# Patient Record
Sex: FEMALE | Race: ASIAN | NOT HISPANIC OR LATINO | URBAN - METROPOLITAN AREA
[De-identification: names, ages, dates, MRNs, and addresses within clinical notes are randomized per-mention and may not be internally consistent; named-entity substitution may affect disease eponyms.]

---

## 2017-02-27 ENCOUNTER — EMERGENCY (EMERGENCY)
Facility: HOSPITAL | Age: 72
LOS: 1 days | Discharge: HOME | End: 2017-02-27
Attending: EMERGENCY MEDICINE | Admitting: EMERGENCY MEDICINE
Payer: MEDICARE

## 2017-02-27 VITALS
OXYGEN SATURATION: 100 % | DIASTOLIC BLOOD PRESSURE: 51 MMHG | SYSTOLIC BLOOD PRESSURE: 123 MMHG | HEART RATE: 70 BPM | RESPIRATION RATE: 18 BRPM

## 2017-02-27 VITALS
DIASTOLIC BLOOD PRESSURE: 71 MMHG | TEMPERATURE: 99 F | OXYGEN SATURATION: 98 % | HEART RATE: 73 BPM | RESPIRATION RATE: 18 BRPM | SYSTOLIC BLOOD PRESSURE: 130 MMHG

## 2017-02-27 LAB
ALBUMIN SERPL ELPH-MCNC: 4.4 G/DL — SIGNIFICANT CHANGE UP (ref 3.3–5)
ALP SERPL-CCNC: 54 U/L — SIGNIFICANT CHANGE UP (ref 40–120)
ALT FLD-CCNC: 26 U/L — SIGNIFICANT CHANGE UP (ref 4–33)
APTT BLD: 27.2 SEC — LOW (ref 27.5–37.4)
AST SERPL-CCNC: 23 U/L — SIGNIFICANT CHANGE UP (ref 4–32)
BASOPHILS # BLD AUTO: 0.03 K/UL — SIGNIFICANT CHANGE UP (ref 0–0.2)
BASOPHILS NFR BLD AUTO: 0.3 % — SIGNIFICANT CHANGE UP (ref 0–2)
BILIRUB SERPL-MCNC: 0.9 MG/DL — SIGNIFICANT CHANGE UP (ref 0.2–1.2)
BUN SERPL-MCNC: 20 MG/DL — SIGNIFICANT CHANGE UP (ref 7–23)
CALCIUM SERPL-MCNC: 9.7 MG/DL — SIGNIFICANT CHANGE UP (ref 8.4–10.5)
CHLORIDE SERPL-SCNC: 101 MMOL/L — SIGNIFICANT CHANGE UP (ref 98–107)
CO2 SERPL-SCNC: 27 MMOL/L — SIGNIFICANT CHANGE UP (ref 22–31)
CREAT SERPL-MCNC: 0.8 MG/DL — SIGNIFICANT CHANGE UP (ref 0.5–1.3)
EOSINOPHIL # BLD AUTO: 0.08 K/UL — SIGNIFICANT CHANGE UP (ref 0–0.5)
EOSINOPHIL NFR BLD AUTO: 0.9 % — SIGNIFICANT CHANGE UP (ref 0–6)
GLUCOSE SERPL-MCNC: 120 MG/DL — HIGH (ref 70–99)
HCT VFR BLD CALC: 39.8 % — SIGNIFICANT CHANGE UP (ref 34.5–45)
HGB BLD-MCNC: 13.8 G/DL — SIGNIFICANT CHANGE UP (ref 11.5–15.5)
IMM GRANULOCYTES NFR BLD AUTO: 0.9 % — SIGNIFICANT CHANGE UP (ref 0–1.5)
INR BLD: 0.96 — SIGNIFICANT CHANGE UP (ref 0.87–1.18)
LYMPHOCYTES # BLD AUTO: 2.19 K/UL — SIGNIFICANT CHANGE UP (ref 1–3.3)
LYMPHOCYTES # BLD AUTO: 25.3 % — SIGNIFICANT CHANGE UP (ref 13–44)
MAGNESIUM SERPL-MCNC: 2 MG/DL — SIGNIFICANT CHANGE UP (ref 1.6–2.6)
MCHC RBC-ENTMCNC: 31.7 PG — SIGNIFICANT CHANGE UP (ref 27–34)
MCHC RBC-ENTMCNC: 34.7 % — SIGNIFICANT CHANGE UP (ref 32–36)
MCV RBC AUTO: 91.5 FL — SIGNIFICANT CHANGE UP (ref 80–100)
MONOCYTES # BLD AUTO: 0.8 K/UL — SIGNIFICANT CHANGE UP (ref 0–0.9)
MONOCYTES NFR BLD AUTO: 9.3 % — SIGNIFICANT CHANGE UP (ref 2–14)
NEUTROPHILS # BLD AUTO: 5.46 K/UL — SIGNIFICANT CHANGE UP (ref 1.8–7.4)
NEUTROPHILS NFR BLD AUTO: 63.3 % — SIGNIFICANT CHANGE UP (ref 43–77)
PLATELET # BLD AUTO: 169 K/UL — SIGNIFICANT CHANGE UP (ref 150–400)
PMV BLD: 9.1 FL — SIGNIFICANT CHANGE UP (ref 7–13)
POTASSIUM SERPL-MCNC: 4.3 MMOL/L — SIGNIFICANT CHANGE UP (ref 3.5–5.3)
POTASSIUM SERPL-SCNC: 4.3 MMOL/L — SIGNIFICANT CHANGE UP (ref 3.5–5.3)
PROT SERPL-MCNC: 7.4 G/DL — SIGNIFICANT CHANGE UP (ref 6–8.3)
PROTHROM AB SERPL-ACNC: 10.9 SEC — SIGNIFICANT CHANGE UP (ref 10–13.1)
RBC # BLD: 4.35 M/UL — SIGNIFICANT CHANGE UP (ref 3.8–5.2)
RBC # FLD: 13.1 % — SIGNIFICANT CHANGE UP (ref 10.3–14.5)
SODIUM SERPL-SCNC: 141 MMOL/L — SIGNIFICANT CHANGE UP (ref 135–145)
TROPONIN T SERPL-MCNC: < 0.06 NG/ML — SIGNIFICANT CHANGE UP (ref 0–0.06)
WBC # BLD: 8.64 K/UL — SIGNIFICANT CHANGE UP (ref 3.8–10.5)
WBC # FLD AUTO: 8.64 K/UL — SIGNIFICANT CHANGE UP (ref 3.8–10.5)

## 2017-02-27 PROCEDURE — 74176 CT ABD & PELVIS W/O CONTRAST: CPT | Mod: 26

## 2017-02-27 PROCEDURE — 70450 CT HEAD/BRAIN W/O DYE: CPT | Mod: 26

## 2017-02-27 PROCEDURE — 99285 EMERGENCY DEPT VISIT HI MDM: CPT | Mod: GC

## 2017-02-27 PROCEDURE — 72125 CT NECK SPINE W/O DYE: CPT | Mod: 26

## 2017-02-27 PROCEDURE — 71250 CT THORAX DX C-: CPT | Mod: 26
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
## 2018-09-14 NOTE — ED ADULT NURSE NOTE - FALL HARM RISK TYPE OF ASSESSMENT
pt presents to ED with complaint of LLQ abdominal pain starting this am. pt states pain began this morning, but has become progressively worse. pt denies n/v/d.
Daily Assessment

## 2019-05-01 ENCOUNTER — OUTPATIENT (OUTPATIENT)
Dept: OUTPATIENT SERVICES | Facility: HOSPITAL | Age: 74
LOS: 1 days | End: 2019-05-01
Payer: MEDICARE

## 2019-05-01 PROCEDURE — G9001: CPT

## 2019-05-08 ENCOUNTER — INPATIENT (INPATIENT)
Facility: HOSPITAL | Age: 74
LOS: 0 days | Discharge: HOME | DRG: 247 | End: 2019-05-09
Attending: INTERNAL MEDICINE | Admitting: INTERNAL MEDICINE
Payer: MEDICARE

## 2019-05-08 ENCOUNTER — TRANSCRIPTION ENCOUNTER (OUTPATIENT)
Age: 74
End: 2019-05-08

## 2019-05-08 VITALS
HEIGHT: 61 IN | HEART RATE: 56 BPM | WEIGHT: 126.1 LBS | RESPIRATION RATE: 20 BRPM | OXYGEN SATURATION: 97 % | DIASTOLIC BLOOD PRESSURE: 59 MMHG | TEMPERATURE: 98 F | SYSTOLIC BLOOD PRESSURE: 119 MMHG

## 2019-05-08 DIAGNOSIS — Z98.891 HISTORY OF UTERINE SCAR FROM PREVIOUS SURGERY: Chronic | ICD-10-CM

## 2019-05-08 DIAGNOSIS — R94.7 ABNORMAL RESULTS OF OTHER ENDOCRINE FUNCTION STUDIES: ICD-10-CM

## 2019-05-08 DIAGNOSIS — Z90.710 ACQUIRED ABSENCE OF BOTH CERVIX AND UTERUS: Chronic | ICD-10-CM

## 2019-05-08 LAB
ALBUMIN SERPL ELPH-MCNC: 4.9 G/DL — SIGNIFICANT CHANGE UP (ref 3.3–5)
ALP SERPL-CCNC: 40 U/L — SIGNIFICANT CHANGE UP (ref 40–120)
ALT FLD-CCNC: 21 U/L — SIGNIFICANT CHANGE UP (ref 10–45)
ANION GAP SERPL CALC-SCNC: 13 MMOL/L — SIGNIFICANT CHANGE UP (ref 5–17)
AST SERPL-CCNC: 25 U/L — SIGNIFICANT CHANGE UP (ref 10–40)
BILIRUB SERPL-MCNC: 0.5 MG/DL — SIGNIFICANT CHANGE UP (ref 0.2–1.2)
BUN SERPL-MCNC: 20 MG/DL — SIGNIFICANT CHANGE UP (ref 7–23)
CALCIUM SERPL-MCNC: 10.7 MG/DL — HIGH (ref 8.4–10.5)
CHLORIDE SERPL-SCNC: 102 MMOL/L — SIGNIFICANT CHANGE UP (ref 96–108)
CO2 SERPL-SCNC: 27 MMOL/L — SIGNIFICANT CHANGE UP (ref 22–31)
CREAT SERPL-MCNC: 0.95 MG/DL — SIGNIFICANT CHANGE UP (ref 0.5–1.3)
GLUCOSE BLDC GLUCOMTR-MCNC: 140 MG/DL — HIGH (ref 70–99)
GLUCOSE BLDC GLUCOMTR-MCNC: 226 MG/DL — HIGH (ref 70–99)
GLUCOSE BLDC GLUCOMTR-MCNC: 92 MG/DL — SIGNIFICANT CHANGE UP (ref 70–99)
GLUCOSE BLDC GLUCOMTR-MCNC: 95 MG/DL — SIGNIFICANT CHANGE UP (ref 70–99)
GLUCOSE SERPL-MCNC: 100 MG/DL — HIGH (ref 70–99)
HBA1C BLD-MCNC: 7.1 % — HIGH (ref 4–5.6)
HCT VFR BLD CALC: 39.8 % — SIGNIFICANT CHANGE UP (ref 34.5–45)
HGB BLD-MCNC: 13.6 G/DL — SIGNIFICANT CHANGE UP (ref 11.5–15.5)
MCHC RBC-ENTMCNC: 30.6 PG — SIGNIFICANT CHANGE UP (ref 27–34)
MCHC RBC-ENTMCNC: 34.1 GM/DL — SIGNIFICANT CHANGE UP (ref 32–36)
MCV RBC AUTO: 90 FL — SIGNIFICANT CHANGE UP (ref 80–100)
PLATELET # BLD AUTO: 247 K/UL — SIGNIFICANT CHANGE UP (ref 150–400)
POTASSIUM SERPL-MCNC: 4.5 MMOL/L — SIGNIFICANT CHANGE UP (ref 3.5–5.3)
POTASSIUM SERPL-SCNC: 4.5 MMOL/L — SIGNIFICANT CHANGE UP (ref 3.5–5.3)
PROT SERPL-MCNC: 7.6 G/DL — SIGNIFICANT CHANGE UP (ref 6–8.3)
RBC # BLD: 4.42 M/UL — SIGNIFICANT CHANGE UP (ref 3.8–5.2)
RBC # FLD: 12.4 % — SIGNIFICANT CHANGE UP (ref 10.3–14.5)
SODIUM SERPL-SCNC: 142 MMOL/L — SIGNIFICANT CHANGE UP (ref 135–145)
WBC # BLD: 5.8 K/UL — SIGNIFICANT CHANGE UP (ref 3.8–10.5)
WBC # FLD AUTO: 5.8 K/UL — SIGNIFICANT CHANGE UP (ref 3.8–10.5)

## 2019-05-08 PROCEDURE — 93458 L HRT ARTERY/VENTRICLE ANGIO: CPT | Mod: 26,XU

## 2019-05-08 PROCEDURE — 93010 ELECTROCARDIOGRAM REPORT: CPT | Mod: 76

## 2019-05-08 PROCEDURE — 92928 PRQ TCAT PLMT NTRAC ST 1 LES: CPT | Mod: LC,GC

## 2019-05-08 PROCEDURE — 99152 MOD SED SAME PHYS/QHP 5/>YRS: CPT | Mod: GC

## 2019-05-08 RX ORDER — ASPIRIN/CALCIUM CARB/MAGNESIUM 324 MG
81 TABLET ORAL DAILY
Qty: 0 | Refills: 0 | Status: DISCONTINUED | OUTPATIENT
Start: 2019-05-08 | End: 2019-05-09

## 2019-05-08 RX ORDER — SODIUM CHLORIDE 9 MG/ML
1000 INJECTION, SOLUTION INTRAVENOUS
Qty: 0 | Refills: 0 | Status: DISCONTINUED | OUTPATIENT
Start: 2019-05-08 | End: 2019-05-09

## 2019-05-08 RX ORDER — CLOPIDOGREL BISULFATE 75 MG/1
75 TABLET, FILM COATED ORAL DAILY
Qty: 0 | Refills: 0 | Status: DISCONTINUED | OUTPATIENT
Start: 2019-05-08 | End: 2019-05-09

## 2019-05-08 RX ORDER — METOPROLOL TARTRATE 50 MG
25 TABLET ORAL DAILY
Qty: 0 | Refills: 0 | Status: DISCONTINUED | OUTPATIENT
Start: 2019-05-08 | End: 2019-05-09

## 2019-05-08 RX ORDER — DEXTROSE 50 % IN WATER 50 %
15 SYRINGE (ML) INTRAVENOUS ONCE
Qty: 0 | Refills: 0 | Status: DISCONTINUED | OUTPATIENT
Start: 2019-05-08 | End: 2019-05-09

## 2019-05-08 RX ORDER — GLUCAGON INJECTION, SOLUTION 0.5 MG/.1ML
1 INJECTION, SOLUTION SUBCUTANEOUS ONCE
Qty: 0 | Refills: 0 | Status: DISCONTINUED | OUTPATIENT
Start: 2019-05-08 | End: 2019-05-09

## 2019-05-08 RX ORDER — DEXTROSE 50 % IN WATER 50 %
25 SYRINGE (ML) INTRAVENOUS ONCE
Qty: 0 | Refills: 0 | Status: DISCONTINUED | OUTPATIENT
Start: 2019-05-08 | End: 2019-05-09

## 2019-05-08 RX ORDER — INSULIN LISPRO 100/ML
VIAL (ML) SUBCUTANEOUS
Qty: 0 | Refills: 0 | Status: DISCONTINUED | OUTPATIENT
Start: 2019-05-08 | End: 2019-05-09

## 2019-05-08 RX ORDER — INSULIN LISPRO 100/ML
VIAL (ML) SUBCUTANEOUS AT BEDTIME
Qty: 0 | Refills: 0 | Status: DISCONTINUED | OUTPATIENT
Start: 2019-05-08 | End: 2019-05-09

## 2019-05-08 RX ORDER — INSULIN GLARGINE 100 [IU]/ML
40 INJECTION, SOLUTION SUBCUTANEOUS AT BEDTIME
Qty: 0 | Refills: 0 | Status: DISCONTINUED | OUTPATIENT
Start: 2019-05-08 | End: 2019-05-09

## 2019-05-08 RX ORDER — ATORVASTATIN CALCIUM 80 MG/1
40 TABLET, FILM COATED ORAL AT BEDTIME
Qty: 0 | Refills: 0 | Status: DISCONTINUED | OUTPATIENT
Start: 2019-05-08 | End: 2019-05-09

## 2019-05-08 RX ORDER — SODIUM CHLORIDE 9 MG/ML
3 INJECTION INTRAMUSCULAR; INTRAVENOUS; SUBCUTANEOUS EVERY 8 HOURS
Qty: 0 | Refills: 0 | Status: DISCONTINUED | OUTPATIENT
Start: 2019-05-08 | End: 2019-05-09

## 2019-05-08 RX ORDER — HYDROCHLOROTHIAZIDE 25 MG
12.5 TABLET ORAL DAILY
Qty: 0 | Refills: 0 | Status: DISCONTINUED | OUTPATIENT
Start: 2019-05-08 | End: 2019-05-09

## 2019-05-08 RX ORDER — DEXTROSE 50 % IN WATER 50 %
12.5 SYRINGE (ML) INTRAVENOUS ONCE
Qty: 0 | Refills: 0 | Status: DISCONTINUED | OUTPATIENT
Start: 2019-05-08 | End: 2019-05-09

## 2019-05-08 RX ORDER — VALSARTAN 80 MG/1
320 TABLET ORAL DAILY
Qty: 0 | Refills: 0 | Status: DISCONTINUED | OUTPATIENT
Start: 2019-05-08 | End: 2019-05-09

## 2019-05-08 RX ORDER — FENOFIBRATE,MICRONIZED 130 MG
145 CAPSULE ORAL DAILY
Qty: 0 | Refills: 0 | Status: DISCONTINUED | OUTPATIENT
Start: 2019-05-08 | End: 2019-05-09

## 2019-05-08 RX ORDER — SODIUM CHLORIDE 9 MG/ML
250 INJECTION INTRAMUSCULAR; INTRAVENOUS; SUBCUTANEOUS ONCE
Qty: 0 | Refills: 0 | Status: COMPLETED | OUTPATIENT
Start: 2019-05-08 | End: 2019-05-08

## 2019-05-08 RX ORDER — AMLODIPINE BESYLATE 2.5 MG/1
5 TABLET ORAL DAILY
Qty: 0 | Refills: 0 | Status: DISCONTINUED | OUTPATIENT
Start: 2019-05-08 | End: 2019-05-09

## 2019-05-08 RX ADMIN — ATORVASTATIN CALCIUM 40 MILLIGRAM(S): 80 TABLET, FILM COATED ORAL at 21:05

## 2019-05-08 RX ADMIN — SODIUM CHLORIDE 3 MILLILITER(S): 9 INJECTION INTRAMUSCULAR; INTRAVENOUS; SUBCUTANEOUS at 16:10

## 2019-05-08 RX ADMIN — SODIUM CHLORIDE 750 MILLILITER(S): 9 INJECTION INTRAMUSCULAR; INTRAVENOUS; SUBCUTANEOUS at 12:52

## 2019-05-08 RX ADMIN — Medication 145 MILLIGRAM(S): at 18:18

## 2019-05-08 RX ADMIN — SODIUM CHLORIDE 3 MILLILITER(S): 9 INJECTION INTRAMUSCULAR; INTRAVENOUS; SUBCUTANEOUS at 21:18

## 2019-05-08 RX ADMIN — INSULIN GLARGINE 40 UNIT(S): 100 INJECTION, SOLUTION SUBCUTANEOUS at 21:08

## 2019-05-08 NOTE — H&P CARDIOLOGY - PMH
Hypertension HTN (hypertension), benign    Hypertension    IDDM (insulin dependent diabetes mellitus)

## 2019-05-08 NOTE — H&P CARDIOLOGY - HISTORY OF PRESENT ILLNESS
72 y/o female with PMH of HTN presents with c/o HANSEN ( while swimming) Denies chest pain or palpitations s/p stress test on 5/2019 which demonstrates reversible anteroapical defect with EF~69% seen & evaluated by cardiologist & now recommends for cardiac cath. 72 y/o female with PMH of HTN HLD, IDDM, presents with c/o HANSEN ( while swimming) & fatigue, Denies chest pain or palpitations s/p stress test on 5/2019 which demonstrates reversible anteroapical defect with EF~69% seen & evaluated by cardiologist Dr Malick Juarez & now recommends for cardiac cath.

## 2019-05-09 ENCOUNTER — TRANSCRIPTION ENCOUNTER (OUTPATIENT)
Age: 74
End: 2019-05-09

## 2019-05-09 VITALS
RESPIRATION RATE: 16 BRPM | TEMPERATURE: 98 F | DIASTOLIC BLOOD PRESSURE: 49 MMHG | HEART RATE: 69 BPM | SYSTOLIC BLOOD PRESSURE: 120 MMHG | OXYGEN SATURATION: 97 %

## 2019-05-09 DIAGNOSIS — Z71.89 OTHER SPECIFIED COUNSELING: ICD-10-CM

## 2019-05-09 LAB
ANION GAP SERPL CALC-SCNC: 11 MMOL/L — SIGNIFICANT CHANGE UP (ref 5–17)
BUN SERPL-MCNC: 22 MG/DL — SIGNIFICANT CHANGE UP (ref 7–23)
CALCIUM SERPL-MCNC: 9.8 MG/DL — SIGNIFICANT CHANGE UP (ref 8.4–10.5)
CHLORIDE SERPL-SCNC: 103 MMOL/L — SIGNIFICANT CHANGE UP (ref 96–108)
CO2 SERPL-SCNC: 26 MMOL/L — SIGNIFICANT CHANGE UP (ref 22–31)
CREAT SERPL-MCNC: 0.92 MG/DL — SIGNIFICANT CHANGE UP (ref 0.5–1.3)
GLUCOSE BLDC GLUCOMTR-MCNC: 106 MG/DL — HIGH (ref 70–99)
GLUCOSE SERPL-MCNC: 101 MG/DL — HIGH (ref 70–99)
HCT VFR BLD CALC: 37.2 % — SIGNIFICANT CHANGE UP (ref 34.5–45)
HGB BLD-MCNC: 13 G/DL — SIGNIFICANT CHANGE UP (ref 11.5–15.5)
MCHC RBC-ENTMCNC: 31.4 PG — SIGNIFICANT CHANGE UP (ref 27–34)
MCHC RBC-ENTMCNC: 34.9 GM/DL — SIGNIFICANT CHANGE UP (ref 32–36)
MCV RBC AUTO: 90.1 FL — SIGNIFICANT CHANGE UP (ref 80–100)
PLATELET # BLD AUTO: 213 K/UL — SIGNIFICANT CHANGE UP (ref 150–400)
POTASSIUM SERPL-MCNC: 3.8 MMOL/L — SIGNIFICANT CHANGE UP (ref 3.5–5.3)
POTASSIUM SERPL-SCNC: 3.8 MMOL/L — SIGNIFICANT CHANGE UP (ref 3.5–5.3)
RBC # BLD: 4.13 M/UL — SIGNIFICANT CHANGE UP (ref 3.8–5.2)
RBC # FLD: 12.4 % — SIGNIFICANT CHANGE UP (ref 10.3–14.5)
SODIUM SERPL-SCNC: 140 MMOL/L — SIGNIFICANT CHANGE UP (ref 135–145)
WBC # BLD: 6.4 K/UL — SIGNIFICANT CHANGE UP (ref 3.8–10.5)
WBC # FLD AUTO: 6.4 K/UL — SIGNIFICANT CHANGE UP (ref 3.8–10.5)

## 2019-05-09 PROCEDURE — C1887: CPT

## 2019-05-09 PROCEDURE — C1894: CPT

## 2019-05-09 PROCEDURE — 99152 MOD SED SAME PHYS/QHP 5/>YRS: CPT

## 2019-05-09 PROCEDURE — 99153 MOD SED SAME PHYS/QHP EA: CPT

## 2019-05-09 PROCEDURE — C1874: CPT

## 2019-05-09 PROCEDURE — 99231 SBSQ HOSP IP/OBS SF/LOW 25: CPT

## 2019-05-09 PROCEDURE — 85027 COMPLETE CBC AUTOMATED: CPT

## 2019-05-09 PROCEDURE — C9600: CPT | Mod: LC

## 2019-05-09 PROCEDURE — 82962 GLUCOSE BLOOD TEST: CPT

## 2019-05-09 PROCEDURE — 80048 BASIC METABOLIC PNL TOTAL CA: CPT

## 2019-05-09 PROCEDURE — 83036 HEMOGLOBIN GLYCOSYLATED A1C: CPT

## 2019-05-09 PROCEDURE — 93005 ELECTROCARDIOGRAM TRACING: CPT

## 2019-05-09 PROCEDURE — 93458 L HRT ARTERY/VENTRICLE ANGIO: CPT | Mod: XU

## 2019-05-09 PROCEDURE — C1769: CPT

## 2019-05-09 PROCEDURE — 80053 COMPREHEN METABOLIC PANEL: CPT

## 2019-05-09 RX ORDER — SITAGLIPTIN AND METFORMIN HYDROCHLORIDE 500; 50 MG/1; MG/1
1 TABLET, FILM COATED ORAL
Qty: 0 | Refills: 0 | DISCHARGE

## 2019-05-09 RX ORDER — CLOPIDOGREL BISULFATE 75 MG/1
1 TABLET, FILM COATED ORAL
Qty: 90 | Refills: 3
Start: 2019-05-09 | End: 2020-05-02

## 2019-05-09 RX ADMIN — VALSARTAN 320 MILLIGRAM(S): 80 TABLET ORAL at 07:45

## 2019-05-09 RX ADMIN — SODIUM CHLORIDE 3 MILLILITER(S): 9 INJECTION INTRAMUSCULAR; INTRAVENOUS; SUBCUTANEOUS at 05:24

## 2019-05-09 RX ADMIN — CLOPIDOGREL BISULFATE 75 MILLIGRAM(S): 75 TABLET, FILM COATED ORAL at 07:44

## 2019-05-09 RX ADMIN — AMLODIPINE BESYLATE 5 MILLIGRAM(S): 2.5 TABLET ORAL at 07:44

## 2019-05-09 RX ADMIN — Medication 81 MILLIGRAM(S): at 07:44

## 2019-05-09 RX ADMIN — Medication 12.5 MILLIGRAM(S): at 07:44

## 2019-05-09 RX ADMIN — Medication 25 MILLIGRAM(S): at 07:44

## 2019-05-09 NOTE — DISCHARGE NOTE PROVIDER - HOSPITAL COURSE
HPI:    74 y/o female with PMH of HTN HLD, IDDM, presents with c/o HANSEN ( while swimming) & fatigue, Denies chest pain or palpitations s/p stress test on 5/2019 which demonstrates reversible anteroapical defect with EF~69% seen & evaluated by cardiologist Dr Malick Juarez & now recommends for cardiac cath. (08 May 2019 11:28).        5/8 cardiac cath one stent to the distal left circ. Right radial site without swelling, bleeding.

## 2019-05-09 NOTE — DISCHARGE NOTE PROVIDER - NSDCCPCAREPLAN_GEN_ALL_CORE_FT
PRINCIPAL DISCHARGE DIAGNOSIS  Diagnosis: CAD (coronary artery disease), native coronary artery  Assessment and Plan of Treatment: Do not stop your aspirin or Plavix unless instructed to do so by your cardiologist, they help keep your stented arteries open.   No heavy lifting or pushing/pulling with procedure arm for 2 weeks. No driving for 2 days. You may shower 24 hours following the procedure but avoid baths/swimming for 1 week. Check your wrist site for bleeding and/or swelling daily following procedure and call your doctor immediately if it occurs or if you experience increased pain at the site. Follow up with your cardiologist in 1-2 weeks. You may call [facility] if you have any questions/concerns regarding your procedure [phone].      SECONDARY DISCHARGE DIAGNOSES  Diagnosis: HTN (hypertension)  Assessment and Plan of Treatment: Continue with your blood pressure medications; eat a heart healthy diet with low salt diet; exercise regularly (consult with your physician or cardiologist first); maintain a heart healthy weight; if you smoke - quit (A resource to help you stop smoking is the [facility] [employer] – phone number [phone].); include healthy ways to manage stress. Continue to follow with your primary care physician or cardiologist.    Diagnosis: HLD (hyperlipidemia)  Assessment and Plan of Treatment: Continue with your cholesterol medications. Eat a heart healthy diet that is low in saturated fats and salt, and includes whole grains, fruits, vegetables and lean protein; exercise regularly (consult with your physician or cardiologist first); maintain a heart healthy weight; if you smoke - quit (A resource to help you stop smoking is the [facility] [employer] [facility] – phone number [phone].). Continue to follow with your primary physician or cardiologist.

## 2019-05-09 NOTE — DISCHARGE NOTE PROVIDER - NSDCPNSUBOBJ_GEN_ALL_CORE
Patient is a 73y old  Female who presents with a chief complaint of                 Allergies        No Known Allergies        Intolerances                Medications:    amLODIPine   Tablet 5 milliGRAM(s) Oral daily    aspirin enteric coated 81 milliGRAM(s) Oral daily    atorvastatin 40 milliGRAM(s) Oral at bedtime    clopidogrel Tablet 75 milliGRAM(s) Oral daily    dextrose 40% Gel 15 Gram(s) Oral once PRN    dextrose 5%. 1000 milliLiter(s) IV Continuous <Continuous>    dextrose 50% Injectable 12.5 Gram(s) IV Push once    dextrose 50% Injectable 25 Gram(s) IV Push once    dextrose 50% Injectable 25 Gram(s) IV Push once    fenofibrate Tablet 145 milliGRAM(s) Oral daily    glucagon  Injectable 1 milliGRAM(s) IntraMuscular once PRN    hydrochlorothiazide 12.5 milliGRAM(s) Oral daily    insulin glargine Injectable (LANTUS) 40 Unit(s) SubCutaneous at bedtime    insulin lispro (HumaLOG) corrective regimen sliding scale   SubCutaneous three times a day before meals    insulin lispro (HumaLOG) corrective regimen sliding scale   SubCutaneous at bedtime    metoprolol succinate ER 25 milliGRAM(s) Oral daily    sodium chloride 0.9% lock flush 3 milliLiter(s) IV Push every 8 hours    valsartan 320 milliGRAM(s) Oral daily            Vitals:    T(C): 37.1 (19 @ 20:05), Max: 37.1 (19 @ 20:05)    HR: 66 (19 @ 20:05) (56 - 72)    BP: 112/39 (19 @ 20:05) (107/51 - 138/60)    BP(mean): 79 (19 @ 11:28) (79 - 79)    RR: 18 (19 @ 20:05) (18 - 20)    SpO2: 97% (19 @ 20:05) (96% - 97%)    Wt(kg): --    Daily Height in cm: 154.94 (08 May 2019 11:28)      Daily Weight in k.2 (08 May 2019 11:28)    I&O's Summary        08 May 2019 07:01  -  09 May 2019 00:46    --------------------------------------------------------    IN: 180 mL / OUT: 0 mL / NET: 180 mL                    Physical Exam:    Appearance: Normal    Eyes: PERRL, EOMI    HENT: Normal oral muscosa, NC/AT    Cardiovascular: S1S2, RRR, No M/R/G, no JVD, No Lower extremity edema    Procedural Access Site: No hematoma, Non-tender to palpation, 2+ pulse, No bruit, No Ecchymosis    Respiratory: Clear to auscultation bilaterally    Gastrointestinal: Soft, Non tender, Normal Bowel Sounds    Musculoskeletal: No clubbing, No joint deformity     Neurologic: Non-focal    Lymphatic: No lymphadenopathy    Psychiatry: AAOx3, Mood & affect appropriate    Skin: No rashes, No ecchymoses, No cyanosis                142  |  102  |  20    ----------------------------<  100<H>    4.5   |  27  |  0.95        Ca    10.7<H>      08 May 2019 11:52        TPro  7.6  /  Alb  4.9  /  TBili  0.5  /  DBili  x   /  AST  25  /  ALT  21  /  AlkPhos  40                          Lipid panel     Hgb A1c Hemoglobin A1C, Whole Blood: 7.1 % ( @ 20:12)                                13.6     5.8   )-----------( 247      ( 08 May 2019 11:52 )               39.8                 ECG: SB 57 bpm        Cath: one distal left circ stent        Imaging:        Interpretation of Telemetry: Patient is a 73y old  Female who presents with a chief complaint of abnormal stress test (09 May 2019 00:19)                    Allergies        No Known Allergies        Intolerances                Medications:    amLODIPine   Tablet 5 milliGRAM(s) Oral daily    aspirin enteric coated 81 milliGRAM(s) Oral daily    atorvastatin 40 milliGRAM(s) Oral at bedtime    clopidogrel Tablet 75 milliGRAM(s) Oral daily    dextrose 40% Gel 15 Gram(s) Oral once PRN    dextrose 5%. 1000 milliLiter(s) IV Continuous <Continuous>    dextrose 50% Injectable 12.5 Gram(s) IV Push once    dextrose 50% Injectable 25 Gram(s) IV Push once    dextrose 50% Injectable 25 Gram(s) IV Push once    fenofibrate Tablet 145 milliGRAM(s) Oral daily    glucagon  Injectable 1 milliGRAM(s) IntraMuscular once PRN    hydrochlorothiazide 12.5 milliGRAM(s) Oral daily    insulin glargine Injectable (LANTUS) 40 Unit(s) SubCutaneous at bedtime    insulin lispro (HumaLOG) corrective regimen sliding scale   SubCutaneous three times a day before meals    insulin lispro (HumaLOG) corrective regimen sliding scale   SubCutaneous at bedtime    metoprolol succinate ER 25 milliGRAM(s) Oral daily    sodium chloride 0.9% lock flush 3 milliLiter(s) IV Push every 8 hours    valsartan 320 milliGRAM(s) Oral daily            Vitals:    T(C): 37.1 (19 @ 20:05), Max: 37.1 (19 @ 20:05)    HR: 66 (19 @ 20:05) (56 - 72)    BP: 112/39 (19 @ 20:05) (107/51 - 138/60)    BP(mean): 79 (19 @ 11:28) (79 - 79)    RR: 18 (19 @ 20:05) (18 - 20)    SpO2: 97% (19 @ 20:05) (96% - 97%)    Wt(kg): --    Daily Height in cm: 154.94 (08 May 2019 11:28)      Daily Weight in k.2 (08 May 2019 11:28)    I&O's Summary        08 May 2019 07:01  -  09 May 2019 02:49    --------------------------------------------------------    IN: 180 mL / OUT: 0 mL / NET: 180 mL                    Physical Exam:    Appearance: Normal    Eyes: PERRL, EOMI    HENT: Normal oral muscosa, NC/AT    Cardiovascular: S1S2, RRR, No M/R/G, no JVD, No Lower extremity edema    Procedural Access Site: No hematoma, Non-tender to palpation, 2+ pulse, No bruit, No Ecchymosis    Respiratory: Clear to auscultation bilaterally    Gastrointestinal: Soft, Non tender, Normal Bowel Sounds    Musculoskeletal: No clubbing, No joint deformity     Neurologic: Non-focal    Lymphatic: No lymphadenopathy    Psychiatry: AAOx3, Mood & affect appropriate    Skin: No rashes, No ecchymoses, No cyanosis                140  |  103  |  22    ----------------------------<  101<H>    3.8   |  26  |  0.92        Ca    9.8      09 May 2019 01:22        TPro  7.6  /  Alb  4.9  /  TBili  0.5  /  DBili  x   /  AST  25  /  ALT  21  /  AlkPhos  40                          Lipid panel     Hgb A1c Hemoglobin A1C, Whole Blood: 7.1 % ( @ 20:12)                                13.0     6.4   )-----------( 213      ( 09 May 2019 01:22 )               37.2                 ECG: SB 57 bpm        Cath: one distal left circ stent        Imaging:        Interpretation of Telemetry:

## 2019-05-09 NOTE — DISCHARGE NOTE PROVIDER - CARE PROVIDER_API CALL
Malick Juarez (MD)  Cardiology  [address] [place] [address]  Phone: [phone]  Fax: [phone]  Follow Up Time:

## 2019-05-09 NOTE — DISCHARGE NOTE PROVIDER - NSDCCPTREATMENT_GEN_ALL_CORE_FT
PRINCIPAL PROCEDURE  Procedure: Placement of coronary artery stent  Findings and Treatment: one distal left circ stent

## 2019-05-09 NOTE — DISCHARGE NOTE NURSING/CASE MANAGEMENT/SOCIAL WORK - NSDCDPATPORTLINK_GEN_ALL_CORE
You can access the [facility] Patient Portal, offered by [facility], by registering with the following website: [email or web address]

## 2020-12-24 PROBLEM — I10 ESSENTIAL (PRIMARY) HYPERTENSION: Chronic | Status: ACTIVE | Noted: 2019-05-08

## 2020-12-24 PROBLEM — E11.9 TYPE 2 DIABETES MELLITUS WITHOUT COMPLICATIONS: Chronic | Status: ACTIVE | Noted: 2019-05-08

## 2021-01-03 ENCOUNTER — APPOINTMENT (OUTPATIENT)
Dept: DISASTER EMERGENCY | Facility: CLINIC | Age: 76
End: 2021-01-03

## 2021-01-04 LAB — SARS-COV-2 N GENE NPH QL NAA+PROBE: NOT DETECTED

## 2021-01-09 DIAGNOSIS — Z01.818 ENCOUNTER FOR OTHER PREPROCEDURAL EXAMINATION: ICD-10-CM

## 2021-01-10 ENCOUNTER — APPOINTMENT (OUTPATIENT)
Dept: DISASTER EMERGENCY | Facility: CLINIC | Age: 76
End: 2021-01-10

## 2021-01-12 ENCOUNTER — FORM ENCOUNTER (OUTPATIENT)
Age: 76
End: 2021-01-12

## 2021-01-12 LAB — SARS-COV-2 N GENE NPH QL NAA+PROBE: NOT DETECTED

## 2021-01-13 ENCOUNTER — OUTPATIENT (OUTPATIENT)
Dept: OUTPATIENT SERVICES | Facility: HOSPITAL | Age: 76
LOS: 1 days | End: 2021-01-13
Payer: MEDICARE

## 2021-01-13 VITALS
DIASTOLIC BLOOD PRESSURE: 63 MMHG | WEIGHT: 125 LBS | RESPIRATION RATE: 18 BRPM | SYSTOLIC BLOOD PRESSURE: 143 MMHG | HEIGHT: 64 IN | TEMPERATURE: 98 F | OXYGEN SATURATION: 96 % | HEART RATE: 59 BPM

## 2021-01-13 VITALS — OXYGEN SATURATION: 97 % | DIASTOLIC BLOOD PRESSURE: 65 MMHG | HEART RATE: 62 BPM | SYSTOLIC BLOOD PRESSURE: 141 MMHG

## 2021-01-13 DIAGNOSIS — Z98.891 HISTORY OF UTERINE SCAR FROM PREVIOUS SURGERY: Chronic | ICD-10-CM

## 2021-01-13 DIAGNOSIS — I25.10 ATHEROSCLEROTIC HEART DISEASE OF NATIVE CORONARY ARTERY WITHOUT ANGINA PECTORIS: ICD-10-CM

## 2021-01-13 DIAGNOSIS — Z90.710 ACQUIRED ABSENCE OF BOTH CERVIX AND UTERUS: Chronic | ICD-10-CM

## 2021-01-13 LAB
ANION GAP SERPL CALC-SCNC: 10 MMOL/L — SIGNIFICANT CHANGE UP (ref 5–17)
BUN SERPL-MCNC: 19 MG/DL — SIGNIFICANT CHANGE UP (ref 7–23)
CALCIUM SERPL-MCNC: 9.7 MG/DL — SIGNIFICANT CHANGE UP (ref 8.4–10.5)
CHLORIDE SERPL-SCNC: 103 MMOL/L — SIGNIFICANT CHANGE UP (ref 96–108)
CO2 SERPL-SCNC: 24 MMOL/L — SIGNIFICANT CHANGE UP (ref 22–31)
CREAT SERPL-MCNC: 0.76 MG/DL — SIGNIFICANT CHANGE UP (ref 0.5–1.3)
GLUCOSE SERPL-MCNC: 101 MG/DL — HIGH (ref 70–99)
HCT VFR BLD CALC: 38.7 % — SIGNIFICANT CHANGE UP (ref 34.5–45)
HGB BLD-MCNC: 12.5 G/DL — SIGNIFICANT CHANGE UP (ref 11.5–15.5)
MCHC RBC-ENTMCNC: 29.3 PG — SIGNIFICANT CHANGE UP (ref 27–34)
MCHC RBC-ENTMCNC: 32.3 GM/DL — SIGNIFICANT CHANGE UP (ref 32–36)
MCV RBC AUTO: 90.6 FL — SIGNIFICANT CHANGE UP (ref 80–100)
NRBC # BLD: 0 /100 WBCS — SIGNIFICANT CHANGE UP (ref 0–0)
PLATELET # BLD AUTO: 247 K/UL — SIGNIFICANT CHANGE UP (ref 150–400)
POTASSIUM SERPL-MCNC: 4.3 MMOL/L — SIGNIFICANT CHANGE UP (ref 3.5–5.3)
POTASSIUM SERPL-SCNC: 4.3 MMOL/L — SIGNIFICANT CHANGE UP (ref 3.5–5.3)
RBC # BLD: 4.27 M/UL — SIGNIFICANT CHANGE UP (ref 3.8–5.2)
RBC # FLD: 13.2 % — SIGNIFICANT CHANGE UP (ref 10.3–14.5)
SODIUM SERPL-SCNC: 137 MMOL/L — SIGNIFICANT CHANGE UP (ref 135–145)
WBC # BLD: 5.6 K/UL — SIGNIFICANT CHANGE UP (ref 3.8–10.5)
WBC # FLD AUTO: 5.6 K/UL — SIGNIFICANT CHANGE UP (ref 3.8–10.5)

## 2021-01-13 PROCEDURE — 80048 BASIC METABOLIC PNL TOTAL CA: CPT

## 2021-01-13 PROCEDURE — 93454 CORONARY ARTERY ANGIO S&I: CPT

## 2021-01-13 PROCEDURE — C1887: CPT

## 2021-01-13 PROCEDURE — 85027 COMPLETE CBC AUTOMATED: CPT

## 2021-01-13 PROCEDURE — 93454 CORONARY ARTERY ANGIO S&I: CPT | Mod: 26

## 2021-01-13 PROCEDURE — C1769: CPT

## 2021-01-13 PROCEDURE — C1894: CPT

## 2021-01-13 NOTE — ASU DISCHARGE PLAN (ADULT/PEDIATRIC) - CALL YOUR DOCTOR IF YOU HAVE ANY OF THE FOLLOWING:
Bleeding that does not stop/Swelling that gets worse/Wound/Surgical Site with redness, or foul smelling discharge or pus/Numbness, tingling, color or temperature change to extremity/Nausea and vomiting that does not stop

## 2021-01-13 NOTE — ASU DISCHARGE PLAN (ADULT/PEDIATRIC) - CARE PROVIDER_API CALL
VIOLA VILLAREAL [facility]  Cardiology  [address] [place] [address]  Phone: [phone]  Fax: [phone]  Follow Up Time: 2 weeks

## 2021-01-13 NOTE — H&P CARDIOLOGY - HISTORY OF PRESENT ILLNESS
Mandarin  Zahra  ID # [id number]    66 yo Mandarin speaking M with PMhx HTN , DM, HLD, presents [facility] after experiencing CP.   Pain is substernal radiating to back and epigastric region . Pain not related to exertion or meals , and lasts a few hours. He has no SOB or palpitations . He followed with his cardiologist Dr Glover, and had a stress test with Left anterior descending artery ischemia . He had an ECHO with EF 70%, normal LV systolic function ,  mild basal septal hypertrophy . Stress revealed apicval , septal anterior hypokinesis  suggestive of ischemia.  No fever , chills , N/V/D abdominal pain . COVID negative on  1/9/21.

## 2021-06-07 NOTE — DISCHARGE NOTE PROVIDER - NSDCQMCOGNITION_NEU_ALL_CORE
Free from intentional harm  6/7/2021 0955 by Leatha Aragon RN  Outcome: Ongoing  Note: Patient remains free from intentional harm during this shift. Will continue to monitor and assess patient. 6/6/2021 2318 by Tomas Yoder RN  Outcome: Met This Shift  Note: Pt is free of intentional harm. No intentions noted. Will monitor. Problem: DAILY CARE  Goal: Daily care needs are met  6/7/2021 0955 by Leatha Aragon RN  Outcome: Ongoing  Note: Patient reports all physical/emotional needs are met at this time, will continue to monitor and assess   6/6/2021 2318 by Tomas Yoder RN  Outcome: Ongoing  Note: Checking on patient Q2H for nutrition needs, hygiene needs, comfort measures, mobility, fall risk interventions, and safe environment. All precautions and interventions in place. Educated patient on use of call light and telephone. Patient verbalizes understanding. Call light/telephone in reach. Problem: PAIN  Goal: Patient's pain/discomfort is manageable  6/7/2021 0955 by Leatha Aragon RN  Outcome: Ongoing  Note: Pain managed with pharmacologic and non-pharmacologic interventions during this shift. Will continue to monitor and assess for needs and change in patient condition. 6/6/2021 2318 by Tomas Yoder RN  Outcome: Ongoing  Note: Pain /discomfort being managed with PRN analgesics per MD orders, rest,ice, splinting. Patient able to express presence and absence of pain and rate pain appropriately using numerical scale. Problem: SKIN INTEGRITY  Goal: Skin integrity is maintained or improved  6/7/2021 0955 by Leatha Aragon RN  Outcome: Ongoing  Note: Patient skin condition and mucus membrane integrity remain unchanged during this shift. Skin breakdown prevention interventions are in place. Will continue to monitor and assess. 6/6/2021 2318 by Tomas Yoder RN  Outcome: Ongoing  Note: Vinod score assessed. Patient able to ambulate and turn self.  Repositioned patient Q2H and assessed skin. Educated patient on importance of repositioning to prevent skin issues. Problem: KNOWLEDGE DEFICIT  Goal: Patient/S.O. demonstrates understanding of disease process, treatment plan, medications, and discharge instructions. 6/7/2021 0955 by Ramsey Peoples RN  Outcome: Ongoing  6/6/2021 2318 by Jackelin Flood RN  Outcome: Ongoing  Note: Education for surgical complications given per this shift. Pt verbalized understanding. Problem: DISCHARGE BARRIERS  Goal: Patient's continuum of care needs are met  6/7/2021 0955 by Ramsey Peoples RN  Outcome: Ongoing  Note: Patient reports needs are met at this time, will continue to monitor and assess   6/6/2021 2318 by Jackelin Flood RN  Outcome: Ongoing  Note: Patient involved in care planning. Patient aware of diagnosis, problems, medications, and other factors that play an important role while in hospital. Will continue to involve patient throughout shift. Problem: Discharge Planning:  Goal: Discharged to appropriate level of care  Description: Discharged to appropriate level of care  6/7/2021 0955 by Ramsey Peoples RN  Outcome: Ongoing  Note: No anticipated plan for d/c at this time, will update patient as more information becomes available   6/6/2021 2318 by Jackelin Flood RN  Outcome: Ongoing  Note: Care continued at this time. Problem: Falls - Risk of:  Goal: Will remain free from falls  Description: Will remain free from falls  6/7/2021 0955 by Ramsey Peoples RN  Outcome: Ongoing  Note: Patient remains free from falls during this shift. Fall precautions remain in place. Patient educated on the need to implement call light use prior to ambulation. Will continue to monitor and assess. 6/6/2021 2318 by Jackelin Flood RN  Outcome: Met This Shift  Note: Patient educated on fall prevention. Call light is within reach, bed locked in lowest position, personal items within reach, and bed alarm is on.   Will round on patient per unit guidelines. Goal: Absence of physical injury  Description: Absence of physical injury  6/7/2021 0955 by Ngozi Saldaña RN  Outcome: Ongoing  Note: Patient remains free from physical harm during this shift. Will continue to monitor and assess patient. 6/6/2021 2318 by Saurabh Sanchez RN  Outcome: Met This Shift  Note: Pt is free of injury. No injury noted. Fall precautions in place. Call light within reach. Will monitor. Problem: Nutrition  Goal: Optimal nutrition therapy  6/7/2021 0955 by Ngozi Saldaña RN  Outcome: Ongoing  Note: Patient NPO at this time for surgery this afternoon   6/6/2021 2318 by Saurabh Sanchez RN  Outcome: Not Met This Shift  Note: Patient educated on proper nutrition. Patients intake monitored and patient assessed to make sure no assistance with eating was required. Patient encouraged to eat a well balanced diet. Problem: Skin Integrity:  Goal: Will show no infection signs and symptoms  Description: Will show no infection signs and symptoms  6/7/2021 0955 by Ngozi Saldaña RN  Outcome: Ongoing  Note: Patient remains free from new signs and symptoms of infection during this shift. Infection prevention measures are in place. Will continue to monitor for alterations in patient condition throughout the shift. 6/6/2021 2318 by Saurabh Sanchez RN  Outcome: Ongoing  Note: Pt assessed for infection, No signs or symptoms of surgical site noted. VVS, WBC being monitored. Reviewed information with pt and family, pt verbalized understanding     Goal: Absence of new skin breakdown  Description: Absence of new skin breakdown  6/7/2021 0955 by Ngozi Saldaña RN  Outcome: Ongoing  Note: Patient skin condition and mucus membrane integrity remain unchanged during this shift. Skin breakdown prevention interventions are in place. Will continue to monitor and assess. 6/6/2021 2318 by Saurabh Sanchez RN  Outcome: Ongoing  Note: Vinod score assessed. Patient able to ambulate and turn self. No difficulties

## 2021-07-19 ENCOUNTER — NON-APPOINTMENT (OUTPATIENT)
Age: 76
End: 2021-07-19

## 2021-07-19 ENCOUNTER — APPOINTMENT (OUTPATIENT)
Dept: OPHTHALMOLOGY | Facility: CLINIC | Age: 76
End: 2021-07-19
Payer: MEDICARE

## 2021-07-19 PROCEDURE — 92004 COMPRE OPH EXAM NEW PT 1/>: CPT

## 2021-07-19 PROCEDURE — 99072 ADDL SUPL MATRL&STAF TM PHE: CPT

## 2021-09-26 NOTE — ASU DISCHARGE PLAN (ADULT/PEDIATRIC) - ASU DC SPECIAL INSTRUCTIONSFT
No heavy lifting or pushing/pulling with procedure arm for 2 weeks. No driving for 2 days. You may shower 24 hours following the procedure but avoid baths/swimming for 1 week. Check your wrist site for bleeding and/or swelling daily following procedure and call your doctor immediately if it occurs or if you experience increased pain at the site. Follow up with your cardiologist in 1-2 weeks. You may call [facility] if you have any questions/concerns regarding your procedure [phone]. Home

## 2022-01-01 ENCOUNTER — APPOINTMENT (OUTPATIENT)
Dept: HEMATOLOGY ONCOLOGY | Facility: CLINIC | Age: 77
End: 2022-01-01

## 2022-01-01 ENCOUNTER — RESULT REVIEW (OUTPATIENT)
Age: 77
End: 2022-01-01

## 2022-01-01 ENCOUNTER — APPOINTMENT (OUTPATIENT)
Dept: INFUSION THERAPY | Facility: HOSPITAL | Age: 77
End: 2022-01-01

## 2022-01-01 ENCOUNTER — NON-APPOINTMENT (OUTPATIENT)
Age: 77
End: 2022-01-01

## 2022-01-01 ENCOUNTER — APPOINTMENT (OUTPATIENT)
Dept: CT IMAGING | Facility: IMAGING CENTER | Age: 77
End: 2022-01-01

## 2022-01-01 ENCOUNTER — APPOINTMENT (OUTPATIENT)
Dept: HEMATOLOGY ONCOLOGY | Facility: CLINIC | Age: 77
End: 2022-01-01
Payer: MEDICARE

## 2022-01-01 ENCOUNTER — APPOINTMENT (OUTPATIENT)
Dept: GERIATRICS | Facility: CLINIC | Age: 77
End: 2022-01-01
Payer: MEDICARE

## 2022-01-01 ENCOUNTER — OUTPATIENT (OUTPATIENT)
Dept: OUTPATIENT SERVICES | Facility: HOSPITAL | Age: 77
LOS: 1 days | Discharge: HOME | End: 2022-01-01

## 2022-01-01 ENCOUNTER — OUTPATIENT (OUTPATIENT)
Dept: OUTPATIENT SERVICES | Facility: HOSPITAL | Age: 77
LOS: 1 days | End: 2022-01-01
Payer: MEDICARE

## 2022-01-01 ENCOUNTER — APPOINTMENT (OUTPATIENT)
Dept: ENDOCRINOLOGY | Facility: CLINIC | Age: 77
End: 2022-01-01

## 2022-01-01 ENCOUNTER — RX RENEWAL (OUTPATIENT)
Age: 77
End: 2022-01-01

## 2022-01-01 ENCOUNTER — APPOINTMENT (OUTPATIENT)
Dept: ENDOCRINOLOGY | Facility: CLINIC | Age: 77
End: 2022-01-01
Payer: MEDICARE

## 2022-01-01 ENCOUNTER — APPOINTMENT (OUTPATIENT)
Dept: INTERVENTIONAL RADIOLOGY/VASCULAR | Facility: CLINIC | Age: 77
End: 2022-01-01

## 2022-01-01 VITALS
RESPIRATION RATE: 16 BRPM | TEMPERATURE: 97.4 F | WEIGHT: 119.05 LBS | HEART RATE: 90 BPM | BODY MASS INDEX: 21.63 KG/M2 | SYSTOLIC BLOOD PRESSURE: 126 MMHG | DIASTOLIC BLOOD PRESSURE: 69 MMHG | OXYGEN SATURATION: 99 % | HEIGHT: 62.01 IN

## 2022-01-01 VITALS
OXYGEN SATURATION: 99 % | TEMPERATURE: 97 F | HEART RATE: 87 BPM | SYSTOLIC BLOOD PRESSURE: 151 MMHG | RESPIRATION RATE: 16 BRPM | HEIGHT: 62.01 IN | DIASTOLIC BLOOD PRESSURE: 73 MMHG | BODY MASS INDEX: 21.39 KG/M2 | WEIGHT: 117.73 LBS

## 2022-01-01 VITALS
HEART RATE: 71 BPM | DIASTOLIC BLOOD PRESSURE: 70 MMHG | OXYGEN SATURATION: 98 % | WEIGHT: 117.95 LBS | BODY MASS INDEX: 21.59 KG/M2 | TEMPERATURE: 97.3 F | RESPIRATION RATE: 16 BRPM | SYSTOLIC BLOOD PRESSURE: 131 MMHG

## 2022-01-01 VITALS
DIASTOLIC BLOOD PRESSURE: 78 MMHG | RESPIRATION RATE: 16 BRPM | BODY MASS INDEX: 22.85 KG/M2 | HEART RATE: 80 BPM | OXYGEN SATURATION: 98 % | WEIGHT: 121.03 LBS | HEIGHT: 60.98 IN | SYSTOLIC BLOOD PRESSURE: 146 MMHG | TEMPERATURE: 97.2 F

## 2022-01-01 VITALS
TEMPERATURE: 97.2 F | SYSTOLIC BLOOD PRESSURE: 143 MMHG | HEIGHT: 61.97 IN | DIASTOLIC BLOOD PRESSURE: 72 MMHG | BODY MASS INDEX: 21.7 KG/M2 | RESPIRATION RATE: 15 BRPM | OXYGEN SATURATION: 98 % | HEART RATE: 88 BPM | WEIGHT: 117.95 LBS

## 2022-01-01 VITALS
BODY MASS INDEX: 22.84 KG/M2 | DIASTOLIC BLOOD PRESSURE: 70 MMHG | HEART RATE: 71 BPM | OXYGEN SATURATION: 97 % | WEIGHT: 121 LBS | SYSTOLIC BLOOD PRESSURE: 110 MMHG | TEMPERATURE: 98.1 F | HEIGHT: 61 IN

## 2022-01-01 VITALS — HEIGHT: 62 IN | WEIGHT: 113 LBS | BODY MASS INDEX: 20.8 KG/M2

## 2022-01-01 VITALS
OXYGEN SATURATION: 98 % | WEIGHT: 117.7 LBS | DIASTOLIC BLOOD PRESSURE: 69 MMHG | TEMPERATURE: 97.1 F | HEART RATE: 94 BPM | RESPIRATION RATE: 16 BRPM | BODY MASS INDEX: 21.66 KG/M2 | SYSTOLIC BLOOD PRESSURE: 111 MMHG | HEIGHT: 61.97 IN

## 2022-01-01 DIAGNOSIS — Z90.710 ACQUIRED ABSENCE OF BOTH CERVIX AND UTERUS: Chronic | ICD-10-CM

## 2022-01-01 DIAGNOSIS — C25.9 MALIGNANT NEOPLASM OF PANCREAS, UNSPECIFIED: ICD-10-CM

## 2022-01-01 DIAGNOSIS — Z95.5 PRESENCE OF CORONARY ANGIOPLASTY IMPLANT AND GRAFT: Chronic | ICD-10-CM

## 2022-01-01 DIAGNOSIS — Z98.891 HISTORY OF UTERINE SCAR FROM PREVIOUS SURGERY: Chronic | ICD-10-CM

## 2022-01-01 DIAGNOSIS — Z51.11 ENCOUNTER FOR ANTINEOPLASTIC CHEMOTHERAPY: ICD-10-CM

## 2022-01-01 DIAGNOSIS — Z71.89 OTHER SPECIFIED COUNSELING: ICD-10-CM

## 2022-01-01 DIAGNOSIS — R11.2 NAUSEA WITH VOMITING, UNSPECIFIED: ICD-10-CM

## 2022-01-01 DIAGNOSIS — R91.1 SOLITARY PULMONARY NODULE: ICD-10-CM

## 2022-01-01 DIAGNOSIS — E86.0 DEHYDRATION: ICD-10-CM

## 2022-01-01 DIAGNOSIS — Z45.2 ENCOUNTER FOR ADJUSTMENT AND MANAGEMENT OF VASCULAR ACCESS DEVICE: ICD-10-CM

## 2022-01-01 DIAGNOSIS — Z51.89 ENCOUNTER FOR OTHER SPECIFIED AFTERCARE: ICD-10-CM

## 2022-01-01 LAB
ALBUMIN SERPL ELPH-MCNC: 3.7 G/DL — SIGNIFICANT CHANGE UP (ref 3.3–5)
ALBUMIN SERPL ELPH-MCNC: 3.8 G/DL — SIGNIFICANT CHANGE UP (ref 3.3–5)
ALBUMIN SERPL ELPH-MCNC: 3.9 G/DL — SIGNIFICANT CHANGE UP (ref 3.3–5)
ALBUMIN SERPL ELPH-MCNC: 3.9 G/DL — SIGNIFICANT CHANGE UP (ref 3.3–5)
ALBUMIN SERPL ELPH-MCNC: 4 G/DL — SIGNIFICANT CHANGE UP (ref 3.3–5)
ALBUMIN SERPL ELPH-MCNC: 4 G/DL — SIGNIFICANT CHANGE UP (ref 3.3–5)
ALBUMIN SERPL ELPH-MCNC: 4.2 G/DL
ALBUMIN SERPL ELPH-MCNC: 4.2 G/DL — SIGNIFICANT CHANGE UP (ref 3.3–5)
ALBUMIN SERPL ELPH-MCNC: 4.2 G/DL — SIGNIFICANT CHANGE UP (ref 3.3–5)
ALP BLD-CCNC: 252 U/L
ALP SERPL-CCNC: 117 U/L — SIGNIFICANT CHANGE UP (ref 40–120)
ALP SERPL-CCNC: 124 U/L — HIGH (ref 40–120)
ALP SERPL-CCNC: 126 U/L — HIGH (ref 40–120)
ALP SERPL-CCNC: 129 U/L — HIGH (ref 40–120)
ALP SERPL-CCNC: 133 U/L — HIGH (ref 40–120)
ALP SERPL-CCNC: 144 U/L — HIGH (ref 40–120)
ALP SERPL-CCNC: 167 U/L — HIGH (ref 40–120)
ALP SERPL-CCNC: 181 U/L — HIGH (ref 40–120)
ALP SERPL-CCNC: 226 U/L — HIGH (ref 40–120)
ALP SERPL-CCNC: 97 U/L — SIGNIFICANT CHANGE UP (ref 40–120)
ALT FLD-CCNC: 10 U/L — SIGNIFICANT CHANGE UP (ref 10–45)
ALT FLD-CCNC: 11 U/L — SIGNIFICANT CHANGE UP (ref 10–45)
ALT FLD-CCNC: 16 U/L — SIGNIFICANT CHANGE UP (ref 10–45)
ALT FLD-CCNC: 16 U/L — SIGNIFICANT CHANGE UP (ref 10–45)
ALT FLD-CCNC: 18 U/L — SIGNIFICANT CHANGE UP (ref 10–45)
ALT FLD-CCNC: 9 U/L — LOW (ref 10–45)
ALT SERPL-CCNC: 11 U/L
ANION GAP SERPL CALC-SCNC: 10 MMOL/L — SIGNIFICANT CHANGE UP (ref 5–17)
ANION GAP SERPL CALC-SCNC: 11 MMOL/L — SIGNIFICANT CHANGE UP (ref 5–17)
ANION GAP SERPL CALC-SCNC: 12 MMOL/L — SIGNIFICANT CHANGE UP (ref 5–17)
ANION GAP SERPL CALC-SCNC: 13 MMOL/L — SIGNIFICANT CHANGE UP (ref 5–17)
ANION GAP SERPL CALC-SCNC: 17 MMOL/L
AST SERPL-CCNC: 15 U/L — SIGNIFICANT CHANGE UP (ref 10–40)
AST SERPL-CCNC: 15 U/L — SIGNIFICANT CHANGE UP (ref 10–40)
AST SERPL-CCNC: 17 U/L — SIGNIFICANT CHANGE UP (ref 10–40)
AST SERPL-CCNC: 18 U/L
AST SERPL-CCNC: 18 U/L — SIGNIFICANT CHANGE UP (ref 10–40)
AST SERPL-CCNC: 21 U/L — SIGNIFICANT CHANGE UP (ref 10–40)
AST SERPL-CCNC: 23 U/L — SIGNIFICANT CHANGE UP (ref 10–40)
AST SERPL-CCNC: 24 U/L — SIGNIFICANT CHANGE UP (ref 10–40)
AST SERPL-CCNC: 28 U/L — SIGNIFICANT CHANGE UP (ref 10–40)
AST SERPL-CCNC: 28 U/L — SIGNIFICANT CHANGE UP (ref 10–40)
AST SERPL-CCNC: 29 U/L — SIGNIFICANT CHANGE UP (ref 10–40)
BASOPHILS # BLD AUTO: 0 K/UL — SIGNIFICANT CHANGE UP (ref 0–0.2)
BASOPHILS # BLD AUTO: 0 K/UL — SIGNIFICANT CHANGE UP (ref 0–0.2)
BASOPHILS # BLD AUTO: 0.04 K/UL — SIGNIFICANT CHANGE UP (ref 0–0.2)
BASOPHILS # BLD AUTO: 0.05 K/UL — SIGNIFICANT CHANGE UP (ref 0–0.2)
BASOPHILS # BLD AUTO: 0.05 K/UL — SIGNIFICANT CHANGE UP (ref 0–0.2)
BASOPHILS # BLD AUTO: 0.06 K/UL — SIGNIFICANT CHANGE UP (ref 0–0.2)
BASOPHILS # BLD AUTO: 0.07 K/UL — SIGNIFICANT CHANGE UP (ref 0–0.2)
BASOPHILS # BLD AUTO: 0.1 K/UL — SIGNIFICANT CHANGE UP (ref 0–0.2)
BASOPHILS # BLD AUTO: 0.11 K/UL — SIGNIFICANT CHANGE UP (ref 0–0.2)
BASOPHILS # BLD AUTO: 0.12 K/UL — SIGNIFICANT CHANGE UP (ref 0–0.2)
BASOPHILS # BLD AUTO: 0.16 K/UL — SIGNIFICANT CHANGE UP (ref 0–0.2)
BASOPHILS # BLD AUTO: 0.17 K/UL — SIGNIFICANT CHANGE UP (ref 0–0.2)
BASOPHILS NFR BLD AUTO: 0 % — SIGNIFICANT CHANGE UP (ref 0–2)
BASOPHILS NFR BLD AUTO: 0 % — SIGNIFICANT CHANGE UP (ref 0–2)
BASOPHILS NFR BLD AUTO: 0.6 % — SIGNIFICANT CHANGE UP (ref 0–2)
BASOPHILS NFR BLD AUTO: 0.6 % — SIGNIFICANT CHANGE UP (ref 0–2)
BASOPHILS NFR BLD AUTO: 0.7 % — SIGNIFICANT CHANGE UP (ref 0–2)
BASOPHILS NFR BLD AUTO: 0.7 % — SIGNIFICANT CHANGE UP (ref 0–2)
BASOPHILS NFR BLD AUTO: 0.8 % — SIGNIFICANT CHANGE UP (ref 0–2)
BASOPHILS NFR BLD AUTO: 1 % — SIGNIFICANT CHANGE UP (ref 0–2)
BASOPHILS NFR BLD AUTO: 1 % — SIGNIFICANT CHANGE UP (ref 0–2)
BASOPHILS NFR BLD AUTO: 1.1 % — SIGNIFICANT CHANGE UP (ref 0–2)
BASOPHILS NFR BLD AUTO: 1.2 % — SIGNIFICANT CHANGE UP (ref 0–2)
BASOPHILS NFR BLD AUTO: 1.4 % — SIGNIFICANT CHANGE UP (ref 0–2)
BILIRUB SERPL-MCNC: 0.2 MG/DL
BILIRUB SERPL-MCNC: 0.2 MG/DL — SIGNIFICANT CHANGE UP (ref 0.2–1.2)
BILIRUB SERPL-MCNC: 0.3 MG/DL — SIGNIFICANT CHANGE UP (ref 0.2–1.2)
BILIRUB SERPL-MCNC: 0.3 MG/DL — SIGNIFICANT CHANGE UP (ref 0.2–1.2)
BUN SERPL-MCNC: 12 MG/DL — SIGNIFICANT CHANGE UP (ref 7–23)
BUN SERPL-MCNC: 13 MG/DL — SIGNIFICANT CHANGE UP (ref 7–23)
BUN SERPL-MCNC: 14 MG/DL — SIGNIFICANT CHANGE UP (ref 7–23)
BUN SERPL-MCNC: 15 MG/DL — SIGNIFICANT CHANGE UP (ref 7–23)
BUN SERPL-MCNC: 16 MG/DL — SIGNIFICANT CHANGE UP (ref 7–23)
BUN SERPL-MCNC: 16 MG/DL — SIGNIFICANT CHANGE UP (ref 7–23)
BUN SERPL-MCNC: 6 MG/DL — LOW (ref 7–23)
BUN SERPL-MCNC: 8 MG/DL
CALCIUM SERPL-MCNC: 8.7 MG/DL — SIGNIFICANT CHANGE UP (ref 8.4–10.5)
CALCIUM SERPL-MCNC: 8.8 MG/DL — SIGNIFICANT CHANGE UP (ref 8.4–10.5)
CALCIUM SERPL-MCNC: 8.9 MG/DL — SIGNIFICANT CHANGE UP (ref 8.4–10.5)
CALCIUM SERPL-MCNC: 8.9 MG/DL — SIGNIFICANT CHANGE UP (ref 8.4–10.5)
CALCIUM SERPL-MCNC: 9.1 MG/DL — SIGNIFICANT CHANGE UP (ref 8.4–10.5)
CALCIUM SERPL-MCNC: 9.2 MG/DL — SIGNIFICANT CHANGE UP (ref 8.4–10.5)
CALCIUM SERPL-MCNC: 9.3 MG/DL — SIGNIFICANT CHANGE UP (ref 8.4–10.5)
CALCIUM SERPL-MCNC: 9.5 MG/DL
CANCER AG19-9 SERPL-ACNC: 1034 U/ML — HIGH
CANCER AG19-9 SERPL-ACNC: 1556 U/ML — HIGH
CANCER AG19-9 SERPL-ACNC: 1948 U/ML — HIGH
CANCER AG19-9 SERPL-ACNC: 474 U/ML — HIGH
CANCER AG19-9 SERPL-ACNC: 606 U/ML — HIGH
CANCER AG19-9 SERPL-ACNC: 640 U/ML — HIGH
CANCER AG19-9 SERPL-ACNC: 652 U/ML
CANCER AG19-9 SERPL-ACNC: 777 U/ML — HIGH
CANCER AG19-9 SERPL-ACNC: 796 U/ML — HIGH
CANCER AG19-9 SERPL-ACNC: 798 U/ML — HIGH
CANCER AG19-9 SERPL-ACNC: 894 U/ML — HIGH
CEA SERPL-MCNC: 3.2 NG/ML — SIGNIFICANT CHANGE UP (ref 0–3.8)
CEA SERPL-MCNC: 3.6 NG/ML — SIGNIFICANT CHANGE UP (ref 0–3.8)
CEA SERPL-MCNC: 3.8 NG/ML — SIGNIFICANT CHANGE UP (ref 0–3.8)
CEA SERPL-MCNC: 4 NG/ML — HIGH (ref 0–3.8)
CEA SERPL-MCNC: 4.2 NG/ML — HIGH (ref 0–3.8)
CEA SERPL-MCNC: 4.3 NG/ML — HIGH (ref 0–3.8)
CEA SERPL-MCNC: 4.3 NG/ML — HIGH (ref 0–3.8)
CEA SERPL-MCNC: 4.4 NG/ML
CEA SERPL-MCNC: 4.8 NG/ML — HIGH (ref 0–3.8)
CEA SERPL-MCNC: 5.6 NG/ML — HIGH (ref 0–3.8)
CEA SERPL-MCNC: 5.6 NG/ML — HIGH (ref 0–3.8)
CHLORIDE SERPL-SCNC: 100 MMOL/L — SIGNIFICANT CHANGE UP (ref 96–108)
CHLORIDE SERPL-SCNC: 101 MMOL/L — SIGNIFICANT CHANGE UP (ref 96–108)
CHLORIDE SERPL-SCNC: 102 MMOL/L — SIGNIFICANT CHANGE UP (ref 96–108)
CHLORIDE SERPL-SCNC: 103 MMOL/L — SIGNIFICANT CHANGE UP (ref 96–108)
CHLORIDE SERPL-SCNC: 99 MMOL/L
CO2 SERPL-SCNC: 20 MMOL/L
CO2 SERPL-SCNC: 22 MMOL/L — SIGNIFICANT CHANGE UP (ref 22–31)
CO2 SERPL-SCNC: 23 MMOL/L — SIGNIFICANT CHANGE UP (ref 22–31)
CO2 SERPL-SCNC: 24 MMOL/L — SIGNIFICANT CHANGE UP (ref 22–31)
CO2 SERPL-SCNC: 24 MMOL/L — SIGNIFICANT CHANGE UP (ref 22–31)
CREAT SERPL-MCNC: 0.53 MG/DL
CREAT SERPL-MCNC: 0.54 MG/DL — SIGNIFICANT CHANGE UP (ref 0.5–1.3)
CREAT SERPL-MCNC: 0.59 MG/DL — SIGNIFICANT CHANGE UP (ref 0.5–1.3)
CREAT SERPL-MCNC: 0.62 MG/DL — SIGNIFICANT CHANGE UP (ref 0.5–1.3)
CREAT SERPL-MCNC: 0.62 MG/DL — SIGNIFICANT CHANGE UP (ref 0.5–1.3)
CREAT SERPL-MCNC: 0.65 MG/DL — SIGNIFICANT CHANGE UP (ref 0.5–1.3)
CREAT SERPL-MCNC: 0.67 MG/DL — SIGNIFICANT CHANGE UP (ref 0.5–1.3)
CREAT SERPL-MCNC: 0.67 MG/DL — SIGNIFICANT CHANGE UP (ref 0.5–1.3)
CREAT SERPL-MCNC: 0.68 MG/DL — SIGNIFICANT CHANGE UP (ref 0.5–1.3)
CREAT SERPL-MCNC: 0.74 MG/DL — SIGNIFICANT CHANGE UP (ref 0.5–1.3)
CREAT SERPL-MCNC: 0.81 MG/DL — SIGNIFICANT CHANGE UP (ref 0.5–1.3)
EGFR: 75 ML/MIN/1.73M2 — SIGNIFICANT CHANGE UP
EGFR: 83 ML/MIN/1.73M2 — SIGNIFICANT CHANGE UP
EGFR: 90 ML/MIN/1.73M2 — SIGNIFICANT CHANGE UP
EGFR: 91 ML/MIN/1.73M2 — SIGNIFICANT CHANGE UP
EGFR: 92 ML/MIN/1.73M2 — SIGNIFICANT CHANGE UP
EGFR: 92 ML/MIN/1.73M2 — SIGNIFICANT CHANGE UP
EGFR: 93 ML/MIN/1.73M2 — SIGNIFICANT CHANGE UP
EGFR: 95 ML/MIN/1.73M2
EGFR: 95 ML/MIN/1.73M2 — SIGNIFICANT CHANGE UP
EOSINOPHIL # BLD AUTO: 0.1 K/UL — SIGNIFICANT CHANGE UP (ref 0–0.5)
EOSINOPHIL # BLD AUTO: 0.11 K/UL — SIGNIFICANT CHANGE UP (ref 0–0.5)
EOSINOPHIL # BLD AUTO: 0.12 K/UL — SIGNIFICANT CHANGE UP (ref 0–0.5)
EOSINOPHIL # BLD AUTO: 0.14 K/UL — SIGNIFICANT CHANGE UP (ref 0–0.5)
EOSINOPHIL # BLD AUTO: 0.15 K/UL — SIGNIFICANT CHANGE UP (ref 0–0.5)
EOSINOPHIL # BLD AUTO: 0.19 K/UL — SIGNIFICANT CHANGE UP (ref 0–0.5)
EOSINOPHIL # BLD AUTO: 0.2 K/UL — SIGNIFICANT CHANGE UP (ref 0–0.5)
EOSINOPHIL # BLD AUTO: 0.32 K/UL — SIGNIFICANT CHANGE UP (ref 0–0.5)
EOSINOPHIL # BLD AUTO: 0.32 K/UL — SIGNIFICANT CHANGE UP (ref 0–0.5)
EOSINOPHIL # BLD AUTO: 0.34 K/UL — SIGNIFICANT CHANGE UP (ref 0–0.5)
EOSINOPHIL # BLD AUTO: 0.34 K/UL — SIGNIFICANT CHANGE UP (ref 0–0.5)
EOSINOPHIL # BLD AUTO: 0.53 K/UL — HIGH (ref 0–0.5)
EOSINOPHIL NFR BLD AUTO: 1 % — SIGNIFICANT CHANGE UP (ref 0–6)
EOSINOPHIL NFR BLD AUTO: 1 % — SIGNIFICANT CHANGE UP (ref 0–6)
EOSINOPHIL NFR BLD AUTO: 1.3 % — SIGNIFICANT CHANGE UP (ref 0–6)
EOSINOPHIL NFR BLD AUTO: 1.3 % — SIGNIFICANT CHANGE UP (ref 0–6)
EOSINOPHIL NFR BLD AUTO: 2 % — SIGNIFICANT CHANGE UP (ref 0–6)
EOSINOPHIL NFR BLD AUTO: 2 % — SIGNIFICANT CHANGE UP (ref 0–6)
EOSINOPHIL NFR BLD AUTO: 2.2 % — SIGNIFICANT CHANGE UP (ref 0–6)
EOSINOPHIL NFR BLD AUTO: 2.3 % — SIGNIFICANT CHANGE UP (ref 0–6)
EOSINOPHIL NFR BLD AUTO: 2.4 % — SIGNIFICANT CHANGE UP (ref 0–6)
EOSINOPHIL NFR BLD AUTO: 2.4 % — SIGNIFICANT CHANGE UP (ref 0–6)
EOSINOPHIL NFR BLD AUTO: 3.2 % — SIGNIFICANT CHANGE UP (ref 0–6)
EOSINOPHIL NFR BLD AUTO: 3.6 % — SIGNIFICANT CHANGE UP (ref 0–6)
GLUCOSE BLDC GLUCOMTR-MCNC: 217
GLUCOSE SERPL-MCNC: 117 MG/DL — HIGH (ref 70–99)
GLUCOSE SERPL-MCNC: 132 MG/DL — HIGH (ref 70–99)
GLUCOSE SERPL-MCNC: 137 MG/DL — HIGH (ref 70–99)
GLUCOSE SERPL-MCNC: 141 MG/DL — HIGH (ref 70–99)
GLUCOSE SERPL-MCNC: 147 MG/DL — HIGH (ref 70–99)
GLUCOSE SERPL-MCNC: 160 MG/DL — HIGH (ref 70–99)
GLUCOSE SERPL-MCNC: 170 MG/DL — HIGH (ref 70–99)
GLUCOSE SERPL-MCNC: 208 MG/DL — HIGH (ref 70–99)
GLUCOSE SERPL-MCNC: 229 MG/DL — HIGH (ref 70–99)
GLUCOSE SERPL-MCNC: 241 MG/DL — HIGH (ref 70–99)
GLUCOSE SERPL-MCNC: 66 MG/DL
HBA1C MFR BLD HPLC: 5.7
HCT VFR BLD CALC: 26.2 % — LOW (ref 34.5–45)
HCT VFR BLD CALC: 26.6 % — LOW (ref 34.5–45)
HCT VFR BLD CALC: 28 % — LOW (ref 34.5–45)
HCT VFR BLD CALC: 28.2 % — LOW (ref 34.5–45)
HCT VFR BLD CALC: 28.6 % — LOW (ref 34.5–45)
HCT VFR BLD CALC: 29.5 % — LOW (ref 34.5–45)
HCT VFR BLD CALC: 29.6 % — LOW (ref 34.5–45)
HCT VFR BLD CALC: 30.2 % — LOW (ref 34.5–45)
HCT VFR BLD CALC: 31.1 % — LOW (ref 34.5–45)
HCT VFR BLD CALC: 31.1 % — LOW (ref 34.5–45)
HCT VFR BLD CALC: 31.8 % — LOW (ref 34.5–45)
HCT VFR BLD CALC: 32.5 % — LOW (ref 34.5–45)
HGB BLD-MCNC: 10 G/DL — LOW (ref 11.5–15.5)
HGB BLD-MCNC: 10.2 G/DL — LOW (ref 11.5–15.5)
HGB BLD-MCNC: 10.6 G/DL — LOW (ref 11.5–15.5)
HGB BLD-MCNC: 8.5 G/DL — LOW (ref 11.5–15.5)
HGB BLD-MCNC: 8.6 G/DL — LOW (ref 11.5–15.5)
HGB BLD-MCNC: 9.1 G/DL — LOW (ref 11.5–15.5)
HGB BLD-MCNC: 9.3 G/DL — LOW (ref 11.5–15.5)
HGB BLD-MCNC: 9.4 G/DL — LOW (ref 11.5–15.5)
HGB BLD-MCNC: 9.4 G/DL — LOW (ref 11.5–15.5)
HGB BLD-MCNC: 9.6 G/DL — LOW (ref 11.5–15.5)
IMM GRANULOCYTES NFR BLD AUTO: 0.2 % — SIGNIFICANT CHANGE UP (ref 0–0.9)
IMM GRANULOCYTES NFR BLD AUTO: 0.2 % — SIGNIFICANT CHANGE UP (ref 0–0.9)
IMM GRANULOCYTES NFR BLD AUTO: 0.5 % — SIGNIFICANT CHANGE UP (ref 0–0.9)
IMM GRANULOCYTES NFR BLD AUTO: 1 % — HIGH (ref 0–0.9)
IMM GRANULOCYTES NFR BLD AUTO: 1 % — HIGH (ref 0–0.9)
IMM GRANULOCYTES NFR BLD AUTO: 2.5 % — HIGH (ref 0–0.9)
IMM GRANULOCYTES NFR BLD AUTO: 3.1 % — HIGH (ref 0–0.9)
IMM GRANULOCYTES NFR BLD AUTO: 4.1 % — HIGH (ref 0–0.9)
IMM GRANULOCYTES NFR BLD AUTO: 4.6 % — HIGH (ref 0–1.5)
LYMPHOCYTES # BLD AUTO: 1.34 K/UL — SIGNIFICANT CHANGE UP (ref 1–3.3)
LYMPHOCYTES # BLD AUTO: 1.5 K/UL — SIGNIFICANT CHANGE UP (ref 1–3.3)
LYMPHOCYTES # BLD AUTO: 1.5 K/UL — SIGNIFICANT CHANGE UP (ref 1–3.3)
LYMPHOCYTES # BLD AUTO: 1.59 K/UL — SIGNIFICANT CHANGE UP (ref 1–3.3)
LYMPHOCYTES # BLD AUTO: 1.75 K/UL — SIGNIFICANT CHANGE UP (ref 1–3.3)
LYMPHOCYTES # BLD AUTO: 1.76 K/UL — SIGNIFICANT CHANGE UP (ref 1–3.3)
LYMPHOCYTES # BLD AUTO: 1.84 K/UL — SIGNIFICANT CHANGE UP (ref 1–3.3)
LYMPHOCYTES # BLD AUTO: 1.95 K/UL — SIGNIFICANT CHANGE UP (ref 1–3.3)
LYMPHOCYTES # BLD AUTO: 12 % — LOW (ref 13–44)
LYMPHOCYTES # BLD AUTO: 12.4 % — LOW (ref 13–44)
LYMPHOCYTES # BLD AUTO: 13.5 % — SIGNIFICANT CHANGE UP (ref 13–44)
LYMPHOCYTES # BLD AUTO: 14.4 % — SIGNIFICANT CHANGE UP (ref 13–44)
LYMPHOCYTES # BLD AUTO: 2.04 K/UL — SIGNIFICANT CHANGE UP (ref 1–3.3)
LYMPHOCYTES # BLD AUTO: 2.09 K/UL — SIGNIFICANT CHANGE UP (ref 1–3.3)
LYMPHOCYTES # BLD AUTO: 2.14 K/UL — SIGNIFICANT CHANGE UP (ref 1–3.3)
LYMPHOCYTES # BLD AUTO: 2.88 K/UL — SIGNIFICANT CHANGE UP (ref 1–3.3)
LYMPHOCYTES # BLD AUTO: 24 % — SIGNIFICANT CHANGE UP (ref 13–44)
LYMPHOCYTES # BLD AUTO: 30.1 % — SIGNIFICANT CHANGE UP (ref 13–44)
LYMPHOCYTES # BLD AUTO: 30.4 % — SIGNIFICANT CHANGE UP (ref 13–44)
LYMPHOCYTES # BLD AUTO: 31.9 % — SIGNIFICANT CHANGE UP (ref 13–44)
LYMPHOCYTES # BLD AUTO: 34.7 % — SIGNIFICANT CHANGE UP (ref 13–44)
LYMPHOCYTES # BLD AUTO: 7.8 % — LOW (ref 13–44)
LYMPHOCYTES # BLD AUTO: 9 % — LOW (ref 13–44)
LYMPHOCYTES # BLD AUTO: 9 % — LOW (ref 13–44)
MAGNESIUM SERPL-MCNC: 1.8 MG/DL — SIGNIFICANT CHANGE UP (ref 1.6–2.6)
MCHC RBC-ENTMCNC: 28.7 PG — SIGNIFICANT CHANGE UP (ref 27–34)
MCHC RBC-ENTMCNC: 28.8 PG — SIGNIFICANT CHANGE UP (ref 27–34)
MCHC RBC-ENTMCNC: 28.9 PG — SIGNIFICANT CHANGE UP (ref 27–34)
MCHC RBC-ENTMCNC: 28.9 PG — SIGNIFICANT CHANGE UP (ref 27–34)
MCHC RBC-ENTMCNC: 29 PG — SIGNIFICANT CHANGE UP (ref 27–34)
MCHC RBC-ENTMCNC: 29.3 PG — SIGNIFICANT CHANGE UP (ref 27–34)
MCHC RBC-ENTMCNC: 29.5 PG — SIGNIFICANT CHANGE UP (ref 27–34)
MCHC RBC-ENTMCNC: 29.6 PG — SIGNIFICANT CHANGE UP (ref 27–34)
MCHC RBC-ENTMCNC: 29.8 PG — SIGNIFICANT CHANGE UP (ref 27–34)
MCHC RBC-ENTMCNC: 31.8 G/DL — LOW (ref 32–36)
MCHC RBC-ENTMCNC: 32.1 G/DL — SIGNIFICANT CHANGE UP (ref 32–36)
MCHC RBC-ENTMCNC: 32.3 G/DL — SIGNIFICANT CHANGE UP (ref 32–36)
MCHC RBC-ENTMCNC: 32.4 G/DL — SIGNIFICANT CHANGE UP (ref 32–36)
MCHC RBC-ENTMCNC: 32.5 G/DL — SIGNIFICANT CHANGE UP (ref 32–36)
MCHC RBC-ENTMCNC: 32.6 G/DL — SIGNIFICANT CHANGE UP (ref 32–36)
MCHC RBC-ENTMCNC: 32.8 G/DL — SIGNIFICANT CHANGE UP (ref 32–36)
MCHC RBC-ENTMCNC: 32.8 G/DL — SIGNIFICANT CHANGE UP (ref 32–36)
MCHC RBC-ENTMCNC: 33.1 G/DL — SIGNIFICANT CHANGE UP (ref 32–36)
MCHC RBC-ENTMCNC: 33.3 G/DL — SIGNIFICANT CHANGE UP (ref 32–36)
MCV RBC AUTO: 86.5 FL — SIGNIFICANT CHANGE UP (ref 80–100)
MCV RBC AUTO: 86.8 FL — SIGNIFICANT CHANGE UP (ref 80–100)
MCV RBC AUTO: 87.6 FL — SIGNIFICANT CHANGE UP (ref 80–100)
MCV RBC AUTO: 88.1 FL — SIGNIFICANT CHANGE UP (ref 80–100)
MCV RBC AUTO: 88.1 FL — SIGNIFICANT CHANGE UP (ref 80–100)
MCV RBC AUTO: 88.9 FL — SIGNIFICANT CHANGE UP (ref 80–100)
MCV RBC AUTO: 89.3 FL — SIGNIFICANT CHANGE UP (ref 80–100)
MCV RBC AUTO: 89.9 FL — SIGNIFICANT CHANGE UP (ref 80–100)
MCV RBC AUTO: 91.1 FL — SIGNIFICANT CHANGE UP (ref 80–100)
MCV RBC AUTO: 91.1 FL — SIGNIFICANT CHANGE UP (ref 80–100)
MCV RBC AUTO: 91.4 FL — SIGNIFICANT CHANGE UP (ref 80–100)
MCV RBC AUTO: 91.9 FL — SIGNIFICANT CHANGE UP (ref 80–100)
METAMYELOCYTES # FLD: 1 % — HIGH (ref 0–0)
METAMYELOCYTES # FLD: 1 % — HIGH (ref 0–0)
MISCELLANEOUS TEST: NORMAL
MONOCYTES # BLD AUTO: 0.53 K/UL — SIGNIFICANT CHANGE UP (ref 0–0.9)
MONOCYTES # BLD AUTO: 0.58 K/UL — SIGNIFICANT CHANGE UP (ref 0–0.9)
MONOCYTES # BLD AUTO: 0.63 K/UL — SIGNIFICANT CHANGE UP (ref 0–0.9)
MONOCYTES # BLD AUTO: 0.64 K/UL — SIGNIFICANT CHANGE UP (ref 0–0.9)
MONOCYTES # BLD AUTO: 0.65 K/UL — SIGNIFICANT CHANGE UP (ref 0–0.9)
MONOCYTES # BLD AUTO: 0.85 K/UL — SIGNIFICANT CHANGE UP (ref 0–0.9)
MONOCYTES # BLD AUTO: 0.9 K/UL — SIGNIFICANT CHANGE UP (ref 0–0.9)
MONOCYTES # BLD AUTO: 1.05 K/UL — HIGH (ref 0–0.9)
MONOCYTES # BLD AUTO: 1.05 K/UL — HIGH (ref 0–0.9)
MONOCYTES # BLD AUTO: 1.36 K/UL — HIGH (ref 0–0.9)
MONOCYTES # BLD AUTO: 2.51 K/UL — HIGH (ref 0–0.9)
MONOCYTES # BLD AUTO: 2.88 K/UL — HIGH (ref 0–0.9)
MONOCYTES NFR BLD AUTO: 10.1 % — SIGNIFICANT CHANGE UP (ref 2–14)
MONOCYTES NFR BLD AUTO: 10.3 % — SIGNIFICANT CHANGE UP (ref 2–14)
MONOCYTES NFR BLD AUTO: 10.6 % — SIGNIFICANT CHANGE UP (ref 2–14)
MONOCYTES NFR BLD AUTO: 12.4 % — SIGNIFICANT CHANGE UP (ref 2–14)
MONOCYTES NFR BLD AUTO: 13.1 % — SIGNIFICANT CHANGE UP (ref 2–14)
MONOCYTES NFR BLD AUTO: 13.2 % — SIGNIFICANT CHANGE UP (ref 2–14)
MONOCYTES NFR BLD AUTO: 5 % — SIGNIFICANT CHANGE UP (ref 2–14)
MONOCYTES NFR BLD AUTO: 6.1 % — SIGNIFICANT CHANGE UP (ref 2–14)
MONOCYTES NFR BLD AUTO: 6.6 % — SIGNIFICANT CHANGE UP (ref 2–14)
MONOCYTES NFR BLD AUTO: 7 % — SIGNIFICANT CHANGE UP (ref 2–14)
MONOCYTES NFR BLD AUTO: 7.2 % — SIGNIFICANT CHANGE UP (ref 2–14)
MONOCYTES NFR BLD AUTO: 9 % — SIGNIFICANT CHANGE UP (ref 2–14)
MYELOCYTES NFR BLD: 2 % — HIGH (ref 0–0)
MYELOCYTES NFR BLD: 3 % — HIGH (ref 0–0)
NEUTROPHILS # BLD AUTO: 10.29 K/UL — HIGH (ref 1.8–7.4)
NEUTROPHILS # BLD AUTO: 11.05 K/UL — HIGH (ref 1.8–7.4)
NEUTROPHILS # BLD AUTO: 11.9 K/UL — HIGH (ref 1.8–7.4)
NEUTROPHILS # BLD AUTO: 13.08 K/UL — HIGH (ref 1.8–7.4)
NEUTROPHILS # BLD AUTO: 15.39 K/UL — HIGH (ref 1.8–7.4)
NEUTROPHILS # BLD AUTO: 18.96 K/UL — HIGH (ref 1.8–7.4)
NEUTROPHILS # BLD AUTO: 2.28 K/UL — SIGNIFICANT CHANGE UP (ref 1.8–7.4)
NEUTROPHILS # BLD AUTO: 2.47 K/UL — SIGNIFICANT CHANGE UP (ref 1.8–7.4)
NEUTROPHILS # BLD AUTO: 2.52 K/UL — SIGNIFICANT CHANGE UP (ref 1.8–7.4)
NEUTROPHILS # BLD AUTO: 2.76 K/UL — SIGNIFICANT CHANGE UP (ref 1.8–7.4)
NEUTROPHILS # BLD AUTO: 25.9 K/UL — HIGH (ref 1.8–7.4)
NEUTROPHILS # BLD AUTO: 3.9 K/UL — SIGNIFICANT CHANGE UP (ref 1.8–7.4)
NEUTROPHILS NFR BLD AUTO: 48.7 % — SIGNIFICANT CHANGE UP (ref 43–77)
NEUTROPHILS NFR BLD AUTO: 50.6 % — SIGNIFICANT CHANGE UP (ref 43–77)
NEUTROPHILS NFR BLD AUTO: 51.6 % — SIGNIFICANT CHANGE UP (ref 43–77)
NEUTROPHILS NFR BLD AUTO: 55.5 % — SIGNIFICANT CHANGE UP (ref 43–77)
NEUTROPHILS NFR BLD AUTO: 62.5 % — SIGNIFICANT CHANGE UP (ref 43–77)
NEUTROPHILS NFR BLD AUTO: 70.7 % — SIGNIFICANT CHANGE UP (ref 43–77)
NEUTROPHILS NFR BLD AUTO: 74.7 % — SIGNIFICANT CHANGE UP (ref 43–77)
NEUTROPHILS NFR BLD AUTO: 74.8 % — SIGNIFICANT CHANGE UP (ref 43–77)
NEUTROPHILS NFR BLD AUTO: 76.1 % — SIGNIFICANT CHANGE UP (ref 43–77)
NEUTROPHILS NFR BLD AUTO: 77 % — SIGNIFICANT CHANGE UP (ref 43–77)
NEUTROPHILS NFR BLD AUTO: 79 % — HIGH (ref 43–77)
NEUTROPHILS NFR BLD AUTO: 81 % — HIGH (ref 43–77)
NRBC # BLD: 0 /100 WBCS — SIGNIFICANT CHANGE UP (ref 0–0)
NRBC # BLD: 0 /100 — SIGNIFICANT CHANGE UP (ref 0–0)
NRBC # BLD: 0 /100 — SIGNIFICANT CHANGE UP (ref 0–0)
NRBC # BLD: 1 /100 — HIGH (ref 0–0)
NRBC # BLD: SIGNIFICANT CHANGE UP /100 WBCS (ref 0–0)
PLAT MORPH BLD: NORMAL — SIGNIFICANT CHANGE UP
PLATELET # BLD AUTO: 201 K/UL — SIGNIFICANT CHANGE UP (ref 150–400)
PLATELET # BLD AUTO: 202 K/UL — SIGNIFICANT CHANGE UP (ref 150–400)
PLATELET # BLD AUTO: 211 K/UL — SIGNIFICANT CHANGE UP (ref 150–400)
PLATELET # BLD AUTO: 213 K/UL — SIGNIFICANT CHANGE UP (ref 150–400)
PLATELET # BLD AUTO: 224 K/UL — SIGNIFICANT CHANGE UP (ref 150–400)
PLATELET # BLD AUTO: 250 K/UL — SIGNIFICANT CHANGE UP (ref 150–400)
PLATELET # BLD AUTO: 253 K/UL — SIGNIFICANT CHANGE UP (ref 150–400)
PLATELET # BLD AUTO: 267 K/UL — SIGNIFICANT CHANGE UP (ref 150–400)
PLATELET # BLD AUTO: 270 K/UL — SIGNIFICANT CHANGE UP (ref 150–400)
PLATELET # BLD AUTO: 271 K/UL — SIGNIFICANT CHANGE UP (ref 150–400)
PLATELET # BLD AUTO: 278 K/UL — SIGNIFICANT CHANGE UP (ref 150–400)
PLATELET # BLD AUTO: 301 K/UL — SIGNIFICANT CHANGE UP (ref 150–400)
POTASSIUM SERPL-MCNC: 4.1 MMOL/L — SIGNIFICANT CHANGE UP (ref 3.5–5.3)
POTASSIUM SERPL-MCNC: 4.1 MMOL/L — SIGNIFICANT CHANGE UP (ref 3.5–5.3)
POTASSIUM SERPL-MCNC: 4.2 MMOL/L — SIGNIFICANT CHANGE UP (ref 3.5–5.3)
POTASSIUM SERPL-MCNC: 4.2 MMOL/L — SIGNIFICANT CHANGE UP (ref 3.5–5.3)
POTASSIUM SERPL-MCNC: 4.3 MMOL/L — SIGNIFICANT CHANGE UP (ref 3.5–5.3)
POTASSIUM SERPL-MCNC: 4.4 MMOL/L — SIGNIFICANT CHANGE UP (ref 3.5–5.3)
POTASSIUM SERPL-MCNC: 4.4 MMOL/L — SIGNIFICANT CHANGE UP (ref 3.5–5.3)
POTASSIUM SERPL-MCNC: 4.5 MMOL/L — SIGNIFICANT CHANGE UP (ref 3.5–5.3)
POTASSIUM SERPL-SCNC: 4 MMOL/L
POTASSIUM SERPL-SCNC: 4.1 MMOL/L — SIGNIFICANT CHANGE UP (ref 3.5–5.3)
POTASSIUM SERPL-SCNC: 4.1 MMOL/L — SIGNIFICANT CHANGE UP (ref 3.5–5.3)
POTASSIUM SERPL-SCNC: 4.2 MMOL/L — SIGNIFICANT CHANGE UP (ref 3.5–5.3)
POTASSIUM SERPL-SCNC: 4.2 MMOL/L — SIGNIFICANT CHANGE UP (ref 3.5–5.3)
POTASSIUM SERPL-SCNC: 4.3 MMOL/L — SIGNIFICANT CHANGE UP (ref 3.5–5.3)
POTASSIUM SERPL-SCNC: 4.4 MMOL/L — SIGNIFICANT CHANGE UP (ref 3.5–5.3)
POTASSIUM SERPL-SCNC: 4.4 MMOL/L — SIGNIFICANT CHANGE UP (ref 3.5–5.3)
POTASSIUM SERPL-SCNC: 4.5 MMOL/L — SIGNIFICANT CHANGE UP (ref 3.5–5.3)
PROC NAME: NORMAL
PROT SERPL-MCNC: 5.8 G/DL — LOW (ref 6–8.3)
PROT SERPL-MCNC: 6 G/DL — SIGNIFICANT CHANGE UP (ref 6–8.3)
PROT SERPL-MCNC: 6.1 G/DL — SIGNIFICANT CHANGE UP (ref 6–8.3)
PROT SERPL-MCNC: 6.2 G/DL — SIGNIFICANT CHANGE UP (ref 6–8.3)
PROT SERPL-MCNC: 6.3 G/DL — SIGNIFICANT CHANGE UP (ref 6–8.3)
PROT SERPL-MCNC: 6.3 G/DL — SIGNIFICANT CHANGE UP (ref 6–8.3)
PROT SERPL-MCNC: 6.4 G/DL — SIGNIFICANT CHANGE UP (ref 6–8.3)
PROT SERPL-MCNC: 6.4 G/DL — SIGNIFICANT CHANGE UP (ref 6–8.3)
PROT SERPL-MCNC: 6.7 G/DL
RBC # BLD: 2.85 M/UL — LOW (ref 3.8–5.2)
RBC # BLD: 2.92 M/UL — LOW (ref 3.8–5.2)
RBC # BLD: 3.14 M/UL — LOW (ref 3.8–5.2)
RBC # BLD: 3.15 M/UL — LOW (ref 3.8–5.2)
RBC # BLD: 3.24 M/UL — LOW (ref 3.8–5.2)
RBC # BLD: 3.26 M/UL — LOW (ref 3.8–5.2)
RBC # BLD: 3.28 M/UL — LOW (ref 3.8–5.2)
RBC # BLD: 3.48 M/UL — LOW (ref 3.8–5.2)
RBC # BLD: 3.53 M/UL — LOW (ref 3.8–5.2)
RBC # BLD: 3.55 M/UL — LOW (ref 3.8–5.2)
RBC # BLD: 3.56 M/UL — LOW (ref 3.8–5.2)
RBC # BLD: 3.69 M/UL — LOW (ref 3.8–5.2)
RBC # FLD: 14.2 % — SIGNIFICANT CHANGE UP (ref 10.3–14.5)
RBC # FLD: 14.5 % — SIGNIFICANT CHANGE UP (ref 10.3–14.5)
RBC # FLD: 14.5 % — SIGNIFICANT CHANGE UP (ref 10.3–14.5)
RBC # FLD: 14.8 % — HIGH (ref 10.3–14.5)
RBC # FLD: 15.3 % — HIGH (ref 10.3–14.5)
RBC # FLD: 15.8 % — HIGH (ref 10.3–14.5)
RBC # FLD: 16.7 % — HIGH (ref 10.3–14.5)
RBC # FLD: 17.6 % — HIGH (ref 10.3–14.5)
RBC # FLD: 17.9 % — HIGH (ref 10.3–14.5)
RBC # FLD: 18.6 % — HIGH (ref 10.3–14.5)
RBC # FLD: 18.6 % — HIGH (ref 10.3–14.5)
RBC # FLD: 18.8 % — HIGH (ref 10.3–14.5)
RBC BLD AUTO: SIGNIFICANT CHANGE UP
SODIUM SERPL-SCNC: 135 MMOL/L — SIGNIFICANT CHANGE UP (ref 135–145)
SODIUM SERPL-SCNC: 136 MMOL/L
SODIUM SERPL-SCNC: 136 MMOL/L — SIGNIFICANT CHANGE UP (ref 135–145)
SODIUM SERPL-SCNC: 137 MMOL/L — SIGNIFICANT CHANGE UP (ref 135–145)
SODIUM SERPL-SCNC: 137 MMOL/L — SIGNIFICANT CHANGE UP (ref 135–145)
SODIUM SERPL-SCNC: 138 MMOL/L — SIGNIFICANT CHANGE UP (ref 135–145)
SODIUM SERPL-SCNC: 139 MMOL/L — SIGNIFICANT CHANGE UP (ref 135–145)
WBC # BLD: 14.56 K/UL — HIGH (ref 3.8–10.5)
WBC # BLD: 14.79 K/UL — HIGH (ref 3.8–10.5)
WBC # BLD: 15.9 K/UL — HIGH (ref 3.8–10.5)
WBC # BLD: 16.99 K/UL — HIGH (ref 3.8–10.5)
WBC # BLD: 19.48 K/UL — HIGH (ref 3.8–10.5)
WBC # BLD: 24.91 K/UL — HIGH (ref 3.8–10.5)
WBC # BLD: 31.98 K/UL — HIGH (ref 3.8–10.5)
WBC # BLD: 4.41 K/UL — SIGNIFICANT CHANGE UP (ref 3.8–10.5)
WBC # BLD: 4.98 K/UL — SIGNIFICANT CHANGE UP (ref 3.8–10.5)
WBC # BLD: 4.98 K/UL — SIGNIFICANT CHANGE UP (ref 3.8–10.5)
WBC # BLD: 5.07 K/UL — SIGNIFICANT CHANGE UP (ref 3.8–10.5)
WBC # BLD: 6.24 K/UL — SIGNIFICANT CHANGE UP (ref 3.8–10.5)
WBC # FLD AUTO: 14.56 K/UL — HIGH (ref 3.8–10.5)
WBC # FLD AUTO: 14.79 K/UL — HIGH (ref 3.8–10.5)
WBC # FLD AUTO: 15.9 K/UL — HIGH (ref 3.8–10.5)
WBC # FLD AUTO: 16.99 K/UL — HIGH (ref 3.8–10.5)
WBC # FLD AUTO: 19.48 K/UL — HIGH (ref 3.8–10.5)
WBC # FLD AUTO: 24.91 K/UL — HIGH (ref 3.8–10.5)
WBC # FLD AUTO: 31.98 K/UL — HIGH (ref 3.8–10.5)
WBC # FLD AUTO: 4.41 K/UL — SIGNIFICANT CHANGE UP (ref 3.8–10.5)
WBC # FLD AUTO: 4.98 K/UL — SIGNIFICANT CHANGE UP (ref 3.8–10.5)
WBC # FLD AUTO: 4.98 K/UL — SIGNIFICANT CHANGE UP (ref 3.8–10.5)
WBC # FLD AUTO: 5.07 K/UL — SIGNIFICANT CHANGE UP (ref 3.8–10.5)
WBC # FLD AUTO: 6.24 K/UL — SIGNIFICANT CHANGE UP (ref 3.8–10.5)

## 2022-01-01 PROCEDURE — 99214 OFFICE O/P EST MOD 30 MIN: CPT

## 2022-01-01 PROCEDURE — 99205 OFFICE O/P NEW HI 60 MIN: CPT | Mod: 25

## 2022-01-01 PROCEDURE — 99215 OFFICE O/P EST HI 40 MIN: CPT

## 2022-01-01 PROCEDURE — G0108 DIAB MANAGE TRN  PER INDIV: CPT

## 2022-01-01 PROCEDURE — 74177 CT ABD & PELVIS W/CONTRAST: CPT | Mod: 26

## 2022-01-01 PROCEDURE — 99215 OFFICE O/P EST HI 40 MIN: CPT | Mod: 25

## 2022-01-01 PROCEDURE — 82962 GLUCOSE BLOOD TEST: CPT

## 2022-01-01 PROCEDURE — 74177 CT ABD & PELVIS W/CONTRAST: CPT

## 2022-01-01 PROCEDURE — 99212 OFFICE O/P EST SF 10 MIN: CPT

## 2022-01-01 PROCEDURE — 99205 OFFICE O/P NEW HI 60 MIN: CPT

## 2022-01-01 PROCEDURE — 95251 CONT GLUC MNTR ANALYSIS I&R: CPT

## 2022-01-01 PROCEDURE — 99203 OFFICE O/P NEW LOW 30 MIN: CPT | Mod: 95

## 2022-01-01 PROCEDURE — 71260 CT THORAX DX C+: CPT | Mod: 26

## 2022-01-01 PROCEDURE — 83036 HEMOGLOBIN GLYCOSYLATED A1C: CPT | Mod: QW

## 2022-01-01 PROCEDURE — 71260 CT THORAX DX C+: CPT

## 2022-01-01 RX ORDER — FLASH GLUCOSE SENSOR
KIT MISCELLANEOUS
Qty: 2 | Refills: 9 | Status: ACTIVE | COMMUNITY
Start: 2022-07-06

## 2022-01-01 RX ORDER — ELECTROLYTES/DEXTROSE
32G X 4 MM SOLUTION, ORAL ORAL
Qty: 1 | Refills: 0 | Status: ACTIVE | COMMUNITY
Start: 2022-01-01 | End: 1900-01-01

## 2022-01-01 RX ORDER — PANCRELIPASE 36000; 180000; 114000 [USP'U]/1; [USP'U]/1; [USP'U]/1
36000-114000 CAPSULE, DELAYED RELEASE PELLETS ORAL
Qty: 300 | Refills: 3 | Status: ACTIVE | COMMUNITY
Start: 2022-06-24 | End: 1900-01-01

## 2022-01-01 RX ORDER — OXYCODONE 5 MG/1
5 TABLET ORAL
Qty: 28 | Refills: 0 | Status: DISCONTINUED | COMMUNITY
Start: 2022-06-27 | End: 2022-01-01

## 2022-01-01 RX ORDER — MIRTAZAPINE 15 MG/1
15 TABLET, ORALLY DISINTEGRATING ORAL
Qty: 30 | Refills: 0 | Status: DISCONTINUED | COMMUNITY
Start: 2022-01-01 | End: 2022-01-01

## 2022-01-01 RX ORDER — LOSARTAN POTASSIUM 100 MG/1
100 TABLET, FILM COATED ORAL DAILY
Refills: 0 | Status: DISCONTINUED | COMMUNITY
Start: 2022-07-06 | End: 2022-01-01

## 2022-01-01 RX ORDER — OMEGA-3/DHA/EPA/FISH OIL 300-1000MG
400 CAPSULE ORAL
Refills: 0 | Status: DISCONTINUED | COMMUNITY
Start: 2022-07-06 | End: 2022-01-01

## 2022-01-01 RX ORDER — ZOLPIDEM TARTRATE 5 MG/1
5 TABLET ORAL
Qty: 10 | Refills: 0 | Status: DISCONTINUED | COMMUNITY
Start: 2022-07-13 | End: 2022-01-01

## 2022-01-19 ENCOUNTER — NON-APPOINTMENT (OUTPATIENT)
Age: 77
End: 2022-01-19

## 2022-01-19 ENCOUNTER — APPOINTMENT (OUTPATIENT)
Dept: OPHTHALMOLOGY | Facility: CLINIC | Age: 77
End: 2022-01-19
Payer: MEDICARE

## 2022-01-19 PROCEDURE — 92136 OPHTHALMIC BIOMETRY: CPT

## 2022-01-19 PROCEDURE — 92014 COMPRE OPH EXAM EST PT 1/>: CPT

## 2022-05-27 ENCOUNTER — APPOINTMENT (OUTPATIENT)
Dept: SURGICAL ONCOLOGY | Facility: CLINIC | Age: 77
End: 2022-05-27
Payer: MEDICARE

## 2022-05-27 VITALS
OXYGEN SATURATION: 96 % | BODY MASS INDEX: 21.52 KG/M2 | TEMPERATURE: 97.6 F | DIASTOLIC BLOOD PRESSURE: 69 MMHG | RESPIRATION RATE: 16 BRPM | WEIGHT: 114 LBS | SYSTOLIC BLOOD PRESSURE: 125 MMHG | HEIGHT: 61 IN | HEART RATE: 76 BPM

## 2022-05-27 DIAGNOSIS — Z86.39 PERSONAL HISTORY OF OTHER ENDOCRINE, NUTRITIONAL AND METABOLIC DISEASE: ICD-10-CM

## 2022-05-27 DIAGNOSIS — B18.2 CHRONIC VIRAL HEPATITIS C: ICD-10-CM

## 2022-05-27 DIAGNOSIS — Z95.5 PRESENCE OF CORONARY ANGIOPLASTY IMPLANT AND GRAFT: ICD-10-CM

## 2022-05-27 DIAGNOSIS — R17 UNSPECIFIED JAUNDICE: ICD-10-CM

## 2022-05-27 DIAGNOSIS — Z86.79 PERSONAL HISTORY OF OTHER DISEASES OF THE CIRCULATORY SYSTEM: ICD-10-CM

## 2022-05-27 PROCEDURE — 99204 OFFICE O/P NEW MOD 45 MIN: CPT

## 2022-05-31 LAB
ALBUMIN SERPL ELPH-MCNC: 4.1 G/DL
ALP BLD-CCNC: 392 U/L
ALT SERPL-CCNC: 72 U/L
ANION GAP SERPL CALC-SCNC: 11 MMOL/L
AST SERPL-CCNC: 54 U/L
BASOPHILS # BLD AUTO: 0.07 K/UL
BASOPHILS NFR BLD AUTO: 0.6 %
BILIRUB SERPL-MCNC: 4.6 MG/DL
BUN SERPL-MCNC: 15 MG/DL
CALCIUM SERPL-MCNC: 10.4 MG/DL
CANCER AG19-9 SERPL-ACNC: 6246 U/ML
CEA SERPL-MCNC: 4.5 NG/ML
CHLORIDE SERPL-SCNC: 95 MMOL/L
CO2 SERPL-SCNC: 27 MMOL/L
CREAT SERPL-MCNC: 0.63 MG/DL
EGFR: 92 ML/MIN/1.73M2
EOSINOPHIL # BLD AUTO: 0.11 K/UL
EOSINOPHIL NFR BLD AUTO: 1 %
GLUCOSE SERPL-MCNC: 135 MG/DL
HCT VFR BLD CALC: 33.1 %
HGB BLD-MCNC: 10 G/DL
IMM GRANULOCYTES NFR BLD AUTO: 2.3 %
LYMPHOCYTES # BLD AUTO: 1.33 K/UL
LYMPHOCYTES NFR BLD AUTO: 11.5 %
MAN DIFF?: NORMAL
MCHC RBC-ENTMCNC: 29.9 PG
MCHC RBC-ENTMCNC: 30.2 GM/DL
MCV RBC AUTO: 98.8 FL
MONOCYTES # BLD AUTO: 0.84 K/UL
MONOCYTES NFR BLD AUTO: 7.3 %
NEUTROPHILS # BLD AUTO: 8.93 K/UL
NEUTROPHILS NFR BLD AUTO: 77.3 %
PLATELET # BLD AUTO: 429 K/UL
POTASSIUM SERPL-SCNC: 4.1 MMOL/L
PROT SERPL-MCNC: 7.4 G/DL
RBC # BLD: 3.35 M/UL
RBC # FLD: 16.5 %
SODIUM SERPL-SCNC: 133 MMOL/L
WBC # FLD AUTO: 11.55 K/UL

## 2022-06-02 PROBLEM — Z95.5 HISTORY OF HEART ARTERY STENT: Status: RESOLVED | Noted: 2022-06-02 | Resolved: 2022-06-02

## 2022-06-02 PROBLEM — B18.2 HEPATITIS C, CHRONIC: Status: RESOLVED | Noted: 2022-06-02 | Resolved: 2022-06-02

## 2022-06-02 PROBLEM — Z86.79 HISTORY OF HYPERTENSION: Status: RESOLVED | Noted: 2022-06-02 | Resolved: 2022-06-02

## 2022-06-02 PROBLEM — Z86.39 HISTORY OF TYPE 2 DIABETES MELLITUS: Status: RESOLVED | Noted: 2022-06-02 | Resolved: 2022-06-02

## 2022-06-02 PROBLEM — R17 JAUNDICE: Status: ACTIVE | Noted: 2022-05-27

## 2022-06-02 PROBLEM — Z86.79 HISTORY OF CORONARY ARTERY DISEASE: Status: RESOLVED | Noted: 2022-06-02 | Resolved: 2022-06-02

## 2022-06-02 LAB
ALBUMIN SERPL ELPH-MCNC: 4.4 G/DL
ALP BLD-CCNC: 250 U/L
ALT SERPL-CCNC: 43 U/L
AST SERPL-CCNC: 61 U/L
BILIRUB DIRECT SERPL-MCNC: 2.2 MG/DL
BILIRUB INDIRECT SERPL-MCNC: 1.3 MG/DL
BILIRUB SERPL-MCNC: 3.5 MG/DL
CANCER AG19-9 SERPL-ACNC: 6619 U/ML
PROT SERPL-MCNC: 7.6 G/DL

## 2022-06-02 NOTE — REASON FOR VISIT
[Initial Consultation] : an initial consultation for [Pancreatic Cancer] : pancreatic cancer [Referred By: ___] : Referred By: [unfilled] [Family Member] : family member

## 2022-06-08 ENCOUNTER — RESULT REVIEW (OUTPATIENT)
Age: 77
End: 2022-06-08

## 2022-06-08 ENCOUNTER — OUTPATIENT (OUTPATIENT)
Dept: OUTPATIENT SERVICES | Facility: HOSPITAL | Age: 77
LOS: 1 days | End: 2022-06-08
Payer: MEDICARE

## 2022-06-08 DIAGNOSIS — C80.1 MALIGNANT (PRIMARY) NEOPLASM, UNSPECIFIED: ICD-10-CM

## 2022-06-08 DIAGNOSIS — Z98.891 HISTORY OF UTERINE SCAR FROM PREVIOUS SURGERY: Chronic | ICD-10-CM

## 2022-06-08 DIAGNOSIS — Z90.710 ACQUIRED ABSENCE OF BOTH CERVIX AND UTERUS: Chronic | ICD-10-CM

## 2022-06-08 PROCEDURE — 88321 CONSLTJ&REPRT SLD PREP ELSWR: CPT

## 2022-06-08 NOTE — ASSESSMENT
[FreeTextEntry1] : Ms. KYA JUNE is a 77 year old femake who presents for initial consultation for new diagnosis of pancreas adenocarcinoma. Hx of DM2, HTN, HLD, CAD/ cardiac stent, chronic hepatitis C (s/p treatment). PSH of total hysterectomy.  Presented with abdominal pain and obstructive jaundice, found to have a pancreatic head mass. She underwent EUS with biopsy which was positive for adenocarcinoma. She is also s/p ERCP with plastic CBD stent placement.  Her Tbili peaked at 20.  Ca 19-9 level was 6704 in the setting of jaundice.  \par \par Discussed treatment methods of pancreatic cancer including systemic vs local therapy and that in order to beat cancer-  both surgery and chemotherapy are needed, sometimes radiation. Reviewed the [place] trial. Also reviewed the benefits of upfront chemotherapy vs. upfront surgical resection. \par \par Discussed with upfront therapy potential benefits include: 1) The possible decreased size of the tumor, 2) It will serve as an in vivo test to see if the tumor will respond to that specific chemotherapy regimen, and 3) Upfront systemic treatment can help prevent metastatic disease spread. Reviewed that <10 % of patients will have disease that progresses on chemotherapy; the majority will have tumors that respond to treatment or tumors that remain stable. Discussed that current chemotherapy is much improved from chemotherapy in the past for two main reasons includin) It is now much more effective and 2) It is much better tolerated. \par \par Discussed that in her case it is also reasonable to move forward with upfront resection given this is a resectable tumor.   She is currently jaundiced and has a plastic stent in her bile duct. If proceeds to neoadjuvant chemotherapy, would need to repeat ERCP and replace with a metal stent. \par Due to the location and size of her tumor I recommend a pancreaticoduodenectomy (Whipple) surgery. Jared a diagram for the patient and family to better understand the operation. Discussed that the operation will last 3-4 hours, and the hospital stay will last 5-7 days. Discussed that the most common complaint after this procedure is fatigue followed by poor appetite. It will take approximately 8-12 weeks to feel "back to normal" after the operation. Risks, benefits and details of the operation were discussed. These include bleeding, infection, damage to surrounding structures, heart/lung failure, air leak from lung, pneumonia, blood clot to the lung, pancreatic leakage and delayed gastric emptying. Mortality quoted at <1.5%. Discussed that if she opts for upfront surgery then would require adjuvant therapy. The patient expressed understanding and would like to talk over with her family, but most likely to pursue upfront surgery.     Will repeat her liver function tests and Ca 19-9 level later next week with hopes of these trending down further prior to surgery. \par   \par Today, I personally spent 60 minutes in total time including reviewing imaging and studies, discussing complex treatment regimens, direct face to face time with the patient, patient education and counseling.\par  \par

## 2022-06-08 NOTE — HISTORY OF PRESENT ILLNESS
[Abdominal Pain] : abdominal pain [Jaundice] : jaundice [Darker Urine] : dark urine [ERCP] : ERCP [Endostent] : endostent [EUS] : EUS [Biopsy] : biopsy performed [de-identified] : Ms. KYA JUNE is a 76 year old female who presents for initial consultation for new diagnosis of pancreas adenocarcinoma. She is primarily Romanian speaking and presents with her daughter who assisted in interpretation.  Patient declined  services.\par Her PMH is significant for IDDM (diagnosed > 10 years ago), HTN, HLD, CAD,and cardiac stent, chronic hepatitis C (s/p treatment). PSH of total hysterectomy.   She initially presented with RUQ abdominal pain intermittently x 1 week and found to be jaundiced when she saw her PCP. Presenting total bilirubin of 15 and Ca 19-9 level of 6704.  She was worked up further with abdominal ultrasound noting CBD dilated to 1.4 cm and gallstones.  CT A/P 5/20/22 noting a 2.1 cm pancreatic head mass with resultant abrupt cut off of the CBD.  CT chest with several indeterminate pulmonary nodules. \par She underwent EUS with FNB of pancreas mass on 5/20/22 and pathology + for moderately differentiated adenocarcinoma. She s/p ERCP on 5/23/22 with plastic stent placement into the CBD.  She was seen at [facility] by surgeon who recommended upfront Whipple surgery.  Family reports she now appears less jaundiced. Denies acholic stools. Had dark bowel movements after EUS which has since resolved.  Her urine was very dark and now looks more normal per patient.  \par \par Family cancer history:  Brother- brain tumor     Daughter- leukemia\par \par \par 5/19/22: Tbili 15.3    ALP: 674     AST: 353   ALT: 342      CA 19-9:   6704      Lipase 1984 \par 5/23/22:  Tbili 20.4    ALP:  769  [TextBox_4] : 5/19/;22 [TextBox_41] : adenocarcinoma  [TextBox_43] : 5/20/22

## 2022-06-08 NOTE — PHYSICAL EXAM
[FreeTextEntry1] : General: No acute distress. Well nourished and well kempt.\par Head: AT/NC\par Eyes: PERRL. EOMI.\par Pulmonary: No respiratory distress. Clear to auscultation bilaterally. No wheezes, rales, or rhonchi. No tachypnea and no retractions. \par CV: Regular rate and rhythm. Normal S1, S2. No murmurs, rubs, clicks or gallops. No chest wall tenderness. Distal pulses intact. Good capillary refill.\par ABD: Soft. NT/ND. No rebound, no guarding, no rigidity. No peritoneal signs. No masses.  \par Extremities: Warm. No edema. 2+ pulses.\par Neurological: A&O x 4.\par Psychiatric: Normal affect and mood.\par \par

## 2022-06-09 ENCOUNTER — OUTPATIENT (OUTPATIENT)
Dept: OUTPATIENT SERVICES | Facility: HOSPITAL | Age: 77
LOS: 1 days | End: 2022-06-09
Payer: COMMERCIAL

## 2022-06-09 ENCOUNTER — NON-APPOINTMENT (OUTPATIENT)
Age: 77
End: 2022-06-09

## 2022-06-09 VITALS
WEIGHT: 111.99 LBS | HEART RATE: 72 BPM | DIASTOLIC BLOOD PRESSURE: 60 MMHG | OXYGEN SATURATION: 99 % | RESPIRATION RATE: 16 BRPM | TEMPERATURE: 99 F | HEIGHT: 62 IN | SYSTOLIC BLOOD PRESSURE: 110 MMHG

## 2022-06-09 DIAGNOSIS — C25.9 MALIGNANT NEOPLASM OF PANCREAS, UNSPECIFIED: ICD-10-CM

## 2022-06-09 DIAGNOSIS — Z95.5 PRESENCE OF CORONARY ANGIOPLASTY IMPLANT AND GRAFT: Chronic | ICD-10-CM

## 2022-06-09 DIAGNOSIS — Z98.891 HISTORY OF UTERINE SCAR FROM PREVIOUS SURGERY: Chronic | ICD-10-CM

## 2022-06-09 DIAGNOSIS — Z98.890 OTHER SPECIFIED POSTPROCEDURAL STATES: ICD-10-CM

## 2022-06-09 DIAGNOSIS — E11.9 TYPE 2 DIABETES MELLITUS WITHOUT COMPLICATIONS: ICD-10-CM

## 2022-06-09 DIAGNOSIS — Z01.812 ENCOUNTER FOR PREPROCEDURAL LABORATORY EXAMINATION: ICD-10-CM

## 2022-06-09 DIAGNOSIS — Z90.710 ACQUIRED ABSENCE OF BOTH CERVIX AND UTERUS: Chronic | ICD-10-CM

## 2022-06-09 DIAGNOSIS — I10 ESSENTIAL (PRIMARY) HYPERTENSION: ICD-10-CM

## 2022-06-09 PROCEDURE — 93010 ELECTROCARDIOGRAM REPORT: CPT

## 2022-06-09 RX ORDER — INSULIN DETEMIR 100/ML (3)
50 INSULIN PEN (ML) SUBCUTANEOUS
Qty: 0 | Refills: 0 | DISCHARGE

## 2022-06-09 RX ORDER — ASPIRIN/CALCIUM CARB/MAGNESIUM 324 MG
1 TABLET ORAL
Qty: 0 | Refills: 0 | DISCHARGE

## 2022-06-09 RX ORDER — AMLODIPINE BESYLATE 2.5 MG/1
1 TABLET ORAL
Qty: 0 | Refills: 0 | DISCHARGE

## 2022-06-09 RX ORDER — METOPROLOL TARTRATE 50 MG
1 TABLET ORAL
Qty: 0 | Refills: 0 | DISCHARGE

## 2022-06-09 RX ORDER — LOVASTATIN 20 MG
1 TABLET ORAL
Qty: 0 | Refills: 0 | DISCHARGE

## 2022-06-09 RX ORDER — CANAGLIFLOZIN 100 MG/1
1 TABLET, FILM COATED ORAL
Qty: 0 | Refills: 0 | DISCHARGE

## 2022-06-09 RX ORDER — LOSARTAN POTASSIUM 100 MG/1
1 TABLET, FILM COATED ORAL
Qty: 0 | Refills: 0 | DISCHARGE

## 2022-06-09 RX ORDER — DAPAGLIFLOZIN 10 MG/1
1 TABLET, FILM COATED ORAL
Qty: 0 | Refills: 0 | DISCHARGE

## 2022-06-09 RX ORDER — FENOFIBRATE,MICRONIZED 130 MG
1 CAPSULE ORAL
Qty: 0 | Refills: 0 | DISCHARGE

## 2022-06-09 RX ORDER — SITAGLIPTIN AND METFORMIN HYDROCHLORIDE 500; 50 MG/1; MG/1
1 TABLET, FILM COATED ORAL
Qty: 0 | Refills: 0 | DISCHARGE

## 2022-06-09 RX ORDER — ROSUVASTATIN CALCIUM 5 MG/1
1 TABLET ORAL
Qty: 0 | Refills: 0 | DISCHARGE

## 2022-06-09 NOTE — H&P PST ADULT - NSICDXFAMILYHX_GEN_ALL_CORE_FT
FAMILY HISTORY:  Mother  Still living? No  FH: HTN (hypertension), Age at diagnosis: Age Unknown    Child  Still living? Yes, Estimated age: Age Unknown  Family history of leukemia, Age at diagnosis: Age Unknown

## 2022-06-09 NOTE — H&P PST ADULT - PROBLEM SELECTOR PLAN 4
Pt with 1 coronary stent inserted in May 2021.  Currently on low dose aspirin.  Pt instructed to continue aspirin  Copy of cardiac cath report in chart

## 2022-06-09 NOTE — H&P PST ADULT - BACK
Clean areas with warm soapy water    Take antibiotic as prescribed  Was all sheets and towels on hot  Follow up with PCP or dermatology of no improvement
detailed exam

## 2022-06-09 NOTE — H&P PST ADULT - HISTORY OF PRESENT ILLNESS
76y/o female pre 76y/o female presents for preop eval for scheduled whipple.  Pt states hospitalized few months ago at [facility] for jaundice.  ERCP and bilary stent inserted.  Imaging show pancreatic mass.  Preop dx malignant neoplasm of pancreas unspecified.

## 2022-06-09 NOTE — H&P PST ADULT - LAST CARDIAC ANGIOGRAM/IMAGING
5/2021 - [facility] 1 stent inserted 5/2021 - [facility] 1 stent inserted copy in chart, copy in chart

## 2022-06-09 NOTE — H&P PST ADULT - PROBLEM SELECTOR PLAN 1
Scheduled for WHIPPLE on 6/13/22  Written & verbal preop instructions, gi prophylaxis & surgical soap given  Pt verbalized good understanding.  Teach back done on surgical soap instructions.  copy of medical eval in chart

## 2022-06-09 NOTE — H&P PST ADULT - PROBLEM SELECTOR PLAN 3
Fs on admit  Pt  instructed last dose farxiga on 6/1/22, last dose Janumet 6/12/22.    and instructed to take Levemir 48units 6/12/22 pm dose   Pt verbalized understanding

## 2022-06-09 NOTE — H&P PST ADULT - PROBLEM SELECTOR PLAN 2
per pt scheduled within 72 hrs of this surgery.  PCR ordered and information on available testing sites given

## 2022-06-09 NOTE — H&P PST ADULT - NSICDXPASTSURGICALHX_GEN_ALL_CORE_FT
PAST SURGICAL HISTORY:  History of  section     History of hysterectomy      PAST SURGICAL HISTORY:  History of  section     History of coronary angioplasty with insertion of stent 2019 - 1 stent inserted    History of hysterectomy

## 2022-06-09 NOTE — H&P PST ADULT - NSICDXPASTMEDICALHX_GEN_ALL_CORE_FT
PAST MEDICAL HISTORY:  HTN (hypertension), benign     Hypertension     IDDM (insulin dependent diabetes mellitus)      PAST MEDICAL HISTORY:  CAD (coronary artery disease)     Dyslipidemia     Hypertension     IDDM (insulin dependent diabetes mellitus)     Malignant neoplasm of pancreas, unspecified      PAST MEDICAL HISTORY:  CAD (coronary artery disease)     Dyslipidemia     H/O chronic hepatitis treated    Hypertension     IDDM (insulin dependent diabetes mellitus)     Malignant neoplasm of pancreas, unspecified

## 2022-06-09 NOTE — H&P PST ADULT - NSANTHOSAYNRD_GEN_A_CORE
30.4
No. JODI screening performed.  STOP BANG Legend: 0-2 = LOW Risk; 3-4 = INTERMEDIATE Risk; 5-8 = HIGH Risk

## 2022-06-10 LAB
SARS-COV-2 RNA SPEC QL NAA+PROBE: SIGNIFICANT CHANGE UP
SURGICAL PATHOLOGY STUDY: SIGNIFICANT CHANGE UP

## 2022-06-10 NOTE — ASU PATIENT PROFILE, ADULT - NSICDXPASTSURGICALHX_GEN_ALL_CORE_FT
PAST SURGICAL HISTORY:  History of  section     History of coronary angioplasty with insertion of stent 2019 - 1 stent inserted    History of hysterectomy

## 2022-06-10 NOTE — ASU PATIENT PROFILE, ADULT - FALL HARM RISK - UNIVERSAL INTERVENTIONS
Bed in lowest position, wheels locked, appropriate side rails in place/Call bell, personal items and telephone in reach/Instruct patient to call for assistance before getting out of bed or chair/Non-slip footwear when patient is out of bed/[place] to call system/Physically safe environment - no spills, clutter or unnecessary equipment/Purposeful Proactive Rounding/Room/bathroom lighting operational, light cord in reach

## 2022-06-10 NOTE — ASU PATIENT PROFILE, ADULT - NSICDXPASTMEDICALHX_GEN_ALL_CORE_FT
PAST MEDICAL HISTORY:  CAD (coronary artery disease)     Dyslipidemia     H/O chronic hepatitis treated    Hypertension     IDDM (insulin dependent diabetes mellitus)     Malignant neoplasm of pancreas, unspecified

## 2022-06-13 ENCOUNTER — INPATIENT (INPATIENT)
Facility: HOSPITAL | Age: 77
LOS: 8 days | Discharge: HOME HEALTH | End: 2022-06-22
Attending: SURGERY | Admitting: SURGERY
Payer: COMMERCIAL

## 2022-06-13 ENCOUNTER — TRANSCRIPTION ENCOUNTER (OUTPATIENT)
Age: 77
End: 2022-06-13

## 2022-06-13 ENCOUNTER — APPOINTMENT (OUTPATIENT)
Dept: SURGICAL ONCOLOGY | Facility: HOSPITAL | Age: 77
End: 2022-06-13

## 2022-06-13 ENCOUNTER — RESULT REVIEW (OUTPATIENT)
Age: 77
End: 2022-06-13

## 2022-06-13 VITALS
TEMPERATURE: 98 F | DIASTOLIC BLOOD PRESSURE: 54 MMHG | WEIGHT: 111.99 LBS | RESPIRATION RATE: 16 BRPM | HEIGHT: 62 IN | SYSTOLIC BLOOD PRESSURE: 101 MMHG | HEART RATE: 63 BPM | OXYGEN SATURATION: 100 %

## 2022-06-13 DIAGNOSIS — Z98.891 HISTORY OF UTERINE SCAR FROM PREVIOUS SURGERY: Chronic | ICD-10-CM

## 2022-06-13 DIAGNOSIS — C25.9 MALIGNANT NEOPLASM OF PANCREAS, UNSPECIFIED: ICD-10-CM

## 2022-06-13 DIAGNOSIS — Z90.710 ACQUIRED ABSENCE OF BOTH CERVIX AND UTERUS: Chronic | ICD-10-CM

## 2022-06-13 DIAGNOSIS — Z95.5 PRESENCE OF CORONARY ANGIOPLASTY IMPLANT AND GRAFT: Chronic | ICD-10-CM

## 2022-06-13 LAB
ALBUMIN SERPL ELPH-MCNC: 3.1 G/DL — LOW (ref 3.3–5)
ALP SERPL-CCNC: 78 U/L — SIGNIFICANT CHANGE UP (ref 40–120)
ALT FLD-CCNC: 26 U/L — SIGNIFICANT CHANGE UP (ref 4–33)
AMYLASE P1 CFR SERPL: 64 U/L — SIGNIFICANT CHANGE UP (ref 25–125)
ANION GAP SERPL CALC-SCNC: 10 MMOL/L — SIGNIFICANT CHANGE UP (ref 7–14)
APTT BLD: 22.9 SEC — LOW (ref 27–36.3)
AST SERPL-CCNC: 45 U/L — HIGH (ref 4–32)
BILIRUB SERPL-MCNC: 1.2 MG/DL — SIGNIFICANT CHANGE UP (ref 0.2–1.2)
BUN SERPL-MCNC: 16 MG/DL — SIGNIFICANT CHANGE UP (ref 7–23)
CALCIUM SERPL-MCNC: 8.4 MG/DL — SIGNIFICANT CHANGE UP (ref 8.4–10.5)
CHLORIDE SERPL-SCNC: 107 MMOL/L — SIGNIFICANT CHANGE UP (ref 98–107)
CO2 SERPL-SCNC: 21 MMOL/L — LOW (ref 22–31)
CREAT SERPL-MCNC: 0.77 MG/DL — SIGNIFICANT CHANGE UP (ref 0.5–1.3)
EGFR: 79 ML/MIN/1.73M2 — SIGNIFICANT CHANGE UP
GLUCOSE BLDC GLUCOMTR-MCNC: 108 MG/DL — HIGH (ref 70–99)
GLUCOSE BLDC GLUCOMTR-MCNC: 186 MG/DL — HIGH (ref 70–99)
GLUCOSE BLDC GLUCOMTR-MCNC: 192 MG/DL — HIGH (ref 70–99)
GLUCOSE SERPL-MCNC: 227 MG/DL — HIGH (ref 70–99)
GRAM STN FLD: SIGNIFICANT CHANGE UP
HCT VFR BLD CALC: 29.3 % — LOW (ref 34.5–45)
HGB BLD-MCNC: 9.7 G/DL — LOW (ref 11.5–15.5)
INR BLD: 1.18 RATIO — HIGH (ref 0.88–1.16)
MAGNESIUM SERPL-MCNC: 1.7 MG/DL — SIGNIFICANT CHANGE UP (ref 1.6–2.6)
MCHC RBC-ENTMCNC: 32 PG — SIGNIFICANT CHANGE UP (ref 27–34)
MCHC RBC-ENTMCNC: 33.1 GM/DL — SIGNIFICANT CHANGE UP (ref 32–36)
MCV RBC AUTO: 96.7 FL — SIGNIFICANT CHANGE UP (ref 80–100)
NRBC # BLD: 0 /100 WBCS — SIGNIFICANT CHANGE UP
NRBC # FLD: 0 K/UL — SIGNIFICANT CHANGE UP
PHOSPHATE SERPL-MCNC: 4.8 MG/DL — HIGH (ref 2.5–4.5)
PLATELET # BLD AUTO: 262 K/UL — SIGNIFICANT CHANGE UP (ref 150–400)
POTASSIUM SERPL-MCNC: 3.7 MMOL/L — SIGNIFICANT CHANGE UP (ref 3.5–5.3)
POTASSIUM SERPL-SCNC: 3.7 MMOL/L — SIGNIFICANT CHANGE UP (ref 3.5–5.3)
PROT SERPL-MCNC: 5.5 G/DL — LOW (ref 6–8.3)
PROTHROM AB SERPL-ACNC: 13.7 SEC — HIGH (ref 10.5–13.4)
RBC # BLD: 3.03 M/UL — LOW (ref 3.8–5.2)
RBC # FLD: 18.7 % — HIGH (ref 10.3–14.5)
SODIUM SERPL-SCNC: 138 MMOL/L — SIGNIFICANT CHANGE UP (ref 135–145)
SPECIMEN SOURCE: SIGNIFICANT CHANGE UP
WBC # BLD: 11.97 K/UL — HIGH (ref 3.8–10.5)
WBC # FLD AUTO: 11.97 K/UL — HIGH (ref 3.8–10.5)

## 2022-06-13 PROCEDURE — 49905 OMENTAL FLAP INTRA-ABDOM: CPT

## 2022-06-13 PROCEDURE — 71045 X-RAY EXAM CHEST 1 VIEW: CPT | Mod: 26

## 2022-06-13 PROCEDURE — 99222 1ST HOSP IP/OBS MODERATE 55: CPT

## 2022-06-13 PROCEDURE — 35221 RPR BLD VSL DIR INTRA-ABDL: CPT | Mod: 82,59

## 2022-06-13 PROCEDURE — 88305 TISSUE EXAM BY PATHOLOGIST: CPT | Mod: 26

## 2022-06-13 PROCEDURE — 88307 TISSUE EXAM BY PATHOLOGIST: CPT | Mod: 26

## 2022-06-13 PROCEDURE — 48150 PARTIAL REMOVAL OF PANCREAS: CPT | Mod: 82

## 2022-06-13 PROCEDURE — 48150 PARTIAL REMOVAL OF PANCREAS: CPT

## 2022-06-13 PROCEDURE — 88331 PATH CONSLTJ SURG 1 BLK 1SPC: CPT | Mod: 26

## 2022-06-13 PROCEDURE — 35221 RPR BLD VSL DIR INTRA-ABDL: CPT | Mod: 59

## 2022-06-13 PROCEDURE — 88304 TISSUE EXAM BY PATHOLOGIST: CPT | Mod: 26

## 2022-06-13 PROCEDURE — 88309 TISSUE EXAM BY PATHOLOGIST: CPT | Mod: 26

## 2022-06-13 PROCEDURE — 97607 NEG PRS WND THR NDME<=50SQCM: CPT

## 2022-06-13 DEVICE — STAPLER ETHICON ECHELON ENDOPATH VASCULAR 35MM WHITE RELOAD: Type: IMPLANTABLE DEVICE | Status: FUNCTIONAL

## 2022-06-13 DEVICE — STAPLER COVIDIEN TRI-STAPLE 60MM PURPLE RELOAD: Type: IMPLANTABLE DEVICE | Status: FUNCTIONAL

## 2022-06-13 DEVICE — LIGATING CLIPS WECK HORIZON LARGE (ORANGE) 24: Type: IMPLANTABLE DEVICE | Status: FUNCTIONAL

## 2022-06-13 DEVICE — SURGICEL SNOW 2 X 4": Type: IMPLANTABLE DEVICE | Status: FUNCTIONAL

## 2022-06-13 DEVICE — LIGATING CLIPS WECK HORIZON MEDIUM (BLUE) 24: Type: IMPLANTABLE DEVICE | Status: FUNCTIONAL

## 2022-06-13 DEVICE — STAPLER COVIDIEN TRI-STAPLE 60MM BLACK RELOAD: Type: IMPLANTABLE DEVICE | Status: FUNCTIONAL

## 2022-06-13 DEVICE — STAPLER ETHICON ECHELON ENDOPATH GRIP SURFACE 60MM WHITE RELOAD: Type: IMPLANTABLE DEVICE | Status: FUNCTIONAL

## 2022-06-13 RX ORDER — PIPERACILLIN AND TAZOBACTAM 4; .5 G/20ML; G/20ML
3.38 INJECTION, POWDER, LYOPHILIZED, FOR SOLUTION INTRAVENOUS EVERY 8 HOURS
Refills: 0 | Status: DISCONTINUED | OUTPATIENT
Start: 2022-06-13 | End: 2022-06-18

## 2022-06-13 RX ORDER — ACETAMINOPHEN 500 MG
1000 TABLET ORAL EVERY 6 HOURS
Refills: 0 | Status: COMPLETED | OUTPATIENT
Start: 2022-06-13 | End: 2022-06-14

## 2022-06-13 RX ORDER — ONDANSETRON 8 MG/1
4 TABLET, FILM COATED ORAL EVERY 6 HOURS
Refills: 0 | Status: DISCONTINUED | OUTPATIENT
Start: 2022-06-13 | End: 2022-06-22

## 2022-06-13 RX ORDER — CHLORHEXIDINE GLUCONATE 213 G/1000ML
1 SOLUTION TOPICAL DAILY
Refills: 0 | Status: DISCONTINUED | OUTPATIENT
Start: 2022-06-13 | End: 2022-06-22

## 2022-06-13 RX ORDER — SODIUM CHLORIDE 9 MG/ML
1000 INJECTION, SOLUTION INTRAVENOUS
Refills: 0 | Status: DISCONTINUED | OUTPATIENT
Start: 2022-06-13 | End: 2022-06-13

## 2022-06-13 RX ORDER — PANTOPRAZOLE SODIUM 20 MG/1
40 TABLET, DELAYED RELEASE ORAL DAILY
Refills: 0 | Status: DISCONTINUED | OUTPATIENT
Start: 2022-06-13 | End: 2022-06-20

## 2022-06-13 RX ORDER — HYDROMORPHONE HYDROCHLORIDE 2 MG/ML
250 INJECTION INTRAMUSCULAR; INTRAVENOUS; SUBCUTANEOUS
Refills: 0 | Status: DISCONTINUED | OUTPATIENT
Start: 2022-06-13 | End: 2022-06-16

## 2022-06-13 RX ORDER — POTASSIUM CHLORIDE 20 MEQ
10 PACKET (EA) ORAL
Refills: 0 | Status: COMPLETED | OUTPATIENT
Start: 2022-06-13 | End: 2022-06-13

## 2022-06-13 RX ORDER — PIPERACILLIN AND TAZOBACTAM 4; .5 G/20ML; G/20ML
3.38 INJECTION, POWDER, LYOPHILIZED, FOR SOLUTION INTRAVENOUS ONCE
Refills: 0 | Status: COMPLETED | OUTPATIENT
Start: 2022-06-13 | End: 2022-06-13

## 2022-06-13 RX ORDER — NALOXONE HYDROCHLORIDE 4 MG/.1ML
0.1 SPRAY NASAL
Refills: 0 | Status: DISCONTINUED | OUTPATIENT
Start: 2022-06-13 | End: 2022-06-22

## 2022-06-13 RX ORDER — DIPHENHYDRAMINE HCL 50 MG
25 CAPSULE ORAL EVERY 4 HOURS
Refills: 0 | Status: DISCONTINUED | OUTPATIENT
Start: 2022-06-13 | End: 2022-06-20

## 2022-06-13 RX ORDER — MAGNESIUM SULFATE 500 MG/ML
2 VIAL (ML) INJECTION ONCE
Refills: 0 | Status: COMPLETED | OUTPATIENT
Start: 2022-06-13 | End: 2022-06-13

## 2022-06-13 RX ORDER — INSULIN LISPRO 100/ML
VIAL (ML) SUBCUTANEOUS
Refills: 0 | Status: DISCONTINUED | OUTPATIENT
Start: 2022-06-13 | End: 2022-06-13

## 2022-06-13 RX ORDER — SODIUM CHLORIDE 9 MG/ML
1000 INJECTION, SOLUTION INTRAVENOUS
Refills: 0 | Status: DISCONTINUED | OUTPATIENT
Start: 2022-06-13 | End: 2022-06-14

## 2022-06-13 RX ORDER — HEPARIN SODIUM 5000 [USP'U]/ML
5000 INJECTION INTRAVENOUS; SUBCUTANEOUS EVERY 8 HOURS
Refills: 0 | Status: DISCONTINUED | OUTPATIENT
Start: 2022-06-13 | End: 2022-06-18

## 2022-06-13 RX ORDER — INSULIN LISPRO 100/ML
VIAL (ML) SUBCUTANEOUS EVERY 6 HOURS
Refills: 0 | Status: DISCONTINUED | OUTPATIENT
Start: 2022-06-13 | End: 2022-06-14

## 2022-06-13 RX ADMIN — HEPARIN SODIUM 5000 UNIT(S): 5000 INJECTION INTRAVENOUS; SUBCUTANEOUS at 18:14

## 2022-06-13 RX ADMIN — Medication 400 MILLIGRAM(S): at 18:13

## 2022-06-13 RX ADMIN — HYDROMORPHONE HYDROCHLORIDE 250 MILLILITER(S): 2 INJECTION INTRAMUSCULAR; INTRAVENOUS; SUBCUTANEOUS at 13:40

## 2022-06-13 RX ADMIN — PANTOPRAZOLE SODIUM 40 MILLIGRAM(S): 20 TABLET, DELAYED RELEASE ORAL at 14:15

## 2022-06-13 RX ADMIN — Medication 100 MILLIEQUIVALENT(S): at 18:47

## 2022-06-13 RX ADMIN — HYDROMORPHONE HYDROCHLORIDE 250 MILLILITER(S): 2 INJECTION INTRAMUSCULAR; INTRAVENOUS; SUBCUTANEOUS at 19:04

## 2022-06-13 RX ADMIN — SODIUM CHLORIDE 30 MILLILITER(S): 9 INJECTION, SOLUTION INTRAVENOUS at 13:32

## 2022-06-13 RX ADMIN — Medication 100 MILLIEQUIVALENT(S): at 16:47

## 2022-06-13 RX ADMIN — SODIUM CHLORIDE 100 MILLILITER(S): 9 INJECTION, SOLUTION INTRAVENOUS at 14:14

## 2022-06-13 RX ADMIN — CHLORHEXIDINE GLUCONATE 1 APPLICATION(S): 213 SOLUTION TOPICAL at 15:20

## 2022-06-13 RX ADMIN — PIPERACILLIN AND TAZOBACTAM 200 GRAM(S): 4; .5 INJECTION, POWDER, LYOPHILIZED, FOR SOLUTION INTRAVENOUS at 14:21

## 2022-06-13 RX ADMIN — Medication 2: at 16:37

## 2022-06-13 RX ADMIN — Medication 25 GRAM(S): at 16:30

## 2022-06-13 RX ADMIN — Medication 100 MILLIEQUIVALENT(S): at 17:42

## 2022-06-13 RX ADMIN — PIPERACILLIN AND TAZOBACTAM 25 GRAM(S): 4; .5 INJECTION, POWDER, LYOPHILIZED, FOR SOLUTION INTRAVENOUS at 22:11

## 2022-06-13 NOTE — BRIEF OPERATIVE NOTE - OPERATION/FINDINGS
Classic Whipple Procedure performed for pancreatic adenocarcinoma.   Pancreaticojejunostomy over 8 Fr feeding tube.   Biliary stent removed, bile cultured.

## 2022-06-13 NOTE — CHART NOTE - NSCHARTNOTEFT_GEN_A_CORE
POST-OPERATIVE CHECK    SUBJECTIVE  Patient is s/p whipple for pancreatic adenocarcinoma. Pt c/o mild abd pain. Denies nausea, vomiting, chest pain, SOB.    Vital Signs Last 24 Hrs  T(C): 36.8 (2022 20:00), Max: 36.8 (2022 13:30)  T(F): 98.3 (2022 20:00), Max: 98.3 (2022 13:30)  HR: 71 (2022 21:00) (59 - 77)  BP: 74/44 (2022 14:00) (74/44 - 101/54)  BP(mean): 54 (2022 14:00) (54 - 58)  RR: 11 (2022 21:00) (10 - 17)  SpO2: 98% (2022 21:00) (97% - 100%)  I&O's Detail    2022 07:01  -  2022 22:09  --------------------------------------------------------  IN:    IV PiggyBack: 450 mL    Lactated Ringers: 260 mL    Lactated Ringers: 20 mL  Total IN: 730 mL    OUT:    Bulb (mL): 120 mL    Bulb (mL): 115 mL    Indwelling Catheter - Urethral (mL): 235 mL    Nasogastric/Oral tube (mL): 100 mL  Total OUT: 570 mL    Total NET: 160 mL        piperacillin/tazobactam IVPB.. 3.375  heparin   Injectable 5000  piperacillin/tazobactam IVPB.. 3.375    PAST MEDICAL & SURGICAL HISTORY:  Hypertension      IDDM (insulin dependent diabetes mellitus)      Malignant neoplasm of pancreas, unspecified      Dyslipidemia      CAD (coronary artery disease)      H/O chronic hepatitis  treated      History of  section      History of hysterectomy      History of coronary angioplasty with insertion of stent  2019 - 1 stent inserted          PHYSICAL EXAM  General: NAD, resting comfortably in bed  Pulmonary: Nonlabored breathing, no respiratory distress  Abdominal: 2 drains, provena, NGT in place, nam, PCA, nontender, nondistended, NGT  Extremities: WWP    LABS                        9.7    11.97 )-----------( 262      ( 2022 15:00 )             29.3     06-13    138  |  107  |  16  ----------------------------<  227<H>  3.7   |  21<L>  |  0.77    Ca    8.4      2022 15:00  Phos  4.8     13  Mg     1.70     13    TPro  5.5<L>  /  Alb  3.1<L>  /  TBili  1.2  /  DBili  x   /  AST  45<H>  /  ALT  26  /  AlkPhos  78  06-13    PT/INR - ( 2022 15:00 )   PT: 13.7 sec;   INR: 1.18 ratio         PTT - ( 2022 15:00 )  PTT:22.9 sec  CAPILLARY BLOOD GLUCOSE      POCT Blood Glucose.: 186 mg/dL (2022 16:28)  POCT Blood Glucose.: 192 mg/dL (2022 13:29)  POCT Blood Glucose.: 108 mg/dL (2022 06:41)    ASSESSMENT  77F PMHx of adenocarcinoma now, s/p 06-13 whipple. Recovering appropriately in the SICU.    PLACEMENT  - Whipple pathway  - Diet: NPO/NGT  - Pain: PCA, ATC Tylenol  - DVT ppx  - Care per SICU    D team surgery  [id number] POST-OPERATIVE CHECK    SUBJECTIVE  Patient is s/p whipple for pancreatic adenocarcinoma. Pt c/o mild abd pain. Denies nausea, vomiting, chest pain, SOB.    Vital Signs Last 24 Hrs  T(C): 36.8 (2022 20:00), Max: 36.8 (2022 13:30)  T(F): 98.3 (2022 20:00), Max: 98.3 (2022 13:30)  HR: 71 (2022 21:00) (59 - 77)  BP: 74/44 (2022 14:00) (74/44 - 101/54)  BP(mean): 54 (2022 14:00) (54 - 58)  RR: 11 (2022 21:00) (10 - 17)  SpO2: 98% (2022 21:00) (97% - 100%)  I&O's Detail    2022 07:01  -  2022 22:09  --------------------------------------------------------  IN:    IV PiggyBack: 450 mL    Lactated Ringers: 260 mL    Lactated Ringers: 20 mL  Total IN: 730 mL    OUT:    Bulb (mL): 120 mL    Bulb (mL): 115 mL    Indwelling Catheter - Urethral (mL): 235 mL    Nasogastric/Oral tube (mL): 100 mL  Total OUT: 570 mL    Total NET: 160 mL        piperacillin/tazobactam IVPB.. 3.375  heparin   Injectable 5000  piperacillin/tazobactam IVPB.. 3.375    PAST MEDICAL & SURGICAL HISTORY:  Hypertension      IDDM (insulin dependent diabetes mellitus)      Malignant neoplasm of pancreas, unspecified      Dyslipidemia      CAD (coronary artery disease)      H/O chronic hepatitis  treated      History of  section      History of hysterectomy      History of coronary angioplasty with insertion of stent  2019 - 1 stent inserted          PHYSICAL EXAM  General: NAD, resting comfortably in bed  Pulmonary: Nonlabored breathing, no respiratory distress  Abdominal: 2 drains, provena, NGT in place, nam, PCA, nontender, nondistended, NGT  Extremities: WWP    LABS                        9.7    11.97 )-----------( 262      ( 2022 15:00 )             29.3     06-13    138  |  107  |  16  ----------------------------<  227<H>  3.7   |  21<L>  |  0.77    Ca    8.4      2022 15:00  Phos  4.8     13  Mg     1.70     13    TPro  5.5<L>  /  Alb  3.1<L>  /  TBili  1.2  /  DBili  x   /  AST  45<H>  /  ALT  26  /  AlkPhos  78  06-13    PT/INR - ( 2022 15:00 )   PT: 13.7 sec;   INR: 1.18 ratio         PTT - ( 2022 15:00 )  PTT:22.9 sec  CAPILLARY BLOOD GLUCOSE      POCT Blood Glucose.: 186 mg/dL (2022 16:28)  POCT Blood Glucose.: 192 mg/dL (2022 13:29)  POCT Blood Glucose.: 108 mg/dL (2022 06:41)    ASSESSMENT  77F PMHx of adenocarcinoma now, s/p 06-13 karle. Recovering appropriately in the SICU.    RECOMMENDATION  - Kane pathway  - Diet: NPO/NGT  - Pain: PCA, ATC Tylenol  - DVT ppx  - Care per SICU    D team surgery  [id number]

## 2022-06-13 NOTE — OPERATIVE REPORT - OPERATION/FINDINGS
Classic Whipple Procedure performed for pancreatic adenocarcinoma.   Pancreaticojejunostomy over 8 Fr feeding tube.   Biliary stent removed, bile cultured.  Tumor close on SMV near 1st jejunal, took a small tangential area en bloc with vascular stapler

## 2022-06-13 NOTE — PATIENT PROFILE ADULT - FALL HARM RISK - UNIVERSAL INTERVENTIONS
Bed in lowest position, wheels locked, appropriate side rails in place/Call bell, personal items and telephone in reach/Instruct patient to call for assistance before getting out of bed or chair/Non-slip footwear when patient is out of bed/[place] to call system/Physically safe environment - no spills, clutter or unnecessary equipment/Purposeful Proactive Rounding/Room/bathroom lighting operational, light cord in reach

## 2022-06-13 NOTE — OPERATIVE REPORT - NSICDXBRIEFPROCEDURE_GEN_ALL_CORE_FT
PROCEDURES:  Pancreaticoduodenectomy with pancreatic jejunostomy 13-Jun-2022 13:32:49  Jose, Eddie  Creation, flap, omental, intra-abdominal 13-Jun-2022 14:25:21  Mckay Edmonds  Negative pressure wound therapy, less than 50 sq cm 13-Jun-2022 14:25:32  Mckay Edmonds  Open repair of portal vein 13-Jun-2022 14:26:39  Mckay Edmonds

## 2022-06-13 NOTE — CONSULT NOTE ADULT - ATTENDING COMMENTS
I agree with the detailed interval history, physical, and plan, which I have reviewed and edited where appropriate'; also agree with notes/assessment with my team on service.  I have personally examined the patient.  I was physically present for the key portions of the evaluation and management (E/M) service provided.  I reviewed all the pertinent data.  The patient is a critical care patient with life threatening hemodynamic and metabolic instability in SICU.  The SICU team has a constant risk benefit analyzes discussion and coordinating care with the primary team and all consultants.   The patient is in SICU with the chief complaint and diagnosis mentioned in the note.   The plan will be specified in the note.  76 y/o F hx HTN, DM, HLD, pancreatic CA sp Whipple in SICU for monitoring.  EXAM:  General/Neuro  Exam: recovering from sedation and anesthesia   Respiratory  Exam: Lungs clear   Cardiovascular  Exam: Regular rate   GI  Exam: Abdomen soft, Non-tender  Neuro  -PCEA  - Acetaminophen IV Q6h x2 doses  Pulm  RA,   - Encourage IS  Cards  - hold home ASA until 6/14  GI  - NPO  - Pantoprazole 40 IV daily  Renal   - LR @100  Endo  - ISS q6 while NPO  ID  -  Zosyn   Heme  - DVT Prplx  Dispo  - SICU

## 2022-06-13 NOTE — OPERATIVE REPORT - OPERATIVE RPOSRT DETAILS
Title: Pancreatoduodenectomy, cholecystectomy, intraabdominal omental flap, small tangential resection of sidewall of SMV, placement of negative pressure wound therapy device    Anesthesia:  General endotracheal, epidural    Specimens:  Whipple, pancreatic neck margin, Gallbladder    Prosthetic Device/Implants:  None    Operative Indications:  Adenocarcinoma in head of pancreas     PROCEDURE:   After discussing risks, benefits, and alternatives in detail, the patient was consented for a whipple operation and brought to the operating room on the aforementioned date.  They had an epidural catheter placed, and were then placed in the supine position and underwent general anesthesia. Rolle catheter and arterial line were place.  They were prepped and draped in the normal sterile manner with a chlorhexidine prep and a loban dressing over the skin.  A time-out was performed prior to any incision and the patient was given preoperative prophylactic antibiotics as well as subcutaneous heparin.      We made a midline incision from just below the xiphoid process to below the umbilicus.  We entered the abdomen under direct visualization, excised a large portion of the pre-peritoneal fat and then placed a Bookwalter retractor in position.  We thoroughly explored the abdomen and found no evidence of metastatic disease.  We performed a formal Cattell-Braasch maneuver and kocherized the duodenum to the left of the aorta.  We then enter the lesser sac along the greater curvature of the stomach and dissected down to the SMV.  We took down the gastrohepatic ligament, divided the right gastric artery between 2-0 silk ties, and then identified the GDA.   We test clamped the GDA to confirm there was no retrograde flow.  We then divided the GDA with a vascular load of the echelon powered stapler.  We then encircled the bile duct with a vessel loop.      At this point, I felt the lesion was indeed resectable, so we divide the antrum of the stomach with a black load of the GHASSAN stapler and reflected the proximal stomach to the left upper quadrant.  We then tunneled behind the neck of the pancreas without great difficulty, placed a penrose drain there, and subsequently divided the neck of the pancreas with electrocautery.  We then divide the bile duct with electrocautery.  We removed the gallbladder in the standard top down manner.  We divided the cystic artery and duct between 2-0 silk ties.  The patient had an aberrant right hepatic artery which we skeletonized and preserved.    We then mobilized the pancreas off of the portal vein and divided the proximal jejunum with a purple load of the GHASSAN stapler.  We took the mesentery with the LigaSure device and then reflected the proximal jejunum through the ligament of Treitz to the right upper quadrant.  We took the remainder of the uncinate process with multiple Reinhoff clamps, 2-0 silk ties, as well as a LigaSure device as appropriate.  There was a small area of the SMV where the 1st jejunla branch came off that was close on tumor, I resected the sidewall with a vascular stapler just below the 1st jejunal branch.  There was good hemostasis and we remove the specimen from the field.  We then closed the mesenteric defect to the ligament of Treitz with a running 3-0 Prolene suture (interrupted 3-0 silk sutures).  We made a small hold in the bare area of the transverse mesocolon to the right of the middle colic vessels and bring the proximal jejunum to the right upper quadrant.      We oversewed the end of the jejunum with 3-0 silk pop sutures.     We performed an end-to-side pancreaticojejunostomy using 3-0 silk pop sutures as the anterior and posterior layers and using 5-0 PDS as the duct-to-mucosal layer.  We left an 8-[place] pediatric feeding tube as a stent.  We then performed an end-to-side hepaticojejunostomy in running manner with PDS and inner layer of 4-0 vicryls on back wall.  We closed the mesenteric defect with 3-0 silk pop sutures.  We then performed an antecolic isoperistaltic gastrojejunostomy using running 3-0 PDS suture in a two-layer anastomosis.  We mobilized a large omental flap off of the transverse mesocolon and placed this on the PJ anastomosis and the GDA stump.  We confirmed the NG tube is in good position.  We then place two 19 James drains in the standard position.  The more inferior drain was placed below the anastomosis and the more superior drain was placed above.      We then closed the abdominal fascia with a running #1 looped PDS suture.  We closed the dermis with 3-0 Vicryl suture and we affixed a negative-pressure wound therapy device to the incision.     The patient tolerated the procedure well.  The patient was extubated and then transferred to the ICU for overnight monitoring.

## 2022-06-13 NOTE — CONSULT NOTE ADULT - SUBJECTIVE AND OBJECTIVE BOX
SICU Consultation Note  =====================================================  77y Female  HPI:  76y/o female presents for preop eval for scheduled whipple.  Pt states [facility].  ERCP and bilary stent inserted.  Imaging show pancreatic mass.  Preop dx malignant neoplasm of pancreas unspecified.  (2022 08:27)      Surgery Information  OR time:      EBL:   200       IV Fluids: 1500mL LR      Blood Products: n/a   UOP:  500mL        PAST MEDICAL & SURGICAL HISTORY:  Hypertension      IDDM (insulin dependent diabetes mellitus)      Malignant neoplasm of pancreas, unspecified      Dyslipidemia      CAD (coronary artery disease)      H/O chronic hepatitis  treated      History of  section      History of hysterectomy      History of coronary angioplasty with insertion of stent  2019 -  stent inserted        Home Meds: Home Medications:  amLODIPine 10 mg oral tablet: 1 tab(s) orally once a day (:23)  aspirin 81 mg oral tablet: 1 tab(s) orally once a day - continue (:23)  Farxiga 10 mg oral tablet: 1 tab(s) orally once a day (:)  fenofibrate 145 mg oral tablet: 1 tab(s) orally once a day (:)  Janumet 50 mg-1000 mg oral tablet: 1 tab(s) orally 2 times a day (:23)  Levemir 100 units/mL subcutaneous solution: 60 unit(s) subcutaneous once a day (at bedtime) (:)  losartan 100 mg oral tablet: 1 tab(s) orally once a day (:)  magnesium 400 m tab(s) (:)  metoprolol succinate 25 mg oral tablet, extended release: 1 tab(s) orally once a day (:)  oysco 500 + 500 + 200 mg 1 tab 2 times a day: 1 tab(s) orally once a day (:23)  rosuvastatin 10 mg oral tablet: 1 tab(s) orally once a day (2022 08:23)    Allergies: Allergies    No Known Allergies    Intolerances      Soc:   Advanced Directives: Presumed Full Code     ROS:    REVIEW OF SYSTEMS    [ ] A ten-point review of systems was otherwise negative except as noted.  [ ] Due to altered mental status/intubation, subjective information were not able to be obtained from the patient. History was obtained, to the extent possible, from review of the chart and collateral sources of information.      CURRENT MEDICATIONS:   --------------------------------------------------------------------------------------  Neurologic Medications  hydromorphone (10 MICROgram(s)/mL) + bupivacaine 0.0625% in 0.9% Sodium Chloride PCEA 250 milliLiter(s) Epidural PCA Continuous  hydromorphone (10 MICROgram(s)/mL) + bupivacaine 0.0625% in 0.9% Sodium Chloride PCEA Rescue Clinician  Bolus 5 milliLiter(s) Epidural every 15 minutes PRN for Pain Score greater than 6  ondansetron Injectable 4 milliGRAM(s) IV Push every 6 hours PRN Nausea    Respiratory Medications  diphenhydrAMINE Injectable 25 milliGRAM(s) IV Push every 4 hours PRN Pruritus    Cardiovascular Medications    Gastrointestinal Medications  lactated ringers. 1000 milliLiter(s) IV Continuous <Continuous>    Genitourinary Medications    Hematologic/Oncologic Medications    Antimicrobial/Immunologic Medications    Endocrine/Metabolic Medications    Topical/Other Medications  naloxone Injectable 0.1 milliGRAM(s) IV Push every 3 minutes PRN For ANY of the following changes in patient status:  A. RR LESS THAN 10 breaths per minute, B. Oxygen saturation LESS THAN 90%, C. Sedation score of 6    --------------------------------------------------------------------------------------    VITAL SIGNS, INS/OUTS (last 24 hours):  --------------------------------------------------------------------------------------  ICU Vital Signs Last 24 Hrs  T(C): 36.4 (2022 06:20), Max: 36.4 (2022 06:20)  T(F): 97.6 (2022 06:20), Max: 97.6 (2022 06:20)  HR: 63 (2022 06:20) (63 - 63)  BP: 101/54 (2022 06:20) (101/54 - 101/54)  BP(mean): --  ABP: --  ABP(mean): --  RR: 16 (2022 06:20) (16 - 16)  SpO2: 100% (2022 06:20) (100% - 100%)    I&O's Summary    --------------------------------------------------------------------------------------    EXAM:  General/Neuro  Exam: minimally responding to commands, protecting airway, recovering from sedation and anesthesia     Respiratory  Exam: Lungs clear to auscultation, Normal expansion/effort.       Cardiovascular  Exam: Regular rate and rhythm.  No peripheral edema    Cardiac Rhythm: Normal Sinus Rhythm    GI  Exam: Abdomen soft, Non-tender, Non-distended.   Wound:   Prevena VAC in place, SS drainage, 2 WALLY bulbs superiorly and inferiorly on R      Tubes/Lines/Drains  ***  [x] Peripheral IV  [] Central Venous Line     	[] R	[] L	[] IJ	[] Fem	[] SC        Type:	    Date Placed:   [X] Arterial Line		[] R	[] L	[] Fem	[] Rad	[] Ax	Date Placed:   [] PICC:         	[] Midline		[] Mediport           [] Urinary Catheter		Date Placed:     Extremities  Exam: Extremities warm, pink, well-perfused.      Derm:  Exam: Good skin turgor, no skin breakdown.      :   Exam: Rolle catheter in place.     LABS  --------------------------------------------------------------------------------------        --------------------------------------------------------------------------------------   SICU Consultation Note  =====================================================  77y Female  HPI:  78y/o female presents for preop eval for scheduled whipple.  Pt states [facility].  ERCP and bilary stent x2 inserted.  Imaging show pancreatic mass.  Preop dx malignant neoplasm of pancreas unspecified.  Operative course significant only for purulent material around biliary stents, no blood products. Extubated and brought to PACU off vasopressors    Surgery Information  OR time:      EBL:   200       IV Fluids: 1500mL LR      Blood Products: n/a   UOP:  500mL        PAST MEDICAL & SURGICAL HISTORY:  Hypertension      IDDM (insulin dependent diabetes mellitus)      Malignant neoplasm of pancreas, unspecified      Dyslipidemia      CAD (coronary artery disease)      H/O chronic hepatitis  treated      History of  section      History of hysterectomy      History of coronary angioplasty with insertion of stent  2019 -  stent inserted        Home Meds: Home Medications:  amLODIPine 10 mg oral tablet: 1 tab(s) orally once a day (:)  aspirin 81 mg oral tablet: 1 tab(s) orally once a day - continue (:)  Farxiga 10 mg oral tablet: 1 tab(s) orally once a day ()  fenofibrate 145 mg oral tablet: 1 tab(s) orally once a day (:)  Janumet 50 mg-1000 mg oral tablet: 1 tab(s) orally 2 times a day (:)  Levemir 100 units/mL subcutaneous solution: 60 unit(s) subcutaneous once a day (at bedtime) (:)  losartan 100 mg oral tablet: 1 tab(s) orally once a day ()  magnesium 400 m tab(s) (:)  metoprolol succinate 25 mg oral tablet, extended release: 1 tab(s) orally once a day (:)  oysco 500 + 500 + 200 mg 1 tab 2 times a day: 1 tab(s) orally once a day (2022 08:23)  rosuvastatin 10 mg oral tablet: 1 tab(s) orally once a day (2022 08:23)    Allergies: Allergies    No Known Allergies    Intolerances      Soc:   Advanced Directives: Presumed Full Code     ROS:    REVIEW OF SYSTEMS    [ ] A ten-point review of systems was otherwise negative except as noted.  [ ] Due to altered mental status/intubation, subjective information were not able to be obtained from the patient. History was obtained, to the extent possible, from review of the chart and collateral sources of information.      CURRENT MEDICATIONS:   --------------------------------------------------------------------------------------  Neurologic Medications  hydromorphone (10 MICROgram(s)/mL) + bupivacaine 0.0625% in 0.9% Sodium Chloride PCEA 250 milliLiter(s) Epidural PCA Continuous  hydromorphone (10 MICROgram(s)/mL) + bupivacaine 0.0625% in 0.9% Sodium Chloride PCEA Rescue Clinician  Bolus 5 milliLiter(s) Epidural every 15 minutes PRN for Pain Score greater than 6  ondansetron Injectable 4 milliGRAM(s) IV Push every 6 hours PRN Nausea    Respiratory Medications  diphenhydrAMINE Injectable 25 milliGRAM(s) IV Push every 4 hours PRN Pruritus    Cardiovascular Medications    Gastrointestinal Medications  lactated ringers. 1000 milliLiter(s) IV Continuous <Continuous>    Genitourinary Medications    Hematologic/Oncologic Medications    Antimicrobial/Immunologic Medications    Endocrine/Metabolic Medications    Topical/Other Medications  naloxone Injectable 0.1 milliGRAM(s) IV Push every 3 minutes PRN For ANY of the following changes in patient status:  A. RR LESS THAN 10 breaths per minute, B. Oxygen saturation LESS THAN 90%, C. Sedation score of 6    --------------------------------------------------------------------------------------    VITAL SIGNS, INS/OUTS (last 24 hours):  --------------------------------------------------------------------------------------  ICU Vital Signs Last 24 Hrs  T(C): 36.4 (2022 06:20), Max: 36.4 (2022 06:20)  T(F): 97.6 (2022 06:20), Max: 97.6 (2022 06:20)  HR: 63 (2022 06:20) (63 - 63)  BP: 101/54 (2022 06:20) (101/54 - 101/54)  BP(mean): --  ABP: --  ABP(mean): --  RR: 16 (2022 06:20) (16 - 16)  SpO2: 100% (2022 06:20) (100% - 100%)    I&O's Summary    --------------------------------------------------------------------------------------    EXAM:  General/Neuro  Exam: minimally responding to commands, protecting airway, recovering from sedation and anesthesia     Respiratory  Exam: Lungs clear to auscultation, Normal expansion/effort.       Cardiovascular  Exam: Regular rate and rhythm.  No peripheral edema    Cardiac Rhythm: Normal Sinus Rhythm    GI  Exam: Abdomen soft, Non-tender, Non-distended.   Wound:   Prevena VAC in place, SS drainage, 2 WALLY bulbs superiorly and inferiorly on R      Tubes/Lines/Drains  ***  [x] Peripheral IV  [] Central Venous Line     	[] R	[] L	[] IJ	[] Fem	[] SC        Type:	    Date Placed:   [X] Arterial Line		[] R	[] L	[] Fem	[] Rad	[] Ax	Date Placed:   [] PICC:         	[] Midline		[] Mediport           [] Urinary Catheter		Date Placed:     Extremities  Exam: Extremities warm, pink, well-perfused.      Derm:  Exam: Good skin turgor, no skin breakdown.      :   Exam: Rolle catheter in place.     LABS  --------------------------------------------------------------------------------------        --------------------------------------------------------------------------------------

## 2022-06-13 NOTE — CONSULT NOTE ADULT - ASSESSMENT
78 y/o F hx HTN, DM, HLD, pancreatic CA s/p stending here for scheduled Whipple on 6/13. Brought to SICU for monitoring.    Neuro  - recovering from sedation, monitor mental status  - Epidural PCA  - Acetaminophen IV Q6h x2 doses    Pulm  - Extubated to NC, wean to RA, no resp distress  - Encourage IS    Cards  - Was briefly on phenylephrine intraop, post op off pressor  - Soft BP post op, no tachycardia, monitor    GI  - NPO  - Pantoprazole 40 IV daily    Renal   - LR @100  - Goal UOP .5cc/kg/hr    Endo  - Check FS, insulin GTT if >180, otherwise ISS    ID  - monitor off abx    Heme  - F/u post op labs      PPX  - SCD    Dispo  - SICU    Goals of Care  - Full code       76 y/o F hx HTN, DM, HLD, pancreatic CA s/p stending here for scheduled Whipple on 6/13. Brought to SICU for monitoring.    Neuro  - recovering from sedation, monitor mental status  - Epidural PCA  - Acetaminophen IV Q6h x2 doses    Pulm  - Extubated to NC, wean to RA, no resp distress  - Encourage IS    Cards  - Was briefly on phenylephrine intraop, post op off pressor  - Soft BP post op, no tachycardia, monitor    GI  - NPO  - Pantoprazole 40 IV daily    Renal   - LR @100  - Goal UOP .5cc/kg/hr    Endo  - Check FS, insulin GTT if >180, otherwise ISS    ID  - monitor off abx  - F/u OR bile culture    Heme  - F/u post op labs      PPX  - SCD    Dispo  - SICU    Goals of Care  - Full code       76 y/o F hx HTN, DM, HLD, pancreatic CA s/p stenting here for scheduled Whipple on 6/13. Brought to SICU for monitoring.    Neuro  - recovering from sedation, monitor mental status  - PCEA  - Acetaminophen IV Q6h x2 doses    Pulm  - Extubated to NC, wean to RA, no resp distress  - Encourage IS    Cards  - Was briefly on phenylephrine intraop, post op off pressor  - Soft BP post op, no tachycardia, monitor  - hold home ASA until 6/14      GI  - NPO  - Pantoprazole 40 IV daily    Renal   - LR @100  - Goal UOP .5cc/kg/hr    Endo  - ISS q6 while NPO    ID  - Possibly purulent bile around stent intra op, Zosyn (6/13 - )   - F/u OR bile culture    Heme  - F/u post op labs      PPX  - SCD    Dispo  - SICU    Goals of Care  - Full code

## 2022-06-14 LAB
A1C WITH ESTIMATED AVERAGE GLUCOSE RESULT: 5.2 % — SIGNIFICANT CHANGE UP (ref 4–5.6)
ALBUMIN SERPL ELPH-MCNC: 3 G/DL — LOW (ref 3.3–5)
ALP SERPL-CCNC: 70 U/L — SIGNIFICANT CHANGE UP (ref 40–120)
ALT FLD-CCNC: 26 U/L — SIGNIFICANT CHANGE UP (ref 4–33)
AMYLASE FLD-CCNC: 3307 U/L — SIGNIFICANT CHANGE UP
AMYLASE FLD-CCNC: 3522 U/L — SIGNIFICANT CHANGE UP
ANION GAP SERPL CALC-SCNC: 10 MMOL/L — SIGNIFICANT CHANGE UP (ref 7–14)
APTT BLD: 25.3 SEC — LOW (ref 27–36.3)
AST SERPL-CCNC: 46 U/L — HIGH (ref 4–32)
BILIRUB SERPL-MCNC: 1.3 MG/DL — HIGH (ref 0.2–1.2)
BUN SERPL-MCNC: 20 MG/DL — SIGNIFICANT CHANGE UP (ref 7–23)
CALCIUM SERPL-MCNC: 8.5 MG/DL — SIGNIFICANT CHANGE UP (ref 8.4–10.5)
CHLORIDE SERPL-SCNC: 106 MMOL/L — SIGNIFICANT CHANGE UP (ref 98–107)
CO2 SERPL-SCNC: 21 MMOL/L — LOW (ref 22–31)
CREAT SERPL-MCNC: 0.84 MG/DL — SIGNIFICANT CHANGE UP (ref 0.5–1.3)
EGFR: 72 ML/MIN/1.73M2 — SIGNIFICANT CHANGE UP
ESTIMATED AVERAGE GLUCOSE: 103 — SIGNIFICANT CHANGE UP
GLUCOSE BLDC GLUCOMTR-MCNC: 185 MG/DL — HIGH (ref 70–99)
GLUCOSE BLDC GLUCOMTR-MCNC: 197 MG/DL — HIGH (ref 70–99)
GLUCOSE BLDC GLUCOMTR-MCNC: 205 MG/DL — HIGH (ref 70–99)
GLUCOSE BLDC GLUCOMTR-MCNC: 239 MG/DL — HIGH (ref 70–99)
GLUCOSE BLDC GLUCOMTR-MCNC: 243 MG/DL — HIGH (ref 70–99)
GLUCOSE SERPL-MCNC: 225 MG/DL — HIGH (ref 70–99)
HCT VFR BLD CALC: 28 % — LOW (ref 34.5–45)
HGB BLD-MCNC: 9.1 G/DL — LOW (ref 11.5–15.5)
INR BLD: 1.26 RATIO — HIGH (ref 0.88–1.16)
MAGNESIUM SERPL-MCNC: 2.4 MG/DL — SIGNIFICANT CHANGE UP (ref 1.6–2.6)
MCHC RBC-ENTMCNC: 32.5 GM/DL — SIGNIFICANT CHANGE UP (ref 32–36)
MCHC RBC-ENTMCNC: 32.7 PG — SIGNIFICANT CHANGE UP (ref 27–34)
MCV RBC AUTO: 100.7 FL — HIGH (ref 80–100)
NRBC # BLD: 0 /100 WBCS — SIGNIFICANT CHANGE UP
NRBC # FLD: 0 K/UL — SIGNIFICANT CHANGE UP
PHOSPHATE SERPL-MCNC: 4.3 MG/DL — SIGNIFICANT CHANGE UP (ref 2.5–4.5)
PLATELET # BLD AUTO: 226 K/UL — SIGNIFICANT CHANGE UP (ref 150–400)
POTASSIUM SERPL-MCNC: 4.4 MMOL/L — SIGNIFICANT CHANGE UP (ref 3.5–5.3)
POTASSIUM SERPL-SCNC: 4.4 MMOL/L — SIGNIFICANT CHANGE UP (ref 3.5–5.3)
PROT SERPL-MCNC: 5.5 G/DL — LOW (ref 6–8.3)
PROTHROM AB SERPL-ACNC: 14.6 SEC — HIGH (ref 10.5–13.4)
RBC # BLD: 2.78 M/UL — LOW (ref 3.8–5.2)
RBC # FLD: 18.4 % — HIGH (ref 10.3–14.5)
SODIUM SERPL-SCNC: 137 MMOL/L — SIGNIFICANT CHANGE UP (ref 135–145)
WBC # BLD: 11.7 K/UL — HIGH (ref 3.8–10.5)
WBC # FLD AUTO: 11.7 K/UL — HIGH (ref 3.8–10.5)

## 2022-06-14 PROCEDURE — 99233 SBSQ HOSP IP/OBS HIGH 50: CPT

## 2022-06-14 PROCEDURE — 93010 ELECTROCARDIOGRAM REPORT: CPT

## 2022-06-14 RX ORDER — INSULIN LISPRO 100/ML
VIAL (ML) SUBCUTANEOUS EVERY 6 HOURS
Refills: 0 | Status: DISCONTINUED | OUTPATIENT
Start: 2022-06-14 | End: 2022-06-16

## 2022-06-14 RX ORDER — ACETAMINOPHEN 500 MG
1000 TABLET ORAL EVERY 6 HOURS
Refills: 0 | Status: COMPLETED | OUTPATIENT
Start: 2022-06-14 | End: 2022-06-15

## 2022-06-14 RX ORDER — INSULIN GLARGINE 100 [IU]/ML
10 INJECTION, SOLUTION SUBCUTANEOUS AT BEDTIME
Refills: 0 | Status: DISCONTINUED | OUTPATIENT
Start: 2022-06-14 | End: 2022-06-15

## 2022-06-14 RX ORDER — SODIUM CHLORIDE 9 MG/ML
1000 INJECTION, SOLUTION INTRAVENOUS
Refills: 0 | Status: DISCONTINUED | OUTPATIENT
Start: 2022-06-14 | End: 2022-06-17

## 2022-06-14 RX ORDER — GLUCAGON INJECTION, SOLUTION 0.5 MG/.1ML
1 INJECTION, SOLUTION SUBCUTANEOUS ONCE
Refills: 0 | Status: DISCONTINUED | OUTPATIENT
Start: 2022-06-14 | End: 2022-06-15

## 2022-06-14 RX ORDER — SODIUM CHLORIDE 9 MG/ML
1000 INJECTION, SOLUTION INTRAVENOUS
Refills: 0 | Status: DISCONTINUED | OUTPATIENT
Start: 2022-06-14 | End: 2022-06-15

## 2022-06-14 RX ADMIN — Medication 400 MILLIGRAM(S): at 00:37

## 2022-06-14 RX ADMIN — HYDROMORPHONE HYDROCHLORIDE 250 MILLILITER(S): 2 INJECTION INTRAMUSCULAR; INTRAVENOUS; SUBCUTANEOUS at 21:53

## 2022-06-14 RX ADMIN — HYDROMORPHONE HYDROCHLORIDE 250 MILLILITER(S): 2 INJECTION INTRAMUSCULAR; INTRAVENOUS; SUBCUTANEOUS at 19:15

## 2022-06-14 RX ADMIN — HEPARIN SODIUM 5000 UNIT(S): 5000 INJECTION INTRAVENOUS; SUBCUTANEOUS at 05:00

## 2022-06-14 RX ADMIN — SODIUM CHLORIDE 84 MILLILITER(S): 9 INJECTION, SOLUTION INTRAVENOUS at 14:21

## 2022-06-14 RX ADMIN — PIPERACILLIN AND TAZOBACTAM 25 GRAM(S): 4; .5 INJECTION, POWDER, LYOPHILIZED, FOR SOLUTION INTRAVENOUS at 05:00

## 2022-06-14 RX ADMIN — INSULIN GLARGINE 10 UNIT(S): 100 INJECTION, SOLUTION SUBCUTANEOUS at 22:38

## 2022-06-14 RX ADMIN — Medication 2: at 05:11

## 2022-06-14 RX ADMIN — HEPARIN SODIUM 5000 UNIT(S): 5000 INJECTION INTRAVENOUS; SUBCUTANEOUS at 13:24

## 2022-06-14 RX ADMIN — PIPERACILLIN AND TAZOBACTAM 25 GRAM(S): 4; .5 INJECTION, POWDER, LYOPHILIZED, FOR SOLUTION INTRAVENOUS at 13:24

## 2022-06-14 RX ADMIN — Medication 400 MILLIGRAM(S): at 05:00

## 2022-06-14 RX ADMIN — Medication 1000 MILLIGRAM(S): at 12:00

## 2022-06-14 RX ADMIN — PIPERACILLIN AND TAZOBACTAM 25 GRAM(S): 4; .5 INJECTION, POWDER, LYOPHILIZED, FOR SOLUTION INTRAVENOUS at 22:32

## 2022-06-14 RX ADMIN — Medication 4: at 11:29

## 2022-06-14 RX ADMIN — Medication 2: at 00:34

## 2022-06-14 RX ADMIN — HYDROMORPHONE HYDROCHLORIDE 250 MILLILITER(S): 2 INJECTION INTRAMUSCULAR; INTRAVENOUS; SUBCUTANEOUS at 07:15

## 2022-06-14 RX ADMIN — PANTOPRAZOLE SODIUM 40 MILLIGRAM(S): 20 TABLET, DELAYED RELEASE ORAL at 11:28

## 2022-06-14 RX ADMIN — Medication 1000 MILLIGRAM(S): at 05:30

## 2022-06-14 RX ADMIN — Medication 400 MILLIGRAM(S): at 11:28

## 2022-06-14 RX ADMIN — Medication 4: at 18:01

## 2022-06-14 RX ADMIN — Medication 4: at 23:04

## 2022-06-14 RX ADMIN — CHLORHEXIDINE GLUCONATE 1 APPLICATION(S): 213 SOLUTION TOPICAL at 11:45

## 2022-06-14 RX ADMIN — HEPARIN SODIUM 5000 UNIT(S): 5000 INJECTION INTRAVENOUS; SUBCUTANEOUS at 22:32

## 2022-06-14 NOTE — PROGRESS NOTE ADULT - ASSESSMENT
76 y/o F hx HTN, DM, HLD, pancreatic CA s/p stenting here for scheduled Whipple on 6/13. Brought to SICU for monitoring.    Neuro  - recovering from sedation, monitor mental status  - PCEA  - Acetaminophen IV Q6h x2 doses    Pulm  - Extubated to NC, wean to RA, no resp distress  - Encourage IS    Cards  - Was briefly on phenylephrine intraop, post op off pressor  - Soft BP post op, no tachycardia, now normotensive AND NSR  - hold home ASA until 6/14      GI  - NPO  - Pantoprazole 40 IV daily    Renal   - LR @100  - Goal UOP .5cc/kg/hr    Endo  - ISS q6 while NPO    ID  - Possibly purulent bile around stent intra op, Zosyn (6/13 - )   - F/u OR bile culture    Heme  - F/u post op labs    PPX  - SCD    Dispo  - SICU    Goals of Care  - Full code       78 y/o F hx HTN, DM, HLD, pancreatic CA s/p stenting here for scheduled Whipple on 6/13. Brought to SICU for monitoring.    Interval Events:  - restart ASA 6/14 when taking PO  - Lantus 10 qHS added  - NGT >300ccs out will keep in    PLAN:  Neuro  - recovering from sedation, monitor mental status  - PCEA  - Acetaminophen IV Q6h x2 doses    Pulm  - Extubated to NC, wean to RA, no resp distress  - Encourage IS    Cards  - Was briefly on phenylephrine intraop, post op off pressor  - Soft BP post op, no tachycardia, now normotensive AND NSR  - hold home ASA until tolerating PO    GI  - NPO  - Pantoprazole 40 IV daily    Renal   - LR @100  - Goal UOP .5cc/kg/hr    Endo  - ISS q6 while NPO  - Lantus 10qHS    ID  - Possibly purulent bile around stent intra op, Zosyn 4 day course (6/13 - )   - F/u OR bile culture    Heme  - F/u post op labs    PPX  - SCD    Dispo  - SICU    Goals of Care  - Full code   76 y/o F hx HTN, DM, HLD, pancreatic CA s/p stenting here for scheduled Whipple on 6/13. Brought to SICU for monitoring.    Interval Events:  - Lantus 10 qHS added  - NGT >300ccs out will keep in    PLAN:  Neuro  - recovering from sedation, monitor mental status  - PCEA  - Acetaminophen IV Q6h x2 doses    Pulm  - Extubated to NC, wean to RA, no resp distress  - Encourage IS    Cards  - Was briefly on phenylephrine intraop, post op off pressor  - Soft BP post op, no tachycardia, now normotensive AND NSR  - hold home ASA until tolerating PO    GI  - NPO  - Pantoprazole 40 IV daily    Renal   - LR @100  - Goal UOP .5cc/kg/hr    Endo  - ISS q6 while NPO  - Lantus 10qHS    ID  - Possibly purulent bile around stent intra op, Zosyn 4 day course (6/13 - )   - F/u OR bile culture    Heme  - F/u post op labs    PPX  - SCD    Dispo  - SICU    Goals of Care  - Full code

## 2022-06-14 NOTE — PROGRESS NOTE ADULT - SUBJECTIVE AND OBJECTIVE BOX
SURGERY DAILY PROGRESS NOTE:     SUBJECTIVE/ROS: No acute events overnight. Patient seen and examined bedside on AM rounds.     OBJECTIVE:  Vital Signs Last 24 Hrs  T(C): 37.2 (14 Jun 2022 00:00), Max: 37.2 (14 Jun 2022 00:00)  T(F): 99 (14 Jun 2022 00:00), Max: 99 (14 Jun 2022 00:00)  HR: 75 (14 Jun 2022 01:00) (59 - 80)  BP: 74/44 (13 Jun 2022 14:00) (74/44 - 101/54)  BP(mean): 54 (13 Jun 2022 14:00) (54 - 58)  RR: 18 (14 Jun 2022 01:00) (10 - 18)  SpO2: 98% (14 Jun 2022 01:00) (97% - 100%)    PHYSICAL EXAM:  General: NAD, resting comfortably in bed  Pulmonary: Nonlabored breathing, no respiratory distress  Abdominal: 2 drains, provena, NGT in place, nam, PCA, nontender, nondistended, NGT  Extremities: WWP                        9.7    11.97 )-----------( 262      ( 13 Jun 2022 15:00 )             29.3     06-13    138  |  107  |  16  ----------------------------<  227<H>  3.7   |  21<L>  |  0.77    Ca    8.4      13 Jun 2022 15:00  Phos  4.8     06-13  Mg     1.70     06-13    TPro  5.5<L>  /  Alb  3.1<L>  /  TBili  1.2  /  DBili  x   /  AST  45<H>  /  ALT  26  /  AlkPhos  78  06-13   PT/INR - ( 13 Jun 2022 15:00 )   PT: 13.7 sec;   INR: 1.18 ratio         PTT - ( 13 Jun 2022 15:00 )  PTT:22.9 sec  I&O's Detail    13 Jun 2022 07:01  -  14 Jun 2022 01:16  --------------------------------------------------------  IN:    IV PiggyBack: 650 mL    Lactated Ringers: 760 mL    Lactated Ringers: 20 mL  Total IN: 1430 mL    OUT:    Bulb (mL): 165 mL    Bulb (mL): 170 mL    Indwelling Catheter - Urethral (mL): 400 mL    Nasogastric/Oral tube (mL): 100 mL  Total OUT: 835 mL    Total NET: 595 mL          IMAGING:               SURGERY DAILY PROGRESS NOTE:     SUBJECTIVE/ROS: No acute events overnight. Patient seen and examined bedside on AM rounds. Reports mild abdominal pain controlled with pca. Denies n/v. NGT in place.     OBJECTIVE:  Vital Signs Last 24 Hrs  T(C): 37.2 (14 Jun 2022 00:00), Max: 37.2 (14 Jun 2022 00:00)  T(F): 99 (14 Jun 2022 00:00), Max: 99 (14 Jun 2022 00:00)  HR: 75 (14 Jun 2022 01:00) (59 - 80)  BP: 74/44 (13 Jun 2022 14:00) (74/44 - 101/54)  BP(mean): 54 (13 Jun 2022 14:00) (54 - 58)  RR: 18 (14 Jun 2022 01:00) (10 - 18)  SpO2: 98% (14 Jun 2022 01:00) (97% - 100%)    PHYSICAL EXAM:  General: NAD, resting comfortably in bed  Pulmonary: Nonlabored breathing, no respiratory distress  Abdominal: 2 drains ss, provena, NGT in place w/ bilious output, nam, PCA, soft, nontender, nondistended   Extremities: WWP                        9.7    11.97 )-----------( 262      ( 13 Jun 2022 15:00 )             29.3     06-13    138  |  107  |  16  ----------------------------<  227<H>  3.7   |  21<L>  |  0.77    Ca    8.4      13 Jun 2022 15:00  Phos  4.8     06-13  Mg     1.70     06-13    TPro  5.5<L>  /  Alb  3.1<L>  /  TBili  1.2  /  DBili  x   /  AST  45<H>  /  ALT  26  /  AlkPhos  78  06-13   PT/INR - ( 13 Jun 2022 15:00 )   PT: 13.7 sec;   INR: 1.18 ratio         PTT - ( 13 Jun 2022 15:00 )  PTT:22.9 sec  I&O's Detail    13 Jun 2022 07:01  -  14 Jun 2022 01:16  --------------------------------------------------------  IN:    IV PiggyBack: 650 mL    Lactated Ringers: 760 mL    Lactated Ringers: 20 mL  Total IN: 1430 mL    OUT:    Bulb (mL): 165 mL    Bulb (mL): 170 mL    Indwelling Catheter - Urethral (mL): 400 mL    Nasogastric/Oral tube (mL): 100 mL  Total OUT: 835 mL    Total NET: 595 mL          IMAGING:

## 2022-06-14 NOTE — PROGRESS NOTE ADULT - ASSESSMENT
77F PMHx of adenocarcinoma now, s/p 06-13 whipple. Recovering appropriately in the SICU.    RECOMMENDATION  - Whipple pathway  - Diet: NPO/NGT  - Pain: PCA, ATC Tylenol  - DVT ppx  - Care per SICU    D team surgery  [id number] PMHx of adenocarcinoma now, s/p 06-13 whipple. Recovering appropriately in the SICU.    RECOMMENDATION  - ipple pathway  - Diet: NPO/IVF  - NGT to LCWS  - Pain: PCA, ATC Tylenol  - c/w Zosyn   - DVT ppx  - Care per SICU    D team surgery  [id number]

## 2022-06-14 NOTE — PROGRESS NOTE ADULT - SUBJECTIVE AND OBJECTIVE BOX
Anesthesia Pain Management Service    SUBJECTIVE: Pt doing well with PCEA without problems reported.    Therapy:	  [ ] IV PCA	   [ X] Epidural           [ ] s/p Spinal Opoid              [ ] Postpartum infusion	  [ ] Patient controlled regional anesthesia (PCRA)    [ ] prn Analgesics    Allergies    No Known Allergies    Intolerances      MEDICATIONS  (STANDING):  acetaminophen   IVPB .. 1000 milliGRAM(s) IV Intermittent every 6 hours  chlorhexidine 2% Cloths 1 Application(s) Topical daily  dextrose 5% + sodium chloride 0.45% 1000 milliLiter(s) (84 mL/Hr) IV Continuous <Continuous>  dextrose 5%. 1000 milliLiter(s) (100 mL/Hr) IV Continuous <Continuous>  glucagon  Injectable 1 milliGRAM(s) IntraMuscular once  heparin   Injectable 5000 Unit(s) SubCutaneous every 8 hours  hydromorphone (10 MICROgram(s)/mL) + bupivacaine 0.0625% in 0.9% Sodium Chloride PCEA 250 milliLiter(s) Epidural PCA Continuous  insulin glargine Injectable (LANTUS) 10 Unit(s) SubCutaneous at bedtime  insulin lispro (ADMELOG) corrective regimen sliding scale   SubCutaneous every 6 hours  pantoprazole  Injectable 40 milliGRAM(s) IV Push daily  piperacillin/tazobactam IVPB.. 3.375 Gram(s) IV Intermittent every 8 hours    MEDICATIONS  (PRN):  diphenhydrAMINE Injectable 25 milliGRAM(s) IV Push every 4 hours PRN Pruritus  hydromorphone (10 MICROgram(s)/mL) + bupivacaine 0.0625% in 0.9% Sodium Chloride PCEA Rescue Clinician  Bolus 5 milliLiter(s) Epidural every 15 minutes PRN for Pain Score greater than 6  naloxone Injectable 0.1 milliGRAM(s) IV Push every 3 minutes PRN For ANY of the following changes in patient status:  A. RR LESS THAN 10 breaths per minute, B. Oxygen saturation LESS THAN 90%, C. Sedation score of 6  ondansetron Injectable 4 milliGRAM(s) IV Push every 6 hours PRN Nausea      OBJECTIVE:   [X] No new signs     [ ] Other:    Side Effects:  [X ] None			[ ] Other:    Assessment of Catheter Site:		[ X] Intact		[ ] Other:    ASSESSMENT/PLAN  [ X] Continue current therapy    [ ] Therapy changed to:    [ ] IV PCA       [ ] Epidural     [ ] prn Analgesics     Comments: Continue current PCEA settings.

## 2022-06-14 NOTE — PROGRESS NOTE ADULT - SUBJECTIVE AND OBJECTIVE BOX
Anesthesia Pain Management Service: Day _2_ of Epidural    SUBJECTIVE: Patient doing well with PCEA and no problems.  Pain Scale Score:   Refer to charted pain scores    THERAPY:  [x ] Epidural Bupivacaine 0.0625% and Hydromorphone  		[ X] 10 micrograms/mL	[ ] 5 micrograms/mL  [ ] Epidural Bupivacaine 0.0625% and Fentanyl - 2 micrograms/mL  [ ] Epidural Ropivacaine 0.1% plain – 1 mg/mL  [ ] Patient Controlled Regional Anesthesia (PCRA) Ropivacaine  		[ ] 0.2%			[ ] 0.1%    Demand dose __3_ lockout __15_ (minutes) Continuous Rate ___ Total: __10.8__ ml used (in past 24 hours)      MEDICATIONS  (STANDING):  acetaminophen   IVPB .. 1000 milliGRAM(s) IV Intermittent every 6 hours  chlorhexidine 2% Cloths 1 Application(s) Topical daily  dextrose 5% + sodium chloride 0.45% 1000 milliLiter(s) (84 mL/Hr) IV Continuous <Continuous>  heparin   Injectable 5000 Unit(s) SubCutaneous every 8 hours  hydromorphone (10 MICROgram(s)/mL) + bupivacaine 0.0625% in 0.9% Sodium Chloride PCEA 250 milliLiter(s) Epidural PCA Continuous  insulin lispro (ADMELOG) corrective regimen sliding scale   SubCutaneous every 6 hours  pantoprazole  Injectable 40 milliGRAM(s) IV Push daily  piperacillin/tazobactam IVPB.. 3.375 Gram(s) IV Intermittent every 8 hours    MEDICATIONS  (PRN):  diphenhydrAMINE Injectable 25 milliGRAM(s) IV Push every 4 hours PRN Pruritus  hydromorphone (10 MICROgram(s)/mL) + bupivacaine 0.0625% in 0.9% Sodium Chloride PCEA Rescue Clinician  Bolus 5 milliLiter(s) Epidural every 15 minutes PRN for Pain Score greater than 6  naloxone Injectable 0.1 milliGRAM(s) IV Push every 3 minutes PRN For ANY of the following changes in patient status:  A. RR LESS THAN 10 breaths per minute, B. Oxygen saturation LESS THAN 90%, C. Sedation score of 6  ondansetron Injectable 4 milliGRAM(s) IV Push every 6 hours PRN Nausea      OBJECTIVE:    Assessment of Catheter Site:	[ ] Left	[ ] Right  [x ] Epidural 	[ ] Femoral	      [ ] Saphenous   [ ] Supraclavicular   [ ] Other:    [x ] Dressing intact	[x ] Site non-tender	[ x] Site without erythema, discharge, edema  [x ] Epidural tubing and connection checked	[x] Gross neurological exam within normal limits  [ ] Catheter removed – tip intact		[ ] Afebrile  	[ ] Febrile: ___   [ X] see Temp under VS below)    PT/INR - ( 14 Jun 2022 00:50 )   PT: 14.6 sec;   INR: 1.26 ratio         PTT - ( 14 Jun 2022 00:50 )  PTT:25.3 sec                      9.1    11.70 )-----------( 226      ( 14 Jun 2022 02:30 )             28.0     Vital Signs Last 24 Hrs  T(C): 36.5 (06-14-22 @ 08:00), Max: 37.4 (06-14-22 @ 04:00)  T(F): 97.7 (06-14-22 @ 08:00), Max: 99.3 (06-14-22 @ 04:00)  HR: 76 (06-14-22 @ 09:00) (59 - 80)  BP: 131/55 (06-14-22 @ 08:00) (74/44 - 131/55)  BP(mean): 70 (06-14-22 @ 08:00) (54 - 70)  RR: 11 (06-14-22 @ 09:00) (10 - 18)  SpO2: 96% (06-14-22 @ 09:00) (96% - 100%)      Sedation Score:	[x ] Alert	[ ] Drowsy	[ ] Arousable	[ ] Asleep	[ ] Unresponsive    Side Effects:	[x ] None	[ ] Nausea	[ ] Vomiting	[ ] Pruritus  		[ ] Weakness		[ ] Numbness	[ ] Other:    ASSESSMENT/ PLAN:    Therapy to  be:	[x ] Continue   [ ] Discontinued   [ ] Change to prn Analgesics    Documentation and Verification of current medications:  [ X ] Done	[ ] Not done, not eligible, reason:    Comments: Doing OK with epidural and may continue.    Progress Note written now but Patient was seen earlier.

## 2022-06-14 NOTE — PROGRESS NOTE ADULT - ATTENDING COMMENTS
I agree with the detailed interval history, physical, and plan, which I have reviewed and edited where appropriate'; also agree with notes/assessment with my team on service.  I have personally examined the patient.  I was physically present for the key portions of the evaluation and management (E/M) service provided.  I reviewed all the pertinent data.  The patient is a critical care patient with life threatening hemodynamic and metabolic instability in SICU.  The SICU team has a constant risk benefit analyzes discussion and coordinating care with the primary team and all consultants.   The patient is in SICU with the chief complaint and diagnosis mentioned in the note.   The plan will be specified in the note.  76 y/o F s/p stenting and sp Whipple in SICU for monitoring.  EXAM:  General/Neuro  Exam: AO3  Respiratory  Exam: Lungs clear   Cardiovascular  Exam: Regular rate   GI  Exam: Abdomen soft,     PLAN:  Neuro  - PCEA  - Acetaminophen   Pulm  -Encourage IS  Cards  - hold home ASA until tolerating PO  GI  - NPO  - Pantoprazole   Renal   - LR @100  -Endo  - ISS q6 while NPO  - Lantus 10qHS  ID  - Zosyn 4 day   Heme  - F/u post op labs  Dispo  - SICU  Goals of Care  - Full code

## 2022-06-14 NOTE — PROGRESS NOTE ADULT - SUBJECTIVE AND OBJECTIVE BOX
ANESTHESIA POSTOP CHECK    77y Female POSTOP DAY 1     Vital Signs Last 24 Hrs  T(C): 36.5 (14 Jun 2022 08:00), Max: 37.4 (14 Jun 2022 04:00)  T(F): 97.7 (14 Jun 2022 08:00), Max: 99.3 (14 Jun 2022 04:00)  HR: 76 (14 Jun 2022 09:00) (59 - 80)  BP: 131/55 (14 Jun 2022 08:00) (74/44 - 131/55)  BP(mean): 70 (14 Jun 2022 08:00) (54 - 70)  RR: 11 (14 Jun 2022 09:00) (10 - 18)  SpO2: 96% (14 Jun 2022 09:00) (96% - 100%)  I&O's Summary    13 Jun 2022 07:01  -  14 Jun 2022 07:00  --------------------------------------------------------  IN: 2030 mL / OUT: 1375 mL / NET: 655 mL    14 Jun 2022 07:01  -  14 Jun 2022 09:33  --------------------------------------------------------  IN: 200 mL / OUT: 500 mL / NET: -300 mL        [X ] NO APPARENT ANESTHESIA COMPLICATIONS      Comments:

## 2022-06-14 NOTE — PROGRESS NOTE ADULT - SUBJECTIVE AND OBJECTIVE BOX
SICU Daily Progress Note  =====================================================  77 y.o. female presents for preop eval for scheduled whipple.  Pt states hospitalized few months ago at [facility] for jaundice.  ERCP and bilary stent x2 inserted.  Imaging show pancreatic mass.  Preop dx malignant neoplasm of pancreas unspecified.  Operative course significant only for purulent material around biliary stents, no blood products. Extubated and brought to PACU off vasopressors    MEDICATIONS  (STANDING):  acetaminophen   IVPB .. 1000 milliGRAM(s) IV Intermittent every 6 hours  chlorhexidine 2% Cloths 1 Application(s) Topical daily  heparin   Injectable 5000 Unit(s) SubCutaneous every 8 hours  hydromorphone (10 MICROgram(s)/mL) + bupivacaine 0.0625% in 0.9% Sodium Chloride PCEA 250 milliLiter(s) Epidural PCA Continuous  insulin lispro (ADMELOG) corrective regimen sliding scale   SubCutaneous every 6 hours  lactated ringers. 1000 milliLiter(s) (100 mL/Hr) IV Continuous <Continuous>  pantoprazole  Injectable 40 milliGRAM(s) IV Push daily  piperacillin/tazobactam IVPB.. 3.375 Gram(s) IV Intermittent every 8 hours    MEDICATIONS  (PRN):  diphenhydrAMINE Injectable 25 milliGRAM(s) IV Push every 4 hours PRN Pruritus  hydromorphone (10 MICROgram(s)/mL) + bupivacaine 0.0625% in 0.9% Sodium Chloride PCEA Rescue Clinician  Bolus 5 milliLiter(s) Epidural every 15 minutes PRN for Pain Score greater than 6  naloxone Injectable 0.1 milliGRAM(s) IV Push every 3 minutes PRN For ANY of the following changes in patient status:  A. RR LESS THAN 10 breaths per minute, B. Oxygen saturation LESS THAN 90%, C. Sedation score of 6  ondansetron Injectable 4 milliGRAM(s) IV Push every 6 hours PRN Nausea    ICU Vital Signs Last 24 Hrs  T(C): 37.2 (14 Jun 2022 00:00), Max: 37.2 (14 Jun 2022 00:00)  T(F): 99 (14 Jun 2022 00:00), Max: 99 (14 Jun 2022 00:00)  HR: 73 (14 Jun 2022 00:00) (59 - 80)  BP: 74/44 (13 Jun 2022 14:00) (74/44 - 101/54)  BP(mean): 54 (13 Jun 2022 14:00) (54 - 58)  ABP: 132/51 (14 Jun 2022 00:00) (80/37 - 146/55)  ABP(mean): 80 (14 Jun 2022 00:00) (51 - 90)  RR: 13 (14 Jun 2022 00:00) (10 - 18)  SpO2: 97% (14 Jun 2022 00:00) (97% - 100%)    I&O's Detail    13 Jun 2022 07:01  -  14 Jun 2022 01:02  --------------------------------------------------------  IN:    IV PiggyBack: 650 mL    Lactated Ringers: 660 mL    Lactated Ringers: 20 mL  Total IN: 1330 mL    OUT:    Bulb (mL): 165 mL    Bulb (mL): 170 mL    Indwelling Catheter - Urethral (mL): 400 mL    Nasogastric/Oral tube (mL): 100 mL  Total OUT: 835 mL    Total NET: 495 mL        ROS:    REVIEW OF SYSTEMS    [ ] A ten-point review of systems was otherwise negative except as noted.  [ ] Due to altered mental status/intubation, subjective information were not able to be obtained from the patient. History was obtained, to the extent possible, from review of the chart and collateral sources of information.      CURRENT MEDICATIONS:   --------------------------------------------------------------------------------------  MEDICATIONS  (STANDING):  acetaminophen   IVPB .. 1000 milliGRAM(s) IV Intermittent every 6 hours  chlorhexidine 2% Cloths 1 Application(s) Topical daily  heparin   Injectable 5000 Unit(s) SubCutaneous every 8 hours  hydromorphone (10 MICROgram(s)/mL) + bupivacaine 0.0625% in 0.9% Sodium Chloride PCEA 250 milliLiter(s) Epidural PCA Continuous  insulin lispro (ADMELOG) corrective regimen sliding scale   SubCutaneous every 6 hours  lactated ringers. 1000 milliLiter(s) (100 mL/Hr) IV Continuous <Continuous>  pantoprazole  Injectable 40 milliGRAM(s) IV Push daily  piperacillin/tazobactam IVPB.. 3.375 Gram(s) IV Intermittent every 8 hours    MEDICATIONS  (PRN):  diphenhydrAMINE Injectable 25 milliGRAM(s) IV Push every 4 hours PRN Pruritus  hydromorphone (10 MICROgram(s)/mL) + bupivacaine 0.0625% in 0.9% Sodium Chloride PCEA Rescue Clinician  Bolus 5 milliLiter(s) Epidural every 15 minutes PRN for Pain Score greater than 6  naloxone Injectable 0.1 milliGRAM(s) IV Push every 3 minutes PRN For ANY of the following changes in patient status:  A. RR LESS THAN 10 breaths per minute, B. Oxygen saturation LESS THAN 90%, C. Sedation score of 6  ondansetron Injectable 4 milliGRAM(s) IV Push every 6 hours PRN Nausea    --------------------------------------------------------------------------------------  ICU Vital Signs Last 24 Hrs  T(C): 37.2 (14 Jun 2022 00:00), Max: 37.2 (14 Jun 2022 00:00)  T(F): 99 (14 Jun 2022 00:00), Max: 99 (14 Jun 2022 00:00)  HR: 75 (14 Jun 2022 01:00) (59 - 80)  BP: 74/44 (13 Jun 2022 14:00) (74/44 - 101/54)  BP(mean): 54 (13 Jun 2022 14:00) (54 - 58)  ABP: 121/47 (14 Jun 2022 01:00) (80/37 - 146/55)  ABP(mean): 74 (14 Jun 2022 01:00) (51 - 90)  RR: 18 (14 Jun 2022 01:00) (10 - 18)  SpO2: 98% (14 Jun 2022 01:00) (97% - 100%)    I&O's Detail    13 Jun 2022 07:01  -  14 Jun 2022 01:09  --------------------------------------------------------  IN:    IV PiggyBack: 650 mL    Lactated Ringers: 760 mL    Lactated Ringers: 20 mL  Total IN: 1430 mL    OUT:    Bulb (mL): 165 mL    Bulb (mL): 170 mL    Indwelling Catheter - Urethral (mL): 400 mL    Nasogastric/Oral tube (mL): 100 mL  Total OUT: 835 mL    Total NET: 595 mL        --------------------------------------------------------------------------------------    EXAM:  General/Neuro  Exam: minimally responding to commands, protecting airway, recovering from sedation and anesthesia     Respiratory  Exam: Lungs clear to auscultation, Normal expansion/effort.       Cardiovascular  Exam: Regular rate and rhythm.  No peripheral edema    Cardiac Rhythm: Normal Sinus Rhythm    GI  Exam: Abdomen soft, Non-tender, Non-distended.   Wound:   Prevena VAC in place, SS drainage, 2 WALLY bulbs superiorly and inferiorly on R      Tubes/Lines/Drains    [x] Peripheral IV  [] Central Venous Line     	[] R	[] L	[] IJ	[] Fem	[] SC        Type:	    Date Placed:   [X] Arterial Line		[] R	[] L	[] Fem	[] Rad	[] Ax	Date Placed: 6/13  [] PICC:         	[] Midline		[] Mediport           [] Urinary Catheter		Date Placed:     Extremities  Exam: Extremities warm, pink, well-perfused.      Derm:  Exam: Good skin turgor, no skin breakdown.      :   Exam: Rolle catheter in place.     LABS  --------------------------------------------------------------------------------------    CBC (06-13 @ 15:00)                              9.7<L>                         11.97<H>  )----------------(  262        --    % Neutrophils, --    % Lymphocytes, ANC: --                                  29.3<L>    BMP (06-13 @ 15:00)             138     |  107     |  16    		Ca++ --      Ca 8.4                ---------------------------------( 227<H>		Mg 1.70               3.7     |  21<L>   |  0.77  			Ph 4.8<H>    LFTs (06-13 @ 15:00)      TPro 5.5<L> / Alb 3.1<L> / TBili 1.2 / DBili -- / AST 45<H> / ALT 26 / AlkPhos 78    Coags (06-13 @ 15:00)  aPTT 22.9<L> / INR 1.18<H> / PT 13.7<H>            --------------------------------------------------------------------------------------

## 2022-06-15 ENCOUNTER — TRANSCRIPTION ENCOUNTER (OUTPATIENT)
Age: 77
End: 2022-06-15

## 2022-06-15 LAB
-  AMIKACIN: SIGNIFICANT CHANGE UP
-  AMOXICILLIN/CLAVULANIC ACID: SIGNIFICANT CHANGE UP
-  AMPICILLIN/SULBACTAM: SIGNIFICANT CHANGE UP
-  AMPICILLIN: SIGNIFICANT CHANGE UP
-  AMPICILLIN: SIGNIFICANT CHANGE UP
-  AZTREONAM: SIGNIFICANT CHANGE UP
-  CEFAZOLIN: SIGNIFICANT CHANGE UP
-  CEFEPIME: SIGNIFICANT CHANGE UP
-  CEFOXITIN: SIGNIFICANT CHANGE UP
-  CEFTRIAXONE: SIGNIFICANT CHANGE UP
-  CIPROFLOXACIN: SIGNIFICANT CHANGE UP
-  ERTAPENEM: SIGNIFICANT CHANGE UP
-  GENTAMICIN: SIGNIFICANT CHANGE UP
-  IMIPENEM: SIGNIFICANT CHANGE UP
-  LEVOFLOXACIN: SIGNIFICANT CHANGE UP
-  MEROPENEM: SIGNIFICANT CHANGE UP
-  PIPERACILLIN/TAZOBACTAM: SIGNIFICANT CHANGE UP
-  TETRACYCLINE: SIGNIFICANT CHANGE UP
-  TOBRAMYCIN: SIGNIFICANT CHANGE UP
-  TRIMETHOPRIM/SULFAMETHOXAZOLE: SIGNIFICANT CHANGE UP
-  VANCOMYCIN: SIGNIFICANT CHANGE UP
ALBUMIN SERPL ELPH-MCNC: 3.4 G/DL — SIGNIFICANT CHANGE UP (ref 3.3–5)
ALP SERPL-CCNC: 104 U/L — SIGNIFICANT CHANGE UP (ref 40–120)
ALT FLD-CCNC: 22 U/L — SIGNIFICANT CHANGE UP (ref 4–33)
AMYLASE FLD-CCNC: 215 U/L — SIGNIFICANT CHANGE UP
AMYLASE FLD-CCNC: 291 U/L — SIGNIFICANT CHANGE UP
ANION GAP SERPL CALC-SCNC: 12 MMOL/L — SIGNIFICANT CHANGE UP (ref 7–14)
ANION GAP SERPL CALC-SCNC: 6 MMOL/L — LOW (ref 7–14)
AST SERPL-CCNC: 22 U/L — SIGNIFICANT CHANGE UP (ref 4–32)
BILIRUB SERPL-MCNC: 1.4 MG/DL — HIGH (ref 0.2–1.2)
BUN SERPL-MCNC: 12 MG/DL — SIGNIFICANT CHANGE UP (ref 7–23)
BUN SERPL-MCNC: 5 MG/DL — LOW (ref 7–23)
CALCIUM SERPL-MCNC: 8.3 MG/DL — LOW (ref 8.4–10.5)
CALCIUM SERPL-MCNC: 8.7 MG/DL — SIGNIFICANT CHANGE UP (ref 8.4–10.5)
CHLORIDE SERPL-SCNC: 102 MMOL/L — SIGNIFICANT CHANGE UP (ref 98–107)
CHLORIDE SERPL-SCNC: 99 MMOL/L — SIGNIFICANT CHANGE UP (ref 98–107)
CO2 SERPL-SCNC: 22 MMOL/L — SIGNIFICANT CHANGE UP (ref 22–31)
CO2 SERPL-SCNC: 23 MMOL/L — SIGNIFICANT CHANGE UP (ref 22–31)
CREAT SERPL-MCNC: 0.56 MG/DL — SIGNIFICANT CHANGE UP (ref 0.5–1.3)
CREAT SERPL-MCNC: 0.63 MG/DL — SIGNIFICANT CHANGE UP (ref 0.5–1.3)
CULTURE RESULTS: SIGNIFICANT CHANGE UP
EGFR: 91 ML/MIN/1.73M2 — SIGNIFICANT CHANGE UP
EGFR: 94 ML/MIN/1.73M2 — SIGNIFICANT CHANGE UP
GLUCOSE BLDC GLUCOMTR-MCNC: 190 MG/DL — HIGH (ref 70–99)
GLUCOSE BLDC GLUCOMTR-MCNC: 199 MG/DL — HIGH (ref 70–99)
GLUCOSE BLDC GLUCOMTR-MCNC: 241 MG/DL — HIGH (ref 70–99)
GLUCOSE SERPL-MCNC: 172 MG/DL — HIGH (ref 70–99)
GLUCOSE SERPL-MCNC: 181 MG/DL — HIGH (ref 70–99)
HCT VFR BLD CALC: 27.1 % — LOW (ref 34.5–45)
HGB BLD-MCNC: 8.7 G/DL — LOW (ref 11.5–15.5)
INR BLD: 1.38 RATIO — HIGH (ref 0.88–1.16)
MAGNESIUM SERPL-MCNC: 1.7 MG/DL — SIGNIFICANT CHANGE UP (ref 1.6–2.6)
MAGNESIUM SERPL-MCNC: 2.3 MG/DL — SIGNIFICANT CHANGE UP (ref 1.6–2.6)
MCHC RBC-ENTMCNC: 32.1 GM/DL — SIGNIFICANT CHANGE UP (ref 32–36)
MCHC RBC-ENTMCNC: 32.7 PG — SIGNIFICANT CHANGE UP (ref 27–34)
MCV RBC AUTO: 101.9 FL — HIGH (ref 80–100)
METHOD TYPE: SIGNIFICANT CHANGE UP
METHOD TYPE: SIGNIFICANT CHANGE UP
NRBC # BLD: 0 /100 WBCS — SIGNIFICANT CHANGE UP
NRBC # FLD: 0 K/UL — SIGNIFICANT CHANGE UP
ORGANISM # SPEC MICROSCOPIC CNT: SIGNIFICANT CHANGE UP
PHOSPHATE SERPL-MCNC: 1.2 MG/DL — LOW (ref 2.5–4.5)
PHOSPHATE SERPL-MCNC: 2.2 MG/DL — LOW (ref 2.5–4.5)
PLATELET # BLD AUTO: 210 K/UL — SIGNIFICANT CHANGE UP (ref 150–400)
POTASSIUM SERPL-MCNC: 3.5 MMOL/L — SIGNIFICANT CHANGE UP (ref 3.5–5.3)
POTASSIUM SERPL-MCNC: 3.7 MMOL/L — SIGNIFICANT CHANGE UP (ref 3.5–5.3)
POTASSIUM SERPL-SCNC: 3.5 MMOL/L — SIGNIFICANT CHANGE UP (ref 3.5–5.3)
POTASSIUM SERPL-SCNC: 3.7 MMOL/L — SIGNIFICANT CHANGE UP (ref 3.5–5.3)
PROT SERPL-MCNC: 6.5 G/DL — SIGNIFICANT CHANGE UP (ref 6–8.3)
PROTHROM AB SERPL-ACNC: 16.1 SEC — HIGH (ref 10.5–13.4)
RBC # BLD: 2.66 M/UL — LOW (ref 3.8–5.2)
RBC # FLD: 17.2 % — HIGH (ref 10.3–14.5)
SODIUM SERPL-SCNC: 131 MMOL/L — LOW (ref 135–145)
SODIUM SERPL-SCNC: 133 MMOL/L — LOW (ref 135–145)
SPECIMEN SOURCE: SIGNIFICANT CHANGE UP
WBC # BLD: 12.21 K/UL — HIGH (ref 3.8–10.5)
WBC # FLD AUTO: 12.21 K/UL — HIGH (ref 3.8–10.5)

## 2022-06-15 RX ORDER — INSULIN GLARGINE 100 [IU]/ML
20 INJECTION, SOLUTION SUBCUTANEOUS AT BEDTIME
Refills: 0 | Status: DISCONTINUED | OUTPATIENT
Start: 2022-06-15 | End: 2022-06-17

## 2022-06-15 RX ORDER — POTASSIUM PHOSPHATE, MONOBASIC POTASSIUM PHOSPHATE, DIBASIC 236; 224 MG/ML; MG/ML
30 INJECTION, SOLUTION INTRAVENOUS EVERY 6 HOURS
Refills: 0 | Status: COMPLETED | OUTPATIENT
Start: 2022-06-15 | End: 2022-06-16

## 2022-06-15 RX ORDER — POTASSIUM PHOSPHATE, MONOBASIC POTASSIUM PHOSPHATE, DIBASIC 236; 224 MG/ML; MG/ML
15 INJECTION, SOLUTION INTRAVENOUS ONCE
Refills: 0 | Status: COMPLETED | OUTPATIENT
Start: 2022-06-15 | End: 2022-06-15

## 2022-06-15 RX ORDER — POTASSIUM PHOSPHATE, MONOBASIC POTASSIUM PHOSPHATE, DIBASIC 236; 224 MG/ML; MG/ML
30 INJECTION, SOLUTION INTRAVENOUS ONCE
Refills: 0 | Status: DISCONTINUED | OUTPATIENT
Start: 2022-06-15 | End: 2022-06-15

## 2022-06-15 RX ORDER — POTASSIUM CHLORIDE 20 MEQ
10 PACKET (EA) ORAL
Refills: 0 | Status: DISCONTINUED | OUTPATIENT
Start: 2022-06-15 | End: 2022-06-15

## 2022-06-15 RX ORDER — MAGNESIUM SULFATE 500 MG/ML
2 VIAL (ML) INJECTION ONCE
Refills: 0 | Status: COMPLETED | OUTPATIENT
Start: 2022-06-15 | End: 2022-06-15

## 2022-06-15 RX ORDER — POTASSIUM CHLORIDE 20 MEQ
10 PACKET (EA) ORAL ONCE
Refills: 0 | Status: COMPLETED | OUTPATIENT
Start: 2022-06-15 | End: 2022-06-15

## 2022-06-15 RX ORDER — PHYTONADIONE (VIT K1) 5 MG
5 TABLET ORAL ONCE
Refills: 0 | Status: COMPLETED | OUTPATIENT
Start: 2022-06-15 | End: 2022-06-15

## 2022-06-15 RX ADMIN — Medication 400 MILLIGRAM(S): at 06:41

## 2022-06-15 RX ADMIN — Medication 400 MILLIGRAM(S): at 00:52

## 2022-06-15 RX ADMIN — Medication 50 GRAM(S): at 10:27

## 2022-06-15 RX ADMIN — ONDANSETRON 4 MILLIGRAM(S): 8 TABLET, FILM COATED ORAL at 17:12

## 2022-06-15 RX ADMIN — POTASSIUM PHOSPHATE, MONOBASIC POTASSIUM PHOSPHATE, DIBASIC 62.5 MILLIMOLE(S): 236; 224 INJECTION, SOLUTION INTRAVENOUS at 04:41

## 2022-06-15 RX ADMIN — Medication 100 MILLIEQUIVALENT(S): at 10:26

## 2022-06-15 RX ADMIN — POTASSIUM PHOSPHATE, MONOBASIC POTASSIUM PHOSPHATE, DIBASIC 83.33 MILLIMOLE(S): 236; 224 INJECTION, SOLUTION INTRAVENOUS at 23:00

## 2022-06-15 RX ADMIN — PIPERACILLIN AND TAZOBACTAM 25 GRAM(S): 4; .5 INJECTION, POWDER, LYOPHILIZED, FOR SOLUTION INTRAVENOUS at 13:32

## 2022-06-15 RX ADMIN — Medication 2: at 18:29

## 2022-06-15 RX ADMIN — Medication 1000 MILLIGRAM(S): at 01:07

## 2022-06-15 RX ADMIN — Medication 1000 MILLIGRAM(S): at 06:20

## 2022-06-15 RX ADMIN — HYDROMORPHONE HYDROCHLORIDE 250 MILLILITER(S): 2 INJECTION INTRAMUSCULAR; INTRAVENOUS; SUBCUTANEOUS at 19:10

## 2022-06-15 RX ADMIN — Medication 1000 MILLIGRAM(S): at 13:00

## 2022-06-15 RX ADMIN — Medication 100 MILLIEQUIVALENT(S): at 07:26

## 2022-06-15 RX ADMIN — ONDANSETRON 4 MILLIGRAM(S): 8 TABLET, FILM COATED ORAL at 06:40

## 2022-06-15 RX ADMIN — Medication 2: at 06:34

## 2022-06-15 RX ADMIN — HYDROMORPHONE HYDROCHLORIDE 250 MILLILITER(S): 2 INJECTION INTRAMUSCULAR; INTRAVENOUS; SUBCUTANEOUS at 03:35

## 2022-06-15 RX ADMIN — HEPARIN SODIUM 5000 UNIT(S): 5000 INJECTION INTRAVENOUS; SUBCUTANEOUS at 13:31

## 2022-06-15 RX ADMIN — Medication 100 MILLIEQUIVALENT(S): at 04:25

## 2022-06-15 RX ADMIN — HEPARIN SODIUM 5000 UNIT(S): 5000 INJECTION INTRAVENOUS; SUBCUTANEOUS at 06:41

## 2022-06-15 RX ADMIN — HEPARIN SODIUM 5000 UNIT(S): 5000 INJECTION INTRAVENOUS; SUBCUTANEOUS at 23:01

## 2022-06-15 RX ADMIN — HYDROMORPHONE HYDROCHLORIDE 250 MILLILITER(S): 2 INJECTION INTRAMUSCULAR; INTRAVENOUS; SUBCUTANEOUS at 07:49

## 2022-06-15 RX ADMIN — PIPERACILLIN AND TAZOBACTAM 25 GRAM(S): 4; .5 INJECTION, POWDER, LYOPHILIZED, FOR SOLUTION INTRAVENOUS at 06:36

## 2022-06-15 RX ADMIN — Medication 400 MILLIGRAM(S): at 12:26

## 2022-06-15 RX ADMIN — PANTOPRAZOLE SODIUM 40 MILLIGRAM(S): 20 TABLET, DELAYED RELEASE ORAL at 12:26

## 2022-06-15 RX ADMIN — Medication 4: at 13:31

## 2022-06-15 RX ADMIN — Medication 400 MILLIGRAM(S): at 18:29

## 2022-06-15 RX ADMIN — Medication 101 MILLIGRAM(S): at 10:27

## 2022-06-15 RX ADMIN — PIPERACILLIN AND TAZOBACTAM 25 GRAM(S): 4; .5 INJECTION, POWDER, LYOPHILIZED, FOR SOLUTION INTRAVENOUS at 23:00

## 2022-06-15 RX ADMIN — Medication 25 GRAM(S): at 04:41

## 2022-06-15 RX ADMIN — Medication 85 MILLIMOLE(S): at 09:45

## 2022-06-15 NOTE — DIETITIAN INITIAL EVALUATION ADULT - PERTINENT MEDS FT
MEDICATIONS  (STANDING):  acetaminophen   IVPB .. 1000 milliGRAM(s) IV Intermittent every 6 hours  chlorhexidine 2% Cloths 1 Application(s) Topical daily  dextrose 5% + sodium chloride 0.45% 1000 milliLiter(s) (84 mL/Hr) IV Continuous <Continuous>  heparin   Injectable 5000 Unit(s) SubCutaneous every 8 hours  hydromorphone (10 MICROgram(s)/mL) + bupivacaine 0.0625% in 0.9% Sodium Chloride PCEA 250 milliLiter(s) Epidural PCA Continuous  insulin glargine Injectable (LANTUS) 10 Unit(s) SubCutaneous at bedtime  insulin lispro (ADMELOG) corrective regimen sliding scale   SubCutaneous every 6 hours  pantoprazole  Injectable 40 milliGRAM(s) IV Push daily  piperacillin/tazobactam IVPB.. 3.375 Gram(s) IV Intermittent every 8 hours    MEDICATIONS  (PRN):  diphenhydrAMINE Injectable 25 milliGRAM(s) IV Push every 4 hours PRN Pruritus  hydromorphone (10 MICROgram(s)/mL) + bupivacaine 0.0625% in 0.9% Sodium Chloride PCEA Rescue Clinician  Bolus 5 milliLiter(s) Epidural every 15 minutes PRN for Pain Score greater than 6  naloxone Injectable 0.1 milliGRAM(s) IV Push every 3 minutes PRN For ANY of the following changes in patient status:  A. RR LESS THAN 10 breaths per minute, B. Oxygen saturation LESS THAN 90%, C. Sedation score of 6  ondansetron Injectable 4 milliGRAM(s) IV Push every 6 hours PRN Nausea

## 2022-06-15 NOTE — DIETITIAN INITIAL EVALUATION ADULT - PERTINENT LABORATORY DATA
06-15    131<L>  |  102  |  12  ----------------------------<  172<H>  3.5   |  23  |  0.63    Ca    8.3<L>      15 Adams 2022 01:30  Phos  1.2     06-15  Mg     1.70     06-15    TPro  5.5<L>  /  Alb  3.0<L>  /  TBili  1.3<H>  /  DBili  x   /  AST  46<H>  /  ALT  26  /  AlkPhos  70  06-14  POCT Blood Glucose.: 190 mg/dL (06-15-22 @ 06:07)  A1C with Estimated Average Glucose Result: 5.2 % (06-14-22 @ 00:50)

## 2022-06-15 NOTE — DISCHARGE NOTE PROVIDER - NSDCFUADDAPPT_GEN_ALL_CORE_FT
Please call [phone] to schedule a follow up appointment with Endocrinologist. Please follow up with primary care provider regarding recent hospitalization and changes in diabetic medications.  Please call [phone] to schedule a follow up appointment with Endocrinologist.     Please follow up with primary care provider within 1 week of discharge regarding recent hospitalization and changes in diabetic medications. If you have any issues with glucose control prior to establishing care with outpatient endocrinologist, please call PCP.

## 2022-06-15 NOTE — PROGRESS NOTE ADULT - SUBJECTIVE AND OBJECTIVE BOX
Anesthesia Pain Management Service: Day __ of Epidural    SUBJECTIVE: Patient doing well with PCEA and no problems.  Pain Scale Score:   Refer to charted pain scores    THERAPY:  [x ] Epidural Bupivacaine 0.0625% and Hydromorphone  		[ X] 10 micrograms/mL	[ ] 5 micrograms/mL  [ ] Epidural Bupivacaine 0.0625% and Fentanyl - 2 micrograms/mL  [ ] Epidural Ropivacaine 0.1% plain – 1 mg/mL  [ ] Patient Controlled Regional Anesthesia (PCRA) Ropivacaine  		[ ] 0.2%			[ ] 0.1%    Demand dose __3_ lockout __15_ (minutes) Continuous Rate ___ Total: __176.5__ ml used (in past 24 hours)      MEDICATIONS  (STANDING):  acetaminophen   IVPB .. 1000 milliGRAM(s) IV Intermittent every 6 hours  chlorhexidine 2% Cloths 1 Application(s) Topical daily  dextrose 5% + sodium chloride 0.45% 1000 milliLiter(s) (84 mL/Hr) IV Continuous <Continuous>  heparin   Injectable 5000 Unit(s) SubCutaneous every 8 hours  hydromorphone (10 MICROgram(s)/mL) + bupivacaine 0.0625% in 0.9% Sodium Chloride PCEA 250 milliLiter(s) Epidural PCA Continuous  insulin glargine Injectable (LANTUS) 10 Unit(s) SubCutaneous at bedtime  insulin lispro (ADMELOG) corrective regimen sliding scale   SubCutaneous every 6 hours  magnesium sulfate  IVPB 2 Gram(s) IV Intermittent once  pantoprazole  Injectable 40 milliGRAM(s) IV Push daily  phytonadione  IVPB 5 milliGRAM(s) IV Intermittent once  piperacillin/tazobactam IVPB.. 3.375 Gram(s) IV Intermittent every 8 hours  potassium chloride  10 mEq/100 mL IVPB 10 milliEquivalent(s) IV Intermittent once  sodium phosphate IVPB 30 milliMole(s) IV Intermittent once    MEDICATIONS  (PRN):  diphenhydrAMINE Injectable 25 milliGRAM(s) IV Push every 4 hours PRN Pruritus  hydromorphone (10 MICROgram(s)/mL) + bupivacaine 0.0625% in 0.9% Sodium Chloride PCEA Rescue Clinician  Bolus 5 milliLiter(s) Epidural every 15 minutes PRN for Pain Score greater than 6  naloxone Injectable 0.1 milliGRAM(s) IV Push every 3 minutes PRN For ANY of the following changes in patient status:  A. RR LESS THAN 10 breaths per minute, B. Oxygen saturation LESS THAN 90%, C. Sedation score of 6  ondansetron Injectable 4 milliGRAM(s) IV Push every 6 hours PRN Nausea      OBJECTIVE:    Assessment of Catheter Site:	[ ] Left	[ ] Right  [x ] Epidural 	[ ] Femoral	      [ ] Saphenous   [ ] Supraclavicular   [ ] Other:    [x ] Dressing intact	[x ] Site non-tender	[ x] Site without erythema, discharge, edema  [x ] Epidural tubing and connection checked	[x] Gross neurological exam within normal limits  [ ] Catheter removed – tip intact		[ ] Afebrile  	[ ] Febrile: ___   [ X] see Temp under VS below)    PT/INR - ( 15 Adams 2022 01:30 )   PT: 16.1 sec;   INR: 1.38 ratio         PTT - ( 14 Jun 2022 00:50 )  PTT:25.3 sec                      8.7    12.21 )-----------( 210      ( 15 Adams 2022 01:30 )             27.1     Vital Signs Last 24 Hrs  T(C): 37.2 (06-15-22 @ 04:00), Max: 37.7 (06-14-22 @ 20:00)  T(F): 98.9 (06-15-22 @ 04:00), Max: 99.9 (06-14-22 @ 20:00)  HR: 88 (06-15-22 @ 04:00) (70 - 114)  BP: 133/49 (06-15-22 @ 04:00) (133/49 - 133/49)  BP(mean): --  RR: 17 (06-15-22 @ 04:00) (12 - 17)  SpO2: 99% (06-15-22 @ 04:00) (95% - 99%)      Sedation Score:	[x ] Alert	[ ] Drowsy	[ ] Arousable	[ ] Asleep	[ ] Unresponsive    Side Effects:	[x ] None	[ ] Nausea	[ ] Vomiting	[ ] Pruritus  		[ ] Weakness		[ ] Numbness	[ ] Other:    ASSESSMENT/ PLAN:    Therapy to  be:	[x ] Continue   [ ] Discontinued   [ ] Change to prn Analgesics    Documentation and Verification of current medications:  [ X ] Done	[ ] Not done, not eligible, reason:    Comments: Doing OK with epidural and may continue.    Progress Note written now but Patient was seen earlier. Anesthesia Pain Management Service: Day _3_ of Epidural    SUBJECTIVE: Patient doing well with PCEA and no problems.  Pain Scale Score: 4/10  Refer to charted pain scores    THERAPY:  [x ] Epidural Bupivacaine 0.0625% and Hydromorphone  		[ X] 10 micrograms/mL	[ ] 5 micrograms/mL  [ ] Epidural Bupivacaine 0.0625% and Fentanyl - 2 micrograms/mL  [ ] Epidural Ropivacaine 0.1% plain – 1 mg/mL  [ ] Patient Controlled Regional Anesthesia (PCRA) Ropivacaine  		[ ] 0.2%			[ ] 0.1%    Demand dose __3_ lockout __15_ (minutes) Continuous Rate ___ Total: __176.5__ ml used (in past 24 hours)      MEDICATIONS  (STANDING):  acetaminophen   IVPB .. 1000 milliGRAM(s) IV Intermittent every 6 hours  chlorhexidine 2% Cloths 1 Application(s) Topical daily  dextrose 5% + sodium chloride 0.45% 1000 milliLiter(s) (84 mL/Hr) IV Continuous <Continuous>  heparin   Injectable 5000 Unit(s) SubCutaneous every 8 hours  hydromorphone (10 MICROgram(s)/mL) + bupivacaine 0.0625% in 0.9% Sodium Chloride PCEA 250 milliLiter(s) Epidural PCA Continuous  insulin glargine Injectable (LANTUS) 10 Unit(s) SubCutaneous at bedtime  insulin lispro (ADMELOG) corrective regimen sliding scale   SubCutaneous every 6 hours  magnesium sulfate  IVPB 2 Gram(s) IV Intermittent once  pantoprazole  Injectable 40 milliGRAM(s) IV Push daily  phytonadione  IVPB 5 milliGRAM(s) IV Intermittent once  piperacillin/tazobactam IVPB.. 3.375 Gram(s) IV Intermittent every 8 hours  potassium chloride  10 mEq/100 mL IVPB 10 milliEquivalent(s) IV Intermittent once  sodium phosphate IVPB 30 milliMole(s) IV Intermittent once    MEDICATIONS  (PRN):  diphenhydrAMINE Injectable 25 milliGRAM(s) IV Push every 4 hours PRN Pruritus  hydromorphone (10 MICROgram(s)/mL) + bupivacaine 0.0625% in 0.9% Sodium Chloride PCEA Rescue Clinician  Bolus 5 milliLiter(s) Epidural every 15 minutes PRN for Pain Score greater than 6  naloxone Injectable 0.1 milliGRAM(s) IV Push every 3 minutes PRN For ANY of the following changes in patient status:  A. RR LESS THAN 10 breaths per minute, B. Oxygen saturation LESS THAN 90%, C. Sedation score of 6  ondansetron Injectable 4 milliGRAM(s) IV Push every 6 hours PRN Nausea      OBJECTIVE:    Assessment of Catheter Site:	[ ] Left	[ ] Right  [x ] Epidural 	[ ] Femoral	      [ ] Saphenous   [ ] Supraclavicular   [ ] Other:    [x ] Dressing intact	[x ] Site non-tender	[ x] Site without erythema, discharge, edema  [x ] Epidural tubing and connection checked	[x] Gross neurological exam within normal limits  [ ] Catheter removed – tip intact		[ ] Afebrile  	[ ] Febrile: ___   [ X] see Temp under VS below)    PT/INR - ( 15 Adams 2022 01:30 )   PT: 16.1 sec;   INR: 1.38 ratio         PTT - ( 14 Jun 2022 00:50 )  PTT:25.3 sec                      8.7    12.21 )-----------( 210      ( 15 Adams 2022 01:30 )             27.1     Vital Signs Last 24 Hrs  T(C): 37.2 (06-15-22 @ 04:00), Max: 37.7 (06-14-22 @ 20:00)  T(F): 98.9 (06-15-22 @ 04:00), Max: 99.9 (06-14-22 @ 20:00)  HR: 88 (06-15-22 @ 04:00) (70 - 114)  BP: 133/49 (06-15-22 @ 04:00) (133/49 - 133/49)  BP(mean): --  RR: 17 (06-15-22 @ 04:00) (12 - 17)  SpO2: 99% (06-15-22 @ 04:00) (95% - 99%)      Sedation Score:	[x ] Alert	[ ] Drowsy	[ ] Arousable	[ ] Asleep	[ ] Unresponsive    Side Effects:	[x ] None	[ ] Nausea	[ ] Vomiting	[ ] Pruritus  		[ ] Weakness		[ ] Numbness	[ ] Other:    ASSESSMENT/ PLAN:    Therapy to  be:	[x ] Continue   [ ] Discontinued   [ ] Change to prn Analgesics    Documentation and Verification of current medications:  [ X ] Done	[ ] Not done, not eligible, reason:    Comments: Doing OK with epidural and may continue.    Progress Note written now but Patient was seen earlier. Anesthesia Pain Management Service: Day _3_ of Epidural    SUBJECTIVE: Patient doing well with PCEA and no problems.  Pain Scale Score: 4/10  Refer to charted pain scores    THERAPY:  [x ] Epidural Bupivacaine 0.0625% and Hydromorphone  		[ X] 10 micrograms/mL	[ ] 5 micrograms/mL  [ ] Epidural Bupivacaine 0.0625% and Fentanyl - 2 micrograms/mL  [ ] Epidural Ropivacaine 0.1% plain – 1 mg/mL  [ ] Patient Controlled Regional Anesthesia (PCRA) Ropivacaine  		[ ] 0.2%			[ ] 0.1%    Demand dose __3_ lockout __15_ (minutes) Continuous Rate _6__ Total: __176.5__ ml used (in past 24 hours)      MEDICATIONS  (STANDING):  acetaminophen   IVPB .. 1000 milliGRAM(s) IV Intermittent every 6 hours  chlorhexidine 2% Cloths 1 Application(s) Topical daily  dextrose 5% + sodium chloride 0.45% 1000 milliLiter(s) (84 mL/Hr) IV Continuous <Continuous>  heparin   Injectable 5000 Unit(s) SubCutaneous every 8 hours  hydromorphone (10 MICROgram(s)/mL) + bupivacaine 0.0625% in 0.9% Sodium Chloride PCEA 250 milliLiter(s) Epidural PCA Continuous  insulin glargine Injectable (LANTUS) 10 Unit(s) SubCutaneous at bedtime  insulin lispro (ADMELOG) corrective regimen sliding scale   SubCutaneous every 6 hours  magnesium sulfate  IVPB 2 Gram(s) IV Intermittent once  pantoprazole  Injectable 40 milliGRAM(s) IV Push daily  phytonadione  IVPB 5 milliGRAM(s) IV Intermittent once  piperacillin/tazobactam IVPB.. 3.375 Gram(s) IV Intermittent every 8 hours  potassium chloride  10 mEq/100 mL IVPB 10 milliEquivalent(s) IV Intermittent once  sodium phosphate IVPB 30 milliMole(s) IV Intermittent once    MEDICATIONS  (PRN):  diphenhydrAMINE Injectable 25 milliGRAM(s) IV Push every 4 hours PRN Pruritus  hydromorphone (10 MICROgram(s)/mL) + bupivacaine 0.0625% in 0.9% Sodium Chloride PCEA Rescue Clinician  Bolus 5 milliLiter(s) Epidural every 15 minutes PRN for Pain Score greater than 6  naloxone Injectable 0.1 milliGRAM(s) IV Push every 3 minutes PRN For ANY of the following changes in patient status:  A. RR LESS THAN 10 breaths per minute, B. Oxygen saturation LESS THAN 90%, C. Sedation score of 6  ondansetron Injectable 4 milliGRAM(s) IV Push every 6 hours PRN Nausea      OBJECTIVE:    Assessment of Catheter Site:	[ ] Left	[ ] Right  [x ] Epidural 	[ ] Femoral	      [ ] Saphenous   [ ] Supraclavicular   [ ] Other:    [x ] Dressing intact	[x ] Site non-tender	[ x] Site without erythema, discharge, edema  [x ] Epidural tubing and connection checked	[x] Gross neurological exam within normal limits  [ ] Catheter removed – tip intact		[ ] Afebrile  	[ ] Febrile: ___   [ X] see Temp under VS below)    PT/INR - ( 15 Adams 2022 01:30 )   PT: 16.1 sec;   INR: 1.38 ratio         PTT - ( 14 Jun 2022 00:50 )  PTT:25.3 sec                      8.7    12.21 )-----------( 210      ( 15 Adams 2022 01:30 )             27.1     Vital Signs Last 24 Hrs  T(C): 37.2 (06-15-22 @ 04:00), Max: 37.7 (06-14-22 @ 20:00)  T(F): 98.9 (06-15-22 @ 04:00), Max: 99.9 (06-14-22 @ 20:00)  HR: 88 (06-15-22 @ 04:00) (70 - 114)  BP: 133/49 (06-15-22 @ 04:00) (133/49 - 133/49)  BP(mean): --  RR: 17 (06-15-22 @ 04:00) (12 - 17)  SpO2: 99% (06-15-22 @ 04:00) (95% - 99%)      Sedation Score:	[x ] Alert	[ ] Drowsy	[ ] Arousable	[ ] Asleep	[ ] Unresponsive    Side Effects:	[x ] None	[ ] Nausea	[ ] Vomiting	[ ] Pruritus  		[ ] Weakness		[ ] Numbness	[ ] Other:    ASSESSMENT/ PLAN:    Therapy to  be:	[x ] Continue   [ ] Discontinued   [ ] Change to prn Analgesics    Documentation and Verification of current medications:  [ X ] Done	[ ] Not done, not eligible, reason:    Comments: Doing OK with epidural and may continue.    Progress Note written now but Patient was seen earlier.

## 2022-06-15 NOTE — DISCHARGE NOTE PROVIDER - NSPANSURGDSCINFOINCISION_GEN_ALL_CORE
•     You may shower at home. While in the shower allow water to hit your chest or back and run down your incision. Gently wash incision with soap and water and pat dry with a clean towel.  •     Avoid baths, hot tubs and pools until your incision is completely healed.  •     Your incision may feel numb or have decreased sensation. Avoid heating pads as these can burn the skin without you noticing or feeling it.  •     If you develop purulent drainage, redness, swelling, or increased incisional pain please contact your surgeon’s office.   •    Demarcus Romano (WALLY) drain site: If you had a WALLY drain removed, the insertion site will close in a few days. You should clean the area with soap and water in the shower and pat dry. While it is healing you can apply a 4x4 gauze pad to protect your clothes.  Change the gauze pad daily or if it becomes saturated.

## 2022-06-15 NOTE — PROGRESS NOTE ADULT - SUBJECTIVE AND OBJECTIVE BOX
Anesthesia Pain Management Service    SUBJECTIVE: Pt doing well with PCEA without problems reported.    Therapy:	  [ ] IV PCA	   [ X] Epidural           [ ] s/p Spinal Opoid              [ ] Postpartum infusion	  [ ] Patient controlled regional anesthesia (PCRA)    [ ] prn Analgesics    Allergies    No Known Allergies    Intolerances      MEDICATIONS  (STANDING):  acetaminophen   IVPB .. 1000 milliGRAM(s) IV Intermittent every 6 hours  chlorhexidine 2% Cloths 1 Application(s) Topical daily  dextrose 5% + sodium chloride 0.45% 1000 milliLiter(s) (84 mL/Hr) IV Continuous <Continuous>  heparin   Injectable 5000 Unit(s) SubCutaneous every 8 hours  hydromorphone (10 MICROgram(s)/mL) + bupivacaine 0.0625% in 0.9% Sodium Chloride PCEA 250 milliLiter(s) Epidural PCA Continuous  insulin glargine Injectable (LANTUS) 10 Unit(s) SubCutaneous at bedtime  insulin lispro (ADMELOG) corrective regimen sliding scale   SubCutaneous every 6 hours  pantoprazole  Injectable 40 milliGRAM(s) IV Push daily  piperacillin/tazobactam IVPB.. 3.375 Gram(s) IV Intermittent every 8 hours    MEDICATIONS  (PRN):  diphenhydrAMINE Injectable 25 milliGRAM(s) IV Push every 4 hours PRN Pruritus  hydromorphone (10 MICROgram(s)/mL) + bupivacaine 0.0625% in 0.9% Sodium Chloride PCEA Rescue Clinician  Bolus 5 milliLiter(s) Epidural every 15 minutes PRN for Pain Score greater than 6  naloxone Injectable 0.1 milliGRAM(s) IV Push every 3 minutes PRN For ANY of the following changes in patient status:  A. RR LESS THAN 10 breaths per minute, B. Oxygen saturation LESS THAN 90%, C. Sedation score of 6  ondansetron Injectable 4 milliGRAM(s) IV Push every 6 hours PRN Nausea      OBJECTIVE:   [X] No new signs     [ ] Other:    Side Effects:  [X ] None			[ ] Other:    Assessment of Catheter Site:		[ X] Intact		[ ] Other:    ASSESSMENT/PLAN  [ X] Continue current therapy    [ ] Therapy changed to:    [ ] IV PCA       [ ] Epidural     [ ] prn Analgesics     Comments: Continue current PCEA settings.

## 2022-06-15 NOTE — DIETITIAN INITIAL EVALUATION ADULT - NSFNSGIIOFT_GEN_A_CORE
06-14-22 @ 07:01  -  06-15-22 @ 07:00  --------------------------------------------------------  OUT:    Nasogastric/Oral tube (mL): 900 mL  Total OUT: 900 mL    Total NET: -900 mL      06-15-22 @ 07:01  -  06-15-22 @ 12:22  --------------------------------------------------------  OUT:    Nasogastric/Oral tube (mL): 250 mL  Total OUT: 250 mL    Total NET: -250 mL

## 2022-06-15 NOTE — DISCHARGE NOTE PROVIDER - NSDCFUSCHEDAPPT_GEN_ALL_CORE_FT
Deb Stallings  [facility]  Scheduled Appointment: 07/06/2022    [facility]  Scheduled Appointment: 09/13/2022

## 2022-06-15 NOTE — PROGRESS NOTE ADULT - SUBJECTIVE AND OBJECTIVE BOX
Anesthesia Pain Management Service: Day _3_ of Epidural    SUBJECTIVE: Patient doing well with PCEA and no problems.  Patient states PCEA helps with her pain.  Pain Scale Score: 4/10  Refer to charted pain scores    THERAPY:  [x ] Epidural Bupivacaine 0.0625% and Hydromorphone  		[ X] 10 micrograms/mL	[ ] 5 micrograms/mL  [ ] Epidural Bupivacaine 0.0625% and Fentanyl - 2 micrograms/mL  [ ] Epidural Ropivacaine 0.1% plain – 1 mg/mL  [ ] Patient Controlled Regional Anesthesia (PCRA) Ropivacaine  		[ ] 0.2%			[ ] 0.1%    Demand dose __3_ lockout __15_ (minutes) Continuous Rate _6__ Total: __176.5__ ml used (in past 24 hours)      MEDICATIONS  (STANDING):  acetaminophen   IVPB .. 1000 milliGRAM(s) IV Intermittent every 6 hours  chlorhexidine 2% Cloths 1 Application(s) Topical daily  dextrose 5% + sodium chloride 0.45% 1000 milliLiter(s) (84 mL/Hr) IV Continuous <Continuous>  heparin   Injectable 5000 Unit(s) SubCutaneous every 8 hours  hydromorphone (10 MICROgram(s)/mL) + bupivacaine 0.0625% in 0.9% Sodium Chloride PCEA 250 milliLiter(s) Epidural PCA Continuous  insulin glargine Injectable (LANTUS) 10 Unit(s) SubCutaneous at bedtime  insulin lispro (ADMELOG) corrective regimen sliding scale   SubCutaneous every 6 hours  magnesium sulfate  IVPB 2 Gram(s) IV Intermittent once  pantoprazole  Injectable 40 milliGRAM(s) IV Push daily  phytonadione  IVPB 5 milliGRAM(s) IV Intermittent once  piperacillin/tazobactam IVPB.. 3.375 Gram(s) IV Intermittent every 8 hours  potassium chloride  10 mEq/100 mL IVPB 10 milliEquivalent(s) IV Intermittent once  sodium phosphate IVPB 30 milliMole(s) IV Intermittent once    MEDICATIONS  (PRN):  diphenhydrAMINE Injectable 25 milliGRAM(s) IV Push every 4 hours PRN Pruritus  hydromorphone (10 MICROgram(s)/mL) + bupivacaine 0.0625% in 0.9% Sodium Chloride PCEA Rescue Clinician  Bolus 5 milliLiter(s) Epidural every 15 minutes PRN for Pain Score greater than 6  naloxone Injectable 0.1 milliGRAM(s) IV Push every 3 minutes PRN For ANY of the following changes in patient status:  A. RR LESS THAN 10 breaths per minute, B. Oxygen saturation LESS THAN 90%, C. Sedation score of 6  ondansetron Injectable 4 milliGRAM(s) IV Push every 6 hours PRN Nausea      OBJECTIVE:  Patient is sitting up in chair, NPO.    Assessment of Catheter Site:	[ ] Left	[ ] Right  [x ] Epidural 	[ ] Femoral	      [ ] Saphenous   [ ] Supraclavicular   [ ] Other:    [x ] Dressing intact	[x ] Site non-tender	[ x] Site without erythema, discharge, edema  [x ] Epidural tubing and connection checked	[x] Gross neurological exam within normal limits  [ ] Catheter removed – tip intact		[ ] Afebrile  	[ ] Febrile: ___   [ X] see Temp under VS below)    PT/INR - ( 15 Adams 2022 01:30 )   PT: 16.1 sec;   INR: 1.38 ratio         PTT - ( 14 Jun 2022 00:50 )  PTT:25.3 sec                      8.7    12.21 )-----------( 210      ( 15 Adams 2022 01:30 )             27.1     Vital Signs Last 24 Hrs  T(C): 37.2 (06-15-22 @ 04:00), Max: 37.7 (06-14-22 @ 20:00)  T(F): 98.9 (06-15-22 @ 04:00), Max: 99.9 (06-14-22 @ 20:00)  HR: 88 (06-15-22 @ 04:00) (70 - 114)  BP: 133/49 (06-15-22 @ 04:00) (133/49 - 133/49)  BP(mean): --  RR: 17 (06-15-22 @ 04:00) (12 - 17)  SpO2: 99% (06-15-22 @ 04:00) (95% - 99%)      Sedation Score:	[x ] Alert	[ ] Drowsy	[ ] Arousable	[ ] Asleep	[ ] Unresponsive    Side Effects:	[x ] None	[ ] Nausea	[ ] Vomiting	[ ] Pruritus  		[ ] Weakness		[ ] Numbness	[ ] Other:    ASSESSMENT/ PLAN:    Therapy to  be:	[x ] Continue   [ ] Discontinued   [ ] Change to prn Analgesics    Documentation and Verification of current medications:  [ X ] Done	[ ] Not done, not eligible, reason:    Comments: Doing OK with epidural and may continue.  Patient has elevated PT/INR, asked team for Vitamin K 5mg and will repeat COAGS tomorrow morning.   Recommend non-opioid adjuvant analgesics to be used when possible and when allowed by primary surgical team.    Progress Note written now but Patient was seen earlier. Anesthesia Pain Management Service: Day _3_ of Epidural    SUBJECTIVE: Patient doing well with PCEA and no problems.  Patient states PCEA helps with her pain.  Patient just complaining of some dizziness getting out of bed to chair this morning.   Pain Scale Score: 4/10  Refer to charted pain scores    THERAPY:  [x ] Epidural Bupivacaine 0.0625% and Hydromorphone  		[ X] 10 micrograms/mL	[ ] 5 micrograms/mL  [ ] Epidural Bupivacaine 0.0625% and Fentanyl - 2 micrograms/mL  [ ] Epidural Ropivacaine 0.1% plain – 1 mg/mL  [ ] Patient Controlled Regional Anesthesia (PCRA) Ropivacaine  		[ ] 0.2%			[ ] 0.1%    Demand dose __3_ lockout __15_ (minutes) Continuous Rate _6__ Total: __176.5__ ml used (in past 24 hours)      MEDICATIONS  (STANDING):  acetaminophen   IVPB .. 1000 milliGRAM(s) IV Intermittent every 6 hours  chlorhexidine 2% Cloths 1 Application(s) Topical daily  dextrose 5% + sodium chloride 0.45% 1000 milliLiter(s) (84 mL/Hr) IV Continuous <Continuous>  heparin   Injectable 5000 Unit(s) SubCutaneous every 8 hours  hydromorphone (10 MICROgram(s)/mL) + bupivacaine 0.0625% in 0.9% Sodium Chloride PCEA 250 milliLiter(s) Epidural PCA Continuous  insulin glargine Injectable (LANTUS) 10 Unit(s) SubCutaneous at bedtime  insulin lispro (ADMELOG) corrective regimen sliding scale   SubCutaneous every 6 hours  magnesium sulfate  IVPB 2 Gram(s) IV Intermittent once  pantoprazole  Injectable 40 milliGRAM(s) IV Push daily  phytonadione  IVPB 5 milliGRAM(s) IV Intermittent once  piperacillin/tazobactam IVPB.. 3.375 Gram(s) IV Intermittent every 8 hours  potassium chloride  10 mEq/100 mL IVPB 10 milliEquivalent(s) IV Intermittent once  sodium phosphate IVPB 30 milliMole(s) IV Intermittent once    MEDICATIONS  (PRN):  diphenhydrAMINE Injectable 25 milliGRAM(s) IV Push every 4 hours PRN Pruritus  hydromorphone (10 MICROgram(s)/mL) + bupivacaine 0.0625% in 0.9% Sodium Chloride PCEA Rescue Clinician  Bolus 5 milliLiter(s) Epidural every 15 minutes PRN for Pain Score greater than 6  naloxone Injectable 0.1 milliGRAM(s) IV Push every 3 minutes PRN For ANY of the following changes in patient status:  A. RR LESS THAN 10 breaths per minute, B. Oxygen saturation LESS THAN 90%, C. Sedation score of 6  ondansetron Injectable 4 milliGRAM(s) IV Push every 6 hours PRN Nausea      OBJECTIVE:  Patient is sitting up in chair, NPO.    Assessment of Catheter Site:	[ ] Left	[ ] Right  [x ] Epidural 	[ ] Femoral	      [ ] Saphenous   [ ] Supraclavicular   [ ] Other:    [x ] Dressing intact	[x ] Site non-tender	[ x] Site without erythema, discharge, edema  [x ] Epidural tubing and connection checked	[x] Gross neurological exam within normal limits  [ ] Catheter removed – tip intact		[ ] Afebrile  	[ ] Febrile: ___   [ X] see Temp under VS below)    PT/INR - ( 15 Adams 2022 01:30 )   PT: 16.1 sec;   INR: 1.38 ratio         PTT - ( 14 Jun 2022 00:50 )  PTT:25.3 sec                      8.7    12.21 )-----------( 210      ( 15 Adams 2022 01:30 )             27.1     Vital Signs Last 24 Hrs  T(C): 37.2 (06-15-22 @ 04:00), Max: 37.7 (06-14-22 @ 20:00)  T(F): 98.9 (06-15-22 @ 04:00), Max: 99.9 (06-14-22 @ 20:00)  HR: 88 (06-15-22 @ 04:00) (70 - 114)  BP: 133/49 (06-15-22 @ 04:00) (133/49 - 133/49)  BP(mean): --  RR: 17 (06-15-22 @ 04:00) (12 - 17)  SpO2: 99% (06-15-22 @ 04:00) (95% - 99%)      Sedation Score:	[x ] Alert	[ ] Drowsy	[ ] Arousable	[ ] Asleep	[ ] Unresponsive    Side Effects:	[x ] None	[ ] Nausea	[ ] Vomiting	[ ] Pruritus  		[ ] Weakness		[ ] Numbness	[ ] Other:    ASSESSMENT/ PLAN:    Therapy to  be:	[x ] Continue   [ ] Discontinued   [ ] Change to prn Analgesics    Documentation and Verification of current medications:  [ X ] Done	[ ] Not done, not eligible, reason:    Comments: Doing OK with epidural and may continue.  Patient has elevated PT/INR, asked team for Vitamin K 5mg and will repeat COAGS tomorrow morning.   Recommend non-opioid adjuvant analgesics to be used when possible and when allowed by primary surgical team.    Progress Note written now but Patient was seen earlier.

## 2022-06-15 NOTE — DISCHARGE NOTE PROVIDER - NSPANSURGDSCINFOGEN_ALL_CORE
General Instructions after Pancreas Surgery/Diet Recommendations after Pancreas Surgery/Pancreatic Insufficiency in Post Pancreatectomy Patients/Bowel Function/Activity Recommendations after Pancreas Surgery/Incision Care/Demarcus Romano Drain General Instructions after Pancreas Surgery/Diet Recommendations after Pancreas Surgery/Pancreatic Insufficiency in Post Pancreatectomy Patients/Bowel Function/Activity Recommendations after Pancreas Surgery/Incision Care

## 2022-06-15 NOTE — DIETITIAN INITIAL EVALUATION ADULT - OTHER INFO
Pt has a history of HTN, DM2, HLD, CAD s/p stent and a recent admission to OSH for biliary obstruction s/p ERCP and stent. Pt was found with pancreatic mass and is now s/ Whipple Procedure 6/13/22.   Spoke with Pt using French  #[id number]. Pt is POD 1 and was NPO. Pt following the Whipple Pathway.   Pt had no complaints of nausea or vomiting. Pt did complain of being thirsty.   Pt states her usual weight was 125 lb. She noticed she started loosing weight about 3 months ago. Pt states she was not able to eat as much as she usually  did. Her admission weight was 112 lbs. Pt had a 13 lb weight loss .  Pt states she knows and follows a consistent carb diet at home. She takes insulin and tests her glucose levels. Pt is well controlled as seen by Hb A1c level of 5.2.  Explained Post Whipple diet and written information was left for Pt.

## 2022-06-15 NOTE — DISCHARGE NOTE PROVIDER - CARE PROVIDER_API CALL
Mckay Edmonds)  Surgery  [address] [facility], [place] [address]  Phone: [phone]  Fax: [phone]  Follow Up Time: 1 week

## 2022-06-15 NOTE — DISCHARGE NOTE PROVIDER - NSPANSURGDSCINFOJACKSONPRATT_GEN_ALL_CORE
• It is important that you measure and record the output. Please bring the output record sheet to your follow-up appointment.  • Keep the drain secured to your clothing at all times to avoid accidental displacement. Monitor the insertion site for redness, pain, swelling, or purulent drainage.  • You may shower with the drain. Wash around the drain with soap and water, pat dry, and reapply dressing.  • If you noticed a sudden change in the color or quantity of the drain output, please contact your surgeon’s office.  • Drain removal will take place during an outpatient follow-up appointment.

## 2022-06-15 NOTE — DISCHARGE NOTE PROVIDER - CARE PROVIDERS DIRECT ADDRESSES
[email or web address]

## 2022-06-15 NOTE — DISCHARGE NOTE PROVIDER - NSDCFUADDINST_GEN_ALL_CORE_FT
WOUND CARE:  Please keep incisions clean and dry. Please do not Scrub or rub incisions. Do not use lotion or powder on incisions.   BATHING: You may shower and/or sponge bathe. You may use warm soapy water in the shower and rinse, pat dry.  ACTIVITY: No heavy lifting or straining. Otherwise, you may return to your usual level of physical activity. If you are taking narcotic pain medication DO NOT drive a car, operate machinery or make important decisions.  DIET: Consume consistent carbohydrate diabetic diet.  NOTIFY YOUR SURGEON IF YOU HAVE: any bleeding that does not stop, any pus draining from your wound(s), any fever (over 100.4 F) persistent nausea/vomiting, or if your pain is not controlled on your discharge pain medications, unable to urinate.  Please follow up with your primary care physician in one week regarding your hospitalization, bring copies of your discharge paperwork.  Please follow up with your surgeon, Dr. Edmonds.  DIABETES MEDICATION: You were previously on 60 units of long acting insulin at home. Please ONLY TAKE diabetes medication as instructed after discharge from hospital. Take Levemir 10 units QHS and Admelog 7 units with meals. You should STOP janumet AND STOP farxiga.  WOUND CARE:  Please keep incisions clean and dry. Please do not Scrub or rub incisions. Do not use lotion or powder on incisions.   BATHING: You may shower and/or sponge bathe. You may use warm soapy water in the shower and rinse, pat dry.  ACTIVITY: No heavy lifting or straining. Otherwise, you may return to your usual level of physical activity. If you are taking narcotic pain medication DO NOT drive a car, operate machinery or make important decisions.  DIET: Consume consistent carbohydrate diabetic diet.  NOTIFY YOUR SURGEON IF YOU HAVE: any bleeding that does not stop, any pus draining from your wound(s), any fever (over 100.4 F) persistent nausea/vomiting, or if your pain is not controlled on your discharge pain medications, unable to urinate.  Please follow up with your primary care physician in one week regarding your hospitalization, bring copies of your discharge paperwork.  Please follow up with your surgeon, Dr. Edmonds.

## 2022-06-15 NOTE — DISCHARGE NOTE PROVIDER - NSDCCPTREATMENT_GEN_ALL_CORE_FT
PRINCIPAL PROCEDURE  Procedure: Pancreaticoduodenectomy with pancreatic jejunostomy  Findings and Treatment:

## 2022-06-15 NOTE — PROGRESS NOTE ADULT - ASSESSMENT
77F PMHx of adenocarcinoma now, s/p 06/13 ipple. Recovering appropriately transferred from SICU to surgical floor.      - ipple pathway  - Diet: NPO/IVF  - NGT to LCWS  - Pain: PCA, ATC Tylenol  - d/c nam  - c/w Zosyn for 4 day total course  - DVT ppx      D team surgery  [id number]

## 2022-06-15 NOTE — DISCHARGE NOTE PROVIDER - HOSPITAL COURSE
This patient is a 78y/o female who presented to [facility] on 6/13/22 for scheduled whipple.  Pt hospitalized few months ago at [facility] for jaundice.  ERCP and bilary stent x2 inserted.  Imaging showed pancreatic mass.  Patient taken to OR by Dr. Edmonds and underwent classic whipple procedure.  Operative course significant only for purulent material around biliary stent. Stent removed. Patient tolerated operation well and there were no post-operative complications identified. Patient was extubated successfully and transferred to the SICU post-operatively. Patient remained on IV antibiotics (Zosyn) to complete 4 day course.  6/14: Patient with high NGT output. Remained NPO with NGT and IV fluids.      INCOMPLETE******Diet was restarted and advanced as tolerated. Pain control was transitioned from IV to PO pain meds. At this time, patient is currently ambulating, voiding, tolerating a regular diet. Pain well controlled on PO pain meds. Patient has been deemed stable for discharge home with follow up as an outpatient. Extubated and brought to PACU off vasopressors This patient is a 78y/o female who presented to [facility] on 6/13/22 for scheduled whipple.  Pt hospitalized few months ago at [facility] for jaundice.  ERCP and bilary stent x2 inserted.  Imaging showed pancreatic mass.  Patient taken to OR by Dr. Edmonds and underwent classic whipple procedure.  Operative course significant only for purulent material around biliary stent. Stent removed. Patient tolerated operation well and there were no post-operative complications identified. Patient was extubated successfully and transferred to the SICU post-operatively. Patient remained on IV antibiotics (Zosyn) to complete 4 day course.  6/14: Patient with high NGT output. Remained NPO with NGT and IV fluids.      When clinically appropriate Diet was restarted and advanced as tolerated. Pain control was transitioned from IV to PO pain meds. At this time, patient is currently ambulating, voiding, tolerating a regular diet. Pain well controlled on PO pain meds. Patient has been deemed stable for discharge home with follow up as an outpatient. This patient is a 76y/o female who presented to [facility] on 6/13/22 for scheduled whipple.  Pt hospitalized few months ago at [facility] for jaundice.  ERCP and bilary stent x2 inserted.  Imaging showed pancreatic mass.  Patient taken to OR by Dr. Edmonds and underwent classic whipple procedure.  Operative course significant only for purulent material around biliary stent. Stent removed. Patient tolerated operation well and there were no post-operative complications identified. Patient was extubated successfully and transferred to the SICU post-operatively. Patient remained on IV antibiotics (Zosyn) to complete 4 day course.  6/14: Patient with high NGT output. Remained NPO with NGT and IV fluids.  6/15: Patient was transferred to surgical floor for further recovery  6/16: NGT removed  6/18: Pain consult for uncontrolled pain s/p PCEA discontinuation  6/21: Endocrine consult recommended   Goal glucose 100-180  Recommend Lantus 10 Units HS and Admelog 5 units TIDAC plus low scale before meals and bedtime  Patient should be discharged on basal/bolus regimen with discontinuation of janumet  She would benefit from CGM monitor such as Free Style Cydney  Follow up with PMD within 1 week  Will email Endocrine Office [address] [place] [address] [phone] to set up appt.      When clinically appropriate Diet was restarted and advanced as tolerated. Pain control was transitioned from IV to PO pain meds. At this time, patient is currently ambulating, voiding, tolerating a regular diet. Pain well controlled on PO pain meds. Patient has been deemed stable for discharge home with follow up as an outpatient. This patient is a 76y/o female who presented to [facility] on 6/13/22 for scheduled whipple.  Pt hospitalized few months ago at [facility] for jaundice.  ERCP and bilary stent x2 inserted.  Imaging showed pancreatic mass.  Patient taken to OR by Dr. Edmonds and underwent classic whipple procedure.  Operative course significant only for purulent material around biliary stent. Stent removed. Patient tolerated operation well and there were no post-operative complications identified. Patient was extubated successfully and transferred to the SICU post-operatively. Patient remained on IV antibiotics (Zosyn) to complete 4 day course.  6/14: Patient with high NGT output. Remained NPO with NGT and IV fluids.  6/15: Patient was transferred to surgical floor for further recovery  6/16: NGT removed  6/18: Pain consult for uncontrolled pain s/p PCEA discontinuation  6/21: Endocrine consult recommended   Goal glucose 100-180  Recommend Lantus 10 Units HS and Admelog 5 units TIDAC plus low scale before meals and bedtime  Patient should be discharged on basal/bolus regimen with discontinuation of janumet  She would benefit from CGM monitor such as Free Style Cydney  Follow up with PMD within 1 week  Will email Endocrine Office [address] [place] [address] [phone] to set up appt.      When clinically appropriate, Diet was restarted and advanced as tolerated. Pain control was transitioned from IV to PO pain meds. At this time, patient is currently ambulating, voiding, tolerating a regular diet. Pain well controlled on PO pain meds. Patient has been deemed stable for discharge home with follow up as an outpatient.

## 2022-06-15 NOTE — DISCHARGE NOTE PROVIDER - NSDCCPCAREPLAN_GEN_ALL_CORE_FT
PRINCIPAL DISCHARGE DIAGNOSIS  Diagnosis: Malignant neoplasm of pancreas, unspecified  Assessment and Plan of Treatment:       SECONDARY DISCHARGE DIAGNOSES  Diagnosis: Type 2 diabetes mellitus  Assessment and Plan of Treatment:      PRINCIPAL DISCHARGE DIAGNOSIS  Diagnosis: Malignant neoplasm of pancreas, unspecified  Assessment and Plan of Treatment:       SECONDARY DISCHARGE DIAGNOSES  Diagnosis: Type 2 diabetes mellitus  Assessment and Plan of Treatment: Discharge regimen per endocrinology:  Levemir 10 units QHS  Admelog 7 units with meals  STOP janumet  STOP farxiga

## 2022-06-15 NOTE — DISCHARGE NOTE PROVIDER - NSDCMRMEDTOKEN_GEN_ALL_CORE_FT
amLODIPine 10 mg oral tablet: 1 tab(s) orally once a day  aspirin 81 mg oral tablet: 1 tab(s) orally once a day - continue  Farxiga 10 mg oral tablet: 1 tab(s) orally once a day  fenofibrate 145 mg oral tablet: 1 tab(s) orally once a day  Janumet 50 mg-1000 mg oral tablet: 1 tab(s) orally 2 times a day  Levemir 100 units/mL subcutaneous solution: 60 unit(s) subcutaneous once a day (at bedtime)  losartan 100 mg oral tablet: 1 tab(s) orally once a day  magnesium 400 m tab(s)  metoprolol succinate 25 mg oral tablet, extended release: 1 tab(s) orally once a day  oysco 500 + 500 + 200 mg 1 tab 2 times a day: 1 tab(s) orally once a day  rosuvastatin 10 mg oral tablet: 1 tab(s) orally once a day   acetaminophen 325 mg oral tablet: 2 tab(s) orally every 6 hours  amLODIPine 10 mg oral tablet: 1 tab(s) orally once a day  aspirin 81 mg oral tablet: 1 tab(s) orally once a day - continue  Farxiga 10 mg oral tablet: 1 tab(s) orally once a day  fenofibrate 145 mg oral tablet: 1 tab(s) orally once a day  ibuprofen 200 mg oral tablet: 1 tab(s) orally every 6 hours  Janumet 50 mg-1000 mg oral tablet: 1 tab(s) orally 2 times a day  Levemir 100 units/mL subcutaneous solution: 60 unit(s) subcutaneous once a day (at bedtime)  losartan 100 mg oral tablet: 1 tab(s) orally once a day  magnesium 400 m tab(s)  metoprolol succinate 25 mg oral tablet, extended release: 1 tab(s) orally once a day  oysco 500 + 500 + 200 mg 1 tab 2 times a day: 1 tab(s) orally once a day  rosuvastatin 10 mg oral tablet: 1 tab(s) orally once a day  senna oral tablet: 2 tab(s) orally once a day (at bedtime), As needed, Constipation   acetaminophen 325 mg oral tablet: 2 tab(s) orally every 6 hours  amLODIPine 10 mg oral tablet: 1 tab(s) orally once a day  aspirin 81 mg oral tablet: 1 tab(s) orally once a day - continue  fenofibrate 145 mg oral tablet: 1 tab(s) orally once a day  gabapentin 100 mg oral capsule: 1 cap(s) orally 3 times a day  HumaLOG KwikPen 100 units/mL injectable solution: 7 unit(s) injectable 3 times a day (before meals)   ibuprofen 200 mg oral tablet: 1 tab(s) orally every 6 hours  insulin glargine 100 units/mL subcutaneous solution: 10 unit(s) subcutaneous once a day (at bedtime)  losartan 100 mg oral tablet: 1 tab(s) orally once a day  magnesium 400 m tab(s)  metoprolol succinate 25 mg oral tablet, extended release: 1 tab(s) orally once a day  oxyCODONE 5 mg oral tablet: 1 tab(s) orally every 6 hours, As Needed -Severe Pain (7 - 10) MDD:4  oysco 500 + 500 + 200 mg 1 tab 2 times a day: 1 tab(s) orally once a day  pantoprazole 40 mg oral delayed release tablet: 1 tab(s) orally once a day (before a meal)  rosuvastatin 10 mg oral tablet: 1 tab(s) orally once a day  senna oral tablet: 2 tab(s) orally once a day (at bedtime), As needed, Constipation

## 2022-06-15 NOTE — DISCHARGE NOTE PROVIDER - DETAILS OF MALNUTRITION DIAGNOSIS/DIAGNOSES
This patient has been assessed with a concern for Malnutrition and was treated during this hospitalization for the following Nutrition diagnosis/diagnoses:     -  06/15/2022: Severe protein-calorie malnutrition

## 2022-06-15 NOTE — PROGRESS NOTE ADULT - SUBJECTIVE AND OBJECTIVE BOX
D Team Surgery Daily Progress Note    Vital Signs Last 24 Hrs  T(C): 37.2 (15 Adams 2022 04:00), Max: 37.7 (14 Jun 2022 20:00)  T(F): 98.9 (15 Adams 2022 04:00), Max: 99.9 (14 Jun 2022 20:00)  HR: 88 (15 Adams 2022 04:00) (70 - 114)  BP: 133/49 (15 Adams 2022 04:00) (133/49 - 133/49)  RR: 17 (15 Adams 2022 04:00) (11 - 17)  SpO2: 99% (15 Adams 2022 04:00) (95% - 99%)      SUBJECTIVE: Pt seen and examined by team on morning rounds. Patient lying comfortably in bed. Denies N/V/D, fever, chills, SOB, chest pain, palitations. Denies BM and flatus.      General Appearance: Appears well, NAD  Neck: Supple  Chest: Equal expansion bilaterally, equal breath sounds  CV: Pulse regular presently  Abdomen: Soft, appropriate incisional tenderness, prevena midline holding good suction, WALLY x 2 SS, NGT to LCWS with bilious OP  Extremities: Grossly symmetric, SCD's in place     I&O's Summary    14 Jun 2022 07:01  -  15 Adams 2022 07:00  --------------------------------------------------------  IN: 1796 mL / OUT: 3580 mL / NET: -1784 mL      I&O's Detail    14 Jun 2022 07:01  -  15 Adams 2022 07:00  --------------------------------------------------------  IN:    dextrose 5% + sodium chloride 0.45% w/ Additives: 1596 mL    IV PiggyBack: 200 mL  Total IN: 1796 mL    OUT:    Bulb (mL): 120 mL    Bulb (mL): 100 mL    Indwelling Catheter - Urethral (mL): 2460 mL    Nasogastric/Oral tube (mL): 900 mL  Total OUT: 3580 mL    Total NET: -1784 mL          MEDICATIONS  (STANDING):  acetaminophen   IVPB .. 1000 milliGRAM(s) IV Intermittent every 6 hours  chlorhexidine 2% Cloths 1 Application(s) Topical daily  dextrose 5% + sodium chloride 0.45% 1000 milliLiter(s) (84 mL/Hr) IV Continuous <Continuous>  heparin   Injectable 5000 Unit(s) SubCutaneous every 8 hours  hydromorphone (10 MICROgram(s)/mL) + bupivacaine 0.0625% in 0.9% Sodium Chloride PCEA 250 milliLiter(s) Epidural PCA Continuous  insulin glargine Injectable (LANTUS) 10 Unit(s) SubCutaneous at bedtime  insulin lispro (ADMELOG) corrective regimen sliding scale   SubCutaneous every 6 hours  magnesium sulfate  IVPB 2 Gram(s) IV Intermittent once  pantoprazole  Injectable 40 milliGRAM(s) IV Push daily  piperacillin/tazobactam IVPB.. 3.375 Gram(s) IV Intermittent every 8 hours  potassium chloride  10 mEq/100 mL IVPB 10 milliEquivalent(s) IV Intermittent once  sodium phosphate IVPB 30 milliMole(s) IV Intermittent once    MEDICATIONS  (PRN):  diphenhydrAMINE Injectable 25 milliGRAM(s) IV Push every 4 hours PRN Pruritus  hydromorphone (10 MICROgram(s)/mL) + bupivacaine 0.0625% in 0.9% Sodium Chloride PCEA Rescue Clinician  Bolus 5 milliLiter(s) Epidural every 15 minutes PRN for Pain Score greater than 6  naloxone Injectable 0.1 milliGRAM(s) IV Push every 3 minutes PRN For ANY of the following changes in patient status:  A. RR LESS THAN 10 breaths per minute, B. Oxygen saturation LESS THAN 90%, C. Sedation score of 6  ondansetron Injectable 4 milliGRAM(s) IV Push every 6 hours PRN Nausea      LABS:                        8.7    12.21 )-----------( 210      ( 15 Adams 2022 01:30 )             27.1     06-15    131<L>  |  102  |  12  ----------------------------<  172<H>  3.5   |  23  |  0.63    Ca    8.3<L>      15 Adams 2022 01:30  Phos  1.2     06-15  Mg     1.70     06-15    TPro  5.5<L>  /  Alb  3.0<L>  /  TBili  1.3<H>  /  DBili  x   /  AST  46<H>  /  ALT  26  /  AlkPhos  70  06-14    PT/INR - ( 15 Adams 2022 01:30 )   PT: 16.1 sec;   INR: 1.38 ratio         PTT - ( 14 Jun 2022 00:50 )  PTT:25.3 sec      RADIOLOGY & ADDITIONAL STUDIES:

## 2022-06-15 NOTE — CHART NOTE - NSCHARTNOTEFT_GEN_A_CORE
Patient agitated and disoriented overnight. Patient combative and refusing AM lab work despite attempted reorientation with  phone.   Will reattempt AM labs later in the morning.    YEN Tong, PGY-2  [facility]  Surgical Intensive Care Unit  [id number] Patient transferred to  [id number]. Patient signed out to on call D Team provider.    YEN Tong, PGY-2  [facility]  Surgical Intensive Care Unit  [id number].

## 2022-06-16 LAB
ALBUMIN SERPL ELPH-MCNC: 3 G/DL — LOW (ref 3.3–5)
ALP SERPL-CCNC: 101 U/L — SIGNIFICANT CHANGE UP (ref 40–120)
ALT FLD-CCNC: 17 U/L — SIGNIFICANT CHANGE UP (ref 4–33)
AMYLASE FLD-CCNC: 104 U/L — SIGNIFICANT CHANGE UP
AMYLASE FLD-CCNC: 127 U/L — SIGNIFICANT CHANGE UP
ANION GAP SERPL CALC-SCNC: 12 MMOL/L — SIGNIFICANT CHANGE UP (ref 7–14)
APTT BLD: 30 SEC — SIGNIFICANT CHANGE UP (ref 27–36.3)
AST SERPL-CCNC: 16 U/L — SIGNIFICANT CHANGE UP (ref 4–32)
BILIRUB SERPL-MCNC: 1.2 MG/DL — SIGNIFICANT CHANGE UP (ref 0.2–1.2)
BUN SERPL-MCNC: 7 MG/DL — SIGNIFICANT CHANGE UP (ref 7–23)
CALCIUM SERPL-MCNC: 8.4 MG/DL — SIGNIFICANT CHANGE UP (ref 8.4–10.5)
CHLORIDE SERPL-SCNC: 102 MMOL/L — SIGNIFICANT CHANGE UP (ref 98–107)
CO2 SERPL-SCNC: 20 MMOL/L — LOW (ref 22–31)
CREAT SERPL-MCNC: 0.52 MG/DL — SIGNIFICANT CHANGE UP (ref 0.5–1.3)
EGFR: 96 ML/MIN/1.73M2 — SIGNIFICANT CHANGE UP
GLUCOSE BLDC GLUCOMTR-MCNC: 159 MG/DL — HIGH (ref 70–99)
GLUCOSE BLDC GLUCOMTR-MCNC: 163 MG/DL — HIGH (ref 70–99)
GLUCOSE BLDC GLUCOMTR-MCNC: 169 MG/DL — HIGH (ref 70–99)
GLUCOSE BLDC GLUCOMTR-MCNC: 177 MG/DL — HIGH (ref 70–99)
GLUCOSE BLDC GLUCOMTR-MCNC: 246 MG/DL — HIGH (ref 70–99)
GLUCOSE SERPL-MCNC: 166 MG/DL — HIGH (ref 70–99)
HCT VFR BLD CALC: 28.1 % — LOW (ref 34.5–45)
HGB BLD-MCNC: 9.2 G/DL — LOW (ref 11.5–15.5)
INR BLD: 1.07 RATIO — SIGNIFICANT CHANGE UP (ref 0.88–1.16)
MAGNESIUM SERPL-MCNC: 1.8 MG/DL — SIGNIFICANT CHANGE UP (ref 1.6–2.6)
MCHC RBC-ENTMCNC: 32.5 PG — SIGNIFICANT CHANGE UP (ref 27–34)
MCHC RBC-ENTMCNC: 32.7 GM/DL — SIGNIFICANT CHANGE UP (ref 32–36)
MCV RBC AUTO: 99.3 FL — SIGNIFICANT CHANGE UP (ref 80–100)
NRBC # BLD: 0 /100 WBCS — SIGNIFICANT CHANGE UP
NRBC # FLD: 0 K/UL — SIGNIFICANT CHANGE UP
PHOSPHATE SERPL-MCNC: 2.5 MG/DL — SIGNIFICANT CHANGE UP (ref 2.5–4.5)
PLATELET # BLD AUTO: 218 K/UL — SIGNIFICANT CHANGE UP (ref 150–400)
POTASSIUM SERPL-MCNC: 4.1 MMOL/L — SIGNIFICANT CHANGE UP (ref 3.5–5.3)
POTASSIUM SERPL-SCNC: 4.1 MMOL/L — SIGNIFICANT CHANGE UP (ref 3.5–5.3)
PROT SERPL-MCNC: 6 G/DL — SIGNIFICANT CHANGE UP (ref 6–8.3)
PROTHROM AB SERPL-ACNC: 12.4 SEC — SIGNIFICANT CHANGE UP (ref 10.5–13.4)
RBC # BLD: 2.83 M/UL — LOW (ref 3.8–5.2)
RBC # FLD: 16.1 % — HIGH (ref 10.3–14.5)
SODIUM SERPL-SCNC: 134 MMOL/L — LOW (ref 135–145)
WBC # BLD: 8.88 K/UL — SIGNIFICANT CHANGE UP (ref 3.8–10.5)
WBC # FLD AUTO: 8.88 K/UL — SIGNIFICANT CHANGE UP (ref 3.8–10.5)

## 2022-06-16 RX ORDER — INSULIN LISPRO 100/ML
VIAL (ML) SUBCUTANEOUS EVERY 6 HOURS
Refills: 0 | Status: DISCONTINUED | OUTPATIENT
Start: 2022-06-16 | End: 2022-06-17

## 2022-06-16 RX ORDER — HYDROMORPHONE HYDROCHLORIDE 2 MG/ML
250 INJECTION INTRAMUSCULAR; INTRAVENOUS; SUBCUTANEOUS
Refills: 0 | Status: DISCONTINUED | OUTPATIENT
Start: 2022-06-16 | End: 2022-06-16

## 2022-06-16 RX ORDER — HYDROMORPHONE HYDROCHLORIDE 2 MG/ML
0.3 INJECTION INTRAMUSCULAR; INTRAVENOUS; SUBCUTANEOUS
Refills: 0 | Status: DISCONTINUED | OUTPATIENT
Start: 2022-06-16 | End: 2022-06-17

## 2022-06-16 RX ORDER — MAGNESIUM SULFATE 500 MG/ML
2 VIAL (ML) INJECTION ONCE
Refills: 0 | Status: COMPLETED | OUTPATIENT
Start: 2022-06-16 | End: 2022-06-16

## 2022-06-16 RX ORDER — HYDROMORPHONE HYDROCHLORIDE 2 MG/ML
0.5 INJECTION INTRAMUSCULAR; INTRAVENOUS; SUBCUTANEOUS
Refills: 0 | Status: DISCONTINUED | OUTPATIENT
Start: 2022-06-16 | End: 2022-06-16

## 2022-06-16 RX ORDER — HYDROMORPHONE HYDROCHLORIDE 2 MG/ML
250 INJECTION INTRAMUSCULAR; INTRAVENOUS; SUBCUTANEOUS
Refills: 0 | Status: DISCONTINUED | OUTPATIENT
Start: 2022-06-16 | End: 2022-06-17

## 2022-06-16 RX ORDER — INSULIN LISPRO 100/ML
VIAL (ML) SUBCUTANEOUS
Refills: 0 | Status: DISCONTINUED | OUTPATIENT
Start: 2022-06-16 | End: 2022-06-16

## 2022-06-16 RX ADMIN — HYDROMORPHONE HYDROCHLORIDE 250 MILLILITER(S): 2 INJECTION INTRAMUSCULAR; INTRAVENOUS; SUBCUTANEOUS at 07:42

## 2022-06-16 RX ADMIN — PANTOPRAZOLE SODIUM 40 MILLIGRAM(S): 20 TABLET, DELAYED RELEASE ORAL at 13:08

## 2022-06-16 RX ADMIN — Medication 2: at 13:08

## 2022-06-16 RX ADMIN — PIPERACILLIN AND TAZOBACTAM 25 GRAM(S): 4; .5 INJECTION, POWDER, LYOPHILIZED, FOR SOLUTION INTRAVENOUS at 05:13

## 2022-06-16 RX ADMIN — INSULIN GLARGINE 20 UNIT(S): 100 INJECTION, SOLUTION SUBCUTANEOUS at 22:06

## 2022-06-16 RX ADMIN — Medication 2: at 18:26

## 2022-06-16 RX ADMIN — CHLORHEXIDINE GLUCONATE 1 APPLICATION(S): 213 SOLUTION TOPICAL at 13:09

## 2022-06-16 RX ADMIN — Medication 25 GRAM(S): at 09:21

## 2022-06-16 RX ADMIN — HYDROMORPHONE HYDROCHLORIDE 250 MILLILITER(S): 2 INJECTION INTRAMUSCULAR; INTRAVENOUS; SUBCUTANEOUS at 11:35

## 2022-06-16 RX ADMIN — HEPARIN SODIUM 5000 UNIT(S): 5000 INJECTION INTRAVENOUS; SUBCUTANEOUS at 05:13

## 2022-06-16 RX ADMIN — HYDROMORPHONE HYDROCHLORIDE 250 MILLILITER(S): 2 INJECTION INTRAMUSCULAR; INTRAVENOUS; SUBCUTANEOUS at 19:12

## 2022-06-16 RX ADMIN — Medication 63.75 MILLIMOLE(S): at 09:21

## 2022-06-16 RX ADMIN — HEPARIN SODIUM 5000 UNIT(S): 5000 INJECTION INTRAVENOUS; SUBCUTANEOUS at 22:07

## 2022-06-16 RX ADMIN — PIPERACILLIN AND TAZOBACTAM 25 GRAM(S): 4; .5 INJECTION, POWDER, LYOPHILIZED, FOR SOLUTION INTRAVENOUS at 13:10

## 2022-06-16 RX ADMIN — Medication 2: at 01:15

## 2022-06-16 RX ADMIN — HYDROMORPHONE HYDROCHLORIDE 250 MILLILITER(S): 2 INJECTION INTRAMUSCULAR; INTRAVENOUS; SUBCUTANEOUS at 15:17

## 2022-06-16 RX ADMIN — HYDROMORPHONE HYDROCHLORIDE 250 MILLILITER(S): 2 INJECTION INTRAMUSCULAR; INTRAVENOUS; SUBCUTANEOUS at 08:13

## 2022-06-16 RX ADMIN — HEPARIN SODIUM 5000 UNIT(S): 5000 INJECTION INTRAVENOUS; SUBCUTANEOUS at 13:09

## 2022-06-16 RX ADMIN — SODIUM CHLORIDE 84 MILLILITER(S): 9 INJECTION, SOLUTION INTRAVENOUS at 15:49

## 2022-06-16 RX ADMIN — INSULIN GLARGINE 20 UNIT(S): 100 INJECTION, SOLUTION SUBCUTANEOUS at 01:16

## 2022-06-16 RX ADMIN — Medication 4: at 22:12

## 2022-06-16 RX ADMIN — PIPERACILLIN AND TAZOBACTAM 25 GRAM(S): 4; .5 INJECTION, POWDER, LYOPHILIZED, FOR SOLUTION INTRAVENOUS at 22:05

## 2022-06-16 RX ADMIN — Medication 2: at 06:53

## 2022-06-16 NOTE — PROGRESS NOTE ADULT - SUBJECTIVE AND OBJECTIVE BOX
SURGERY DAILY PROGRESS NOTE:     SUBJECTIVE/ROS: No acute events overnight. Patient seen and examined bedside on AM rounds.  Repleted phos.      OBJECTIVE:  Vital Signs Last 24 Hrs  T(C): 36.7 (15 Adams 2022 20:35), Max: 37.2 (15 Adams 2022 04:00)  T(F): 98 (15 Adams 2022 20:35), Max: 98.9 (15 Adams 2022 04:00)  HR: 85 (15 Adams 2022 20:35) (77 - 98)  BP: 131/59 (15 Adams 2022 20:35) (120/48 - 133/49)  BP(mean): --  RR: 18 (15 Adams 2022 20:35) (13 - 19)  SpO2: 100% (15 Adams 2022 20:35) (97% - 100%)    PHYSICAL EXAM:  General: NAD, resting comfortably in bed  Pulmonary: Nonlabored breathing, no respiratory distress  Abdominal: 2 drains ss, provena, NGT in place w/ bilious output, nam, PCA, soft, nontender, nondistended   Extremities: WWP                          8.7    12.21 )-----------( 210      ( 15 Adams 2022 01:30 )             27.1     06-15    133<L>  |  99  |  5<L>  ----------------------------<  181<H>  3.7   |  22  |  0.56    Ca    8.7      15 Adams 2022 18:23  Phos  2.2     06-15  Mg     2.30     06-15    TPro  6.5  /  Alb  3.4  /  TBili  1.4<H>  /  DBili  x   /  AST  22  /  ALT  22  /  AlkPhos  104  06-15   PT/INR - ( 15 Adams 2022 01:30 )   PT: 16.1 sec;   INR: 1.38 ratio           I&O's Detail    14 Jun 2022 07:01  -  15 Adams 2022 07:00  --------------------------------------------------------  IN:    dextrose 5% + sodium chloride 0.45% w/ Additives: 1596 mL    IV PiggyBack: 200 mL  Total IN: 1796 mL    OUT:    Bulb (mL): 120 mL    Bulb (mL): 100 mL    Indwelling Catheter - Urethral (mL): 2460 mL    Nasogastric/Oral tube (mL): 900 mL  Total OUT: 3580 mL    Total NET: -1784 mL      15 Adams 2022 07:01  -  16 Jun 2022 01:22  --------------------------------------------------------  IN:    dextrose 5% + sodium chloride 0.45% w/ Additives: 1008 mL    Oral Fluid: 150 mL  Total IN: 1158 mL    OUT:    Bulb (mL): 35 mL    Bulb (mL): 15 mL    Indwelling Catheter - Urethral (mL): 350 mL    Nasogastric/Oral tube (mL): 450 mL    Voided (mL): 950 mL  Total OUT: 1800 mL    Total NET: -642 mL          IMAGING:

## 2022-06-16 NOTE — CHART NOTE - NSCHARTNOTEFT_GEN_A_CORE
Called by RN, patient is feeling better that NGT was removed.  However, patient's daughter states that mom is more sleepy today.  Epidural continuous decreased to 6ml/hr.  Will follow up patient in the morning, on rounds.  Discussed patient with team, if ok with Dr. Edmonds, plan is to remove epidural tomorrow, Day 5, POD #4.  Team will follow up and let pain service know whether epidural can be removed tomorrow morning.  May call pain service if needed.

## 2022-06-16 NOTE — PHYSICAL THERAPY INITIAL EVALUATION ADULT - DISCHARGE DISPOSITION, PT EVAL
Hospital Medicine Daily Progress Note    Date of Service  12/29/2019    Chief Complaint  Abdominal pain, nausea, vomiting    Hospital Course    Ms. Wadsworth is an 82 year old female with PMH significant for total cystectomy with ileal conduit 2011, PAF not on chronic anticoagulation due to history of bleeding, rate controlled with Metoprolol, TIA, HTN, and hypothyroidism who presented 12/20/19 with abdominal pain.  She has been admitted multiple times this year for same requiring NG tube decompression and conservative therapy.  Day of presentation she presented with nausea and vomiting x3 days.  CT consistent with small bowel obstruction and thickened gastric wall.  General surgery was consulted and recommended PPI for gastritis, surgical intervention for SBO and follow-up EGD. She was also found to be in ARF which resolved with IV fluid hydration. Post-op course was complicated by leukocytosis. Tmax 100.4. Blood cultures (-), chest xray showed atelectasis without evidence of pneumonia. LUCIA drain cultures (-). UA indicated possible infection and she was treated with IV ABX. Urine culture (-).      Interval Problem Update  12/29 - wound culture (+) Bacteroides fragilis. Sensitivities pending. ID consulted. Recommend repeat CT - I have ordered this.  -tachycardia resolved. Mag normalized    12/28 - isolated episode of tachycardia (120's) overnight. /67 at that time. Patient asymptomatic. Scheduled Metoprolol administered early. HR's 80's this am. 12-lead EKG shows AFIB rates 130's (history of PAF). Afebrile. Leukocytosis improving.   -mag low at 1.6. Ordered IV replacement yesterday, however patient refused. Ordered IV Mag again this morning. Patient agreeable.   12/27 - MARSHALL resolved. DC IVF  -ongoing leukocytosis. Afebrile overnight. Pan cultures all negative to date.  -passing flatus, no BM. Advancing diet to Full Liquid.    12/26 - MARSHALL improving. UA indicating infection. Temp 100.4 today, ongoing leukocytosis.  "IV ABX.   -denies flatus. Hypoactive bowel sounds BLQ. Ambulating in the ozuna with walker. Reports good pain control.  12/25 - worsening MARSHALL. Episode of hypotension overnight requiring NS bolus. I've increased her IVF today.  -leukocytosis - afebrile. checking blood cultures, UA, chest xray  -pain management  12/24 - Seen and examined after surgery.  She is drowsy but easily arousable and oriented x3.  She reports \"at least\" 6/10 abdominal pain.  -Ileostomy with pink stoma draining clear yellow urine.  Hemodynamically stable    Consultants/Specialty  -General Surgery  -Infectious Disease    Code Status  FULL    Disposition  TBD - surgery has cleared her for DC.    Review of Systems  Review of Systems   Constitutional: Negative for chills and fever.   HENT: Positive for hearing loss.    Eyes: Negative.    Respiratory: Negative for cough, sputum production and wheezing.    Cardiovascular: Negative for chest pain and palpitations.   Gastrointestinal: Positive for abdominal pain (\"I have none right now\"). Negative for constipation, diarrhea, nausea and vomiting.   Genitourinary: Negative for flank pain and hematuria.   Musculoskeletal: Negative for falls and joint pain.   Neurological: Negative for dizziness and headaches.   Psychiatric/Behavioral: The patient is not nervous/anxious and does not have insomnia.    All other systems reviewed and are negative.       Physical Exam  Temp:  [36.1 °C (96.9 °F)-36.9 °C (98.5 °F)] 36.3 °C (97.3 °F)  Pulse:  [66-94] 66  Resp:  [16-17] 16  BP: ()/(47-70) 99/62  SpO2:  [94 %-97 %] 96 %    Physical Exam  Vitals signs and nursing note reviewed.   Constitutional:       Appearance: Normal appearance. She is not ill-appearing or toxic-appearing.   HENT:      Head: Normocephalic.      Right Ear: Hearing normal.      Left Ear: Hearing normal.      Nose: Nose normal.      Mouth/Throat:      Mouth: Mucous membranes are dry.   Eyes:      General: Lids are normal.   Cardiovascular:      " Rate and Rhythm: Normal rate and regular rhythm.      Pulses: Normal pulses.      Heart sounds: Normal heart sounds.   Pulmonary:      Effort: Pulmonary effort is normal.      Breath sounds: Normal breath sounds. No wheezing or rhonchi.   Abdominal:      General: Bowel sounds are normal.      Comments: Ostomy with pink stoma  2 surgical stab wounds with Dermabond  1 surgical site with LUCIA drain with dressing CDI - LUCIA drainage thin, watery fluid    LUCIA drain with watery pink output    (+) flatus       Genitourinary:     Comments: Urostomy with clear yellow urine  Musculoskeletal:      Right lower leg: No edema.      Left lower leg: No edema.      Comments: Ambulating to BR and up in halls with walker   Skin:     General: Skin is warm.   Neurological:      General: No focal deficit present.      Mental Status: She is alert and oriented to person, place, and time.      Coordination: Coordination is intact.      Gait: Gait is intact.   Psychiatric:         Attention and Perception: Attention normal.         Mood and Affect: Mood normal.         Speech: Speech normal.         Behavior: Behavior is cooperative.         Thought Content: Thought content normal.         Cognition and Memory: Cognition normal.         Judgment: Judgment normal.         Fluids    Intake/Output Summary (Last 24 hours) at 12/29/2019 1153  Last data filed at 12/29/2019 0900  Gross per 24 hour   Intake 1114.8 ml   Output 1205 ml   Net -90.2 ml       Laboratory  Recent Labs     12/27/19  0430 12/28/19 0227 12/29/19  0456   WBC 19.1* 15.3* 15.3*   RBC 4.37 4.07* 3.84*   HEMOGLOBIN 12.4 11.4* 10.8*   HEMATOCRIT 39.2 36.2* 33.8*   MCV 89.7 88.9 88.0   MCH 28.4 28.0 28.1   MCHC 31.6* 31.5* 32.0*   RDW 52.6* 52.4* 52.2*   PLATELETCT 189 213 227   MPV 12.1 11.7 11.0     Recent Labs     12/27/19  0430 12/28/19  0227 12/29/19 0456   SODIUM 137 136 136   POTASSIUM 4.2 4.2 4.2   CHLORIDE 110 110 109   CO2 20 19* 20   GLUCOSE 90 93 86   BUN 16 23* 21  "  CREATININE 0.89 0.99 0.92   CALCIUM 7.9* 7.5* 7.6*                   Imaging  IR-US GUIDED PIV   Final Result    Ultrasound-guided PERIPHERAL IV INSERTION performed by    qualified nursing staff as above.            DX-CHEST-LIMITED (1 VIEW)   Final Result      New mild left basilar opacity is more likely from atelectasis than consolidation      Otherwise stable chest with right hemidiaphragm elevation, adjacent atelectasis or scarring. This also could obscure consolidation      DX-UPPER GI-SMALL BOWEL FOLLOW THRU   Final Result      1.  Small hiatal hernia.      2.  No significant fold thickening in the stomach to suggest an infiltrative process.      3.  Distended small bowel loops as seen on recent CT are consistent with partial obstruction. Contrast reached the colon in 90 minutes.      CT-ABDOMEN-PELVIS WITH    (Results Pending)        Assessment/Plan  Small bowel obstruction (HCC)- (present on admission)  Assessment & Plan  -POD #5 \"Severe adhesions to a prior parastomal hernia mesh.  Incarcerated loop of small bowel causing obstruction\"  -full liquid diet  -(+) flatus  -general surgery following      Hypomagnesemia- (present on admission)  Assessment & Plan  -resolved after IV replacement yesterday  -BID PO replacement  -follow lab    CKD (chronic kidney disease) stage 3, GFR 30-59 ml/min (HCC)- (present on admission)  Assessment & Plan  -chronic and stable at her baseline  -avoid nephrotoxins  -renally dose medications as indicated  -follow labs    Vitamin D deficiency  Assessment & Plan  -started her on daily supplementation today  -OP FU     Paroxysmal A-fib (HCC)- (present on admission)  Assessment & Plan  -rates controlled today  -asymptomatic  -not on chronic anticoagulation due to history of bleeding - continue ASA      Presence of urostomy (HCC)- (present on admission)  Assessment & Plan  -History of total cystectomy with ileal conduit 2011      Leukocytosis- (present on admission)  Assessment & " home/no skilled PT needs Plan  -stable  -clinically shows no indications of infection  -incentive spirometry  -ambulation/mobility  -afebrile   -(+) wound culture - ID consulted today  -follow CBC    Enteritis- (present on admission)  Assessment & Plan  -Continue daily PPI  -Per general surgery recommendations, patient will need follow-up EGD for CT scan findings of thickened gastric wall      Hypothyroid- (present on admission)  Assessment & Plan  -continue Levothyroxine         VTE prophylaxis: Heparin & Ambulation     Please note that this dictation was created using voice recognition software. I have made every reasonable attempt to correct obvious errors, but there may be errors of grammar and possibly content that I did not discover before finalizing the note.     LISBETH Isabel.

## 2022-06-16 NOTE — PROGRESS NOTE ADULT - SUBJECTIVE AND OBJECTIVE BOX
Anesthesia Pain Management Service: Day _4_ of Epidural    SUBJECTIVE: Patient doing well with PCEA and has some pain after ambulating. As per patient, prior to ambulating, she had no pain.   Pain Scale Score: 7/10  Refer to charted pain scores    THERAPY:  [x ] Epidural Bupivacaine 0.0625% and Hydromorphone  		[ X] 10 micrograms/mL	[ ] 5 micrograms/mL  [ ] Epidural Bupivacaine 0.0625% and Fentanyl - 2 micrograms/mL  [ ] Epidural Ropivacaine 0.1% plain – 1 mg/mL  [ ] Patient Controlled Regional Anesthesia (PCRA) Ropivacaine  		[ ] 0.2%			[ ] 0.1%    Demand dose __3_ lockout __15_ (minutes) Continuous Rate __6_ Total: __165.3__ ml used (in past 24 hours)      MEDICATIONS  (STANDING):  chlorhexidine 2% Cloths 1 Application(s) Topical daily  dextrose 5% + sodium chloride 0.45% 1000 milliLiter(s) (84 mL/Hr) IV Continuous <Continuous>  heparin   Injectable 5000 Unit(s) SubCutaneous every 8 hours  hydromorphone (10 MICROgram(s)/mL) + bupivacaine 0.0625% in 0.9% Sodium Chloride PCEA 250 milliLiter(s) Epidural PCA Continuous  insulin glargine Injectable (LANTUS) 20 Unit(s) SubCutaneous at bedtime  insulin lispro (ADMELOG) corrective regimen sliding scale   SubCutaneous every 6 hours  pantoprazole  Injectable 40 milliGRAM(s) IV Push daily  piperacillin/tazobactam IVPB.. 3.375 Gram(s) IV Intermittent every 8 hours    MEDICATIONS  (PRN):  diphenhydrAMINE Injectable 25 milliGRAM(s) IV Push every 4 hours PRN Pruritus  HYDROmorphone  Injectable 0.5 milliGRAM(s) IV Push every 3 hours PRN Severe Breakthrough Pain (7 - 10)  hydromorphone (10 MICROgram(s)/mL) + bupivacaine 0.0625% in 0.9% Sodium Chloride PCEA Rescue Clinician  Bolus 5 milliLiter(s) Epidural every 15 minutes PRN for Pain Score greater than 6  naloxone Injectable 0.1 milliGRAM(s) IV Push every 3 minutes PRN For ANY of the following changes in patient status:  A. RR LESS THAN 10 breaths per minute, B. Oxygen saturation LESS THAN 90%, C. Sedation score of 6  ondansetron Injectable 4 milliGRAM(s) IV Push every 6 hours PRN Nausea      OBJECTIVE:  Patient is sitting up in chair, NPO.    Assessment of Catheter Site:	[ ] Left	[ ] Right  [x ] Epidural 	[ ] Femoral	      [ ] Saphenous   [ ] Supraclavicular   [ ] Other:    [x ] Dressing intact	[x ] Site non-tender	[ x] Site without erythema, discharge, edema  [x ] Epidural tubing and connection checked	[x] Gross neurological exam within normal limits  [ ] Catheter removed – tip intact		[ ] Afebrile  	[ ] Febrile: ___   [ X] see Temp under VS below)    PT/INR - ( 16 Jun 2022 06:36 )   PT: 12.4 sec;   INR: 1.07 ratio         PTT - ( 16 Jun 2022 06:36 )  PTT:30.0 sec                      9.2    8.88  )-----------( 218      ( 16 Jun 2022 06:36 )             28.1     Vital Signs Last 24 Hrs  T(C): 36.9 (06-16-22 @ 09:18), Max: 37.2 (06-16-22 @ 06:02)  T(F): 98.5 (06-16-22 @ 09:18), Max: 99 (06-16-22 @ 06:02)  HR: 94 (06-16-22 @ 09:18) (77 - 97)  BP: 126/64 (06-16-22 @ 09:18) (126/64 - 131/59)  BP(mean): --  RR: 18 (06-16-22 @ 09:18) (18 - 18)  SpO2: 97% (06-16-22 @ 09:18) (96% - 100%)      Sedation Score:	[x ] Alert	[ ] Drowsy	[ ] Arousable	[ ] Asleep	[ ] Unresponsive    Side Effects:	[x ] None	[ ] Nausea	[ ] Vomiting	[ ] Pruritus  		[ ] Weakness		[ ] Numbness	[ ] Other:    ASSESSMENT/ PLAN:    Therapy to  be:	[x ] Continue   [ ] Discontinued   [ ] Change to prn Analgesics    Documentation and Verification of current medications:  [ X ] Done	[ ] Not done, not eligible, reason:    Comments: Doing OK with epidural and may continue. Dr. Trna at bedside checked levels, epidural working T8-11, L>R.   Increased continuous rate to 8ml/hr, 4 hour lockout increased to 50ml.  Added IV Dilaudid 0.5mg q3 hours PRN, for pain that may persist despite Epidural.  Recommend non-opioid adjuvant analgesics to be used when possible and when allowed by primary surgical team.    Progress Note written now but Patient was seen earlier.

## 2022-06-16 NOTE — PROGRESS NOTE ADULT - SUBJECTIVE AND OBJECTIVE BOX
Anesthesia Pain Management Service- Attending Addendum    SUBJECTIVE: Pt doing well with PCEA without problems reported. She has no pain while sitting but we visited her right after PT and she reports having 7/10 pain with movement.     Therapy:	  [ ] IV PCA	   [ X] Epidural           [ ] s/p Spinal Opoid              [ ] Postpartum infusion	  [ ] Patient controlled regional anesthesia (PCRA)    [ ] prn Analgesics    Allergies    No Known Allergies    Intolerances      MEDICATIONS  (STANDING):  chlorhexidine 2% Cloths 1 Application(s) Topical daily  dextrose 5% + sodium chloride 0.45% 1000 milliLiter(s) (84 mL/Hr) IV Continuous <Continuous>  heparin   Injectable 5000 Unit(s) SubCutaneous every 8 hours  hydromorphone (10 MICROgram(s)/mL) + bupivacaine 0.0625% in 0.9% Sodium Chloride PCEA 250 milliLiter(s) Epidural PCA Continuous  insulin glargine Injectable (LANTUS) 20 Unit(s) SubCutaneous at bedtime  insulin lispro (ADMELOG) corrective regimen sliding scale   SubCutaneous every 6 hours  pantoprazole  Injectable 40 milliGRAM(s) IV Push daily  piperacillin/tazobactam IVPB.. 3.375 Gram(s) IV Intermittent every 8 hours    MEDICATIONS  (PRN):  diphenhydrAMINE Injectable 25 milliGRAM(s) IV Push every 4 hours PRN Pruritus  hydromorphone (10 MICROgram(s)/mL) + bupivacaine 0.0625% in 0.9% Sodium Chloride PCEA Rescue Clinician  Bolus 5 milliLiter(s) Epidural every 15 minutes PRN for Pain Score greater than 6  naloxone Injectable 0.1 milliGRAM(s) IV Push every 3 minutes PRN For ANY of the following changes in patient status:  A. RR LESS THAN 10 breaths per minute, B. Oxygen saturation LESS THAN 90%, C. Sedation score of 6  ondansetron Injectable 4 milliGRAM(s) IV Push every 6 hours PRN Nausea      OBJECTIVE:   [X] No new signs     [ ] Other:    Side Effects:  [X ] None			[ ] Other:    Assessment of Catheter Site:		[ X] Intact		[ ] Other:    ASSESSMENT/PLAN  [ X] Continue current therapy. Increase the rate on the epidural to 8 cc/hr. She has minimal coverage over T10 to L1 to cold. This will hopefully increase the dermatomes covered.     [ ] Therapy changed to:    [ ] IV PCA       [ ] Epidural     [ ] prn Analgesics     Comments: Continue current PCEA settings.    Note written after patient seen

## 2022-06-16 NOTE — PROGRESS NOTE ADULT - ASSESSMENT
77F PMHx of adenocarcinoma now, s/p 06-13 whipple. Recovering appropriately.    RECOMMENDATION  - ipp pathway  - Diet: NPO/IVF  - NGT to LCWS - monitor output  - Pain: PCA, ATC Tylenol  - c/w Zosyn   - DVT ppx    D team surgery  [id number]

## 2022-06-16 NOTE — PHYSICAL THERAPY INITIAL EVALUATION ADULT - ADDITIONAL COMMENTS
Patient lives with family in second story condo, flight to bedroom/bathroom. Patient was using cane and walker prior to admission. Patient was getting assist for  for ADL.

## 2022-06-16 NOTE — PHYSICAL THERAPY INITIAL EVALUATION ADULT - PERTINENT HX OF CURRENT PROBLEM, REHAB EVAL
76y/o female presents for preop eval for scheduled whipple.  Pt states [facility].  ERCP and bilary stent inserted.  Imaging show pancreatic mass.  Preop dx malignant neoplasm of pancreas unspecified.

## 2022-06-17 LAB
ALBUMIN SERPL ELPH-MCNC: 3 G/DL — LOW (ref 3.3–5)
ALP SERPL-CCNC: 99 U/L — SIGNIFICANT CHANGE UP (ref 40–120)
ALT FLD-CCNC: 16 U/L — SIGNIFICANT CHANGE UP (ref 4–33)
AMYLASE FLD-CCNC: 53 U/L — SIGNIFICANT CHANGE UP
ANION GAP SERPL CALC-SCNC: 11 MMOL/L — SIGNIFICANT CHANGE UP (ref 7–14)
APTT BLD: 27 SEC — SIGNIFICANT CHANGE UP (ref 27–36.3)
AST SERPL-CCNC: 16 U/L — SIGNIFICANT CHANGE UP (ref 4–32)
BILIRUB DIRECT SERPL-MCNC: 0.5 MG/DL — HIGH (ref 0–0.3)
BILIRUB INDIRECT FLD-MCNC: 0.5 MG/DL — SIGNIFICANT CHANGE UP (ref 0–1)
BILIRUB SERPL-MCNC: 1 MG/DL — SIGNIFICANT CHANGE UP (ref 0.2–1.2)
BUN SERPL-MCNC: 4 MG/DL — LOW (ref 7–23)
CALCIUM SERPL-MCNC: 8.5 MG/DL — SIGNIFICANT CHANGE UP (ref 8.4–10.5)
CHLORIDE SERPL-SCNC: 104 MMOL/L — SIGNIFICANT CHANGE UP (ref 98–107)
CO2 SERPL-SCNC: 21 MMOL/L — LOW (ref 22–31)
CREAT SERPL-MCNC: 0.55 MG/DL — SIGNIFICANT CHANGE UP (ref 0.5–1.3)
EGFR: 94 ML/MIN/1.73M2 — SIGNIFICANT CHANGE UP
GLUCOSE BLDC GLUCOMTR-MCNC: 101 MG/DL — HIGH (ref 70–99)
GLUCOSE BLDC GLUCOMTR-MCNC: 123 MG/DL — HIGH (ref 70–99)
GLUCOSE BLDC GLUCOMTR-MCNC: 143 MG/DL — HIGH (ref 70–99)
GLUCOSE BLDC GLUCOMTR-MCNC: 70 MG/DL — SIGNIFICANT CHANGE UP (ref 70–99)
GLUCOSE BLDC GLUCOMTR-MCNC: 74 MG/DL — SIGNIFICANT CHANGE UP (ref 70–99)
GLUCOSE BLDC GLUCOMTR-MCNC: 85 MG/DL — SIGNIFICANT CHANGE UP (ref 70–99)
GLUCOSE BLDC GLUCOMTR-MCNC: 93 MG/DL — SIGNIFICANT CHANGE UP (ref 70–99)
GLUCOSE SERPL-MCNC: 59 MG/DL — LOW (ref 70–99)
HCT VFR BLD CALC: 27.1 % — LOW (ref 34.5–45)
HGB BLD-MCNC: 8.9 G/DL — LOW (ref 11.5–15.5)
INR BLD: 1.01 RATIO — SIGNIFICANT CHANGE UP (ref 0.88–1.16)
MAGNESIUM SERPL-MCNC: 1.8 MG/DL — SIGNIFICANT CHANGE UP (ref 1.6–2.6)
MCHC RBC-ENTMCNC: 32.2 PG — SIGNIFICANT CHANGE UP (ref 27–34)
MCHC RBC-ENTMCNC: 32.8 GM/DL — SIGNIFICANT CHANGE UP (ref 32–36)
MCV RBC AUTO: 98.2 FL — SIGNIFICANT CHANGE UP (ref 80–100)
NRBC # BLD: 0 /100 WBCS — SIGNIFICANT CHANGE UP
NRBC # FLD: 0 K/UL — SIGNIFICANT CHANGE UP
PHOSPHATE SERPL-MCNC: 2.4 MG/DL — LOW (ref 2.5–4.5)
PLATELET # BLD AUTO: 222 K/UL — SIGNIFICANT CHANGE UP (ref 150–400)
POTASSIUM SERPL-MCNC: 3.9 MMOL/L — SIGNIFICANT CHANGE UP (ref 3.5–5.3)
POTASSIUM SERPL-SCNC: 3.9 MMOL/L — SIGNIFICANT CHANGE UP (ref 3.5–5.3)
PROT SERPL-MCNC: 5.8 G/DL — LOW (ref 6–8.3)
PROTHROM AB SERPL-ACNC: 11.7 SEC — SIGNIFICANT CHANGE UP (ref 10.5–13.4)
RBC # BLD: 2.76 M/UL — LOW (ref 3.8–5.2)
RBC # FLD: 15.8 % — HIGH (ref 10.3–14.5)
SODIUM SERPL-SCNC: 136 MMOL/L — SIGNIFICANT CHANGE UP (ref 135–145)
WBC # BLD: 6.83 K/UL — SIGNIFICANT CHANGE UP (ref 3.8–10.5)
WBC # FLD AUTO: 6.83 K/UL — SIGNIFICANT CHANGE UP (ref 3.8–10.5)

## 2022-06-17 PROCEDURE — 74018 RADEX ABDOMEN 1 VIEW: CPT | Mod: 26

## 2022-06-17 RX ORDER — HYDROMORPHONE HYDROCHLORIDE 2 MG/ML
0.25 INJECTION INTRAMUSCULAR; INTRAVENOUS; SUBCUTANEOUS
Refills: 0 | Status: DISCONTINUED | OUTPATIENT
Start: 2022-06-17 | End: 2022-06-18

## 2022-06-17 RX ORDER — POTASSIUM PHOSPHATE, MONOBASIC POTASSIUM PHOSPHATE, DIBASIC 236; 224 MG/ML; MG/ML
15 INJECTION, SOLUTION INTRAVENOUS ONCE
Refills: 0 | Status: COMPLETED | OUTPATIENT
Start: 2022-06-17 | End: 2022-06-17

## 2022-06-17 RX ORDER — HYDROMORPHONE HYDROCHLORIDE 2 MG/ML
0.5 INJECTION INTRAMUSCULAR; INTRAVENOUS; SUBCUTANEOUS
Refills: 0 | Status: DISCONTINUED | OUTPATIENT
Start: 2022-06-17 | End: 2022-06-18

## 2022-06-17 RX ORDER — INSULIN LISPRO 100/ML
VIAL (ML) SUBCUTANEOUS
Refills: 0 | Status: DISCONTINUED | OUTPATIENT
Start: 2022-06-17 | End: 2022-06-22

## 2022-06-17 RX ORDER — ACETAMINOPHEN 500 MG
1000 TABLET ORAL EVERY 6 HOURS
Refills: 0 | Status: COMPLETED | OUTPATIENT
Start: 2022-06-17 | End: 2022-06-18

## 2022-06-17 RX ORDER — SENNA PLUS 8.6 MG/1
2 TABLET ORAL AT BEDTIME
Refills: 0 | Status: DISCONTINUED | OUTPATIENT
Start: 2022-06-17 | End: 2022-06-22

## 2022-06-17 RX ORDER — INSULIN GLARGINE 100 [IU]/ML
10 INJECTION, SOLUTION SUBCUTANEOUS AT BEDTIME
Refills: 0 | Status: DISCONTINUED | OUTPATIENT
Start: 2022-06-17 | End: 2022-06-22

## 2022-06-17 RX ORDER — POTASSIUM PHOSPHATE, MONOBASIC POTASSIUM PHOSPHATE, DIBASIC 236; 224 MG/ML; MG/ML
15 INJECTION, SOLUTION INTRAVENOUS ONCE
Refills: 0 | Status: DISCONTINUED | OUTPATIENT
Start: 2022-06-17 | End: 2022-06-17

## 2022-06-17 RX ORDER — MAGNESIUM SULFATE 500 MG/ML
2 VIAL (ML) INJECTION ONCE
Refills: 0 | Status: COMPLETED | OUTPATIENT
Start: 2022-06-17 | End: 2022-06-17

## 2022-06-17 RX ORDER — MAGNESIUM SULFATE 500 MG/ML
2 VIAL (ML) INJECTION ONCE
Refills: 0 | Status: DISCONTINUED | OUTPATIENT
Start: 2022-06-17 | End: 2022-06-17

## 2022-06-17 RX ORDER — DEXTROSE MONOHYDRATE, SODIUM CHLORIDE, AND POTASSIUM CHLORIDE 50; .745; 4.5 G/1000ML; G/1000ML; G/1000ML
1000 INJECTION, SOLUTION INTRAVENOUS
Refills: 0 | Status: DISCONTINUED | OUTPATIENT
Start: 2022-06-17 | End: 2022-06-20

## 2022-06-17 RX ADMIN — Medication 400 MILLIGRAM(S): at 12:00

## 2022-06-17 RX ADMIN — HYDROMORPHONE HYDROCHLORIDE 0.5 MILLIGRAM(S): 2 INJECTION INTRAMUSCULAR; INTRAVENOUS; SUBCUTANEOUS at 18:00

## 2022-06-17 RX ADMIN — PIPERACILLIN AND TAZOBACTAM 25 GRAM(S): 4; .5 INJECTION, POWDER, LYOPHILIZED, FOR SOLUTION INTRAVENOUS at 14:52

## 2022-06-17 RX ADMIN — DEXTROSE MONOHYDRATE, SODIUM CHLORIDE, AND POTASSIUM CHLORIDE 42 MILLILITER(S): 50; .745; 4.5 INJECTION, SOLUTION INTRAVENOUS at 17:56

## 2022-06-17 RX ADMIN — HEPARIN SODIUM 5000 UNIT(S): 5000 INJECTION INTRAVENOUS; SUBCUTANEOUS at 21:02

## 2022-06-17 RX ADMIN — Medication 1000 MILLIGRAM(S): at 14:51

## 2022-06-17 RX ADMIN — Medication 400 MILLIGRAM(S): at 06:47

## 2022-06-17 RX ADMIN — INSULIN GLARGINE 10 UNIT(S): 100 INJECTION, SOLUTION SUBCUTANEOUS at 22:33

## 2022-06-17 RX ADMIN — Medication 400 MILLIGRAM(S): at 17:59

## 2022-06-17 RX ADMIN — Medication 1000 MILLIGRAM(S): at 08:11

## 2022-06-17 RX ADMIN — HYDROMORPHONE HYDROCHLORIDE 0.5 MILLIGRAM(S): 2 INJECTION INTRAMUSCULAR; INTRAVENOUS; SUBCUTANEOUS at 12:00

## 2022-06-17 RX ADMIN — HYDROMORPHONE HYDROCHLORIDE 250 MILLILITER(S): 2 INJECTION INTRAMUSCULAR; INTRAVENOUS; SUBCUTANEOUS at 07:37

## 2022-06-17 RX ADMIN — PIPERACILLIN AND TAZOBACTAM 25 GRAM(S): 4; .5 INJECTION, POWDER, LYOPHILIZED, FOR SOLUTION INTRAVENOUS at 06:43

## 2022-06-17 RX ADMIN — PANTOPRAZOLE SODIUM 40 MILLIGRAM(S): 20 TABLET, DELAYED RELEASE ORAL at 12:00

## 2022-06-17 RX ADMIN — CHLORHEXIDINE GLUCONATE 1 APPLICATION(S): 213 SOLUTION TOPICAL at 12:00

## 2022-06-17 RX ADMIN — DEXTROSE MONOHYDRATE, SODIUM CHLORIDE, AND POTASSIUM CHLORIDE 42 MILLILITER(S): 50; .745; 4.5 INJECTION, SOLUTION INTRAVENOUS at 21:05

## 2022-06-17 RX ADMIN — PIPERACILLIN AND TAZOBACTAM 25 GRAM(S): 4; .5 INJECTION, POWDER, LYOPHILIZED, FOR SOLUTION INTRAVENOUS at 21:04

## 2022-06-17 RX ADMIN — HYDROMORPHONE HYDROCHLORIDE 0.5 MILLIGRAM(S): 2 INJECTION INTRAMUSCULAR; INTRAVENOUS; SUBCUTANEOUS at 17:56

## 2022-06-17 RX ADMIN — HEPARIN SODIUM 5000 UNIT(S): 5000 INJECTION INTRAVENOUS; SUBCUTANEOUS at 06:50

## 2022-06-17 RX ADMIN — HYDROMORPHONE HYDROCHLORIDE 0.5 MILLIGRAM(S): 2 INJECTION INTRAMUSCULAR; INTRAVENOUS; SUBCUTANEOUS at 21:04

## 2022-06-17 RX ADMIN — HEPARIN SODIUM 5000 UNIT(S): 5000 INJECTION INTRAVENOUS; SUBCUTANEOUS at 14:52

## 2022-06-17 NOTE — PROGRESS NOTE ADULT - SUBJECTIVE AND OBJECTIVE BOX
Anesthesia Pain Management Service: Day _5_ of Epidural    SUBJECTIVE: Patient doing well with PCEA and no problems. As per primary RN, she noticed epidural was dislodged when ambulating to bathroom with patient. Patient states her pain has been controlled and she has been ambulating with no difficulty today.  Pain Scale Score: 4/10  Refer to charted pain scores    THERAPY:  [x ] Epidural Bupivacaine 0.0625% and Hydromorphone  		[X ] 10 micrograms/mL	[ ] 5 micrograms/mL  [ ] Epidural Bupivacaine 0.0625% and Fentanyl - 2 micrograms/mL  [ ] Epidural Ropivacaine 0.1% plain – 1 mg/mL  [ ] Patient Controlled Regional Anesthesia (PCRA) Ropivacaine  		[ ] 0.2%			[ ] 0.1%    Demand dose __3_ lockout __15_ (minutes) Continuous Rate __6_ Total: _83.3__ Daily      MEDICATIONS  (STANDING):  acetaminophen   IVPB .. 1000 milliGRAM(s) IV Intermittent every 6 hours  chlorhexidine 2% Cloths 1 Application(s) Topical daily  dextrose 5% + sodium chloride 0.45% with potassium chloride 20 mEq/L 1000 milliLiter(s) (42 mL/Hr) IV Continuous <Continuous>  heparin   Injectable 5000 Unit(s) SubCutaneous every 8 hours  insulin glargine Injectable (LANTUS) 20 Unit(s) SubCutaneous at bedtime  insulin lispro (ADMELOG) corrective regimen sliding scale   SubCutaneous Before meals and at bedtime  pantoprazole  Injectable 40 milliGRAM(s) IV Push daily  piperacillin/tazobactam IVPB.. 3.375 Gram(s) IV Intermittent every 8 hours    MEDICATIONS  (PRN):  bisacodyl Suppository 10 milliGRAM(s) Rectal daily PRN Constipation  diphenhydrAMINE Injectable 25 milliGRAM(s) IV Push every 4 hours PRN Pruritus  naloxone Injectable 0.1 milliGRAM(s) IV Push every 3 minutes PRN For ANY of the following changes in patient status:  A. RR LESS THAN 10 breaths per minute, B. Oxygen saturation LESS THAN 90%, C. Sedation score of 6  ondansetron Injectable 4 milliGRAM(s) IV Push every 6 hours PRN Nausea  senna 2 Tablet(s) Oral at bedtime PRN Constipation      OBJECTIVE: Patient sitting on edge of bed, spouse and primary RN at bedside.    Assessment of Catheter Site:	[ ] Left	[ ] Right  [x ] Epidural 	[ ] Femoral	      [ ] Saphenous   [ ] Supraclavicular   [ ] Other:    [x ] Dressing intact	[x ] Site non-tender	[ x] Site without erythema, discharge, edema  [x ] Epidural tubing and connection checked	[x] Gross neurological exam within normal limits  [X ] Catheter removed – tip intact		[ ] Afebrile	  [ ] Febrile: ___       [ X] see Temp under VS below)    PT/INR - ( 17 Jun 2022 05:49 )   PT: 11.7 sec;   INR: 1.01 ratio         PTT - ( 17 Jun 2022 05:49 )  PTT:27.0 sec                      8.9    6.83  )-----------( 222      ( 17 Jun 2022 05:49 )             27.1     Vital Signs Last 24 Hrs  T(C): 36.7 (06-17-22 @ 08:56), Max: 37.2 (06-16-22 @ 12:46)  T(F): 98 (06-17-22 @ 08:56), Max: 99 (06-16-22 @ 12:46)  HR: 74 (06-17-22 @ 08:56) (74 - 98)  BP: 118/50 (06-17-22 @ 08:56) (107/47 - 132/56)  BP(mean): --  RR: 18 (06-17-22 @ 08:56) (18 - 19)  SpO2: 98% (06-17-22 @ 08:56) (97% - 99%)      Sedation Score:	[x ] Alert	[ ] Drowsy	[ ] Arousable	[ ] Asleep	[ ] Unresponsive    Side Effects:	[x ] None	[ ] Nausea	[ ] Vomiting	[ ] Pruritus  		[ ] Weakness		[ ] Numbness	[ ] Other:    ASSESSMENT/ PLAN:    Therapy to  be:	[ ] Continue   [ X] Discontinued   [ X] Change to prn Analgesics    Documentation and Verification of current medications:  [ X ] Done	[ ] Not done, not eligible, reason:    Comments: Epidural catheter observed to be mostly out at around 5-6cm from tip. Discussed patient with Anesthesia pain attending and D Team, PCEA discontinued. Changed to IV opioid and/or non-opioid Adjuvant analgesics to be used at this point.    Progress Note written now but Patient was seen earlier.

## 2022-06-17 NOTE — PROGRESS NOTE ADULT - SUBJECTIVE AND OBJECTIVE BOX
Anesthesia Pain Management Service    SUBJECTIVE: Pt doing well with PCEA removed & no problems reported.    Therapy:	  [ ] IV PCA	   [ X] Epidural stopped          [ ] s/p Spinal Opoid              [ ] Postpartum infusion	  [ ] Patient controlled regional anesthesia (PCRA)    [ ] prn Analgesics    Allergies    No Known Allergies    Intolerances      MEDICATIONS  (STANDING):  acetaminophen   IVPB .. 1000 milliGRAM(s) IV Intermittent every 6 hours  chlorhexidine 2% Cloths 1 Application(s) Topical daily  dextrose 5% + sodium chloride 0.45% with potassium chloride 20 mEq/L 1000 milliLiter(s) (42 mL/Hr) IV Continuous <Continuous>  heparin   Injectable 5000 Unit(s) SubCutaneous every 8 hours  hydromorphone (10 MICROgram(s)/mL) + bupivacaine 0.0625% in 0.9% Sodium Chloride PCEA 250 milliLiter(s) Epidural PCA Continuous  insulin glargine Injectable (LANTUS) 20 Unit(s) SubCutaneous at bedtime  insulin lispro (ADMELOG) corrective regimen sliding scale   SubCutaneous Before meals and at bedtime  pantoprazole  Injectable 40 milliGRAM(s) IV Push daily  piperacillin/tazobactam IVPB.. 3.375 Gram(s) IV Intermittent every 8 hours    MEDICATIONS  (PRN):  bisacodyl Suppository 10 milliGRAM(s) Rectal daily PRN Constipation  diphenhydrAMINE Injectable 25 milliGRAM(s) IV Push every 4 hours PRN Pruritus  HYDROmorphone  Injectable 0.3 milliGRAM(s) IV Push every 3 hours PRN Severe Breakthrough Pain (7 - 10)  hydromorphone (10 MICROgram(s)/mL) + bupivacaine 0.0625% in 0.9% Sodium Chloride PCEA Rescue Clinician  Bolus 5 milliLiter(s) Epidural every 15 minutes PRN for Pain Score greater than 6  naloxone Injectable 0.1 milliGRAM(s) IV Push every 3 minutes PRN For ANY of the following changes in patient status:  A. RR LESS THAN 10 breaths per minute, B. Oxygen saturation LESS THAN 90%, C. Sedation score of 6  ondansetron Injectable 4 milliGRAM(s) IV Push every 6 hours PRN Nausea  senna 2 Tablet(s) Oral at bedtime PRN Constipation      OBJECTIVE:   [X] No new signs     [ ] Other:    Side Effects:  [X ] None			[ ] Other:    Assessment of Catheter Site:		[ ] Intact		[ ] Other:    ASSESSMENT/PLAN  [ ] Continue current therapy    [X ] Therapy changed to:    [ ] IV PCA       [ ] Epidural     [ X] prn Analgesics     Comments: Epidural stopped & now to go on oral/IV opioids & adjuvant medication as needed.     Progress Note written now but Patient was seen earlier. Catheter to be removed after heparin timing

## 2022-06-17 NOTE — PROGRESS NOTE ADULT - ASSESSMENT
77F PMHx of adenocarcinoma now, s/p 06-13 whipple. Recovering appropriately.    RECOMMENDATION  - Whipple pathway  - Diet: NPO/IVF  - Pain: PCA, ATC Tylenol  - c/w Zosyn  - DVT ppx    D team surgery  [id number]   77F PMHx of adenocarcinoma now, s/p 06-13 whipple. Recovering appropriately.    RECOMMENDATION  - Whipple pathway  - d/c inferior drain  - Diet: CLD/IVF  - Pain: PCEA, ATC Tylenol  - c/w Zosyn  - DVT ppx    D team surgery  [id number]

## 2022-06-17 NOTE — PROGRESS NOTE ADULT - SUBJECTIVE AND OBJECTIVE BOX
SURGERY DAILY PROGRESS NOTE:     INTERVAL EVENTS: Pt ordered for CLD, but received a regular diet tray for dinner. Pt ate half of the tray. Backed down to NPO as her NGT was removed yesterday. No nausea or vomiting.    SUBJECTIVE/ROS: No acute events overnight. Patient seen and examined bedside on AM rounds.  d/c NGT, d/c superior drain     OBJECTIVE:  Vital Signs Last 24 Hrs  T(C): 36.7 (17 Jun 2022 00:15), Max: 37.2 (16 Jun 2022 06:02)  T(F): 98 (17 Jun 2022 00:15), Max: 99 (16 Jun 2022 06:02)  HR: 88 (17 Jun 2022 00:15) (88 - 98)  BP: 132/56 (17 Jun 2022 00:15) (107/47 - 132/56)  BP(mean): --  RR: 18 (17 Jun 2022 00:15) (18 - 19)  SpO2: 98% (17 Jun 2022 00:15) (96% - 99%)    PHYSICAL EXAM:  General: NAD, resting comfortably in bed  Pulmonary: Nonlabored breathing, no respiratory distress  Abdominal: 2 drains ss, provena, nam, PCA, soft, nontender, nondistended   Extremities: WWP                            9.2    8.88  )-----------( 218      ( 16 Jun 2022 06:36 )             28.1     06-16    134<L>  |  102  |  7   ----------------------------<  166<H>  4.1   |  20<L>  |  0.52    Ca    8.4      16 Jun 2022 06:36  Phos  2.5     06-16  Mg     1.80     06-16    TPro  6.0  /  Alb  3.0<L>  /  TBili  1.2  /  DBili  x   /  AST  16  /  ALT  17  /  AlkPhos  101  06-16   PT/INR - ( 16 Jun 2022 06:36 )   PT: 12.4 sec;   INR: 1.07 ratio         PTT - ( 16 Jun 2022 06:36 )  PTT:30.0 sec  I&O's Detail    15 Adams 2022 07:01  -  16 Jun 2022 07:00  --------------------------------------------------------  IN:    dextrose 5% + sodium chloride 0.45% w/ Additives: 1680 mL    Oral Fluid: 150 mL  Total IN: 1830 mL    OUT:    Bulb (mL): 55 mL    Bulb (mL): 65 mL    Indwelling Catheter - Urethral (mL): 350 mL    Nasogastric/Oral tube (mL): 450 mL    Voided (mL): 1750 mL  Total OUT: 2670 mL    Total NET: -840 mL      16 Jun 2022 07:01  -  17 Jun 2022 00:30  --------------------------------------------------------  IN:    dextrose 5% + sodium chloride 0.45% w/ Additives: 1008 mL    IV PiggyBack: 550 mL  Total IN: 1558 mL    OUT:    Bulb (mL): 40 mL    Nasogastric/Oral tube (mL): 50 mL    Voided (mL): 2025 mL  Total OUT: 2115 mL    Total NET: -557 mL         SURGERY DAILY PROGRESS NOTE:     INTERVAL EVENTS: Pt ordered for CLD, but received a regular diet tray for dinner. Pt ate half of the tray. Backed down to NPO as her NGT was removed yesterday. No nausea or vomiting.    SUBJECTIVE/ROS: No acute events overnight. Patient seen and examined bedside on AM rounds. Patient is feeling well, tolerates limited CLD, ate regular diet by accident yesterday dinner, no n/v,        OBJECTIVE:  Vital Signs Last 24 Hrs  T(C): 36.7 (17 Jun 2022 00:15), Max: 37.2 (16 Jun 2022 06:02)  T(F): 98 (17 Jun 2022 00:15), Max: 99 (16 Jun 2022 06:02)  HR: 88 (17 Jun 2022 00:15) (88 - 98)  BP: 132/56 (17 Jun 2022 00:15) (107/47 - 132/56)  BP(mean): --  RR: 18 (17 Jun 2022 00:15) (18 - 19)  SpO2: 98% (17 Jun 2022 00:15) (96% - 99%)    PHYSICAL EXAM:  General: NAD, resting comfortably in bed  Pulmonary: Nonlabored breathing, no respiratory distress  Abdominal: inferior drains ss, provena, PCEA, soft, nontender, nondistended   Extremities: WWP                            9.2    8.88  )-----------( 218      ( 16 Jun 2022 06:36 )             28.1     06-16    134<L>  |  102  |  7   ----------------------------<  166<H>  4.1   |  20<L>  |  0.52    Ca    8.4      16 Jun 2022 06:36  Phos  2.5     06-16  Mg     1.80     06-16    TPro  6.0  /  Alb  3.0<L>  /  TBili  1.2  /  DBili  x   /  AST  16  /  ALT  17  /  AlkPhos  101  06-16   PT/INR - ( 16 Jun 2022 06:36 )   PT: 12.4 sec;   INR: 1.07 ratio         PTT - ( 16 Jun 2022 06:36 )  PTT:30.0 sec  I&O's Detail    15 Adams 2022 07:01 - 16 Jun 2022 07:00  --------------------------------------------------------  IN:    dextrose 5% + sodium chloride 0.45% w/ Additives: 1680 mL    Oral Fluid: 150 mL  Total IN: 1830 mL    OUT:    Bulb (mL): 55 mL    Bulb (mL): 65 mL    Indwelling Catheter - Urethral (mL): 350 mL    Nasogastric/Oral tube (mL): 450 mL    Voided (mL): 1750 mL  Total OUT: 2670 mL    Total NET: -840 mL      16 Jun 2022 07:01  -  17 Jun 2022 00:30  --------------------------------------------------------  IN:    dextrose 5% + sodium chloride 0.45% w/ Additives: 1008 mL    IV PiggyBack: 550 mL  Total IN: 1558 mL    OUT:    Bulb (mL): 40 mL    Nasogastric/Oral tube (mL): 50 mL    Voided (mL): 2025 mL  Total OUT: 2115 mL    Total NET: -557 mL

## 2022-06-18 LAB
ALBUMIN SERPL ELPH-MCNC: 2.9 G/DL — LOW (ref 3.3–5)
ALP SERPL-CCNC: 98 U/L — SIGNIFICANT CHANGE UP (ref 40–120)
ALT FLD-CCNC: 16 U/L — SIGNIFICANT CHANGE UP (ref 4–33)
ANION GAP SERPL CALC-SCNC: 10 MMOL/L — SIGNIFICANT CHANGE UP (ref 7–14)
APTT BLD: 27.9 SEC — SIGNIFICANT CHANGE UP (ref 27–36.3)
AST SERPL-CCNC: 17 U/L — SIGNIFICANT CHANGE UP (ref 4–32)
BILIRUB SERPL-MCNC: 1 MG/DL — SIGNIFICANT CHANGE UP (ref 0.2–1.2)
BUN SERPL-MCNC: 5 MG/DL — LOW (ref 7–23)
CALCIUM SERPL-MCNC: 8.7 MG/DL — SIGNIFICANT CHANGE UP (ref 8.4–10.5)
CHLORIDE SERPL-SCNC: 105 MMOL/L — SIGNIFICANT CHANGE UP (ref 98–107)
CO2 SERPL-SCNC: 21 MMOL/L — LOW (ref 22–31)
CREAT SERPL-MCNC: 0.46 MG/DL — LOW (ref 0.5–1.3)
EGFR: 98 ML/MIN/1.73M2 — SIGNIFICANT CHANGE UP
GLUCOSE BLDC GLUCOMTR-MCNC: 109 MG/DL — HIGH (ref 70–99)
GLUCOSE BLDC GLUCOMTR-MCNC: 132 MG/DL — HIGH (ref 70–99)
GLUCOSE BLDC GLUCOMTR-MCNC: 142 MG/DL — HIGH (ref 70–99)
GLUCOSE BLDC GLUCOMTR-MCNC: 175 MG/DL — HIGH (ref 70–99)
GLUCOSE SERPL-MCNC: 110 MG/DL — HIGH (ref 70–99)
HCT VFR BLD CALC: 27.1 % — LOW (ref 34.5–45)
HGB BLD-MCNC: 8.7 G/DL — LOW (ref 11.5–15.5)
INR BLD: 1.02 RATIO — SIGNIFICANT CHANGE UP (ref 0.88–1.16)
MAGNESIUM SERPL-MCNC: 1.8 MG/DL — SIGNIFICANT CHANGE UP (ref 1.6–2.6)
MCHC RBC-ENTMCNC: 30.7 PG — SIGNIFICANT CHANGE UP (ref 27–34)
MCHC RBC-ENTMCNC: 32.1 GM/DL — SIGNIFICANT CHANGE UP (ref 32–36)
MCV RBC AUTO: 95.8 FL — SIGNIFICANT CHANGE UP (ref 80–100)
NRBC # BLD: 0 /100 WBCS — SIGNIFICANT CHANGE UP
NRBC # FLD: 0 K/UL — SIGNIFICANT CHANGE UP
PHOSPHATE SERPL-MCNC: 2.9 MG/DL — SIGNIFICANT CHANGE UP (ref 2.5–4.5)
PLATELET # BLD AUTO: 239 K/UL — SIGNIFICANT CHANGE UP (ref 150–400)
POTASSIUM SERPL-MCNC: 3.6 MMOL/L — SIGNIFICANT CHANGE UP (ref 3.5–5.3)
POTASSIUM SERPL-SCNC: 3.6 MMOL/L — SIGNIFICANT CHANGE UP (ref 3.5–5.3)
PROT SERPL-MCNC: 6.1 G/DL — SIGNIFICANT CHANGE UP (ref 6–8.3)
PROTHROM AB SERPL-ACNC: 11.8 SEC — SIGNIFICANT CHANGE UP (ref 10.5–13.4)
RBC # BLD: 2.83 M/UL — LOW (ref 3.8–5.2)
RBC # FLD: 15.7 % — HIGH (ref 10.3–14.5)
SODIUM SERPL-SCNC: 136 MMOL/L — SIGNIFICANT CHANGE UP (ref 135–145)
WBC # BLD: 4.79 K/UL — SIGNIFICANT CHANGE UP (ref 3.8–10.5)
WBC # FLD AUTO: 4.79 K/UL — SIGNIFICANT CHANGE UP (ref 3.8–10.5)

## 2022-06-18 RX ORDER — HYDROMORPHONE HYDROCHLORIDE 2 MG/ML
30 INJECTION INTRAMUSCULAR; INTRAVENOUS; SUBCUTANEOUS
Refills: 0 | Status: DISCONTINUED | OUTPATIENT
Start: 2022-06-18 | End: 2022-06-20

## 2022-06-18 RX ORDER — ACETAMINOPHEN 500 MG
1000 TABLET ORAL EVERY 6 HOURS
Refills: 0 | Status: COMPLETED | OUTPATIENT
Start: 2022-06-18 | End: 2022-06-19

## 2022-06-18 RX ORDER — HYDROMORPHONE HYDROCHLORIDE 2 MG/ML
0.5 INJECTION INTRAMUSCULAR; INTRAVENOUS; SUBCUTANEOUS
Refills: 0 | Status: DISCONTINUED | OUTPATIENT
Start: 2022-06-18 | End: 2022-06-20

## 2022-06-18 RX ORDER — ENOXAPARIN SODIUM 100 MG/ML
40 INJECTION SUBCUTANEOUS EVERY 24 HOURS
Refills: 0 | Status: DISCONTINUED | OUTPATIENT
Start: 2022-06-18 | End: 2022-06-22

## 2022-06-18 RX ORDER — POTASSIUM CHLORIDE 20 MEQ
20 PACKET (EA) ORAL ONCE
Refills: 0 | Status: COMPLETED | OUTPATIENT
Start: 2022-06-18 | End: 2022-06-18

## 2022-06-18 RX ORDER — MAGNESIUM SULFATE 500 MG/ML
2 VIAL (ML) INJECTION ONCE
Refills: 0 | Status: COMPLETED | OUTPATIENT
Start: 2022-06-18 | End: 2022-06-18

## 2022-06-18 RX ORDER — SODIUM,POTASSIUM PHOSPHATES 278-250MG
1 POWDER IN PACKET (EA) ORAL ONCE
Refills: 0 | Status: COMPLETED | OUTPATIENT
Start: 2022-06-18 | End: 2022-06-18

## 2022-06-18 RX ADMIN — Medication 400 MILLIGRAM(S): at 21:57

## 2022-06-18 RX ADMIN — Medication 1000 MILLIGRAM(S): at 17:29

## 2022-06-18 RX ADMIN — Medication 20 MILLIEQUIVALENT(S): at 12:07

## 2022-06-18 RX ADMIN — HYDROMORPHONE HYDROCHLORIDE 0.5 MILLIGRAM(S): 2 INJECTION INTRAMUSCULAR; INTRAVENOUS; SUBCUTANEOUS at 08:36

## 2022-06-18 RX ADMIN — Medication 1 PACKET(S): at 12:07

## 2022-06-18 RX ADMIN — Medication 10 MILLIGRAM(S): at 09:50

## 2022-06-18 RX ADMIN — Medication 1000 MILLIGRAM(S): at 11:17

## 2022-06-18 RX ADMIN — HYDROMORPHONE HYDROCHLORIDE 0.5 MILLIGRAM(S): 2 INJECTION INTRAMUSCULAR; INTRAVENOUS; SUBCUTANEOUS at 02:32

## 2022-06-18 RX ADMIN — HYDROMORPHONE HYDROCHLORIDE 30 MILLILITER(S): 2 INJECTION INTRAMUSCULAR; INTRAVENOUS; SUBCUTANEOUS at 18:59

## 2022-06-18 RX ADMIN — CHLORHEXIDINE GLUCONATE 1 APPLICATION(S): 213 SOLUTION TOPICAL at 12:07

## 2022-06-18 RX ADMIN — Medication 2: at 13:20

## 2022-06-18 RX ADMIN — HYDROMORPHONE HYDROCHLORIDE 0.5 MILLIGRAM(S): 2 INJECTION INTRAMUSCULAR; INTRAVENOUS; SUBCUTANEOUS at 09:36

## 2022-06-18 RX ADMIN — HEPARIN SODIUM 5000 UNIT(S): 5000 INJECTION INTRAVENOUS; SUBCUTANEOUS at 05:36

## 2022-06-18 RX ADMIN — HYDROMORPHONE HYDROCHLORIDE 30 MILLILITER(S): 2 INJECTION INTRAMUSCULAR; INTRAVENOUS; SUBCUTANEOUS at 13:07

## 2022-06-18 RX ADMIN — PIPERACILLIN AND TAZOBACTAM 25 GRAM(S): 4; .5 INJECTION, POWDER, LYOPHILIZED, FOR SOLUTION INTRAVENOUS at 05:36

## 2022-06-18 RX ADMIN — PANTOPRAZOLE SODIUM 40 MILLIGRAM(S): 20 TABLET, DELAYED RELEASE ORAL at 12:08

## 2022-06-18 RX ADMIN — Medication 25 GRAM(S): at 12:07

## 2022-06-18 RX ADMIN — HYDROMORPHONE HYDROCHLORIDE 0.5 MILLIGRAM(S): 2 INJECTION INTRAMUSCULAR; INTRAVENOUS; SUBCUTANEOUS at 05:36

## 2022-06-18 RX ADMIN — Medication 400 MILLIGRAM(S): at 09:46

## 2022-06-18 RX ADMIN — Medication 400 MILLIGRAM(S): at 00:37

## 2022-06-18 RX ADMIN — ENOXAPARIN SODIUM 40 MILLIGRAM(S): 100 INJECTION SUBCUTANEOUS at 12:08

## 2022-06-18 RX ADMIN — INSULIN GLARGINE 10 UNIT(S): 100 INJECTION, SOLUTION SUBCUTANEOUS at 21:58

## 2022-06-18 RX ADMIN — Medication 400 MILLIGRAM(S): at 16:39

## 2022-06-18 NOTE — CONSULT NOTE ADULT - SUBJECTIVE AND OBJECTIVE BOX
Acute Pain Management Consult requested for pain not well controlled.   HPI:  76y/o female presents for preop eval for scheduled whipple.  Pt states hospitalized few months ago at [facility] [place] for jaundice.  ERCP and bilary stent inserted.  Imaging show pancreatic mass.  Preop dx malignant neoplasm of pancreas unspecified.  (2022 08:27)      PAST MEDICAL & SURGICAL HISTORY:  Hypertension      IDDM (insulin dependent diabetes mellitus)      Malignant neoplasm of pancreas, unspecified      Dyslipidemia      CAD (coronary artery disease)      H/O chronic hepatitis  treated      History of  section      History of hysterectomy      History of coronary angioplasty with insertion of stent  2019 -  stent inserted          FAMILY HISTORY:  FH: HTN (hypertension) (Mother)    Family history of leukemia (Child)        SOCIAL HISTORY:  [ ] Denies Smoking, Alcohol, or Drug Use    Allergies    No Known Allergies    Intolerances        PAIN MEDICATIONS:  acetaminophen   IVPB .. 1000 milliGRAM(s) IV Intermittent every 6 hours  HYDROmorphone PCA (1 mG/mL) 30 milliLiter(s) PCA Continuous PCA Continuous  HYDROmorphone PCA (1 mG/mL) Rescue Clinician Bolus 0.5 milliGRAM(s) IV Push every 15 minutes PRN  ondansetron Injectable 4 milliGRAM(s) IV Push every 6 hours PRN    Heme:  enoxaparin Injectable 40 milliGRAM(s) SubCutaneous every 24 hours    Antibiotics:    Cardiovascular:    GI:  bisacodyl Suppository 10 milliGRAM(s) Rectal every 24 hours  pantoprazole  Injectable 40 milliGRAM(s) IV Push daily  senna 2 Tablet(s) Oral at bedtime PRN    Endocrine:  insulin glargine Injectable (LANTUS) 10 Unit(s) SubCutaneous at bedtime  insulin lispro (ADMELOG) corrective regimen sliding scale   SubCutaneous Before meals and at bedtime    All Other Medications:  chlorhexidine 2% Cloths 1 Application(s) Topical daily  dextrose 5% + sodium chloride 0.45% with potassium chloride 20 mEq/L 1000 milliLiter(s) IV Continuous <Continuous>  naloxone Injectable 0.1 milliGRAM(s) IV Push every 3 minutes PRN      Vital Signs Last 24 Hrs  T(C): 36.7 (2022 11:21), Max: 36.7 (2022 11:21)  T(F): 98 (2022 11:21), Max: 98 (2022 11:21)  HR: 77 (2022 11:21) (68 - 81)  BP: 133/58 (2022 11:21) (127/52 - 151/59)  BP(mean): --  RR: 16 (2022 11:21) (16 - 18)  SpO2: 100% (2022 11:21) (99% - 100%)    PAIN SCORE:        see chart           Physical Exam: A & O x 3 in some discomfort    LABS:                          8.7    4.79  )-----------( 239      ( 2022 05:29 )             27.1     06-18    136  |  105  |  5<L>  ----------------------------<  110<H>  3.6   |  21<L>  |  0.46<L>    Ca    8.7      2022 05:29  Phos  2.9     06-18  Mg     1.80     06-18    TPro  6.1  /  Alb  2.9<L>  /  TBili  1.0  /  DBili  x   /  AST  17  /  ALT  16  /  AlkPhos  98  06-18    PT/INR - ( 2022 05:29 )   PT: 11.8 sec;   INR: 1.02 ratio         PTT - ( 2022 05:29 )  PTT:27.9 sec      Drug Screen:    SUMMARY:    Patient seen at bedside. Patient primarily English speaking.  at bedside translating. Patient states she has 8/10 pain on her abdomen. Patient reports the IV Dilaudid only last for an hour. Patient states her pain got worse since the PCEA was discontinued. Patient on a clear liquid diet but not drinking much states she has no appetite.      [ X]  [place]  Reviewed and no medications found  Impression/Plan: Pain not adequately relieved with current opioid treatment regimen. Agree with plan to begin Hydromorphone IV PCA along with any non-opioid adjuvant analgesic medication that can be added and reevaluate by Pain Service tomorrow.

## 2022-06-18 NOTE — PROGRESS NOTE ADULT - ASSESSMENT
77F PMHx of adenocarcinoma now, s/p 06-13 whipple. Recovering appropriately.    RECOMMENDATION  - Whipple pathway  - d/c both drain  - Diet: CLD/IVF  - Pain: PCEA, ATC Tylenol  - c/w Zosyn  - DVT ppx    D team surgery  [id number]     77F PMHx of adenocarcinoma now, s/p 06-13 whipple. Recovering appropriately.    RECOMMENDATION  - Whipple pathway  - Take down provena today  - d/c'd both drain  - Diet: CLD/IVF  - Pain: PCEA, ATC Tylenol  - Monitor bowel function. Distended abd. No stomach bubble on Abd xray. Given suppository.  - c/w Zosyn  - DVT ppx    D team surgery  [id number]     77F PMHx of adenocarcinoma now, s/p 06-13 whipple. Recovering appropriately.    RECOMMENDATION  - Whipple pathway  - Took down provena today  - d/c'd both drain  - Diet: CLD/IVF  - Pain: acute pain consult, ATC Tylenol  - Monitor bowel function. Distended abd. No stomach bubble on Abd xray. Given suppository.  - Zosyn finished yesterday  - DVT ppx    D team surgery  [id number]

## 2022-06-18 NOTE — PROGRESS NOTE ADULT - SUBJECTIVE AND OBJECTIVE BOX
SURGERY DAILY PROGRESS NOTE:     SUBJECTIVE/ROS: No acute events overnight. Patient seen and examined bedside on AM rounds.     OBJECTIVE:  Vital Signs Last 24 Hrs  T(C): 36.4 (18 Jun 2022 00:14), Max: 36.9 (17 Jun 2022 06:25)  T(F): 97.6 (18 Jun 2022 00:14), Max: 98.4 (17 Jun 2022 06:25)  HR: 78 (18 Jun 2022 00:14) (68 - 88)  BP: 151/59 (18 Jun 2022 00:14) (111/68 - 151/59)  BP(mean): --  RR: 18 (18 Jun 2022 00:14) (18 - 18)  SpO2: 100% (18 Jun 2022 00:14) (97% - 100%)    PHYSICAL EXAM:  General: NAD, resting comfortably in bed  Pulmonary: Nonlabored breathing, no respiratory distress  Abdominal: inferior drains ss, provena, PCEA, soft, nontender, nondistended   Extremities: WWP                          8.9    6.83  )-----------( 222      ( 17 Jun 2022 05:49 )             27.1     06-17    136  |  104  |  4<L>  ----------------------------<  59<L>  3.9   |  21<L>  |  0.55    Ca    8.5      17 Jun 2022 05:49  Phos  2.4     06-17  Mg     1.80     06-17    TPro  5.8<L>  /  Alb  3.0<L>  /  TBili  1.0  /  DBili  0.5<H>  /  AST  16  /  ALT  16  /  AlkPhos  99  06-17   PT/INR - ( 17 Jun 2022 05:49 )   PT: 11.7 sec;   INR: 1.01 ratio         PTT - ( 17 Jun 2022 05:49 )  PTT:27.0 sec  I&O's Detail    16 Jun 2022 07:01  -  17 Jun 2022 07:00  --------------------------------------------------------  IN:    dextrose 5% + sodium chloride 0.45% w/ Additives: 1596 mL    IV PiggyBack: 550 mL  Total IN: 2146 mL    OUT:    Bulb (mL): 50 mL    Nasogastric/Oral tube (mL): 50 mL    Voided (mL): 2025 mL  Total OUT: 2125 mL    Total NET: 21 mL       SURGERY DAILY PROGRESS NOTE:     SUBJECTIVE/ROS: No acute events overnight. Patient seen and examined bedside on AM rounds.     OBJECTIVE:  Vital Signs Last 24 Hrs  T(C): 36.4 (18 Jun 2022 00:14), Max: 36.9 (17 Jun 2022 06:25)  T(F): 97.6 (18 Jun 2022 00:14), Max: 98.4 (17 Jun 2022 06:25)  HR: 78 (18 Jun 2022 00:14) (68 - 88)  BP: 151/59 (18 Jun 2022 00:14) (111/68 - 151/59)  BP(mean): --  RR: 18 (18 Jun 2022 00:14) (18 - 18)  SpO2: 100% (18 Jun 2022 00:14) (97% - 100%)    PHYSICAL EXAM:  General: NAD, resting comfortably in bed  Pulmonary: Nonlabored breathing, no respiratory distress  Abdominal: provena, PCEA, soft, nontender, nondistended   Extremities: WWP                          8.9    6.83  )-----------( 222      ( 17 Jun 2022 05:49 )             27.1     06-17    136  |  104  |  4<L>  ----------------------------<  59<L>  3.9   |  21<L>  |  0.55    Ca    8.5      17 Jun 2022 05:49  Phos  2.4     06-17  Mg     1.80     06-17    TPro  5.8<L>  /  Alb  3.0<L>  /  TBili  1.0  /  DBili  0.5<H>  /  AST  16  /  ALT  16  /  AlkPhos  99  06-17   PT/INR - ( 17 Jun 2022 05:49 )   PT: 11.7 sec;   INR: 1.01 ratio         PTT - ( 17 Jun 2022 05:49 )  PTT:27.0 sec  I&O's Detail    16 Jun 2022 07:01  -  17 Jun 2022 07:00  --------------------------------------------------------  IN:    dextrose 5% + sodium chloride 0.45% w/ Additives: 1596 mL    IV PiggyBack: 550 mL  Total IN: 2146 mL    OUT:    Bulb (mL): 50 mL    Nasogastric/Oral tube (mL): 50 mL    Voided (mL): 2025 mL  Total OUT: 2125 mL    Total NET: 21 mL    IMAGING  Unremarkable ABD xray. Ordered for increasing distension.     SURGERY DAILY PROGRESS NOTE:     SUBJECTIVE/ROS: No acute events overnight. Patient seen and examined bedside on AM rounds. Patient complains of severe pain. Better than yesterday but still 8/10. Nausea with CLD, no vomiting, passing gas, no BM.     OBJECTIVE:  Vital Signs Last 24 Hrs  T(C): 36.4 (18 Jun 2022 00:14), Max: 36.9 (17 Jun 2022 06:25)  T(F): 97.6 (18 Jun 2022 00:14), Max: 98.4 (17 Jun 2022 06:25)  HR: 78 (18 Jun 2022 00:14) (68 - 88)  BP: 151/59 (18 Jun 2022 00:14) (111/68 - 151/59)  BP(mean): --  RR: 18 (18 Jun 2022 00:14) (18 - 18)  SpO2: 100% (18 Jun 2022 00:14) (97% - 100%)    PHYSICAL EXAM:  General: NAD, resting comfortably in bed  Pulmonary: Nonlabored breathing, no respiratory distress  Abdominal: provena down, soft, nontender, nondistended   Extremities: WWP                          8.9    6.83  )-----------( 222      ( 17 Jun 2022 05:49 )             27.1     06-17    136  |  104  |  4<L>  ----------------------------<  59<L>  3.9   |  21<L>  |  0.55    Ca    8.5      17 Jun 2022 05:49  Phos  2.4     06-17  Mg     1.80     06-17    TPro  5.8<L>  /  Alb  3.0<L>  /  TBili  1.0  /  DBili  0.5<H>  /  AST  16  /  ALT  16  /  AlkPhos  99  06-17   PT/INR - ( 17 Jun 2022 05:49 )   PT: 11.7 sec;   INR: 1.01 ratio         PTT - ( 17 Jun 2022 05:49 )  PTT:27.0 sec  I&O's Detail    16 Jun 2022 07:01  -  17 Jun 2022 07:00  --------------------------------------------------------  IN:    dextrose 5% + sodium chloride 0.45% w/ Additives: 1596 mL    IV PiggyBack: 550 mL  Total IN: 2146 mL    OUT:    Bulb (mL): 50 mL    Nasogastric/Oral tube (mL): 50 mL    Voided (mL): 2025 mL  Total OUT: 2125 mL    Total NET: 21 mL    IMAGING  Unremarkable ABD xray. Ordered for increasing distension.

## 2022-06-19 LAB
ALBUMIN SERPL ELPH-MCNC: 3 G/DL — LOW (ref 3.3–5)
ALP SERPL-CCNC: 108 U/L — SIGNIFICANT CHANGE UP (ref 40–120)
ALT FLD-CCNC: 15 U/L — SIGNIFICANT CHANGE UP (ref 4–33)
ANION GAP SERPL CALC-SCNC: 13 MMOL/L — SIGNIFICANT CHANGE UP (ref 7–14)
APTT BLD: 29.7 SEC — SIGNIFICANT CHANGE UP (ref 27–36.3)
AST SERPL-CCNC: 20 U/L — SIGNIFICANT CHANGE UP (ref 4–32)
BILIRUB SERPL-MCNC: 0.9 MG/DL — SIGNIFICANT CHANGE UP (ref 0.2–1.2)
BUN SERPL-MCNC: 6 MG/DL — LOW (ref 7–23)
CALCIUM SERPL-MCNC: 8.7 MG/DL — SIGNIFICANT CHANGE UP (ref 8.4–10.5)
CHLORIDE SERPL-SCNC: 102 MMOL/L — SIGNIFICANT CHANGE UP (ref 98–107)
CO2 SERPL-SCNC: 19 MMOL/L — LOW (ref 22–31)
CREAT SERPL-MCNC: 0.46 MG/DL — LOW (ref 0.5–1.3)
EGFR: 98 ML/MIN/1.73M2 — SIGNIFICANT CHANGE UP
GLUCOSE BLDC GLUCOMTR-MCNC: 118 MG/DL — HIGH (ref 70–99)
GLUCOSE BLDC GLUCOMTR-MCNC: 182 MG/DL — HIGH (ref 70–99)
GLUCOSE BLDC GLUCOMTR-MCNC: 183 MG/DL — HIGH (ref 70–99)
GLUCOSE BLDC GLUCOMTR-MCNC: 184 MG/DL — HIGH (ref 70–99)
GLUCOSE SERPL-MCNC: 86 MG/DL — SIGNIFICANT CHANGE UP (ref 70–99)
HCT VFR BLD CALC: 28 % — LOW (ref 34.5–45)
HGB BLD-MCNC: 9.3 G/DL — LOW (ref 11.5–15.5)
INR BLD: 1.02 RATIO — SIGNIFICANT CHANGE UP (ref 0.88–1.16)
MAGNESIUM SERPL-MCNC: 1.9 MG/DL — SIGNIFICANT CHANGE UP (ref 1.6–2.6)
MCHC RBC-ENTMCNC: 31.8 PG — SIGNIFICANT CHANGE UP (ref 27–34)
MCHC RBC-ENTMCNC: 33.2 GM/DL — SIGNIFICANT CHANGE UP (ref 32–36)
MCV RBC AUTO: 95.9 FL — SIGNIFICANT CHANGE UP (ref 80–100)
NRBC # BLD: 0 /100 WBCS — SIGNIFICANT CHANGE UP
NRBC # FLD: 0 K/UL — SIGNIFICANT CHANGE UP
PHOSPHATE SERPL-MCNC: 2.9 MG/DL — SIGNIFICANT CHANGE UP (ref 2.5–4.5)
PLATELET # BLD AUTO: 267 K/UL — SIGNIFICANT CHANGE UP (ref 150–400)
POTASSIUM SERPL-MCNC: 3.9 MMOL/L — SIGNIFICANT CHANGE UP (ref 3.5–5.3)
POTASSIUM SERPL-SCNC: 3.9 MMOL/L — SIGNIFICANT CHANGE UP (ref 3.5–5.3)
PROT SERPL-MCNC: 6 G/DL — SIGNIFICANT CHANGE UP (ref 6–8.3)
PROTHROM AB SERPL-ACNC: 11.8 SEC — SIGNIFICANT CHANGE UP (ref 10.5–13.4)
RBC # BLD: 2.92 M/UL — LOW (ref 3.8–5.2)
RBC # FLD: 15.5 % — HIGH (ref 10.3–14.5)
SODIUM SERPL-SCNC: 134 MMOL/L — LOW (ref 135–145)
WBC # BLD: 5.05 K/UL — SIGNIFICANT CHANGE UP (ref 3.8–10.5)
WBC # FLD AUTO: 5.05 K/UL — SIGNIFICANT CHANGE UP (ref 3.8–10.5)

## 2022-06-19 RX ADMIN — HYDROMORPHONE HYDROCHLORIDE 30 MILLILITER(S): 2 INJECTION INTRAMUSCULAR; INTRAVENOUS; SUBCUTANEOUS at 19:19

## 2022-06-19 RX ADMIN — HYDROMORPHONE HYDROCHLORIDE 30 MILLILITER(S): 2 INJECTION INTRAMUSCULAR; INTRAVENOUS; SUBCUTANEOUS at 07:06

## 2022-06-19 RX ADMIN — INSULIN GLARGINE 10 UNIT(S): 100 INJECTION, SOLUTION SUBCUTANEOUS at 22:09

## 2022-06-19 RX ADMIN — ENOXAPARIN SODIUM 40 MILLIGRAM(S): 100 INJECTION SUBCUTANEOUS at 11:49

## 2022-06-19 RX ADMIN — PANTOPRAZOLE SODIUM 40 MILLIGRAM(S): 20 TABLET, DELAYED RELEASE ORAL at 11:49

## 2022-06-19 RX ADMIN — Medication 2: at 18:11

## 2022-06-19 RX ADMIN — Medication 2: at 22:09

## 2022-06-19 RX ADMIN — Medication 400 MILLIGRAM(S): at 03:42

## 2022-06-19 NOTE — PROGRESS NOTE ADULT - ASSESSMENT
77F PMHx of adenocarcinoma now, s/p 06-13 whipple. Recovering appropriately.    RECOMMENDATION  - Whipple pathway  - Took done provena   - d/c'd both drain  - Diet: FLD/IVF  - Pain: PCA, ATC Tylenol  - Monitor bowel function. Distended abd. No stomach bubble on Abd xray. Given suppository.  - Zosyn finished   - DVT ppx    D team surgery  [id number]   77F PMHx of adenocarcinoma now, s/p 06-13 whipple. Recovering appropriately.    RECOMMENDATION  - Whipple pathway  - d/c'd both drain  - Diet: FLD/IVF  - Pain: PCA, ATC Tylenol  - Monitor bowel function. Distended abd. No stomach bubble on Abd xray. Given suppository; will continue suppositories  - Zosyn finished   - DVT ppx    D team surgery  [id number]

## 2022-06-19 NOTE — PROGRESS NOTE ADULT - SUBJECTIVE AND OBJECTIVE BOX
Surgery Progress Note    INTERVAl/SUBJECTIVE: No acute event overnight. Patient seen and examined in am rounds, found to be without acute distress.      Vital Signs Last 24 Hrs  T(C): 36.7 (19 Jun 2022 00:20), Max: 37.2 (18 Jun 2022 21:41)  T(F): 98 (19 Jun 2022 00:20), Max: 99 (18 Jun 2022 21:41)  HR: 79 (19 Jun 2022 00:20) (77 - 84)  BP: 142/61 (19 Jun 2022 00:20) (133/58 - 147/64)  BP(mean): --  RR: 16 (19 Jun 2022 00:20) (16 - 16)  SpO2: 99% (19 Jun 2022 00:20) (99% - 100%)    Physical Exam:    General: NAD, resting comfortably in bed  Pulmonary: Nonlabored breathing, no respiratory distress  Abdominal: provena down, soft, nontender, nondistended   Extremities: WWP          LABS:                        8.7    4.79  )-----------( 239      ( 18 Jun 2022 05:29 )             27.1     06-18    136  |  105  |  5<L>  ----------------------------<  110<H>  3.6   |  21<L>  |  0.46<L>    Ca    8.7      18 Jun 2022 05:29  Phos  2.9     06-18  Mg     1.80     06-18    TPro  6.1  /  Alb  2.9<L>  /  TBili  1.0  /  DBili  x   /  AST  17  /  ALT  16  /  AlkPhos  98  06-18    PT/INR - ( 18 Jun 2022 05:29 )   PT: 11.8 sec;   INR: 1.02 ratio         PTT - ( 18 Jun 2022 05:29 )  PTT:27.9 sec      INs and OUTs:    06-17-22 @ 07:01  -  06-18-22 @ 07:00  --------------------------------------------------------  IN: 0 mL / OUT: 1100 mL / NET: -1100 mL    06-18-22 @ 07:01  -  06-19-22 @ 01:53  --------------------------------------------------------  IN: 814 mL / OUT: 550 mL / NET: 264 mL

## 2022-06-19 NOTE — PROGRESS NOTE ADULT - NUTRITIONAL ASSESSMENT
This patient has been assessed with a concern for Malnutrition and has been determined to have a diagnosis/diagnoses of Severe protein-calorie malnutrition.    This patient is being managed with:   Diet Clear Liquid-  Consistent Carbohydrate {Evening Snack} (CSTCHOSN)  Supplement Feeding Modality:  Oral  Ensure Clear Cans or Servings Per Day:  1       Frequency:  Two Times a day  Entered: Jun 17 2022 10:52AM

## 2022-06-19 NOTE — PROGRESS NOTE ADULT - SUBJECTIVE AND OBJECTIVE BOX
Anesthesia Pain Management Service    SUBJECTIVE: Patient is doing well with IV PCA and no significant problems reported.    Pain Scale Score	At rest: ___ 	With Activity: ___ 	[X ] Refer to charted pain scores    THERAPY:    [ ] IV PCA Morphine		[ ] 5 mg/mL	[ ] 1 mg/mL  [X ] IV PCA Hydromorphone	[ ] 5 mg/mL	[X ] 1 mg/mL  [ ] IV PCA Fentanyl		[ ] 50 micrograms/mL    Demand dose __0.2_ lockout __6_ (minutes) Continuous Rate _0__ Total: ___  Daily      MEDICATIONS  (STANDING):  bisacodyl Suppository 10 milliGRAM(s) Rectal every 24 hours  chlorhexidine 2% Cloths 1 Application(s) Topical daily  dextrose 5% + sodium chloride 0.45% with potassium chloride 20 mEq/L 1000 milliLiter(s) (42 mL/Hr) IV Continuous <Continuous>  enoxaparin Injectable 40 milliGRAM(s) SubCutaneous every 24 hours  HYDROmorphone PCA (1 mG/mL) 30 milliLiter(s) PCA Continuous PCA Continuous  insulin glargine Injectable (LANTUS) 10 Unit(s) SubCutaneous at bedtime  insulin lispro (ADMELOG) corrective regimen sliding scale   SubCutaneous Before meals and at bedtime  pantoprazole  Injectable 40 milliGRAM(s) IV Push daily    MEDICATIONS  (PRN):  diphenhydrAMINE Injectable 25 milliGRAM(s) IV Push every 4 hours PRN Pruritus  HYDROmorphone PCA (1 mG/mL) Rescue Clinician Bolus 0.5 milliGRAM(s) IV Push every 15 minutes PRN for Pain Scale GREATER THAN 6  naloxone Injectable 0.1 milliGRAM(s) IV Push every 3 minutes PRN For ANY of the following changes in patient status:  A. RR LESS THAN 10 breaths per minute, B. Oxygen saturation LESS THAN 90%, C. Sedation score of 6  ondansetron Injectable 4 milliGRAM(s) IV Push every 6 hours PRN Nausea  senna 2 Tablet(s) Oral at bedtime PRN Constipation      OBJECTIVE:    Sedation Score:	[ X] Alert	[ ] Drowsy 	[ ] Arousable	[ ] Asleep	[ ] Unresponsive    Side Effects:	[X ] None	[ ] Nausea	[ ] Vomiting	[ ] Pruritus  		[ ] Other:    Vital Signs Last 24 Hrs  T(C): 36.8 (19 Jun 2022 08:15), Max: 37.2 (18 Jun 2022 21:41)  T(F): 98.3 (19 Jun 2022 08:15), Max: 99 (18 Jun 2022 21:41)  HR: 79 (19 Jun 2022 08:15) (77 - 84)  BP: 137/54 (19 Jun 2022 08:15) (133/58 - 142/61)  BP(mean): --  RR: 17 (19 Jun 2022 08:15) (16 - 17)  SpO2: 99% (19 Jun 2022 08:15) (99% - 100%)    ASSESSMENT/ PLAN    Therapy to  be:	[ X] Continue   [ ] Discontinued   [ ] Change to prn Analgesics    Documentation and Verification of current medications:   [X] Done	[ ] Not done, not elligible    Comments:

## 2022-06-20 LAB
ALBUMIN SERPL ELPH-MCNC: 3.3 G/DL — SIGNIFICANT CHANGE UP (ref 3.3–5)
ALP SERPL-CCNC: 111 U/L — SIGNIFICANT CHANGE UP (ref 40–120)
ALT FLD-CCNC: 14 U/L — SIGNIFICANT CHANGE UP (ref 4–33)
ANION GAP SERPL CALC-SCNC: 12 MMOL/L — SIGNIFICANT CHANGE UP (ref 7–14)
AST SERPL-CCNC: 17 U/L — SIGNIFICANT CHANGE UP (ref 4–32)
BILIRUB SERPL-MCNC: 0.8 MG/DL — SIGNIFICANT CHANGE UP (ref 0.2–1.2)
BUN SERPL-MCNC: 6 MG/DL — LOW (ref 7–23)
CALCIUM SERPL-MCNC: 8.9 MG/DL — SIGNIFICANT CHANGE UP (ref 8.4–10.5)
CHLORIDE SERPL-SCNC: 100 MMOL/L — SIGNIFICANT CHANGE UP (ref 98–107)
CO2 SERPL-SCNC: 24 MMOL/L — SIGNIFICANT CHANGE UP (ref 22–31)
CREAT SERPL-MCNC: 0.5 MG/DL — SIGNIFICANT CHANGE UP (ref 0.5–1.3)
EGFR: 97 ML/MIN/1.73M2 — SIGNIFICANT CHANGE UP
GLUCOSE BLDC GLUCOMTR-MCNC: 150 MG/DL — HIGH (ref 70–99)
GLUCOSE BLDC GLUCOMTR-MCNC: 224 MG/DL — HIGH (ref 70–99)
GLUCOSE BLDC GLUCOMTR-MCNC: 246 MG/DL — HIGH (ref 70–99)
GLUCOSE BLDC GLUCOMTR-MCNC: 76 MG/DL — SIGNIFICANT CHANGE UP (ref 70–99)
GLUCOSE BLDC GLUCOMTR-MCNC: 94 MG/DL — SIGNIFICANT CHANGE UP (ref 70–99)
GLUCOSE SERPL-MCNC: 123 MG/DL — HIGH (ref 70–99)
HCT VFR BLD CALC: 30.8 % — LOW (ref 34.5–45)
HGB BLD-MCNC: 9.9 G/DL — LOW (ref 11.5–15.5)
MAGNESIUM SERPL-MCNC: 1.7 MG/DL — SIGNIFICANT CHANGE UP (ref 1.6–2.6)
MCHC RBC-ENTMCNC: 31.5 PG — SIGNIFICANT CHANGE UP (ref 27–34)
MCHC RBC-ENTMCNC: 32.1 GM/DL — SIGNIFICANT CHANGE UP (ref 32–36)
MCV RBC AUTO: 98.1 FL — SIGNIFICANT CHANGE UP (ref 80–100)
NRBC # BLD: 0 /100 WBCS — SIGNIFICANT CHANGE UP
NRBC # FLD: 0 K/UL — SIGNIFICANT CHANGE UP
PHOSPHATE SERPL-MCNC: 2.5 MG/DL — SIGNIFICANT CHANGE UP (ref 2.5–4.5)
PLATELET # BLD AUTO: 326 K/UL — SIGNIFICANT CHANGE UP (ref 150–400)
POTASSIUM SERPL-MCNC: 4.1 MMOL/L — SIGNIFICANT CHANGE UP (ref 3.5–5.3)
POTASSIUM SERPL-SCNC: 4.1 MMOL/L — SIGNIFICANT CHANGE UP (ref 3.5–5.3)
PROT SERPL-MCNC: 6.4 G/DL — SIGNIFICANT CHANGE UP (ref 6–8.3)
RBC # BLD: 3.14 M/UL — LOW (ref 3.8–5.2)
RBC # FLD: 15.5 % — HIGH (ref 10.3–14.5)
SODIUM SERPL-SCNC: 136 MMOL/L — SIGNIFICANT CHANGE UP (ref 135–145)
WBC # BLD: 6.95 K/UL — SIGNIFICANT CHANGE UP (ref 3.8–10.5)
WBC # FLD AUTO: 6.95 K/UL — SIGNIFICANT CHANGE UP (ref 3.8–10.5)

## 2022-06-20 RX ORDER — DEXTROSE MONOHYDRATE, SODIUM CHLORIDE, AND POTASSIUM CHLORIDE 50; .745; 4.5 G/1000ML; G/1000ML; G/1000ML
1000 INJECTION, SOLUTION INTRAVENOUS
Refills: 0 | Status: DISCONTINUED | OUTPATIENT
Start: 2022-06-20 | End: 2022-06-20

## 2022-06-20 RX ORDER — OXYCODONE HYDROCHLORIDE 5 MG/1
7.5 TABLET ORAL
Refills: 0 | Status: DISCONTINUED | OUTPATIENT
Start: 2022-06-20 | End: 2022-06-20

## 2022-06-20 RX ORDER — PANTOPRAZOLE SODIUM 20 MG/1
40 TABLET, DELAYED RELEASE ORAL
Refills: 0 | Status: DISCONTINUED | OUTPATIENT
Start: 2022-06-20 | End: 2022-06-22

## 2022-06-20 RX ORDER — ACETAMINOPHEN 500 MG
975 TABLET ORAL EVERY 6 HOURS
Refills: 0 | Status: DISCONTINUED | OUTPATIENT
Start: 2022-06-20 | End: 2022-06-20

## 2022-06-20 RX ORDER — LANOLIN ALCOHOL/MO/W.PET/CERES
1 CREAM (GRAM) TOPICAL AT BEDTIME
Refills: 0 | Status: DISCONTINUED | OUTPATIENT
Start: 2022-06-20 | End: 2022-06-20

## 2022-06-20 RX ORDER — METOPROLOL TARTRATE 50 MG
25 TABLET ORAL DAILY
Refills: 0 | Status: DISCONTINUED | OUTPATIENT
Start: 2022-06-20 | End: 2022-06-22

## 2022-06-20 RX ORDER — HYDROMORPHONE HYDROCHLORIDE 2 MG/ML
0.25 INJECTION INTRAMUSCULAR; INTRAVENOUS; SUBCUTANEOUS
Refills: 0 | Status: DISCONTINUED | OUTPATIENT
Start: 2022-06-20 | End: 2022-06-20

## 2022-06-20 RX ORDER — HYDROMORPHONE HYDROCHLORIDE 2 MG/ML
0.5 INJECTION INTRAMUSCULAR; INTRAVENOUS; SUBCUTANEOUS
Refills: 0 | Status: DISCONTINUED | OUTPATIENT
Start: 2022-06-20 | End: 2022-06-21

## 2022-06-20 RX ORDER — AMLODIPINE BESYLATE 2.5 MG/1
10 TABLET ORAL DAILY
Refills: 0 | Status: DISCONTINUED | OUTPATIENT
Start: 2022-06-20 | End: 2022-06-22

## 2022-06-20 RX ORDER — GABAPENTIN 400 MG/1
100 CAPSULE ORAL THREE TIMES A DAY
Refills: 0 | Status: DISCONTINUED | OUTPATIENT
Start: 2022-06-20 | End: 2022-06-22

## 2022-06-20 RX ORDER — INSULIN LISPRO 100/ML
3 VIAL (ML) SUBCUTANEOUS
Refills: 0 | Status: DISCONTINUED | OUTPATIENT
Start: 2022-06-20 | End: 2022-06-22

## 2022-06-20 RX ORDER — TRAMADOL HYDROCHLORIDE 50 MG/1
50 TABLET ORAL EVERY 4 HOURS
Refills: 0 | Status: DISCONTINUED | OUTPATIENT
Start: 2022-06-20 | End: 2022-06-22

## 2022-06-20 RX ORDER — ATORVASTATIN CALCIUM 80 MG/1
40 TABLET, FILM COATED ORAL AT BEDTIME
Refills: 0 | Status: DISCONTINUED | OUTPATIENT
Start: 2022-06-20 | End: 2022-06-22

## 2022-06-20 RX ORDER — MAGNESIUM SULFATE 500 MG/ML
2 VIAL (ML) INJECTION ONCE
Refills: 0 | Status: COMPLETED | OUTPATIENT
Start: 2022-06-20 | End: 2022-06-20

## 2022-06-20 RX ORDER — LANOLIN ALCOHOL/MO/W.PET/CERES
3 CREAM (GRAM) TOPICAL AT BEDTIME
Refills: 0 | Status: DISCONTINUED | OUTPATIENT
Start: 2022-06-20 | End: 2022-06-22

## 2022-06-20 RX ORDER — SODIUM,POTASSIUM PHOSPHATES 278-250MG
2 POWDER IN PACKET (EA) ORAL ONCE
Refills: 0 | Status: COMPLETED | OUTPATIENT
Start: 2022-06-20 | End: 2022-06-20

## 2022-06-20 RX ORDER — IBUPROFEN 200 MG
200 TABLET ORAL EVERY 6 HOURS
Refills: 0 | Status: DISCONTINUED | OUTPATIENT
Start: 2022-06-20 | End: 2022-06-22

## 2022-06-20 RX ORDER — ACETAMINOPHEN 500 MG
650 TABLET ORAL EVERY 6 HOURS
Refills: 0 | Status: DISCONTINUED | OUTPATIENT
Start: 2022-06-20 | End: 2022-06-22

## 2022-06-20 RX ORDER — OXYCODONE HYDROCHLORIDE 5 MG/1
5 TABLET ORAL
Refills: 0 | Status: DISCONTINUED | OUTPATIENT
Start: 2022-06-20 | End: 2022-06-20

## 2022-06-20 RX ORDER — ASPIRIN/CALCIUM CARB/MAGNESIUM 324 MG
81 TABLET ORAL DAILY
Refills: 0 | Status: DISCONTINUED | OUTPATIENT
Start: 2022-06-20 | End: 2022-06-22

## 2022-06-20 RX ORDER — OXYCODONE HYDROCHLORIDE 5 MG/1
5 TABLET ORAL EVERY 4 HOURS
Refills: 0 | Status: DISCONTINUED | OUTPATIENT
Start: 2022-06-20 | End: 2022-06-22

## 2022-06-20 RX ADMIN — Medication 3 MILLIGRAM(S): at 22:04

## 2022-06-20 RX ADMIN — INSULIN GLARGINE 10 UNIT(S): 100 INJECTION, SOLUTION SUBCUTANEOUS at 21:53

## 2022-06-20 RX ADMIN — TRAMADOL HYDROCHLORIDE 50 MILLIGRAM(S): 50 TABLET ORAL at 11:20

## 2022-06-20 RX ADMIN — Medication 25 GRAM(S): at 11:20

## 2022-06-20 RX ADMIN — Medication 650 MILLIGRAM(S): at 11:20

## 2022-06-20 RX ADMIN — Medication 650 MILLIGRAM(S): at 11:50

## 2022-06-20 RX ADMIN — Medication 650 MILLIGRAM(S): at 18:18

## 2022-06-20 RX ADMIN — DEXTROSE MONOHYDRATE, SODIUM CHLORIDE, AND POTASSIUM CHLORIDE 42 MILLILITER(S): 50; .745; 4.5 INJECTION, SOLUTION INTRAVENOUS at 08:15

## 2022-06-20 RX ADMIN — ENOXAPARIN SODIUM 40 MILLIGRAM(S): 100 INJECTION SUBCUTANEOUS at 11:20

## 2022-06-20 RX ADMIN — Medication 200 MILLIGRAM(S): at 15:54

## 2022-06-20 RX ADMIN — Medication 3 UNIT(S): at 17:55

## 2022-06-20 RX ADMIN — HYDROMORPHONE HYDROCHLORIDE 30 MILLILITER(S): 2 INJECTION INTRAMUSCULAR; INTRAVENOUS; SUBCUTANEOUS at 07:48

## 2022-06-20 RX ADMIN — Medication 650 MILLIGRAM(S): at 17:54

## 2022-06-20 RX ADMIN — PANTOPRAZOLE SODIUM 40 MILLIGRAM(S): 20 TABLET, DELAYED RELEASE ORAL at 11:20

## 2022-06-20 RX ADMIN — GABAPENTIN 100 MILLIGRAM(S): 400 CAPSULE ORAL at 21:53

## 2022-06-20 RX ADMIN — OXYCODONE HYDROCHLORIDE 5 MILLIGRAM(S): 5 TABLET ORAL at 14:28

## 2022-06-20 RX ADMIN — Medication 4: at 17:54

## 2022-06-20 RX ADMIN — TRAMADOL HYDROCHLORIDE 50 MILLIGRAM(S): 50 TABLET ORAL at 11:50

## 2022-06-20 RX ADMIN — Medication 81 MILLIGRAM(S): at 17:53

## 2022-06-20 RX ADMIN — Medication 10 MILLIGRAM(S): at 17:54

## 2022-06-20 RX ADMIN — Medication 2 PACKET(S): at 11:20

## 2022-06-20 RX ADMIN — Medication 4: at 13:07

## 2022-06-20 RX ADMIN — OXYCODONE HYDROCHLORIDE 5 MILLIGRAM(S): 5 TABLET ORAL at 15:00

## 2022-06-20 RX ADMIN — GABAPENTIN 100 MILLIGRAM(S): 400 CAPSULE ORAL at 14:28

## 2022-06-20 RX ADMIN — Medication 200 MILLIGRAM(S): at 19:57

## 2022-06-20 RX ADMIN — Medication 200 MILLIGRAM(S): at 15:24

## 2022-06-20 RX ADMIN — AMLODIPINE BESYLATE 10 MILLIGRAM(S): 2.5 TABLET ORAL at 17:53

## 2022-06-20 RX ADMIN — Medication 25 MILLIGRAM(S): at 17:53

## 2022-06-20 RX ADMIN — ATORVASTATIN CALCIUM 40 MILLIGRAM(S): 80 TABLET, FILM COATED ORAL at 21:54

## 2022-06-20 NOTE — PROGRESS NOTE ADULT - SUBJECTIVE AND OBJECTIVE BOX
Anesthesia Pain Management Service    SUBJECTIVE: Patient is doing well with IV PCA and no significant problems reported. Patient primarily Portuguese speaking.  ID# [id number] used.    Pain Scale Score	At rest: _6/10__ 	With Activity: ___ 	[X ] Refer to charted pain scores    THERAPY:    [ ] IV PCA Morphine		[ ] 5 mg/mL	[ ] 1 mg/mL  [X ] IV PCA Hydromorphone	[ ] 5 mg/mL	[X ] 1 mg/mL  [ ] IV PCA Fentanyl		[ ] 50 micrograms/mL    Demand dose __0.2_ lockout __6_ (minutes) Continuous Rate _0__ Total: _4.4__   mg used (in past 24 hrs)      MEDICATIONS  (STANDING):  acetaminophen     Tablet .. 975 milliGRAM(s) Oral every 6 hours  bisacodyl Suppository 10 milliGRAM(s) Rectal every 24 hours  chlorhexidine 2% Cloths 1 Application(s) Topical daily  dextrose 5% + sodium chloride 0.45% with potassium chloride 20 mEq/L 1000 milliLiter(s) (42 mL/Hr) IV Continuous <Continuous>  enoxaparin Injectable 40 milliGRAM(s) SubCutaneous every 24 hours  insulin glargine Injectable (LANTUS) 10 Unit(s) SubCutaneous at bedtime  insulin lispro (ADMELOG) corrective regimen sliding scale   SubCutaneous Before meals and at bedtime  pantoprazole  Injectable 40 milliGRAM(s) IV Push daily    MEDICATIONS  (PRN):  diphenhydrAMINE Injectable 25 milliGRAM(s) IV Push every 4 hours PRN Pruritus  HYDROmorphone  Injectable 0.25 milliGRAM(s) IV Push every 3 hours PRN Severe Braekthrough  Pain (7 - 10)  naloxone Injectable 0.1 milliGRAM(s) IV Push every 3 minutes PRN For ANY of the following changes in patient status:  A. RR LESS THAN 10 breaths per minute, B. Oxygen saturation LESS THAN 90%, C. Sedation score of 6  ondansetron Injectable 4 milliGRAM(s) IV Push every 6 hours PRN Nausea  oxyCODONE    IR 5 milliGRAM(s) Oral every 3 hours PRN Moderate Pain (4 - 6)  oxyCODONE    IR 7.5 milliGRAM(s) Oral every 3 hours PRN Severe Pain (7 - 10)  senna 2 Tablet(s) Oral at bedtime PRN Constipation      OBJECTIVE: Patient sitting up in chair.    Sedation Score:	[ X] Alert	[ ] Drowsy 	[ ] Arousable	[ ] Asleep	[ ] Unresponsive    Side Effects:	[X ] None	[ ] Nausea	[ ] Vomiting	[ ] Pruritus  		[ ] Other:    Vital Signs Last 24 Hrs  T(C): 36.9 (20 Jun 2022 08:00), Max: 36.9 (20 Jun 2022 08:00)  T(F): 98.4 (20 Jun 2022 08:00), Max: 98.4 (20 Jun 2022 08:00)  HR: 90 (20 Jun 2022 08:00) (75 - 91)  BP: 133/58 (20 Jun 2022 08:00) (126/62 - 141/65)  BP(mean): --  RR: 18 (20 Jun 2022 08:00) (17 - 18)  SpO2: 100% (20 Jun 2022 08:00) (99% - 100%)    ASSESSMENT/ PLAN    Therapy to  be:	[ ] Continue   [ X] Discontinued   [X ] Change to prn Analgesics    Documentation and Verification of current medications:   [X] Done	[ ] Not done, not elligible    Comments: Discussed patient with primary team. IV PCA discontinued PRN Oral/IV opioids and/or Adjuvant non-opioid medication to be ordered at this point.    Progress Note written now but Patient was seen earlier.

## 2022-06-20 NOTE — PROGRESS NOTE ADULT - ASSESSMENT
77F PMHx of adenocarcinoma now, s/p 06-13 whipple. Recovering appropriately.    RECOMMENDATION  - ipple pathway  - Diet: FLD/IVF  - Pain: PCA, ATC Tylenol  - Monitor bowel function. Distended abd. No stomach bubble on Abd xray. Given suppository; will continue suppositories  - Zosyn finished   - DVT ppx    D team surgery  [id number]   77F PMHx of adenocarcinoma now, s/p 06-13 whipple. Recovering appropriately.    RECOMMENDATION  - Whipple pathway  - Diet: FLD/IVF  - Pain: PCA, ATC Tylenol  - Bowel function returned (+/+)  - Zosyn finished   - DVT ppx    D team surgery  [id number]   77F PMHx of adenocarcinoma now, s/p 06-13 whipple. Recovering appropriately.    RECOMMENDATION  - Whipple pathway  - Diet: FLD/IVF  - PO pain meds  - Melatonin  - Bowel function returned (+/+)  - DVT ppx    D team surgery  [id number]

## 2022-06-20 NOTE — PROGRESS NOTE ADULT - SUBJECTIVE AND OBJECTIVE BOX
SURGERY DAILY PROGRESS NOTE:     SUBJECTIVE/ROS: No acute events overnight. Patient seen and examined bedside on AM rounds.     OBJECTIVE:  Vital Signs Last 24 Hrs  T(C): 36.4 (20 Jun 2022 00:10), Max: 36.8 (19 Jun 2022 08:15)  T(F): 97.6 (20 Jun 2022 00:10), Max: 98.3 (19 Jun 2022 08:15)  HR: 91 (20 Jun 2022 00:10) (75 - 91)  BP: 126/62 (20 Jun 2022 00:10) (126/62 - 142/61)  BP(mean): --  RR: 18 (20 Jun 2022 00:10) (16 - 18)  SpO2: 100% (20 Jun 2022 00:10) (99% - 100%)    PHYSICAL EXAM:  General: NAD, resting comfortably in bed  Pulmonary: Nonlabored breathing, no respiratory distress  Abdominal: soft, nontender, nondistended, incision well healing  Extremities: WWP                          9.3    5.05  )-----------( 267      ( 19 Jun 2022 05:39 )             28.0     06-19    134<L>  |  102  |  6<L>  ----------------------------<  86  3.9   |  19<L>  |  0.46<L>    Ca    8.7      19 Jun 2022 05:39  Phos  2.9     06-19  Mg     1.90     06-19    TPro  6.0  /  Alb  3.0<L>  /  TBili  0.9  /  DBili  x   /  AST  20  /  ALT  15  /  AlkPhos  108  06-19   PT/INR - ( 19 Jun 2022 05:39 )   PT: 11.8 sec;   INR: 1.02 ratio         PTT - ( 19 Jun 2022 05:39 )  PTT:29.7 sec  I&O's Detail    18 Jun 2022 07:01  -  19 Jun 2022 07:00  --------------------------------------------------------  IN:    dextrose 5% + sodium chloride 0.45% w/ Additives: 966 mL    IV PiggyBack: 200 mL    Oral Fluid: 180 mL  Total IN: 1346 mL    OUT:    Voided (mL): 1150 mL  Total OUT: 1150 mL    Total NET: 196 mL      19 Jun 2022 07:01  -  20 Jun 2022 00:54  --------------------------------------------------------  IN:    dextrose 5% + sodium chloride 0.45% w/ Additives: 546 mL    Oral Fluid: 660 mL  Total IN: 1206 mL    OUT:    Voided (mL): 1000 mL  Total OUT: 1000 mL    Total NET: 206 mL          IMAGING:               SURGERY DAILY PROGRESS NOTE:     SUBJECTIVE/ROS: No acute events overnight. Patient seen and examined bedside on AM rounds. Patient is pain controlled and tolerating full liquid diet yesterday. Patient had rice soup yesterday without N/V. Denies fever, chills, chest pain, SOB     OBJECTIVE:  Vital Signs Last 24 Hrs  ICU Vital Signs Last 24 Hrs  T(C): 36.9 (20 Jun 2022 08:00), Max: 36.9 (20 Jun 2022 08:00)  T(F): 98.4 (20 Jun 2022 08:00), Max: 98.4 (20 Jun 2022 08:00)  HR: 90 (20 Jun 2022 08:00) (75 - 91)  BP: 133/58 (20 Jun 2022 08:00) (126/62 - 141/65)  BP(mean): --  ABP: --  ABP(mean): --  RR: 18 (20 Jun 2022 08:00) (17 - 18)  SpO2: 100% (20 Jun 2022 08:00) (99% - 100%)    INS AND OUTS  I&O's Detail    19 Jun 2022 07:01  -  20 Jun 2022 07:00  --------------------------------------------------------  IN:    dextrose 5% + sodium chloride 0.45% w/ Additives: 798 mL    Oral Fluid: 930 mL  Total IN: 1728 mL    OUT:    Voided (mL): 2200 mL  Total OUT: 2200 mL    Total NET: -472 mL      20 Jun 2022 07:01  -  20 Jun 2022 10:59  --------------------------------------------------------  IN:    dextrose 5% + sodium chloride 0.45% w/ Additives: 84 mL    Oral Fluid: 240 mL  Total IN: 324 mL    OUT:    Voided (mL): 300 mL  Total OUT: 300 mL    Total NET: 24 mL        PHYSICAL EXAM:  General: NAD, resting comfortably in bed  Pulmonary: Nonlabored breathing, no respiratory distress  Abdominal: soft, nontender, nondistended, midline incision c/d/i  Extremities: WWP    LABS                        9.9    6.95  )-----------( 326      ( 20 Jun 2022 04:40 )             30.8   06-20    136  |  100  |  6<L>  ----------------------------<  123<H>  4.1   |  24  |  0.50    Ca    8.9      20 Jun 2022 04:40  Phos  2.5     06-20  Mg     1.70     06-20    TPro  6.4  /  Alb  3.3  /  TBili  0.8  /  DBili  x   /  AST  17  /  ALT  14  /  AlkPhos  111  06-20            IMAGING:

## 2022-06-20 NOTE — PROGRESS NOTE ADULT - SUBJECTIVE AND OBJECTIVE BOX
Anesthesia Pain Management Service    SUBJECTIVE:    Therapy:	  [ x] IV PCA	   [ ] Epidural           [ ] s/p Spinal Opoid              [ ] Postpartum infusion	  [ ] Patient controlled regional anesthesia (PCRA)    [ ] prn Analgesics    OBJECTIVE:   [ x] No new signs     [ ] Other:    Side Effects:  [x ] None			[ ] Other:    Assessment of Catheter Site:		[ ] Intact		[ ] Other:    ASSESSMENT/PLAN  [ ] Continue current therapy    [ x] Therapy changed to:    [ ] IV PCA       [ ] Epidural     [x ] prn Analgesics     Comments:

## 2022-06-21 LAB
ALBUMIN SERPL ELPH-MCNC: 3.2 G/DL — LOW (ref 3.3–5)
ALP SERPL-CCNC: 122 U/L — HIGH (ref 40–120)
ALT FLD-CCNC: 16 U/L — SIGNIFICANT CHANGE UP (ref 4–33)
ANION GAP SERPL CALC-SCNC: 10 MMOL/L — SIGNIFICANT CHANGE UP (ref 7–14)
AST SERPL-CCNC: 31 U/L — SIGNIFICANT CHANGE UP (ref 4–32)
BILIRUB SERPL-MCNC: 0.9 MG/DL — SIGNIFICANT CHANGE UP (ref 0.2–1.2)
BUN SERPL-MCNC: 7 MG/DL — SIGNIFICANT CHANGE UP (ref 7–23)
C PEPTIDE SERPL-MCNC: 0.4 NG/ML — LOW (ref 1.1–4.4)
CALCIUM SERPL-MCNC: 8.8 MG/DL — SIGNIFICANT CHANGE UP (ref 8.4–10.5)
CHLORIDE SERPL-SCNC: 102 MMOL/L — SIGNIFICANT CHANGE UP (ref 98–107)
CO2 SERPL-SCNC: 23 MMOL/L — SIGNIFICANT CHANGE UP (ref 22–31)
CREAT SERPL-MCNC: 0.46 MG/DL — LOW (ref 0.5–1.3)
EGFR: 98 ML/MIN/1.73M2 — SIGNIFICANT CHANGE UP
GLUCOSE BLDC GLUCOMTR-MCNC: 100 MG/DL — HIGH (ref 70–99)
GLUCOSE BLDC GLUCOMTR-MCNC: 204 MG/DL — HIGH (ref 70–99)
GLUCOSE BLDC GLUCOMTR-MCNC: 237 MG/DL — HIGH (ref 70–99)
GLUCOSE BLDC GLUCOMTR-MCNC: 275 MG/DL — HIGH (ref 70–99)
GLUCOSE BLDC GLUCOMTR-MCNC: 68 MG/DL — LOW (ref 70–99)
GLUCOSE BLDC GLUCOMTR-MCNC: 74 MG/DL — SIGNIFICANT CHANGE UP (ref 70–99)
GLUCOSE SERPL-MCNC: 128 MG/DL — HIGH (ref 70–99)
HCT VFR BLD CALC: 26.7 % — LOW (ref 34.5–45)
HGB BLD-MCNC: 8.9 G/DL — LOW (ref 11.5–15.5)
MAGNESIUM SERPL-MCNC: 1.9 MG/DL — SIGNIFICANT CHANGE UP (ref 1.6–2.6)
MCHC RBC-ENTMCNC: 31.4 PG — SIGNIFICANT CHANGE UP (ref 27–34)
MCHC RBC-ENTMCNC: 33.3 GM/DL — SIGNIFICANT CHANGE UP (ref 32–36)
MCV RBC AUTO: 94.3 FL — SIGNIFICANT CHANGE UP (ref 80–100)
NRBC # BLD: 0 /100 WBCS — SIGNIFICANT CHANGE UP
NRBC # FLD: 0 K/UL — SIGNIFICANT CHANGE UP
PHOSPHATE SERPL-MCNC: 2.9 MG/DL — SIGNIFICANT CHANGE UP (ref 2.5–4.5)
PLATELET # BLD AUTO: 343 K/UL — SIGNIFICANT CHANGE UP (ref 150–400)
POTASSIUM SERPL-MCNC: 3.6 MMOL/L — SIGNIFICANT CHANGE UP (ref 3.5–5.3)
POTASSIUM SERPL-SCNC: 3.6 MMOL/L — SIGNIFICANT CHANGE UP (ref 3.5–5.3)
PROT SERPL-MCNC: 6.3 G/DL — SIGNIFICANT CHANGE UP (ref 6–8.3)
RBC # BLD: 2.83 M/UL — LOW (ref 3.8–5.2)
RBC # FLD: 15.4 % — HIGH (ref 10.3–14.5)
SODIUM SERPL-SCNC: 135 MMOL/L — SIGNIFICANT CHANGE UP (ref 135–145)
WBC # BLD: 5.98 K/UL — SIGNIFICANT CHANGE UP (ref 3.8–10.5)
WBC # FLD AUTO: 5.98 K/UL — SIGNIFICANT CHANGE UP (ref 3.8–10.5)

## 2022-06-21 PROCEDURE — 99222 1ST HOSP IP/OBS MODERATE 55: CPT

## 2022-06-21 RX ORDER — SENNA PLUS 8.6 MG/1
2 TABLET ORAL
Qty: 0 | Refills: 0 | DISCHARGE
Start: 2022-06-21

## 2022-06-21 RX ORDER — IBUPROFEN 200 MG
1 TABLET ORAL
Qty: 0 | Refills: 0 | DISCHARGE
Start: 2022-06-21

## 2022-06-21 RX ORDER — ACETAMINOPHEN 500 MG
2 TABLET ORAL
Qty: 0 | Refills: 0 | DISCHARGE
Start: 2022-06-21

## 2022-06-21 RX ORDER — POTASSIUM CHLORIDE 20 MEQ
40 PACKET (EA) ORAL ONCE
Refills: 0 | Status: COMPLETED | OUTPATIENT
Start: 2022-06-21 | End: 2022-06-21

## 2022-06-21 RX ORDER — OXYCODONE HYDROCHLORIDE 5 MG/1
1 TABLET ORAL
Qty: 28 | Refills: 0
Start: 2022-06-21 | End: 2022-06-27

## 2022-06-21 RX ORDER — GABAPENTIN 400 MG/1
1 CAPSULE ORAL
Qty: 21 | Refills: 0
Start: 2022-06-21 | End: 2022-06-27

## 2022-06-21 RX ORDER — PANTOPRAZOLE SODIUM 20 MG/1
1 TABLET, DELAYED RELEASE ORAL
Qty: 30 | Refills: 0
Start: 2022-06-21 | End: 2022-07-20

## 2022-06-21 RX ADMIN — Medication 650 MILLIGRAM(S): at 00:43

## 2022-06-21 RX ADMIN — SENNA PLUS 2 TABLET(S): 8.6 TABLET ORAL at 22:20

## 2022-06-21 RX ADMIN — Medication 650 MILLIGRAM(S): at 05:47

## 2022-06-21 RX ADMIN — Medication 4: at 13:54

## 2022-06-21 RX ADMIN — Medication 650 MILLIGRAM(S): at 12:52

## 2022-06-21 RX ADMIN — Medication 3 UNIT(S): at 09:46

## 2022-06-21 RX ADMIN — ENOXAPARIN SODIUM 40 MILLIGRAM(S): 100 INJECTION SUBCUTANEOUS at 12:52

## 2022-06-21 RX ADMIN — Medication 650 MILLIGRAM(S): at 13:22

## 2022-06-21 RX ADMIN — Medication 200 MILLIGRAM(S): at 02:34

## 2022-06-21 RX ADMIN — OXYCODONE HYDROCHLORIDE 5 MILLIGRAM(S): 5 TABLET ORAL at 18:14

## 2022-06-21 RX ADMIN — GABAPENTIN 100 MILLIGRAM(S): 400 CAPSULE ORAL at 22:21

## 2022-06-21 RX ADMIN — PANTOPRAZOLE SODIUM 40 MILLIGRAM(S): 20 TABLET, DELAYED RELEASE ORAL at 05:49

## 2022-06-21 RX ADMIN — Medication 81 MILLIGRAM(S): at 12:52

## 2022-06-21 RX ADMIN — Medication 40 MILLIEQUIVALENT(S): at 12:52

## 2022-06-21 RX ADMIN — Medication 3 MILLIGRAM(S): at 22:21

## 2022-06-21 RX ADMIN — INSULIN GLARGINE 10 UNIT(S): 100 INJECTION, SOLUTION SUBCUTANEOUS at 22:21

## 2022-06-21 RX ADMIN — OXYCODONE HYDROCHLORIDE 5 MILLIGRAM(S): 5 TABLET ORAL at 17:44

## 2022-06-21 RX ADMIN — GABAPENTIN 100 MILLIGRAM(S): 400 CAPSULE ORAL at 05:47

## 2022-06-21 RX ADMIN — Medication 3 UNIT(S): at 13:55

## 2022-06-21 RX ADMIN — Medication 25 MILLIGRAM(S): at 05:49

## 2022-06-21 RX ADMIN — GABAPENTIN 100 MILLIGRAM(S): 400 CAPSULE ORAL at 12:52

## 2022-06-21 RX ADMIN — OXYCODONE HYDROCHLORIDE 5 MILLIGRAM(S): 5 TABLET ORAL at 11:12

## 2022-06-21 RX ADMIN — ATORVASTATIN CALCIUM 40 MILLIGRAM(S): 80 TABLET, FILM COATED ORAL at 22:20

## 2022-06-21 RX ADMIN — Medication 200 MILLIGRAM(S): at 22:21

## 2022-06-21 RX ADMIN — AMLODIPINE BESYLATE 10 MILLIGRAM(S): 2.5 TABLET ORAL at 05:48

## 2022-06-21 RX ADMIN — Medication 200 MILLIGRAM(S): at 10:41

## 2022-06-21 RX ADMIN — OXYCODONE HYDROCHLORIDE 5 MILLIGRAM(S): 5 TABLET ORAL at 10:42

## 2022-06-21 RX ADMIN — Medication 6: at 18:23

## 2022-06-21 RX ADMIN — Medication 200 MILLIGRAM(S): at 11:12

## 2022-06-21 RX ADMIN — Medication 3 UNIT(S): at 18:22

## 2022-06-21 RX ADMIN — Medication 4: at 09:46

## 2022-06-21 NOTE — CONSULT NOTE ADULT - ASSESSMENT
77 yr old F with malignant pancreatic mass s/p Whipple 6/13 and DM2 with overly tight control (A1C 5.2). C-peptide indicative of low insulin reserve.

## 2022-06-21 NOTE — PROGRESS NOTE ADULT - ASSESSMENT
77F PMHx of adenocarcinoma now, s/p 06-13 whipple. Recovering appropriately.    RECOMMENDATION  - Whipple pathway  - Diet: regular cc  - PO pain meds  - Melatonin  - Bowel function returned (+/+)  - DVT ppx  - Discharge: possibly today    D team surgery  [id number]   77F PMHx of adenocarcinoma now, s/p 06-13 whipple. Recovering appropriately.    RECOMMENDATION  - Whipple pathway  - Diet: regular cc  - PO pain meds  - Melatonin  - Bowel function returned (+/+)  - DVT ppx  - Discharge: possibly today - awaiting endo input re insulin regimen    D team surgery  [id number]

## 2022-06-21 NOTE — CONSULT NOTE ADULT - PROBLEM SELECTOR RECOMMENDATION 9
While inpatient CHO diet and FS AC and HS  Goal glucose 100-180  Recommend Lantus 10 Units HS and Admelog 5 units TIDAC plus low scale before meals and bedtime  Patient should be discharged on basal/bolus regimen with discontinuation of janumet  She would benefit from CGM monitor such as [employer]  Follow up with PMD within 1 week  Will email Endocrine Office [address] [place] [address] [phone] to set up appt

## 2022-06-21 NOTE — PROGRESS NOTE ADULT - SUBJECTIVE AND OBJECTIVE BOX
SURGERY DAILY PROGRESS NOTE:     SUBJECTIVE/ROS: No acute events overnight. Patient seen and examined bedside on AM rounds.     OBJECTIVE:  Vital Signs Last 24 Hrs  T(C): 37 (20 Jun 2022 20:26), Max: 37 (20 Jun 2022 20:26)  T(F): 98.6 (20 Jun 2022 20:26), Max: 98.6 (20 Jun 2022 20:26)  HR: 84 (20 Jun 2022 20:26) (73 - 91)  BP: 132/60 (20 Jun 2022 20:26) (127/59 - 133/58)  BP(mean): --  RR: 18 (20 Jun 2022 20:26) (17 - 18)  SpO2: 98% (20 Jun 2022 20:26) (98% - 100%)    PHYSICAL EXAM:  General: NAD, resting comfortably in bed  Pulmonary: Nonlabored breathing, no respiratory distress  Abdominal: soft, nontender, nondistended, midline incision c/d/i  Extremities: WWP                          9.9    6.95  )-----------( 326      ( 20 Jun 2022 04:40 )             30.8     06-20    136  |  100  |  6<L>  ----------------------------<  123<H>  4.1   |  24  |  0.50    Ca    8.9      20 Jun 2022 04:40  Phos  2.5     06-20  Mg     1.70     06-20    TPro  6.4  /  Alb  3.3  /  TBili  0.8  /  DBili  x   /  AST  17  /  ALT  14  /  AlkPhos  111  06-20   PT/INR - ( 19 Jun 2022 05:39 )   PT: 11.8 sec;   INR: 1.02 ratio         PTT - ( 19 Jun 2022 05:39 )  PTT:29.7 sec  I&O's Detail    19 Jun 2022 07:01  -  20 Jun 2022 07:00  --------------------------------------------------------  IN:    dextrose 5% + sodium chloride 0.45% w/ Additives: 798 mL    Oral Fluid: 930 mL  Total IN: 1728 mL    OUT:    Voided (mL): 2200 mL  Total OUT: 2200 mL    Total NET: -472 mL      20 Jun 2022 07:01  -  21 Jun 2022 00:29  --------------------------------------------------------  IN:    dextrose 5% + sodium chloride 0.45% w/ Additives: 168 mL    IV PiggyBack: 50 mL    Oral Fluid: 840 mL  Total IN: 1058 mL    OUT:    Voided (mL): 900 mL  Total OUT: 900 mL    Total NET: 158 mL          IMAGING:               SURGERY DAILY PROGRESS NOTE:     SUBJECTIVE/ROS: No acute events overnight. Patient seen and examined bedside on AM rounds.     OBJECTIVE:  Vital Signs Last 24 Hrs  T(C): 37 (20 Jun 2022 20:26), Max: 37 (20 Jun 2022 20:26)  T(F): 98.6 (20 Jun 2022 20:26), Max: 98.6 (20 Jun 2022 20:26)  HR: 84 (20 Jun 2022 20:26) (73 - 91)  BP: 132/60 (20 Jun 2022 20:26) (127/59 - 133/58)  BP(mean): --  RR: 18 (20 Jun 2022 20:26) (17 - 18)  SpO2: 98% (20 Jun 2022 20:26) (98% - 100%)    PHYSICAL EXAM:  General: NAD, resting comfortably in bed  Pulmonary: Nonlabored breathing, no respiratory distress  Abdominal: soft, nontender, nondistended, midline incision c/d/i w/o erythema or purulence  Extremities: WWP                          9.9    6.95  )-----------( 326      ( 20 Jun 2022 04:40 )             30.8     06-20    136  |  100  |  6<L>  ----------------------------<  123<H>  4.1   |  24  |  0.50    Ca    8.9      20 Jun 2022 04:40  Phos  2.5     06-20  Mg     1.70     06-20    TPro  6.4  /  Alb  3.3  /  TBili  0.8  /  DBili  x   /  AST  17  /  ALT  14  /  AlkPhos  111  06-20   PT/INR - ( 19 Jun 2022 05:39 )   PT: 11.8 sec;   INR: 1.02 ratio         PTT - ( 19 Jun 2022 05:39 )  PTT:29.7 sec  I&O's Detail    19 Jun 2022 07:01  -  20 Jun 2022 07:00  --------------------------------------------------------  IN:    dextrose 5% + sodium chloride 0.45% w/ Additives: 798 mL    Oral Fluid: 930 mL  Total IN: 1728 mL    OUT:    Voided (mL): 2200 mL  Total OUT: 2200 mL    Total NET: -472 mL      20 Jun 2022 07:01  -  21 Jun 2022 00:29  --------------------------------------------------------  IN:    dextrose 5% + sodium chloride 0.45% w/ Additives: 168 mL    IV PiggyBack: 50 mL    Oral Fluid: 840 mL  Total IN: 1058 mL    OUT:    Voided (mL): 900 mL  Total OUT: 900 mL    Total NET: 158 mL          IMAGING:

## 2022-06-21 NOTE — CONSULT NOTE ADULT - SUBJECTIVE AND OBJECTIVE BOX
HPI:  78y/o female presents for preop eval for scheduled whipple.  Pt states hospitalized few months ago at [facility] for jaundice.  ERCP and bilary stent inserted.  Imaging show pancreatic mass.  Preop dx malignant neoplasm of pancreas unspecified.  (2022 08:27)      PAST MEDICAL & SURGICAL HISTORY:  Hypertension      IDDM (insulin dependent diabetes mellitus)      Malignant neoplasm of pancreas, unspecified      Dyslipidemia      CAD (coronary artery disease)      H/O chronic hepatitis  treated      History of  section      History of hysterectomy      History of coronary angioplasty with insertion of stent  2019 - 1 stent inserted          FAMILY HISTORY:  FH: HTN (hypertension) (Mother)    Family history of leukemia (Child)        Social History:    Outpatient Medications:    MEDICATIONS  (STANDING):  acetaminophen     Tablet .. 650 milliGRAM(s) Oral every 6 hours  amLODIPine   Tablet 10 milliGRAM(s) Oral daily  aspirin  chewable 81 milliGRAM(s) Oral daily  atorvastatin 40 milliGRAM(s) Oral at bedtime  bisacodyl Suppository 10 milliGRAM(s) Rectal every 24 hours  chlorhexidine 2% Cloths 1 Application(s) Topical daily  enoxaparin Injectable 40 milliGRAM(s) SubCutaneous every 24 hours  gabapentin 100 milliGRAM(s) Oral three times a day  ibuprofen  Tablet. 200 milliGRAM(s) Oral every 6 hours  insulin glargine Injectable (LANTUS) 10 Unit(s) SubCutaneous at bedtime  insulin lispro (ADMELOG) corrective regimen sliding scale   SubCutaneous Before meals and at bedtime  insulin lispro Injectable (ADMELOG) 3 Unit(s) SubCutaneous three times a day before meals  melatonin 3 milliGRAM(s) Oral at bedtime  metoprolol succinate ER 25 milliGRAM(s) Oral daily  pantoprazole    Tablet 40 milliGRAM(s) Oral before breakfast  potassium chloride    Tablet ER 40 milliEquivalent(s) Oral once    MEDICATIONS  (PRN):  naloxone Injectable 0.1 milliGRAM(s) IV Push every 3 minutes PRN For ANY of the following changes in patient status:  A. RR LESS THAN 10 breaths per minute, B. Oxygen saturation LESS THAN 90%, C. Sedation score of 6  ondansetron Injectable 4 milliGRAM(s) IV Push every 6 hours PRN Nausea  oxyCODONE    IR 5 milliGRAM(s) Oral every 4 hours PRN Severe Pain (7 - 10)  senna 2 Tablet(s) Oral at bedtime PRN Constipation  traMADol 50 milliGRAM(s) Oral every 4 hours PRN Moderate Pain (4 - 6)      Allergies    No Known Allergies    Intolerances      Review of Systems:  Constitutional: No fever  Eyes: No blurry vision  Neuro: No tremors  HEENT: No pain  Cardiovascular: No chest pain, palpitations  Respiratory: No SOB, no cough  GI: No nausea, vomiting, abdominal pain  : No dysuria  Skin: no rash  Psych: no depression  Endocrine: no polyuria, polydipsia  Hem/lymph: no swelling  Osteoporosis: no fractures    ALL OTHER SYSTEMS REVIEWED AND NEGATIVE    UNABLE TO OBTAIN    PHYSICAL EXAM:  VITALS: T(C): 36.6 (22 @ 08:00)  T(F): 97.9 (22 @ 08:00), Max: 98.7 (22 @ 00:42)  HR: 80 (22 @ 08:00) (73 - 84)  BP: 119/62 (22 @ 08:00) (119/62 - 139/65)  RR:  (18 - 18)  SpO2:  (98% - 100%)  Wt(kg): --  GENERAL: NAD, well-groomed, well-developed  EYES: No proptosis, no lid lag, anicteric  HEENT:  Atraumatic, Normocephalic, moist mucous membranes  THYROID: Normal size, no palpable nodules  RESPIRATORY: Clear to auscultation bilaterally; No rales, rhonchi, wheezing, or rubs  CARDIOVASCULAR: Regular rate and rhythm; No murmurs; no peripheral edema  GI: Soft, nontender, non distended, normal bowel sounds  SKIN: Dry, intact, No rashes or lesions  MUSCULOSKELETAL: Full range of motion, normal strength  NEURO: sensation intact, extraocular movements intact, no tremor, normal reflexes  PSYCH: Alert and oriented x 3, normal affect, normal mood  CUSHING'S SIGNS: no striae    POCT Blood Glucose.: 204 mg/dL (22 @ 09:31)  POCT Blood Glucose.: 94 mg/dL (22 @ 21:51)  POCT Blood Glucose.: 76 mg/dL (22 @ 20:56)  POCT Blood Glucose.: 224 mg/dL (22 @ 17:35)  POCT Blood Glucose.: 246 mg/dL (22 @ 13:04)  POCT Blood Glucose.: 150 mg/dL (22 @ 08:57)  POCT Blood Glucose.: 184 mg/dL (22 @ 22:01)  POCT Blood Glucose.: 182 mg/dL (22 @ 17:34)  POCT Blood Glucose.: 183 mg/dL (22 @ 12:07)  POCT Blood Glucose.: 118 mg/dL (22 @ 08:51)  POCT Blood Glucose.: 132 mg/dL (22 @ 20:56)  POCT Blood Glucose.: 142 mg/dL (22 @ 18:01)  POCT Blood Glucose.: 175 mg/dL (22 @ 12:43)                            8.9    5.98  )-----------( 343      ( 2022 06:59 )             26.7           135  |  102  |  7   ----------------------------<  128<H>  3.6   |  23  |  0.46<L>    eGFR: 98    Ca    8.8        Mg     1.90       Phos  2.9         TPro  6.3  /  Alb  3.2<L>  /  TBili  0.9  /  DBili  x   /  AST  31  /  ALT  16  /  AlkPhos  122<H>        Thyroid Function Tests:              Radiology:                  HPI:  76y/o female presented for preop eval for scheduled whipple.  Pt states hospitalized few months ago at [facility] for jaundice.  ERCP and bilary stent inserted.  Imaging show pancreatic mass.  Preop dx malignant neoplasm of pancreas unspecified.     Endocrine History:    Patient has longstanding diabetes. At home, she takes Levemir 60 Units HS and Janumet 50-1000mg BID. She is now s/p Whipple on . She is eating well. Has CAD but no other known complications of diabetes. A1C is 5.2 indicative of overly tight control. C-peptide 0.4 Glucose (128) which shows that she has poor insulin reserve.         PAST MEDICAL & SURGICAL HISTORY:  Hypertension      IDDM (insulin dependent diabetes mellitus)      Malignant neoplasm of pancreas, unspecified      Dyslipidemia      CAD (coronary artery disease)      H/O chronic hepatitis  treated      History of  section      History of hysterectomy      History of coronary angioplasty with insertion of stent  2019 - 1 stent inserted          FAMILY HISTORY:  FH: HTN (hypertension) (Mother)    Family history of leukemia (Child)        Social History: No ETOH abuse, no illicit drug use    Outpatient Medications: See outpt med rec    MEDICATIONS  (STANDING):  acetaminophen     Tablet .. 650 milliGRAM(s) Oral every 6 hours  amLODIPine   Tablet 10 milliGRAM(s) Oral daily  aspirin  chewable 81 milliGRAM(s) Oral daily  atorvastatin 40 milliGRAM(s) Oral at bedtime  bisacodyl Suppository 10 milliGRAM(s) Rectal every 24 hours  chlorhexidine 2% Cloths 1 Application(s) Topical daily  enoxaparin Injectable 40 milliGRAM(s) SubCutaneous every 24 hours  gabapentin 100 milliGRAM(s) Oral three times a day  ibuprofen  Tablet. 200 milliGRAM(s) Oral every 6 hours  insulin glargine Injectable (LANTUS) 10 Unit(s) SubCutaneous at bedtime  insulin lispro (ADMELOG) corrective regimen sliding scale   SubCutaneous Before meals and at bedtime  insulin lispro Injectable (ADMELOG) 3 Unit(s) SubCutaneous three times a day before meals  melatonin 3 milliGRAM(s) Oral at bedtime  metoprolol succinate ER 25 milliGRAM(s) Oral daily  pantoprazole    Tablet 40 milliGRAM(s) Oral before breakfast  potassium chloride    Tablet ER 40 milliEquivalent(s) Oral once    MEDICATIONS  (PRN):  naloxone Injectable 0.1 milliGRAM(s) IV Push every 3 minutes PRN For ANY of the following changes in patient status:  A. RR LESS THAN 10 breaths per minute, B. Oxygen saturation LESS THAN 90%, C. Sedation score of 6  ondansetron Injectable 4 milliGRAM(s) IV Push every 6 hours PRN Nausea  oxyCODONE    IR 5 milliGRAM(s) Oral every 4 hours PRN Severe Pain (7 - 10)  senna 2 Tablet(s) Oral at bedtime PRN Constipation  traMADol 50 milliGRAM(s) Oral every 4 hours PRN Moderate Pain (4 - 6)      Allergies    No Known Allergies    Intolerances      Review of Systems:  Constitutional: No fever  Eyes: No blurry vision  Neuro: No tremors  HEENT: No pain  Cardiovascular: No chest pain, palpitations  Respiratory: No SOB, no cough  GI: No nausea, vomiting, abdominal pain  : No dysuria  Skin: no rash  Psych: no depression  Endocrine: no polyuria, polydipsia      ALL OTHER SYSTEMS REVIEWED AND NEGATIVE      PHYSICAL EXAM:  VITALS: T(C): 36.6 (22 @ 08:00)  T(F): 97.9 (22 @ 08:00), Max: 98.7 (22 @ 00:42)  HR: 80 (22 @ 08:00) (73 - 84)  BP: 119/62 (22 @ 08:00) (119/62 - 139/65)  RR:  (18 - 18)  SpO2:  (98% - 100%)  Wt(kg): --  GENERAL: NAD, well-groomed, well-developed  EYES: No proptosis, no lid lag, anicteric  HEENT:  Atraumatic, Normocephalic, moist mucous membranes  RESPIRATORY: Clear to auscultation bilaterally  CARDIOVASCULAR: Regular rate and rhythm  GI: Soft, nontender, non distended, normal bowel sounds  SKIN: Dry, intact, No rashes or lesions  PSYCH: Alert and oriented x 3, normal affect, normal mood      POCT Blood Glucose.: 204 mg/dL (22 @ 09:31)  POCT Blood Glucose.: 94 mg/dL (22 @ 21:51)  POCT Blood Glucose.: 76 mg/dL (22 @ 20:56)  POCT Blood Glucose.: 224 mg/dL (22 @ 17:35)  POCT Blood Glucose.: 246 mg/dL (22 @ 13:04)  POCT Blood Glucose.: 150 mg/dL (22 @ 08:57)  POCT Blood Glucose.: 184 mg/dL (22 @ 22:01)  POCT Blood Glucose.: 182 mg/dL (22 @ 17:34)  POCT Blood Glucose.: 183 mg/dL (22 @ 12:07)  POCT Blood Glucose.: 118 mg/dL (22 @ 08:51)  POCT Blood Glucose.: 132 mg/dL (22 @ 20:56)  POCT Blood Glucose.: 142 mg/dL (22 @ 18:01)  POCT Blood Glucose.: 175 mg/dL (22 @ 12:43)                            8.9    5.98  )-----------( 343      ( 2022 06:59 )             26.7           135  |  102  |  7   ----------------------------<  128<H>  3.6   |  23  |  0.46<L>    eGFR: 98    Ca    8.8        Mg     1.90       Phos  2.9         TPro  6.3  /  Alb  3.2<L>  /  TBili  0.9  /  DBili  x   /  AST  31  /  ALT  16  /  AlkPhos  122<H>                                   HPI:  78y/o female presented for preop eval for scheduled whipple.  Pt states hospitalized few months ago at [facility] for jaundice.  ERCP and bilary stent inserted.  Imaging show pancreatic mass.  Preop dx malignant neoplasm of pancreas unspecified.     Endocrine History:    Patient has longstanding diabetes. At home, she takes Levemir 60 Units HS and Janumet 50-1000mg BID as well as Farxiga (unknown dose). She is now s/p Whipple on . She is eating well. Has CAD but no other known complications of diabetes. A1C is 5.2 indicative of overly tight control. C-peptide 0.4 Glucose (128) which shows that she has poor insulin reserve. Denies hypoglycemia but checks FS only once a day in AM.        PAST MEDICAL & SURGICAL HISTORY:  Hypertension      IDDM (insulin dependent diabetes mellitus)      Malignant neoplasm of pancreas, unspecified      Dyslipidemia      CAD (coronary artery disease)      H/O chronic hepatitis  treated      History of  section      History of hysterectomy      History of coronary angioplasty with insertion of stent  2019 - 1 stent inserted          FAMILY HISTORY:  FH: HTN (hypertension) (Mother)    Family history of leukemia (Child)        Social History: No ETOH abuse, no illicit drug use    Outpatient Medications: See outpt med rec    MEDICATIONS  (STANDING):  acetaminophen     Tablet .. 650 milliGRAM(s) Oral every 6 hours  amLODIPine   Tablet 10 milliGRAM(s) Oral daily  aspirin  chewable 81 milliGRAM(s) Oral daily  atorvastatin 40 milliGRAM(s) Oral at bedtime  bisacodyl Suppository 10 milliGRAM(s) Rectal every 24 hours  chlorhexidine 2% Cloths 1 Application(s) Topical daily  enoxaparin Injectable 40 milliGRAM(s) SubCutaneous every 24 hours  gabapentin 100 milliGRAM(s) Oral three times a day  ibuprofen  Tablet. 200 milliGRAM(s) Oral every 6 hours  insulin glargine Injectable (LANTUS) 10 Unit(s) SubCutaneous at bedtime  insulin lispro (ADMELOG) corrective regimen sliding scale   SubCutaneous Before meals and at bedtime  insulin lispro Injectable (ADMELOG) 3 Unit(s) SubCutaneous three times a day before meals  melatonin 3 milliGRAM(s) Oral at bedtime  metoprolol succinate ER 25 milliGRAM(s) Oral daily  pantoprazole    Tablet 40 milliGRAM(s) Oral before breakfast  potassium chloride    Tablet ER 40 milliEquivalent(s) Oral once    MEDICATIONS  (PRN):  naloxone Injectable 0.1 milliGRAM(s) IV Push every 3 minutes PRN For ANY of the following changes in patient status:  A. RR LESS THAN 10 breaths per minute, B. Oxygen saturation LESS THAN 90%, C. Sedation score of 6  ondansetron Injectable 4 milliGRAM(s) IV Push every 6 hours PRN Nausea  oxyCODONE    IR 5 milliGRAM(s) Oral every 4 hours PRN Severe Pain (7 - 10)  senna 2 Tablet(s) Oral at bedtime PRN Constipation  traMADol 50 milliGRAM(s) Oral every 4 hours PRN Moderate Pain (4 - 6)      Allergies    No Known Allergies    Intolerances      Review of Systems:  Constitutional: No fever  Eyes: No blurry vision  Neuro: No tremors  HEENT: No pain  Cardiovascular: No chest pain, palpitations  Respiratory: No SOB, no cough  GI: No nausea, vomiting, abdominal pain  : No dysuria  Skin: no rash  Psych: no depression  Endocrine: no polyuria, polydipsia      ALL OTHER SYSTEMS REVIEWED AND NEGATIVE      PHYSICAL EXAM:  VITALS: T(C): 36.6 (22 @ 08:00)  T(F): 97.9 (22 @ 08:00), Max: 98.7 (22 @ 00:42)  HR: 80 (22 @ 08:00) (73 - 84)  BP: 119/62 (22 @ 08:00) (119/62 - 139/65)  RR:  (18 - 18)  SpO2:  (98% - 100%)  Wt(kg): --  GENERAL: NAD, well-groomed, well-developed  EYES: No proptosis, no lid lag, anicteric  HEENT:  Atraumatic, Normocephalic, moist mucous membranes  RESPIRATORY: Clear to auscultation bilaterally  CARDIOVASCULAR: Regular rate and rhythm  GI: Soft, nontender, non distended, normal bowel sounds  SKIN: Dry, intact, No rashes or lesions  PSYCH: Alert and oriented x 3, normal affect, normal mood      POCT Blood Glucose.: 204 mg/dL (22 @ 09:31)  POCT Blood Glucose.: 94 mg/dL (22 @ 21:51)  POCT Blood Glucose.: 76 mg/dL (22 @ 20:56)  POCT Blood Glucose.: 224 mg/dL (22 @ 17:35)  POCT Blood Glucose.: 246 mg/dL (22 @ 13:04)  POCT Blood Glucose.: 150 mg/dL (22 @ 08:57)  POCT Blood Glucose.: 184 mg/dL (22 @ 22:01)  POCT Blood Glucose.: 182 mg/dL (22 @ 17:34)  POCT Blood Glucose.: 183 mg/dL (22 @ 12:07)  POCT Blood Glucose.: 118 mg/dL (22 @ 08:51)  POCT Blood Glucose.: 132 mg/dL (22 @ 20:56)  POCT Blood Glucose.: 142 mg/dL (22 @ 18:01)  POCT Blood Glucose.: 175 mg/dL (22 @ 12:43)                            8.9    5.98  )-----------( 343      ( 2022 06:59 )             26.7           135  |  102  |  7   ----------------------------<  128<H>  3.6   |  23  |  0.46<L>    eGFR: 98    Ca    8.8        Mg     1.90       Phos  2.9         TPro  6.3  /  Alb  3.2<L>  /  TBili  0.9  /  DBili  x   /  AST  31  /  ALT  16  /  AlkPhos  122<H>

## 2022-06-22 ENCOUNTER — TRANSCRIPTION ENCOUNTER (OUTPATIENT)
Age: 77
End: 2022-06-22

## 2022-06-22 VITALS
TEMPERATURE: 99 F | SYSTOLIC BLOOD PRESSURE: 123 MMHG | HEART RATE: 75 BPM | RESPIRATION RATE: 16 BRPM | OXYGEN SATURATION: 99 % | DIASTOLIC BLOOD PRESSURE: 56 MMHG

## 2022-06-22 PROBLEM — E78.5 HYPERLIPIDEMIA, UNSPECIFIED: Chronic | Status: ACTIVE | Noted: 2022-06-09

## 2022-06-22 PROBLEM — I25.10 ATHEROSCLEROTIC HEART DISEASE OF NATIVE CORONARY ARTERY WITHOUT ANGINA PECTORIS: Chronic | Status: ACTIVE | Noted: 2022-06-09

## 2022-06-22 PROBLEM — C25.9 MALIGNANT NEOPLASM OF PANCREAS, UNSPECIFIED: Chronic | Status: ACTIVE | Noted: 2022-06-09

## 2022-06-22 PROBLEM — Z87.19 PERSONAL HISTORY OF OTHER DISEASES OF THE DIGESTIVE SYSTEM: Chronic | Status: ACTIVE | Noted: 2022-06-09

## 2022-06-22 LAB
ALBUMIN SERPL ELPH-MCNC: 3.1 G/DL — LOW (ref 3.3–5)
ALP SERPL-CCNC: 128 U/L — HIGH (ref 40–120)
ALT FLD-CCNC: 20 U/L — SIGNIFICANT CHANGE UP (ref 4–33)
ANION GAP SERPL CALC-SCNC: 11 MMOL/L — SIGNIFICANT CHANGE UP (ref 7–14)
AST SERPL-CCNC: 29 U/L — SIGNIFICANT CHANGE UP (ref 4–32)
BILIRUB SERPL-MCNC: 0.8 MG/DL — SIGNIFICANT CHANGE UP (ref 0.2–1.2)
BUN SERPL-MCNC: 10 MG/DL — SIGNIFICANT CHANGE UP (ref 7–23)
CALCIUM SERPL-MCNC: 9 MG/DL — SIGNIFICANT CHANGE UP (ref 8.4–10.5)
CHLORIDE SERPL-SCNC: 102 MMOL/L — SIGNIFICANT CHANGE UP (ref 98–107)
CO2 SERPL-SCNC: 23 MMOL/L — SIGNIFICANT CHANGE UP (ref 22–31)
CREAT SERPL-MCNC: 0.48 MG/DL — LOW (ref 0.5–1.3)
EGFR: 97 ML/MIN/1.73M2 — SIGNIFICANT CHANGE UP
GLUCOSE BLDC GLUCOMTR-MCNC: 142 MG/DL — HIGH (ref 70–99)
GLUCOSE BLDC GLUCOMTR-MCNC: 197 MG/DL — HIGH (ref 70–99)
GLUCOSE SERPL-MCNC: 132 MG/DL — HIGH (ref 70–99)
HCT VFR BLD CALC: 27.2 % — LOW (ref 34.5–45)
HGB BLD-MCNC: 8.9 G/DL — LOW (ref 11.5–15.5)
MAGNESIUM SERPL-MCNC: 1.7 MG/DL — SIGNIFICANT CHANGE UP (ref 1.6–2.6)
MCHC RBC-ENTMCNC: 30.9 PG — SIGNIFICANT CHANGE UP (ref 27–34)
MCHC RBC-ENTMCNC: 32.7 GM/DL — SIGNIFICANT CHANGE UP (ref 32–36)
MCV RBC AUTO: 94.4 FL — SIGNIFICANT CHANGE UP (ref 80–100)
NRBC # BLD: 0 /100 WBCS — SIGNIFICANT CHANGE UP
NRBC # FLD: 0 K/UL — SIGNIFICANT CHANGE UP
PHOSPHATE SERPL-MCNC: 3.1 MG/DL — SIGNIFICANT CHANGE UP (ref 2.5–4.5)
PLATELET # BLD AUTO: 381 K/UL — SIGNIFICANT CHANGE UP (ref 150–400)
POTASSIUM SERPL-MCNC: 3.9 MMOL/L — SIGNIFICANT CHANGE UP (ref 3.5–5.3)
POTASSIUM SERPL-SCNC: 3.9 MMOL/L — SIGNIFICANT CHANGE UP (ref 3.5–5.3)
PROT SERPL-MCNC: 6.3 G/DL — SIGNIFICANT CHANGE UP (ref 6–8.3)
RBC # BLD: 2.88 M/UL — LOW (ref 3.8–5.2)
RBC # FLD: 15.6 % — HIGH (ref 10.3–14.5)
SODIUM SERPL-SCNC: 136 MMOL/L — SIGNIFICANT CHANGE UP (ref 135–145)
WBC # BLD: 5.9 K/UL — SIGNIFICANT CHANGE UP (ref 3.8–10.5)
WBC # FLD AUTO: 5.9 K/UL — SIGNIFICANT CHANGE UP (ref 3.8–10.5)

## 2022-06-22 RX ORDER — DAPAGLIFLOZIN 10 MG/1
1 TABLET, FILM COATED ORAL
Qty: 0 | Refills: 0 | DISCHARGE

## 2022-06-22 RX ORDER — INSULIN LISPRO 100/ML
5 VIAL (ML) SUBCUTANEOUS
Refills: 0 | Status: DISCONTINUED | OUTPATIENT
Start: 2022-06-22 | End: 2022-06-22

## 2022-06-22 RX ORDER — MAGNESIUM SULFATE 500 MG/ML
2 VIAL (ML) INJECTION ONCE
Refills: 0 | Status: COMPLETED | OUTPATIENT
Start: 2022-06-22 | End: 2022-06-22

## 2022-06-22 RX ORDER — INSULIN LISPRO 100/ML
7 VIAL (ML) SUBCUTANEOUS
Qty: 3 | Refills: 0
Start: 2022-06-22

## 2022-06-22 RX ORDER — INSULIN GLARGINE 100 [IU]/ML
10 INJECTION, SOLUTION SUBCUTANEOUS
Qty: 0 | Refills: 0 | DISCHARGE
Start: 2022-06-22

## 2022-06-22 RX ORDER — INSULIN DETEMIR 100/ML (3)
60 INSULIN PEN (ML) SUBCUTANEOUS
Qty: 0 | Refills: 0 | DISCHARGE

## 2022-06-22 RX ORDER — GABAPENTIN 400 MG/1
1 CAPSULE ORAL
Qty: 21 | Refills: 0
Start: 2022-06-22 | End: 2022-06-28

## 2022-06-22 RX ORDER — POLYETHYLENE GLYCOL 3350 17 G/17G
17 POWDER, FOR SOLUTION ORAL DAILY
Refills: 0 | Status: DISCONTINUED | OUTPATIENT
Start: 2022-06-22 | End: 2022-06-22

## 2022-06-22 RX ORDER — INSULIN LISPRO 100/ML
VIAL (ML) SUBCUTANEOUS
Refills: 0 | Status: DISCONTINUED | OUTPATIENT
Start: 2022-06-22 | End: 2022-06-22

## 2022-06-22 RX ORDER — SITAGLIPTIN AND METFORMIN HYDROCHLORIDE 500; 50 MG/1; MG/1
1 TABLET, FILM COATED ORAL
Qty: 0 | Refills: 0 | DISCHARGE

## 2022-06-22 RX ORDER — PANTOPRAZOLE SODIUM 20 MG/1
1 TABLET, DELAYED RELEASE ORAL
Qty: 30 | Refills: 0
Start: 2022-06-22 | End: 2022-07-21

## 2022-06-22 RX ORDER — OXYCODONE HYDROCHLORIDE 5 MG/1
1 TABLET ORAL
Qty: 28 | Refills: 0
Start: 2022-06-22 | End: 2022-06-28

## 2022-06-22 RX ADMIN — Medication 5 UNIT(S): at 12:34

## 2022-06-22 RX ADMIN — Medication 650 MILLIGRAM(S): at 12:11

## 2022-06-22 RX ADMIN — AMLODIPINE BESYLATE 10 MILLIGRAM(S): 2.5 TABLET ORAL at 04:42

## 2022-06-22 RX ADMIN — Medication 1: at 12:33

## 2022-06-22 RX ADMIN — GABAPENTIN 100 MILLIGRAM(S): 400 CAPSULE ORAL at 04:42

## 2022-06-22 RX ADMIN — PANTOPRAZOLE SODIUM 40 MILLIGRAM(S): 20 TABLET, DELAYED RELEASE ORAL at 04:43

## 2022-06-22 RX ADMIN — Medication 25 MILLIGRAM(S): at 04:47

## 2022-06-22 RX ADMIN — Medication 650 MILLIGRAM(S): at 04:42

## 2022-06-22 RX ADMIN — ENOXAPARIN SODIUM 40 MILLIGRAM(S): 100 INJECTION SUBCUTANEOUS at 12:12

## 2022-06-22 RX ADMIN — POLYETHYLENE GLYCOL 3350 17 GRAM(S): 17 POWDER, FOR SOLUTION ORAL at 12:12

## 2022-06-22 RX ADMIN — OXYCODONE HYDROCHLORIDE 5 MILLIGRAM(S): 5 TABLET ORAL at 12:32

## 2022-06-22 RX ADMIN — Medication 3 UNIT(S): at 09:17

## 2022-06-22 RX ADMIN — Medication 25 GRAM(S): at 12:00

## 2022-06-22 RX ADMIN — Medication 81 MILLIGRAM(S): at 12:11

## 2022-06-22 RX ADMIN — ONDANSETRON 4 MILLIGRAM(S): 8 TABLET, FILM COATED ORAL at 02:33

## 2022-06-22 RX ADMIN — Medication 10 MILLIGRAM(S): at 06:31

## 2022-06-22 RX ADMIN — Medication 200 MILLIGRAM(S): at 04:43

## 2022-06-22 RX ADMIN — Medication 650 MILLIGRAM(S): at 00:02

## 2022-06-22 NOTE — DISCHARGE NOTE NURSING/CASE MANAGEMENT/SOCIAL WORK - PATIENT PORTAL LINK FT
You can access the FollowMyHealth Patient Portal offered by [facility] by registering at the following website: [email or web address]. By joining [employer]’s FollowMyHealth portal, you will also be able to view your health information using other applications (apps) compatible with our system.

## 2022-06-22 NOTE — CHART NOTE - NSCHARTNOTEFT_GEN_A_CORE
Brief Endocrine note  77 yr old F with malignant pancreatic mass s/p Whipple 6/13 and DM2 with overly tight control (A1C 5.2). C-peptide indicative of low insulin reserve.     Pt planned for discharge today  glucose was 68 prior to bed -> corrected to 100. Glucose dropped 275->68 after dinner after receiving 9 units total admelog.     Problem/Recommendation - 1:  ·  Problem: Type 2 diabetes mellitus.   ·  Recommendation: While inpatient CHO diet and FS AC and HS  Goal glucose 100-180  While inpatient:  Recommend Lantus 10 Units HS and Admelog 7 units TIDAC  CHANGE TO LOW admelog scale before meals and bedtime  Patient should be discharged on basal/bolus regimen with discontinuation of janumet/farxiga    Discharge regimen:  Levemir 10 units QHS  Admelog 7 units with meals    STOP janumet  STOP farxiga    She would benefit from CGM monitor such as [employer] Cydney. This would be obtained with endocrine as outpatient.    Follow up with PMD within 1 week. Pt will need to call PMD if any issues with glucoses prior to establishing care with endocrine office.    Dr. Khalil already emailed Endocrine Office [facility] [phone] to set up appt.    Discussed with team    Estelle Beltran MD  Attending Physician   Department of Endocrinology, Diabetes and Metabolism   Pager: [phone]    If before 9AM or after 5PM, or on weekends/holidays, please call the Endocrine answering service for assistance ([phone]).  For nonurgent matters, please email [email or web address] for assistance.

## 2022-06-22 NOTE — DISCHARGE NOTE NURSING/CASE MANAGEMENT/SOCIAL WORK - NSDCPEFALRISK_GEN_ALL_CORE
For information on Fall & Injury Prevention, visit: [email or web address] OR  [email or web address] OR  https://www.cdc.gov/steadi/patient.html

## 2022-06-22 NOTE — PROGRESS NOTE ADULT - ASSESSMENT
77F PMHx of adenocarcinoma now, s/p 06-13 whipple. Recovering appropriately.    RECOMMENDATION  - Whipple pathway  - Diet: regular cc  - PO pain meds  - Melatonin  - Bowel function returned (+/+)  - DVT ppx  - Discharge: today - awaiting endo input re insulin regimen    D team surgery  [id number]     77F PMHx of adenocarcinoma now, s/p 06-13 ipple. Recovering appropriately.    RECOMMENDATION  - Suppository for constipation  - F/U orthostatic vitals  - ipple pathway  - Diet: regular cc  - PO pain meds  - Melatonin  - Monitor bowel function  - DVT ppx  - Dispo planning- pending endo recommendations    D team surgery  [id number]

## 2022-06-22 NOTE — PROGRESS NOTE ADULT - SUBJECTIVE AND OBJECTIVE BOX
SURGERY DAILY PROGRESS NOTE:     INTERVAL: Kept for endocrine monitoring.    SUBJECTIVE/ROS: Hypoglycemic to 68. Patient seen and examined bedside on AM rounds.     OBJECTIVE:  Vital Signs Last 24 Hrs  T(C): 36.8 (21 Jun 2022 21:50), Max: 37.1 (21 Jun 2022 16:30)  T(F): 98.3 (21 Jun 2022 21:50), Max: 98.7 (21 Jun 2022 16:30)  HR: 72 (21 Jun 2022 21:50) (72 - 84)  BP: 129/50 (21 Jun 2022 21:50) (112/51 - 139/65)  BP(mean): --  RR: 18 (21 Jun 2022 21:50) (18 - 18)  SpO2: 100% (21 Jun 2022 21:50) (99% - 100%)    PHYSICAL EXAM:  General: NAD, resting comfortably in bed  Pulmonary: Nonlabored breathing, no respiratory distress  Abdominal: soft, nontender, nondistended, midline incision c/d/i w/o erythema or purulence  Extremities: WWP                          8.9    5.98  )-----------( 343      ( 21 Jun 2022 06:59 )             26.7     06-21    135  |  102  |  7   ----------------------------<  128<H>  3.6   |  23  |  0.46<L>    Ca    8.8      21 Jun 2022 06:59  Phos  2.9     06-21  Mg     1.90     06-21    TPro  6.3  /  Alb  3.2<L>  /  TBili  0.9  /  DBili  x   /  AST  31  /  ALT  16  /  AlkPhos  122<H>  06-21     I&O's Detail    20 Jun 2022 07:01  -  21 Jun 2022 07:00  --------------------------------------------------------  IN:    dextrose 5% + sodium chloride 0.45% w/ Additives: 168 mL    IV PiggyBack: 50 mL    Oral Fluid: 1140 mL  Total IN: 1358 mL    OUT:    Voided (mL): 900 mL  Total OUT: 900 mL    Total NET: 458 mL      21 Jun 2022 07:01  -  22 Jun 2022 00:47  --------------------------------------------------------  IN:    Oral Fluid: 880 mL  Total IN: 880 mL    OUT:    Voided (mL): 600 mL  Total OUT: 600 mL    Total NET: 280 mL          IMAGING:               SURGERY DAILY PROGRESS NOTE:     INTERVAL: Kept for endocrine monitoring.    SUBJECTIVE/ROS: Hypoglycemic to 68 on Admelog 3 and lantus 10. Patient seen and examined bedside on AM rounds. Patient complains of surgical incisional pain and dizziness overnight. Patient is passing gas, but has not yet passed a bowel movement in 4 days. Denies fever, chills, chest pain, SOB, N/V.     OBJECTIVE:  Vital Signs Last 24 Hrs  ICU Vital Signs Last 24 Hrs  T(C): 36.9 (22 Jun 2022 04:45), Max: 37.1 (21 Jun 2022 16:30)  T(F): 98.4 (22 Jun 2022 04:45), Max: 98.7 (21 Jun 2022 16:30)  HR: 72 (22 Jun 2022 04:45) (70 - 80)  BP: 132/58 (22 Jun 2022 04:45) (112/51 - 132/58)  BP(mean): --  ABP: --  ABP(mean): --  RR: 18 (22 Jun 2022 04:45) (18 - 18)  SpO2: 100% (22 Jun 2022 04:45) (99% - 100%)    INS AND OUTS  I&O's Detail    21 Jun 2022 07:01  -  22 Jun 2022 07:00  --------------------------------------------------------  IN:    Oral Fluid: 1630 mL  Total IN: 1630 mL    OUT:    Voided (mL): 600 mL  Total OUT: 600 mL    Total NET: 1030 mL        PHYSICAL EXAM:  General: NAD, resting comfortably in bed  Pulmonary: Nonlabored breathing, no respiratory distress  Abdominal: soft, nontender, nondistended, midline incision c/d/i w/o erythema or purulence  Extremities: WWP    LABS                        8.9    5.90  )-----------( 381      ( 22 Jun 2022 06:32 )             27.2   06-22    136  |  102  |  10  ----------------------------<  132<H>  3.9   |  23  |  0.48<L>    Ca    9.0      22 Jun 2022 06:32  Phos  3.1     06-22  Mg     1.70     06-22    TPro  6.3  /  Alb  3.1<L>  /  TBili  0.8  /  DBili  x   /  AST  29  /  ALT  20  /  AlkPhos  128<H>  06-22          IMAGING:

## 2022-06-22 NOTE — DISCHARGE NOTE NURSING/CASE MANAGEMENT/SOCIAL WORK - NSDCPNINST_GEN_ALL_CORE
Take all medications as prescribed, keep all follow up appointments. If symptoms persist seek medical attention.

## 2022-06-22 NOTE — PROGRESS NOTE ADULT - PROVIDER SPECIALTY LIST ADULT
Pain Medicine
Surgery
Surgery
Pain Medicine
Surgery
SICU
Surgery

## 2022-06-22 NOTE — DISCHARGE NOTE NURSING/CASE MANAGEMENT/SOCIAL WORK - NSDCFUADDAPPT_GEN_ALL_CORE_FT
Please call [phone] to schedule a follow up appointment with Endocrinologist.     Please follow up with primary care provider within 1 week of discharge regarding recent hospitalization and changes in diabetic medications. If you have any issues with glucose control prior to establishing care with outpatient endocrinologist, please call PCP.

## 2022-06-29 LAB — SURGICAL PATHOLOGY STUDY: SIGNIFICANT CHANGE UP

## 2022-07-01 ENCOUNTER — APPOINTMENT (OUTPATIENT)
Dept: SURGICAL ONCOLOGY | Facility: CLINIC | Age: 77
End: 2022-07-01

## 2022-07-01 VITALS
SYSTOLIC BLOOD PRESSURE: 107 MMHG | HEIGHT: 61 IN | WEIGHT: 110 LBS | DIASTOLIC BLOOD PRESSURE: 64 MMHG | RESPIRATION RATE: 16 BRPM | OXYGEN SATURATION: 98 % | HEART RATE: 63 BPM | BODY MASS INDEX: 20.77 KG/M2 | TEMPERATURE: 97.7 F

## 2022-07-01 PROCEDURE — 99024 POSTOP FOLLOW-UP VISIT: CPT

## 2022-07-06 ENCOUNTER — APPOINTMENT (OUTPATIENT)
Dept: ENDOCRINOLOGY | Facility: CLINIC | Age: 77
End: 2022-07-06

## 2022-07-06 VITALS
BODY MASS INDEX: 20.65 KG/M2 | SYSTOLIC BLOOD PRESSURE: 120 MMHG | WEIGHT: 109.38 LBS | HEIGHT: 61 IN | HEART RATE: 74 BPM | DIASTOLIC BLOOD PRESSURE: 60 MMHG | TEMPERATURE: 97.7 F | OXYGEN SATURATION: 97 %

## 2022-07-06 DIAGNOSIS — Z82.49 FAMILY HISTORY OF ISCHEMIC HEART DISEASE AND OTHER DISEASES OF THE CIRCULATORY SYSTEM: ICD-10-CM

## 2022-07-06 DIAGNOSIS — Z80.9 FAMILY HISTORY OF MALIGNANT NEOPLASM, UNSPECIFIED: ICD-10-CM

## 2022-07-06 DIAGNOSIS — Z83.3 FAMILY HISTORY OF DIABETES MELLITUS: ICD-10-CM

## 2022-07-06 DIAGNOSIS — Z83.438 FAMILY HISTORY OF OTHER DISORDER OF LIPOPROTEIN METABOLISM AND OTHER LIPIDEMIA: ICD-10-CM

## 2022-07-06 PROCEDURE — 99205 OFFICE O/P NEW HI 60 MIN: CPT

## 2022-07-06 NOTE — HISTORY OF PRESENT ILLNESS
[FreeTextEntry1] : Hospital Course:\par 78y/o female presented for preop eval for scheduled whipple. Pt states hospitalized few months ago at [facility] for jaundice. ERCP and bilary stent inserted. Imaging show pancreatic mass. Preop dx malignant neoplasm of pancreas unspecified.\par Patient has longstanding diabetes. At home, she takes Levemir 60 Units HS and Janumet 50-1000mg BID as well as Farxiga (unknown dose). She is now s/p Whipple on 6/13. She is eating well. Has CAD but no other known complications of diabetes. A1C is 5.2 (one day s/p Whipple) indicative of overly tight control. C-peptide 0.4 Glucose (128) which shows that she has poor insulin reserve. Denies hypoglycemia but checks FS only once a day in AM.\par \par Diabetes Disease Process:\par Long standing DM for about 25 years. DM was managed by PCP. States her A1c has typically been around 7% on Janumet, Farxiga, and Levemir. No history of frequent hypoglycemia. Patient is s/p Whipple for malignant pancreatic mass, reports she was given glargine in the hospital but discharged on Levemir 10 units qhs and Humalog. Reports abdominal surgical wound is healing well, on oxycodone qd prn and tylenol prn for pain. Has some dizziness with positional changes since hospitalization, not checking BP at home.\par \par -Other Medical Conditions and Course: CAD s/p stent, HTN, HLD, carotid stenosis, pancreatic adenocarcinoma s/p Whipple on 6/13/2022 (planning for chemo), chronic hepatitis C (treated), s/p hysterectomy\par \par -Medication:\par Previously tried medications and effects:\par Janumet  mg BID--> stopped after whipple\par Farxiga 10 mg qd --> stopped after whipple\par \par Current Medications: \par Levemir 15 units qhs--> increased from 10 units on 7/3/22 by PCP (was on 60 units qhs prior to Whipple)\par Humalog 7-10 units before meals, unclear if dosing is based off meal size or glucose (started after Whipple), usually 10 units\par \par -Glucose monitoring:\par Meter type:\par fbs: 178-357 (mostly in the 200s)\par pre-lunch: 270-515 (mostly in the 300s)\par pre-dinner: 231-357 (high 200s-low 300s)\par Rare hypoglycemia. \par \par -Diet: now eating smaller portions, smaller meals post-Whipple\par breakfast: mixed grain rice, kimchi, beans, chicken, squash\par lunch: similar to above\par dinner: similar to above, doesn't like meat, has tofu/beans for protein\par snack: some fruits (she hasn't been eating fruits lately due to hyperglycemia)\par No juice or soda. No desserts.\par \par -Exercise: 30 minute walks 3x/day\par \par -Optho: last visit 4/2022- s/p cataract surgery, no retinopathy\par -Pod: last visit 4/2022, no neuropathy, gets calf cramps\par -Denies nephropathy. Denies polyuria or polydipsia.\par \par PCP: Dr. Martínez ([place])

## 2022-07-06 NOTE — ASSESSMENT
[FreeTextEntry1] : 76 yo F here for hospital follow-up of Type 2 DM. \par \par 1. Type 2 DM, s/p whipple procedure with low c-peptide requiring insulin therapy, on MDI. \par Readings are above goal. \par No lows. \par May recheck c-peptide in 2 months.\par \par Switch from Levemir to Tresiba.\par Increase Tresiba dose from 15->18 units daily. \par After 4 days, if morning numbers are >150, increase to 20 units. \par \par Humalog:\par Taking your short acting insulin 15 minutes prior to your meal based off your pre-meal glucose:\par <150: 10 units\par 151-200: 11 units\par 201-250: 12 units\par 251-300: 13 units\par 301-350: 14 units\par \par Will send Cydney 2 and reader.\par Will send Onetouch Ultra.\par Recommend f/u with PCP- may need adjustment of BP meds.\par Recommend f/u with Dr. Edmonds (surgeon).\par \par F/u for CGM teaching with CDE.\par F/u in 2 months with CDE and CGM download. \par F/u in 4 months with Dr. Dunlap.

## 2022-07-06 NOTE — PHYSICAL EXAM
[Alert] : alert [No Acute Distress] : no acute distress [No Neck Mass] : no neck mass was observed [Thyroid Not Enlarged] : the thyroid was not enlarged [No Respiratory Distress] : no respiratory distress [Clear to Auscultation] : lungs were clear to auscultation bilaterally [Normal Sclera/Conjunctiva] : normal sclera/conjunctiva [Normal S1, S2] : normal S1 and S2 [Normal Rate] : heart rate was normal [Regular Rhythm] : with a regular rhythm [No Edema] : no peripheral edema [Pedal Pulses Normal] : the pedal pulses are present [Normal Bowel Sounds] : normal bowel sounds [Soft] : abdomen soft [No Joint Swelling] : no joint swelling seen [No Rash] : no rash [No Tremors] : no tremors [Oriented x3] : oriented to person, place, and time [Normal Affect] : the affect was normal [Normal Mood] : the mood was normal

## 2022-07-06 NOTE — REVIEW OF SYSTEMS
[Negative] : Psychiatric [Polydipsia] : no polydipsia [Swelling] : no swelling [FreeTextEntry7] : back pain after eating, abdominal pain, reflux  [de-identified] : dizziness with positional changes

## 2022-07-12 ENCOUNTER — NON-APPOINTMENT (OUTPATIENT)
Age: 77
End: 2022-07-12

## 2022-07-13 DIAGNOSIS — G47.00 INSOMNIA, UNSPECIFIED: ICD-10-CM

## 2022-07-19 ENCOUNTER — APPOINTMENT (OUTPATIENT)
Dept: MULTI SPECIALTY CLINIC | Facility: CLINIC | Age: 77
End: 2022-07-19

## 2022-07-19 ENCOUNTER — NON-APPOINTMENT (OUTPATIENT)
Age: 77
End: 2022-07-19

## 2022-07-19 ENCOUNTER — RESULT REVIEW (OUTPATIENT)
Age: 77
End: 2022-07-19

## 2022-07-19 ENCOUNTER — OUTPATIENT (OUTPATIENT)
Dept: OUTPATIENT SERVICES | Facility: HOSPITAL | Age: 77
LOS: 1 days | Discharge: HOME | End: 2022-07-19

## 2022-07-19 ENCOUNTER — LABORATORY RESULT (OUTPATIENT)
Age: 77
End: 2022-07-19

## 2022-07-19 VITALS
BODY MASS INDEX: 20.43 KG/M2 | RESPIRATION RATE: 16 BRPM | DIASTOLIC BLOOD PRESSURE: 67 MMHG | OXYGEN SATURATION: 100 % | HEIGHT: 62.01 IN | WEIGHT: 112.44 LBS | TEMPERATURE: 98 F | HEART RATE: 82 BPM | SYSTOLIC BLOOD PRESSURE: 123 MMHG

## 2022-07-19 DIAGNOSIS — F32.81 PREMENSTRUAL DYSPHORIC DISORDER: ICD-10-CM

## 2022-07-19 DIAGNOSIS — Z95.5 PRESENCE OF CORONARY ANGIOPLASTY IMPLANT AND GRAFT: Chronic | ICD-10-CM

## 2022-07-19 DIAGNOSIS — Z98.891 HISTORY OF UTERINE SCAR FROM PREVIOUS SURGERY: Chronic | ICD-10-CM

## 2022-07-19 DIAGNOSIS — Z90.710 ACQUIRED ABSENCE OF BOTH CERVIX AND UTERUS: Chronic | ICD-10-CM

## 2022-07-19 LAB
BASOPHILS # BLD AUTO: 0.06 K/UL — SIGNIFICANT CHANGE UP (ref 0–0.2)
BASOPHILS NFR BLD AUTO: 0.9 % — SIGNIFICANT CHANGE UP (ref 0–2)
EOSINOPHIL # BLD AUTO: 0.33 K/UL — SIGNIFICANT CHANGE UP (ref 0–0.5)
EOSINOPHIL NFR BLD AUTO: 5.2 % — SIGNIFICANT CHANGE UP (ref 0–6)
HCT VFR BLD CALC: 34.3 % — LOW (ref 34.5–45)
HGB BLD-MCNC: 11 G/DL — LOW (ref 11.5–15.5)
IMM GRANULOCYTES NFR BLD AUTO: 0.3 % — SIGNIFICANT CHANGE UP (ref 0–1.5)
LYMPHOCYTES # BLD AUTO: 2.22 K/UL — SIGNIFICANT CHANGE UP (ref 1–3.3)
LYMPHOCYTES # BLD AUTO: 35 % — SIGNIFICANT CHANGE UP (ref 13–44)
MCHC RBC-ENTMCNC: 30.5 PG — SIGNIFICANT CHANGE UP (ref 27–34)
MCHC RBC-ENTMCNC: 32.1 G/DL — SIGNIFICANT CHANGE UP (ref 32–36)
MCV RBC AUTO: 95 FL — SIGNIFICANT CHANGE UP (ref 80–100)
MONOCYTES # BLD AUTO: 0.56 K/UL — SIGNIFICANT CHANGE UP (ref 0–0.9)
MONOCYTES NFR BLD AUTO: 8.8 % — SIGNIFICANT CHANGE UP (ref 2–14)
NEUTROPHILS # BLD AUTO: 3.16 K/UL — SIGNIFICANT CHANGE UP (ref 1.8–7.4)
NEUTROPHILS NFR BLD AUTO: 49.8 % — SIGNIFICANT CHANGE UP (ref 43–77)
NRBC # BLD: 0 /100 WBCS — SIGNIFICANT CHANGE UP (ref 0–0)
PLATELET # BLD AUTO: 272 K/UL — SIGNIFICANT CHANGE UP (ref 150–400)
RBC # BLD: 3.61 M/UL — LOW (ref 3.8–5.2)
RBC # FLD: 13.2 % — SIGNIFICANT CHANGE UP (ref 10.3–14.5)
WBC # BLD: 6.35 K/UL — SIGNIFICANT CHANGE UP (ref 3.8–10.5)
WBC # FLD AUTO: 6.35 K/UL — SIGNIFICANT CHANGE UP (ref 3.8–10.5)

## 2022-07-19 PROCEDURE — 99205 OFFICE O/P NEW HI 60 MIN: CPT

## 2022-07-19 PROCEDURE — 99024 POSTOP FOLLOW-UP VISIT: CPT

## 2022-07-19 RX ORDER — CALCIUM CARBONATE/VITAMIN D3 500MG-5MCG
500-5 TABLET ORAL TWICE DAILY
Refills: 0 | Status: ACTIVE | COMMUNITY
Start: 2022-07-06

## 2022-07-19 RX ORDER — FLASH GLUCOSE SCANNING READER
EACH MISCELLANEOUS
Qty: 1 | Refills: 0 | Status: ACTIVE | COMMUNITY
Start: 2022-07-06

## 2022-07-19 RX ORDER — AMLODIPINE BESYLATE 10 MG/1
10 TABLET ORAL DAILY
Refills: 0 | Status: ACTIVE | COMMUNITY
Start: 2022-07-06

## 2022-07-19 RX ORDER — BLOOD SUGAR DIAGNOSTIC
STRIP MISCELLANEOUS
Qty: 400 | Refills: 0 | Status: ACTIVE | COMMUNITY
Start: 2022-07-06

## 2022-07-19 RX ORDER — LANCETS
EACH MISCELLANEOUS
Qty: 400 | Refills: 0 | Status: ACTIVE | COMMUNITY
Start: 2022-07-06

## 2022-07-19 RX ORDER — FENOFIBRATE 145 MG/1
145 TABLET, COATED ORAL DAILY
Refills: 0 | Status: ACTIVE | COMMUNITY
Start: 2022-07-06

## 2022-07-19 RX ORDER — METOPROLOL SUCCINATE 25 MG/1
25 TABLET, EXTENDED RELEASE ORAL DAILY
Refills: 0 | Status: ACTIVE | COMMUNITY
Start: 2022-07-06

## 2022-07-19 RX ORDER — ROSUVASTATIN CALCIUM 10 MG/1
10 TABLET, FILM COATED ORAL
Refills: 0 | Status: ACTIVE | COMMUNITY
Start: 2022-07-06

## 2022-07-19 NOTE — ASSESSMENT
[FreeTextEntry1] : 78 y/o woman with resected pancreatic cancer pT3N2 PNI+/LVI+ disease, margin negative. She has a fairly high risk of recurrence given the jose disease and other features. Despite her advanced age, she is very robust, active. Given the result of the PRODIGE-24 trial were so impressive, I am inclined to give her FOLFIRINOX, although  technically she would have been ineligible for that trial given her high  level and age, so extrapolating from that trial is imperfect. Still, she is robust and think could handle low-dose FOLFIRINOX, so will plan for that for now. Will use ctDNA to evalute for recurrence, which if found could make her eligible fro the ELICIO trial.\par \par Plan:\par - post op CT CAP (JJ ordered)\par - port (JJ ordered)\par - labs including Invitae and Signatera (performed today 7/19)\par - consented today 7/19 for FOLFIRINOX\par - RTC ~1 week to discuss chemo and s/e with Viridiana\par - RTC ~ 2 weeks to initiate chemotherapy\par - plan for 2 months of chemotherapy then repeat imaging\par \par FOLFIRINOX REGIMEN\par - every other week\par - 5FU 2000 mg/m2\par - oxaliplatin 70 mg/m2\par - irinotecan 120 mg/m2\par - leucovorin\par - onpro

## 2022-07-19 NOTE — PHYSICAL EXAM
[Restricted in physically strenuous activity but ambulatory and able to carry out work of a light or sedentary nature] : Status 1- Restricted in physically strenuous activity but ambulatory and able to carry out work of a light or sedentary nature, e.g., light house work, office work [Normal] : normal appearance, no rash, nodules, vesicles, ulcers, erythema [Ulcers] : no ulcers [Mucositis] : no mucositis [Thrush] : no thrush [Vesicles] : no vesicles [de-identified] : Weight improving  [de-identified] : Post op incision scar helaing well , no drainage noted at site , pain at the surgical site  [de-identified] : occasional lower back pain  [de-identified] : anxious

## 2022-07-19 NOTE — HISTORY OF PRESENT ILLNESS
[de-identified] : Diagnosis: Pancreatic adenocarcinoma \par \par Other Current Medical Problems:  IDDM diagnosed >20 years, HTN, HLD, CAD, Cardiac stent 2019  , chronic hep C (S/P treatment) arthritis , \par \par Oncological History :\par May 2022: Presented with RUQ abdominal pain intermittently x 1 week and was found to be jaundiced presenting with a T bili of 15 and Ca 19.9 was 6704.\par 05/20/22: CT A/P - 2.1 cms pancreatic head mass with resultant abrupt cut off the CBD.\par Ct chest with several indeterminate pulmonary nodules. \par 05/20/22- S/P EUS  with FNB pathology + for moderately differntiated adenocarcinoma \par 05/23/22- S/P ERCP- Plastic stent into CBD \par 06/13/22- Upfront Whipple surgery \par Surgical pathology - Moderately differentiated adenocarcinoma , negative margins 5/17 LN +ve. \par \par POst operatively she has been experiencing some digestion issues- experiencing pain after she eats but has improved after she started Creon.\par Pain surgical site - Post op pain managed by Tylenol / Motrin PRN  and Oxycodone for severe pain \par Last used Oxycodone a week ago. \par She has been Gaining weight slowly\par Weight loss x 17  lbs \par Speaks Bengali \par \par PMD: Dr Martínez - [phone] \par \par FHX: Brother brain aneurysm diagnosed at 50, Sister had Aneuresym ,daughter Leukemia (27 years old), \par \par Social HX: Lives with , Apartment, no smoker, no alcohol, walks about 30 mts 2-3 times a day. \par \par PSX:  Total hysterectomy , Whipple procedure (06/13/22) \par \par Genetic Testing: Will send Invitae on 07/19/2\par Disease:  Pancreatic adenocarcinoma\par Pathology:\par \par AJCC Stage :\par \par Tumor Markers: CEA/ Ca 19.9 \par  [de-identified] : 07/19/22: Ms Herrera is seen as initial consult to discuss adjuvant chemotherapy - She is accompanied by her daughter and  today at the office visit. \par Denies N/ V/D\par Pain improving - last used Oxycodone a week ago \par Gaining weight \par Ambulates and exercises - walks about 2-3 times a day 30 minutes each time.

## 2022-07-19 NOTE — REASON FOR VISIT
[Initial Consultation] : an initial consultation [Family Member] : family member [[employer] ] : provided by [employer]   [Time Spent: ____ minutes] : Total time spent using  services: [unfilled] minutes. The patient's primary language is not English thus required  services. [FreeTextEntry2] : Pancreatic adenocarcinoma  [Interpreters_IDNumber] : [id number] [Interpreters_FullName] : Janet

## 2022-07-20 ENCOUNTER — NON-APPOINTMENT (OUTPATIENT)
Age: 77
End: 2022-07-20

## 2022-07-20 RX ORDER — LIDOCAINE AND PRILOCAINE 25; 25 MG/G; MG/G
2.5-2.5 CREAM TOPICAL
Qty: 1 | Refills: 0 | Status: ACTIVE | COMMUNITY
Start: 2022-07-20 | End: 1900-01-01

## 2022-07-20 RX ORDER — PROCHLORPERAZINE MALEATE 10 MG/1
10 TABLET ORAL
Qty: 30 | Refills: 2 | Status: ACTIVE | COMMUNITY
Start: 2022-07-20 | End: 1900-01-01

## 2022-07-20 NOTE — HISTORY OF PRESENT ILLNESS
[After Surgery] : after surgery [Not staged outside] : not staged outside [Pancreatic ductal adenocarcinoma] : Pancreatic ductal adenocarcinoma [Resectable] : resectable [Adjuvant recommendations] : adjuvant recommendations [Has the patient undergone surgery at [facility] after being [place] [facility]] : The patient has undergone surgery at [facility] after being reviewed at [facility] [[facility]] : [facility] [Tumor Site: _____] : Tumor Site: [unfilled] [Tumor Size: _____] : Tumor Size: [unfilled] [Ductal adenocarcinoma (PDAC)] : Ductal adenocarcinoma (PDAC) [Moderately differentiated] : Moderately differentiated [Negative] : Negative [No neoadjuvant treatment] : No neoadjuvant treatment [T3] : T3 [N2] : N2 [Yes] : Patient received adjuvant chemotherapy [[place]] : [place] [de-identified] : This is a non-billable note*\par \par \par MIRELLA Carney is a 78 y/o female who presents for evaluation in our Pancreas [facility]idisciplinary [facility].\par dx w/ PDAC, s/p upfront Whipple  6/13/22. \par Surgical pathology:  moderately differentiated adenocarcinoma, negative margins, 5/17LN.\par Labs: \par 5/27/22 Tbili 3.5      Ca 19-9: 6619      \par Discussion:  review surgical path, discuss adjuvant therapy \par \par \par \par \par  [TextBox_4] : 5/20/2022 [] : no [TextBox_5] : 7/19/2022 [TextBox_38] : 405 [TextBox_40] : 5/20/22 [TextBox_6] : 6/13/2022 [FreeTextEntry1] : whipple resection, partial pancreatectomy  [FreeTextEntry3] : 5 [FreeTextEntry4] : 17

## 2022-07-21 LAB
ALBUMIN SERPL ELPH-MCNC: 4.6 G/DL
ALP BLD-CCNC: 73 U/L
ALT SERPL-CCNC: 18 U/L
ANION GAP SERPL CALC-SCNC: 13 MMOL/L
AST SERPL-CCNC: 29 U/L
BILIRUB SERPL-MCNC: 0.2 MG/DL
BUN SERPL-MCNC: 24 MG/DL
CALCIUM SERPL-MCNC: 10.1 MG/DL
CANCER AG125 SERPL-ACNC: 31 U/ML
CANCER AG19-9 SERPL-ACNC: 328 U/ML
CHLORIDE SERPL-SCNC: 103 MMOL/L
CO2 SERPL-SCNC: 24 MMOL/L
CREAT SERPL-MCNC: 0.65 MG/DL
EGFR: 91 ML/MIN/1.73M2
GLUCOSE SERPL-MCNC: 152 MG/DL
HAV IGM SER QL: NONREACTIVE
HBV CORE IGM SER QL: NONREACTIVE
HBV SURFACE AG SER QL: NONREACTIVE
HCV AB SER QL: REACTIVE
HCV S/CO RATIO: 10.29 S/CO
INR PPP: 0.93 RATIO
POTASSIUM SERPL-SCNC: 4.5 MMOL/L
PROT SERPL-MCNC: 7.2 G/DL
PT BLD: 11 SEC
SODIUM SERPL-SCNC: 140 MMOL/L

## 2022-07-24 ENCOUNTER — APPOINTMENT (OUTPATIENT)
Dept: CT IMAGING | Facility: IMAGING CENTER | Age: 77
End: 2022-07-24

## 2022-07-24 ENCOUNTER — OUTPATIENT (OUTPATIENT)
Dept: OUTPATIENT SERVICES | Facility: HOSPITAL | Age: 77
LOS: 1 days | End: 2022-07-24
Payer: MEDICARE

## 2022-07-24 DIAGNOSIS — Z98.891 HISTORY OF UTERINE SCAR FROM PREVIOUS SURGERY: Chronic | ICD-10-CM

## 2022-07-24 DIAGNOSIS — Z90.710 ACQUIRED ABSENCE OF BOTH CERVIX AND UTERUS: Chronic | ICD-10-CM

## 2022-07-24 DIAGNOSIS — Z95.5 PRESENCE OF CORONARY ANGIOPLASTY IMPLANT AND GRAFT: Chronic | ICD-10-CM

## 2022-07-24 DIAGNOSIS — C25.9 MALIGNANT NEOPLASM OF PANCREAS, UNSPECIFIED: ICD-10-CM

## 2022-07-24 PROCEDURE — 71260 CT THORAX DX C+: CPT

## 2022-07-24 PROCEDURE — 71260 CT THORAX DX C+: CPT | Mod: 26

## 2022-07-24 PROCEDURE — 74177 CT ABD & PELVIS W/CONTRAST: CPT | Mod: 26

## 2022-07-24 PROCEDURE — 74177 CT ABD & PELVIS W/CONTRAST: CPT

## 2022-07-25 ENCOUNTER — APPOINTMENT (OUTPATIENT)
Dept: HEMATOLOGY ONCOLOGY | Facility: CLINIC | Age: 77
End: 2022-07-25

## 2022-07-25 VITALS
WEIGHT: 115.08 LBS | SYSTOLIC BLOOD PRESSURE: 131 MMHG | HEART RATE: 74 BPM | HEIGHT: 62.01 IN | RESPIRATION RATE: 16 BRPM | BODY MASS INDEX: 20.91 KG/M2 | OXYGEN SATURATION: 98 % | DIASTOLIC BLOOD PRESSURE: 68 MMHG | TEMPERATURE: 97.3 F

## 2022-07-25 PROCEDURE — 99215 OFFICE O/P EST HI 40 MIN: CPT

## 2022-07-25 RX ORDER — DIFLUPREDNATE 0.5 MG/ML
0.05 EMULSION OPHTHALMIC
Qty: 5 | Refills: 0 | Status: DISCONTINUED | COMMUNITY
Start: 2022-03-31

## 2022-07-25 RX ORDER — SITAGLIPTIN AND METFORMIN HYDROCHLORIDE 50; 1000 MG/1; MG/1
50-1000 TABLET, FILM COATED ORAL
Qty: 180 | Refills: 0 | Status: DISCONTINUED | COMMUNITY
Start: 2021-11-10

## 2022-07-25 RX ORDER — GABAPENTIN 100 MG/1
100 CAPSULE ORAL
Qty: 21 | Refills: 0 | Status: DISCONTINUED | COMMUNITY
Start: 2022-06-22

## 2022-07-25 RX ORDER — LIFITEGRAST 50 MG/ML
5 SOLUTION/ DROPS OPHTHALMIC
Qty: 60 | Refills: 0 | Status: ACTIVE | COMMUNITY
Start: 2022-02-22

## 2022-07-25 RX ORDER — AMLODIPINE BESYLATE 5 MG/1
5 TABLET ORAL
Qty: 90 | Refills: 0 | Status: DISCONTINUED | COMMUNITY
Start: 2021-11-10

## 2022-07-25 RX ORDER — BROMFENAC SODIUM 0.7 MG/ML
0.07 SOLUTION/ DROPS OPHTHALMIC
Qty: 3 | Refills: 0 | Status: DISCONTINUED | COMMUNITY
Start: 2022-03-31

## 2022-07-25 RX ORDER — AMMONIUM LACTATE 12 %
12 CREAM (GRAM) TOPICAL
Qty: 385 | Refills: 0 | Status: DISCONTINUED | COMMUNITY
Start: 2022-05-12

## 2022-07-25 RX ORDER — DICLOFENAC EPOLAMINE 0.01 G/1
1.3 SYSTEM TOPICAL
Qty: 60 | Refills: 0 | Status: DISCONTINUED | COMMUNITY
Start: 2022-03-14

## 2022-07-25 RX ORDER — BESIFLOXACIN 6 MG/ML
0.6 SUSPENSION OPHTHALMIC
Qty: 5 | Refills: 0 | Status: DISCONTINUED | COMMUNITY
Start: 2022-03-31

## 2022-07-25 RX ORDER — INSULIN DETEMIR 100 [IU]/ML
100 INJECTION, SOLUTION SUBCUTANEOUS
Qty: 15 | Refills: 0 | Status: DISCONTINUED | COMMUNITY
Start: 2021-11-10

## 2022-07-25 RX ORDER — DAPAGLIFLOZIN 10 MG/1
10 TABLET, FILM COATED ORAL
Qty: 90 | Refills: 0 | Status: DISCONTINUED | COMMUNITY
Start: 2021-11-10

## 2022-07-25 RX ORDER — CICLOPIROX 80 MG/ML
8 SOLUTION TOPICAL
Qty: 7 | Refills: 0 | Status: DISCONTINUED | COMMUNITY
Start: 2022-05-12

## 2022-07-25 RX ORDER — ISOPROPYL ALCOHOL 70 ML/100ML
70 SWAB TOPICAL
Qty: 100 | Refills: 0 | Status: DISCONTINUED | COMMUNITY
Start: 2021-11-10

## 2022-07-25 RX ORDER — CLOPIDOGREL BISULFATE 75 MG/1
75 TABLET, FILM COATED ORAL
Qty: 90 | Refills: 0 | Status: DISCONTINUED | COMMUNITY
Start: 2021-11-10

## 2022-07-25 RX ORDER — ECONAZOLE NITRATE 10 MG/G
1 CREAM TOPICAL
Qty: 85 | Refills: 0 | Status: DISCONTINUED | COMMUNITY
Start: 2022-07-15

## 2022-07-25 RX ORDER — HALOBETASOL PROPIONATE 0.5 MG/G
0.05 OINTMENT TOPICAL
Qty: 50 | Refills: 0 | Status: DISCONTINUED | COMMUNITY
Start: 2022-04-12

## 2022-07-26 ENCOUNTER — NON-APPOINTMENT (OUTPATIENT)
Age: 77
End: 2022-07-26

## 2022-07-28 ENCOUNTER — APPOINTMENT (OUTPATIENT)
Dept: ENDOCRINOLOGY | Facility: CLINIC | Age: 77
End: 2022-07-28

## 2022-07-28 ENCOUNTER — NON-APPOINTMENT (OUTPATIENT)
Age: 77
End: 2022-07-28

## 2022-07-28 VITALS
DIASTOLIC BLOOD PRESSURE: 60 MMHG | HEART RATE: 88 BPM | WEIGHT: 113 LBS | OXYGEN SATURATION: 95 % | SYSTOLIC BLOOD PRESSURE: 120 MMHG | HEIGHT: 62.01 IN | TEMPERATURE: 97.3 F | BODY MASS INDEX: 20.53 KG/M2

## 2022-07-28 LAB
GLUCOSE BLDC GLUCOMTR-MCNC: 197
HBA1C MFR BLD HPLC: 6.5

## 2022-07-28 PROCEDURE — 99214 OFFICE O/P EST MOD 30 MIN: CPT | Mod: 25

## 2022-07-28 PROCEDURE — 83036 HEMOGLOBIN GLYCOSYLATED A1C: CPT | Mod: QW

## 2022-07-28 PROCEDURE — 82962 GLUCOSE BLOOD TEST: CPT

## 2022-07-28 NOTE — ASSESSMENT
[FreeTextEntry1] : 76 yo F here for hospital follow-up of Type 2 DM. \par \par 1. Type 2 DM, s/p whipple procedure with low c-peptide requiring insulin therapy, on MDI. \par Readings are above goal. \par No lows. \par May recheck c-peptide in 2 months.\par A1c 6.5%\par \par I sent a message to heme onc team regarding  concerns for chemo regimen affecting blood glucose. \par Snacking in between meals is bringing her glucose up. \par Eating 3 meals a day with snacks in between. But not always.\par \par  Increase Tresiba to 20 units daily. \par Can take 2-3 units with snack due family concern if having a snack a few hours after a meal. \par \par Humalog:\par Taking your short acting insulin 15 minutes prior to your meal based off your pre-meal glucose:\par <150: 10 units\par 151-200: 11 units\par 201-250: 12 units\par 251-300: 13 units\par 301-350: 14 units\par \par Will send Cydney 2 and reader.\par Will send Onetouch Ultra.\par Recommend f/u with PCP- may need adjustment of BP meds.\par Recommend f/u with Dr. Edmonds (surgeon).\par did not get Cydney from [employer]?\par \par F/u for CGM teaching with CDE.\par F/u in 2 months with CDE and CGM download. \par F/u in 4 months with Dr. Dunlap.

## 2022-07-28 NOTE — REVIEW OF SYSTEMS
[Negative] : Psychiatric [Polydipsia] : no polydipsia [Swelling] : no swelling [FreeTextEntry7] : back pain after eating, abdominal pain, reflux  [de-identified] : dizziness with positional changes

## 2022-07-28 NOTE — HISTORY OF PRESENT ILLNESS
[FreeTextEntry1] : Hospital Course:\par 78y/o female presented for preop eval for scheduled whipple. Pt states hospitalized few months ago at [facility] for jaundice. ERCP and bilary stent inserted. Imaging show pancreatic mass. Preop dx malignant neoplasm of pancreas unspecified.\par Patient has longstanding diabetes. At home, she takes Levemir 60 Units HS and Janumet 50-1000mg BID as well as Farxiga (unknown dose). She is now s/p Whipple on 6/13. She is eating well. Has CAD but no other known complications of diabetes. A1C is 5.2 (one day s/p Whipple) indicative of overly tight control. C-peptide 0.4 Glucose (128) which shows that she has poor insulin reserve. Denies hypoglycemia but checks FS only once a day in AM.\par \par Diabetes Disease Process:\par Long standing DM for about 25 years. DM was managed by PCP. States her A1c has typically been around 7% on Janumet, Farxiga, and Levemir. No history of frequent hypoglycemia. Patient is s/p Whipple for malignant pancreatic mass, reports she was given glargine in the hospital but discharged on Levemir 10 units qhs and Humalog. Reports abdominal surgical wound is healing well, on oxycodone qd prn and tylenol prn for pain. Has some dizziness with positional changes since hospitalization, not checking BP at home.\par \par -Other Medical Conditions and Course: CAD s/p stent, HTN, HLD, carotid stenosis, pancreatic adenocarcinoma s/p Whipple on 6/13/2022 (planning for chemo), chronic hepatitis C (treated), s/p hysterectomy\par \par -Medication:\par Previously tried medications and effects:\par Janumet  mg BID--> stopped after whipple\par Farxiga 10 mg qd --> stopped after whipple\par \par Current Medications: \par Tresiba 18 units daily\par Humalog 10 units plus sliding scale\par \par -Glucose monitoring:\par Meter type:\par Scanned log reviwed, morning numbers are slightly above goal, but reasonably controlled.\par The pre-and post meal readings are similar, leading me to believe her mealtime dose is appropriate, but her overall baseline could be slightly lower. \par \par -Diet: now eating smaller portions, smaller meals post-Whipple\par breakfast: mixed grain rice, kimchi, beans, chicken, squash\par lunch: similar to above\par dinner: similar to above, doesn't like meat, has tofu/beans for protein\par snack: some fruits (she hasn't been eating fruits lately due to hyperglycemia)\par No juice or soda. No desserts.\par \par -Exercise: 30 minute walks 3x/day\par \par -Optho: last visit 4/2022- s/p cataract surgery, no retinopathy\par -Pod: last visit 4/2022, no neuropathy, gets calf cramps\par -Denies nephropathy. Denies polyuria or polydipsia.\par \par PCP: Dr. Martínez ([place])

## 2022-07-28 NOTE — PHYSICAL EXAM
[Alert] : alert [No Acute Distress] : no acute distress [Normal Sclera/Conjunctiva] : normal sclera/conjunctiva [No Neck Mass] : no neck mass was observed [Thyroid Not Enlarged] : the thyroid was not enlarged [No Respiratory Distress] : no respiratory distress [Clear to Auscultation] : lungs were clear to auscultation bilaterally [Normal S1, S2] : normal S1 and S2 [Normal Rate] : heart rate was normal [Regular Rhythm] : with a regular rhythm [No Edema] : no peripheral edema [Pedal Pulses Normal] : the pedal pulses are present [Normal Bowel Sounds] : normal bowel sounds [Soft] : abdomen soft [No Joint Swelling] : no joint swelling seen [No Rash] : no rash [No Tremors] : no tremors [Oriented x3] : oriented to person, place, and time [Normal Affect] : the affect was normal [Normal Mood] : the mood was normal

## 2022-07-29 ENCOUNTER — OUTPATIENT (OUTPATIENT)
Dept: OUTPATIENT SERVICES | Facility: HOSPITAL | Age: 77
LOS: 1 days | End: 2022-07-29
Payer: MEDICARE

## 2022-07-29 DIAGNOSIS — Z11.52 ENCOUNTER FOR SCREENING FOR COVID-19: ICD-10-CM

## 2022-07-29 DIAGNOSIS — Z95.5 PRESENCE OF CORONARY ANGIOPLASTY IMPLANT AND GRAFT: Chronic | ICD-10-CM

## 2022-07-29 DIAGNOSIS — Z90.710 ACQUIRED ABSENCE OF BOTH CERVIX AND UTERUS: Chronic | ICD-10-CM

## 2022-07-29 DIAGNOSIS — Z98.891 HISTORY OF UTERINE SCAR FROM PREVIOUS SURGERY: Chronic | ICD-10-CM

## 2022-07-29 LAB — SARS-COV-2 RNA SPEC QL NAA+PROBE: SIGNIFICANT CHANGE UP

## 2022-07-29 PROCEDURE — C9803: CPT

## 2022-07-29 PROCEDURE — U0005: CPT

## 2022-07-29 PROCEDURE — U0003: CPT

## 2022-08-01 ENCOUNTER — TRANSCRIPTION ENCOUNTER (OUTPATIENT)
Age: 77
End: 2022-08-01

## 2022-08-01 ENCOUNTER — APPOINTMENT (OUTPATIENT)
Dept: ENDOCRINOLOGY | Facility: CLINIC | Age: 77
End: 2022-08-01

## 2022-08-01 ENCOUNTER — RESULT REVIEW (OUTPATIENT)
Age: 77
End: 2022-08-01

## 2022-08-01 ENCOUNTER — OUTPATIENT (OUTPATIENT)
Dept: OUTPATIENT SERVICES | Facility: HOSPITAL | Age: 77
LOS: 1 days | End: 2022-08-01
Payer: MEDICARE

## 2022-08-01 VITALS
WEIGHT: 110.01 LBS | HEIGHT: 62 IN | SYSTOLIC BLOOD PRESSURE: 129 MMHG | RESPIRATION RATE: 17 BRPM | DIASTOLIC BLOOD PRESSURE: 57 MMHG | OXYGEN SATURATION: 97 % | HEART RATE: 58 BPM | TEMPERATURE: 98 F

## 2022-08-01 VITALS
OXYGEN SATURATION: 100 % | SYSTOLIC BLOOD PRESSURE: 128 MMHG | DIASTOLIC BLOOD PRESSURE: 55 MMHG | HEART RATE: 61 BPM | RESPIRATION RATE: 13 BRPM

## 2022-08-01 DIAGNOSIS — Z95.5 PRESENCE OF CORONARY ANGIOPLASTY IMPLANT AND GRAFT: Chronic | ICD-10-CM

## 2022-08-01 DIAGNOSIS — Z98.891 HISTORY OF UTERINE SCAR FROM PREVIOUS SURGERY: Chronic | ICD-10-CM

## 2022-08-01 DIAGNOSIS — Z90.710 ACQUIRED ABSENCE OF BOTH CERVIX AND UTERUS: Chronic | ICD-10-CM

## 2022-08-01 DIAGNOSIS — C25.9 MALIGNANT NEOPLASM OF PANCREAS, UNSPECIFIED: ICD-10-CM

## 2022-08-01 PROCEDURE — 36561 INSERT TUNNELED CV CATH: CPT

## 2022-08-01 PROCEDURE — 77001 FLUOROGUIDE FOR VEIN DEVICE: CPT | Mod: 26

## 2022-08-01 PROCEDURE — 76937 US GUIDE VASCULAR ACCESS: CPT | Mod: 26

## 2022-08-01 PROCEDURE — C1769: CPT

## 2022-08-01 PROCEDURE — 76937 US GUIDE VASCULAR ACCESS: CPT

## 2022-08-01 PROCEDURE — C1788: CPT

## 2022-08-01 PROCEDURE — 77001 FLUOROGUIDE FOR VEIN DEVICE: CPT

## 2022-08-01 PROCEDURE — C1894: CPT

## 2022-08-01 RX ORDER — OXYCODONE HYDROCHLORIDE 5 MG/1
5 TABLET ORAL ONCE
Refills: 0 | Status: DISCONTINUED | OUTPATIENT
Start: 2022-08-01 | End: 2022-08-01

## 2022-08-01 RX ORDER — FENTANYL CITRATE 50 UG/ML
50 INJECTION INTRAVENOUS
Refills: 0 | Status: DISCONTINUED | OUTPATIENT
Start: 2022-08-01 | End: 2022-08-01

## 2022-08-01 RX ORDER — ONDANSETRON 8 MG/1
4 TABLET, FILM COATED ORAL ONCE
Refills: 0 | Status: DISCONTINUED | OUTPATIENT
Start: 2022-08-01 | End: 2022-01-01

## 2022-08-01 RX ORDER — SODIUM CHLORIDE 9 MG/ML
1000 INJECTION INTRAMUSCULAR; INTRAVENOUS; SUBCUTANEOUS
Refills: 0 | Status: DISCONTINUED | OUTPATIENT
Start: 2022-08-01 | End: 2022-01-01

## 2022-08-01 NOTE — PRE PROCEDURE NOTE - PRE PROCEDURE EVALUATION
Vascular & Interventional Radiology Pre-Procedure Note    Procedure Name: chest port placement    HPI: 77y Female with DM, HTN, CAD, hep C s/p treatment with pancreatic adenocarcinoma for port placement.    Allergies: NKDA    Medications: Home Medications:  amLODIPine 10 mg oral tablet: 1 tab(s) orally once a day (01 Aug 2022 08:)  aspirin 81 mg oral tablet: 1 tab(s) orally once a day - continue (01 Aug 2022 08:)  fenofibrate 145 mg oral tablet: 1 tab(s) orally once a day (01 Aug 2022 08:)  losartan 100 mg oral tablet: 1 tab(s) orally once a day (01 Aug 2022 08:)  magnesium 400 m tab(s) (01 Aug 2022 08:)  metoprolol succinate 25 mg oral tablet, extended release: 1 tab(s) orally once a day (01 Aug 2022 08:)  oysco 500 + 500 + 200 mg 1 tab 2 times a day: 1 tab(s) orally once a day (01 Aug 2022 08:)  rosuvastatin 10 mg oral tablet: 1 tab(s) orally once a day (01 Aug 2022 08:)          Data:  Vital Signs Last 24 Hrs  T(C): 36.8 (01 Aug 2022 08:26), Max: 36.8 (01 Aug 2022 08:)  T(F): 98.2 (01 Aug 2022 08:26), Max: 98.2 (01 Aug 2022 08:)  HR: 58 (01 Aug 2022 08:) (58 - 58)  BP: 129/57 (01 Aug 2022 08:26) (129/57 - 129/57)  BP(mean): --  RR: 17 (01 Aug 2022 08:) (17 - 17)  SpO2: 97% (01 Aug 2022 08:) (97% - 97%)          Plan:   -77y Female presents for chest port placement  -Risks/Benefits/alternatives explained with the patient and witnessed informed consent obtained.  Vascular & Interventional Radiology Pre-Procedure Note    Procedure Name: chest port placement    HPI: 77y Female with DM, HTN, CAD, hep C s/p treatment with pancreatic adenocarcinoma for port placement.    Allergies: NKDA    Medications: Home Medications:  amLODIPine 10 mg oral tablet: 1 tab(s) orally once a day (01 Aug 2022 08:)  aspirin 81 mg oral tablet: 1 tab(s) orally once a day - continue (01 Aug 2022 08:)  fenofibrate 145 mg oral tablet: 1 tab(s) orally once a day (01 Aug 2022 08:)  losartan 100 mg oral tablet: 1 tab(s) orally once a day (01 Aug 2022 08:)  magnesium 400 m tab(s) (01 Aug 2022 08:)  metoprolol succinate 25 mg oral tablet, extended release: 1 tab(s) orally once a day (01 Aug 2022 08:)  oysco 500 + 500 + 200 mg 1 tab 2 times a day: 1 tab(s) orally once a day (01 Aug 2022 08:)  rosuvastatin 10 mg oral tablet: 1 tab(s) orally once a day (01 Aug 2022 08:)          Data:  Vital Signs Last 24 Hrs  T(C): 36.8 (01 Aug 2022 08:26), Max: 36.8 (01 Aug 2022 08:)  T(F): 98.2 (01 Aug 2022 08:26), Max: 98.2 (01 Aug 2022 08:)  HR: 58 (01 Aug 2022 08:) (58 - 58)  BP: 129/57 (01 Aug 2022 08:26) (129/57 - 129/57)  BP(mean): --  RR: 17 (01 Aug 2022 08:) (17 - 17)  SpO2: 97% (01 Aug 2022 08:) (97% - 97%)    Plan:   -77y Female presents for chest port placement  -Risks/Benefits/alternatives explained with the patient and witnessed informed consent obtained.

## 2022-08-01 NOTE — ASU PATIENT PROFILE, ADULT - FALL HARM RISK - UNIVERSAL INTERVENTIONS
Bed in lowest position, wheels locked, appropriate side rails in place/Call bell, personal items and telephone in reach/Instruct patient to call for assistance before getting out of bed or chair/Non-slip footwear when patient is out of bed/[place] to call system/Physically safe environment - no spills, clutter or unnecessary equipment/Purposeful Proactive Rounding/Room/bathroom lighting operational, light cord in reach

## 2022-08-01 NOTE — ASU PATIENT PROFILE, ADULT - PAIN CHRONIC, PROFILE
[facility]    AUDIOLOGY MEDICAL CLEARANCE FORM FOR AMPLIFICATION    Dear Dr. Sinha    Thank you for referring your patient, Reina Chatman, for audiologic testing. Audiogram and report will be available via Epic - Chart Review.    [place] State law and many insurances such as Medicaid require a statement of medical clearance from the referring physician.    Please fill in any appropriate comments and co-sign electronically.      Reina Chatman has been medically evaluated and is considered an appropriate candidate for amplification. Comments:   
SUBJECTIVE:  Ms. Chatman is seen today at the request of Kamilah Sinha MD. She is accompanied by her daughter, Jessica.    Patient complained of bilateral hearing loss, greater in the left ear as well as constant bilateral tinnitus, which she describes as a humming sound.  She states that [place]  She reports that her father had hearing loss.  Patient denied otitis media, otalgia, otorrhea, aural fullness, dizziness, history of ear surgery, hearing aid history .    OBJECTIVE:  AUDIOMETRIC RESULTS  Otoscopic Evaluation:  Examination reveals clear ear canals bilaterally    Audiogram:  Right ear:  Mild sloping to severe sensorineural hearing loss  Left ear:    Mild sloping to profound sensorineural hearing loss    Speech Audiometry:  Speech Reception Thresholds:  35 dB right ear and 35 dB left ear.  Word Recognition Test Scores:  84% right ear when presented at 75 dB HL in quiet and 84% left ear when presented at 75 dB HL in quiet.    IMPRESSIONS:  Bilateral sensorineural hearing loss with good word recognition scores if presented at a sufficiently loud presentation level    RECOMMENDATIONS:  · The patient would be a good candidate for amplification.  · She is invited to return to this department for a hearing aid consultation.    These results were reviewed with the patient and will be forwarded to Kamilah Sinha MD.    The patient and daughter(s) indicated understanding of the diagnosis and was encouraged to contact our department with any questions or concerns.  
no

## 2022-08-01 NOTE — ASU DISCHARGE PLAN (ADULT/PEDIATRIC) - NS MD DC FALL RISK RISK
For information on Fall & Injury Prevention, visit: [email or web address] OR  [email or web address] OR  https://www.cdc.gov/steadi/patient.html

## 2022-08-01 NOTE — ASU DISCHARGE PLAN (ADULT/PEDIATRIC) - NURSING INSTRUCTIONS
Please feel free to contact us at [phone] if any problems arise. After 6PM, Monday through Friday, on weekends and on holidays, please call [phone] and ask for the radiology resident on call to be paged.

## 2022-08-01 NOTE — ASU PATIENT PROFILE, ADULT - DATE OF LAST VACCINATION
Return in about 2 weeks (around 3/20/2017), or if symptoms worsen or fail to improve.    Please follow up for abdominal pain symptoms that persist or worsen as discussed and call 911 or go to ER for any severe symptoms.    Patient avised to call clinic to discuss any questions regarding test results 48 hours after the tests are reported unless otherwise requested or call if no results received in 2 weeks of them being completed.     Continue current meds per Epic as advised.    Please see After Visit Summary for other details of the remaining discussion.    Avoid lactose (milk and ice cream) in diet.  Use lactose free milk (if tolerated) or soy or almond milk and sorbet.    Avoid any irritating hair or body wash chemicals or body or clothes washing soaps or fabric softeners that may aggravate the skin condition. Use mild soaps and shampoos (Ivory) and clothes static reducing devices.    Use  probiotics in diet three times a day for two weeks to prevent diarrhea or yeast infection from antibiotc use.    PLEASE DO THE FOLLOWING BEFORE LEAVING THE CLINIC TODAY:    -[place] at First Floor Lab for lab test collection or materials for home collection labs to brought back later.    -Schedule thru Second Floor Nursing Staff requested Tests to be done soon at Hospital (CT scans, MRI scans, Heart tests, etc.).    -Schedule thru Second Floor Nursing Staff requested Referrals for Specialty Consults or Therapies to be done soon.    -Schedule today thru Second Floor Receptionists future requested Office Follow Up Visits.            
01-Aug-2021

## 2022-08-01 NOTE — ASU DISCHARGE PLAN (ADULT/PEDIATRIC) - ASU DC SPECIAL INSTRUCTIONSFT
Chest Port Placement    Discharge Instructions  - You have had a chest port implanted in your chest.   - The port is ready for use.  - You may shower in 48 hours. No soaking or swimming for 2 weeks or until the site is completely healed.  - Do not perform any heavy lifting or put tension on the area for the next week or until the site is healed.  - The skin glue (Dermabond) on your skin will act as a scab, allow it to fall off on its own over the next 1-3 weeks.  - You may resume your normal diet.  - You may resume your normal medications however you should wait 48 hours before restarting aspirin, plavix, or blood thinners.  - It is normal to experience some pain over the site for the next few days. You may take apply ice to the area (20 minutes on, 20 minutes off) and take Tylenol for that pain. Do not take more frequently than every 6 hours and do not exceed more than 3000mg of Tylenol in a 24 hour period.    - You were given conscious sedation which may make you drowsy, therefore you need someone to stay with you until the morning following the procedure.  - Do not drive, engage in heavy lifting or strenuous activity, or drink any alcoholic beverages for the next 24 hours.   - You may resume normal activity in 24 hours.    Notify your primary physician and/or Interventional Radiology IMMEDIATELY if you experience any of the following       - Fever of 101F or 38C       - Chills or Rigors/ Shakes       - Swelling and/or Redness in the area around the port       - Worsening Pain       - Blood soaked bandages or worsening bleeding       - Lightheadedness and/or dizziness upon standing       - Chest Pain/ Tightness       - Shortness of Breath       - Difficulty walking    If you have a problem that you believe requires IMMEDIATE attention, please go to your NEAREST Emergency Room. If you believe your problem can safely wait until you speak to a physician, please call Interventional Radiology for any concerns.    During Normal Weekday Business Hours- You can contact the Interventional Radiology department during normal business hours via telephone.  During Evenings and Weekends- If you need to contact Interventional Radiology during off hours, do so by calling the hospital and requesting to be connected to the Interventional Radiologist on call.

## 2022-08-02 ENCOUNTER — RESULT REVIEW (OUTPATIENT)
Age: 77
End: 2022-08-02

## 2022-08-02 ENCOUNTER — NON-APPOINTMENT (OUTPATIENT)
Age: 77
End: 2022-08-02

## 2022-08-02 ENCOUNTER — APPOINTMENT (OUTPATIENT)
Dept: HEMATOLOGY ONCOLOGY | Facility: CLINIC | Age: 77
End: 2022-08-02

## 2022-08-02 ENCOUNTER — APPOINTMENT (OUTPATIENT)
Dept: INFUSION THERAPY | Facility: HOSPITAL | Age: 77
End: 2022-08-02

## 2022-08-02 LAB
ALBUMIN SERPL ELPH-MCNC: 4.2 G/DL — SIGNIFICANT CHANGE UP (ref 3.3–5)
ALP SERPL-CCNC: 86 U/L — SIGNIFICANT CHANGE UP (ref 40–120)
ALT FLD-CCNC: 39 U/L — SIGNIFICANT CHANGE UP (ref 10–45)
ANION GAP SERPL CALC-SCNC: 10 MMOL/L — SIGNIFICANT CHANGE UP (ref 5–17)
AST SERPL-CCNC: 42 U/L — HIGH (ref 10–40)
BASOPHILS # BLD AUTO: 0.05 K/UL — SIGNIFICANT CHANGE UP (ref 0–0.2)
BASOPHILS NFR BLD AUTO: 1 % — SIGNIFICANT CHANGE UP (ref 0–2)
BILIRUB SERPL-MCNC: 0.2 MG/DL — SIGNIFICANT CHANGE UP (ref 0.2–1.2)
BUN SERPL-MCNC: 22 MG/DL — SIGNIFICANT CHANGE UP (ref 7–23)
CALCIUM SERPL-MCNC: 9.6 MG/DL — SIGNIFICANT CHANGE UP (ref 8.4–10.5)
CANCER AG19-9 SERPL-ACNC: 409 U/ML — HIGH
CEA SERPL-MCNC: 3.8 NG/ML — SIGNIFICANT CHANGE UP (ref 0–3.8)
CHLORIDE SERPL-SCNC: 103 MMOL/L — SIGNIFICANT CHANGE UP (ref 96–108)
CO2 SERPL-SCNC: 25 MMOL/L — SIGNIFICANT CHANGE UP (ref 22–31)
CREAT SERPL-MCNC: 0.7 MG/DL — SIGNIFICANT CHANGE UP (ref 0.5–1.3)
EGFR: 89 ML/MIN/1.73M2 — SIGNIFICANT CHANGE UP
EOSINOPHIL # BLD AUTO: 0.29 K/UL — SIGNIFICANT CHANGE UP (ref 0–0.5)
EOSINOPHIL NFR BLD AUTO: 6 % — SIGNIFICANT CHANGE UP (ref 0–6)
GLUCOSE SERPL-MCNC: 113 MG/DL — HIGH (ref 70–99)
HCT VFR BLD CALC: 30.7 % — LOW (ref 34.5–45)
HGB BLD-MCNC: 10.3 G/DL — LOW (ref 11.5–15.5)
LYMPHOCYTES # BLD AUTO: 1.81 K/UL — SIGNIFICANT CHANGE UP (ref 1–3.3)
LYMPHOCYTES # BLD AUTO: 37 % — SIGNIFICANT CHANGE UP (ref 13–44)
MCHC RBC-ENTMCNC: 29.6 PG — SIGNIFICANT CHANGE UP (ref 27–34)
MCHC RBC-ENTMCNC: 33.6 G/DL — SIGNIFICANT CHANGE UP (ref 32–36)
MCV RBC AUTO: 88.2 FL — SIGNIFICANT CHANGE UP (ref 80–100)
MONOCYTES # BLD AUTO: 0.34 K/UL — SIGNIFICANT CHANGE UP (ref 0–0.9)
MONOCYTES NFR BLD AUTO: 7 % — SIGNIFICANT CHANGE UP (ref 2–14)
NEUTROPHILS # BLD AUTO: 2.35 K/UL — SIGNIFICANT CHANGE UP (ref 1.8–7.4)
NEUTROPHILS NFR BLD AUTO: 48 % — SIGNIFICANT CHANGE UP (ref 43–77)
NRBC # BLD: 0 /100 — SIGNIFICANT CHANGE UP (ref 0–0)
NRBC # BLD: SIGNIFICANT CHANGE UP /100 WBCS (ref 0–0)
PLAT MORPH BLD: NORMAL — SIGNIFICANT CHANGE UP
PLATELET # BLD AUTO: 213 K/UL — SIGNIFICANT CHANGE UP (ref 150–400)
POTASSIUM SERPL-MCNC: 4.4 MMOL/L — SIGNIFICANT CHANGE UP (ref 3.5–5.3)
POTASSIUM SERPL-SCNC: 4.4 MMOL/L — SIGNIFICANT CHANGE UP (ref 3.5–5.3)
PROT SERPL-MCNC: 6.8 G/DL — SIGNIFICANT CHANGE UP (ref 6–8.3)
RBC # BLD: 3.48 M/UL — LOW (ref 3.8–5.2)
RBC # FLD: 12.8 % — SIGNIFICANT CHANGE UP (ref 10.3–14.5)
RBC BLD AUTO: SIGNIFICANT CHANGE UP
SODIUM SERPL-SCNC: 138 MMOL/L — SIGNIFICANT CHANGE UP (ref 135–145)
VARIANT LYMPHS # BLD: 1 % — SIGNIFICANT CHANGE UP (ref 0–6)
WBC # BLD: 4.89 K/UL — SIGNIFICANT CHANGE UP (ref 3.8–10.5)
WBC # FLD AUTO: 4.89 K/UL — SIGNIFICANT CHANGE UP (ref 3.8–10.5)

## 2022-08-02 PROCEDURE — 99214 OFFICE O/P EST MOD 30 MIN: CPT

## 2022-08-02 NOTE — HISTORY OF PRESENT ILLNESS
[de-identified] : ID: 78 y/o Pashto-speaking woman with resected pancreatic cancer pT3N2 PNI+/LVI+ disease, margin negative, presenting for adjuvant treatment discussion.\par \par Other Current Medical Problems:  IDDM diagnosed >20 years, HTN, HLD, CAD, Cardiac stent 2019  , chronic hep C (S/P treatment) arthritis , \par \par Oncological History :\par May 2022: Presented with RUQ abdominal pain intermittently x 1 week and was found to be jaundiced presenting with a T bili of 15 and Ca 19.9 was 6704.\par 05/20/22: CT A/P - 2.1 cms pancreatic head mass with resultant abrupt cut off the CBD.\par Ct chest with several indeterminate pulmonary nodules. \par 05/20/22- S/P EUS  with FNB pathology + for moderately differntiated adenocarcinoma \par 05/23/22- S/P ERCP- Plastic stent into CBD \par 06/13/22- Upfront Whipple surgery: Surgical pathology - Moderately differentiated adenocarcinoma , negative margins 5/17 LN +ve. \par 07/19/22 CT CAP no e/o local disease; however scattered pulmonary nodules, some of which are new\par \par Signatera testing: Signatera #1 sent 7/19/22\par Genetic Testing: Invitae sent 7/19/22\par PMD: Dr Martínez - [phone] \par \par FHX: Brother brain aneurysm diagnosed at 50, Sister had Aneuresym ,daughter Leukemia (27 years old), \par \par Social HX: Lives with , Apartment, no smoker, no alcohol, walks about 30 mts 2-3 times a day. \par \par PSX:  Total hysterectomy , Whipple procedure (06/13/22) \par \par \par Disease:  Pancreatic adenocarcinoma\par Pathology:\par \par AJCC Stage :\par \par Tumor Markers: CEA/ Ca 19.9 \par  [de-identified] : 8/2\par - feeling quite well overall\par - discussed possible s/e a/w treatment\par - stable, no new symptoms

## 2022-08-02 NOTE — HISTORY OF PRESENT ILLNESS
[FreeTextEntry1] : Ms. MIRELLA JUNE is a 77 year old who presents for post-operative evaluation after a whipple operation 6/13/22. \par She denies abdominal pain, endorses back pain and is taking pain medication as needed.  Denies nausea, vomiting or diarrhea. Intermittent constipation. She is taking pancreatic enzymes that is helping. her appetite is slowly improving but gets full quickly. her incision is well approximated, c/d/i without drainage. Abdomen soft. Her biggest issues have been inability to sleep well and elevated blood sugars since discharge. Was evaluated by endocrinology inhouse.  She was taking 16 units of glargine preop and was sent home on 10 units nightly and humalog 7-10 units with meals. bloos sugars have been ranging from 275-300's with some readings into the 400's and 500's. She is scheduled to see endocrine next week.\par \par \par   [Post-Op Complications] : No post-op complications reported

## 2022-08-02 NOTE — PHYSICAL EXAM
[Restricted in physically strenuous activity but ambulatory and able to carry out work of a light or sedentary nature] : Status 1- Restricted in physically strenuous activity but ambulatory and able to carry out work of a light or sedentary nature, e.g., light house work, office work [Normal] : normal appearance, no rash, nodules, vesicles, ulcers, erythema [Ulcers] : no ulcers [Mucositis] : no mucositis [Thrush] : no thrush [Vesicles] : no vesicles [de-identified] : Weight improving  [de-identified] : Post op incision scar helaing well , no drainage noted at site , pain at the surgical site  [de-identified] : occasional lower back pain  [de-identified] : anxious

## 2022-08-02 NOTE — REASON FOR VISIT
[Family Member] : family member [[employer] ] : provided by [employer]   [Follow-Up Visit] : a follow-up [FreeTextEntry2] : Pancreatic adenocarcinoma  [Interpreters_IDNumber] : [id number] [Interpreters_FullName] : Janet

## 2022-08-02 NOTE — ASSESSMENT
[FreeTextEntry1] : Ms. MIRELLA JUNE is a 77 year old who presents for post-operative evaluation after a whipple operation 6/13/22. We reviewed final pathology demonstrating moderately differentiated adenocarcinoma, negative margins, 5/17LN. Given this pathology I strongly recommend adjuvant chemotherapy and we will refer the patient to a medical oncologist. We discussed her post-operative and recovery period and restrictions moving forward which include no lifting greater than 10 lbs for 8 weeks. We discussed dietary options and I encouraged the patient to advance as tolerated.  We reviewed s/s of pancreatic insufficiency which include but are not limited to; loose oily bowel movements or bowel movements occurring 30-40 minutes after eating, increased bloating or gas after meals and inability to gain weight.  We discussed the role of pancreatic enzymes in EPI and she will continue on Creon. Discussed blood sugars and reaching out to endocrine. Her PCP also prescribed her insulin preoperatively and may be able to weigh in on short term management needs until her endocrinology apt. She was encouraged to continue with tylenol and ibuprofen and use the oxycodone as needed. I do not recommend any additional sleep medications at this time with her current meds and narcotics. Reviewed good sleep habits and staying up during the day and increasing physical activity. OK to take OTC sleep aid such as melatonin. Ms. JUNE will return to clinic in a few weeks to coincide with a medical oncology apt to discuss adjuvant treatment. Will follow up with endocrinology for blood sugar management. \par \par Patient will require home care for medication management and new diagnosis of cancer and close monitoring. It requires substantial effort for her to leave the home in post op state. \par

## 2022-08-02 NOTE — ASSESSMENT
[FreeTextEntry1] : Ms. MIRELLA JUNE is a 77 year old who presents for post-operative evaluation after a whipple operation 6/13/22. \par abdominal pain controlled on pain regimen.  Bowels improved on pancreatic enzymes. her appetite is slowly improving but gets full quickly. her incision is well approximated, c/d/i without drainage. Follows with endo for blood sugars. Seeing med onc today to discuss adjuvant therapy.\par

## 2022-08-02 NOTE — HISTORY OF PRESENT ILLNESS
[FreeTextEntry1] : Ms. MIRELLA JUNE is a 77 year old who presents for post-operative evaluation after a whipple operation 6/13/22. \par abdominal pain controlled on pain regimen.  Bowels improved on pancreatic enzymes. her appetite is slowly improving but gets full quickly. her incision is well approximated, c/d/i without drainage. Follows with endo for blood sugars. Seeing med onc today to discuss adjuvant therapy.\par \par \par

## 2022-08-02 NOTE — ASSESSMENT
[FreeTextEntry1] : 78 y/o woman with resected pancreatic cancer pT3N2 PNI+/LVI+ disease, margin negative. \par \par Post-op imaging demonstrated lung lesions c/w metastasis. Considered biopsy of one, but it is very small, sub-centimeter, and discussion with radiology noted likely under size threshold for biopsy. In addition, would not change plan to proceed with FOLFIRINOX as even if patient had histologic confirmation would not qualify for GIANT trial due to language barrier. Will proceed with FOLFIRINOX. Also discussed with patient that [facility], now a [facility], can provide treatment a bit closer to home, but she declined this at this time. Thus will proceed with FOLFIRINOX.\par \par Plan:\par - FOLFIRINOX C1D1 8/2\par - f/u Invitae and Signatera\par - RTC 2 weeks w [place]\par \par FOLFIRINOX REGIMEN\par - every other week\par - 5FU 2000 mg/m2\par - oxaliplatin 70 mg/m2\par - irinotecan 120 mg/m2\par - leucovorin\par - onpro\par \par Eliel David MD PhD\par Medical Oncology Attending\par I personally have spent a total of 30 minutes of time on the date of this encounter reviewing test results, documenting findings, coordinating care, and directly consulting with the patient and/or designated family member.\par

## 2022-08-02 NOTE — PHYSICAL EXAM
[Normal] : oriented to person, place and time, with appropriate affect [de-identified] : incision CDI

## 2022-08-03 ENCOUNTER — APPOINTMENT (OUTPATIENT)
Dept: ENDOCRINOLOGY | Facility: CLINIC | Age: 77
End: 2022-08-03

## 2022-08-03 PROCEDURE — 95249 CONT GLUC MNTR PT PROV EQP: CPT

## 2022-08-03 PROCEDURE — 99211 OFF/OP EST MAY X REQ PHY/QHP: CPT | Mod: 25

## 2022-08-04 ENCOUNTER — APPOINTMENT (OUTPATIENT)
Dept: INFUSION THERAPY | Facility: HOSPITAL | Age: 77
End: 2022-08-04

## 2022-08-04 DIAGNOSIS — Z51.89 ENCOUNTER FOR OTHER SPECIFIED AFTERCARE: ICD-10-CM

## 2022-08-05 DIAGNOSIS — C25.9 MALIGNANT NEOPLASM OF PANCREAS, UNSPECIFIED: ICD-10-CM

## 2022-08-25 NOTE — PHYSICAL EXAM
[Restricted in physically strenuous activity but ambulatory and able to carry out work of a light or sedentary nature] : Status 1- Restricted in physically strenuous activity but ambulatory and able to carry out work of a light or sedentary nature, e.g., light house work, office work [Normal] : normal appearance, no rash, nodules, vesicles, ulcers, erythema [Ulcers] : no ulcers [Mucositis] : no mucositis [Thrush] : no thrush [Vesicles] : no vesicles [de-identified] : Weight improving  [de-identified] : Post op incision scar helaing well , no drainage noted at site , pain at the surgical site  [de-identified] : occasional lower back pain  [de-identified] : anxious

## 2022-08-25 NOTE — ASSESSMENT
[FreeTextEntry1] : 78 y/o woman with resected pancreatic cancer pT3N2 PNI+/LVI+ disease, margin negative. She has a fairly high risk of recurrence given the jose disease and other features. Despite her advanced age, she is very robust, active. Given the result of the PRODIGE-24 trial were so impressive, I am inclined to give her FOLFIRINOX, although  technically she would have been ineligible for that trial given her high  level and age, so extrapolating from that trial is imperfect. Still, she is robust and think could handle low-dose FOLFIRINOX, so will plan for that for now. Will use ctDNA to evalute for recurrence, which if found could make her eligible fro the ELICIO trial.\par \par Plan:\par - Pancreatic Adenocarcinoma \par POst OP scans done on 07/24- no report yet \par Scheduled for port on 08/01/22\par - labs including Invitae and Signatera (performed on  7/19)\par - consented  for FOLFIRINOX\par \par \par FOLFIRINOX REGIMEN\par - every other week\par - 5FU 2000 mg/m2\par - oxaliplatin 70 mg/m2\par - irinotecan 120 mg/m2\par - leucovorin\par - onpro\par \par Chemo education done on FOLFIRINOX_\par Plan to start chemo on 08/02/22\par RTC in a week \par case discussed with DK \par

## 2022-08-25 NOTE — REASON FOR VISIT
[Initial Consultation] : an initial consultation [Family Member] : family member [[employer] ] : provided by [employer]   [FreeTextEntry2] : Pancreatic adenocarcinoma  [Interpreters_IDNumber] : [id number] [Interpreters_FullName] : Janet

## 2022-08-25 NOTE — REASON FOR VISIT
[Follow-Up Visit] : a follow-up [Family Member] : family member [[employer] ] : provided by [employer]   [FreeTextEntry2] : Pancreatic adenocarcinoma  [Interpreters_IDNumber] : [id number] [Interpreters_FullName] : Janet

## 2022-08-25 NOTE — PHYSICAL EXAM
[Restricted in physically strenuous activity but ambulatory and able to carry out work of a light or sedentary nature] : Status 1- Restricted in physically strenuous activity but ambulatory and able to carry out work of a light or sedentary nature, e.g., light house work, office work [Normal] : normal appearance, no rash, nodules, vesicles, ulcers, erythema [Ulcers] : no ulcers [Mucositis] : no mucositis [Thrush] : no thrush [Vesicles] : no vesicles [de-identified] : Weight improving  [de-identified] : Post op incision scar helaing well , no drainage noted at site , pain at the surgical site  [de-identified] : occasional lower back pain  [de-identified] : anxious

## 2022-08-25 NOTE — ASSESSMENT
[FreeTextEntry1] : 78 y/o woman with resected pancreatic cancer pT3N2 PNI+/LVI+ disease, margin negative. \par \par Post-op imaging demonstrated lung lesions c/w metastasis. Considered biopsy of one, but it is very small, sub-centimeter, and discussion with radiology noted likely under size threshold for biopsy. In addition, would not change plan to proceed with FOLFIRINOX as even if patient had histologic confirmation would not qualify for GIANT trial due to language barrier. Will proceed with FOLFIRINOX. Also discussed with patient that [facility], [facility] a [facility], can provide treatment a bit closer to home, but she declined this at this time. Thus will proceed with FOLFIRINOX.\par \par Plan:\par - FOLFIRINOX every other week \par - f/u Signatera\par \par \par FOLFIRINOX REGIMEN\par - every other week\par - 5FU 2000 mg/m2\par - oxaliplatin 70 mg/m2\par - irinotecan 120 mg/m2\par - leucovorin 200mg/m2 \par \par Pt tolerated chemo well. \par Inviate reveals BRCA2 Heterogeneous uncertain \par Will refer to Dr Dumont genetics counselling \par Will get restaging scans after completion of 4 cycles. \par \par RTC in 2 weeks \par

## 2022-08-25 NOTE — HISTORY OF PRESENT ILLNESS
[de-identified] : Diagnosis: Pancreatic adenocarcinoma \par \par Other Current Medical Problems:  IDDM diagnosed >20 years, HTN, HLD, CAD, Cardiac stent 2019  , chronic hep C (S/P treatment) arthritis , \par \par Oncological History :\par May 2022: Presented with RUQ abdominal pain intermittently x 1 week and was found to be jaundiced presenting with a T bili of 15 and Ca 19.9 was 6704.\par 05/20/22: CT A/P - 2.1 cms pancreatic head mass with resultant abrupt cut off the CBD.\par Ct chest with several indeterminate pulmonary nodules. \par 05/20/22- S/P EUS  with FNB pathology + for moderately differntiated adenocarcinoma \par 05/23/22- S/P ERCP- Plastic stent into CBD \par 06/13/22- Upfront Whipple surgery \par Surgical pathology - Moderately differentiated adenocarcinoma , negative margins 5/17 LN +ve. \par \par POst operatively she has been experiencing some digestion issues- experiencing pain after she eats but has improved after she started Creon.\par Pain surgical site - Post op pain managed by Tylenol / Motrin PRN  and Oxycodone for severe pain \par Last used Oxycodone a week ago. \par She has been Gaining weight slowly\par Weight loss x 17  lbs \par Speaks Khmer \par \par PMD: Dr Martínez - [phone] \par \par FHX: Brother brain aneurysm diagnosed at 50, Sister had Aneuresym ,daughter Leukemia (27 years old), \par \par Social HX: Lives with , Apartment, no smoker, no alcohol, walks about 30 mts 2-3 times a day. \par \par PSX:  Total hysterectomy , Whipple procedure (06/13/22) \par \par Genetic Testing: Will send Invitae on 07/19/2\par Disease:  Pancreatic adenocarcinoma\par Pathology:\par \par AJCC Stage :\par \par Tumor Markers: CEA/ Ca 19.9 \par  [de-identified] : 07/25/22:\par Ms sarkar is seen in office accompanied by her  and daughter \par Chemo education done on FOLFORINOX.\par Drug sheet was provided Risks/ benefits and alternatives of chemotherapy were discussed and included but not limited to fatigue,low blood counts, nausea/vomiting, skin reactions, hair thinning, possible blood transfusion requirement,increased risk of infection,neuropathy. Patient agreed to above and informed consent was signed.\par Discussed potential side effects and sent Rx medications for preparation of chemotherapy.

## 2022-08-29 PROBLEM — Z71.89 ENCOUNTER FOR EDUCATION ABOUT INFUSION CARE: Status: ACTIVE | Noted: 2022-07-25

## 2022-08-30 NOTE — ASSESSMENT
[FreeTextEntry1] : 78 y/o woman with resected pancreatic cancer pT3N2 PNI+/LVI+ disease, margin negative. \par \par Post-op imaging demonstrated lung lesions c/w metastasis. Considered biopsy of one, but it is very small, sub-centimeter, and discussion with radiology noted likely under size threshold for biopsy. In addition, would not change plan to proceed with FOLFIRINOX as even if patient had histologic confirmation would not qualify for GIANT trial due to language barrier. Will proceed with FOLFIRINOX. Also discussed with patient that [facility], now a [facility], can provide treatment a bit closer to home, but she declined this at this time. Thus will proceed with FOLFIRINOX.\par \par Plan:\par - FOLFIRINOX every other week \par - f/u Signatera\par \par \par FOLFIRINOX REGIMEN\par - every other week\par - 5FU 2000 mg/m2\par - oxaliplatin 70 mg/m2\par - irinotecan 120 mg/m2\par - leucovorin 200mg/m2 \par \par Pt tolerated chemo well. \par Inviate reveals BRCA2 Heterogeneous uncertain \par Will refer to Dr Dumont genetics counselling \par Will get restaging scans after completion of 4 cycles. \par \par RTC in 2 weeks \par

## 2022-08-30 NOTE — REASON FOR VISIT
[Follow-Up Visit] : a follow-up [Family Member] : family member [[employer] ] : provided by [employer]   [FreeTextEntry2] : Pancreatic adenocarcinoma  [Interpreters_IDNumber] : [id number] [Interpreters_FullName] : Janet

## 2022-08-30 NOTE — HISTORY OF PRESENT ILLNESS
[de-identified] : ID: 78 y/o Japanese-speaking woman with resected pancreatic cancer pT3N2 PNI+/LVI+ disease, margin negative, presenting for adjuvant treatment discussion\par \par Other Current Medical Problems:  IDDM diagnosed >20 years, HTN, HLD, CAD, Cardiac stent 2019  , chronic hep C (S/P treatment) arthritis , \par \par \par   Oncological History : \par May 2022: Presented with RUQ abdominal pain intermittently x 1 week and was found to be jaundiced presenting with a T bili of 15 and Ca 19.9 was 6704.\par  05/20/22: CT A/P - 2.1 cms pancreatic head mass with resultant abrupt cut off the CBD. Ct chest with several indeterminate pulmonary nodules. \par  05/20/22- S/P EUS  with FNB pathology + for moderately differntiated adenocarcinoma  05/23/22- S/P ERCP- Plastic stent into CBD  \par 06/13/22- Upfront Whipple surgery: Surgical pathology - Moderately differentiated adenocarcinoma , negative margins 5/17 LN +ve.\par   07/19/22 CT CAP no e/o local disease; however scattered pulmonary nodules, some of which are new.  \par \par \par Treatment Regimen : \par  08/01/22- C # 1 FOLFORINOX - 5FU 2000 mg/m2/  mg/ m2/ Irinotecan 120 mg/ m2/ Oxaliplatin 70 mg/ m2 \par  08/15/22- C # 2 FOLFORINOX - 5FU 2000 mg/m2/  mg/ m2/ Irinotecan 120 mg/ m2/ Oxaliplatin 70 mg/ m2  \par 08/29/22- C # 3 FOLFORINOX - 5FU 2000 mg/m2/  mg/ m2/ Irinotecan 120 mg/ m2/ Oxaliplatin 70 mg/ m2  \par \par \par \par    PMD: Dr Martínez - [phone] \par \par   FHX: Brother brain aneurysm diagnosed at 50, Sister had Aneuresym ,daughter Leukemia (27 years old) \par  \par Social HX: Lives with , Apartment, no smoker, no alcohol, walks about 30 mts 2-3 times a day.\par \par    PSX:  Total hysterectomy , Whipple procedure (06/13/22)    \par \par Disease:  Pancreatic adenocarcinoma Pathology:  AJCC Stage :  Tumor Markers: CEA/ Ca 19.9   [de-identified] : Margaret- BRCA2 Variant heterozygous- c.1964C>A \par Kadi - 07/19/22- Pending results  [de-identified] : 8/29/22: \par Pt is seen in treatment room accompanied by her  .\par had 2-3 episodes of diarrhea after chemo but resolved on its own \par occasional Pins and needles on her hands \par Episodes of Nausea -took  anti nauseas meds - good relief \par Still active - walking 3-4 times / day 30 mins each \par cold sensitivity first few days and resolves \par Discussed most recent labs including tumor markers

## 2022-08-30 NOTE — PHYSICAL EXAM
[Restricted in physically strenuous activity but ambulatory and able to carry out work of a light or sedentary nature] : Status 1- Restricted in physically strenuous activity but ambulatory and able to carry out work of a light or sedentary nature, e.g., light house work, office work [Normal] : normal appearance, no rash, nodules, vesicles, ulcers, erythema [Ulcers] : no ulcers [Mucositis] : no mucositis [Thrush] : no thrush [Vesicles] : no vesicles [de-identified] : Weight improving  [de-identified] : Post op incision scar helaing well , no drainage noted at site , pain at the surgical site  [de-identified] : occasional lower back pain  [de-identified] : anxious

## 2022-09-20 NOTE — ASSESSMENT
[FreeTextEntry1] : 76 y/o woman with resected pancreatic cancer pT3N2 PNI+/LVI+ disease, margin negative. \par \par Post-op imaging demonstrated lung lesions c/w metastasis. Considered biopsy of one, but it is very small, sub-centimeter, and discussion with radiology noted likely under size threshold for biopsy. In addition, would not change plan to proceed with FOLFIRINOX as even if patient had histologic confirmation would not qualify for GIANT trial due to language barrier. Will proceed with FOLFIRINOX. Also discussed with patient that [facility], now a [facility], can provide treatment a bit closer to home, but she declined this at this time. Thus we proceeded with FOLFIRINOX. There is concern now that she has relapsed given her rising tumor markers and positive Signatera\par \par Plan:\par \par #Pancreatic Cancer\par - Currently on FOLFIRINOX every other week. s/p C4 9/12/22. We will continue with treatment for now\par - Signatera _ 08/02/22 was positive - 0.58\par -Sent repeat Signatera on 09/12/22-pending\par -Will recheck Signatera today 9/20/22 (SP Ordered)\par -Restaging scans 9/14/22 show stable disease, no change in lung nodules\par -Inviate reveals BRCA2 Heterogeneous uncertain. Referred  to Dr Dumont genetics counselling \par - patient is not a candidate for the Elicio trial on account of having 4 lesions (more than 3) in the lung\par ---We will send for liquid and tissue Foundation to assess for KRAS mutation (SP ordered)\par ---Confirming that patient can be enrolled given stable 1.0cm lung nodule on CT TAP (DAK to discuss with trial team)\par \par #Diarrhea\par -We will dose reduce Irinotecan with C5 to irinotecan 100mg/m2\par -Patient advised to use immodium PRN at home\par \par FOLFIRINOX REGIMEN\par - every other week\par - 5FU 2000 mg/m2\par - oxaliplatin 70 mg/m2\par - irinotecan 100 mg/m2\par - leucovorin 200mg/m2 \par \par Case discussed with Dr. David\par \par Brittney Hermosillo MD\par Heme/Onc Fellow, PGY-6\par

## 2022-09-20 NOTE — PHYSICAL EXAM
[Restricted in physically strenuous activity but ambulatory and able to carry out work of a light or sedentary nature] : Status 1- Restricted in physically strenuous activity but ambulatory and able to carry out work of a light or sedentary nature, e.g., light house work, office work [Normal] : normal appearance, no rash, nodules, vesicles, ulcers, erythema [Ulcers] : no ulcers [Mucositis] : no mucositis [Thrush] : no thrush [Vesicles] : no vesicles [de-identified] : Weight improving  [de-identified] : Post op incision scar helaing well , no drainage noted at site , pain at the surgical site  [de-identified] : occasional lower back pain  [de-identified] : pins and needles on both hands, dropping things lately

## 2022-09-20 NOTE — END OF VISIT
[] : Fellow [FreeTextEntry3] : Eliel David MD PhD\par Medical Oncology Attending\par I personally have spent a total of 30 minutes of time on the date of this encounter reviewing test results, documenting findings, coordinating care, and directly consulting with the patient and/or designated family member.\par I was present with the trainee during the key portions of the history and exam. I agree with the findings and plan as documented above.\par

## 2022-09-20 NOTE — HISTORY OF PRESENT ILLNESS
[de-identified] : ID: 78 y/o Icelandic-speaking woman with resected pancreatic cancer pT3N2 PNI+/LVI+ disease, margin negative, presenting for adjuvant treatment discussion\par \par Other Current Medical Problems:  IDDM diagnosed >20 years, HTN, HLD, CAD, Cardiac stent 2019  , chronic hep C (S/P treatment) arthritis , \par \par  Oncological History : \par May 2022: Presented with RUQ abdominal pain intermittently x 1 week and was found to be jaundiced presenting with a T bili of 15 and Ca 19.9 was 6704.\par  05/20/22: CT A/P - 2.1 cms pancreatic head mass with resultant abrupt cut off the CBD. Ct chest with several indeterminate pulmonary nodules. \par  05/20/22- S/P EUS  with FNB pathology + for moderately differntiated adenocarcinoma  05/23/22- S/P ERCP- Plastic stent into CBD  \par 06/13/22- Upfront Whipple surgery: Surgical pathology - Moderately differentiated adenocarcinoma , negative margins 5/17 LN +ve.\par  07/19/22 CT CAP no e/o local disease; however scattered pulmonary nodules, some of which are new.  \par 08/01/22- C # 1 FOLFORINOX - 5FU 2000 mg/m2/  mg/ m2/ Irinotecan 120 mg/ m2/ Oxaliplatin 70 mg/ m2 \par  08/15/22- C # 2 FOLFORINOX - 5FU 2000 mg/m2/  mg/ m2/ Irinotecan 120 mg/ m2/ Oxaliplatin 70 mg/ m2  \par 08/29/22- C # 3 FOLFORINOX - 5FU 2000 mg/m2/  mg/ m2/ Irinotecan 120 mg/ m2/ Oxaliplatin 70 mg/ m2  \par 09/12/22- C # 4  FOLFORINOX - 5FU 2000 mg/m2/  mg/ m2/ Irinotecan 120 mg/ m2/ Oxaliplatin 70 mg/ m2  \par 09/14/22 Restaging scans show no evidence of POD or metastatic disease\par \par \par    PMD: Dr Martínez - [phone] \par \par   FHX: Brother brain aneurysm diagnosed at 50, Sister had Aneuresym ,daughter Leukemia (27 years old) \par  \par Social HX: Lives with , Apartment, no smoker, no alcohol, walks about 30 mts 2-3 times a day.\par \par    PSX:  Total hysterectomy , Whipple procedure (06/13/22)    \par \par Disease:  Pancreatic adenocarcinoma Pathology:  AJCC Stage :  Tumor Markers: CEA/ Ca 19.9   [de-identified] : Margaret- BRCA2 Variant heterozygous- c.1964C>A \par Kadi - 07/19/22- Pending results  [de-identified] : 09/20/22\par -having some abdominal pain before BM's\par -Having accidents at home due to urgency. Incontinence is new over the past two weeks. Having some rectal pain, using bacitracin\par -No fevers, chills\par -Very nauseous after chemo, but using PRN's at home\par -Difficult to control sugars at home\par \par

## 2022-09-20 NOTE — REASON FOR VISIT
[Follow-Up Visit] : a follow-up [Family Member] : family member [[employer] ] : provided by [employer]   [FreeTextEntry2] : Pancreatic adenocarcinoma  [Interpreters_IDNumber] : [id number] [Interpreters_FullName] : Janet

## 2022-09-20 NOTE — RESULTS/DATA
[FreeTextEntry1] : ---CT TAP 9/14/22---\par \par FINDINGS:\par CHEST:\par LUNGS AND LARGE AIRWAYS: Patent central airways. Numerous pulmonary nodules are again identified which are overall stable in size and number, for example:\par - left lower lobe subpleural, 7 mm, 9-72.\par - left lower lobe near the diaphragm, 6 mm, 9-87, previously 4 mm\par - right lower lobe near the diaphragm, 1.0 cm, 9-90, previously 9 mm\par - right lower lobe, 6mm, 5-85, previously 6 mm.\par \par A few additional subcentimeter pulmonary nodules are also stable.\par \par PLEURA: No pleural effusion.\par VESSELS: Normal caliber of the ascending thoracic aorta, 3.3 cm and the pulmonary trunk, 2.5 cm. No pulmonary arterial filling defects.\par HEART: Heart size is normal. No pericardial effusion.\par MEDIASTINUM AND LEVI: No lymphadenopathy.\par CHEST WALL AND LOWER NECK: Multiple rounded soft tissue densities and partial calcifications in bilateral breasts are similar in appearance compared to prior study and not characterized with CT. Right prepectoral Mediport with the catheter tip in the cavoatrial junction. Thyroid gland slightly heterogeneous with tiny hypodense right cystic nodules and the coarse calcification on the left.\par \par ABDOMEN AND PELVIS:\par LIVER: Subcentimeter stable hypodensity in segment 6, likely a cyst. Otherwise no suspicious focal liver lesions.\par BILE DUCTS: No intrahepatic biliary dilatation. Slightly prominent CBD measuring 1.1 cm, hepaticojejunostomy.\par GALLBLADDER: Cholecystectomy\par SPLEEN: Within normal limits.\par PANCREAS: Postsurgical changes from Whipple procedure. Normal appearance of the pancreaticojejunostomy. Pancreatic stent is in place. The remainder of the pancreatic parenchyma is mildly atrophic, specifically in the tail, but otherwise normal in appearance. Mild haziness of the fat planes posterior to the SMA, 3-87, unchanged from prior study and likely postoperative changes.\par ADRENALS: Within normal limits.\par KIDNEYS/URETERS: Mild pelvocaliectasis and extrarenal pelvis on the right, similar to prior study. Extrarenal pelvis on the left. No libby hydronephrosis. Bilaterally no nephroureterolithiasis. Renal cysts.\par \par BLADDER: Within normal limits.\par REPRODUCTIVE ORGANS: Hysterectomy\par \par BOWEL: Postsurgical changes from Whipple procedure and gastrojejunostomy which appears patent. No bowel obstruction. Appendix is normal\par PERITONEUM: No ascites.\par VESSELS: Atherosclerotic changes of moderate degree. Patent celiac axis and SMA. Calcific plaques at the origin of both vessels. Hepatic veins, portal vein, SMV, renal veins and IVC are patent.\par RETROPERITONEUM/LYMPH NODES: No lymphadenopathy.\par ABDOMINAL WALL: Calcified granuloma in the subcutaneous gluteal soft tissue. Postsurgical changes in the abdominal midline.\par BONES: No suspicious osseous lesions. Multilevel discogenic degenerative disease. Stable sclerotic bone island in the right sacrum.\par \par IMPRESSION:\par Expected postsurgical changes from Whipple procedure. Pancreatic ductal stent in place.\par \par No evidence of local recurrence or metastatic disease in the abdomen or pelvis from pancreatic neoplasm.\par \par Stable pulmonary nodules in size and number. Stable breast masses, not characterized by CT. Please refer to dedicated breast imaging.

## 2022-10-04 NOTE — PHYSICAL EXAM
[Restricted in physically strenuous activity but ambulatory and able to carry out work of a light or sedentary nature] : Status 1- Restricted in physically strenuous activity but ambulatory and able to carry out work of a light or sedentary nature, e.g., light house work, office work [Normal] : normal appearance, no rash, nodules, vesicles, ulcers, erythema [Thin] : thin [Ulcers] : no ulcers [Mucositis] : no mucositis [Thrush] : no thrush [Vesicles] : no vesicles [de-identified] : Weight loss  [de-identified] : Post op incision scar helaing well , multiple episodes of diarrhea even with Imodium on board  [de-identified] : occasional lower back pain  [de-identified] : pins and needles on both hands, dropping things lately

## 2022-10-04 NOTE — HISTORY OF PRESENT ILLNESS
[de-identified] : ID: 76 y/o Telugu-speaking woman with resected pancreatic cancer pT3N2 PNI+/LVI+ disease, margin negative, presenting for adjuvant treatment discussion\par \par Other Current Medical Problems:  IDDM diagnosed >20 years, HTN, HLD, CAD, Cardiac stent 2019  , chronic hep C (S/P treatment) arthritis , \par \par \par   Oncological History : \par \par May 2022: Presented with RUQ abdominal pain intermittently x 1 week and was found to be jaundiced presenting with a T bili of 15 and Ca 19.9 was 6704.\par  05/20/22: CT A/P - 2.1 cms pancreatic head mass with resultant abrupt cut off the CBD. Ct chest with several indeterminate pulmonary nodules. \par  05/20/22- S/P EUS  with FNB pathology + for moderately differntiated adenocarcinoma  05/23/22- S/P ERCP- Plastic stent into CBD  \par 06/13/22- Upfront Whipple surgery: Surgical pathology - Moderately differentiated adenocarcinoma , negative margins 5/17 LN +ve.\par   07/19/22 CT CAP no e/o local disease; however scattered pulmonary nodules, some of which are new.  \par \par \par Treatment Regimen : \par  08/01/22- C # 1 FOLFORINOX - 5FU 2000 mg/m2/  mg/ m2/ Irinotecan 120 mg/ m2/ Oxaliplatin 70 mg/ m2 \par  08/15/22- C # 2 FOLFORINOX - 5FU 2000 mg/m2/  mg/ m2/ Irinotecan 120 mg/ m2/ Oxaliplatin 70 mg/ m2  \par 08/29/22- C # 3 FOLFORINOX - 5FU 2000 mg/m2/  mg/ m2/ Irinotecan 120 mg/ m2/ Oxaliplatin 70 mg/ m2  \par 09/12/22- C # 4  FOLFORINOX - 5FU 2000 mg/m2/  mg/ m2/ Irinotecan 120 mg/ m2/ Oxaliplatin 70 mg/ m2  \par \par \par \par    PMD: Dr Martínez - [phone] \par \par   FHX: Brother brain aneurysm diagnosed at 50, Sister had Aneuresym ,daughter Leukemia (27 years old) \par  \par Social HX: Lives with , Apartment, no smoker, no alcohol, walks about 30 mts 2-3 times a day.\par \par    PSX:  Total hysterectomy , Whipple procedure (06/13/22)    \par \par Disease:  Pancreatic adenocarcinoma Pathology:  AJCC Stage :  Tumor Markers: CEA/ Ca 19.9   [de-identified] : Margaret- BRCA2 Variant heterozygous- c.1964C>A \par Kadi - 07/19/22- Pending results  [de-identified] : 09/12/22:\par Pt is seen in treatment room accompanied by her  . Here for C # 4 of FFX\par She Had 5-6 episodes of diarrhea  lasted 5 days \par Been using Imodium prn for diarrhea- helps but is afraid to take more- Advised she can go upto 8 pills a day \par has episodes of Nausea  takes Compazine prn and helps \par Denies pain\par Pins and needles on both hands \par Difficulty opening cans/bottles lately \par Drops things lately \par Activity ok- Walks often \par Discussed most recent labs including tumor markers - explained she will need scans sooner to r/o any metastatic disease \par Will get scans this week and follow up with KIRK .

## 2022-10-04 NOTE — REASON FOR VISIT
[Follow-Up Visit] : a follow-up [Family Member] : family member [[employer] ] : provided by [employer]   [FreeTextEntry2] : Pancreatic adenocarcinoma  [Interpreters_IDNumber] : [id number] [Interpreters_FullName] : Janet

## 2022-10-04 NOTE — ASSESSMENT
[FreeTextEntry1] : 78 y/o woman with resected pancreatic cancer pT3N2 PNI+/LVI+ disease, margin negative. \par \par Post-op imaging demonstrated lung lesions c/w metastasis. Considered biopsy of one, but it is very small, sub-centimeter, and discussion with radiology noted likely under size threshold for biopsy. In addition, would not change plan to proceed with FOLFIRINOX as even if patient had histologic confirmation would not qualify for GIANT trial due to language barrier. Will proceed with FOLFIRINOX. Also discussed with patient that [facility], [facility], can provide treatment a bit closer to home, but she declined this at this time. Thus will proceed with FOLFIRINOX.\par \par Plan:\par - FOLFIRINOX every other week \par - Signatera _ 08/02/22 was positive - 0.58\par Will send repeat Signatera on 09/12/22-\par \par \par FOLFIRINOX REGIMEN\par - every other week\par - 5FU 2000 mg/m2\par - oxaliplatin 70 mg/m2\par - irinotecan 120 mg/m2\par - leucovorin 200mg/m2 \par \par Pt has been having a hrad time with chemo this past week \par Feels more tired and had 5-6 episodes of diarrhea \par Has been using Imodium prn \par Has some pins and needles - Lately she has been dropping things and difficulty opening cans/ bottles. \par Elevated tumor markers  - discussed need of scans sooner\par \par \par Inviate reveals BRCA2 Heterogeneous uncertain \par Refered  to Dr Dumont genetics counselling \par Will get restaging scans after this round of chemo. \par \par RTC in 1 weeks after completion of scans \par

## 2022-10-04 NOTE — REASON FOR VISIT
[Follow-Up Visit] : a follow-up [Family Member] : family member [[employer] ] : provided by [employer]   [FreeTextEntry2] : Pancreatic adenocarcinoma  [Interpreters_IDNumber] : [id number] [Interpreters_FullName] : Janet

## 2022-10-04 NOTE — PHYSICAL EXAM
[Restricted in physically strenuous activity but ambulatory and able to carry out work of a light or sedentary nature] : Status 1- Restricted in physically strenuous activity but ambulatory and able to carry out work of a light or sedentary nature, e.g., light house work, office work [Normal] : normal appearance, no rash, nodules, vesicles, ulcers, erythema [Ulcers] : no ulcers [Mucositis] : no mucositis [Thrush] : no thrush [Vesicles] : no vesicles [de-identified] : Weight improving  [de-identified] : Post op incision scar helaing well , no drainage noted at site , pain at the surgical site  [de-identified] : occasional lower back pain  [de-identified] : pins and needles on both hands, dropping things lately

## 2022-10-04 NOTE — HISTORY OF PRESENT ILLNESS
[de-identified] : ID: 78 y/o Frisian-speaking woman with resected pancreatic cancer pT3N2 PNI+/LVI+ disease, margin negative, presenting for adjuvant treatment discussion\par \par Other Current Medical Problems:  IDDM diagnosed >20 years, HTN, HLD, CAD, Cardiac stent 2019  , chronic hep C (S/P treatment) arthritis , \par \par  Oncological History : \par May 2022: Presented with RUQ abdominal pain intermittently x 1 week and was found to be jaundiced presenting with a T bili of 15 and Ca 19.9 was 6704.\par  05/20/22: CT A/P - 2.1 cms pancreatic head mass with resultant abrupt cut off the CBD. Ct chest with several indeterminate pulmonary nodules. \par  05/20/22- S/P EUS  with FNB pathology + for moderately differntiated adenocarcinoma  05/23/22- S/P ERCP- Plastic stent into CBD  \par 06/13/22- Upfront Whipple surgery: Surgical pathology - Moderately differentiated adenocarcinoma , negative margins 5/17 LN +ve.\par  07/19/22 CT CAP no e/o local disease; however scattered pulmonary nodules, some of which are new.  \par 08/01/22- C # 1 FOLFORINOX - 5FU 2000 mg/m2/  mg/ m2/ Irinotecan 120 mg/ m2/ Oxaliplatin 70 mg/ m2 \par  08/15/22- C # 2 FOLFORINOX - 5FU 2000 mg/m2/  mg/ m2/ Irinotecan 120 mg/ m2/ Oxaliplatin 70 mg/ m2  \par 08/29/22- C # 3 FOLFORINOX - 5FU 2000 mg/m2/  mg/ m2/ Irinotecan 120 mg/ m2/ Oxaliplatin 70 mg/ m2  \par 09/12/22- C # 4  FOLFORINOX - 5FU 2000 mg/m2/  mg/ m2/ Irinotecan 120 mg/ m2/ Oxaliplatin 70 mg/ m2  \par 09/14/22 Restaging scans show no evidence of POD or metastatic disease\par \par 09/26/22- C #5 FOLFORINOX - 5FU 2000 mg/m2/  mg/ m2/ Irinotecan 75 mg/ m2/ Oxaliplatin 70 mg/ m2  \par \par    PMD: Dr Martínez -  \par \par   FHX: Brother brain aneurysm diagnosed at 50, Sister had Aneuresym ,daughter Leukemia (27 years old) \par  \par Social HX: Lives with , Apartment, no smoker, no alcohol, walks about 30 mts 2-3 times a day.\par \par    PSX:  Total hysterectomy , Whipple procedure (06/13/22)    \par \par Disease:  Pancreatic adenocarcinoma Pathology:  AJCC Stage :  Tumor Markers: CEA/ Ca 19.9   [de-identified] : Margaret- BRCA2 Variant heterozygous- c.1964C>A \par Signatera - 07/19/22- Pending results \par                    08/02/22 was positive - 0.58 [de-identified] : 10/04/22:\par Episodes of diarrhea after this last round of chemo \par SHe states she has been going to the bathroom every hour or two - \par Has been using Imodium Q 2hrs prn. \par -No fevers, chills\par Lost appetite / \par Loss of energy- feeling fatigued and wiped out \par Will administer IVfluids today and will add labs as well. \par \par \par

## 2022-10-04 NOTE — ASSESSMENT
[FreeTextEntry1] : 76 y/o woman with resected pancreatic cancer pT3N2 PNI+/LVI+ disease, margin negative. \par \par Post-op imaging demonstrated lung lesions c/w metastasis. Considered biopsy of one, but it is very small, sub-centimeter, and discussion with radiology noted likely under size threshold for biopsy. In addition, would not change plan to proceed with FOLFIRINOX as even if patient had histologic confirmation would not qualify for GIANT trial due to language barrier. Will proceed with FOLFIRINOX. Also discussed with patient that [facility] a [facility], can provide treatment a bit closer to home, but she declined this at this time. Thus we proceeded with FOLFIRINOX. There is concern now that she has relapsed given her rising tumor markers and positive Signatera\par \par Plan:\par \par #Pancreatic Cancer\par - Currently on FOLFIRINOX every other week\par Last chemo was 09/26/22.\par However she has been having a hard time with this chemo \par Chemo induced diarrhea- has been having BM every 2 hours inspite of her taking Imodium every 2 hrs \par She is having Grade 2 neuropathy, tingling persistent in both her hands finger tips and on her toes .\par Will Stop Irinotecan and dose reduce Oxaliplatin to 55 mg/ m2 sec to neuropathy .\par \par Chemo induced diarrhea : \par Stop  Irinotecan \par Alternate Imodium/ Lomotil prn \par will add IV fluids \par \par Loss of appetite : \par added Remeron to boost appetite \par Refer to Pall team for medical marijuana \par \par -Restaging scans 9/14/22 show stable disease, no change in lung nodules\par -Inviate reveals BRCA2 Heterogeneous uncertain. Referred  to Dr Dumont genetics counselling \par - patient is not a candidate for the Elicio trial on account of having 4 lesions (more than 3) in the lung\par ---We will send for liquid and tissue Foundation to assess for KRAS mutation (SP ordered)\par ---Confirming that patient can be enrolled given stable 1.0cm lung nodule on CT TAP (DAK to discuss with trial team)\par \par \par Case discussed with Dr. David\par \par \par

## 2022-11-01 NOTE — DATA REVIEWED
[FreeTextEntry1] : CT C/A/P  (09/14/2022): \par \par COMPARISON: CT chest abdomen pelvis 7/24/2022 and 5/20/2022.\par \par FINDINGS:\par CHEST:\par LUNGS AND LARGE AIRWAYS: Patent central airways. Numerous pulmonary nodules are again identified which are overall stable in size and number, for example:\par - left lower lobe subpleural, 7 mm, 9-72.\par - left lower lobe near the diaphragm, 6 mm, 9-87, previously 4 mm\par - right lower lobe near the diaphragm, 1.0 cm, 9-90, previously 9 mm\par - right lower lobe, 6mm, 5-85, previously 6 mm.\par \par A few additional subcentimeter pulmonary nodules are also stable.\par \par PLEURA: No pleural effusion.\par VESSELS: Normal caliber of the ascending thoracic aorta, 3.3 cm and the pulmonary trunk, 2.5 cm. No pulmonary arterial filling defects.\par HEART: Heart size is normal. No pericardial effusion.\par MEDIASTINUM AND LEVI: No lymphadenopathy.\par CHEST WALL AND LOWER NECK: Multiple rounded soft tissue densities and partial calcifications in bilateral breasts are similar in appearance compared to prior study and not characterized with CT. Right prepectoral Mediport with the catheter tip in the cavoatrial junction. Thyroid gland slightly heterogeneous with tiny hypodense right cystic nodules and the coarse calcification on the left.\par \par ABDOMEN AND PELVIS:\par LIVER: Subcentimeter stable hypodensity in segment 6, likely a cyst. Otherwise no suspicious focal liver lesions.\par BILE DUCTS: No intrahepatic biliary dilatation. Slightly prominent CBD measuring 1.1 cm, hepaticojejunostomy.\par GALLBLADDER: Cholecystectomy\par SPLEEN: Within normal limits.\par PANCREAS: Postsurgical changes from Whipple procedure. Normal appearance of the pancreaticojejunostomy. Pancreatic stent is in place. The remainder of the pancreatic parenchyma is mildly atrophic, specifically in the tail, but otherwise normal in appearance. Mild haziness of the fat planes posterior to the SMA, 3-87, unchanged from prior study and likely postoperative changes.\par ADRENALS: Within normal limits.\par KIDNEYS/URETERS: Mild pelvocaliectasis and extrarenal pelvis on the right, similar to prior study. Extrarenal pelvis on the left. No libby hydronephrosis. Bilaterally no nephroureterolithiasis. Renal cysts.\par \par BLADDER: Within normal limits.\par REPRODUCTIVE ORGANS: Hysterectomy\par \par BOWEL: Postsurgical changes from Whipple procedure and gastrojejunostomy which appears patent. No bowel obstruction. Appendix is normal\par PERITONEUM: No ascites.\par VESSELS: Atherosclerotic changes of moderate degree. Patent celiac axis and SMA. Calcific plaques at the origin of both vessels. Hepatic veins, portal vein, SMV, renal veins and IVC are patent.\par RETROPERITONEUM/LYMPH NODES: No lymphadenopathy.\par ABDOMINAL WALL: Calcified granuloma in the subcutaneous gluteal soft tissue. Postsurgical changes in the abdominal midline.\par BONES: No suspicious osseous lesions. Multilevel discogenic degenerative disease. Stable sclerotic bone island in the right sacrum.\par \par IMPRESSION:\par Expected postsurgical changes from Whipple procedure. Pancreatic ductal stent in place.\par \par No evidence of local recurrence or metastatic disease in the abdomen or pelvis from pancreatic neoplasm.\par \par Stable pulmonary nodules in size and number. Stable breast masses, not characterized by CT. Please refer to dedicated breast imaging.

## 2022-11-01 NOTE — HISTORY OF PRESENT ILLNESS
[FreeTextEntry1] : 77yoF with pancreatic cancer presents for initial palliative care visit, referred by Dr. David. \par PMH significant for CAD, HTN, HLD, IDDM, chronic hepatitis C, arthritis, cardiac stent 2019. \par \par Patient is Hungarian-speaking, her daughter provide translation per patient's request. \par \par May 2022: Presented with RUQ abdominal pain intermittently x 1 week and was found to be jaundiced presenting with a T bili of 15 and Ca 19.9 was 6704. CT A/P showed a 2.1cm pancreatic head mass. 5/20/22- S/P EUS with FNB pathology + for moderately differentiated adenocarcinoma 05/23/22- S/P ERCP- Plastic stent into CBD \par 06/13/22- Upfront Whipple surgery: Surgical pathology - Moderately differentiated adenocarcinoma , negative margins 5/17 LNs positive. Started adjuvant FOLFIRINOX on 8/1/22. \par \par Main reason for which patient is referred is symptom management. \par Irinotecan has been on hold due to diarrhea, which has been notably better since it being held. \par \par ROS:\par +loss of appetite - mirtazipine 7.5mg has helped, makes her feel drowsy the next day. \par +diarrhea - Imodium/Lomotil PRN - has not been as bad since Irinotecan has been held\par +abdominal cramping preceding BMs, improved with BM\par +episode of stool incontinence this AM, not typical for her. She wears a diaper. \par +peripheral neuropathy \par +mood is generally positive\par Denies n/v, trouble sleeping, \par \par Patient is , lives with her  in an apartment in [place]. She is originally from [place] and has been in [place] since 1988. They have 4 children, 3 live in [place]. Patient requires occasional assistance with ADLs, her  and daughter are able to assist. Patient's daughter serves as HHA, 3 times a week, 4-5 hours each time. Patient enjoys listening to music, reading and doing puzzles. She is emotionally supported by her  and children who are able to assist as needed. She enjoys going for walks, playing golf. She utilizes a cane, wheelchair and shower chair. \par \par PMD: Dr Martínez:  \edilia \edilia I-STOP Ref#:  [id number]

## 2022-11-01 NOTE — ASSESSMENT
[FreeTextEntry1] : 77yoF with:\par \par # Pancreatic cancer - C/w FOLFIRINOX. Med Onc follow-up. \par \par # Diarrhea, chemotherapy-related - c/w PRN Imodium/Lomotil. \par \par # Loss of appetite -\par Informed patient/daughter that mirtazapine 7.5mg is more sedating, recommend going back up to 15mg QHS and she should develop some tolerance to the sedating effect. \par Medical cannabis certification completed today. Provided cannabis education, overview of state program, and discussed adverse effects in detail. Counseled that vaporized cannabis is not the preferred route of administration due to the fact that both short-term and long-term risks associated with vaporizing oils are not yet fully understood. Recommend starting with 1:1 THC:CBD formulation at low dose of THC (~2mg/dose).\par \par # Encounter for palliative care- Emotional support provided \par Explained the role of palliative care in enhancing quality of life in the setting of serious illness.\par Provided video codes for Advance Care Planning videos in Yakut. Will follow up with advance care planning discussion at next visit. \par \par Follow up in 1 month, call sooner with questions or issues.

## 2022-11-01 NOTE — PHYSICAL EXAM
[General Appearance - Alert] : alert [General Appearance - In No Acute Distress] : in no acute distress [Sclera] : the sclera and conjunctiva were normal [No Focal Deficits] : no focal deficits [Oriented To Time, Place, And Person] : oriented to person, place, and time [Affect] : the affect was normal [Normal Oral Mucosa] : normal oral mucosa [Neck Appearance] : the appearance of the neck was normal [] : no respiratory distress [Auscultation Breath Sounds / Voice Sounds] : lungs were clear to auscultation bilaterally [Heart Rate And Rhythm] : heart rate was normal and rhythm regular [Heart Sounds] : normal S1 and S2 [Edema] : there was no peripheral edema [Bowel Sounds] : normal bowel sounds [Abdomen Soft] : soft [Abdomen Tenderness] : non-tender [Abnormal Walk] : normal gait [Skin Color & Pigmentation] : normal skin color and pigmentation

## 2022-11-17 PROBLEM — R91.1 PULMONARY NODULE: Status: ACTIVE | Noted: 2022-01-01

## 2022-11-18 NOTE — HISTORY OF PRESENT ILLNESS
[de-identified] : ID: 76 y/o Thai-speaking woman with resected pancreatic cancer pT3N2 PNI+/LVI+ disease, margin negative, presenting for adjuvant treatment discussion\par \par Other Current Medical Problems:  IDDM diagnosed >20 years, HTN, HLD, CAD, Cardiac stent 2019  , chronic hep C (S/P treatment) arthritis , \par \par  Oncological History : \par May 2022: Presented with RUQ abdominal pain intermittently x 1 week and was found to be jaundiced presenting with a T bili of 15 and Ca 19.9 was 6704.\par  05/20/22: CT A/P - 2.1 cms pancreatic head mass with resultant abrupt cut off the CBD. Ct chest with several indeterminate pulmonary nodules. \par  05/20/22- S/P EUS  with [facility]NB pathology + for moderately differntiated adenocarcinoma  05/23/22- S/P ERCP- Plastic stent into CBD  \par 06/13/22- Upfront Whipple surgery: Surgical pathology - Moderately differentiated adenocarcinoma , negative margins 5/17 LN +ve.\par  07/19/22 CT CAP no e/o local disease; however scattered pulmonary nodules, some of which are new.  \par 08/01/22- C # 1 FOLFORINOX - 5FU 2000 mg/m2/  mg/ m2/ Irinotecan 120 mg/ m2/ Oxaliplatin 70 mg/ m2 \par  08/15/22- C # 2 FOLFORINOX - 5FU 2000 mg/m2/  mg/ m2/ Irinotecan 120 mg/ m2/ Oxaliplatin 70 mg/ m2  \par 08/29/22- C # 3 FOLFORINOX - 5FU 2000 mg/m2/  mg/ m2/ Irinotecan 120 mg/ m2/ Oxaliplatin 70 mg/ m2  \par 09/12/22- C # 4  FOLFORINOX - 5FU 2000 mg/m2/  mg/ m2/ Irinotecan 120 mg/ m2/ Oxaliplatin 70 mg/ m2  \par 09/14/22 Restaging scans show no evidence of POD or metastatic disease\par \par 09/26/22- C #5 FOLFORINOX - 5FU 2000 mg/m2/  mg/ m2/ Irinotecan 75 mg/ m2/ Oxaliplatin 70 mg/ m2  \par 10/10/22- C # 6 FOLFOX-  - 5FU 2000 mg/m2/  mg/Oxaliplatin 55 mg/m2 -( stop Irinotecan sec to diarrhea) \par 10/24/22- C # 7 FOLFOX 5FU 2000 mg/m2/  mg/Oxaliplatin 55 mg/m2\par 11/07/22- Plan for C # 8- FOLFOX \par \par    PMD: Dr Martínez - [phone] \par \par   FHX: Brother brain aneurysm diagnosed at 50, Sister had Aneuresym ,daughter Leukemia (27 years old) \par  \par Social HX: Lives with , Apartment, no smoker, no alcohol, walks about 30 mts 2-3 times a day.\par \par    PSX:  Total hysterectomy , Whipple procedure (06/13/22)    \par \par Disease:  Pancreatic adenocarcinoma Pathology:  AJCC Stage :  Tumor Markers: CEA/ Ca 19.9   [de-identified] : [facility]- BRCA2 Variant heterozygous- c.1964C>A \par [facility] -08/02/22 was positive - 0.58\par                 09/12/22-    0.14\par                09/20/22- 0.44\par Foundation one CDx-  KAILEY, 2 muts/ Mb, KRAS Q61H,CDKN2A, CSF3R amplification [de-identified] : 11/09/22:\par Pt seen in treatment room accompanied by her spouse. \par Tolerated this chemo regimen better with IVF in between treatments and skipping Irinotecan. \par Denies diarrhea /N/V\par She has noticed that her blood sugars have been running high - Been followed by Endocrinologist \par She has been using Remeron 7.5 mg at HS \par Discussed about getting restaging scans after this round of chemo. \par \par \par \par

## 2022-11-18 NOTE — HISTORY OF PRESENT ILLNESS
[de-identified] : ID: 78 y/o Georgian-speaking woman with resected pancreatic cancer pT3N2 PNI+/LVI+ disease, margin negative, presenting for adjuvant treatment discussion\par \par Other Current Medical Problems:  IDDM diagnosed >20 years, HTN, HLD, CAD, Cardiac stent 2019  , chronic hep C (S/P treatment) arthritis , \par \par  Oncological History : \par May 2022: Presented with RUQ abdominal pain intermittently x 1 week and was found to be jaundiced presenting with a T bili of 15 and Ca 19.9 was 6704.\par  05/20/22: CT A/P - 2.1 cms pancreatic head mass with resultant abrupt cut off the CBD. Ct chest with several indeterminate pulmonary nodules. \par  05/20/22- S/P EUS  with [facility]NB pathology + for moderately differntiated adenocarcinoma  05/23/22- S/P ERCP- Plastic stent into CBD  \par 06/13/22- Upfront Whipple surgery: Surgical pathology - Moderately differentiated adenocarcinoma , negative margins 5/17 LN +ve.\par  07/19/22 CT CAP no e/o local disease; however scattered pulmonary nodules, some of which are new.  \par 08/01/22- C # 1 FOLFORINOX - 5FU 2000 mg/m2/  mg/ m2/ Irinotecan 120 mg/ m2/ Oxaliplatin 70 mg/ m2 \par  08/15/22- C # 2 FOLFORINOX - 5FU 2000 mg/m2/  mg/ m2/ Irinotecan 120 mg/ m2/ Oxaliplatin 70 mg/ m2  \par 08/29/22- C # 3 FOLFORINOX - 5FU 2000 mg/m2/  mg/ m2/ Irinotecan 120 mg/ m2/ Oxaliplatin 70 mg/ m2  \par 09/12/22- C # 4  FOLFORINOX - 5FU 2000 mg/m2/  mg/ m2/ Irinotecan 120 mg/ m2/ Oxaliplatin 70 mg/ m2  \par 09/14/22 Restaging scans show no evidence of POD or metastatic disease\par \par 09/26/22- C #5 FOLFORINOX - 5FU 2000 mg/m2/  mg/ m2/ Irinotecan 75 mg/ m2/ Oxaliplatin 70 mg/ m2  \par 10/10/22- C # 6 FOLFOX-  - 5FU 2000 mg/m2/  mg/Oxaliplatin 55 mg/m2 -( stop Irinotecan sec to diarrhea) \par 10/24/22- C # 7 FOLFOX 5FU 2000 mg/m2/  mg/Oxaliplatin 55 mg/m2\par 11/07/22- C # 8- FOLFOX \par \par 11/16/22: Restaging CT scans--Mild increase in size of several previously seen noncalcified pulmonary nodules as described above\par No imaging evidence of locally recurrent or metastatic disease involving the abdomen or pelvis.\par \par    PMD:  Martínez - [phone] \par \par   FHX: Brother brain aneurysm diagnosed at 50, Sister had Aneuresym ,daughter Leukemia (27 years old) \par  \par Social HX: Lives with , Apartment, no smoker, no alcohol, walks about 30 mts 2-3 times a day.\par \par    PSX:  Total hysterectomy , Whipple procedure (06/13/22)    \par \par Disease:  Pancreatic adenocarcinoma Pathology:  AJCC Stage :  Tumor Markers: CEA/ Ca 19.9   [de-identified] : [facility]- BRCA2 Variant heterozygous- c.1964C>A \par [facility] -08/02/22 was positive - 0.58\par                 09/12/22-    0.14\par                09/20/22- 0.44\par Foundation one CDx-  KAILEY, 2 muts/ Mb, KRAS Q61H,CDKN2A, CSF3R amplification [de-identified] : 11/17/22:\par Pt is seen and examined in office accompanied by her spouse and son. \par Pt is tolerating chemo well with IV fluids on off week.\par Denies N/V/D\par has episodes of uneasiness and sour stomach - occasionally throws up bile \par Reviewed the most recent scans and tumor markers with patient and family .\par Mild increase in size of several previously seen noncalcified pulmonary nodules .No imaging evidence of locally recurrent or metastatic disease involving the abdomen or pelvis.\par Answered all question of son - \par \par \par \par \par \par \par \par

## 2022-11-18 NOTE — PHYSICAL EXAM
[Ulcers] : no ulcers [Mucositis] : no mucositis [Thrush] : no thrush [Vesicles] : no vesicles [de-identified] : Weight loss  [de-identified] : Post op incision scar helaing well  [de-identified] : occasional lower back pain  [de-identified] : pins and needles on both hands, dropping things lately

## 2022-11-18 NOTE — PHYSICAL EXAM
[Restricted in physically strenuous activity but ambulatory and able to carry out work of a light or sedentary nature] : Status 1- Restricted in physically strenuous activity but ambulatory and able to carry out work of a light or sedentary nature, e.g., light house work, office work [Thin] : thin [Normal] : pharynx is unremarkable, moist mucus membrane, no oral lesions [Ulcers] : no ulcers [Mucositis] : no mucositis [Thrush] : no thrush [Vesicles] : no vesicles [de-identified] : Post op incision scar helaing well  [de-identified] : occasional lower back pain  [de-identified] : pins and needles on both hands, dropping things lately

## 2022-11-18 NOTE — HISTORY OF PRESENT ILLNESS
[de-identified] : ID: 78 y/o Bulgarian-speaking woman with resected pancreatic cancer pT3N2 PNI+/LVI+ disease, margin negative, presenting for adjuvant treatment discussion\par \par Other Current Medical Problems:  IDDM diagnosed >20 years, HTN, HLD, CAD, Cardiac stent 2019  , chronic hep C (S/P treatment) arthritis , \par \par  Oncological History : \par May 2022: Presented with RUQ abdominal pain intermittently x 1 week and was found to be jaundiced presenting with a T bili of 15 and Ca 19.9 was 6704.\par  05/20/22: CT A/P - 2.1 cms pancreatic head mass with resultant abrupt cut off the CBD. Ct chest with several indeterminate pulmonary nodules. \par  05/20/22- S/P EUS  with FNB pathology + for moderately differntiated adenocarcinoma  05/23/22- S/P ERCP- Plastic stent into CBD  \par 06/13/22- Upfront Whipple surgery: Surgical pathology - Moderately differentiated adenocarcinoma , negative margins 5/17 LN +ve.\par  07/19/22 CT CAP no e/o local disease; however scattered pulmonary nodules, some of which are new.  \par 08/01/22- C # 1 FOLFORINOX - 5FU 2000 mg/m2/  mg/ m2/ Irinotecan 120 mg/ m2/ Oxaliplatin 70 mg/ m2 \par  08/15/22- C # 2 FOLFORINOX - 5FU 2000 mg/m2/  mg/ m2/ Irinotecan 120 mg/ m2/ Oxaliplatin 70 mg/ m2  \par 08/29/22- C # 3 FOLFORINOX - 5FU 2000 mg/m2/  mg/ m2/ Irinotecan 120 mg/ m2/ Oxaliplatin 70 mg/ m2  \par 09/12/22- C # 4  FOLFORINOX - 5FU 2000 mg/m2/  mg/ m2/ Irinotecan 120 mg/ m2/ Oxaliplatin 70 mg/ m2  \par 09/14/22 Restaging scans show no evidence of POD or metastatic disease\par \par 09/26/22- C #5 FOLFORINOX - 5FU 2000 mg/m2/  mg/ m2/ Irinotecan 75 mg/ m2/ Oxaliplatin 70 mg/ m2  \par 10/10/22- C # 6 FOLFOX-  - 5FU 2000 mg/m2/  mg/Oxaliplatin 55 mg/m2 -( stop Irinotecan sec to diarrhea) \par 10/24/22- C # 7 FOLFOX 5FU 2000 mg/m2/  mg/Oxaliplatin 55 mg/m2\par \par    PMD: Dr Martínez - [phone] \par \par   FHX: Brother brain aneurysm diagnosed at 50, Sister had Aneuresym ,daughter Leukemia (27 years old) \par  \par Social HX: Lives with , Apartment, no smoker, no alcohol, walks about 30 mts 2-3 times a day.\par \par    PSX:  Total hysterectomy , Whipple procedure (06/13/22)    \par \par Disease:  Pancreatic adenocarcinoma Pathology:  AJCC Stage :  Tumor Markers: CEA/ Ca 19.9   [de-identified] : [facility]- BRCA2 Variant heterozygous- c.1964C>A \par [facility] - 07/19/22- Pending results \par                    08/02/22 was positive - 0.58\par Foundation one CDx-  KAILEY, 2 muts/ Mb, KRAS Q61H,CDKN2A, CSF3R amplification [de-identified] : 10/26/22: \par Pt seen in office accompanied by her spouse. \par Tolerated this chemo regimen better with IVF in between treatments and skipping Irinotecan. \par Denies diarrhea \par She has been using Remeron 7.5 mg at HS and feels sleepy and groggy but it seems to help her with her appetite and sleep. \par She is scheduled to see Dr AMAYA bermudez- Interested in exploring Medical marijuana to help with symptoms\par Discussed and review tumor markers and pathology report per spouse request. \par \par \par \par

## 2022-11-18 NOTE — PHYSICAL EXAM
[Ulcers] : no ulcers [Mucositis] : no mucositis [Thrush] : no thrush [Vesicles] : no vesicles [de-identified] : Weight loss  [de-identified] : Post op incision scar helaing well , multiple episodes of diarrhea even with Imodium on board  [de-identified] : occasional lower back pain  [de-identified] : pins and needles on both hands, dropping things lately

## 2022-11-18 NOTE — REASON FOR VISIT
"  2020      RE: Jose Monge  [address]  [place] [address]       Leticia Pedro FRANCO  [address] [place] [address]    RE:  Jose Monge  :  2019  [place] MRN:  [id number]  Date of visit:  2020    Dear Dr. Kelly:    I had the pleasure of seeing your patient, Jose, today through the [facility] in urology consultation for the question of undescended testicle.  Please see below the details of this visit and my impression and plans discussed with the family.        CC:  Undescended testicle    HPI:  Jose Monge is a 9 month old child whom I was asked to see in consultation for the above.  Jose is referred for evaluation of left undescended testis. Jose was born at term via vaginal delivery. On  exam the bilateral testis were palpable, left documented as 50% smaller than right. At his well exams performed at 6 days, 5 weeks, 2m, 4m, and 6 months the left testicle was not palpable, thought to possibly be felt in the left inguinal canal. Dad states they have felt the Right testis for sure, and felt the Left up higher. There is no waxing or waning of fluid in the scrotum, no bulging or masses in the groin or scrotum. Jose has several wet diapers every day. Jose has 1 bowel movement per day. There is no family history of undescended testicles or other  disorders.      PMH:  Reviewed, no significant medical history    PSH:   Reviewed, no surgical history    Meds, allergies, family history, social history reviewed per intake form and confirmed in our EMR.    ROS:  Negative on a 12-point scale.  All other pertinent positives mentioned in the HPI.    PE:  Height 0.705 m (2' 3.75\"), weight 9.45 kg (20 lb 13.3 oz).  Body mass index is 19.02 kg/m .  General:  Well-appearing child, distressed with exam  HEENT:  Normocephalic, normal facies, moist mucous membranes  Resp:  Symmetric chest wall movement, no audible respirations  Abd:  Soft, " non-tender, non-distended, no palpable masses  Genitalia:  Uncircumcised phallus. Right testicle descended and palpable within the scrotum. Left testis not palpable on multiple attempts  Spine:  Straight, no palpable sacral defects  Neuromuscular:  Muscles symmetrically bulked/developed  Ext:  Full range of motion  Skin:  Warm, well-perfused      Impression:  Non palpable, undescended Left testicle.     Plan:    Trip to the OR for exam under anesthesia, diagnostic laparoscopy, possible Left staged laparoscopic orchiopexy vs Left groin exploration for orchiectomy or inguinal orchiopexy. Dad is unsure about proceeding with surgery, he wants to see if he can feel the Left testicle at home. We decided to proceed with placing surgery orders and Parents will discuss further at home.    Family understands that this surgery will be performed on an out-patient basis under general anesthesia which requires a pre-operative visit with someone from the PCP office, as well as compliance with strict fasting guidelines prior to surgery.  The surgery itself carries risk, including risk of bleeding, infection, poor wound healing or scaring, damage to neighboring structures.  Post-operative care (pain medicines, wound care, etc.) will be reviewed on the day of surgery, but we've briefly gone through an overview today.     We'll ask that the child stay off straddle toys and out of swimming pools for about 2 weeks after surgery, but he will be able to return to regular baths/showering about 24 hours after surgery.    Our office will be in contact with the family to arrange a mutually convenient time, but please don't hesitate to contact us directly with any questions/concerns.    Thank you very much for allowing me the opportunity to participate in this nice family's care with you.    Sincerely,  DARON Monae, CNP  Pediatric Urology  [facility]    DARON Parham CNP     [FreeTextEntry2] : Pancreatic adenocarcinoma  [Interpreters_IDNumber] : [id number] [Interpreters_FullName] : Janet

## 2022-11-18 NOTE — ASSESSMENT
[FreeTextEntry1] : 78 y/o woman with resected pancreatic cancer pT3N2 PNI+/LVI+ disease, margin negative. \par \par Post-op imaging demonstrated lung lesions c/w metastasis. Considered biopsy of one, but it is very small, sub-centimeter, and discussion with radiology noted likely under size threshold for biopsy. In addition, would not change plan to proceed with FOLFIRINOX as even if patient had histologic confirmation would not qualify for GIANT trial due to language barrier. Will proceed with FOLFIRINOX. Also discussed with patient that [facility], now a [facility], can provide treatment a bit closer to home, but she declined this at this time. Thus we proceeded with FOLFIRINOX. There is concern now that she has relapsed given her rising tumor markers and positive Signatera\par \par Plan:\par \par #Pancreatic Cancer\par - S/P 5 cycles of  FOLFIRINOX every other week- \par Switched  chemo to FOLFOX every 2 weeks dose reduced Oxaliplatin sec to neuropathy \par She is having Grade 2 neuropathy, tingling persistent in both her hands finger tips and on her toes .\par She is doing better with IVF in between chemo infusions.\par Restaging scans with stable disease - will continue with same chemo regimen \par \par \par Loss of appetite : \par added Remeron to boost appetite \par Refer to Pall team for medical marijuana \par \par -Restaging scans 9/14/22 show stable disease, no change in lung nodules\par -Invitae reveals BRCA2 Heterogeneous uncertain. Referred  to Dr Dumont genetics counselling \par - patient is not a candidate for the Elicio trial on account of having 4 lesions (more than 3) in the lung\par -Will plan for restaging scans after completion of 8 cycles. \par \par \par Case discussed with Dr. David\par RTC in 2 weeks \par

## 2022-11-18 NOTE — REASON FOR VISIT
[Follow-Up Visit] : a follow-up [Family Member] : family member [[employer] ] : provided by [employer]   [FreeTextEntry2] : Pancreatic adenocarcinoma  [Interpreters_IDNumber] : [id number] [Interpreters_FullName] : Janet

## 2022-11-18 NOTE — REASON FOR VISIT
[Follow-Up Visit] : a follow-up [Family Member] : family member [[employer] ] : provided by [employer]   [FreeTextEntry2] : Pancreatic adenocarcinoma  [Interpreters_IDNumber] : [id number] [Interpreters_FullName] : Janet

## 2022-11-18 NOTE — REASON FOR VISIT
[FreeTextEntry2] : Pancreatic adenocarcinoma  [Interpreters_IDNumber] : [id number] [Interpreters_FullName] : Janet

## 2022-11-18 NOTE — ASSESSMENT
[FreeTextEntry1] : 78 y/o woman with resected pancreatic cancer pT3N2 PNI+/LVI+ disease, margin negative. \par \par Post-op imaging demonstrated lung lesions c/w metastasis. Considered biopsy of one, but it is very small, sub-centimeter, and discussion with radiology noted likely under size threshold for biopsy. In addition, would not change plan to proceed with FOLFIRINOX as even if patient had histologic confirmation would not qualify for GIANT trial due to language barrier. Will proceed with FOLFIRINOX. Also discussed with patient that [facility], now a [facility], can provide treatment a bit closer to home, but she declined this at this time. Thus we proceeded with FOLFIRINOX. There is concern now that she has relapsed given her rising tumor markers and positive Signatera\par \par Plan:\par \par #Pancreatic Cancer\par - Was  on FOLFIRINOX every other week- \par Switched  chemo to FOLFOX every 2 weeks dose reduced Oxaliplatin sec to neuropathy \par Chemo induced diarrhea- improved over the weekend \par She is having Grade 2 neuropathy, tingling persistent in both her hands finger tips and on her toes .\par \par \par Chemo induced diarrhea : \par Stop  Irinotecan \par Alternate Imodium/ Lomotil prn \par will add IV fluids in between chemo \par \par Loss of appetite : \par added Remeron to boost appetite \par Refer to Pall team for medical marijuana \par \par -Restaging scans 9/14/22 show stable disease, no change in lung nodules\par -Invitae reveals BRCA2 Heterogeneous uncertain. Referred  to Dr Dumont genetics counselling \par - patient is not a candidate for the Elicio trial on account of having 4 lesions (more than 3) in the lung\par -Will plan for restaging scans after completion of 8 cycles. \par \par \par Case discussed with Dr. David\par \par \par

## 2022-11-18 NOTE — HISTORY OF PRESENT ILLNESS
[de-identified] : ID: 78 y/o [place]-speaking woman with resected pancreatic cancer pT3N2 PNI+/LVI+ disease, margin negative, presenting for adjuvant treatment discussion\par \par Other Current Medical Problems:  IDDM diagnosed >20 years, HTN, HLD, CAD, Cardiac stent 2019  , chronic hep C (S/P treatment) arthritis , \par \par  Oncological History : \par May 2022: Presented with RUQ abdominal pain intermittently x 1 week and was found to be jaundiced presenting with a T bili of 15 and Ca 19.9 was 6704.\par  05/20/22: CT A/P - 2.1 cms pancreatic head mass with resultant abrupt cut off the CBD. Ct chest with several indeterminate pulmonary nodules. \par  05/20/22- S/P EUS  with FNB pathology + for moderately differntiated adenocarcinoma  05/23/22- S/P ERCP- Plastic stent into CBD  \par 06/13/22- Upfront Whipple surgery: Surgical pathology - Moderately differentiated adenocarcinoma , negative margins 5/17 LN +ve.\par  07/19/22 CT CAP no e/o local disease; however scattered pulmonary nodules, some of which are new.  \par 08/01/22- C # 1 FOLFORINOX - 5FU 2000 mg/m2/  mg/ m2/ Irinotecan 120 mg/ m2/ Oxaliplatin 70 mg/ m2 \par  08/15/22- C # 2 FOLFORINOX - 5FU 2000 mg/m2/  mg/ m2/ Irinotecan 120 mg/ m2/ Oxaliplatin 70 mg/ m2  \par 08/29/22- C # 3 FOLFORINOX - 5FU 2000 mg/m2/  mg/ m2/ Irinotecan 120 mg/ m2/ Oxaliplatin 70 mg/ m2  \par 09/12/22- C # 4  FOLFORINOX - 5FU 2000 mg/m2/  mg/ m2/ Irinotecan 120 mg/ m2/ Oxaliplatin 70 mg/ m2  \par 09/14/22 Restaging scans show no evidence of POD or metastatic disease\par \par 09/26/22- C #5 FOLFORINOX - 5FU 2000 mg/m2/  mg/ m2/ Irinotecan 75 mg/ m2/ Oxaliplatin 70 mg/ m2  \par 10/10/232- C # 6 FOLFOX-  - 5FU 2000 mg/m2/  mg/Oxaliplatin 55 mg/m2 -( stop Irinotecan sec to diarrhea) \par \par    PMD:  Martínez - [phone] \par \par   FHX: Brother brain aneurysm diagnosed at 50, Sister had Aneuresym ,daughter Leukemia (27 years old) \par  \par Social HX: Lives with , Apartment, no smoker, no alcohol, walks about 30 mts 2-3 times a day.\par \par    PSX:  Total hysterectomy , Whipple procedure (06/13/22)    \par \par Disease:  Pancreatic adenocarcinoma Pathology:  AJCC Stage :  Tumor Markers: CEA/ Ca 19.9   [de-identified] : Margaret- BRCA2 Variant heterozygous- [id number].[id number]>A \par Signatera - 07/19/22- Pending results \par                    08/02/22 was positive - 0.58 [de-identified] : 10/10/22: \par Pt is seen in office with her daughter and her . \par Her diarrhea has improved - However, she had an episode of diarrhea on Sunday but attributes to something she ate from the resturant over weekend. \par She has been using Remeron 7.5 mg at HS and feels sleepy and groggy \par Loss of energy- feeling fatigued and wiped out \par Will make appt for IVfluids  in between chemo cycles. \par \par \par \par

## 2022-11-18 NOTE — PHYSICAL EXAM
[Restricted in physically strenuous activity but ambulatory and able to carry out work of a light or sedentary nature] : Status 1- Restricted in physically strenuous activity but ambulatory and able to carry out work of a light or sedentary nature, e.g., light house work, office work [Thin] : thin [Normal] : normal appearance, no rash, nodules, vesicles, ulcers, erythema [Ulcers] : no ulcers [Mucositis] : no mucositis [Thrush] : no thrush [Vesicles] : no vesicles [de-identified] : Weight loss  [de-identified] : Post op incision scar helaing well , multiple episodes of diarrhea even with Imodium on board  [de-identified] : occasional lower back pain  [de-identified] : pins and needles on both hands, dropping things lately

## 2022-11-18 NOTE — ASSESSMENT
[FreeTextEntry1] : 76 y/o woman with resected pancreatic cancer pT3N2 PNI+/LVI+ disease, margin negative. \par \par Post-op imaging demonstrated lung lesions c/w metastasis. Considered biopsy of one, but it is very small, sub-centimeter, and discussion with radiology noted likely under size threshold for biopsy. In addition, would not change plan to proceed with FOLFIRINOX as even if patient had histologic confirmation would not qualify for GIANT trial due to language barrier. Will proceed with FOLFIRINOX. Also discussed with patient that [facility], can provide treatment a bit closer to home, but she declined this at this time. Thus we proceeded with FOLFIRINOX. There is concern now that she has relapsed given her rising tumor markers and positive Signatera\par \par Plan:\par \par #Pancreatic Cancer\par - Was  on FOLFIRINOX every other week- \par Will switch chemo to FOLFOX every 2 weeks dose reduced Oxaliplatin sec to neuropathy \par Chemo induced diarrhea- improved over the weekend \par She is having Grade 2 neuropathy, tingling persistent in both her hands finger tips and on her toes .\par \par \par Chemo induced diarrhea : \par Stop  Irinotecan \par Alternate Imodium/ Lomotil prn \par will add IV fluids in between chemo \par \par Loss of appetite : \par added Remeron to boost appetite \par Refer to Pall team for medical marijuana \par \par -Restaging scans 9/14/22 show stable disease, no change in lung nodules\par -Invitae reveals BRCA2 Heterogeneous uncertain. Referred  to Dr Dumont genetics counselling \par - patient is not a candidate for the Elicio trial on account of having 4 lesions (more than 3) in the lung\par ---We will send for liquid and tissue Foundation to assess for KRAS mutation (SP ordered)\par ---Confirming that patient can be enrolled given stable 1.0cm lung nodule on CT TAP (DAK to discuss with trial team)\par \par \par Case discussed with Dr. David\par \par \par

## 2022-11-18 NOTE — ASSESSMENT
[FreeTextEntry1] : 76 y/o woman with resected pancreatic cancer pT3N2 PNI+/LVI+ disease, margin negative. \par \par Post-op imaging demonstrated lung lesions c/w metastasis. Considered biopsy of one, but it is very small, sub-centimeter, and discussion with radiology noted likely under size threshold for biopsy. In addition, would not change plan to proceed with FOLFIRINOX as even if patient had histologic confirmation would not qualify for GIANT trial due to language barrier. Will proceed with FOLFIRINOX. Also discussed with patient that [facility], now a [facility], can provide treatment a bit closer to home, but she declined this at this time. Thus we proceeded with FOLFIRINOX. There is concern now that she has relapsed given her rising tumor markers and positive Signatera\par \par Plan:\par \par #Pancreatic Cancer\par - Was  on FOLFIRINOX every other week- \par Switched  chemo to FOLFOX every 2 weeks dose reduced Oxaliplatin sec to neuropathy \par She is having Grade 2 neuropathy, tingling persistent in both her hands finger tips and on her toes .\par She is doing better with IVF in between chemo infusions.\par Will get rseatging scans after this round of chemo. \par \par \par Chemo induced diarrhea : \par Stop  Irinotecan \par Alternate Imodium/ Lomotil prn \par Added  IV fluids in between chemo \par \par Loss of appetite : \par added Remeron to boost appetite \par Refer to Pall team for medical marijuana \par \par -Restaging scans 9/14/22 show stable disease, no change in lung nodules\par -Invitae reveals BRCA2 Heterogeneous uncertain. Referred  to Dr Dumont genetics counselling \par - patient is not a candidate for the Elicio trial on account of having 4 lesions (more than 3) in the lung\par -Will plan for restaging scans after completion of 8 cycles. \par \par \par Case discussed with Dr. David\par RTC in 2 weeks \par

## 2022-11-21 NOTE — PHYSICAL EXAM
[General Appearance - Alert] : alert [General Appearance - In No Acute Distress] : in no acute distress [Sclera] : the sclera and conjunctiva were normal [Normal Oral Mucosa] : normal oral mucosa [Neck Appearance] : the appearance of the neck was normal [] : no respiratory distress [Auscultation Breath Sounds / Voice Sounds] : lungs were clear to auscultation bilaterally [Heart Rate And Rhythm] : heart rate was normal and rhythm regular [Heart Sounds] : normal S1 and S2 [Edema] : there was no peripheral edema [Bowel Sounds] : normal bowel sounds [Abdomen Soft] : soft [Abdomen Tenderness] : non-tender [Abnormal Walk] : normal gait [Skin Color & Pigmentation] : normal skin color and pigmentation [No Focal Deficits] : no focal deficits [Oriented To Time, Place, And Person] : oriented to person, place, and time [Affect] : the affect was normal

## 2022-11-23 NOTE — DATA REVIEWED
[FreeTextEntry1] : CT C/A/P  (11/16/2022): \par \par COMPARISON: CT chest abdomen and pelvis 9/14/2022, CT chest 7/24/2022 and outside facility CT chest 5/20/2022\par \par FINDINGS:\par CHEST:\par LUNGS AND LARGE AIRWAYS: Expiratory phase imaging with hypoventilatory change.\par Similar reticulonodular peripheral infiltrate posteriorly within the right upper lobe\par Left lower lobe noncalcified pulmonary nodules measuring up to 6 mm and 7 mm (3, 50 and 43) are stable since July 2022 however previously measured 3 mm and 4 mm respectively in May 2022 (6, 29 and 25).\par Right lower lobe noncalcified 7 mm pulmonary nodule (3, 41) previously measured 6 mm in July 2022 and 5 mm in May 2022.\par Stable right upper lobe noncalcified 4 mm pulmonary nodule (3, 23)\par Right lower lobe irregular noncalcified 1.1 cm pulmonary nodule (3, 46) previously measured 9 mm in July 2022.\par PLEURA: No pleural effusion.\par VESSELS: Narrowed lower right internal jugular vein surrounding indwelling right chest Mediport catheter which terminates within the right atrium. Aortic and coronary artery calcification\par HEART: Heart size is normal. No pericardial effusion.\par MEDIASTINUM AND LEVI: No lymphadenopathy. Subcentimeter calcified right hilar lymph nodes\par CHEST WALL AND LOWER NECK: Innumerable bilateral breast nodules several with rim calcification are overall unchanged however incompletely assessed by CT  Multinodular thyroid gland.\par \par ABDOMEN AND PELVIS:\par LIVER: Stable subcentimeter hypodensities too small for definitive characterization\par BILE DUCTS: Normal caliber. Trace pneumobilia\par GALLBLADDER: Cholecystectomy.\par SPLEEN: Within normal limits.\par PANCREAS: Unchanged positioning of the indwelling main pancreatic duct stent. Stable Whipple postoperative changes\par ADRENALS: Within normal limits.\par KIDNEYS/URETERS: Symmetric renal enhancement. Mild bilateral hydronephrosis, possibly secondary to distended bladder. Stable subcentimeter hypodensities within each kidney too small for definitive characterization\par \par BLADDER: Distended\par REPRODUCTIVE ORGANS: Hysterectomy.\par \par BOWEL: No bowel obstruction. Appendix is normal.\par PERITONEUM: No ascites.\par VESSELS: Atherosclerotic changes.\par RETROPERITONEUM/LYMPH NODES: No lymphadenopathy.\par ABDOMINAL WALL: Anterior abdominal wall prior postoperative change. Bilateral gluteal injection granulomata\par BONES: Multilevel degenerative changes throughout the spine.\par \par IMPRESSION:\par \par Mild increase in size of several previously seen noncalcified pulmonary nodules as described above\par \par No imaging evidence of locally recurrent or metastatic disease involving the abdomen or pelvis.

## 2022-11-23 NOTE — HISTORY OF PRESENT ILLNESS
[FreeTextEntry1] : 77yoF with pancreatic cancer presents for follow up visit.\par PMH significant for CAD, HTN, HLD, IDDM, chronic hepatitis C, arthritis, cardiac stent 2019. \par \par Patient is Indonesian-speaking, her  provided translation per patient's request. \par \par May 2022: Presented with RUQ abdominal pain intermittently x 1 week and was found to be jaundiced presenting with a T bili of 15 and Ca 19.9 was 6704. CT A/P showed a 2.1cm pancreatic head mass. 5/20/22- S/P EUS with FNB pathology + for moderately differentiated adenocarcinoma 05/23/22- S/P ERCP- Plastic stent into CBD \par 06/13/22- Upfront Whipple surgery: Surgical pathology - Moderately differentiated adenocarcinoma , negative margins 5/17 LNs positive. Started adjuvant FOLFIRINOX on 8/1/22. \par \par Main reason for which patient is referred is symptom management. \par Irinotecan has been on hold due to diarrhea, which has been notably better since it being held. \par \par Interval Hx (11/21/22):\par Patient seen for visit in Treatment Room accompanied by her .\par Patient tried increasing dose of mirtazapine to 15mg once. She felt very groggy the next day, went back down to 7.5mg thereafter. She has been feeling pretty well. Appetite is good lately. \par \par ROS:\par +diarrhea - Imodium/Lomotil PRN - has not been as bad since Irinotecan has been held\par +abdominal cramping preceding BMs, improved with BM\par +peripheral neuropathy \par +mood is generally positive\par Denies n/v, trouble sleeping, \par \par Patient is , lives with her  in an apartment in [place]. She is originally from [place] and has been in the [place] since 1988. They have 4 children, 3 live in [place]. Patient requires occasional assistance with ADLs, her  and daughter are able to assist. Patient's daughter serves as HHA, 3 times a week, 4-5 hours each time. Patient enjoys listening to music, reading and doing puzzles. She is emotionally supported by her  and children who are able to assist as needed. She enjoys going for walks, playing golf. She utilizes a cane, wheelchair and shower chair. \par \par PMD: Dr Martínez:  \par \par I-STOP Ref#:  [id number]

## 2022-11-23 NOTE — ASSESSMENT
[FreeTextEntry1] : 77yoF with:\par \par # Pancreatic cancer - C/w FOLFIRINOX. Med Onc follow-up. \par \par # Diarrhea, chemotherapy-related - c/w PRN Imodium/Lomotil. \par \par # Loss of appetite -\par Appetite has been improved lately. C/w mirtazapine 7.5mg QHS. medical cannabis cert previously completed, patient's daughter will complete registration.\par \par # Encounter for palliative care- Emotional support provided \par Provided video codes for Advance Care Planning videos in Czech at our initial visit. \par \par Follow up as needed, call with questions/issues\par

## 2023-01-01 ENCOUNTER — RESULT REVIEW (OUTPATIENT)
Age: 78
End: 2023-01-01

## 2023-01-01 ENCOUNTER — APPOINTMENT (OUTPATIENT)
Dept: INFUSION THERAPY | Facility: HOSPITAL | Age: 78
End: 2023-01-01

## 2023-01-01 ENCOUNTER — NON-APPOINTMENT (OUTPATIENT)
Age: 78
End: 2023-01-01

## 2023-01-01 ENCOUNTER — APPOINTMENT (OUTPATIENT)
Dept: HEMATOLOGY ONCOLOGY | Facility: CLINIC | Age: 78
End: 2023-01-01
Payer: MEDICARE

## 2023-01-01 ENCOUNTER — APPOINTMENT (OUTPATIENT)
Dept: GERIATRICS | Facility: CLINIC | Age: 78
End: 2023-01-01
Payer: MEDICARE

## 2023-01-01 ENCOUNTER — INPATIENT (INPATIENT)
Facility: HOSPITAL | Age: 78
LOS: 3 days | Discharge: HOME | End: 2023-07-05
Attending: STUDENT IN AN ORGANIZED HEALTH CARE EDUCATION/TRAINING PROGRAM | Admitting: STUDENT IN AN ORGANIZED HEALTH CARE EDUCATION/TRAINING PROGRAM
Payer: MEDICARE

## 2023-01-01 ENCOUNTER — APPOINTMENT (OUTPATIENT)
Dept: GERIATRICS | Facility: CLINIC | Age: 78
End: 2023-01-01

## 2023-01-01 ENCOUNTER — APPOINTMENT (OUTPATIENT)
Dept: ENDOCRINOLOGY | Facility: CLINIC | Age: 78
End: 2023-01-01
Payer: MEDICARE

## 2023-01-01 ENCOUNTER — APPOINTMENT (OUTPATIENT)
Dept: ULTRASOUND IMAGING | Facility: IMAGING CENTER | Age: 78
End: 2023-01-01

## 2023-01-01 ENCOUNTER — OUTPATIENT (OUTPATIENT)
Dept: OUTPATIENT SERVICES | Facility: HOSPITAL | Age: 78
LOS: 1 days | Discharge: HOME | End: 2023-01-01

## 2023-01-01 ENCOUNTER — OUTPATIENT (OUTPATIENT)
Dept: OUTPATIENT SERVICES | Facility: HOSPITAL | Age: 78
LOS: 1 days | End: 2023-01-01
Payer: MEDICARE

## 2023-01-01 ENCOUNTER — TRANSCRIPTION ENCOUNTER (OUTPATIENT)
Age: 78
End: 2023-01-01

## 2023-01-01 ENCOUNTER — APPOINTMENT (OUTPATIENT)
Dept: HEMATOLOGY ONCOLOGY | Facility: CLINIC | Age: 78
End: 2023-01-01

## 2023-01-01 ENCOUNTER — APPOINTMENT (OUTPATIENT)
Dept: UROLOGY | Facility: CLINIC | Age: 78
End: 2023-01-01
Payer: MEDICARE

## 2023-01-01 ENCOUNTER — APPOINTMENT (OUTPATIENT)
Dept: ULTRASOUND IMAGING | Facility: IMAGING CENTER | Age: 78
End: 2023-01-01
Payer: MEDICARE

## 2023-01-01 ENCOUNTER — APPOINTMENT (OUTPATIENT)
Dept: CT IMAGING | Facility: IMAGING CENTER | Age: 78
End: 2023-01-01
Payer: MEDICARE

## 2023-01-01 ENCOUNTER — APPOINTMENT (OUTPATIENT)
Dept: CT IMAGING | Facility: IMAGING CENTER | Age: 78
End: 2023-01-01

## 2023-01-01 ENCOUNTER — LABORATORY RESULT (OUTPATIENT)
Age: 78
End: 2023-01-01

## 2023-01-01 ENCOUNTER — APPOINTMENT (OUTPATIENT)
Dept: UROLOGY | Facility: CLINIC | Age: 78
End: 2023-01-01

## 2023-01-01 ENCOUNTER — APPOINTMENT (OUTPATIENT)
Dept: ENDOCRINOLOGY | Facility: CLINIC | Age: 78
End: 2023-01-01

## 2023-01-01 ENCOUNTER — APPOINTMENT (OUTPATIENT)
Dept: ENDOCRINOLOGY | Facility: CLINIC | Age: 78
End: 2023-01-01
Payer: COMMERCIAL

## 2023-01-01 ENCOUNTER — EMERGENCY (EMERGENCY)
Facility: HOSPITAL | Age: 78
LOS: 1 days | Discharge: HOME | End: 2023-01-01
Attending: EMERGENCY MEDICINE | Admitting: EMERGENCY MEDICINE
Payer: MEDICARE

## 2023-01-01 ENCOUNTER — RX RENEWAL (OUTPATIENT)
Age: 78
End: 2023-01-01

## 2023-01-01 ENCOUNTER — INPATIENT (INPATIENT)
Facility: HOSPITAL | Age: 78
LOS: 12 days | Discharge: OTHER INSTITUTION | End: 2023-08-05
Attending: STUDENT IN AN ORGANIZED HEALTH CARE EDUCATION/TRAINING PROGRAM | Admitting: STUDENT IN AN ORGANIZED HEALTH CARE EDUCATION/TRAINING PROGRAM
Payer: MEDICARE

## 2023-01-01 VITALS
HEIGHT: 60 IN | BODY MASS INDEX: 24.54 KG/M2 | HEART RATE: 67 BPM | WEIGHT: 125 LBS | DIASTOLIC BLOOD PRESSURE: 70 MMHG | RESPIRATION RATE: 14 BRPM | OXYGEN SATURATION: 98 % | SYSTOLIC BLOOD PRESSURE: 116 MMHG

## 2023-01-01 VITALS
BODY MASS INDEX: 25.41 KG/M2 | SYSTOLIC BLOOD PRESSURE: 115 MMHG | RESPIRATION RATE: 15 BRPM | WEIGHT: 130.29 LBS | TEMPERATURE: 96.7 F | OXYGEN SATURATION: 97 % | HEART RATE: 90 BPM | DIASTOLIC BLOOD PRESSURE: 70 MMHG

## 2023-01-01 VITALS
RESPIRATION RATE: 16 BRPM | SYSTOLIC BLOOD PRESSURE: 98 MMHG | OXYGEN SATURATION: 96 % | BODY MASS INDEX: 24.38 KG/M2 | DIASTOLIC BLOOD PRESSURE: 61 MMHG | WEIGHT: 125 LBS | HEART RATE: 78 BPM | TEMPERATURE: 97.2 F

## 2023-01-01 VITALS
DIASTOLIC BLOOD PRESSURE: 58 MMHG | RESPIRATION RATE: 17 BRPM | HEART RATE: 86 BPM | SYSTOLIC BLOOD PRESSURE: 118 MMHG | OXYGEN SATURATION: 98 %

## 2023-01-01 VITALS
HEIGHT: 60.04 IN | TEMPERATURE: 98 F | OXYGEN SATURATION: 97 % | RESPIRATION RATE: 20 BRPM | SYSTOLIC BLOOD PRESSURE: 134 MMHG | DIASTOLIC BLOOD PRESSURE: 86 MMHG | HEART RATE: 118 BPM

## 2023-01-01 VITALS
OXYGEN SATURATION: 96 % | DIASTOLIC BLOOD PRESSURE: 65 MMHG | WEIGHT: 124.12 LBS | TEMPERATURE: 97.2 F | BODY MASS INDEX: 24.24 KG/M2 | SYSTOLIC BLOOD PRESSURE: 138 MMHG | HEART RATE: 95 BPM | RESPIRATION RATE: 16 BRPM

## 2023-01-01 VITALS
HEART RATE: 91 BPM | HEIGHT: 60.04 IN | TEMPERATURE: 97 F | RESPIRATION RATE: 16 BRPM | WEIGHT: 124.54 LBS | BODY MASS INDEX: 24.13 KG/M2 | SYSTOLIC BLOOD PRESSURE: 125 MMHG | OXYGEN SATURATION: 96 % | DIASTOLIC BLOOD PRESSURE: 70 MMHG

## 2023-01-01 VITALS
RESPIRATION RATE: 16 BRPM | TEMPERATURE: 96.9 F | DIASTOLIC BLOOD PRESSURE: 50 MMHG | SYSTOLIC BLOOD PRESSURE: 92 MMHG | HEART RATE: 84 BPM | WEIGHT: 127.87 LBS | OXYGEN SATURATION: 99 % | BODY MASS INDEX: 24.94 KG/M2

## 2023-01-01 VITALS
HEART RATE: 97 BPM | SYSTOLIC BLOOD PRESSURE: 133 MMHG | RESPIRATION RATE: 17 BRPM | DIASTOLIC BLOOD PRESSURE: 75 MMHG | OXYGEN SATURATION: 94 %

## 2023-01-01 VITALS
DIASTOLIC BLOOD PRESSURE: 75 MMHG | RESPIRATION RATE: 18 BRPM | HEART RATE: 99 BPM | TEMPERATURE: 97.2 F | WEIGHT: 125.66 LBS | BODY MASS INDEX: 24.54 KG/M2 | OXYGEN SATURATION: 95 % | SYSTOLIC BLOOD PRESSURE: 126 MMHG

## 2023-01-01 VITALS
OXYGEN SATURATION: 98 % | BODY MASS INDEX: 24.65 KG/M2 | HEART RATE: 94 BPM | TEMPERATURE: 98 F | WEIGHT: 127.21 LBS | DIASTOLIC BLOOD PRESSURE: 69 MMHG | RESPIRATION RATE: 16 BRPM | SYSTOLIC BLOOD PRESSURE: 111 MMHG | HEIGHT: 60.04 IN

## 2023-01-01 VITALS
SYSTOLIC BLOOD PRESSURE: 122 MMHG | OXYGEN SATURATION: 95 % | HEIGHT: 60.04 IN | RESPIRATION RATE: 18 BRPM | TEMPERATURE: 98 F | HEART RATE: 80 BPM | DIASTOLIC BLOOD PRESSURE: 71 MMHG

## 2023-01-01 VITALS
WEIGHT: 126.32 LBS | HEART RATE: 92 BPM | DIASTOLIC BLOOD PRESSURE: 62 MMHG | BODY MASS INDEX: 24.8 KG/M2 | HEIGHT: 60 IN | RESPIRATION RATE: 17 BRPM | TEMPERATURE: 97.4 F | OXYGEN SATURATION: 98 % | SYSTOLIC BLOOD PRESSURE: 106 MMHG

## 2023-01-01 VITALS
OXYGEN SATURATION: 97 % | WEIGHT: 123.46 LBS | RESPIRATION RATE: 16 BRPM | TEMPERATURE: 97.1 F | BODY MASS INDEX: 23.34 KG/M2 | SYSTOLIC BLOOD PRESSURE: 107 MMHG | DIASTOLIC BLOOD PRESSURE: 69 MMHG | HEART RATE: 85 BPM

## 2023-01-01 VITALS
SYSTOLIC BLOOD PRESSURE: 156 MMHG | RESPIRATION RATE: 16 BRPM | HEIGHT: 60 IN | BODY MASS INDEX: 24.32 KG/M2 | DIASTOLIC BLOOD PRESSURE: 78 MMHG | TEMPERATURE: 97.3 F | HEART RATE: 76 BPM | WEIGHT: 123.9 LBS | OXYGEN SATURATION: 97 %

## 2023-01-01 VITALS
TEMPERATURE: 97 F | SYSTOLIC BLOOD PRESSURE: 120 MMHG | WEIGHT: 126.1 LBS | BODY MASS INDEX: 24.63 KG/M2 | OXYGEN SATURATION: 99 % | RESPIRATION RATE: 16 BRPM | HEART RATE: 60 BPM | DIASTOLIC BLOOD PRESSURE: 67 MMHG

## 2023-01-01 VITALS
WEIGHT: 127.87 LBS | HEART RATE: 85 BPM | BODY MASS INDEX: 24.97 KG/M2 | RESPIRATION RATE: 16 BRPM | OXYGEN SATURATION: 95 % | DIASTOLIC BLOOD PRESSURE: 74 MMHG | TEMPERATURE: 97.2 F | SYSTOLIC BLOOD PRESSURE: 146 MMHG

## 2023-01-01 VITALS
WEIGHT: 123 LBS | HEART RATE: 81 BPM | BODY MASS INDEX: 24.15 KG/M2 | OXYGEN SATURATION: 95 % | DIASTOLIC BLOOD PRESSURE: 70 MMHG | SYSTOLIC BLOOD PRESSURE: 126 MMHG | HEIGHT: 60 IN

## 2023-01-01 VITALS
OXYGEN SATURATION: 100 % | DIASTOLIC BLOOD PRESSURE: 61 MMHG | HEART RATE: 110 BPM | TEMPERATURE: 98 F | SYSTOLIC BLOOD PRESSURE: 122 MMHG | RESPIRATION RATE: 16 BRPM

## 2023-01-01 VITALS
BODY MASS INDEX: 25.58 KG/M2 | TEMPERATURE: 97.3 F | WEIGHT: 131.15 LBS | RESPIRATION RATE: 16 BRPM | OXYGEN SATURATION: 95 % | DIASTOLIC BLOOD PRESSURE: 65 MMHG | SYSTOLIC BLOOD PRESSURE: 115 MMHG | HEART RATE: 92 BPM

## 2023-01-01 VITALS
HEART RATE: 93 BPM | TEMPERATURE: 97.3 F | HEIGHT: 60 IN | WEIGHT: 126.77 LBS | BODY MASS INDEX: 24.89 KG/M2 | SYSTOLIC BLOOD PRESSURE: 126 MMHG | DIASTOLIC BLOOD PRESSURE: 74 MMHG | RESPIRATION RATE: 16 BRPM | OXYGEN SATURATION: 98 %

## 2023-01-01 VITALS — HEIGHT: 60.04 IN

## 2023-01-01 DIAGNOSIS — R11.2 NAUSEA WITH VOMITING, UNSPECIFIED: ICD-10-CM

## 2023-01-01 DIAGNOSIS — R78.81 BACTEREMIA: ICD-10-CM

## 2023-01-01 DIAGNOSIS — R31.9 HEMATURIA, UNSPECIFIED: ICD-10-CM

## 2023-01-01 DIAGNOSIS — Z51.5 ENCOUNTER FOR PALLIATIVE CARE: ICD-10-CM

## 2023-01-01 DIAGNOSIS — Z95.5 PRESENCE OF CORONARY ANGIOPLASTY IMPLANT AND GRAFT: Chronic | ICD-10-CM

## 2023-01-01 DIAGNOSIS — R63.0 ANOREXIA: ICD-10-CM

## 2023-01-01 DIAGNOSIS — E87.1 HYPO-OSMOLALITY AND HYPONATREMIA: ICD-10-CM

## 2023-01-01 DIAGNOSIS — R18.8 OTHER ASCITES: ICD-10-CM

## 2023-01-01 DIAGNOSIS — Z29.9 ENCOUNTER FOR PROPHYLACTIC MEASURES, UNSPECIFIED: ICD-10-CM

## 2023-01-01 DIAGNOSIS — C25.9 MALIGNANT NEOPLASM OF PANCREAS, UNSPECIFIED: ICD-10-CM

## 2023-01-01 DIAGNOSIS — Z90.710 ACQUIRED ABSENCE OF BOTH CERVIX AND UTERUS: Chronic | ICD-10-CM

## 2023-01-01 DIAGNOSIS — Z98.891 HISTORY OF UTERINE SCAR FROM PREVIOUS SURGERY: Chronic | ICD-10-CM

## 2023-01-01 DIAGNOSIS — Z51.11 ENCOUNTER FOR ANTINEOPLASTIC CHEMOTHERAPY: ICD-10-CM

## 2023-01-01 DIAGNOSIS — I10 ESSENTIAL (PRIMARY) HYPERTENSION: ICD-10-CM

## 2023-01-01 DIAGNOSIS — I25.10 ATHEROSCLEROTIC HEART DISEASE OF NATIVE CORONARY ARTERY WITHOUT ANGINA PECTORIS: ICD-10-CM

## 2023-01-01 DIAGNOSIS — K59.00 CONSTIPATION, UNSPECIFIED: ICD-10-CM

## 2023-01-01 DIAGNOSIS — Z90.49 ACQUIRED TOTAL ABSENCE OF PANCREAS: ICD-10-CM

## 2023-01-01 DIAGNOSIS — Z90.410 ACQUIRED TOTAL ABSENCE OF PANCREAS: Chronic | ICD-10-CM

## 2023-01-01 DIAGNOSIS — T45.1X5A DRUG-INDUCED POLYNEUROPATHY: ICD-10-CM

## 2023-01-01 DIAGNOSIS — I82.409 ACUTE EMBOLISM AND THROMBOSIS OF UNSPECIFIED DEEP VEINS OF UNSPECIFIED LOWER EXTREMITY: ICD-10-CM

## 2023-01-01 DIAGNOSIS — E11.9 TYPE 2 DIABETES MELLITUS W/OUT COMPLICATIONS: ICD-10-CM

## 2023-01-01 DIAGNOSIS — G89.3 NEOPLASM RELATED PAIN (ACUTE) (CHRONIC): ICD-10-CM

## 2023-01-01 DIAGNOSIS — N39.0 URINARY TRACT INFECTION, SITE NOT SPECIFIED: ICD-10-CM

## 2023-01-01 DIAGNOSIS — G62.0 DRUG-INDUCED POLYNEUROPATHY: ICD-10-CM

## 2023-01-01 DIAGNOSIS — R52 PAIN, UNSPECIFIED: ICD-10-CM

## 2023-01-01 DIAGNOSIS — T45.1X5A TOXIC GASTROENTERITIS AND COLITIS: ICD-10-CM

## 2023-01-01 DIAGNOSIS — G62.9 POLYNEUROPATHY, UNSPECIFIED: ICD-10-CM

## 2023-01-01 DIAGNOSIS — R62.7 ADULT FAILURE TO THRIVE: ICD-10-CM

## 2023-01-01 DIAGNOSIS — Z90.410 ACQUIRED TOTAL ABSENCE OF PANCREAS: ICD-10-CM

## 2023-01-01 DIAGNOSIS — R19.7 DIARRHEA, UNSPECIFIED: ICD-10-CM

## 2023-01-01 DIAGNOSIS — G47.00 INSOMNIA, UNSPECIFIED: ICD-10-CM

## 2023-01-01 DIAGNOSIS — K13.79 OTHER LESIONS OF ORAL MUCOSA: ICD-10-CM

## 2023-01-01 DIAGNOSIS — R33.9 RETENTION OF URINE, UNSPECIFIED: ICD-10-CM

## 2023-01-01 DIAGNOSIS — E11.9 TYPE 2 DIABETES MELLITUS WITHOUT COMPLICATIONS: ICD-10-CM

## 2023-01-01 DIAGNOSIS — Z71.89 OTHER SPECIFIED COUNSELING: ICD-10-CM

## 2023-01-01 DIAGNOSIS — M25.561 PAIN IN RIGHT KNEE: ICD-10-CM

## 2023-01-01 DIAGNOSIS — R10.9 UNSPECIFIED ABDOMINAL PAIN: ICD-10-CM

## 2023-01-01 DIAGNOSIS — R97.8 OTHER ABNORMAL TUMOR MARKERS: ICD-10-CM

## 2023-01-01 DIAGNOSIS — R41.82 ALTERED MENTAL STATUS, UNSPECIFIED: ICD-10-CM

## 2023-01-01 DIAGNOSIS — R19.8 OTHER SPECIFIED SYMPTOMS AND SIGNS INVOLVING THE DIGESTIVE SYSTEM AND ABDOMEN: ICD-10-CM

## 2023-01-01 DIAGNOSIS — R53.83 OTHER FATIGUE: ICD-10-CM

## 2023-01-01 DIAGNOSIS — K52.1 TOXIC GASTROENTERITIS AND COLITIS: ICD-10-CM

## 2023-01-01 DIAGNOSIS — E86.0 DEHYDRATION: ICD-10-CM

## 2023-01-01 DIAGNOSIS — G93.41 METABOLIC ENCEPHALOPATHY: ICD-10-CM

## 2023-01-01 LAB
-  AMIKACIN: SIGNIFICANT CHANGE UP
-  AMIKACIN: SIGNIFICANT CHANGE UP
-  AMOXICILLIN/CLAVULANIC ACID: SIGNIFICANT CHANGE UP
-  AMPICILLIN/SULBACTAM: SIGNIFICANT CHANGE UP
-  AMPICILLIN/SULBACTAM: SIGNIFICANT CHANGE UP
-  AMPICILLIN: SIGNIFICANT CHANGE UP
-  AMPICILLIN: SIGNIFICANT CHANGE UP
-  AZTREONAM: SIGNIFICANT CHANGE UP
-  AZTREONAM: SIGNIFICANT CHANGE UP
-  CEFAZOLIN: SIGNIFICANT CHANGE UP
-  CEFAZOLIN: SIGNIFICANT CHANGE UP
-  CEFEPIME: SIGNIFICANT CHANGE UP
-  CEFEPIME: SIGNIFICANT CHANGE UP
-  CEFOXITIN: SIGNIFICANT CHANGE UP
-  CEFOXITIN: SIGNIFICANT CHANGE UP
-  CEFTRIAXONE: SIGNIFICANT CHANGE UP
-  CEFTRIAXONE: SIGNIFICANT CHANGE UP
-  CEFUROXIME: SIGNIFICANT CHANGE UP
-  CIPROFLOXACIN: SIGNIFICANT CHANGE UP
-  CIPROFLOXACIN: SIGNIFICANT CHANGE UP
-  ERTAPENEM: SIGNIFICANT CHANGE UP
-  ERTAPENEM: SIGNIFICANT CHANGE UP
-  GENTAMICIN: SIGNIFICANT CHANGE UP
-  GENTAMICIN: SIGNIFICANT CHANGE UP
-  IMIPENEM: SIGNIFICANT CHANGE UP
-  IMIPENEM: SIGNIFICANT CHANGE UP
-  LEVOFLOXACIN: SIGNIFICANT CHANGE UP
-  LEVOFLOXACIN: SIGNIFICANT CHANGE UP
-  MEROPENEM: SIGNIFICANT CHANGE UP
-  MEROPENEM: SIGNIFICANT CHANGE UP
-  NITROFURANTOIN: SIGNIFICANT CHANGE UP
-  PIPERACILLIN/TAZOBACTAM: SIGNIFICANT CHANGE UP
-  PIPERACILLIN/TAZOBACTAM: SIGNIFICANT CHANGE UP
-  TOBRAMYCIN: SIGNIFICANT CHANGE UP
-  TOBRAMYCIN: SIGNIFICANT CHANGE UP
-  TRIMETHOPRIM/SULFAMETHOXAZOLE: SIGNIFICANT CHANGE UP
-  TRIMETHOPRIM/SULFAMETHOXAZOLE: SIGNIFICANT CHANGE UP
A1C WITH ESTIMATED AVERAGE GLUCOSE RESULT: 6.5 % — HIGH (ref 4–5.6)
ALBUMIN SERPL ELPH-MCNC: 2.1 G/DL — LOW (ref 3.3–5)
ALBUMIN SERPL ELPH-MCNC: 2.2 G/DL — LOW (ref 3.3–5)
ALBUMIN SERPL ELPH-MCNC: 2.3 G/DL — LOW (ref 3.3–5)
ALBUMIN SERPL ELPH-MCNC: 2.4 G/DL — LOW (ref 3.3–5)
ALBUMIN SERPL ELPH-MCNC: 2.5 G/DL — LOW (ref 3.3–5)
ALBUMIN SERPL ELPH-MCNC: 2.6 G/DL — LOW (ref 3.3–5)
ALBUMIN SERPL ELPH-MCNC: 2.7 G/DL — LOW (ref 3.3–5)
ALBUMIN SERPL ELPH-MCNC: 3 G/DL — LOW (ref 3.3–5)
ALBUMIN SERPL ELPH-MCNC: 3.1 G/DL — LOW (ref 3.3–5)
ALBUMIN SERPL ELPH-MCNC: 3.8 G/DL — SIGNIFICANT CHANGE UP (ref 3.3–5)
ALBUMIN SERPL ELPH-MCNC: 3.8 G/DL — SIGNIFICANT CHANGE UP (ref 3.3–5)
ALBUMIN SERPL ELPH-MCNC: 3.9 G/DL
ALBUMIN SERPL ELPH-MCNC: 3.9 G/DL — SIGNIFICANT CHANGE UP (ref 3.3–5)
ALBUMIN SERPL ELPH-MCNC: 4.1 G/DL — SIGNIFICANT CHANGE UP (ref 3.3–5)
ALBUMIN SERPL ELPH-MCNC: 4.1 G/DL — SIGNIFICANT CHANGE UP (ref 3.3–5)
ALBUMIN SERPL ELPH-MCNC: 4.2 G/DL — SIGNIFICANT CHANGE UP (ref 3.3–5)
ALBUMIN SERPL ELPH-MCNC: 4.3 G/DL — SIGNIFICANT CHANGE UP (ref 3.3–5)
ALBUMIN SERPL ELPH-MCNC: 4.3 G/DL — SIGNIFICANT CHANGE UP (ref 3.3–5)
ALP BLD-CCNC: 93 U/L
ALP SERPL-CCNC: 100 U/L — SIGNIFICANT CHANGE UP (ref 40–120)
ALP SERPL-CCNC: 101 U/L — SIGNIFICANT CHANGE UP (ref 40–120)
ALP SERPL-CCNC: 103 U/L — SIGNIFICANT CHANGE UP (ref 40–120)
ALP SERPL-CCNC: 110 U/L — SIGNIFICANT CHANGE UP (ref 40–120)
ALP SERPL-CCNC: 112 U/L — SIGNIFICANT CHANGE UP (ref 40–120)
ALP SERPL-CCNC: 112 U/L — SIGNIFICANT CHANGE UP (ref 40–120)
ALP SERPL-CCNC: 127 U/L — HIGH (ref 40–120)
ALP SERPL-CCNC: 156 U/L — HIGH (ref 40–120)
ALP SERPL-CCNC: 170 U/L — HIGH (ref 40–120)
ALP SERPL-CCNC: 178 U/L — HIGH (ref 40–120)
ALP SERPL-CCNC: 197 U/L — HIGH (ref 40–120)
ALP SERPL-CCNC: 223 U/L — HIGH (ref 40–120)
ALP SERPL-CCNC: 244 U/L — HIGH (ref 40–120)
ALP SERPL-CCNC: 341 U/L — HIGH (ref 40–120)
ALP SERPL-CCNC: 358 U/L — HIGH (ref 40–120)
ALP SERPL-CCNC: 385 U/L — HIGH (ref 40–120)
ALP SERPL-CCNC: 390 U/L — HIGH (ref 40–120)
ALP SERPL-CCNC: 425 U/L — HIGH (ref 40–120)
ALP SERPL-CCNC: 98 U/L — SIGNIFICANT CHANGE UP (ref 40–120)
ALT FLD-CCNC: 10 U/L — SIGNIFICANT CHANGE UP (ref 4–33)
ALT FLD-CCNC: 11 U/L — SIGNIFICANT CHANGE UP (ref 10–45)
ALT FLD-CCNC: 11 U/L — SIGNIFICANT CHANGE UP (ref 4–33)
ALT FLD-CCNC: 12 U/L — SIGNIFICANT CHANGE UP (ref 10–45)
ALT FLD-CCNC: 12 U/L — SIGNIFICANT CHANGE UP (ref 4–33)
ALT FLD-CCNC: 12 U/L — SIGNIFICANT CHANGE UP (ref 4–33)
ALT FLD-CCNC: 13 U/L — SIGNIFICANT CHANGE UP (ref 10–45)
ALT FLD-CCNC: 13 U/L — SIGNIFICANT CHANGE UP (ref 10–45)
ALT FLD-CCNC: 15 U/L — SIGNIFICANT CHANGE UP (ref 10–45)
ALT FLD-CCNC: 15 U/L — SIGNIFICANT CHANGE UP (ref 10–45)
ALT FLD-CCNC: 15 U/L — SIGNIFICANT CHANGE UP (ref 4–33)
ALT FLD-CCNC: 16 U/L — SIGNIFICANT CHANGE UP (ref 10–45)
ALT FLD-CCNC: 18 U/L — SIGNIFICANT CHANGE UP (ref 4–33)
ALT FLD-CCNC: 19 U/L — SIGNIFICANT CHANGE UP (ref 4–33)
ALT FLD-CCNC: 22 U/L — SIGNIFICANT CHANGE UP (ref 4–33)
ALT FLD-CCNC: 6 U/L — SIGNIFICANT CHANGE UP (ref 4–33)
ALT FLD-CCNC: 7 U/L — SIGNIFICANT CHANGE UP (ref 4–33)
ALT FLD-CCNC: 9 U/L — LOW (ref 10–45)
ALT FLD-CCNC: 9 U/L — SIGNIFICANT CHANGE UP (ref 4–33)
ALT SERPL-CCNC: 8 U/L
ANION GAP SERPL CALC-SCNC: 10 MMOL/L
ANION GAP SERPL CALC-SCNC: 10 MMOL/L — SIGNIFICANT CHANGE UP (ref 7–14)
ANION GAP SERPL CALC-SCNC: 10 MMOL/L — SIGNIFICANT CHANGE UP (ref 7–14)
ANION GAP SERPL CALC-SCNC: 11 MMOL/L — SIGNIFICANT CHANGE UP (ref 5–17)
ANION GAP SERPL CALC-SCNC: 11 MMOL/L — SIGNIFICANT CHANGE UP (ref 5–17)
ANION GAP SERPL CALC-SCNC: 12 MMOL/L — SIGNIFICANT CHANGE UP (ref 5–17)
ANION GAP SERPL CALC-SCNC: 12 MMOL/L — SIGNIFICANT CHANGE UP (ref 5–17)
ANION GAP SERPL CALC-SCNC: 12 MMOL/L — SIGNIFICANT CHANGE UP (ref 7–14)
ANION GAP SERPL CALC-SCNC: 13 MMOL/L — SIGNIFICANT CHANGE UP (ref 5–17)
ANION GAP SERPL CALC-SCNC: 13 MMOL/L — SIGNIFICANT CHANGE UP (ref 5–17)
ANION GAP SERPL CALC-SCNC: 13 MMOL/L — SIGNIFICANT CHANGE UP (ref 7–14)
ANION GAP SERPL CALC-SCNC: 14 MMOL/L — SIGNIFICANT CHANGE UP (ref 5–17)
ANION GAP SERPL CALC-SCNC: 14 MMOL/L — SIGNIFICANT CHANGE UP (ref 5–17)
ANION GAP SERPL CALC-SCNC: 14 MMOL/L — SIGNIFICANT CHANGE UP (ref 7–14)
ANION GAP SERPL CALC-SCNC: 15 MMOL/L — HIGH (ref 7–14)
ANION GAP SERPL CALC-SCNC: 15 MMOL/L — HIGH (ref 7–14)
ANION GAP SERPL CALC-SCNC: 6 MMOL/L — LOW (ref 7–14)
ANION GAP SERPL CALC-SCNC: 9 MMOL/L — SIGNIFICANT CHANGE UP (ref 7–14)
ANION GAP SERPL CALC-SCNC: 9 MMOL/L — SIGNIFICANT CHANGE UP (ref 7–14)
APPEARANCE UR: ABNORMAL
APPEARANCE UR: ABNORMAL
APPEARANCE UR: CLEAR — SIGNIFICANT CHANGE UP
APPEARANCE: ABNORMAL
APPEARANCE: CLEAR
APTT BLD: 26.7 SEC — LOW (ref 27–36.3)
APTT BLD: 27.2 SEC
APTT BLD: 28 SEC — SIGNIFICANT CHANGE UP (ref 24.5–35.6)
APTT BLD: 28.6 SEC — SIGNIFICANT CHANGE UP (ref 27–36.3)
APTT BLD: 30.9 SEC — SIGNIFICANT CHANGE UP (ref 27–36.3)
APTT BLD: 31.7 SEC — SIGNIFICANT CHANGE UP (ref 24.5–35.6)
APTT BLD: 32.3 SEC — SIGNIFICANT CHANGE UP (ref 24.5–35.6)
APTT BLD: 66.9 SEC — HIGH (ref 24.5–35.6)
APTT BLD: 73.4 SEC — HIGH (ref 24.5–35.6)
APTT BLD: 87.8 SEC — HIGH (ref 24.5–35.6)
AST SERPL-CCNC: 20 U/L — SIGNIFICANT CHANGE UP (ref 4–32)
AST SERPL-CCNC: 21 U/L — SIGNIFICANT CHANGE UP (ref 10–40)
AST SERPL-CCNC: 22 U/L — SIGNIFICANT CHANGE UP (ref 10–40)
AST SERPL-CCNC: 23 U/L
AST SERPL-CCNC: 23 U/L — SIGNIFICANT CHANGE UP (ref 10–40)
AST SERPL-CCNC: 24 U/L — SIGNIFICANT CHANGE UP (ref 10–40)
AST SERPL-CCNC: 25 U/L — SIGNIFICANT CHANGE UP (ref 10–40)
AST SERPL-CCNC: 26 U/L — SIGNIFICANT CHANGE UP (ref 10–40)
AST SERPL-CCNC: 27 U/L — SIGNIFICANT CHANGE UP (ref 4–32)
AST SERPL-CCNC: 28 U/L — SIGNIFICANT CHANGE UP (ref 4–32)
AST SERPL-CCNC: 29 U/L — SIGNIFICANT CHANGE UP (ref 4–32)
AST SERPL-CCNC: 30 U/L — SIGNIFICANT CHANGE UP (ref 10–40)
AST SERPL-CCNC: 31 U/L — SIGNIFICANT CHANGE UP (ref 4–32)
AST SERPL-CCNC: 36 U/L — HIGH (ref 4–32)
AST SERPL-CCNC: 39 U/L — HIGH (ref 4–32)
AST SERPL-CCNC: 43 U/L — HIGH (ref 10–40)
AST SERPL-CCNC: 44 U/L — HIGH (ref 4–32)
AST SERPL-CCNC: 47 U/L — HIGH (ref 4–32)
AST SERPL-CCNC: 48 U/L — HIGH (ref 4–32)
AST SERPL-CCNC: 60 U/L — HIGH (ref 4–32)
B PERT DNA SPEC QL NAA+PROBE: SIGNIFICANT CHANGE UP
B PERT IGG+IGM PNL SER: ABNORMAL
B PERT+PARAPERT DNA PNL SPEC NAA+PROBE: SIGNIFICANT CHANGE UP
BACTERIA # UR AUTO: NEGATIVE /HPF — SIGNIFICANT CHANGE UP
BACTERIA UR CULT: ABNORMAL
BACTERIA UR CULT: ABNORMAL
BACTERIA: ABNORMAL /HPF
BASE EXCESS BLDV CALC-SCNC: -1.2 MMOL/L — SIGNIFICANT CHANGE UP (ref -2–3)
BASOPHILS # BLD AUTO: 0.04 K/UL — SIGNIFICANT CHANGE UP (ref 0–0.2)
BASOPHILS # BLD AUTO: 0.05 K/UL — SIGNIFICANT CHANGE UP (ref 0–0.2)
BASOPHILS # BLD AUTO: 0.06 K/UL — SIGNIFICANT CHANGE UP (ref 0–0.2)
BASOPHILS # BLD AUTO: 0.06 K/UL — SIGNIFICANT CHANGE UP (ref 0–0.2)
BASOPHILS # BLD AUTO: 0.07 K/UL — SIGNIFICANT CHANGE UP (ref 0–0.2)
BASOPHILS # BLD AUTO: 0.07 K/UL — SIGNIFICANT CHANGE UP (ref 0–0.2)
BASOPHILS # BLD AUTO: 0.08 K/UL — SIGNIFICANT CHANGE UP (ref 0–0.2)
BASOPHILS # BLD AUTO: 0.09 K/UL — SIGNIFICANT CHANGE UP (ref 0–0.2)
BASOPHILS NFR BLD AUTO: 0.5 % — SIGNIFICANT CHANGE UP (ref 0–2)
BASOPHILS NFR BLD AUTO: 0.6 % — SIGNIFICANT CHANGE UP (ref 0–2)
BASOPHILS NFR BLD AUTO: 0.7 % — SIGNIFICANT CHANGE UP (ref 0–2)
BASOPHILS NFR BLD AUTO: 0.8 % — SIGNIFICANT CHANGE UP (ref 0–2)
BASOPHILS NFR BLD AUTO: 0.8 % — SIGNIFICANT CHANGE UP (ref 0–2)
BASOPHILS NFR BLD AUTO: 0.9 % — SIGNIFICANT CHANGE UP (ref 0–2)
BASOPHILS NFR BLD AUTO: 0.9 % — SIGNIFICANT CHANGE UP (ref 0–2)
BASOPHILS NFR BLD AUTO: 1 % — SIGNIFICANT CHANGE UP (ref 0–2)
BILIRUB SERPL-MCNC: 0.2 MG/DL — SIGNIFICANT CHANGE UP (ref 0.2–1.2)
BILIRUB SERPL-MCNC: 0.3 MG/DL
BILIRUB SERPL-MCNC: 0.3 MG/DL — SIGNIFICANT CHANGE UP (ref 0.2–1.2)
BILIRUB SERPL-MCNC: 0.4 MG/DL — SIGNIFICANT CHANGE UP (ref 0.2–1.2)
BILIRUB SERPL-MCNC: 0.5 MG/DL — SIGNIFICANT CHANGE UP (ref 0.2–1.2)
BILIRUB SERPL-MCNC: 0.8 MG/DL — SIGNIFICANT CHANGE UP (ref 0.2–1.2)
BILIRUB SERPL-MCNC: 0.9 MG/DL — SIGNIFICANT CHANGE UP (ref 0.2–1.2)
BILIRUB UR-MCNC: ABNORMAL
BILIRUB UR-MCNC: ABNORMAL
BILIRUB UR-MCNC: NEGATIVE — SIGNIFICANT CHANGE UP
BILIRUBIN URINE: ABNORMAL
BILIRUBIN URINE: NEGATIVE
BLD GP AB SCN SERPL QL: NEGATIVE — SIGNIFICANT CHANGE UP
BLOOD GAS VENOUS COMPREHENSIVE RESULT: SIGNIFICANT CHANGE UP
BLOOD GAS VENOUS COMPREHENSIVE RESULT: SIGNIFICANT CHANGE UP
BLOOD URINE: ABNORMAL
BLOOD URINE: NEGATIVE
BORDETELLA PARAPERTUSSIS (RAPRVP): SIGNIFICANT CHANGE UP
BUN SERPL-MCNC: 10 MG/DL — SIGNIFICANT CHANGE UP (ref 7–23)
BUN SERPL-MCNC: 11 MG/DL — SIGNIFICANT CHANGE UP (ref 7–23)
BUN SERPL-MCNC: 11 MG/DL — SIGNIFICANT CHANGE UP (ref 7–23)
BUN SERPL-MCNC: 12 MG/DL — SIGNIFICANT CHANGE UP (ref 7–23)
BUN SERPL-MCNC: 12 MG/DL — SIGNIFICANT CHANGE UP (ref 7–23)
BUN SERPL-MCNC: 13 MG/DL — SIGNIFICANT CHANGE UP (ref 7–23)
BUN SERPL-MCNC: 14 MG/DL — SIGNIFICANT CHANGE UP (ref 7–23)
BUN SERPL-MCNC: 14 MG/DL — SIGNIFICANT CHANGE UP (ref 7–23)
BUN SERPL-MCNC: 16 MG/DL — SIGNIFICANT CHANGE UP (ref 7–23)
BUN SERPL-MCNC: 17 MG/DL — SIGNIFICANT CHANGE UP (ref 7–23)
BUN SERPL-MCNC: 17 MG/DL — SIGNIFICANT CHANGE UP (ref 7–23)
BUN SERPL-MCNC: 19 MG/DL — SIGNIFICANT CHANGE UP (ref 7–23)
BUN SERPL-MCNC: 19 MG/DL — SIGNIFICANT CHANGE UP (ref 7–23)
BUN SERPL-MCNC: 20 MG/DL — SIGNIFICANT CHANGE UP (ref 7–23)
BUN SERPL-MCNC: 21 MG/DL — SIGNIFICANT CHANGE UP (ref 7–23)
BUN SERPL-MCNC: 23 MG/DL
BUN SERPL-MCNC: 23 MG/DL — SIGNIFICANT CHANGE UP (ref 7–23)
BUN SERPL-MCNC: 26 MG/DL — HIGH (ref 7–23)
BUN SERPL-MCNC: 26 MG/DL — HIGH (ref 7–23)
BUN SERPL-MCNC: 31 MG/DL — HIGH (ref 7–23)
C PEPTIDE SERPL-MCNC: 0.7 NG/ML
C PEPTIDE SERPL-MCNC: 1.7 NG/ML
C PNEUM DNA SPEC QL NAA+PROBE: SIGNIFICANT CHANGE UP
CALCIUM SERPL-MCNC: 7.5 MG/DL — LOW (ref 8.4–10.5)
CALCIUM SERPL-MCNC: 7.7 MG/DL — LOW (ref 8.4–10.5)
CALCIUM SERPL-MCNC: 7.9 MG/DL — LOW (ref 8.4–10.5)
CALCIUM SERPL-MCNC: 8 MG/DL — LOW (ref 8.4–10.5)
CALCIUM SERPL-MCNC: 8.1 MG/DL — LOW (ref 8.4–10.5)
CALCIUM SERPL-MCNC: 8.3 MG/DL — LOW (ref 8.4–10.5)
CALCIUM SERPL-MCNC: 8.3 MG/DL — LOW (ref 8.4–10.5)
CALCIUM SERPL-MCNC: 8.4 MG/DL — SIGNIFICANT CHANGE UP (ref 8.4–10.5)
CALCIUM SERPL-MCNC: 8.4 MG/DL — SIGNIFICANT CHANGE UP (ref 8.4–10.5)
CALCIUM SERPL-MCNC: 8.6 MG/DL — SIGNIFICANT CHANGE UP (ref 8.4–10.5)
CALCIUM SERPL-MCNC: 8.7 MG/DL — SIGNIFICANT CHANGE UP (ref 8.4–10.5)
CALCIUM SERPL-MCNC: 8.8 MG/DL — SIGNIFICANT CHANGE UP (ref 8.4–10.5)
CALCIUM SERPL-MCNC: 8.8 MG/DL — SIGNIFICANT CHANGE UP (ref 8.4–10.5)
CALCIUM SERPL-MCNC: 8.9 MG/DL — SIGNIFICANT CHANGE UP (ref 8.4–10.5)
CALCIUM SERPL-MCNC: 9.3 MG/DL
CALCIUM SERPL-MCNC: 9.3 MG/DL — SIGNIFICANT CHANGE UP (ref 8.4–10.5)
CALCIUM SERPL-MCNC: 9.4 MG/DL — SIGNIFICANT CHANGE UP (ref 8.4–10.5)
CALCIUM SERPL-MCNC: 9.5 MG/DL — SIGNIFICANT CHANGE UP (ref 8.4–10.5)
CALCIUM SERPL-MCNC: 9.5 MG/DL — SIGNIFICANT CHANGE UP (ref 8.4–10.5)
CALCIUM SERPL-MCNC: 9.8 MG/DL — SIGNIFICANT CHANGE UP (ref 8.4–10.5)
CANCER AG19-9 SERPL-ACNC: 2364 U/ML — HIGH
CANCER AG19-9 SERPL-ACNC: 3477 U/ML — HIGH
CANCER AG19-9 SERPL-ACNC: 3536 U/ML — HIGH
CANCER AG19-9 SERPL-ACNC: 3756 U/ML — HIGH
CANCER AG19-9 SERPL-ACNC: 3842 U/ML — HIGH
CANCER AG19-9 SERPL-ACNC: ABNORMAL U/ML
CANCER AG19-9 SERPL-ACNC: HIGH U/ML
CANCER AG19-9 SERPL-ACNC: HIGH U/ML
CAST: 0 /LPF
CAST: 5 /LPF — HIGH (ref 0–4)
CEA SERPL-MCNC: 162 NG/ML — HIGH (ref 0–3.8)
CEA SERPL-MCNC: 27.8 NG/ML — HIGH (ref 0–3.8)
CEA SERPL-MCNC: 5.4 NG/ML — HIGH (ref 0–3.8)
CEA SERPL-MCNC: 5.6 NG/ML — HIGH (ref 0–3.8)
CEA SERPL-MCNC: 6 NG/ML — HIGH (ref 0–3.8)
CEA SERPL-MCNC: 62.1 NG/ML
CEA SERPL-MCNC: 7.5 NG/ML — HIGH (ref 0–3.8)
CEA SERPL-MCNC: 7.8 NG/ML — HIGH (ref 0–3.8)
CHLORIDE BLDV-SCNC: 98 MMOL/L — SIGNIFICANT CHANGE UP (ref 96–108)
CHLORIDE SERPL-SCNC: 100 MMOL/L
CHLORIDE SERPL-SCNC: 100 MMOL/L — SIGNIFICANT CHANGE UP (ref 96–108)
CHLORIDE SERPL-SCNC: 101 MMOL/L — SIGNIFICANT CHANGE UP (ref 96–108)
CHLORIDE SERPL-SCNC: 101 MMOL/L — SIGNIFICANT CHANGE UP (ref 98–107)
CHLORIDE SERPL-SCNC: 102 MMOL/L — SIGNIFICANT CHANGE UP (ref 96–108)
CHLORIDE SERPL-SCNC: 102 MMOL/L — SIGNIFICANT CHANGE UP (ref 98–107)
CHLORIDE SERPL-SCNC: 103 MMOL/L — SIGNIFICANT CHANGE UP (ref 96–108)
CHLORIDE SERPL-SCNC: 103 MMOL/L — SIGNIFICANT CHANGE UP (ref 96–108)
CHLORIDE SERPL-SCNC: 105 MMOL/L — SIGNIFICANT CHANGE UP (ref 96–108)
CHLORIDE SERPL-SCNC: 92 MMOL/L — LOW (ref 98–107)
CHLORIDE SERPL-SCNC: 92 MMOL/L — LOW (ref 98–107)
CHLORIDE SERPL-SCNC: 93 MMOL/L — LOW (ref 98–107)
CHLORIDE SERPL-SCNC: 93 MMOL/L — LOW (ref 98–107)
CHLORIDE SERPL-SCNC: 94 MMOL/L — LOW (ref 98–107)
CHLORIDE SERPL-SCNC: 95 MMOL/L — LOW (ref 96–108)
CHLORIDE SERPL-SCNC: 95 MMOL/L — LOW (ref 98–107)
CHLORIDE SERPL-SCNC: 96 MMOL/L — LOW (ref 98–107)
CHLORIDE SERPL-SCNC: 97 MMOL/L — LOW (ref 98–107)
CHLORIDE SERPL-SCNC: 98 MMOL/L — SIGNIFICANT CHANGE UP (ref 96–108)
CHLORIDE SERPL-SCNC: 98 MMOL/L — SIGNIFICANT CHANGE UP (ref 98–107)
CHLORIDE SERPL-SCNC: 99 MMOL/L — SIGNIFICANT CHANGE UP (ref 98–107)
CHLORIDE UR-SCNC: 30 MMOL/L — SIGNIFICANT CHANGE UP
CHLORIDE UR-SCNC: 58 MMOL/L — SIGNIFICANT CHANGE UP
CHOLEST SERPL-MCNC: 143 MG/DL
CO2 BLDV-SCNC: 25.6 MMOL/L — SIGNIFICANT CHANGE UP (ref 22–26)
CO2 SERPL-SCNC: 15 MMOL/L — LOW (ref 22–31)
CO2 SERPL-SCNC: 17 MMOL/L — LOW (ref 22–31)
CO2 SERPL-SCNC: 17 MMOL/L — LOW (ref 22–31)
CO2 SERPL-SCNC: 18 MMOL/L — LOW (ref 22–31)
CO2 SERPL-SCNC: 19 MMOL/L — LOW (ref 22–31)
CO2 SERPL-SCNC: 20 MMOL/L — LOW (ref 22–31)
CO2 SERPL-SCNC: 21 MMOL/L — LOW (ref 22–31)
CO2 SERPL-SCNC: 22 MMOL/L — SIGNIFICANT CHANGE UP (ref 22–31)
CO2 SERPL-SCNC: 23 MMOL/L — SIGNIFICANT CHANGE UP (ref 22–31)
CO2 SERPL-SCNC: 25 MMOL/L
COLOR FLD: ABNORMAL
COLOR SPEC: SIGNIFICANT CHANGE UP
COLOR SPEC: YELLOW — SIGNIFICANT CHANGE UP
COLOR: NORMAL
COLOR: YELLOW
CREAT ?TM UR-MCNC: 53 MG/DL — SIGNIFICANT CHANGE UP
CREAT ?TM UR-MCNC: 54 MG/DL — SIGNIFICANT CHANGE UP
CREAT SERPL-MCNC: 0.51 MG/DL — SIGNIFICANT CHANGE UP (ref 0.5–1.3)
CREAT SERPL-MCNC: 0.53 MG/DL — SIGNIFICANT CHANGE UP (ref 0.5–1.3)
CREAT SERPL-MCNC: 0.54 MG/DL — SIGNIFICANT CHANGE UP (ref 0.5–1.3)
CREAT SERPL-MCNC: 0.55 MG/DL — SIGNIFICANT CHANGE UP (ref 0.5–1.3)
CREAT SERPL-MCNC: 0.56 MG/DL — SIGNIFICANT CHANGE UP (ref 0.5–1.3)
CREAT SERPL-MCNC: 0.57 MG/DL — SIGNIFICANT CHANGE UP (ref 0.5–1.3)
CREAT SERPL-MCNC: 0.62 MG/DL — SIGNIFICANT CHANGE UP (ref 0.5–1.3)
CREAT SERPL-MCNC: 0.62 MG/DL — SIGNIFICANT CHANGE UP (ref 0.5–1.3)
CREAT SERPL-MCNC: 0.64 MG/DL — SIGNIFICANT CHANGE UP (ref 0.5–1.3)
CREAT SERPL-MCNC: 0.65 MG/DL — SIGNIFICANT CHANGE UP (ref 0.5–1.3)
CREAT SERPL-MCNC: 0.66 MG/DL — SIGNIFICANT CHANGE UP (ref 0.5–1.3)
CREAT SERPL-MCNC: 0.7 MG/DL — SIGNIFICANT CHANGE UP (ref 0.5–1.3)
CREAT SERPL-MCNC: 0.71 MG/DL — SIGNIFICANT CHANGE UP (ref 0.5–1.3)
CREAT SERPL-MCNC: 0.72 MG/DL — SIGNIFICANT CHANGE UP (ref 0.5–1.3)
CREAT SERPL-MCNC: 0.72 MG/DL — SIGNIFICANT CHANGE UP (ref 0.5–1.3)
CREAT SERPL-MCNC: 0.73 MG/DL — SIGNIFICANT CHANGE UP (ref 0.5–1.3)
CREAT SERPL-MCNC: 0.78 MG/DL — SIGNIFICANT CHANGE UP (ref 0.5–1.3)
CREAT SERPL-MCNC: 0.78 MG/DL — SIGNIFICANT CHANGE UP (ref 0.5–1.3)
CREAT SERPL-MCNC: 0.81 MG/DL — SIGNIFICANT CHANGE UP (ref 0.5–1.3)
CREAT SERPL-MCNC: 0.81 MG/DL — SIGNIFICANT CHANGE UP (ref 0.5–1.3)
CREAT SERPL-MCNC: 0.84 MG/DL — SIGNIFICANT CHANGE UP (ref 0.5–1.3)
CREAT SERPL-MCNC: 0.91 MG/DL — SIGNIFICANT CHANGE UP (ref 0.5–1.3)
CREAT SERPL-MCNC: 0.93 MG/DL — SIGNIFICANT CHANGE UP (ref 0.5–1.3)
CREAT SERPL-MCNC: 1.05 MG/DL
CREAT SERPL-MCNC: 1.18 MG/DL — SIGNIFICANT CHANGE UP (ref 0.5–1.3)
CREAT SPEC-SCNC: 81 MG/DL
CULTURE RESULTS: NO GROWTH — SIGNIFICANT CHANGE UP
CULTURE RESULTS: SIGNIFICANT CHANGE UP
DIFF PNL FLD: NEGATIVE — SIGNIFICANT CHANGE UP
E COLI DNA BLD POS QL NAA+NON-PROBE: SIGNIFICANT CHANGE UP
EGFR: 47 ML/MIN/1.73M2 — LOW
EGFR: 55 ML/MIN/1.73M2
EGFR: 63 ML/MIN/1.73M2 — SIGNIFICANT CHANGE UP
EGFR: 65 ML/MIN/1.73M2 — SIGNIFICANT CHANGE UP
EGFR: 72 ML/MIN/1.73M2 — SIGNIFICANT CHANGE UP
EGFR: 75 ML/MIN/1.73M2 — SIGNIFICANT CHANGE UP
EGFR: 75 ML/MIN/1.73M2 — SIGNIFICANT CHANGE UP
EGFR: 78 ML/MIN/1.73M2 — SIGNIFICANT CHANGE UP
EGFR: 78 ML/MIN/1.73M2 — SIGNIFICANT CHANGE UP
EGFR: 85 ML/MIN/1.73M2 — SIGNIFICANT CHANGE UP
EGFR: 86 ML/MIN/1.73M2 — SIGNIFICANT CHANGE UP
EGFR: 86 ML/MIN/1.73M2 — SIGNIFICANT CHANGE UP
EGFR: 88 ML/MIN/1.73M2 — SIGNIFICANT CHANGE UP
EGFR: 88 ML/MIN/1.73M2 — SIGNIFICANT CHANGE UP
EGFR: 90 ML/MIN/1.73M2 — SIGNIFICANT CHANGE UP
EGFR: 91 ML/MIN/1.73M2 — SIGNIFICANT CHANGE UP
EGFR: 91 ML/MIN/1.73M2 — SIGNIFICANT CHANGE UP
EGFR: 93 ML/MIN/1.73M2 — SIGNIFICANT CHANGE UP
EGFR: 93 ML/MIN/1.73M2 — SIGNIFICANT CHANGE UP
EGFR: 94 ML/MIN/1.73M2 — SIGNIFICANT CHANGE UP
EGFR: 94 ML/MIN/1.73M2 — SIGNIFICANT CHANGE UP
EGFR: 95 ML/MIN/1.73M2 — SIGNIFICANT CHANGE UP
EGFR: 95 ML/MIN/1.73M2 — SIGNIFICANT CHANGE UP
EOSINOPHIL # BLD AUTO: 0.06 K/UL — SIGNIFICANT CHANGE UP (ref 0–0.5)
EOSINOPHIL # BLD AUTO: 0.08 K/UL — SIGNIFICANT CHANGE UP (ref 0–0.5)
EOSINOPHIL # BLD AUTO: 0.09 K/UL — SIGNIFICANT CHANGE UP (ref 0–0.5)
EOSINOPHIL # BLD AUTO: 0.11 K/UL — SIGNIFICANT CHANGE UP (ref 0–0.5)
EOSINOPHIL # BLD AUTO: 0.12 K/UL — SIGNIFICANT CHANGE UP (ref 0–0.5)
EOSINOPHIL # BLD AUTO: 0.12 K/UL — SIGNIFICANT CHANGE UP (ref 0–0.5)
EOSINOPHIL # BLD AUTO: 0.13 K/UL — SIGNIFICANT CHANGE UP (ref 0–0.5)
EOSINOPHIL # BLD AUTO: 0.14 K/UL — SIGNIFICANT CHANGE UP (ref 0–0.5)
EOSINOPHIL # BLD AUTO: 0.15 K/UL — SIGNIFICANT CHANGE UP (ref 0–0.5)
EOSINOPHIL # BLD AUTO: 0.17 K/UL — SIGNIFICANT CHANGE UP (ref 0–0.5)
EOSINOPHIL # BLD AUTO: 0.18 K/UL — SIGNIFICANT CHANGE UP (ref 0–0.5)
EOSINOPHIL # BLD AUTO: 0.22 K/UL — SIGNIFICANT CHANGE UP (ref 0–0.5)
EOSINOPHIL # BLD AUTO: 0.25 K/UL — SIGNIFICANT CHANGE UP (ref 0–0.5)
EOSINOPHIL # FLD: 0 % — SIGNIFICANT CHANGE UP
EOSINOPHIL NFR BLD AUTO: 0.7 % — SIGNIFICANT CHANGE UP (ref 0–6)
EOSINOPHIL NFR BLD AUTO: 0.8 % — SIGNIFICANT CHANGE UP (ref 0–6)
EOSINOPHIL NFR BLD AUTO: 0.8 % — SIGNIFICANT CHANGE UP (ref 0–6)
EOSINOPHIL NFR BLD AUTO: 0.9 % — SIGNIFICANT CHANGE UP (ref 0–6)
EOSINOPHIL NFR BLD AUTO: 1.1 % — SIGNIFICANT CHANGE UP (ref 0–6)
EOSINOPHIL NFR BLD AUTO: 1.1 % — SIGNIFICANT CHANGE UP (ref 0–6)
EOSINOPHIL NFR BLD AUTO: 1.2 % — SIGNIFICANT CHANGE UP (ref 0–6)
EOSINOPHIL NFR BLD AUTO: 1.4 % — SIGNIFICANT CHANGE UP (ref 0–6)
EOSINOPHIL NFR BLD AUTO: 1.6 % — SIGNIFICANT CHANGE UP (ref 0–6)
EOSINOPHIL NFR BLD AUTO: 1.9 % — SIGNIFICANT CHANGE UP (ref 0–6)
EOSINOPHIL NFR BLD AUTO: 1.9 % — SIGNIFICANT CHANGE UP (ref 0–6)
EOSINOPHIL NFR BLD AUTO: 2 % — SIGNIFICANT CHANGE UP (ref 0–6)
EOSINOPHIL NFR BLD AUTO: 2.2 % — SIGNIFICANT CHANGE UP (ref 0–6)
EOSINOPHIL NFR BLD AUTO: 2.3 % — SIGNIFICANT CHANGE UP (ref 0–6)
EOSINOPHIL NFR BLD AUTO: 2.4 % — SIGNIFICANT CHANGE UP (ref 0–6)
EOSINOPHIL NFR BLD AUTO: 2.6 % — SIGNIFICANT CHANGE UP (ref 0–6)
EPITHELIAL CELLS: 2 /HPF
ESTIMATED AVERAGE GLUCOSE: 140 — SIGNIFICANT CHANGE UP
FLUAV SUBTYP SPEC NAA+PROBE: SIGNIFICANT CHANGE UP
FLUBV RNA SPEC QL NAA+PROBE: SIGNIFICANT CHANGE UP
FLUID INTAKE SUBSTANCE CLASS: SIGNIFICANT CHANGE UP
FOLATE+VIT B12 SERBLD-IMP: 0 % — SIGNIFICANT CHANGE UP
GAS PNL BLDV: 127 MMOL/L — LOW (ref 136–145)
GI PCR PANEL: SIGNIFICANT CHANGE UP
GI PCR PANEL: SIGNIFICANT CHANGE UP
GLUCOSE BLDC GLUCOMTR-MCNC: 102 MG/DL — HIGH (ref 70–99)
GLUCOSE BLDC GLUCOMTR-MCNC: 104 MG/DL — HIGH (ref 70–99)
GLUCOSE BLDC GLUCOMTR-MCNC: 115 MG/DL — HIGH (ref 70–99)
GLUCOSE BLDC GLUCOMTR-MCNC: 117 MG/DL — HIGH (ref 70–99)
GLUCOSE BLDC GLUCOMTR-MCNC: 123 MG/DL — HIGH (ref 70–99)
GLUCOSE BLDC GLUCOMTR-MCNC: 127 MG/DL — HIGH (ref 70–99)
GLUCOSE BLDC GLUCOMTR-MCNC: 129 MG/DL — HIGH (ref 70–99)
GLUCOSE BLDC GLUCOMTR-MCNC: 131 MG/DL — HIGH (ref 70–99)
GLUCOSE BLDC GLUCOMTR-MCNC: 132 MG/DL — HIGH (ref 70–99)
GLUCOSE BLDC GLUCOMTR-MCNC: 133 MG/DL — HIGH (ref 70–99)
GLUCOSE BLDC GLUCOMTR-MCNC: 135 MG/DL — HIGH (ref 70–99)
GLUCOSE BLDC GLUCOMTR-MCNC: 135 MG/DL — HIGH (ref 70–99)
GLUCOSE BLDC GLUCOMTR-MCNC: 136 MG/DL — HIGH (ref 70–99)
GLUCOSE BLDC GLUCOMTR-MCNC: 139 MG/DL — HIGH (ref 70–99)
GLUCOSE BLDC GLUCOMTR-MCNC: 140 MG/DL — HIGH (ref 70–99)
GLUCOSE BLDC GLUCOMTR-MCNC: 141 MG/DL — HIGH (ref 70–99)
GLUCOSE BLDC GLUCOMTR-MCNC: 147 MG/DL — HIGH (ref 70–99)
GLUCOSE BLDC GLUCOMTR-MCNC: 148 MG/DL — HIGH (ref 70–99)
GLUCOSE BLDC GLUCOMTR-MCNC: 149 MG/DL — HIGH (ref 70–99)
GLUCOSE BLDC GLUCOMTR-MCNC: 150 MG/DL — HIGH (ref 70–99)
GLUCOSE BLDC GLUCOMTR-MCNC: 154 MG/DL — HIGH (ref 70–99)
GLUCOSE BLDC GLUCOMTR-MCNC: 155 MG/DL — HIGH (ref 70–99)
GLUCOSE BLDC GLUCOMTR-MCNC: 155 MG/DL — HIGH (ref 70–99)
GLUCOSE BLDC GLUCOMTR-MCNC: 156 MG/DL — HIGH (ref 70–99)
GLUCOSE BLDC GLUCOMTR-MCNC: 158 MG/DL — HIGH (ref 70–99)
GLUCOSE BLDC GLUCOMTR-MCNC: 159 MG/DL — HIGH (ref 70–99)
GLUCOSE BLDC GLUCOMTR-MCNC: 160 MG/DL — HIGH (ref 70–99)
GLUCOSE BLDC GLUCOMTR-MCNC: 162 MG/DL — HIGH (ref 70–99)
GLUCOSE BLDC GLUCOMTR-MCNC: 162 MG/DL — HIGH (ref 70–99)
GLUCOSE BLDC GLUCOMTR-MCNC: 163 MG/DL — HIGH (ref 70–99)
GLUCOSE BLDC GLUCOMTR-MCNC: 166 MG/DL — HIGH (ref 70–99)
GLUCOSE BLDC GLUCOMTR-MCNC: 167 MG/DL — HIGH (ref 70–99)
GLUCOSE BLDC GLUCOMTR-MCNC: 174 MG/DL — HIGH (ref 70–99)
GLUCOSE BLDC GLUCOMTR-MCNC: 178 MG/DL — HIGH (ref 70–99)
GLUCOSE BLDC GLUCOMTR-MCNC: 178 MG/DL — HIGH (ref 70–99)
GLUCOSE BLDC GLUCOMTR-MCNC: 180 MG/DL — HIGH (ref 70–99)
GLUCOSE BLDC GLUCOMTR-MCNC: 181 MG/DL — HIGH (ref 70–99)
GLUCOSE BLDC GLUCOMTR-MCNC: 183 MG/DL — HIGH (ref 70–99)
GLUCOSE BLDC GLUCOMTR-MCNC: 184 MG/DL — HIGH (ref 70–99)
GLUCOSE BLDC GLUCOMTR-MCNC: 184 MG/DL — HIGH (ref 70–99)
GLUCOSE BLDC GLUCOMTR-MCNC: 185 MG/DL — HIGH (ref 70–99)
GLUCOSE BLDC GLUCOMTR-MCNC: 188 MG/DL — HIGH (ref 70–99)
GLUCOSE BLDC GLUCOMTR-MCNC: 189 MG/DL — HIGH (ref 70–99)
GLUCOSE BLDC GLUCOMTR-MCNC: 191 MG/DL — HIGH (ref 70–99)
GLUCOSE BLDC GLUCOMTR-MCNC: 198 MG/DL — HIGH (ref 70–99)
GLUCOSE BLDC GLUCOMTR-MCNC: 205 MG/DL — HIGH (ref 70–99)
GLUCOSE BLDC GLUCOMTR-MCNC: 206 MG/DL — HIGH (ref 70–99)
GLUCOSE BLDC GLUCOMTR-MCNC: 206 MG/DL — HIGH (ref 70–99)
GLUCOSE BLDC GLUCOMTR-MCNC: 209 MG/DL — HIGH (ref 70–99)
GLUCOSE BLDC GLUCOMTR-MCNC: 218 MG/DL — HIGH (ref 70–99)
GLUCOSE BLDC GLUCOMTR-MCNC: 219 MG/DL — HIGH (ref 70–99)
GLUCOSE BLDC GLUCOMTR-MCNC: 232 MG/DL — HIGH (ref 70–99)
GLUCOSE BLDC GLUCOMTR-MCNC: 234 MG/DL — HIGH (ref 70–99)
GLUCOSE BLDC GLUCOMTR-MCNC: 235 MG/DL — HIGH (ref 70–99)
GLUCOSE BLDC GLUCOMTR-MCNC: 88 MG/DL — SIGNIFICANT CHANGE UP (ref 70–99)
GLUCOSE BLDV-MCNC: 108 MG/DL — HIGH (ref 70–99)
GLUCOSE FLD-MCNC: 117 MG/DL — SIGNIFICANT CHANGE UP
GLUCOSE QUALITATIVE U: NEGATIVE MG/DL
GLUCOSE QUALITATIVE U: NEGATIVE MG/DL
GLUCOSE SERPL-MCNC: 101 MG/DL — HIGH (ref 70–99)
GLUCOSE SERPL-MCNC: 112 MG/DL — HIGH (ref 70–99)
GLUCOSE SERPL-MCNC: 113 MG/DL — HIGH (ref 70–99)
GLUCOSE SERPL-MCNC: 126 MG/DL — HIGH (ref 70–99)
GLUCOSE SERPL-MCNC: 134 MG/DL — HIGH (ref 70–99)
GLUCOSE SERPL-MCNC: 141 MG/DL — HIGH (ref 70–99)
GLUCOSE SERPL-MCNC: 142 MG/DL — HIGH (ref 70–99)
GLUCOSE SERPL-MCNC: 145 MG/DL — HIGH (ref 70–99)
GLUCOSE SERPL-MCNC: 147 MG/DL — HIGH (ref 70–99)
GLUCOSE SERPL-MCNC: 149 MG/DL — HIGH (ref 70–99)
GLUCOSE SERPL-MCNC: 151 MG/DL — HIGH (ref 70–99)
GLUCOSE SERPL-MCNC: 151 MG/DL — HIGH (ref 70–99)
GLUCOSE SERPL-MCNC: 169 MG/DL — HIGH (ref 70–99)
GLUCOSE SERPL-MCNC: 170 MG/DL — HIGH (ref 70–99)
GLUCOSE SERPL-MCNC: 173 MG/DL — HIGH (ref 70–99)
GLUCOSE SERPL-MCNC: 178 MG/DL
GLUCOSE SERPL-MCNC: 179 MG/DL — HIGH (ref 70–99)
GLUCOSE SERPL-MCNC: 182 MG/DL — HIGH (ref 70–99)
GLUCOSE SERPL-MCNC: 186 MG/DL — HIGH (ref 70–99)
GLUCOSE SERPL-MCNC: 194 MG/DL — HIGH (ref 70–99)
GLUCOSE SERPL-MCNC: 199 MG/DL — HIGH (ref 70–99)
GLUCOSE SERPL-MCNC: 223 MG/DL — HIGH (ref 70–99)
GLUCOSE SERPL-MCNC: 229 MG/DL
GLUCOSE SERPL-MCNC: 246 MG/DL
GLUCOSE SERPL-MCNC: 276 MG/DL — HIGH (ref 70–99)
GLUCOSE SERPL-MCNC: 276 MG/DL — HIGH (ref 70–99)
GLUCOSE SERPL-MCNC: 291 MG/DL — HIGH (ref 70–99)
GLUCOSE SERPL-MCNC: 331 MG/DL — HIGH (ref 70–99)
GLUCOSE SERPL-MCNC: 76 MG/DL — SIGNIFICANT CHANGE UP (ref 70–99)
GLUCOSE UR QL: NEGATIVE MG/DL — SIGNIFICANT CHANGE UP
GLUCOSE UR QL: NEGATIVE — SIGNIFICANT CHANGE UP
GLUCOSE UR QL: NEGATIVE — SIGNIFICANT CHANGE UP
GRAM STN FLD: SIGNIFICANT CHANGE UP
GRAM STN FLD: SIGNIFICANT CHANGE UP
HADV DNA SPEC QL NAA+PROBE: SIGNIFICANT CHANGE UP
HBA1C MFR BLD HPLC: 8.2
HCO3 BLDV-SCNC: 24 MMOL/L — SIGNIFICANT CHANGE UP (ref 22–29)
HCOV 229E RNA SPEC QL NAA+PROBE: SIGNIFICANT CHANGE UP
HCOV HKU1 RNA SPEC QL NAA+PROBE: SIGNIFICANT CHANGE UP
HCOV NL63 RNA SPEC QL NAA+PROBE: SIGNIFICANT CHANGE UP
HCOV OC43 RNA SPEC QL NAA+PROBE: SIGNIFICANT CHANGE UP
HCT VFR BLD CALC: 24.6 % — LOW (ref 34.5–45)
HCT VFR BLD CALC: 26.3 % — LOW (ref 34.5–45)
HCT VFR BLD CALC: 26.4 % — LOW (ref 34.5–45)
HCT VFR BLD CALC: 27.4 % — LOW (ref 34.5–45)
HCT VFR BLD CALC: 27.5 % — LOW (ref 34.5–45)
HCT VFR BLD CALC: 27.6 % — LOW (ref 34.5–45)
HCT VFR BLD CALC: 28.1 % — LOW (ref 34.5–45)
HCT VFR BLD CALC: 28.5 % — LOW (ref 34.5–45)
HCT VFR BLD CALC: 28.6 % — LOW (ref 34.5–45)
HCT VFR BLD CALC: 28.9 % — LOW (ref 34.5–45)
HCT VFR BLD CALC: 29.7 % — LOW (ref 34.5–45)
HCT VFR BLD CALC: 30.5 % — LOW (ref 34.5–45)
HCT VFR BLD CALC: 30.5 % — LOW (ref 34.5–45)
HCT VFR BLD CALC: 31.1 % — LOW (ref 34.5–45)
HCT VFR BLD CALC: 31.1 % — LOW (ref 34.5–45)
HCT VFR BLD CALC: 31.3 % — LOW (ref 34.5–45)
HCT VFR BLD CALC: 31.4 % — LOW (ref 34.5–45)
HCT VFR BLD CALC: 31.5 % — LOW (ref 34.5–45)
HCT VFR BLD CALC: 31.6 % — LOW (ref 34.5–45)
HCT VFR BLD CALC: 31.8 % — LOW (ref 34.5–45)
HCT VFR BLD CALC: 32 % — LOW (ref 34.5–45)
HCT VFR BLD CALC: 32.1 % — LOW (ref 34.5–45)
HCT VFR BLD CALC: 32.5 % — LOW (ref 34.5–45)
HCT VFR BLD CALC: 33.4 % — LOW (ref 34.5–45)
HCT VFR BLDA CALC: 33 % — LOW (ref 34.5–46.5)
HCV AB S/CO SERPL IA: 5.83 S/CO — HIGH (ref 0–0.99)
HCV AB SERPL-IMP: REACTIVE
HDLC SERPL-MCNC: 31 MG/DL
HGB BLD CALC-MCNC: 10.9 G/DL — LOW (ref 11.7–16.1)
HGB BLD-MCNC: 10 G/DL — LOW (ref 11.5–15.5)
HGB BLD-MCNC: 10 G/DL — LOW (ref 11.5–15.5)
HGB BLD-MCNC: 10.2 G/DL — LOW (ref 11.5–15.5)
HGB BLD-MCNC: 10.2 G/DL — LOW (ref 11.5–15.5)
HGB BLD-MCNC: 10.3 G/DL — LOW (ref 11.5–15.5)
HGB BLD-MCNC: 10.4 G/DL — LOW (ref 11.5–15.5)
HGB BLD-MCNC: 10.5 G/DL — LOW (ref 11.5–15.5)
HGB BLD-MCNC: 8 G/DL — LOW (ref 11.5–15.5)
HGB BLD-MCNC: 8.3 G/DL — LOW (ref 11.5–15.5)
HGB BLD-MCNC: 8.3 G/DL — LOW (ref 11.5–15.5)
HGB BLD-MCNC: 8.8 G/DL — LOW (ref 11.5–15.5)
HGB BLD-MCNC: 8.9 G/DL — LOW (ref 11.5–15.5)
HGB BLD-MCNC: 9 G/DL — LOW (ref 11.5–15.5)
HGB BLD-MCNC: 9.1 G/DL — LOW (ref 11.5–15.5)
HGB BLD-MCNC: 9.2 G/DL — LOW (ref 11.5–15.5)
HGB BLD-MCNC: 9.3 G/DL — LOW (ref 11.5–15.5)
HGB BLD-MCNC: 9.6 G/DL — LOW (ref 11.5–15.5)
HGB BLD-MCNC: 9.6 G/DL — LOW (ref 11.5–15.5)
HGB BLD-MCNC: 9.8 G/DL — LOW (ref 11.5–15.5)
HGB BLD-MCNC: 9.8 G/DL — LOW (ref 11.5–15.5)
HGB BLD-MCNC: 9.9 G/DL — LOW (ref 11.5–15.5)
HGB BLD-MCNC: 9.9 G/DL — LOW (ref 11.5–15.5)
HMPV RNA SPEC QL NAA+PROBE: SIGNIFICANT CHANGE UP
HPIV1 RNA SPEC QL NAA+PROBE: SIGNIFICANT CHANGE UP
HPIV2 RNA SPEC QL NAA+PROBE: SIGNIFICANT CHANGE UP
HPIV3 RNA SPEC QL NAA+PROBE: SIGNIFICANT CHANGE UP
HPIV4 RNA SPEC QL NAA+PROBE: SIGNIFICANT CHANGE UP
IANC: 5.78 K/UL — SIGNIFICANT CHANGE UP (ref 1.8–7.4)
IANC: 6.66 K/UL — SIGNIFICANT CHANGE UP (ref 1.8–7.4)
IANC: 7.57 K/UL — HIGH (ref 1.8–7.4)
IANC: 7.86 K/UL — HIGH (ref 1.8–7.4)
IANC: 7.99 K/UL — HIGH (ref 1.8–7.4)
IANC: 8.85 K/UL — HIGH (ref 1.8–7.4)
IANC: 8.91 K/UL — HIGH (ref 1.8–7.4)
IANC: 9.09 K/UL — HIGH (ref 1.8–7.4)
IMM GRANULOCYTES NFR BLD AUTO: 0.4 % — SIGNIFICANT CHANGE UP (ref 0–0.9)
IMM GRANULOCYTES NFR BLD AUTO: 0.5 % — SIGNIFICANT CHANGE UP (ref 0–0.9)
IMM GRANULOCYTES NFR BLD AUTO: 0.5 % — SIGNIFICANT CHANGE UP (ref 0–0.9)
IMM GRANULOCYTES NFR BLD AUTO: 0.6 % — SIGNIFICANT CHANGE UP (ref 0–0.9)
IMM GRANULOCYTES NFR BLD AUTO: 0.7 % — SIGNIFICANT CHANGE UP (ref 0–0.9)
IMM GRANULOCYTES NFR BLD AUTO: 0.8 % — SIGNIFICANT CHANGE UP (ref 0–0.9)
IMM GRANULOCYTES NFR BLD AUTO: 1.1 % — HIGH (ref 0–0.9)
IMM GRANULOCYTES NFR BLD AUTO: 1.2 % — HIGH (ref 0–0.9)
IMM GRANULOCYTES NFR BLD AUTO: 1.4 % — HIGH (ref 0–0.9)
IMM GRANULOCYTES NFR BLD AUTO: 1.5 % — HIGH (ref 0–0.9)
IMM GRANULOCYTES NFR BLD AUTO: 1.7 % — HIGH (ref 0–0.9)
INR BLD: 1.16 RATIO — SIGNIFICANT CHANGE UP (ref 0.85–1.18)
INR BLD: 1.18 RATIO — HIGH (ref 0.88–1.16)
INR BLD: 1.23 RATIO — HIGH (ref 0.88–1.16)
INR BLD: 1.25 RATIO — HIGH (ref 0.88–1.16)
INR BLD: 1.28 RATIO — HIGH (ref 0.85–1.18)
INR BLD: 1.31 RATIO — HIGH (ref 0.85–1.18)
INR BLD: 1.62 RATIO — HIGH (ref 0.85–1.18)
INR BLD: 1.94 RATIO — HIGH (ref 0.85–1.18)
INR PPP: 1.03 RATIO
KETONES UR-MCNC: ABNORMAL MG/DL
KETONES UR-MCNC: NEGATIVE — SIGNIFICANT CHANGE UP
KETONES UR-MCNC: NEGATIVE — SIGNIFICANT CHANGE UP
KETONES URINE: ABNORMAL MG/DL
KETONES URINE: NEGATIVE MG/DL
LACTATE BLDV-MCNC: 1.8 MMOL/L — SIGNIFICANT CHANGE UP (ref 0.5–2)
LDH SERPL L TO P-CCNC: 435 U/L — SIGNIFICANT CHANGE UP
LDLC SERPL CALC-MCNC: 61 MG/DL
LEUKOCYTE ESTERASE UR-ACNC: ABNORMAL
LEUKOCYTE ESTERASE UR-ACNC: ABNORMAL
LEUKOCYTE ESTERASE UR-ACNC: NEGATIVE — SIGNIFICANT CHANGE UP
LEUKOCYTE ESTERASE URINE: ABNORMAL
LEUKOCYTE ESTERASE URINE: ABNORMAL
LIDOCAIN IGE QN: 10 U/L — SIGNIFICANT CHANGE UP (ref 7–60)
LIDOCAIN IGE QN: 9 U/L — SIGNIFICANT CHANGE UP (ref 7–60)
LYMPHOCYTES # BLD AUTO: 0.87 K/UL — LOW (ref 1–3.3)
LYMPHOCYTES # BLD AUTO: 1.1 K/UL — SIGNIFICANT CHANGE UP (ref 1–3.3)
LYMPHOCYTES # BLD AUTO: 1.37 K/UL — SIGNIFICANT CHANGE UP (ref 1–3.3)
LYMPHOCYTES # BLD AUTO: 1.43 K/UL — SIGNIFICANT CHANGE UP (ref 1–3.3)
LYMPHOCYTES # BLD AUTO: 1.45 K/UL — SIGNIFICANT CHANGE UP (ref 1–3.3)
LYMPHOCYTES # BLD AUTO: 1.49 K/UL — SIGNIFICANT CHANGE UP (ref 1–3.3)
LYMPHOCYTES # BLD AUTO: 1.51 K/UL — SIGNIFICANT CHANGE UP (ref 1–3.3)
LYMPHOCYTES # BLD AUTO: 1.56 K/UL — SIGNIFICANT CHANGE UP (ref 1–3.3)
LYMPHOCYTES # BLD AUTO: 1.57 K/UL — SIGNIFICANT CHANGE UP (ref 1–3.3)
LYMPHOCYTES # BLD AUTO: 1.61 K/UL — SIGNIFICANT CHANGE UP (ref 1–3.3)
LYMPHOCYTES # BLD AUTO: 1.64 K/UL — SIGNIFICANT CHANGE UP (ref 1–3.3)
LYMPHOCYTES # BLD AUTO: 1.65 K/UL — SIGNIFICANT CHANGE UP (ref 1–3.3)
LYMPHOCYTES # BLD AUTO: 1.66 K/UL — SIGNIFICANT CHANGE UP (ref 1–3.3)
LYMPHOCYTES # BLD AUTO: 1.77 K/UL — SIGNIFICANT CHANGE UP (ref 1–3.3)
LYMPHOCYTES # BLD AUTO: 1.81 K/UL — SIGNIFICANT CHANGE UP (ref 1–3.3)
LYMPHOCYTES # BLD AUTO: 1.87 K/UL — SIGNIFICANT CHANGE UP (ref 1–3.3)
LYMPHOCYTES # BLD AUTO: 11.9 % — LOW (ref 13–44)
LYMPHOCYTES # BLD AUTO: 12.7 % — LOW (ref 13–44)
LYMPHOCYTES # BLD AUTO: 13.4 % — SIGNIFICANT CHANGE UP (ref 13–44)
LYMPHOCYTES # BLD AUTO: 14.1 % — SIGNIFICANT CHANGE UP (ref 13–44)
LYMPHOCYTES # BLD AUTO: 14.3 % — SIGNIFICANT CHANGE UP (ref 13–44)
LYMPHOCYTES # BLD AUTO: 15.7 % — SIGNIFICANT CHANGE UP (ref 13–44)
LYMPHOCYTES # BLD AUTO: 15.7 % — SIGNIFICANT CHANGE UP (ref 13–44)
LYMPHOCYTES # BLD AUTO: 18.2 % — SIGNIFICANT CHANGE UP (ref 13–44)
LYMPHOCYTES # BLD AUTO: 18.2 % — SIGNIFICANT CHANGE UP (ref 13–44)
LYMPHOCYTES # BLD AUTO: 21.3 % — SIGNIFICANT CHANGE UP (ref 13–44)
LYMPHOCYTES # BLD AUTO: 21.7 % — SIGNIFICANT CHANGE UP (ref 13–44)
LYMPHOCYTES # BLD AUTO: 25.6 % — SIGNIFICANT CHANGE UP (ref 13–44)
LYMPHOCYTES # BLD AUTO: 27.8 % — SIGNIFICANT CHANGE UP (ref 13–44)
LYMPHOCYTES # BLD AUTO: 29.8 % — SIGNIFICANT CHANGE UP (ref 13–44)
LYMPHOCYTES # BLD AUTO: 33.6 % — SIGNIFICANT CHANGE UP (ref 13–44)
LYMPHOCYTES # BLD AUTO: 7.9 % — LOW (ref 13–44)
LYMPHOCYTES # FLD: 13 % — SIGNIFICANT CHANGE UP
M PNEUMO DNA SPEC QL NAA+PROBE: SIGNIFICANT CHANGE UP
MAGNESIUM SERPL-MCNC: 1.8 MG/DL — SIGNIFICANT CHANGE UP (ref 1.6–2.6)
MAGNESIUM SERPL-MCNC: 1.9 MG/DL — SIGNIFICANT CHANGE UP (ref 1.6–2.6)
MAGNESIUM SERPL-MCNC: 2 MG/DL — SIGNIFICANT CHANGE UP (ref 1.6–2.6)
MAGNESIUM SERPL-MCNC: 2.1 MG/DL — SIGNIFICANT CHANGE UP (ref 1.6–2.6)
MAGNESIUM SERPL-MCNC: 2.3 MG/DL — SIGNIFICANT CHANGE UP (ref 1.6–2.6)
MCHC RBC-ENTMCNC: 26.7 PG — LOW (ref 27–34)
MCHC RBC-ENTMCNC: 26.8 PG — LOW (ref 27–34)
MCHC RBC-ENTMCNC: 26.9 PG — LOW (ref 27–34)
MCHC RBC-ENTMCNC: 27 PG — SIGNIFICANT CHANGE UP (ref 27–34)
MCHC RBC-ENTMCNC: 27.2 PG — SIGNIFICANT CHANGE UP (ref 27–34)
MCHC RBC-ENTMCNC: 27.3 PG — SIGNIFICANT CHANGE UP (ref 27–34)
MCHC RBC-ENTMCNC: 27.3 PG — SIGNIFICANT CHANGE UP (ref 27–34)
MCHC RBC-ENTMCNC: 27.5 PG — SIGNIFICANT CHANGE UP (ref 27–34)
MCHC RBC-ENTMCNC: 27.6 PG — SIGNIFICANT CHANGE UP (ref 27–34)
MCHC RBC-ENTMCNC: 27.7 PG — SIGNIFICANT CHANGE UP (ref 27–34)
MCHC RBC-ENTMCNC: 27.7 PG — SIGNIFICANT CHANGE UP (ref 27–34)
MCHC RBC-ENTMCNC: 27.8 PG — SIGNIFICANT CHANGE UP (ref 27–34)
MCHC RBC-ENTMCNC: 27.9 PG — SIGNIFICANT CHANGE UP (ref 27–34)
MCHC RBC-ENTMCNC: 28 PG — SIGNIFICANT CHANGE UP (ref 27–34)
MCHC RBC-ENTMCNC: 28 PG — SIGNIFICANT CHANGE UP (ref 27–34)
MCHC RBC-ENTMCNC: 28.4 PG — SIGNIFICANT CHANGE UP (ref 27–34)
MCHC RBC-ENTMCNC: 30.5 GM/DL — LOW (ref 32–36)
MCHC RBC-ENTMCNC: 30.9 GM/DL — LOW (ref 32–36)
MCHC RBC-ENTMCNC: 31.2 G/DL — LOW (ref 32–36)
MCHC RBC-ENTMCNC: 31.2 GM/DL — LOW (ref 32–36)
MCHC RBC-ENTMCNC: 31.4 GM/DL — LOW (ref 32–36)
MCHC RBC-ENTMCNC: 31.5 GM/DL — LOW (ref 32–36)
MCHC RBC-ENTMCNC: 31.6 G/DL — LOW (ref 32–36)
MCHC RBC-ENTMCNC: 31.6 GM/DL — LOW (ref 32–36)
MCHC RBC-ENTMCNC: 31.7 GM/DL — LOW (ref 32–36)
MCHC RBC-ENTMCNC: 31.8 G/DL — LOW (ref 32–36)
MCHC RBC-ENTMCNC: 31.8 GM/DL — LOW (ref 32–36)
MCHC RBC-ENTMCNC: 32 GM/DL — SIGNIFICANT CHANGE UP (ref 32–36)
MCHC RBC-ENTMCNC: 32.3 G/DL — SIGNIFICANT CHANGE UP (ref 32–36)
MCHC RBC-ENTMCNC: 32.3 GM/DL — SIGNIFICANT CHANGE UP (ref 32–36)
MCHC RBC-ENTMCNC: 32.4 G/DL — SIGNIFICANT CHANGE UP (ref 32–36)
MCHC RBC-ENTMCNC: 32.5 G/DL — SIGNIFICANT CHANGE UP (ref 32–36)
MCHC RBC-ENTMCNC: 32.5 G/DL — SIGNIFICANT CHANGE UP (ref 32–36)
MCHC RBC-ENTMCNC: 32.5 GM/DL — SIGNIFICANT CHANGE UP (ref 32–36)
MCHC RBC-ENTMCNC: 32.6 GM/DL — SIGNIFICANT CHANGE UP (ref 32–36)
MCHC RBC-ENTMCNC: 32.8 GM/DL — SIGNIFICANT CHANGE UP (ref 32–36)
MCHC RBC-ENTMCNC: 33.3 G/DL — SIGNIFICANT CHANGE UP (ref 32–36)
MCV RBC AUTO: 82.8 FL — SIGNIFICANT CHANGE UP (ref 80–100)
MCV RBC AUTO: 82.8 FL — SIGNIFICANT CHANGE UP (ref 80–100)
MCV RBC AUTO: 83.8 FL — SIGNIFICANT CHANGE UP (ref 80–100)
MCV RBC AUTO: 84.3 FL — SIGNIFICANT CHANGE UP (ref 80–100)
MCV RBC AUTO: 84.4 FL — SIGNIFICANT CHANGE UP (ref 80–100)
MCV RBC AUTO: 84.7 FL — SIGNIFICANT CHANGE UP (ref 80–100)
MCV RBC AUTO: 85.2 FL — SIGNIFICANT CHANGE UP (ref 80–100)
MCV RBC AUTO: 85.4 FL — SIGNIFICANT CHANGE UP (ref 80–100)
MCV RBC AUTO: 85.4 FL — SIGNIFICANT CHANGE UP (ref 80–100)
MCV RBC AUTO: 85.5 FL — SIGNIFICANT CHANGE UP (ref 80–100)
MCV RBC AUTO: 85.8 FL — SIGNIFICANT CHANGE UP (ref 80–100)
MCV RBC AUTO: 85.9 FL — SIGNIFICANT CHANGE UP (ref 80–100)
MCV RBC AUTO: 86 FL — SIGNIFICANT CHANGE UP (ref 80–100)
MCV RBC AUTO: 86 FL — SIGNIFICANT CHANGE UP (ref 80–100)
MCV RBC AUTO: 86.2 FL — SIGNIFICANT CHANGE UP (ref 80–100)
MCV RBC AUTO: 86.8 FL — SIGNIFICANT CHANGE UP (ref 80–100)
MCV RBC AUTO: 86.9 FL — SIGNIFICANT CHANGE UP (ref 80–100)
MCV RBC AUTO: 87.4 FL — SIGNIFICANT CHANGE UP (ref 80–100)
MCV RBC AUTO: 87.4 FL — SIGNIFICANT CHANGE UP (ref 80–100)
MCV RBC AUTO: 87.5 FL — SIGNIFICANT CHANGE UP (ref 80–100)
MCV RBC AUTO: 87.5 FL — SIGNIFICANT CHANGE UP (ref 80–100)
MCV RBC AUTO: 88.5 FL — SIGNIFICANT CHANGE UP (ref 80–100)
MCV RBC AUTO: 89.2 FL — SIGNIFICANT CHANGE UP (ref 80–100)
MCV RBC AUTO: 91 FL — SIGNIFICANT CHANGE UP (ref 80–100)
MESOTHL CELL # FLD: 0 % — SIGNIFICANT CHANGE UP
METHOD TYPE: SIGNIFICANT CHANGE UP
MICROALBUMIN 24H UR DL<=1MG/L-MCNC: 2.3 MG/DL
MICROALBUMIN/CREAT 24H UR-RTO: 28 MG/G
MICROSCOPIC-UA: NORMAL
MONOCYTES # BLD AUTO: 0.28 K/UL — SIGNIFICANT CHANGE UP (ref 0–0.9)
MONOCYTES # BLD AUTO: 0.5 K/UL — SIGNIFICANT CHANGE UP (ref 0–0.9)
MONOCYTES # BLD AUTO: 0.53 K/UL — SIGNIFICANT CHANGE UP (ref 0–0.9)
MONOCYTES # BLD AUTO: 0.55 K/UL — SIGNIFICANT CHANGE UP (ref 0–0.9)
MONOCYTES # BLD AUTO: 0.61 K/UL — SIGNIFICANT CHANGE UP (ref 0–0.9)
MONOCYTES # BLD AUTO: 0.72 K/UL — SIGNIFICANT CHANGE UP (ref 0–0.9)
MONOCYTES # BLD AUTO: 0.73 K/UL — SIGNIFICANT CHANGE UP (ref 0–0.9)
MONOCYTES # BLD AUTO: 0.73 K/UL — SIGNIFICANT CHANGE UP (ref 0–0.9)
MONOCYTES # BLD AUTO: 0.77 K/UL — SIGNIFICANT CHANGE UP (ref 0–0.9)
MONOCYTES # BLD AUTO: 0.81 K/UL — SIGNIFICANT CHANGE UP (ref 0–0.9)
MONOCYTES # BLD AUTO: 0.84 K/UL — SIGNIFICANT CHANGE UP (ref 0–0.9)
MONOCYTES # BLD AUTO: 0.85 K/UL — SIGNIFICANT CHANGE UP (ref 0–0.9)
MONOCYTES # BLD AUTO: 0.9 K/UL — SIGNIFICANT CHANGE UP (ref 0–0.9)
MONOCYTES # BLD AUTO: 0.91 K/UL — HIGH (ref 0–0.9)
MONOCYTES # BLD AUTO: 0.93 K/UL — HIGH (ref 0–0.9)
MONOCYTES # BLD AUTO: 0.96 K/UL — HIGH (ref 0–0.9)
MONOCYTES NFR BLD AUTO: 10 % — SIGNIFICANT CHANGE UP (ref 2–14)
MONOCYTES NFR BLD AUTO: 11.1 % — SIGNIFICANT CHANGE UP (ref 2–14)
MONOCYTES NFR BLD AUTO: 12.2 % — SIGNIFICANT CHANGE UP (ref 2–14)
MONOCYTES NFR BLD AUTO: 12.3 % — SIGNIFICANT CHANGE UP (ref 2–14)
MONOCYTES NFR BLD AUTO: 3.4 % — SIGNIFICANT CHANGE UP (ref 2–14)
MONOCYTES NFR BLD AUTO: 5.9 % — SIGNIFICANT CHANGE UP (ref 2–14)
MONOCYTES NFR BLD AUTO: 6.5 % — SIGNIFICANT CHANGE UP (ref 2–14)
MONOCYTES NFR BLD AUTO: 7 % — SIGNIFICANT CHANGE UP (ref 2–14)
MONOCYTES NFR BLD AUTO: 7 % — SIGNIFICANT CHANGE UP (ref 2–14)
MONOCYTES NFR BLD AUTO: 7.8 % — SIGNIFICANT CHANGE UP (ref 2–14)
MONOCYTES NFR BLD AUTO: 8.3 % — SIGNIFICANT CHANGE UP (ref 2–14)
MONOCYTES NFR BLD AUTO: 8.5 % — SIGNIFICANT CHANGE UP (ref 2–14)
MONOCYTES NFR BLD AUTO: 8.7 % — SIGNIFICANT CHANGE UP (ref 2–14)
MONOCYTES NFR BLD AUTO: 8.9 % — SIGNIFICANT CHANGE UP (ref 2–14)
MONOCYTES NFR BLD AUTO: 9.3 % — SIGNIFICANT CHANGE UP (ref 2–14)
MONOCYTES NFR BLD AUTO: 9.8 % — SIGNIFICANT CHANGE UP (ref 2–14)
MONOS+MACROS # FLD: 72 % — SIGNIFICANT CHANGE UP
NEUTROPHILS # BLD AUTO: 2.78 K/UL — SIGNIFICANT CHANGE UP (ref 1.8–7.4)
NEUTROPHILS # BLD AUTO: 3.17 K/UL — SIGNIFICANT CHANGE UP (ref 1.8–7.4)
NEUTROPHILS # BLD AUTO: 3.35 K/UL — SIGNIFICANT CHANGE UP (ref 1.8–7.4)
NEUTROPHILS # BLD AUTO: 4.18 K/UL — SIGNIFICANT CHANGE UP (ref 1.8–7.4)
NEUTROPHILS # BLD AUTO: 4.92 K/UL — SIGNIFICANT CHANGE UP (ref 1.8–7.4)
NEUTROPHILS # BLD AUTO: 4.94 K/UL — SIGNIFICANT CHANGE UP (ref 1.8–7.4)
NEUTROPHILS # BLD AUTO: 5.78 K/UL — SIGNIFICANT CHANGE UP (ref 1.8–7.4)
NEUTROPHILS # BLD AUTO: 6.66 K/UL — SIGNIFICANT CHANGE UP (ref 1.8–7.4)
NEUTROPHILS # BLD AUTO: 6.66 K/UL — SIGNIFICANT CHANGE UP (ref 1.8–7.4)
NEUTROPHILS # BLD AUTO: 7.33 K/UL — SIGNIFICANT CHANGE UP (ref 1.8–7.4)
NEUTROPHILS # BLD AUTO: 7.57 K/UL — HIGH (ref 1.8–7.4)
NEUTROPHILS # BLD AUTO: 7.86 K/UL — HIGH (ref 1.8–7.4)
NEUTROPHILS # BLD AUTO: 7.99 K/UL — HIGH (ref 1.8–7.4)
NEUTROPHILS # BLD AUTO: 8.85 K/UL — HIGH (ref 1.8–7.4)
NEUTROPHILS # BLD AUTO: 8.91 K/UL — HIGH (ref 1.8–7.4)
NEUTROPHILS # BLD AUTO: 9.09 K/UL — HIGH (ref 1.8–7.4)
NEUTROPHILS NFR BLD AUTO: 51.6 % — SIGNIFICANT CHANGE UP (ref 43–77)
NEUTROPHILS NFR BLD AUTO: 56 % — SIGNIFICANT CHANGE UP (ref 43–77)
NEUTROPHILS NFR BLD AUTO: 57.3 % — SIGNIFICANT CHANGE UP (ref 43–77)
NEUTROPHILS NFR BLD AUTO: 57.5 % — SIGNIFICANT CHANGE UP (ref 43–77)
NEUTROPHILS NFR BLD AUTO: 65.4 % — SIGNIFICANT CHANGE UP (ref 43–77)
NEUTROPHILS NFR BLD AUTO: 68.3 % — SIGNIFICANT CHANGE UP (ref 43–77)
NEUTROPHILS NFR BLD AUTO: 68.4 % — SIGNIFICANT CHANGE UP (ref 43–77)
NEUTROPHILS NFR BLD AUTO: 69.7 % — SIGNIFICANT CHANGE UP (ref 43–77)
NEUTROPHILS NFR BLD AUTO: 72.3 % — SIGNIFICANT CHANGE UP (ref 43–77)
NEUTROPHILS NFR BLD AUTO: 73.5 % — SIGNIFICANT CHANGE UP (ref 43–77)
NEUTROPHILS NFR BLD AUTO: 75.4 % — SIGNIFICANT CHANGE UP (ref 43–77)
NEUTROPHILS NFR BLD AUTO: 76.8 % — SIGNIFICANT CHANGE UP (ref 43–77)
NEUTROPHILS NFR BLD AUTO: 77.8 % — HIGH (ref 43–77)
NEUTROPHILS NFR BLD AUTO: 78.8 % — HIGH (ref 43–77)
NEUTROPHILS NFR BLD AUTO: 81.4 % — HIGH (ref 43–77)
NEUTROPHILS NFR BLD AUTO: 82.5 % — HIGH (ref 43–77)
NEUTROPHILS-BODY FLUID: 15 % — SIGNIFICANT CHANGE UP
NITRITE UR-MCNC: NEGATIVE — SIGNIFICANT CHANGE UP
NITRITE UR-MCNC: POSITIVE
NITRITE URINE: POSITIVE
NITRITE URINE: POSITIVE
NON-GYNECOLOGICAL CYTOLOGY STUDY: SIGNIFICANT CHANGE UP
NONHDLC SERPL-MCNC: 111 MG/DL
NRBC # BLD: 0 /100 WBCS — SIGNIFICANT CHANGE UP (ref 0–0)
NRBC # FLD: 0 K/UL — SIGNIFICANT CHANGE UP (ref 0–0)
ORGANISM # SPEC MICROSCOPIC CNT: SIGNIFICANT CHANGE UP
OSMOLALITY SERPL: 259 MOSM/KG — LOW (ref 275–295)
OSMOLALITY SERPL: 271 MOSM/KG — LOW (ref 275–295)
OSMOLALITY UR: 469 MOSM/KG — SIGNIFICANT CHANGE UP (ref 50–1200)
OSMOLALITY UR: 522 MOSM/KG — SIGNIFICANT CHANGE UP (ref 50–1200)
OSMOLALITY UR: 548 MOSM/KG — SIGNIFICANT CHANGE UP (ref 50–1200)
OTHER CELLS FLD MANUAL: 0 % — SIGNIFICANT CHANGE UP
PCO2 BLDV: 43 MMHG — SIGNIFICANT CHANGE UP (ref 39–52)
PH BLDV: 7.36 — SIGNIFICANT CHANGE UP (ref 7.32–7.43)
PH UR: 6 — SIGNIFICANT CHANGE UP (ref 5–8)
PH UR: 6.5 — SIGNIFICANT CHANGE UP (ref 5–8)
PH UR: 6.5 — SIGNIFICANT CHANGE UP (ref 5–8)
PH URINE: 5.5
PH URINE: 6.5
PHOSPHATE SERPL-MCNC: 2.1 MG/DL — LOW (ref 2.5–4.5)
PHOSPHATE SERPL-MCNC: 2.1 MG/DL — LOW (ref 2.5–4.5)
PHOSPHATE SERPL-MCNC: 2.2 MG/DL — LOW (ref 2.5–4.5)
PHOSPHATE SERPL-MCNC: 2.3 MG/DL — LOW (ref 2.5–4.5)
PHOSPHATE SERPL-MCNC: 2.4 MG/DL — LOW (ref 2.5–4.5)
PHOSPHATE SERPL-MCNC: 2.5 MG/DL — SIGNIFICANT CHANGE UP (ref 2.5–4.5)
PHOSPHATE SERPL-MCNC: 2.6 MG/DL — SIGNIFICANT CHANGE UP (ref 2.5–4.5)
PHOSPHATE SERPL-MCNC: 2.6 MG/DL — SIGNIFICANT CHANGE UP (ref 2.5–4.5)
PHOSPHATE SERPL-MCNC: 2.7 MG/DL — SIGNIFICANT CHANGE UP (ref 2.5–4.5)
PHOSPHATE SERPL-MCNC: 2.8 MG/DL — SIGNIFICANT CHANGE UP (ref 2.5–4.5)
PLATELET # BLD AUTO: 259 K/UL — SIGNIFICANT CHANGE UP (ref 150–400)
PLATELET # BLD AUTO: 260 K/UL — SIGNIFICANT CHANGE UP (ref 150–400)
PLATELET # BLD AUTO: 262 K/UL — SIGNIFICANT CHANGE UP (ref 150–400)
PLATELET # BLD AUTO: 277 K/UL — SIGNIFICANT CHANGE UP (ref 150–400)
PLATELET # BLD AUTO: 317 K/UL — SIGNIFICANT CHANGE UP (ref 150–400)
PLATELET # BLD AUTO: 331 K/UL — SIGNIFICANT CHANGE UP (ref 150–400)
PLATELET # BLD AUTO: 334 K/UL — SIGNIFICANT CHANGE UP (ref 150–400)
PLATELET # BLD AUTO: 351 K/UL — SIGNIFICANT CHANGE UP (ref 150–400)
PLATELET # BLD AUTO: 359 K/UL — SIGNIFICANT CHANGE UP (ref 150–400)
PLATELET # BLD AUTO: 361 K/UL — SIGNIFICANT CHANGE UP (ref 150–400)
PLATELET # BLD AUTO: 365 K/UL — SIGNIFICANT CHANGE UP (ref 150–400)
PLATELET # BLD AUTO: 370 K/UL — SIGNIFICANT CHANGE UP (ref 150–400)
PLATELET # BLD AUTO: 372 K/UL — SIGNIFICANT CHANGE UP (ref 150–400)
PLATELET # BLD AUTO: 376 K/UL — SIGNIFICANT CHANGE UP (ref 150–400)
PLATELET # BLD AUTO: 394 K/UL — SIGNIFICANT CHANGE UP (ref 150–400)
PLATELET # BLD AUTO: 394 K/UL — SIGNIFICANT CHANGE UP (ref 150–400)
PLATELET # BLD AUTO: 402 K/UL — HIGH (ref 150–400)
PLATELET # BLD AUTO: 404 K/UL — HIGH (ref 150–400)
PLATELET # BLD AUTO: 411 K/UL — HIGH (ref 150–400)
PLATELET # BLD AUTO: 455 K/UL — HIGH (ref 150–400)
PLATELET # BLD AUTO: 489 K/UL — HIGH (ref 150–400)
PLATELET # BLD AUTO: 504 K/UL — HIGH (ref 150–400)
PLATELET # BLD AUTO: 524 K/UL — HIGH (ref 150–400)
PLATELET # BLD AUTO: 533 K/UL — HIGH (ref 150–400)
PO2 BLDV: 42 MMHG — SIGNIFICANT CHANGE UP (ref 25–45)
POTASSIUM BLDV-SCNC: 5.3 MMOL/L — HIGH (ref 3.5–5.1)
POTASSIUM SERPL-MCNC: 4 MMOL/L — SIGNIFICANT CHANGE UP (ref 3.5–5.3)
POTASSIUM SERPL-MCNC: 4 MMOL/L — SIGNIFICANT CHANGE UP (ref 3.5–5.3)
POTASSIUM SERPL-MCNC: 4.2 MMOL/L — SIGNIFICANT CHANGE UP (ref 3.5–5.3)
POTASSIUM SERPL-MCNC: 4.3 MMOL/L — SIGNIFICANT CHANGE UP (ref 3.5–5.3)
POTASSIUM SERPL-MCNC: 4.4 MMOL/L — SIGNIFICANT CHANGE UP (ref 3.5–5.3)
POTASSIUM SERPL-MCNC: 4.5 MMOL/L — SIGNIFICANT CHANGE UP (ref 3.5–5.3)
POTASSIUM SERPL-MCNC: 4.5 MMOL/L — SIGNIFICANT CHANGE UP (ref 3.5–5.3)
POTASSIUM SERPL-MCNC: 4.6 MMOL/L — SIGNIFICANT CHANGE UP (ref 3.5–5.3)
POTASSIUM SERPL-MCNC: 4.7 MMOL/L — SIGNIFICANT CHANGE UP (ref 3.5–5.3)
POTASSIUM SERPL-MCNC: 4.7 MMOL/L — SIGNIFICANT CHANGE UP (ref 3.5–5.3)
POTASSIUM SERPL-MCNC: 4.8 MMOL/L — SIGNIFICANT CHANGE UP (ref 3.5–5.3)
POTASSIUM SERPL-MCNC: 4.8 MMOL/L — SIGNIFICANT CHANGE UP (ref 3.5–5.3)
POTASSIUM SERPL-MCNC: 5.1 MMOL/L — SIGNIFICANT CHANGE UP (ref 3.5–5.3)
POTASSIUM SERPL-SCNC: 4 MMOL/L — SIGNIFICANT CHANGE UP (ref 3.5–5.3)
POTASSIUM SERPL-SCNC: 4 MMOL/L — SIGNIFICANT CHANGE UP (ref 3.5–5.3)
POTASSIUM SERPL-SCNC: 4.2 MMOL/L — SIGNIFICANT CHANGE UP (ref 3.5–5.3)
POTASSIUM SERPL-SCNC: 4.3 MMOL/L — SIGNIFICANT CHANGE UP (ref 3.5–5.3)
POTASSIUM SERPL-SCNC: 4.4 MMOL/L — SIGNIFICANT CHANGE UP (ref 3.5–5.3)
POTASSIUM SERPL-SCNC: 4.5 MMOL/L — SIGNIFICANT CHANGE UP (ref 3.5–5.3)
POTASSIUM SERPL-SCNC: 4.5 MMOL/L — SIGNIFICANT CHANGE UP (ref 3.5–5.3)
POTASSIUM SERPL-SCNC: 4.6 MMOL/L — SIGNIFICANT CHANGE UP (ref 3.5–5.3)
POTASSIUM SERPL-SCNC: 4.7 MMOL/L — SIGNIFICANT CHANGE UP (ref 3.5–5.3)
POTASSIUM SERPL-SCNC: 4.7 MMOL/L — SIGNIFICANT CHANGE UP (ref 3.5–5.3)
POTASSIUM SERPL-SCNC: 4.8 MMOL/L — SIGNIFICANT CHANGE UP (ref 3.5–5.3)
POTASSIUM SERPL-SCNC: 4.8 MMOL/L — SIGNIFICANT CHANGE UP (ref 3.5–5.3)
POTASSIUM SERPL-SCNC: 5.1 MMOL/L
POTASSIUM SERPL-SCNC: 5.1 MMOL/L — SIGNIFICANT CHANGE UP (ref 3.5–5.3)
POTASSIUM UR-SCNC: 29.7 MMOL/L — SIGNIFICANT CHANGE UP
POTASSIUM UR-SCNC: 39.2 MMOL/L — SIGNIFICANT CHANGE UP
PROT SERPL-MCNC: 5.3 G/DL — LOW (ref 6–8.3)
PROT SERPL-MCNC: 5.4 G/DL — LOW (ref 6–8.3)
PROT SERPL-MCNC: 5.5 G/DL — LOW (ref 6–8.3)
PROT SERPL-MCNC: 5.6 G/DL — LOW (ref 6–8.3)
PROT SERPL-MCNC: 5.7 G/DL — LOW (ref 6–8.3)
PROT SERPL-MCNC: 5.8 G/DL — LOW (ref 6–8.3)
PROT SERPL-MCNC: 5.9 G/DL — LOW (ref 6–8.3)
PROT SERPL-MCNC: 6 G/DL — SIGNIFICANT CHANGE UP (ref 6–8.3)
PROT SERPL-MCNC: 6 G/DL — SIGNIFICANT CHANGE UP (ref 6–8.3)
PROT SERPL-MCNC: 6.2 G/DL — SIGNIFICANT CHANGE UP (ref 6–8.3)
PROT SERPL-MCNC: 6.2 G/DL — SIGNIFICANT CHANGE UP (ref 6–8.3)
PROT SERPL-MCNC: 6.4 G/DL — SIGNIFICANT CHANGE UP (ref 6–8.3)
PROT SERPL-MCNC: 6.5 G/DL — SIGNIFICANT CHANGE UP (ref 6–8.3)
PROT SERPL-MCNC: 6.5 G/DL — SIGNIFICANT CHANGE UP (ref 6–8.3)
PROT SERPL-MCNC: 6.7 G/DL
PROT SERPL-MCNC: 6.7 G/DL — SIGNIFICANT CHANGE UP (ref 6–8.3)
PROT SERPL-MCNC: 6.7 G/DL — SIGNIFICANT CHANGE UP (ref 6–8.3)
PROT SERPL-MCNC: 6.8 G/DL — SIGNIFICANT CHANGE UP (ref 6–8.3)
PROT SERPL-MCNC: 6.9 G/DL — SIGNIFICANT CHANGE UP (ref 6–8.3)
PROT SERPL-MCNC: 7.1 G/DL — SIGNIFICANT CHANGE UP (ref 6–8.3)
PROT UR-MCNC: ABNORMAL
PROT UR-MCNC: NEGATIVE MG/DL — SIGNIFICANT CHANGE UP
PROT UR-MCNC: NEGATIVE — SIGNIFICANT CHANGE UP
PROT UR-MCNC: SIGNIFICANT CHANGE UP MG/DL
PROT UR-MCNC: SIGNIFICANT CHANGE UP MG/DL
PROTEIN URINE: NEGATIVE MG/DL
PROTEIN URINE: NORMAL MG/DL
PROTHROM AB SERPL-ACNC: 13 SEC — SIGNIFICANT CHANGE UP (ref 9.5–13)
PROTHROM AB SERPL-ACNC: 13.7 SEC — HIGH (ref 10.5–13.4)
PROTHROM AB SERPL-ACNC: 14.3 SEC — HIGH (ref 10.5–13.4)
PROTHROM AB SERPL-ACNC: 14.4 SEC — HIGH (ref 9.5–13)
PROTHROM AB SERPL-ACNC: 14.5 SEC — HIGH (ref 10.5–13.4)
PROTHROM AB SERPL-ACNC: 14.6 SEC — HIGH (ref 9.5–13)
PROTHROM AB SERPL-ACNC: 18 SEC — HIGH (ref 9.5–13)
PROTHROM AB SERPL-ACNC: 21.3 SEC — HIGH (ref 9.5–13)
PT BLD: 12.1 SEC
RAPID RVP RESULT: SIGNIFICANT CHANGE UP
RBC # BLD: 2.86 M/UL — LOW (ref 3.8–5.2)
RBC # BLD: 3.02 M/UL — LOW (ref 3.8–5.2)
RBC # BLD: 3.08 M/UL — LOW (ref 3.8–5.2)
RBC # BLD: 3.24 M/UL — LOW (ref 3.8–5.2)
RBC # BLD: 3.26 M/UL — LOW (ref 3.8–5.2)
RBC # BLD: 3.27 M/UL — LOW (ref 3.8–5.2)
RBC # BLD: 3.27 M/UL — LOW (ref 3.8–5.2)
RBC # BLD: 3.31 M/UL — LOW (ref 3.8–5.2)
RBC # BLD: 3.35 M/UL — LOW (ref 3.8–5.2)
RBC # BLD: 3.38 M/UL — LOW (ref 3.8–5.2)
RBC # BLD: 3.38 M/UL — LOW (ref 3.8–5.2)
RBC # BLD: 3.46 M/UL — LOW (ref 3.8–5.2)
RBC # BLD: 3.55 M/UL — LOW (ref 3.8–5.2)
RBC # BLD: 3.56 M/UL — LOW (ref 3.8–5.2)
RBC # BLD: 3.6 M/UL — LOW (ref 3.8–5.2)
RBC # BLD: 3.61 M/UL — LOW (ref 3.8–5.2)
RBC # BLD: 3.64 M/UL — LOW (ref 3.8–5.2)
RBC # BLD: 3.67 M/UL — LOW (ref 3.8–5.2)
RBC # BLD: 3.69 M/UL — LOW (ref 3.8–5.2)
RBC # BLD: 3.69 M/UL — LOW (ref 3.8–5.2)
RBC # BLD: 3.72 M/UL — LOW (ref 3.8–5.2)
RBC # BLD: 3.74 M/UL — LOW (ref 3.8–5.2)
RBC # BLD: 3.78 M/UL — LOW (ref 3.8–5.2)
RBC # BLD: 3.85 M/UL — SIGNIFICANT CHANGE UP (ref 3.8–5.2)
RBC # FLD: 14.7 % — HIGH (ref 10.3–14.5)
RBC # FLD: 14.9 % — HIGH (ref 10.3–14.5)
RBC # FLD: 15 % — HIGH (ref 10.3–14.5)
RBC # FLD: 15.2 % — HIGH (ref 10.3–14.5)
RBC # FLD: 15.2 % — HIGH (ref 10.3–14.5)
RBC # FLD: 15.3 % — HIGH (ref 10.3–14.5)
RBC # FLD: 15.4 % — HIGH (ref 10.3–14.5)
RBC # FLD: 15.6 % — HIGH (ref 10.3–14.5)
RBC # FLD: 15.7 % — HIGH (ref 10.3–14.5)
RBC # FLD: 15.7 % — HIGH (ref 10.3–14.5)
RBC # FLD: 15.9 % — HIGH (ref 10.3–14.5)
RBC # FLD: 16 % — HIGH (ref 10.3–14.5)
RBC # FLD: 16.1 % — HIGH (ref 10.3–14.5)
RBC # FLD: 16.2 % — HIGH (ref 10.3–14.5)
RBC # FLD: 16.4 % — HIGH (ref 10.3–14.5)
RBC # FLD: 16.5 % — HIGH (ref 10.3–14.5)
RBC # FLD: 17 % — HIGH (ref 10.3–14.5)
RBC # FLD: 17 % — HIGH (ref 10.3–14.5)
RBC # FLD: 17.2 % — HIGH (ref 10.3–14.5)
RBC # FLD: 17.3 % — HIGH (ref 10.3–14.5)
RBC # FLD: 17.4 % — HIGH (ref 10.3–14.5)
RBC # FLD: 17.5 % — HIGH (ref 10.3–14.5)
RBC # FLD: 17.8 % — HIGH (ref 10.3–14.5)
RBC # FLD: 17.9 % — HIGH (ref 10.3–14.5)
RBC CASTS # UR COMP ASSIST: 2 /HPF — SIGNIFICANT CHANGE UP (ref 0–4)
RCV VOL RI: HIGH CELLS/UL (ref 0–5)
RED BLOOD CELLS URINE: 0 /HPF
REVIEW: SIGNIFICANT CHANGE UP
RH IG SCN BLD-IMP: POSITIVE — SIGNIFICANT CHANGE UP
RSV RNA SPEC QL NAA+PROBE: SIGNIFICANT CHANGE UP
RV+EV RNA SPEC QL NAA+PROBE: SIGNIFICANT CHANGE UP
SAO2 % BLDV: 62.4 % — LOW (ref 67–88)
SARS-COV-2 RNA SPEC QL NAA+PROBE: SIGNIFICANT CHANGE UP
SODIUM SERPL-SCNC: 122 MMOL/L — LOW (ref 135–145)
SODIUM SERPL-SCNC: 123 MMOL/L — LOW (ref 135–145)
SODIUM SERPL-SCNC: 124 MMOL/L — LOW (ref 135–145)
SODIUM SERPL-SCNC: 125 MMOL/L — LOW (ref 135–145)
SODIUM SERPL-SCNC: 126 MMOL/L — LOW (ref 135–145)
SODIUM SERPL-SCNC: 127 MMOL/L — LOW (ref 135–145)
SODIUM SERPL-SCNC: 129 MMOL/L — LOW (ref 135–145)
SODIUM SERPL-SCNC: 130 MMOL/L — LOW (ref 135–145)
SODIUM SERPL-SCNC: 130 MMOL/L — LOW (ref 135–145)
SODIUM SERPL-SCNC: 131 MMOL/L — LOW (ref 135–145)
SODIUM SERPL-SCNC: 132 MMOL/L — LOW (ref 135–145)
SODIUM SERPL-SCNC: 133 MMOL/L — LOW (ref 135–145)
SODIUM SERPL-SCNC: 133 MMOL/L — LOW (ref 135–145)
SODIUM SERPL-SCNC: 135 MMOL/L
SODIUM SERPL-SCNC: 135 MMOL/L — SIGNIFICANT CHANGE UP (ref 135–145)
SODIUM SERPL-SCNC: 135 MMOL/L — SIGNIFICANT CHANGE UP (ref 135–145)
SODIUM SERPL-SCNC: 136 MMOL/L — SIGNIFICANT CHANGE UP (ref 135–145)
SODIUM SERPL-SCNC: 136 MMOL/L — SIGNIFICANT CHANGE UP (ref 135–145)
SODIUM SERPL-SCNC: 137 MMOL/L — SIGNIFICANT CHANGE UP (ref 135–145)
SODIUM SERPL-SCNC: 137 MMOL/L — SIGNIFICANT CHANGE UP (ref 135–145)
SODIUM UR-SCNC: 60 MMOL/L — SIGNIFICANT CHANGE UP
SODIUM UR-SCNC: 83 MMOL/L — SIGNIFICANT CHANGE UP
SODIUM UR-SCNC: <20 MMOL/L — SIGNIFICANT CHANGE UP
SP GR SPEC: 1.01 — LOW (ref 1.01–1.05)
SP GR SPEC: 1.02 — SIGNIFICANT CHANGE UP (ref 1–1.03)
SP GR SPEC: 1.03 — SIGNIFICANT CHANGE UP (ref 1.01–1.05)
SPECIFIC GRAVITY URINE: 1.01
SPECIFIC GRAVITY URINE: 1.02
SPECIMEN SOURCE FLD: SIGNIFICANT CHANGE UP
SPECIMEN SOURCE: SIGNIFICANT CHANGE UP
SQUAMOUS # UR AUTO: 4 /HPF — SIGNIFICANT CHANGE UP (ref 0–5)
TOTAL CELLS COUNTED, BODY FLUID: 100 CELLS — SIGNIFICANT CHANGE UP
TOTAL NUCLEATED CELL COUNT, BODY FLUID: 1154 CELLS/UL — HIGH (ref 0–5)
TRIGL SERPL-MCNC: 251 MG/DL
TUBE TYPE: SIGNIFICANT CHANGE UP
URATE SERPL-MCNC: 3.7 MG/DL — SIGNIFICANT CHANGE UP (ref 2.5–7)
URATE SERPL-MCNC: 3.8 MG/DL — SIGNIFICANT CHANGE UP (ref 2.5–7)
UROBILINOGEN FLD QL: 1 MG/DL — SIGNIFICANT CHANGE UP (ref 0.2–1)
UROBILINOGEN FLD QL: SIGNIFICANT CHANGE UP
UROBILINOGEN FLD QL: SIGNIFICANT CHANGE UP
UROBILINOGEN URINE: 0.2 MG/DL
UROBILINOGEN URINE: 1 MG/DL
UUN UR-MCNC: 616 MG/DL — SIGNIFICANT CHANGE UP
VANCOMYCIN TROUGH SERPL-MCNC: <4 UG/ML — LOW (ref 10–20)
WBC # BLD: 10.27 K/UL — SIGNIFICANT CHANGE UP (ref 3.8–10.5)
WBC # BLD: 10.32 K/UL — SIGNIFICANT CHANGE UP (ref 3.8–10.5)
WBC # BLD: 10.41 K/UL — SIGNIFICANT CHANGE UP (ref 3.8–10.5)
WBC # BLD: 10.48 K/UL — SIGNIFICANT CHANGE UP (ref 3.8–10.5)
WBC # BLD: 11.01 K/UL — HIGH (ref 3.8–10.5)
WBC # BLD: 11.19 K/UL — HIGH (ref 3.8–10.5)
WBC # BLD: 11.19 K/UL — HIGH (ref 3.8–10.5)
WBC # BLD: 11.3 K/UL — HIGH (ref 3.8–10.5)
WBC # BLD: 11.53 K/UL — HIGH (ref 3.8–10.5)
WBC # BLD: 11.98 K/UL — HIGH (ref 3.8–10.5)
WBC # BLD: 13.33 K/UL — HIGH (ref 3.8–10.5)
WBC # BLD: 5.39 K/UL — SIGNIFICANT CHANGE UP (ref 3.8–10.5)
WBC # BLD: 5.51 K/UL — SIGNIFICANT CHANGE UP (ref 3.8–10.5)
WBC # BLD: 5.98 K/UL — SIGNIFICANT CHANGE UP (ref 3.8–10.5)
WBC # BLD: 7.19 K/UL — SIGNIFICANT CHANGE UP (ref 3.8–10.5)
WBC # BLD: 7.3 K/UL — SIGNIFICANT CHANGE UP (ref 3.8–10.5)
WBC # BLD: 7.56 K/UL — SIGNIFICANT CHANGE UP (ref 3.8–10.5)
WBC # BLD: 8.19 K/UL — SIGNIFICANT CHANGE UP (ref 3.8–10.5)
WBC # BLD: 8.24 K/UL — SIGNIFICANT CHANGE UP (ref 3.8–10.5)
WBC # BLD: 8.29 K/UL — SIGNIFICANT CHANGE UP (ref 3.8–10.5)
WBC # BLD: 9.07 K/UL — SIGNIFICANT CHANGE UP (ref 3.8–10.5)
WBC # BLD: 9.75 K/UL — SIGNIFICANT CHANGE UP (ref 3.8–10.5)
WBC # BLD: 9.87 K/UL — SIGNIFICANT CHANGE UP (ref 3.8–10.5)
WBC # BLD: 9.98 K/UL — SIGNIFICANT CHANGE UP (ref 3.8–10.5)
WBC # FLD AUTO: 10.27 K/UL — SIGNIFICANT CHANGE UP (ref 3.8–10.5)
WBC # FLD AUTO: 10.32 K/UL — SIGNIFICANT CHANGE UP (ref 3.8–10.5)
WBC # FLD AUTO: 10.41 K/UL — SIGNIFICANT CHANGE UP (ref 3.8–10.5)
WBC # FLD AUTO: 10.48 K/UL — SIGNIFICANT CHANGE UP (ref 3.8–10.5)
WBC # FLD AUTO: 11.01 K/UL — HIGH (ref 3.8–10.5)
WBC # FLD AUTO: 11.19 K/UL — HIGH (ref 3.8–10.5)
WBC # FLD AUTO: 11.19 K/UL — HIGH (ref 3.8–10.5)
WBC # FLD AUTO: 11.3 K/UL — HIGH (ref 3.8–10.5)
WBC # FLD AUTO: 11.53 K/UL — HIGH (ref 3.8–10.5)
WBC # FLD AUTO: 11.98 K/UL — HIGH (ref 3.8–10.5)
WBC # FLD AUTO: 13.33 K/UL — HIGH (ref 3.8–10.5)
WBC # FLD AUTO: 5.39 K/UL — SIGNIFICANT CHANGE UP (ref 3.8–10.5)
WBC # FLD AUTO: 5.51 K/UL — SIGNIFICANT CHANGE UP (ref 3.8–10.5)
WBC # FLD AUTO: 5.98 K/UL — SIGNIFICANT CHANGE UP (ref 3.8–10.5)
WBC # FLD AUTO: 7.19 K/UL — SIGNIFICANT CHANGE UP (ref 3.8–10.5)
WBC # FLD AUTO: 7.3 K/UL — SIGNIFICANT CHANGE UP (ref 3.8–10.5)
WBC # FLD AUTO: 7.56 K/UL — SIGNIFICANT CHANGE UP (ref 3.8–10.5)
WBC # FLD AUTO: 8.19 K/UL — SIGNIFICANT CHANGE UP (ref 3.8–10.5)
WBC # FLD AUTO: 8.24 K/UL — SIGNIFICANT CHANGE UP (ref 3.8–10.5)
WBC # FLD AUTO: 8.29 K/UL — SIGNIFICANT CHANGE UP (ref 3.8–10.5)
WBC # FLD AUTO: 9.07 K/UL — SIGNIFICANT CHANGE UP (ref 3.8–10.5)
WBC # FLD AUTO: 9.75 K/UL — SIGNIFICANT CHANGE UP (ref 3.8–10.5)
WBC # FLD AUTO: 9.87 K/UL — SIGNIFICANT CHANGE UP (ref 3.8–10.5)
WBC # FLD AUTO: 9.98 K/UL — SIGNIFICANT CHANGE UP (ref 3.8–10.5)
WBC UR QL: 4 /HPF — SIGNIFICANT CHANGE UP (ref 0–5)
WHITE BLOOD CELLS URINE: 5 /HPF

## 2023-01-01 PROCEDURE — 99223 1ST HOSP IP/OBS HIGH 75: CPT

## 2023-01-01 PROCEDURE — 99232 SBSQ HOSP IP/OBS MODERATE 35: CPT

## 2023-01-01 PROCEDURE — 99233 SBSQ HOSP IP/OBS HIGH 50: CPT

## 2023-01-01 PROCEDURE — 71260 CT THORAX DX C+: CPT | Mod: 26

## 2023-01-01 PROCEDURE — 71260 CT THORAX DX C+: CPT | Mod: 26,MA

## 2023-01-01 PROCEDURE — 74177 CT ABD & PELVIS W/CONTRAST: CPT | Mod: 26,MA

## 2023-01-01 PROCEDURE — 99215 OFFICE O/P EST HI 40 MIN: CPT

## 2023-01-01 PROCEDURE — 99214 OFFICE O/P EST MOD 30 MIN: CPT

## 2023-01-01 PROCEDURE — 93010 ELECTROCARDIOGRAM REPORT: CPT

## 2023-01-01 PROCEDURE — 99255 IP/OBS CONSLTJ NEW/EST HI 80: CPT | Mod: GC

## 2023-01-01 PROCEDURE — 99223 1ST HOSP IP/OBS HIGH 75: CPT | Mod: GC

## 2023-01-01 PROCEDURE — 99204 OFFICE O/P NEW MOD 45 MIN: CPT

## 2023-01-01 PROCEDURE — 99232 SBSQ HOSP IP/OBS MODERATE 35: CPT | Mod: GC

## 2023-01-01 PROCEDURE — 99285 EMERGENCY DEPT VISIT HI MDM: CPT

## 2023-01-01 PROCEDURE — 99233 SBSQ HOSP IP/OBS HIGH 50: CPT | Mod: GC

## 2023-01-01 PROCEDURE — 71260 CT THORAX DX C+: CPT

## 2023-01-01 PROCEDURE — 99498 ADVNCD CARE PLAN ADDL 30 MIN: CPT

## 2023-01-01 PROCEDURE — 49083 ABD PARACENTESIS W/IMAGING: CPT

## 2023-01-01 PROCEDURE — 73560 X-RAY EXAM OF KNEE 1 OR 2: CPT | Mod: 26,RT

## 2023-01-01 PROCEDURE — 99222 1ST HOSP IP/OBS MODERATE 55: CPT

## 2023-01-01 PROCEDURE — 99497 ADVNCD CARE PLAN 30 MIN: CPT | Mod: 25

## 2023-01-01 PROCEDURE — 99239 HOSP IP/OBS DSCHRG MGMT >30: CPT

## 2023-01-01 PROCEDURE — 74177 CT ABD & PELVIS W/CONTRAST: CPT

## 2023-01-01 PROCEDURE — P0004: CPT

## 2023-01-01 PROCEDURE — 71045 X-RAY EXAM CHEST 1 VIEW: CPT | Mod: 26

## 2023-01-01 PROCEDURE — 93880 EXTRACRANIAL BILAT STUDY: CPT | Mod: 26

## 2023-01-01 PROCEDURE — 76705 ECHO EXAM OF ABDOMEN: CPT | Mod: 26

## 2023-01-01 PROCEDURE — 93306 TTE W/DOPPLER COMPLETE: CPT | Mod: 26

## 2023-01-01 PROCEDURE — 99214 OFFICE O/P EST MOD 30 MIN: CPT | Mod: 25

## 2023-01-01 PROCEDURE — 99231 SBSQ HOSP IP/OBS SF/LOW 25: CPT

## 2023-01-01 PROCEDURE — 49418 INSERT TUN IP CATH PERC: CPT | Mod: GC

## 2023-01-01 PROCEDURE — 95251 CONT GLUC MNTR ANALYSIS I&R: CPT

## 2023-01-01 PROCEDURE — 99442: CPT

## 2023-01-01 PROCEDURE — 83036 HEMOGLOBIN GLYCOSYLATED A1C: CPT | Mod: QW

## 2023-01-01 PROCEDURE — 51702 INSERT TEMP BLADDER CATH: CPT

## 2023-01-01 PROCEDURE — 74177 CT ABD & PELVIS W/CONTRAST: CPT | Mod: 26

## 2023-01-01 PROCEDURE — 70450 CT HEAD/BRAIN W/O DYE: CPT | Mod: 26,MA

## 2023-01-01 PROCEDURE — G0108 DIAB MANAGE TRN  PER INDIV: CPT

## 2023-01-01 PROCEDURE — 99443: CPT

## 2023-01-01 PROCEDURE — 99448 NTRPROF PH1/NTRNET/EHR 21-30: CPT

## 2023-01-01 PROCEDURE — 93970 EXTREMITY STUDY: CPT | Mod: 26

## 2023-01-01 RX ORDER — MORPHINE SULFATE 50 MG/1
4 CAPSULE, EXTENDED RELEASE ORAL ONCE
Refills: 0 | Status: DISCONTINUED | OUTPATIENT
Start: 2023-01-01 | End: 2023-01-01

## 2023-01-01 RX ORDER — GLUCAGON INJECTION, SOLUTION 0.5 MG/.1ML
1 INJECTION, SOLUTION SUBCUTANEOUS ONCE
Refills: 0 | Status: DISCONTINUED | OUTPATIENT
Start: 2023-01-01 | End: 2023-01-01

## 2023-01-01 RX ORDER — ENOXAPARIN SODIUM 100 MG/ML
40 INJECTION SUBCUTANEOUS ONCE
Refills: 0 | Status: COMPLETED | OUTPATIENT
Start: 2023-01-01 | End: 2023-01-01

## 2023-01-01 RX ORDER — METHADONE HYDROCHLORIDE 40 MG/1
2 TABLET ORAL
Qty: 28 | Refills: 0
Start: 2023-01-01 | End: 2023-08-11

## 2023-01-01 RX ORDER — MORPHINE SULFATE 50 MG/1
2 CAPSULE, EXTENDED RELEASE ORAL
Qty: 84 | Refills: 0
Start: 2023-01-01 | End: 2023-08-11

## 2023-01-01 RX ORDER — DIPHENHYDRAMINE HYDROCHLORIDE AND LIDOCAINE HYDROCHLORIDE AND ALUMINUM HYDROXIDE AND MAGNESIUM HYDRO
10 KIT THREE TIMES A DAY
Refills: 0 | Status: DISCONTINUED | OUTPATIENT
Start: 2023-01-01 | End: 2023-01-01

## 2023-01-01 RX ORDER — LOSARTAN POTASSIUM 100 MG/1
100 TABLET, FILM COATED ORAL DAILY
Refills: 0 | Status: DISCONTINUED | OUTPATIENT
Start: 2023-01-01 | End: 2023-01-01

## 2023-01-01 RX ORDER — OXYCODONE HYDROCHLORIDE 5 MG/1
5 TABLET ORAL ONCE
Refills: 0 | Status: DISCONTINUED | OUTPATIENT
Start: 2023-01-01 | End: 2023-01-01

## 2023-01-01 RX ORDER — MAGNESIUM HYDROXIDE 400 MG/1
30 TABLET, CHEWABLE ORAL ONCE
Refills: 0 | Status: DISCONTINUED | OUTPATIENT
Start: 2023-01-01 | End: 2023-01-01

## 2023-01-01 RX ORDER — DEXTROSE 50 % IN WATER 50 %
25 SYRINGE (ML) INTRAVENOUS ONCE
Refills: 0 | Status: DISCONTINUED | OUTPATIENT
Start: 2023-01-01 | End: 2023-01-01

## 2023-01-01 RX ORDER — VANCOMYCIN HCL 1 G
1000 VIAL (EA) INTRAVENOUS EVERY 24 HOURS
Refills: 0 | Status: DISCONTINUED | OUTPATIENT
Start: 2023-01-01 | End: 2023-01-01

## 2023-01-01 RX ORDER — INSULIN LISPRO 100/ML
VIAL (ML) SUBCUTANEOUS
Refills: 0 | Status: DISCONTINUED | OUTPATIENT
Start: 2023-01-01 | End: 2023-01-01

## 2023-01-01 RX ORDER — OXYCODONE HYDROCHLORIDE 5 MG/1
1 TABLET ORAL
Qty: 9 | Refills: 0
Start: 2023-01-01 | End: 2023-01-01

## 2023-01-01 RX ORDER — SENNA PLUS 8.6 MG/1
2 TABLET ORAL
Qty: 0 | Refills: 0 | DISCHARGE
Start: 2023-01-01

## 2023-01-01 RX ORDER — LIDOCAINE 4 G/100G
10 CREAM TOPICAL
Qty: 0 | Refills: 0 | DISCHARGE
Start: 2023-01-01

## 2023-01-01 RX ORDER — DOCUSATE SODIUM 100 MG/1
100 CAPSULE ORAL 3 TIMES DAILY
Qty: 90 | Refills: 5 | Status: ACTIVE | COMMUNITY
Start: 2023-01-01 | End: 1900-01-01

## 2023-01-01 RX ORDER — HYOSCYAMINE SULFATE 0.13 MG
1 TABLET ORAL
Qty: 12 | Refills: 0
Start: 2023-01-01 | End: 2023-01-01

## 2023-01-01 RX ORDER — BENZOCAINE 10 %
1 GEL (GRAM) MUCOUS MEMBRANE
Refills: 0 | Status: DISCONTINUED | OUTPATIENT
Start: 2023-01-01 | End: 2023-01-01

## 2023-01-01 RX ORDER — DIPHENHYDRAMINE HYDROCHLORIDE AND LIDOCAINE HYDROCHLORIDE AND ALUMINUM HYDROXIDE AND MAGNESIUM HYDRO
10 KIT
Refills: 0 | Status: DISCONTINUED | OUTPATIENT
Start: 2023-01-01 | End: 2023-01-01

## 2023-01-01 RX ORDER — LANOLIN ALCOHOL/MO/W.PET/CERES
3 CREAM (GRAM) TOPICAL AT BEDTIME
Refills: 0 | Status: DISCONTINUED | OUTPATIENT
Start: 2023-01-01 | End: 2023-01-01

## 2023-01-01 RX ORDER — METOPROLOL TARTRATE 50 MG
1 TABLET ORAL
Qty: 0 | Refills: 0 | DISCHARGE

## 2023-01-01 RX ORDER — SODIUM CHLORIDE 9 MG/ML
2 INJECTION INTRAMUSCULAR; INTRAVENOUS; SUBCUTANEOUS THREE TIMES A DAY
Refills: 0 | Status: DISCONTINUED | OUTPATIENT
Start: 2023-01-01 | End: 2023-01-01

## 2023-01-01 RX ORDER — SENNA PLUS 8.6 MG/1
2 TABLET ORAL ONCE
Refills: 0 | Status: COMPLETED | OUTPATIENT
Start: 2023-01-01 | End: 2023-01-01

## 2023-01-01 RX ORDER — BENZOCAINE 10 %
1 GEL (GRAM) MUCOUS MEMBRANE
Qty: 1 | Refills: 0
Start: 2023-01-01

## 2023-01-01 RX ORDER — METOPROLOL TARTRATE 50 MG
25 TABLET ORAL DAILY
Refills: 0 | Status: DISCONTINUED | OUTPATIENT
Start: 2023-01-01 | End: 2023-01-01

## 2023-01-01 RX ORDER — POLYETHYLENE GLYCOL 3350 17 G/17G
17 POWDER, FOR SOLUTION ORAL
Qty: 0 | Refills: 0 | DISCHARGE
Start: 2023-01-01

## 2023-01-01 RX ORDER — POLYETHYLENE GLYCOL 3350 17 G/17G
17 POWDER, FOR SOLUTION ORAL
Refills: 0 | Status: DISCONTINUED | OUTPATIENT
Start: 2023-01-01 | End: 2023-01-01

## 2023-01-01 RX ORDER — DEXTROSE 50 % IN WATER 50 %
12.5 SYRINGE (ML) INTRAVENOUS ONCE
Refills: 0 | Status: DISCONTINUED | OUTPATIENT
Start: 2023-01-01 | End: 2023-01-01

## 2023-01-01 RX ORDER — ATORVASTATIN CALCIUM 80 MG/1
40 TABLET, FILM COATED ORAL AT BEDTIME
Refills: 0 | Status: DISCONTINUED | OUTPATIENT
Start: 2023-01-01 | End: 2023-01-01

## 2023-01-01 RX ORDER — SODIUM,POTASSIUM PHOSPHATES 278-250MG
1 POWDER IN PACKET (EA) ORAL ONCE
Refills: 0 | Status: COMPLETED | OUTPATIENT
Start: 2023-01-01 | End: 2023-01-01

## 2023-01-01 RX ORDER — HYDROXYZINE HCL 10 MG
25 TABLET ORAL ONCE
Refills: 0 | Status: DISCONTINUED | OUTPATIENT
Start: 2023-01-01 | End: 2023-01-01

## 2023-01-01 RX ORDER — SODIUM CHLORIDE 9 MG/ML
1000 INJECTION, SOLUTION INTRAVENOUS ONCE
Refills: 0 | Status: COMPLETED | OUTPATIENT
Start: 2023-01-01 | End: 2023-01-01

## 2023-01-01 RX ORDER — CEFEPIME 1 G/1
2000 INJECTION, POWDER, FOR SOLUTION INTRAMUSCULAR; INTRAVENOUS ONCE
Refills: 0 | Status: COMPLETED | OUTPATIENT
Start: 2023-01-01 | End: 2023-01-01

## 2023-01-01 RX ORDER — FUROSEMIDE 40 MG
20 TABLET ORAL ONCE
Refills: 0 | Status: COMPLETED | OUTPATIENT
Start: 2023-01-01 | End: 2023-01-01

## 2023-01-01 RX ORDER — DEXTROSE 50 % IN WATER 50 %
15 SYRINGE (ML) INTRAVENOUS ONCE
Refills: 0 | Status: DISCONTINUED | OUTPATIENT
Start: 2023-01-01 | End: 2023-01-01

## 2023-01-01 RX ORDER — MIRTAZAPINE 45 MG/1
1 TABLET, ORALLY DISINTEGRATING ORAL
Qty: 0 | Refills: 0 | DISCHARGE
Start: 2023-01-01

## 2023-01-01 RX ORDER — ASPIRIN 81 MG/1
81 TABLET, CHEWABLE ORAL
Refills: 0 | Status: DISCONTINUED | COMMUNITY
Start: 2022-07-06 | End: 2023-01-01

## 2023-01-01 RX ORDER — LIPASE/PROTEASE/AMYLASE 16-48-48K
1 CAPSULE,DELAYED RELEASE (ENTERIC COATED) ORAL
Refills: 0 | Status: DISCONTINUED | OUTPATIENT
Start: 2023-01-01 | End: 2023-01-01

## 2023-01-01 RX ORDER — ZOLPIDEM TARTRATE 5 MG/1
5 TABLET ORAL
Qty: 10 | Refills: 0 | Status: ACTIVE | COMMUNITY
Start: 2023-01-01 | End: 1900-01-01

## 2023-01-01 RX ORDER — APIXABAN 2.5 MG/1
1 TABLET, FILM COATED ORAL
Qty: 60 | Refills: 0
Start: 2023-01-01 | End: 2023-08-24

## 2023-01-01 RX ORDER — ZOLPIDEM TARTRATE 10 MG/1
5 TABLET ORAL AT BEDTIME
Refills: 0 | Status: DISCONTINUED | OUTPATIENT
Start: 2023-01-01 | End: 2023-01-01

## 2023-01-01 RX ORDER — HYDROMORPHONE HYDROCHLORIDE 2 MG/ML
1 INJECTION INTRAMUSCULAR; INTRAVENOUS; SUBCUTANEOUS ONCE
Refills: 0 | Status: DISCONTINUED | OUTPATIENT
Start: 2023-01-01 | End: 2023-01-01

## 2023-01-01 RX ORDER — MORPHINE SULFATE 50 MG/1
1.5 CAPSULE, EXTENDED RELEASE ORAL
Qty: 63 | Refills: 0
Start: 2023-01-01 | End: 2023-08-11

## 2023-01-01 RX ORDER — POLYETHYLENE GLYCOL 3350 17 G/17G
17 POWDER, FOR SOLUTION ORAL EVERY 24 HOURS
Refills: 0 | Status: DISCONTINUED | OUTPATIENT
Start: 2023-01-01 | End: 2023-01-01

## 2023-01-01 RX ORDER — CELECOXIB 200 MG/1
100 CAPSULE ORAL
Refills: 0 | Status: DISCONTINUED | OUTPATIENT
Start: 2023-01-01 | End: 2023-01-01

## 2023-01-01 RX ORDER — SODIUM CHLORIDE 9 MG/ML
1 INJECTION INTRAMUSCULAR; INTRAVENOUS; SUBCUTANEOUS THREE TIMES A DAY
Refills: 0 | Status: DISCONTINUED | OUTPATIENT
Start: 2023-01-01 | End: 2023-01-01

## 2023-01-01 RX ORDER — FENOFIBRATE,MICRONIZED 130 MG
145 CAPSULE ORAL DAILY
Refills: 0 | Status: DISCONTINUED | OUTPATIENT
Start: 2023-01-01 | End: 2023-01-01

## 2023-01-01 RX ORDER — LACTULOSE 10 G/15ML
10 SOLUTION ORAL EVERY 6 HOURS
Qty: 1 | Refills: 2 | Status: ACTIVE | COMMUNITY
Start: 2023-01-01 | End: 1900-01-01

## 2023-01-01 RX ORDER — ONDANSETRON 8 MG/1
4 TABLET, FILM COATED ORAL EVERY 8 HOURS
Refills: 0 | Status: DISCONTINUED | OUTPATIENT
Start: 2023-01-01 | End: 2023-01-01

## 2023-01-01 RX ORDER — POLYETHYLENE GLYCOL 3350 17 G/17G
17 POWDER, FOR SOLUTION ORAL DAILY
Refills: 0 | Status: DISCONTINUED | OUTPATIENT
Start: 2023-01-01 | End: 2023-01-01

## 2023-01-01 RX ORDER — FENOFIBRATE,MICRONIZED 130 MG
48 CAPSULE ORAL DAILY
Refills: 0 | Status: DISCONTINUED | OUTPATIENT
Start: 2023-01-01 | End: 2023-01-01

## 2023-01-01 RX ORDER — MORPHINE SULFATE 50 MG/1
3 CAPSULE, EXTENDED RELEASE ORAL EVERY 4 HOURS
Refills: 0 | Status: DISCONTINUED | OUTPATIENT
Start: 2023-01-01 | End: 2023-01-01

## 2023-01-01 RX ORDER — MORPHINE SULFATE 50 MG/1
6 CAPSULE, EXTENDED RELEASE ORAL EVERY 4 HOURS
Refills: 0 | Status: DISCONTINUED | OUTPATIENT
Start: 2023-01-01 | End: 2023-01-01

## 2023-01-01 RX ORDER — PANTOPRAZOLE 40 MG/1
40 TABLET, DELAYED RELEASE ORAL DAILY
Qty: 30 | Refills: 2 | Status: ACTIVE | COMMUNITY
Start: 2022-07-06 | End: 1900-01-01

## 2023-01-01 RX ORDER — CEFTRIAXONE 500 MG/1
2000 INJECTION, POWDER, FOR SOLUTION INTRAMUSCULAR; INTRAVENOUS EVERY 24 HOURS
Refills: 0 | Status: COMPLETED | OUTPATIENT
Start: 2023-01-01 | End: 2023-01-01

## 2023-01-01 RX ORDER — PROCHLORPERAZINE MALEATE 5 MG
1 TABLET ORAL
Qty: 2 | Refills: 0
Start: 2023-01-01 | End: 2023-01-01

## 2023-01-01 RX ORDER — AMLODIPINE BESYLATE 2.5 MG/1
1 TABLET ORAL
Qty: 0 | Refills: 0 | DISCHARGE

## 2023-01-01 RX ORDER — FENOFIBRATE,MICRONIZED 130 MG
1 CAPSULE ORAL
Qty: 0 | Refills: 0 | DISCHARGE

## 2023-01-01 RX ORDER — METOPROLOL TARTRATE 50 MG
1 TABLET ORAL
Qty: 30 | Refills: 0
Start: 2023-01-01 | End: 2023-09-03

## 2023-01-01 RX ORDER — PANTOPRAZOLE SODIUM 20 MG/1
40 TABLET, DELAYED RELEASE ORAL
Refills: 0 | Status: DISCONTINUED | OUTPATIENT
Start: 2023-01-01 | End: 2023-01-01

## 2023-01-01 RX ORDER — AMLODIPINE BESYLATE 2.5 MG/1
5 TABLET ORAL DAILY
Refills: 0 | Status: DISCONTINUED | OUTPATIENT
Start: 2023-01-01 | End: 2023-01-01

## 2023-01-01 RX ORDER — VANCOMYCIN HCL 1 G
1000 VIAL (EA) INTRAVENOUS ONCE
Refills: 0 | Status: COMPLETED | OUTPATIENT
Start: 2023-01-01 | End: 2023-01-01

## 2023-01-01 RX ORDER — APIXABAN 2.5 MG/1
10 TABLET, FILM COATED ORAL EVERY 12 HOURS
Refills: 0 | Status: COMPLETED | OUTPATIENT
Start: 2023-01-01 | End: 2023-01-01

## 2023-01-01 RX ORDER — VANCOMYCIN HCL 1 G
750 VIAL (EA) INTRAVENOUS EVERY 12 HOURS
Refills: 0 | Status: DISCONTINUED | OUTPATIENT
Start: 2023-01-01 | End: 2023-01-01

## 2023-01-01 RX ORDER — INSULIN LISPRO 100 [IU]/ML
100 INJECTION, SOLUTION INTRAVENOUS; SUBCUTANEOUS
Qty: 4 | Refills: 1 | Status: ACTIVE | COMMUNITY
Start: 2022-06-22 | End: 1900-01-01

## 2023-01-01 RX ORDER — MORPHINE SULFATE 50 MG/1
2 CAPSULE, EXTENDED RELEASE ORAL EVERY 6 HOURS
Refills: 0 | Status: DISCONTINUED | OUTPATIENT
Start: 2023-01-01 | End: 2023-01-01

## 2023-01-01 RX ORDER — DIPHENHYDRAMINE HYDROCHLORIDE AND LIDOCAINE HYDROCHLORIDE AND ALUMINUM HYDROXIDE AND MAGNESIUM HYDRO
10 KIT
Qty: 4 | Refills: 0
Start: 2023-01-01 | End: 2023-09-03

## 2023-01-01 RX ORDER — INSULIN LISPRO 100 [IU]/ML
100 INJECTION, SOLUTION INTRAVENOUS; SUBCUTANEOUS
Qty: 9 | Refills: 3 | Status: ACTIVE | COMMUNITY
Start: 2023-01-01 | End: 1900-01-01

## 2023-01-01 RX ORDER — CEFTRIAXONE 500 MG/1
2000 INJECTION, POWDER, FOR SOLUTION INTRAMUSCULAR; INTRAVENOUS EVERY 24 HOURS
Refills: 0 | Status: DISCONTINUED | OUTPATIENT
Start: 2023-01-01 | End: 2023-01-01

## 2023-01-01 RX ORDER — MORPHINE SULFATE 50 MG/1
4 CAPSULE, EXTENDED RELEASE ORAL EVERY 4 HOURS
Refills: 0 | Status: DISCONTINUED | OUTPATIENT
Start: 2023-01-01 | End: 2023-01-01

## 2023-01-01 RX ORDER — ZOLPIDEM TARTRATE 10 MG/1
1 TABLET ORAL
Qty: 5 | Refills: 0
Start: 2023-01-01 | End: 2023-01-01

## 2023-01-01 RX ORDER — SODIUM CHLORIDE 9 MG/ML
1000 INJECTION, SOLUTION INTRAVENOUS
Refills: 0 | Status: DISCONTINUED | OUTPATIENT
Start: 2023-01-01 | End: 2023-01-01

## 2023-01-01 RX ORDER — METHADONE HYDROCHLORIDE 40 MG/1
2 TABLET ORAL
Refills: 0 | Status: DISCONTINUED | OUTPATIENT
Start: 2023-01-01 | End: 2023-01-01

## 2023-01-01 RX ORDER — LIPASE/PROTEASE/AMYLASE 16-48-48K
1 CAPSULE,DELAYED RELEASE (ENTERIC COATED) ORAL
Refills: 0 | DISCHARGE

## 2023-01-01 RX ORDER — HALOPERIDOL DECANOATE 100 MG/ML
1 INJECTION INTRAMUSCULAR
Qty: 8 | Refills: 0
Start: 2023-01-01 | End: 2023-01-01

## 2023-01-01 RX ORDER — MORPHINE SULFATE 50 MG/1
2 CAPSULE, EXTENDED RELEASE ORAL ONCE
Refills: 0 | Status: DISCONTINUED | OUTPATIENT
Start: 2023-01-01 | End: 2023-01-01

## 2023-01-01 RX ORDER — LOSARTAN POTASSIUM 100 MG/1
1 TABLET, FILM COATED ORAL
Qty: 0 | Refills: 0 | DISCHARGE

## 2023-01-01 RX ORDER — ASPIRIN/CALCIUM CARB/MAGNESIUM 324 MG
1 TABLET ORAL
Qty: 0 | Refills: 0 | DISCHARGE

## 2023-01-01 RX ORDER — INSULIN DEGLUDEC 100 U/ML
20 INJECTION, SOLUTION SUBCUTANEOUS
Refills: 0 | DISCHARGE

## 2023-01-01 RX ORDER — SENNA PLUS 8.6 MG/1
2 TABLET ORAL AT BEDTIME
Refills: 0 | Status: DISCONTINUED | OUTPATIENT
Start: 2023-01-01 | End: 2023-01-01

## 2023-01-01 RX ORDER — AMLODIPINE BESYLATE 2.5 MG/1
1 TABLET ORAL
Qty: 30 | Refills: 0
Start: 2023-01-01 | End: 2023-09-03

## 2023-01-01 RX ORDER — SODIUM CHLORIDE 9 MG/ML
1000 INJECTION INTRAMUSCULAR; INTRAVENOUS; SUBCUTANEOUS ONCE
Refills: 0 | Status: COMPLETED | OUTPATIENT
Start: 2023-01-01 | End: 2023-01-01

## 2023-01-01 RX ORDER — HEPARIN SODIUM 5000 [USP'U]/ML
INJECTION INTRAVENOUS; SUBCUTANEOUS
Qty: 25000 | Refills: 0 | Status: DISCONTINUED | OUTPATIENT
Start: 2023-01-01 | End: 2023-01-01

## 2023-01-01 RX ORDER — ACETAMINOPHEN 500 MG
650 TABLET ORAL EVERY 6 HOURS
Refills: 0 | Status: DISCONTINUED | OUTPATIENT
Start: 2023-01-01 | End: 2023-01-01

## 2023-01-01 RX ORDER — MORPHINE SULFATE 50 MG/1
2 CAPSULE, EXTENDED RELEASE ORAL EVERY 4 HOURS
Refills: 0 | Status: DISCONTINUED | OUTPATIENT
Start: 2023-01-01 | End: 2023-01-01

## 2023-01-01 RX ORDER — MORPHINE SULFATE 50 MG/1
3 CAPSULE, EXTENDED RELEASE ORAL
Qty: 126 | Refills: 0
Start: 2023-01-01 | End: 2023-08-11

## 2023-01-01 RX ORDER — ACETAMINOPHEN 500 MG
1 TABLET ORAL
Qty: 56 | Refills: 0
Start: 2023-01-01 | End: 2023-08-18

## 2023-01-01 RX ORDER — CEFPODOXIME PROXETIL 100 MG
1 TABLET ORAL
Qty: 6 | Refills: 0
Start: 2023-01-01 | End: 2023-01-01

## 2023-01-01 RX ORDER — GABAPENTIN 400 MG/1
1 CAPSULE ORAL
Qty: 90 | Refills: 0
Start: 2023-01-01 | End: 2023-09-03

## 2023-01-01 RX ORDER — INSULIN DEGLUDEC INJECTION 100 U/ML
100 INJECTION, SOLUTION SUBCUTANEOUS DAILY
Qty: 2 | Refills: 1 | Status: ACTIVE | COMMUNITY
Start: 2022-07-06

## 2023-01-01 RX ORDER — APIXABAN 2.5 MG/1
1 TABLET, FILM COATED ORAL
Qty: 60 | Refills: 0
Start: 2023-01-01 | End: 2023-09-03

## 2023-01-01 RX ORDER — METHADONE HYDROCHLORIDE 10 MG/5ML
10 SOLUTION ORAL
Qty: 15 | Refills: 0 | Status: ACTIVE | COMMUNITY
Start: 2023-01-01 | End: 1900-01-01

## 2023-01-01 RX ORDER — ATORVASTATIN CALCIUM 80 MG/1
1 TABLET, FILM COATED ORAL
Qty: 30 | Refills: 0
Start: 2023-01-01 | End: 2023-01-01

## 2023-01-01 RX ORDER — ACETAMINOPHEN 500 MG
1000 TABLET ORAL ONCE
Refills: 0 | Status: COMPLETED | OUTPATIENT
Start: 2023-01-01 | End: 2023-01-01

## 2023-01-01 RX ORDER — POLYETHYLENE GLYCOL 3350 17 G
17 POWDER IN PACKET (EA) ORAL
Qty: 1 | Refills: 2 | Status: ACTIVE | COMMUNITY
Start: 2023-01-01 | End: 1900-01-01

## 2023-01-01 RX ORDER — CEFEPIME 1 G/1
2000 INJECTION, POWDER, FOR SOLUTION INTRAMUSCULAR; INTRAVENOUS EVERY 12 HOURS
Refills: 0 | Status: DISCONTINUED | OUTPATIENT
Start: 2023-01-01 | End: 2023-01-01

## 2023-01-01 RX ORDER — ASPIRIN/CALCIUM CARB/MAGNESIUM 324 MG
81 TABLET ORAL DAILY
Refills: 0 | Status: DISCONTINUED | OUTPATIENT
Start: 2023-01-01 | End: 2023-01-01

## 2023-01-01 RX ORDER — CELECOXIB 200 MG/1
200 CAPSULE ORAL DAILY
Refills: 0 | Status: DISCONTINUED | OUTPATIENT
Start: 2023-01-01 | End: 2023-01-01

## 2023-01-01 RX ORDER — MORPHINE SULFATE 50 MG/1
1.5 CAPSULE, EXTENDED RELEASE ORAL
Qty: 27 | Refills: 0
Start: 2023-01-01 | End: 2023-01-01

## 2023-01-01 RX ORDER — ALBUMIN HUMAN 25 %
250 VIAL (ML) INTRAVENOUS ONCE
Refills: 0 | Status: COMPLETED | OUTPATIENT
Start: 2023-01-01 | End: 2023-01-01

## 2023-01-01 RX ORDER — AMLODIPINE BESYLATE 2.5 MG/1
10 TABLET ORAL DAILY
Refills: 0 | Status: DISCONTINUED | OUTPATIENT
Start: 2023-01-01 | End: 2023-01-01

## 2023-01-01 RX ORDER — LIDOCAINE 4 G/100G
1 CREAM TOPICAL ONCE
Refills: 0 | Status: COMPLETED | OUTPATIENT
Start: 2023-01-01 | End: 2023-01-01

## 2023-01-01 RX ORDER — MIRTAZAPINE 15 MG/1
15 TABLET, FILM COATED ORAL
Qty: 30 | Refills: 3 | Status: ACTIVE | COMMUNITY
Start: 2022-01-01 | End: 1900-01-01

## 2023-01-01 RX ORDER — ENOXAPARIN SODIUM 100 MG/ML
40 INJECTION SUBCUTANEOUS EVERY 24 HOURS
Refills: 0 | Status: DISCONTINUED | OUTPATIENT
Start: 2023-01-01 | End: 2023-01-01

## 2023-01-01 RX ORDER — CELECOXIB 200 MG/1
1 CAPSULE ORAL
Qty: 30 | Refills: 0
Start: 2023-01-01 | End: 2023-01-01

## 2023-01-01 RX ORDER — GABAPENTIN 400 MG/1
100 CAPSULE ORAL THREE TIMES A DAY
Refills: 0 | Status: DISCONTINUED | OUTPATIENT
Start: 2023-01-01 | End: 2023-01-01

## 2023-01-01 RX ORDER — LANOLIN ALCOHOL/MO/W.PET/CERES
1 CREAM (GRAM) TOPICAL
Qty: 0 | Refills: 0 | DISCHARGE

## 2023-01-01 RX ORDER — APIXABAN 2.5 MG/1
5 TABLET, FILM COATED ORAL EVERY 12 HOURS
Refills: 0 | Status: DISCONTINUED | OUTPATIENT
Start: 2023-01-01 | End: 2023-01-01

## 2023-01-01 RX ORDER — LIDOCAINE 4 G/100G
10 CREAM TOPICAL
Refills: 0 | Status: DISCONTINUED | OUTPATIENT
Start: 2023-01-01 | End: 2023-01-01

## 2023-01-01 RX ORDER — MORPHINE SULFATE 50 MG/1
0.25 CAPSULE, EXTENDED RELEASE ORAL
Qty: 3 | Refills: 0
Start: 2023-01-01 | End: 2023-01-01

## 2023-01-01 RX ORDER — CIPROFLOXACIN LACTATE 400MG/40ML
1 VIAL (ML) INTRAVENOUS
Qty: 2 | Refills: 0
Start: 2023-01-01 | End: 2023-01-01

## 2023-01-01 RX ORDER — METHADONE HYDROCHLORIDE 40 MG/1
1 TABLET ORAL DAILY
Refills: 0 | Status: DISCONTINUED | OUTPATIENT
Start: 2023-01-01 | End: 2023-01-01

## 2023-01-01 RX ORDER — DIPHENHYDRAMINE HYDROCHLORIDE AND LIDOCAINE HYDROCHLORIDE AND ALUMINUM HYDROXIDE AND MAGNESIUM HYDRO
KIT
Qty: 1 | Refills: 0 | Status: ACTIVE | COMMUNITY
Start: 2023-01-01 | End: 1900-01-01

## 2023-01-01 RX ORDER — OXYCODONE 5 MG/1
5 TABLET ORAL
Qty: 28 | Refills: 0 | Status: ACTIVE | COMMUNITY
Start: 2023-01-01 | End: 1900-01-01

## 2023-01-01 RX ORDER — METHADONE HYDROCHLORIDE 40 MG/1
4 TABLET ORAL
Refills: 0 | Status: DISCONTINUED | OUTPATIENT
Start: 2023-01-01 | End: 2023-01-01

## 2023-01-01 RX ORDER — SODIUM CHLORIDE 9 MG/ML
500 INJECTION INTRAMUSCULAR; INTRAVENOUS; SUBCUTANEOUS ONCE
Refills: 0 | Status: COMPLETED | OUTPATIENT
Start: 2023-01-01 | End: 2023-01-01

## 2023-01-01 RX ORDER — CELECOXIB 100 MG/1
100 CAPSULE ORAL
Qty: 60 | Refills: 0 | Status: ACTIVE | COMMUNITY
Start: 2023-01-01 | End: 1900-01-01

## 2023-01-01 RX ORDER — FUROSEMIDE 40 MG
1 TABLET ORAL
Qty: 14 | Refills: 0
Start: 2023-01-01 | End: 2023-08-18

## 2023-01-01 RX ORDER — ROSUVASTATIN CALCIUM 5 MG/1
1 TABLET ORAL
Qty: 0 | Refills: 0 | DISCHARGE

## 2023-01-01 RX ORDER — CEFEPIME 1 G/1
INJECTION, POWDER, FOR SOLUTION INTRAMUSCULAR; INTRAVENOUS
Refills: 0 | Status: DISCONTINUED | OUTPATIENT
Start: 2023-01-01 | End: 2023-01-01

## 2023-01-01 RX ORDER — DIPHENOXYLATE HYDROCHLORIDE AND ATROPINE SULFATE 2.5; .025 MG/1; MG/1
2.5-0.025 TABLET ORAL
Qty: 80 | Refills: 0 | Status: DISCONTINUED | COMMUNITY
Start: 2022-01-01 | End: 2023-01-01

## 2023-01-01 RX ORDER — METRONIDAZOLE 500 MG
1 TABLET ORAL
Qty: 6 | Refills: 0
Start: 2023-01-01 | End: 2023-01-01

## 2023-01-01 RX ORDER — MIRTAZAPINE 45 MG/1
15 TABLET, ORALLY DISINTEGRATING ORAL AT BEDTIME
Refills: 0 | Status: DISCONTINUED | OUTPATIENT
Start: 2023-01-01 | End: 2023-01-01

## 2023-01-01 RX ORDER — APIXABAN 2.5 MG/1
1 TABLET, FILM COATED ORAL
Qty: 60 | Refills: 0
Start: 2023-01-01 | End: 2023-08-26

## 2023-01-01 RX ORDER — SODIUM CHLORIDE 9 MG/ML
250 INJECTION INTRAMUSCULAR; INTRAVENOUS; SUBCUTANEOUS ONCE
Refills: 0 | Status: COMPLETED | OUTPATIENT
Start: 2023-01-01 | End: 2023-01-01

## 2023-01-01 RX ORDER — SODIUM CHLORIDE 9 MG/ML
2 INJECTION INTRAMUSCULAR; INTRAVENOUS; SUBCUTANEOUS
Qty: 0 | Refills: 0 | DISCHARGE
Start: 2023-01-01

## 2023-01-01 RX ORDER — SODIUM CHLORIDE 5 G/100ML
500 INJECTION, SOLUTION INTRAVENOUS
Refills: 0 | Status: COMPLETED | OUTPATIENT
Start: 2023-01-01 | End: 2023-01-01

## 2023-01-01 RX ORDER — ALBUMIN HUMAN 25 %
50 VIAL (ML) INTRAVENOUS ONCE
Refills: 0 | Status: COMPLETED | OUTPATIENT
Start: 2023-01-01 | End: 2023-01-01

## 2023-01-01 RX ORDER — OXYCODONE HYDROCHLORIDE 5 MG/1
5 TABLET ORAL EVERY 6 HOURS
Refills: 0 | Status: DISCONTINUED | OUTPATIENT
Start: 2023-01-01 | End: 2023-01-01

## 2023-01-01 RX ORDER — GABAPENTIN 100 MG/1
100 CAPSULE ORAL
Qty: 30 | Refills: 0 | Status: ACTIVE | COMMUNITY
Start: 2023-01-01 | End: 1900-01-01

## 2023-01-01 RX ORDER — OXYCODONE HYDROCHLORIDE 5 MG/1
10 TABLET ORAL ONCE
Refills: 0 | Status: DISCONTINUED | OUTPATIENT
Start: 2023-01-01 | End: 2023-01-01

## 2023-01-01 RX ORDER — DIPHENHYDRAMINE HYDROCHLORIDE AND LIDOCAINE HYDROCHLORIDE AND ALUMINUM HYDROXIDE AND MAGNESIUM HYDRO
10 KIT
Qty: 1 | Refills: 0
Start: 2023-01-01 | End: 2023-09-03

## 2023-01-01 RX ORDER — SULFAMETHOXAZOLE AND TRIMETHOPRIM 800; 160 MG/1; MG/1
800-160 TABLET ORAL TWICE DAILY
Qty: 14 | Refills: 0 | Status: DISCONTINUED | COMMUNITY
Start: 2023-01-01 | End: 2023-01-01

## 2023-01-01 RX ORDER — CHLORHEXIDINE GLUCONATE 213 G/1000ML
1 SOLUTION TOPICAL DAILY
Refills: 0 | Status: DISCONTINUED | OUTPATIENT
Start: 2023-01-01 | End: 2023-01-01

## 2023-01-01 RX ORDER — MORPHINE SULFATE 10 MG/5ML
10 SOLUTION ORAL
Qty: 270 | Refills: 0 | Status: ACTIVE | COMMUNITY
Start: 2023-01-01 | End: 1900-01-01

## 2023-01-01 RX ORDER — MORPHINE SULFATE 50 MG/1
4 CAPSULE, EXTENDED RELEASE ORAL EVERY 6 HOURS
Refills: 0 | Status: DISCONTINUED | OUTPATIENT
Start: 2023-01-01 | End: 2023-01-01

## 2023-01-01 RX ORDER — KETOROLAC TROMETHAMINE 30 MG/ML
15 SYRINGE (ML) INJECTION ONCE
Refills: 0 | Status: DISCONTINUED | OUTPATIENT
Start: 2023-01-01 | End: 2023-01-01

## 2023-01-01 RX ADMIN — Medication 650 MILLIGRAM(S): at 14:58

## 2023-01-01 RX ADMIN — LOSARTAN POTASSIUM 100 MILLIGRAM(S): 100 TABLET, FILM COATED ORAL at 05:27

## 2023-01-01 RX ADMIN — Medication 1 CAPSULE(S): at 16:40

## 2023-01-01 RX ADMIN — CELECOXIB 200 MILLIGRAM(S): 200 CAPSULE ORAL at 11:12

## 2023-01-01 RX ADMIN — CELECOXIB 200 MILLIGRAM(S): 200 CAPSULE ORAL at 12:28

## 2023-01-01 RX ADMIN — Medication 1: at 09:23

## 2023-01-01 RX ADMIN — PANTOPRAZOLE SODIUM 40 MILLIGRAM(S): 20 TABLET, DELAYED RELEASE ORAL at 21:40

## 2023-01-01 RX ADMIN — LIDOCAINE 10 MILLILITER(S): 4 CREAM TOPICAL at 11:16

## 2023-01-01 RX ADMIN — APIXABAN 10 MILLIGRAM(S): 2.5 TABLET, FILM COATED ORAL at 17:29

## 2023-01-01 RX ADMIN — MORPHINE SULFATE 2 MILLIGRAM(S): 50 CAPSULE, EXTENDED RELEASE ORAL at 05:07

## 2023-01-01 RX ADMIN — Medication 650 MILLIGRAM(S): at 04:15

## 2023-01-01 RX ADMIN — MORPHINE SULFATE 6 MILLIGRAM(S): 50 CAPSULE, EXTENDED RELEASE ORAL at 17:26

## 2023-01-01 RX ADMIN — MORPHINE SULFATE 4 MILLIGRAM(S): 50 CAPSULE, EXTENDED RELEASE ORAL at 17:03

## 2023-01-01 RX ADMIN — DIPHENHYDRAMINE HYDROCHLORIDE AND LIDOCAINE HYDROCHLORIDE AND ALUMINUM HYDROXIDE AND MAGNESIUM HYDRO 10 MILLILITER(S): KIT at 05:17

## 2023-01-01 RX ADMIN — CHLORHEXIDINE GLUCONATE 1 APPLICATION(S): 213 SOLUTION TOPICAL at 12:29

## 2023-01-01 RX ADMIN — METHADONE HYDROCHLORIDE 4 MILLIGRAM(S): 40 TABLET ORAL at 18:17

## 2023-01-01 RX ADMIN — MORPHINE SULFATE 4 MILLIGRAM(S): 50 CAPSULE, EXTENDED RELEASE ORAL at 22:03

## 2023-01-01 RX ADMIN — LIDOCAINE 10 MILLILITER(S): 4 CREAM TOPICAL at 13:07

## 2023-01-01 RX ADMIN — SODIUM CHLORIDE 500 MILLILITER(S): 9 INJECTION INTRAMUSCULAR; INTRAVENOUS; SUBCUTANEOUS at 11:24

## 2023-01-01 RX ADMIN — MORPHINE SULFATE 4 MILLIGRAM(S): 50 CAPSULE, EXTENDED RELEASE ORAL at 02:23

## 2023-01-01 RX ADMIN — MORPHINE SULFATE 4 MILLIGRAM(S): 50 CAPSULE, EXTENDED RELEASE ORAL at 21:22

## 2023-01-01 RX ADMIN — Medication 15 MILLIGRAM(S): at 13:14

## 2023-01-01 RX ADMIN — DIPHENHYDRAMINE HYDROCHLORIDE AND LIDOCAINE HYDROCHLORIDE AND ALUMINUM HYDROXIDE AND MAGNESIUM HYDRO 10 MILLILITER(S): KIT at 05:08

## 2023-01-01 RX ADMIN — Medication 1 CAPSULE(S): at 18:07

## 2023-01-01 RX ADMIN — LIDOCAINE 10 MILLILITER(S): 4 CREAM TOPICAL at 17:29

## 2023-01-01 RX ADMIN — Medication 20 MILLIGRAM(S): at 11:07

## 2023-01-01 RX ADMIN — SODIUM CHLORIDE 100 MILLILITER(S): 9 INJECTION, SOLUTION INTRAVENOUS at 02:20

## 2023-01-01 RX ADMIN — MORPHINE SULFATE 4 MILLIGRAM(S): 50 CAPSULE, EXTENDED RELEASE ORAL at 18:49

## 2023-01-01 RX ADMIN — CHLORHEXIDINE GLUCONATE 1 APPLICATION(S): 213 SOLUTION TOPICAL at 11:12

## 2023-01-01 RX ADMIN — MORPHINE SULFATE 2 MILLIGRAM(S): 50 CAPSULE, EXTENDED RELEASE ORAL at 02:05

## 2023-01-01 RX ADMIN — HEPARIN SODIUM 900 UNIT(S)/HR: 5000 INJECTION INTRAVENOUS; SUBCUTANEOUS at 18:23

## 2023-01-01 RX ADMIN — Medication 1 CAPSULE(S): at 17:42

## 2023-01-01 RX ADMIN — LIDOCAINE 10 MILLILITER(S): 4 CREAM TOPICAL at 17:19

## 2023-01-01 RX ADMIN — Medication 48 MILLIGRAM(S): at 15:28

## 2023-01-01 RX ADMIN — Medication 48 MILLIGRAM(S): at 12:44

## 2023-01-01 RX ADMIN — METHADONE HYDROCHLORIDE 2 MILLIGRAM(S): 40 TABLET ORAL at 18:07

## 2023-01-01 RX ADMIN — CELECOXIB 200 MILLIGRAM(S): 200 CAPSULE ORAL at 15:05

## 2023-01-01 RX ADMIN — SODIUM CHLORIDE 2 GRAM(S): 9 INJECTION INTRAMUSCULAR; INTRAVENOUS; SUBCUTANEOUS at 13:10

## 2023-01-01 RX ADMIN — SENNA PLUS 2 TABLET(S): 8.6 TABLET ORAL at 22:10

## 2023-01-01 RX ADMIN — APIXABAN 10 MILLIGRAM(S): 2.5 TABLET, FILM COATED ORAL at 05:09

## 2023-01-01 RX ADMIN — PANTOPRAZOLE SODIUM 40 MILLIGRAM(S): 20 TABLET, DELAYED RELEASE ORAL at 05:10

## 2023-01-01 RX ADMIN — LIDOCAINE 1 PATCH: 4 CREAM TOPICAL at 19:10

## 2023-01-01 RX ADMIN — MORPHINE SULFATE 6 MILLIGRAM(S): 50 CAPSULE, EXTENDED RELEASE ORAL at 21:55

## 2023-01-01 RX ADMIN — Medication 81 MILLIGRAM(S): at 13:05

## 2023-01-01 RX ADMIN — ONDANSETRON 4 MILLIGRAM(S): 8 TABLET, FILM COATED ORAL at 09:34

## 2023-01-01 RX ADMIN — DIPHENHYDRAMINE HYDROCHLORIDE AND LIDOCAINE HYDROCHLORIDE AND ALUMINUM HYDROXIDE AND MAGNESIUM HYDRO 10 MILLILITER(S): KIT at 12:19

## 2023-01-01 RX ADMIN — CEFEPIME 100 MILLIGRAM(S): 1 INJECTION, POWDER, FOR SOLUTION INTRAMUSCULAR; INTRAVENOUS at 05:05

## 2023-01-01 RX ADMIN — MORPHINE SULFATE 6 MILLIGRAM(S): 50 CAPSULE, EXTENDED RELEASE ORAL at 11:30

## 2023-01-01 RX ADMIN — SODIUM CHLORIDE 1 GRAM(S): 9 INJECTION INTRAMUSCULAR; INTRAVENOUS; SUBCUTANEOUS at 12:30

## 2023-01-01 RX ADMIN — MORPHINE SULFATE 3 MILLIGRAM(S): 50 CAPSULE, EXTENDED RELEASE ORAL at 10:34

## 2023-01-01 RX ADMIN — MORPHINE SULFATE 6 MILLIGRAM(S): 50 CAPSULE, EXTENDED RELEASE ORAL at 15:10

## 2023-01-01 RX ADMIN — POLYETHYLENE GLYCOL 3350 17 GRAM(S): 17 POWDER, FOR SOLUTION ORAL at 05:10

## 2023-01-01 RX ADMIN — METHADONE HYDROCHLORIDE 1 MILLIGRAM(S): 40 TABLET ORAL at 14:39

## 2023-01-01 RX ADMIN — SENNA PLUS 2 TABLET(S): 8.6 TABLET ORAL at 21:04

## 2023-01-01 RX ADMIN — SENNA PLUS 2 TABLET(S): 8.6 TABLET ORAL at 22:22

## 2023-01-01 RX ADMIN — MORPHINE SULFATE 4 MILLIGRAM(S): 50 CAPSULE, EXTENDED RELEASE ORAL at 22:33

## 2023-01-01 RX ADMIN — Medication 250 MILLIGRAM(S): at 03:06

## 2023-01-01 RX ADMIN — AMLODIPINE BESYLATE 10 MILLIGRAM(S): 2.5 TABLET ORAL at 12:14

## 2023-01-01 RX ADMIN — Medication 1 CAPSULE(S): at 11:46

## 2023-01-01 RX ADMIN — APIXABAN 10 MILLIGRAM(S): 2.5 TABLET, FILM COATED ORAL at 05:18

## 2023-01-01 RX ADMIN — SODIUM CHLORIDE 2 GRAM(S): 9 INJECTION INTRAMUSCULAR; INTRAVENOUS; SUBCUTANEOUS at 05:12

## 2023-01-01 RX ADMIN — ATORVASTATIN CALCIUM 40 MILLIGRAM(S): 80 TABLET, FILM COATED ORAL at 22:08

## 2023-01-01 RX ADMIN — LOSARTAN POTASSIUM 100 MILLIGRAM(S): 100 TABLET, FILM COATED ORAL at 05:08

## 2023-01-01 RX ADMIN — ENOXAPARIN SODIUM 40 MILLIGRAM(S): 100 INJECTION SUBCUTANEOUS at 05:20

## 2023-01-01 RX ADMIN — SODIUM CHLORIDE 100 MILLILITER(S): 9 INJECTION INTRAMUSCULAR; INTRAVENOUS; SUBCUTANEOUS at 07:57

## 2023-01-01 RX ADMIN — MORPHINE SULFATE 4 MILLIGRAM(S): 50 CAPSULE, EXTENDED RELEASE ORAL at 21:35

## 2023-01-01 RX ADMIN — MORPHINE SULFATE 4 MILLIGRAM(S): 50 CAPSULE, EXTENDED RELEASE ORAL at 22:10

## 2023-01-01 RX ADMIN — Medication 1: at 09:08

## 2023-01-01 RX ADMIN — MORPHINE SULFATE 6 MILLIGRAM(S): 50 CAPSULE, EXTENDED RELEASE ORAL at 13:09

## 2023-01-01 RX ADMIN — SENNA PLUS 2 TABLET(S): 8.6 TABLET ORAL at 10:16

## 2023-01-01 RX ADMIN — CHLORHEXIDINE GLUCONATE 1 APPLICATION(S): 213 SOLUTION TOPICAL at 14:38

## 2023-01-01 RX ADMIN — APIXABAN 10 MILLIGRAM(S): 2.5 TABLET, FILM COATED ORAL at 05:20

## 2023-01-01 RX ADMIN — ATORVASTATIN CALCIUM 40 MILLIGRAM(S): 80 TABLET, FILM COATED ORAL at 21:40

## 2023-01-01 RX ADMIN — MORPHINE SULFATE 2 MILLIGRAM(S): 50 CAPSULE, EXTENDED RELEASE ORAL at 17:40

## 2023-01-01 RX ADMIN — CELECOXIB 100 MILLIGRAM(S): 200 CAPSULE ORAL at 07:00

## 2023-01-01 RX ADMIN — Medication 400 MILLIGRAM(S): at 16:29

## 2023-01-01 RX ADMIN — ATORVASTATIN CALCIUM 40 MILLIGRAM(S): 80 TABLET, FILM COATED ORAL at 21:22

## 2023-01-01 RX ADMIN — GABAPENTIN 100 MILLIGRAM(S): 400 CAPSULE ORAL at 14:51

## 2023-01-01 RX ADMIN — AMLODIPINE BESYLATE 10 MILLIGRAM(S): 2.5 TABLET ORAL at 05:20

## 2023-01-01 RX ADMIN — Medication 1 SPRAY(S): at 17:41

## 2023-01-01 RX ADMIN — Medication 650 MILLIGRAM(S): at 23:21

## 2023-01-01 RX ADMIN — MORPHINE SULFATE 4 MILLIGRAM(S): 50 CAPSULE, EXTENDED RELEASE ORAL at 03:30

## 2023-01-01 RX ADMIN — MIRTAZAPINE 15 MILLIGRAM(S): 45 TABLET, ORALLY DISINTEGRATING ORAL at 21:22

## 2023-01-01 RX ADMIN — APIXABAN 10 MILLIGRAM(S): 2.5 TABLET, FILM COATED ORAL at 16:36

## 2023-01-01 RX ADMIN — PANTOPRAZOLE SODIUM 40 MILLIGRAM(S): 20 TABLET, DELAYED RELEASE ORAL at 05:13

## 2023-01-01 RX ADMIN — Medication 2: at 18:33

## 2023-01-01 RX ADMIN — SODIUM CHLORIDE 2 GRAM(S): 9 INJECTION INTRAMUSCULAR; INTRAVENOUS; SUBCUTANEOUS at 05:06

## 2023-01-01 RX ADMIN — Medication 3 MILLIGRAM(S): at 00:24

## 2023-01-01 RX ADMIN — Medication 1 SPRAY(S): at 12:12

## 2023-01-01 RX ADMIN — CEFEPIME 100 MILLIGRAM(S): 1 INJECTION, POWDER, FOR SOLUTION INTRAMUSCULAR; INTRAVENOUS at 16:29

## 2023-01-01 RX ADMIN — SODIUM CHLORIDE 30 MILLILITER(S): 5 INJECTION, SOLUTION INTRAVENOUS at 16:40

## 2023-01-01 RX ADMIN — Medication 81 MILLIGRAM(S): at 12:45

## 2023-01-01 RX ADMIN — AMLODIPINE BESYLATE 10 MILLIGRAM(S): 2.5 TABLET ORAL at 21:41

## 2023-01-01 RX ADMIN — ATORVASTATIN CALCIUM 40 MILLIGRAM(S): 80 TABLET, FILM COATED ORAL at 21:06

## 2023-01-01 RX ADMIN — MORPHINE SULFATE 4 MILLIGRAM(S): 50 CAPSULE, EXTENDED RELEASE ORAL at 15:28

## 2023-01-01 RX ADMIN — Medication 2: at 18:24

## 2023-01-01 RX ADMIN — Medication 145 MILLIGRAM(S): at 14:37

## 2023-01-01 RX ADMIN — ZOLPIDEM TARTRATE 5 MILLIGRAM(S): 10 TABLET ORAL at 22:04

## 2023-01-01 RX ADMIN — PANTOPRAZOLE SODIUM 40 MILLIGRAM(S): 20 TABLET, DELAYED RELEASE ORAL at 09:52

## 2023-01-01 RX ADMIN — DIPHENHYDRAMINE HYDROCHLORIDE AND LIDOCAINE HYDROCHLORIDE AND ALUMINUM HYDROXIDE AND MAGNESIUM HYDRO 10 MILLILITER(S): KIT at 00:20

## 2023-01-01 RX ADMIN — OXYCODONE HYDROCHLORIDE 5 MILLIGRAM(S): 5 TABLET ORAL at 14:18

## 2023-01-01 RX ADMIN — Medication 1 CAPSULE(S): at 08:02

## 2023-01-01 RX ADMIN — MIRTAZAPINE 15 MILLIGRAM(S): 45 TABLET, ORALLY DISINTEGRATING ORAL at 20:48

## 2023-01-01 RX ADMIN — GABAPENTIN 100 MILLIGRAM(S): 400 CAPSULE ORAL at 05:07

## 2023-01-01 RX ADMIN — CEFEPIME 100 MILLIGRAM(S): 1 INJECTION, POWDER, FOR SOLUTION INTRAMUSCULAR; INTRAVENOUS at 18:24

## 2023-01-01 RX ADMIN — ATORVASTATIN CALCIUM 40 MILLIGRAM(S): 80 TABLET, FILM COATED ORAL at 21:56

## 2023-01-01 RX ADMIN — ZOLPIDEM TARTRATE 5 MILLIGRAM(S): 10 TABLET ORAL at 21:12

## 2023-01-01 RX ADMIN — Medication 1 CAPSULE(S): at 18:12

## 2023-01-01 RX ADMIN — ATORVASTATIN CALCIUM 40 MILLIGRAM(S): 80 TABLET, FILM COATED ORAL at 21:48

## 2023-01-01 RX ADMIN — CHLORHEXIDINE GLUCONATE 1 APPLICATION(S): 213 SOLUTION TOPICAL at 13:26

## 2023-01-01 RX ADMIN — CELECOXIB 200 MILLIGRAM(S): 200 CAPSULE ORAL at 19:39

## 2023-01-01 RX ADMIN — POLYETHYLENE GLYCOL 3350 17 GRAM(S): 17 POWDER, FOR SOLUTION ORAL at 12:28

## 2023-01-01 RX ADMIN — MORPHINE SULFATE 6 MILLIGRAM(S): 50 CAPSULE, EXTENDED RELEASE ORAL at 21:08

## 2023-01-01 RX ADMIN — MORPHINE SULFATE 4 MILLIGRAM(S): 50 CAPSULE, EXTENDED RELEASE ORAL at 22:27

## 2023-01-01 RX ADMIN — SODIUM CHLORIDE 2 GRAM(S): 9 INJECTION INTRAMUSCULAR; INTRAVENOUS; SUBCUTANEOUS at 05:18

## 2023-01-01 RX ADMIN — PANTOPRAZOLE SODIUM 40 MILLIGRAM(S): 20 TABLET, DELAYED RELEASE ORAL at 06:29

## 2023-01-01 RX ADMIN — SODIUM CHLORIDE 1 GRAM(S): 9 INJECTION INTRAMUSCULAR; INTRAVENOUS; SUBCUTANEOUS at 20:49

## 2023-01-01 RX ADMIN — LIDOCAINE 10 MILLILITER(S): 4 CREAM TOPICAL at 11:32

## 2023-01-01 RX ADMIN — MORPHINE SULFATE 2 MILLIGRAM(S): 50 CAPSULE, EXTENDED RELEASE ORAL at 16:40

## 2023-01-01 RX ADMIN — Medication 1: at 18:17

## 2023-01-01 RX ADMIN — SODIUM CHLORIDE 2 GRAM(S): 9 INJECTION INTRAMUSCULAR; INTRAVENOUS; SUBCUTANEOUS at 05:20

## 2023-01-01 RX ADMIN — HEPARIN SODIUM 900 UNIT(S)/HR: 5000 INJECTION INTRAVENOUS; SUBCUTANEOUS at 19:03

## 2023-01-01 RX ADMIN — MORPHINE SULFATE 4 MILLIGRAM(S): 50 CAPSULE, EXTENDED RELEASE ORAL at 17:06

## 2023-01-01 RX ADMIN — MORPHINE SULFATE 6 MILLIGRAM(S): 50 CAPSULE, EXTENDED RELEASE ORAL at 05:09

## 2023-01-01 RX ADMIN — PANTOPRAZOLE SODIUM 40 MILLIGRAM(S): 20 TABLET, DELAYED RELEASE ORAL at 05:07

## 2023-01-01 RX ADMIN — MORPHINE SULFATE 4 MILLIGRAM(S): 50 CAPSULE, EXTENDED RELEASE ORAL at 23:38

## 2023-01-01 RX ADMIN — MORPHINE SULFATE 6 MILLIGRAM(S): 50 CAPSULE, EXTENDED RELEASE ORAL at 02:29

## 2023-01-01 RX ADMIN — LIDOCAINE 10 MILLILITER(S): 4 CREAM TOPICAL at 05:53

## 2023-01-01 RX ADMIN — SODIUM CHLORIDE 2 GRAM(S): 9 INJECTION INTRAMUSCULAR; INTRAVENOUS; SUBCUTANEOUS at 21:33

## 2023-01-01 RX ADMIN — MORPHINE SULFATE 4 MILLIGRAM(S): 50 CAPSULE, EXTENDED RELEASE ORAL at 07:07

## 2023-01-01 RX ADMIN — Medication 1 CAPSULE(S): at 09:23

## 2023-01-01 RX ADMIN — Medication 25 MILLIGRAM(S): at 21:40

## 2023-01-01 RX ADMIN — Medication 1 CAPSULE(S): at 12:30

## 2023-01-01 RX ADMIN — SENNA PLUS 2 TABLET(S): 8.6 TABLET ORAL at 21:48

## 2023-01-01 RX ADMIN — DIPHENHYDRAMINE HYDROCHLORIDE AND LIDOCAINE HYDROCHLORIDE AND ALUMINUM HYDROXIDE AND MAGNESIUM HYDRO 10 MILLILITER(S): KIT at 06:26

## 2023-01-01 RX ADMIN — Medication 1: at 22:25

## 2023-01-01 RX ADMIN — MORPHINE SULFATE 4 MILLIGRAM(S): 50 CAPSULE, EXTENDED RELEASE ORAL at 09:53

## 2023-01-01 RX ADMIN — SODIUM CHLORIDE 83.33 MILLILITER(S): 9 INJECTION INTRAMUSCULAR; INTRAVENOUS; SUBCUTANEOUS at 12:25

## 2023-01-01 RX ADMIN — DIPHENHYDRAMINE HYDROCHLORIDE AND LIDOCAINE HYDROCHLORIDE AND ALUMINUM HYDROXIDE AND MAGNESIUM HYDRO 10 MILLILITER(S): KIT at 21:07

## 2023-01-01 RX ADMIN — MIRTAZAPINE 15 MILLIGRAM(S): 45 TABLET, ORALLY DISINTEGRATING ORAL at 21:18

## 2023-01-01 RX ADMIN — SENNA PLUS 2 TABLET(S): 8.6 TABLET ORAL at 10:31

## 2023-01-01 RX ADMIN — MORPHINE SULFATE 4 MILLIGRAM(S): 50 CAPSULE, EXTENDED RELEASE ORAL at 02:44

## 2023-01-01 RX ADMIN — ATORVASTATIN CALCIUM 40 MILLIGRAM(S): 80 TABLET, FILM COATED ORAL at 21:29

## 2023-01-01 RX ADMIN — MORPHINE SULFATE 4 MILLIGRAM(S): 50 CAPSULE, EXTENDED RELEASE ORAL at 21:18

## 2023-01-01 RX ADMIN — GABAPENTIN 100 MILLIGRAM(S): 400 CAPSULE ORAL at 13:56

## 2023-01-01 RX ADMIN — Medication 145 MILLIGRAM(S): at 12:20

## 2023-01-01 RX ADMIN — SODIUM CHLORIDE 2 GRAM(S): 9 INJECTION INTRAMUSCULAR; INTRAVENOUS; SUBCUTANEOUS at 05:07

## 2023-01-01 RX ADMIN — ZOLPIDEM TARTRATE 5 MILLIGRAM(S): 10 TABLET ORAL at 22:28

## 2023-01-01 RX ADMIN — HEPARIN SODIUM 900 UNIT(S)/HR: 5000 INJECTION INTRAVENOUS; SUBCUTANEOUS at 09:21

## 2023-01-01 RX ADMIN — SENNA PLUS 2 TABLET(S): 8.6 TABLET ORAL at 22:07

## 2023-01-01 RX ADMIN — Medication 1 CAPSULE(S): at 15:07

## 2023-01-01 RX ADMIN — DIPHENHYDRAMINE HYDROCHLORIDE AND LIDOCAINE HYDROCHLORIDE AND ALUMINUM HYDROXIDE AND MAGNESIUM HYDRO 10 MILLILITER(S): KIT at 21:19

## 2023-01-01 RX ADMIN — GABAPENTIN 100 MILLIGRAM(S): 400 CAPSULE ORAL at 12:36

## 2023-01-01 RX ADMIN — Medication 650 MILLIGRAM(S): at 14:03

## 2023-01-01 RX ADMIN — Medication 650 MILLIGRAM(S): at 22:21

## 2023-01-01 RX ADMIN — MORPHINE SULFATE 4 MILLIGRAM(S): 50 CAPSULE, EXTENDED RELEASE ORAL at 04:23

## 2023-01-01 RX ADMIN — MORPHINE SULFATE 4 MILLIGRAM(S): 50 CAPSULE, EXTENDED RELEASE ORAL at 13:48

## 2023-01-01 RX ADMIN — Medication 1 CAPSULE(S): at 12:34

## 2023-01-01 RX ADMIN — CEFTRIAXONE 100 MILLIGRAM(S): 500 INJECTION, POWDER, FOR SOLUTION INTRAMUSCULAR; INTRAVENOUS at 05:13

## 2023-01-01 RX ADMIN — ZOLPIDEM TARTRATE 5 MILLIGRAM(S): 10 TABLET ORAL at 15:22

## 2023-01-01 RX ADMIN — Medication 1 CAPSULE(S): at 12:39

## 2023-01-01 RX ADMIN — LIDOCAINE 10 MILLILITER(S): 4 CREAM TOPICAL at 12:28

## 2023-01-01 RX ADMIN — MORPHINE SULFATE 4 MILLIGRAM(S): 50 CAPSULE, EXTENDED RELEASE ORAL at 11:44

## 2023-01-01 RX ADMIN — SODIUM CHLORIDE 2 GRAM(S): 9 INJECTION INTRAMUSCULAR; INTRAVENOUS; SUBCUTANEOUS at 21:09

## 2023-01-01 RX ADMIN — SODIUM CHLORIDE 2 GRAM(S): 9 INJECTION INTRAMUSCULAR; INTRAVENOUS; SUBCUTANEOUS at 21:29

## 2023-01-01 RX ADMIN — Medication 650 MILLIGRAM(S): at 07:54

## 2023-01-01 RX ADMIN — METHADONE HYDROCHLORIDE 4 MILLIGRAM(S): 40 TABLET ORAL at 17:27

## 2023-01-01 RX ADMIN — ZOLPIDEM TARTRATE 5 MILLIGRAM(S): 10 TABLET ORAL at 22:54

## 2023-01-01 RX ADMIN — Medication 1 CAPSULE(S): at 21:40

## 2023-01-01 RX ADMIN — SODIUM CHLORIDE 2 GRAM(S): 9 INJECTION INTRAMUSCULAR; INTRAVENOUS; SUBCUTANEOUS at 21:07

## 2023-01-01 RX ADMIN — ZOLPIDEM TARTRATE 5 MILLIGRAM(S): 10 TABLET ORAL at 21:51

## 2023-01-01 RX ADMIN — MORPHINE SULFATE 6 MILLIGRAM(S): 50 CAPSULE, EXTENDED RELEASE ORAL at 06:15

## 2023-01-01 RX ADMIN — MORPHINE SULFATE 4 MILLIGRAM(S): 50 CAPSULE, EXTENDED RELEASE ORAL at 21:52

## 2023-01-01 RX ADMIN — Medication 25 MILLIGRAM(S): at 05:09

## 2023-01-01 RX ADMIN — ATORVASTATIN CALCIUM 40 MILLIGRAM(S): 80 TABLET, FILM COATED ORAL at 21:49

## 2023-01-01 RX ADMIN — MORPHINE SULFATE 4 MILLIGRAM(S): 50 CAPSULE, EXTENDED RELEASE ORAL at 08:28

## 2023-01-01 RX ADMIN — ATORVASTATIN CALCIUM 40 MILLIGRAM(S): 80 TABLET, FILM COATED ORAL at 21:18

## 2023-01-01 RX ADMIN — AMLODIPINE BESYLATE 10 MILLIGRAM(S): 2.5 TABLET ORAL at 06:11

## 2023-01-01 RX ADMIN — Medication 3 MILLIGRAM(S): at 22:28

## 2023-01-01 RX ADMIN — MORPHINE SULFATE 6 MILLIGRAM(S): 50 CAPSULE, EXTENDED RELEASE ORAL at 21:33

## 2023-01-01 RX ADMIN — CHLORHEXIDINE GLUCONATE 1 APPLICATION(S): 213 SOLUTION TOPICAL at 21:44

## 2023-01-01 RX ADMIN — MORPHINE SULFATE 3 MILLIGRAM(S): 50 CAPSULE, EXTENDED RELEASE ORAL at 05:44

## 2023-01-01 RX ADMIN — DIPHENHYDRAMINE HYDROCHLORIDE AND LIDOCAINE HYDROCHLORIDE AND ALUMINUM HYDROXIDE AND MAGNESIUM HYDRO 10 MILLILITER(S): KIT at 00:31

## 2023-01-01 RX ADMIN — PANTOPRAZOLE SODIUM 40 MILLIGRAM(S): 20 TABLET, DELAYED RELEASE ORAL at 08:28

## 2023-01-01 RX ADMIN — APIXABAN 10 MILLIGRAM(S): 2.5 TABLET, FILM COATED ORAL at 05:13

## 2023-01-01 RX ADMIN — CELECOXIB 200 MILLIGRAM(S): 200 CAPSULE ORAL at 12:38

## 2023-01-01 RX ADMIN — CEFEPIME 100 MILLIGRAM(S): 1 INJECTION, POWDER, FOR SOLUTION INTRAMUSCULAR; INTRAVENOUS at 17:19

## 2023-01-01 RX ADMIN — Medication 1 CAPSULE(S): at 14:52

## 2023-01-01 RX ADMIN — METHADONE HYDROCHLORIDE 2 MILLIGRAM(S): 40 TABLET ORAL at 05:09

## 2023-01-01 RX ADMIN — MORPHINE SULFATE 4 MILLIGRAM(S): 50 CAPSULE, EXTENDED RELEASE ORAL at 13:39

## 2023-01-01 RX ADMIN — LIDOCAINE 10 MILLILITER(S): 4 CREAM TOPICAL at 18:18

## 2023-01-01 RX ADMIN — MORPHINE SULFATE 6 MILLIGRAM(S): 50 CAPSULE, EXTENDED RELEASE ORAL at 21:16

## 2023-01-01 RX ADMIN — LOSARTAN POTASSIUM 100 MILLIGRAM(S): 100 TABLET, FILM COATED ORAL at 06:29

## 2023-01-01 RX ADMIN — CELECOXIB 100 MILLIGRAM(S): 200 CAPSULE ORAL at 18:17

## 2023-01-01 RX ADMIN — ENOXAPARIN SODIUM 40 MILLIGRAM(S): 100 INJECTION SUBCUTANEOUS at 05:14

## 2023-01-01 RX ADMIN — AMLODIPINE BESYLATE 10 MILLIGRAM(S): 2.5 TABLET ORAL at 05:27

## 2023-01-01 RX ADMIN — GABAPENTIN 100 MILLIGRAM(S): 400 CAPSULE ORAL at 21:32

## 2023-01-01 RX ADMIN — METHADONE HYDROCHLORIDE 2 MILLIGRAM(S): 40 TABLET ORAL at 05:20

## 2023-01-01 RX ADMIN — METHADONE HYDROCHLORIDE 4 MILLIGRAM(S): 40 TABLET ORAL at 17:18

## 2023-01-01 RX ADMIN — PANTOPRAZOLE SODIUM 40 MILLIGRAM(S): 20 TABLET, DELAYED RELEASE ORAL at 05:34

## 2023-01-01 RX ADMIN — CELECOXIB 200 MILLIGRAM(S): 200 CAPSULE ORAL at 11:45

## 2023-01-01 RX ADMIN — MORPHINE SULFATE 4 MILLIGRAM(S): 50 CAPSULE, EXTENDED RELEASE ORAL at 16:57

## 2023-01-01 RX ADMIN — MORPHINE SULFATE 4 MILLIGRAM(S): 50 CAPSULE, EXTENDED RELEASE ORAL at 16:56

## 2023-01-01 RX ADMIN — MORPHINE SULFATE 4 MILLIGRAM(S): 50 CAPSULE, EXTENDED RELEASE ORAL at 17:21

## 2023-01-01 RX ADMIN — CELECOXIB 100 MILLIGRAM(S): 200 CAPSULE ORAL at 18:22

## 2023-01-01 RX ADMIN — Medication 1 CAPSULE(S): at 12:44

## 2023-01-01 RX ADMIN — MORPHINE SULFATE 4 MILLIGRAM(S): 50 CAPSULE, EXTENDED RELEASE ORAL at 21:50

## 2023-01-01 RX ADMIN — MORPHINE SULFATE 2 MILLIGRAM(S): 50 CAPSULE, EXTENDED RELEASE ORAL at 20:35

## 2023-01-01 RX ADMIN — CHLORHEXIDINE GLUCONATE 1 APPLICATION(S): 213 SOLUTION TOPICAL at 13:08

## 2023-01-01 RX ADMIN — Medication 20 MILLIGRAM(S): at 21:40

## 2023-01-01 RX ADMIN — Medication 145 MILLIGRAM(S): at 11:46

## 2023-01-01 RX ADMIN — DIPHENHYDRAMINE HYDROCHLORIDE AND LIDOCAINE HYDROCHLORIDE AND ALUMINUM HYDROXIDE AND MAGNESIUM HYDRO 10 MILLILITER(S): KIT at 12:12

## 2023-01-01 RX ADMIN — CELECOXIB 200 MILLIGRAM(S): 200 CAPSULE ORAL at 22:00

## 2023-01-01 RX ADMIN — LIDOCAINE 10 MILLILITER(S): 4 CREAM TOPICAL at 21:10

## 2023-01-01 RX ADMIN — Medication 1000 MILLIGRAM(S): at 17:34

## 2023-01-01 RX ADMIN — MIRTAZAPINE 15 MILLIGRAM(S): 45 TABLET, ORALLY DISINTEGRATING ORAL at 21:08

## 2023-01-01 RX ADMIN — MORPHINE SULFATE 2 MILLIGRAM(S): 50 CAPSULE, EXTENDED RELEASE ORAL at 15:01

## 2023-01-01 RX ADMIN — ZOLPIDEM TARTRATE 5 MILLIGRAM(S): 10 TABLET ORAL at 21:38

## 2023-01-01 RX ADMIN — MORPHINE SULFATE 4 MILLIGRAM(S): 50 CAPSULE, EXTENDED RELEASE ORAL at 22:50

## 2023-01-01 RX ADMIN — CELECOXIB 200 MILLIGRAM(S): 200 CAPSULE ORAL at 12:39

## 2023-01-01 RX ADMIN — HYDROMORPHONE HYDROCHLORIDE 1 MILLIGRAM(S): 2 INJECTION INTRAMUSCULAR; INTRAVENOUS; SUBCUTANEOUS at 12:42

## 2023-01-01 RX ADMIN — AMLODIPINE BESYLATE 10 MILLIGRAM(S): 2.5 TABLET ORAL at 06:26

## 2023-01-01 RX ADMIN — MORPHINE SULFATE 4 MILLIGRAM(S): 50 CAPSULE, EXTENDED RELEASE ORAL at 00:38

## 2023-01-01 RX ADMIN — LOSARTAN POTASSIUM 100 MILLIGRAM(S): 100 TABLET, FILM COATED ORAL at 05:13

## 2023-01-01 RX ADMIN — Medication 1: at 18:06

## 2023-01-01 RX ADMIN — CHLORHEXIDINE GLUCONATE 1 APPLICATION(S): 213 SOLUTION TOPICAL at 16:40

## 2023-01-01 RX ADMIN — Medication 145 MILLIGRAM(S): at 12:32

## 2023-01-01 RX ADMIN — AMLODIPINE BESYLATE 5 MILLIGRAM(S): 2.5 TABLET ORAL at 05:07

## 2023-01-01 RX ADMIN — Medication 1 PACKET(S): at 12:22

## 2023-01-01 RX ADMIN — SODIUM CHLORIDE 2 GRAM(S): 9 INJECTION INTRAMUSCULAR; INTRAVENOUS; SUBCUTANEOUS at 13:56

## 2023-01-01 RX ADMIN — Medication 1 CAPSULE(S): at 18:47

## 2023-01-01 RX ADMIN — SODIUM CHLORIDE 100 MILLILITER(S): 9 INJECTION, SOLUTION INTRAVENOUS at 15:01

## 2023-01-01 RX ADMIN — MORPHINE SULFATE 4 MILLIGRAM(S): 50 CAPSULE, EXTENDED RELEASE ORAL at 08:23

## 2023-01-01 RX ADMIN — Medication 1 CAPSULE(S): at 17:29

## 2023-01-01 RX ADMIN — CEFTRIAXONE 100 MILLIGRAM(S): 500 INJECTION, POWDER, FOR SOLUTION INTRAMUSCULAR; INTRAVENOUS at 05:20

## 2023-01-01 RX ADMIN — Medication 1 CAPSULE(S): at 17:39

## 2023-01-01 RX ADMIN — APIXABAN 10 MILLIGRAM(S): 2.5 TABLET, FILM COATED ORAL at 05:26

## 2023-01-01 RX ADMIN — Medication 25 MILLIGRAM(S): at 06:27

## 2023-01-01 RX ADMIN — Medication 1 CAPSULE(S): at 08:57

## 2023-01-01 RX ADMIN — CEFEPIME 100 MILLIGRAM(S): 1 INJECTION, POWDER, FOR SOLUTION INTRAMUSCULAR; INTRAVENOUS at 02:30

## 2023-01-01 RX ADMIN — LIDOCAINE 10 MILLILITER(S): 4 CREAM TOPICAL at 16:30

## 2023-01-01 RX ADMIN — Medication 145 MILLIGRAM(S): at 12:13

## 2023-01-01 RX ADMIN — MORPHINE SULFATE 4 MILLIGRAM(S): 50 CAPSULE, EXTENDED RELEASE ORAL at 19:30

## 2023-01-01 RX ADMIN — CELECOXIB 200 MILLIGRAM(S): 200 CAPSULE ORAL at 14:00

## 2023-01-01 RX ADMIN — PANTOPRAZOLE SODIUM 40 MILLIGRAM(S): 20 TABLET, DELAYED RELEASE ORAL at 05:53

## 2023-01-01 RX ADMIN — SODIUM CHLORIDE 2 GRAM(S): 9 INJECTION INTRAMUSCULAR; INTRAVENOUS; SUBCUTANEOUS at 14:53

## 2023-01-01 RX ADMIN — MORPHINE SULFATE 2 MILLIGRAM(S): 50 CAPSULE, EXTENDED RELEASE ORAL at 05:22

## 2023-01-01 RX ADMIN — APIXABAN 5 MILLIGRAM(S): 2.5 TABLET, FILM COATED ORAL at 05:07

## 2023-01-01 RX ADMIN — APIXABAN 10 MILLIGRAM(S): 2.5 TABLET, FILM COATED ORAL at 18:07

## 2023-01-01 RX ADMIN — Medication 25 MILLIGRAM(S): at 05:21

## 2023-01-01 RX ADMIN — GABAPENTIN 100 MILLIGRAM(S): 400 CAPSULE ORAL at 05:20

## 2023-01-01 RX ADMIN — Medication 2: at 08:58

## 2023-01-01 RX ADMIN — DIPHENHYDRAMINE HYDROCHLORIDE AND LIDOCAINE HYDROCHLORIDE AND ALUMINUM HYDROXIDE AND MAGNESIUM HYDRO 10 MILLILITER(S): KIT at 17:09

## 2023-01-01 RX ADMIN — MORPHINE SULFATE 3 MILLIGRAM(S): 50 CAPSULE, EXTENDED RELEASE ORAL at 22:06

## 2023-01-01 RX ADMIN — LIDOCAINE 10 MILLILITER(S): 4 CREAM TOPICAL at 17:13

## 2023-01-01 RX ADMIN — CHLORHEXIDINE GLUCONATE 1 APPLICATION(S): 213 SOLUTION TOPICAL at 11:16

## 2023-01-01 RX ADMIN — GABAPENTIN 100 MILLIGRAM(S): 400 CAPSULE ORAL at 06:28

## 2023-01-01 RX ADMIN — Medication 25 MILLIGRAM(S): at 05:27

## 2023-01-01 RX ADMIN — Medication 1 CAPSULE(S): at 07:56

## 2023-01-01 RX ADMIN — Medication 1: at 12:28

## 2023-01-01 RX ADMIN — DIPHENHYDRAMINE HYDROCHLORIDE AND LIDOCAINE HYDROCHLORIDE AND ALUMINUM HYDROXIDE AND MAGNESIUM HYDRO 10 MILLILITER(S): KIT at 21:51

## 2023-01-01 RX ADMIN — Medication 1 CAPSULE(S): at 10:16

## 2023-01-01 RX ADMIN — AMLODIPINE BESYLATE 10 MILLIGRAM(S): 2.5 TABLET ORAL at 05:13

## 2023-01-01 RX ADMIN — GABAPENTIN 100 MILLIGRAM(S): 400 CAPSULE ORAL at 05:27

## 2023-01-01 RX ADMIN — Medication 1 CAPSULE(S): at 15:22

## 2023-01-01 RX ADMIN — SODIUM CHLORIDE 2 GRAM(S): 9 INJECTION INTRAMUSCULAR; INTRAVENOUS; SUBCUTANEOUS at 21:56

## 2023-01-01 RX ADMIN — Medication 1: at 08:43

## 2023-01-01 RX ADMIN — Medication 1: at 12:50

## 2023-01-01 RX ADMIN — Medication 1 CAPSULE(S): at 13:55

## 2023-01-01 RX ADMIN — Medication 25 MILLIGRAM(S): at 05:07

## 2023-01-01 RX ADMIN — Medication 145 MILLIGRAM(S): at 11:12

## 2023-01-01 RX ADMIN — GABAPENTIN 100 MILLIGRAM(S): 400 CAPSULE ORAL at 21:07

## 2023-01-01 RX ADMIN — Medication 1 SPRAY(S): at 15:24

## 2023-01-01 RX ADMIN — MIRTAZAPINE 15 MILLIGRAM(S): 45 TABLET, ORALLY DISINTEGRATING ORAL at 21:21

## 2023-01-01 RX ADMIN — MORPHINE SULFATE 6 MILLIGRAM(S): 50 CAPSULE, EXTENDED RELEASE ORAL at 14:09

## 2023-01-01 RX ADMIN — CEFTRIAXONE 100 MILLIGRAM(S): 500 INJECTION, POWDER, FOR SOLUTION INTRAMUSCULAR; INTRAVENOUS at 00:20

## 2023-01-01 RX ADMIN — MORPHINE SULFATE 4 MILLIGRAM(S): 50 CAPSULE, EXTENDED RELEASE ORAL at 17:53

## 2023-01-01 RX ADMIN — ONDANSETRON 4 MILLIGRAM(S): 8 TABLET, FILM COATED ORAL at 21:16

## 2023-01-01 RX ADMIN — AMLODIPINE BESYLATE 10 MILLIGRAM(S): 2.5 TABLET ORAL at 05:07

## 2023-01-01 RX ADMIN — MORPHINE SULFATE 4 MILLIGRAM(S): 50 CAPSULE, EXTENDED RELEASE ORAL at 11:21

## 2023-01-01 RX ADMIN — MORPHINE SULFATE 4 MILLIGRAM(S): 50 CAPSULE, EXTENDED RELEASE ORAL at 07:27

## 2023-01-01 RX ADMIN — HYDROMORPHONE HYDROCHLORIDE 1 MILLIGRAM(S): 2 INJECTION INTRAMUSCULAR; INTRAVENOUS; SUBCUTANEOUS at 09:29

## 2023-01-01 RX ADMIN — METHADONE HYDROCHLORIDE 4 MILLIGRAM(S): 40 TABLET ORAL at 18:23

## 2023-01-01 RX ADMIN — Medication 1 CAPSULE(S): at 18:17

## 2023-01-01 RX ADMIN — CEFTRIAXONE 100 MILLIGRAM(S): 500 INJECTION, POWDER, FOR SOLUTION INTRAMUSCULAR; INTRAVENOUS at 00:34

## 2023-01-01 RX ADMIN — POLYETHYLENE GLYCOL 3350 17 GRAM(S): 17 POWDER, FOR SOLUTION ORAL at 18:37

## 2023-01-01 RX ADMIN — MORPHINE SULFATE 3 MILLIGRAM(S): 50 CAPSULE, EXTENDED RELEASE ORAL at 11:13

## 2023-01-01 RX ADMIN — Medication 48 MILLIGRAM(S): at 13:05

## 2023-01-01 RX ADMIN — PANTOPRAZOLE SODIUM 40 MILLIGRAM(S): 20 TABLET, DELAYED RELEASE ORAL at 05:06

## 2023-01-01 RX ADMIN — MORPHINE SULFATE 4 MILLIGRAM(S): 50 CAPSULE, EXTENDED RELEASE ORAL at 20:30

## 2023-01-01 RX ADMIN — ENOXAPARIN SODIUM 40 MILLIGRAM(S): 100 INJECTION SUBCUTANEOUS at 06:11

## 2023-01-01 RX ADMIN — Medication 1 CAPSULE(S): at 17:13

## 2023-01-01 RX ADMIN — MORPHINE SULFATE 4 MILLIGRAM(S): 50 CAPSULE, EXTENDED RELEASE ORAL at 17:38

## 2023-01-01 RX ADMIN — DIPHENHYDRAMINE HYDROCHLORIDE AND LIDOCAINE HYDROCHLORIDE AND ALUMINUM HYDROXIDE AND MAGNESIUM HYDRO 10 MILLILITER(S): KIT at 17:36

## 2023-01-01 RX ADMIN — GABAPENTIN 100 MILLIGRAM(S): 400 CAPSULE ORAL at 13:10

## 2023-01-01 RX ADMIN — CHLORHEXIDINE GLUCONATE 1 APPLICATION(S): 213 SOLUTION TOPICAL at 12:13

## 2023-01-01 RX ADMIN — SODIUM CHLORIDE 1000 MILLILITER(S): 9 INJECTION, SOLUTION INTRAVENOUS at 09:32

## 2023-01-01 RX ADMIN — Medication 3 MILLIGRAM(S): at 21:09

## 2023-01-01 RX ADMIN — DIPHENHYDRAMINE HYDROCHLORIDE AND LIDOCAINE HYDROCHLORIDE AND ALUMINUM HYDROXIDE AND MAGNESIUM HYDRO 10 MILLILITER(S): KIT at 12:38

## 2023-01-01 RX ADMIN — LIDOCAINE 10 MILLILITER(S): 4 CREAM TOPICAL at 00:36

## 2023-01-01 RX ADMIN — MORPHINE SULFATE 3 MILLIGRAM(S): 50 CAPSULE, EXTENDED RELEASE ORAL at 06:44

## 2023-01-01 RX ADMIN — DIPHENHYDRAMINE HYDROCHLORIDE AND LIDOCAINE HYDROCHLORIDE AND ALUMINUM HYDROXIDE AND MAGNESIUM HYDRO 10 MILLILITER(S): KIT at 12:35

## 2023-01-01 RX ADMIN — METHADONE HYDROCHLORIDE 4 MILLIGRAM(S): 40 TABLET ORAL at 05:34

## 2023-01-01 RX ADMIN — Medication 15 MILLIGRAM(S): at 14:14

## 2023-01-01 RX ADMIN — MORPHINE SULFATE 3 MILLIGRAM(S): 50 CAPSULE, EXTENDED RELEASE ORAL at 21:00

## 2023-01-01 RX ADMIN — METHADONE HYDROCHLORIDE 2 MILLIGRAM(S): 40 TABLET ORAL at 05:27

## 2023-01-01 RX ADMIN — Medication 145 MILLIGRAM(S): at 12:40

## 2023-01-01 RX ADMIN — METHADONE HYDROCHLORIDE 1 MILLIGRAM(S): 40 TABLET ORAL at 21:18

## 2023-01-01 RX ADMIN — SODIUM CHLORIDE 1 GRAM(S): 9 INJECTION INTRAMUSCULAR; INTRAVENOUS; SUBCUTANEOUS at 05:27

## 2023-01-01 RX ADMIN — Medication 1: at 18:22

## 2023-01-01 RX ADMIN — LIDOCAINE 1 PATCH: 4 CREAM TOPICAL at 07:17

## 2023-01-01 RX ADMIN — Medication 250 MILLIGRAM(S): at 16:36

## 2023-01-01 RX ADMIN — MORPHINE SULFATE 3 MILLIGRAM(S): 50 CAPSULE, EXTENDED RELEASE ORAL at 03:06

## 2023-01-01 RX ADMIN — Medication 25 MILLIGRAM(S): at 05:18

## 2023-01-01 RX ADMIN — Medication 1 CAPSULE(S): at 12:26

## 2023-01-01 RX ADMIN — MORPHINE SULFATE 2 MILLIGRAM(S): 50 CAPSULE, EXTENDED RELEASE ORAL at 10:15

## 2023-01-01 RX ADMIN — METHADONE HYDROCHLORIDE 1 MILLIGRAM(S): 40 TABLET ORAL at 12:34

## 2023-01-01 RX ADMIN — DIPHENHYDRAMINE HYDROCHLORIDE AND LIDOCAINE HYDROCHLORIDE AND ALUMINUM HYDROXIDE AND MAGNESIUM HYDRO 10 MILLILITER(S): KIT at 15:05

## 2023-01-01 RX ADMIN — Medication 25 MILLIGRAM(S): at 05:06

## 2023-01-01 RX ADMIN — MORPHINE SULFATE 6 MILLIGRAM(S): 50 CAPSULE, EXTENDED RELEASE ORAL at 12:29

## 2023-01-01 RX ADMIN — LIDOCAINE 10 MILLILITER(S): 4 CREAM TOPICAL at 14:50

## 2023-01-01 RX ADMIN — DIPHENHYDRAMINE HYDROCHLORIDE AND LIDOCAINE HYDROCHLORIDE AND ALUMINUM HYDROXIDE AND MAGNESIUM HYDRO 10 MILLILITER(S): KIT at 05:20

## 2023-01-01 RX ADMIN — Medication 650 MILLIGRAM(S): at 22:04

## 2023-01-01 RX ADMIN — MORPHINE SULFATE 2 MILLIGRAM(S): 50 CAPSULE, EXTENDED RELEASE ORAL at 09:44

## 2023-01-01 RX ADMIN — AMLODIPINE BESYLATE 10 MILLIGRAM(S): 2.5 TABLET ORAL at 05:09

## 2023-01-01 RX ADMIN — PANTOPRAZOLE SODIUM 40 MILLIGRAM(S): 20 TABLET, DELAYED RELEASE ORAL at 05:20

## 2023-01-01 RX ADMIN — MORPHINE SULFATE 4 MILLIGRAM(S): 50 CAPSULE, EXTENDED RELEASE ORAL at 16:51

## 2023-01-01 RX ADMIN — GABAPENTIN 100 MILLIGRAM(S): 400 CAPSULE ORAL at 21:22

## 2023-01-01 RX ADMIN — LIDOCAINE 1 PATCH: 4 CREAM TOPICAL at 19:15

## 2023-01-01 RX ADMIN — METHADONE HYDROCHLORIDE 4 MILLIGRAM(S): 40 TABLET ORAL at 19:23

## 2023-01-01 RX ADMIN — HEPARIN SODIUM 900 UNIT(S)/HR: 5000 INJECTION INTRAVENOUS; SUBCUTANEOUS at 07:11

## 2023-01-01 RX ADMIN — Medication 48 MILLIGRAM(S): at 15:22

## 2023-01-01 RX ADMIN — MORPHINE SULFATE 4 MILLIGRAM(S): 50 CAPSULE, EXTENDED RELEASE ORAL at 12:47

## 2023-01-01 RX ADMIN — MORPHINE SULFATE 4 MILLIGRAM(S): 50 CAPSULE, EXTENDED RELEASE ORAL at 04:38

## 2023-01-01 RX ADMIN — SODIUM CHLORIDE 2 GRAM(S): 9 INJECTION INTRAMUSCULAR; INTRAVENOUS; SUBCUTANEOUS at 05:26

## 2023-01-01 RX ADMIN — SODIUM CHLORIDE 2 GRAM(S): 9 INJECTION INTRAMUSCULAR; INTRAVENOUS; SUBCUTANEOUS at 15:10

## 2023-01-01 RX ADMIN — ATORVASTATIN CALCIUM 40 MILLIGRAM(S): 80 TABLET, FILM COATED ORAL at 22:22

## 2023-01-01 RX ADMIN — MIRTAZAPINE 15 MILLIGRAM(S): 45 TABLET, ORALLY DISINTEGRATING ORAL at 21:07

## 2023-01-01 RX ADMIN — MIRTAZAPINE 15 MILLIGRAM(S): 45 TABLET, ORALLY DISINTEGRATING ORAL at 21:30

## 2023-01-01 RX ADMIN — Medication 1 CAPSULE(S): at 08:58

## 2023-01-01 RX ADMIN — GABAPENTIN 100 MILLIGRAM(S): 400 CAPSULE ORAL at 05:10

## 2023-01-01 RX ADMIN — Medication 1: at 17:52

## 2023-01-01 RX ADMIN — MORPHINE SULFATE 2 MILLIGRAM(S): 50 CAPSULE, EXTENDED RELEASE ORAL at 08:57

## 2023-01-01 RX ADMIN — Medication 25 MILLIGRAM(S): at 05:48

## 2023-01-01 RX ADMIN — LIDOCAINE 10 MILLILITER(S): 4 CREAM TOPICAL at 06:17

## 2023-01-01 RX ADMIN — Medication 1 CAPSULE(S): at 16:31

## 2023-01-01 RX ADMIN — MORPHINE SULFATE 4 MILLIGRAM(S): 50 CAPSULE, EXTENDED RELEASE ORAL at 11:55

## 2023-01-01 RX ADMIN — DIPHENHYDRAMINE HYDROCHLORIDE AND LIDOCAINE HYDROCHLORIDE AND ALUMINUM HYDROXIDE AND MAGNESIUM HYDRO 10 MILLILITER(S): KIT at 02:02

## 2023-01-01 RX ADMIN — APIXABAN 10 MILLIGRAM(S): 2.5 TABLET, FILM COATED ORAL at 17:18

## 2023-01-01 RX ADMIN — METHADONE HYDROCHLORIDE 2 MILLIGRAM(S): 40 TABLET ORAL at 18:32

## 2023-01-01 RX ADMIN — AMLODIPINE BESYLATE 10 MILLIGRAM(S): 2.5 TABLET ORAL at 05:48

## 2023-01-01 RX ADMIN — Medication 25 MILLIGRAM(S): at 06:11

## 2023-01-01 RX ADMIN — Medication 25 MILLIGRAM(S): at 06:30

## 2023-01-01 RX ADMIN — POLYETHYLENE GLYCOL 3350 17 GRAM(S): 17 POWDER, FOR SOLUTION ORAL at 17:09

## 2023-01-01 RX ADMIN — ZOLPIDEM TARTRATE 5 MILLIGRAM(S): 10 TABLET ORAL at 22:32

## 2023-01-01 RX ADMIN — Medication 1 CAPSULE(S): at 08:55

## 2023-01-01 RX ADMIN — CELECOXIB 200 MILLIGRAM(S): 200 CAPSULE ORAL at 12:45

## 2023-01-01 RX ADMIN — LIDOCAINE 10 MILLILITER(S): 4 CREAM TOPICAL at 05:14

## 2023-01-01 RX ADMIN — LOSARTAN POTASSIUM 100 MILLIGRAM(S): 100 TABLET, FILM COATED ORAL at 05:20

## 2023-01-01 RX ADMIN — CELECOXIB 200 MILLIGRAM(S): 200 CAPSULE ORAL at 21:02

## 2023-01-01 RX ADMIN — LIDOCAINE 10 MILLILITER(S): 4 CREAM TOPICAL at 18:13

## 2023-01-01 RX ADMIN — MORPHINE SULFATE 3 MILLIGRAM(S): 50 CAPSULE, EXTENDED RELEASE ORAL at 03:44

## 2023-01-01 RX ADMIN — CEFEPIME 100 MILLIGRAM(S): 1 INJECTION, POWDER, FOR SOLUTION INTRAMUSCULAR; INTRAVENOUS at 18:18

## 2023-01-01 RX ADMIN — ATORVASTATIN CALCIUM 40 MILLIGRAM(S): 80 TABLET, FILM COATED ORAL at 21:09

## 2023-01-01 RX ADMIN — APIXABAN 10 MILLIGRAM(S): 2.5 TABLET, FILM COATED ORAL at 18:23

## 2023-01-01 RX ADMIN — DIPHENHYDRAMINE HYDROCHLORIDE AND LIDOCAINE HYDROCHLORIDE AND ALUMINUM HYDROXIDE AND MAGNESIUM HYDRO 10 MILLILITER(S): KIT at 14:37

## 2023-01-01 RX ADMIN — MORPHINE SULFATE 6 MILLIGRAM(S): 50 CAPSULE, EXTENDED RELEASE ORAL at 14:00

## 2023-01-01 RX ADMIN — GABAPENTIN 100 MILLIGRAM(S): 400 CAPSULE ORAL at 14:40

## 2023-01-01 RX ADMIN — MORPHINE SULFATE 2 MILLIGRAM(S): 50 CAPSULE, EXTENDED RELEASE ORAL at 01:48

## 2023-01-01 RX ADMIN — GABAPENTIN 100 MILLIGRAM(S): 400 CAPSULE ORAL at 14:11

## 2023-01-01 RX ADMIN — ZOLPIDEM TARTRATE 5 MILLIGRAM(S): 10 TABLET ORAL at 21:22

## 2023-01-01 RX ADMIN — ZOLPIDEM TARTRATE 5 MILLIGRAM(S): 10 TABLET ORAL at 22:03

## 2023-01-01 RX ADMIN — LIDOCAINE 10 MILLILITER(S): 4 CREAM TOPICAL at 12:46

## 2023-01-01 RX ADMIN — ATORVASTATIN CALCIUM 40 MILLIGRAM(S): 80 TABLET, FILM COATED ORAL at 21:33

## 2023-01-01 RX ADMIN — OXYCODONE HYDROCHLORIDE 5 MILLIGRAM(S): 5 TABLET ORAL at 23:00

## 2023-01-01 RX ADMIN — OXYCODONE HYDROCHLORIDE 5 MILLIGRAM(S): 5 TABLET ORAL at 00:00

## 2023-01-01 RX ADMIN — CELECOXIB 100 MILLIGRAM(S): 200 CAPSULE ORAL at 06:10

## 2023-01-01 RX ADMIN — LOSARTAN POTASSIUM 100 MILLIGRAM(S): 100 TABLET, FILM COATED ORAL at 21:40

## 2023-01-01 RX ADMIN — MORPHINE SULFATE 2 MILLIGRAM(S): 50 CAPSULE, EXTENDED RELEASE ORAL at 08:25

## 2023-01-01 RX ADMIN — GABAPENTIN 100 MILLIGRAM(S): 400 CAPSULE ORAL at 21:18

## 2023-01-01 RX ADMIN — HYDROMORPHONE HYDROCHLORIDE 1 MILLIGRAM(S): 2 INJECTION INTRAMUSCULAR; INTRAVENOUS; SUBCUTANEOUS at 09:59

## 2023-01-01 RX ADMIN — MORPHINE SULFATE 3 MILLIGRAM(S): 50 CAPSULE, EXTENDED RELEASE ORAL at 16:08

## 2023-01-01 RX ADMIN — ZOLPIDEM TARTRATE 5 MILLIGRAM(S): 10 TABLET ORAL at 21:47

## 2023-01-01 RX ADMIN — SODIUM CHLORIDE 1000 MILLILITER(S): 9 INJECTION INTRAMUSCULAR; INTRAVENOUS; SUBCUTANEOUS at 05:30

## 2023-01-01 RX ADMIN — APIXABAN 10 MILLIGRAM(S): 2.5 TABLET, FILM COATED ORAL at 05:06

## 2023-01-01 RX ADMIN — GABAPENTIN 100 MILLIGRAM(S): 400 CAPSULE ORAL at 21:10

## 2023-01-01 RX ADMIN — MORPHINE SULFATE 6 MILLIGRAM(S): 50 CAPSULE, EXTENDED RELEASE ORAL at 13:32

## 2023-01-01 RX ADMIN — MORPHINE SULFATE 6 MILLIGRAM(S): 50 CAPSULE, EXTENDED RELEASE ORAL at 22:00

## 2023-01-01 RX ADMIN — GABAPENTIN 100 MILLIGRAM(S): 400 CAPSULE ORAL at 20:48

## 2023-01-01 RX ADMIN — DIPHENHYDRAMINE HYDROCHLORIDE AND LIDOCAINE HYDROCHLORIDE AND ALUMINUM HYDROXIDE AND MAGNESIUM HYDRO 10 MILLILITER(S): KIT at 17:39

## 2023-01-01 RX ADMIN — GABAPENTIN 100 MILLIGRAM(S): 400 CAPSULE ORAL at 15:06

## 2023-01-01 RX ADMIN — GABAPENTIN 100 MILLIGRAM(S): 400 CAPSULE ORAL at 21:49

## 2023-01-01 RX ADMIN — DIPHENHYDRAMINE HYDROCHLORIDE AND LIDOCAINE HYDROCHLORIDE AND ALUMINUM HYDROXIDE AND MAGNESIUM HYDRO 10 MILLILITER(S): KIT at 05:07

## 2023-01-01 RX ADMIN — MORPHINE SULFATE 2 MILLIGRAM(S): 50 CAPSULE, EXTENDED RELEASE ORAL at 08:38

## 2023-01-01 RX ADMIN — MORPHINE SULFATE 4 MILLIGRAM(S): 50 CAPSULE, EXTENDED RELEASE ORAL at 12:41

## 2023-01-01 RX ADMIN — Medication 50 MILLILITER(S): at 11:24

## 2023-01-01 RX ADMIN — Medication 1: at 09:03

## 2023-01-01 RX ADMIN — LOSARTAN POTASSIUM 100 MILLIGRAM(S): 100 TABLET, FILM COATED ORAL at 06:33

## 2023-01-01 RX ADMIN — CHLORHEXIDINE GLUCONATE 1 APPLICATION(S): 213 SOLUTION TOPICAL at 12:59

## 2023-01-01 RX ADMIN — ATORVASTATIN CALCIUM 40 MILLIGRAM(S): 80 TABLET, FILM COATED ORAL at 21:02

## 2023-01-01 RX ADMIN — MORPHINE SULFATE 2 MILLIGRAM(S): 50 CAPSULE, EXTENDED RELEASE ORAL at 10:07

## 2023-01-01 RX ADMIN — Medication 145 MILLIGRAM(S): at 11:17

## 2023-01-01 RX ADMIN — SODIUM CHLORIDE 30 MILLILITER(S): 5 INJECTION, SOLUTION INTRAVENOUS at 21:28

## 2023-01-01 RX ADMIN — CHLORHEXIDINE GLUCONATE 1 APPLICATION(S): 213 SOLUTION TOPICAL at 13:55

## 2023-01-01 RX ADMIN — MORPHINE SULFATE 2 MILLIGRAM(S): 50 CAPSULE, EXTENDED RELEASE ORAL at 16:52

## 2023-01-01 RX ADMIN — HYDROMORPHONE HYDROCHLORIDE 1 MILLIGRAM(S): 2 INJECTION INTRAMUSCULAR; INTRAVENOUS; SUBCUTANEOUS at 12:12

## 2023-01-01 RX ADMIN — Medication 1: at 10:05

## 2023-01-01 RX ADMIN — ONDANSETRON 4 MILLIGRAM(S): 8 TABLET, FILM COATED ORAL at 00:12

## 2023-01-01 RX ADMIN — Medication 650 MILLIGRAM(S): at 13:58

## 2023-01-01 RX ADMIN — Medication 125 MILLILITER(S): at 12:43

## 2023-01-01 RX ADMIN — GABAPENTIN 100 MILLIGRAM(S): 400 CAPSULE ORAL at 05:08

## 2023-01-01 RX ADMIN — MORPHINE SULFATE 4 MILLIGRAM(S): 50 CAPSULE, EXTENDED RELEASE ORAL at 11:40

## 2023-01-01 RX ADMIN — Medication 650 MILLIGRAM(S): at 22:00

## 2023-01-01 RX ADMIN — CELECOXIB 100 MILLIGRAM(S): 200 CAPSULE ORAL at 01:16

## 2023-01-01 RX ADMIN — MORPHINE SULFATE 4 MILLIGRAM(S): 50 CAPSULE, EXTENDED RELEASE ORAL at 17:41

## 2023-01-01 RX ADMIN — MORPHINE SULFATE 3 MILLIGRAM(S): 50 CAPSULE, EXTENDED RELEASE ORAL at 07:45

## 2023-01-01 RX ADMIN — CEFTRIAXONE 100 MILLIGRAM(S): 500 INJECTION, POWDER, FOR SOLUTION INTRAMUSCULAR; INTRAVENOUS at 00:49

## 2023-01-01 RX ADMIN — Medication 25 MILLIGRAM(S): at 05:20

## 2023-01-01 RX ADMIN — Medication 650 MILLIGRAM(S): at 23:00

## 2023-01-01 RX ADMIN — AMLODIPINE BESYLATE 10 MILLIGRAM(S): 2.5 TABLET ORAL at 05:11

## 2023-01-01 RX ADMIN — LIDOCAINE 10 MILLILITER(S): 4 CREAM TOPICAL at 05:17

## 2023-01-01 RX ADMIN — ZOLPIDEM TARTRATE 5 MILLIGRAM(S): 10 TABLET ORAL at 21:09

## 2023-01-01 RX ADMIN — MORPHINE SULFATE 4 MILLIGRAM(S): 50 CAPSULE, EXTENDED RELEASE ORAL at 13:14

## 2023-01-01 RX ADMIN — Medication 145 MILLIGRAM(S): at 16:30

## 2023-01-01 RX ADMIN — MIRTAZAPINE 15 MILLIGRAM(S): 45 TABLET, ORALLY DISINTEGRATING ORAL at 21:03

## 2023-01-01 RX ADMIN — APIXABAN 5 MILLIGRAM(S): 2.5 TABLET, FILM COATED ORAL at 19:23

## 2023-01-01 RX ADMIN — MORPHINE SULFATE 4 MILLIGRAM(S): 50 CAPSULE, EXTENDED RELEASE ORAL at 02:59

## 2023-01-01 RX ADMIN — ENOXAPARIN SODIUM 40 MILLIGRAM(S): 100 INJECTION SUBCUTANEOUS at 05:48

## 2023-01-01 RX ADMIN — LOSARTAN POTASSIUM 100 MILLIGRAM(S): 100 TABLET, FILM COATED ORAL at 06:27

## 2023-01-01 RX ADMIN — Medication 400 MILLIGRAM(S): at 01:41

## 2023-01-01 RX ADMIN — SODIUM CHLORIDE 1 GRAM(S): 9 INJECTION INTRAMUSCULAR; INTRAVENOUS; SUBCUTANEOUS at 12:33

## 2023-01-01 RX ADMIN — Medication 1 CAPSULE(S): at 10:06

## 2023-01-01 RX ADMIN — ZOLPIDEM TARTRATE 5 MILLIGRAM(S): 10 TABLET ORAL at 21:37

## 2023-01-01 RX ADMIN — Medication 30 MILLILITER(S): at 22:08

## 2023-01-01 RX ADMIN — MORPHINE SULFATE 6 MILLIGRAM(S): 50 CAPSULE, EXTENDED RELEASE ORAL at 05:34

## 2023-01-01 RX ADMIN — SODIUM CHLORIDE 2 GRAM(S): 9 INJECTION INTRAMUSCULAR; INTRAVENOUS; SUBCUTANEOUS at 13:29

## 2023-01-01 RX ADMIN — PANTOPRAZOLE SODIUM 40 MILLIGRAM(S): 20 TABLET, DELAYED RELEASE ORAL at 05:17

## 2023-01-01 RX ADMIN — Medication 145 MILLIGRAM(S): at 17:12

## 2023-01-01 RX ADMIN — MORPHINE SULFATE 2 MILLIGRAM(S): 50 CAPSULE, EXTENDED RELEASE ORAL at 14:32

## 2023-01-01 RX ADMIN — CHLORHEXIDINE GLUCONATE 1 APPLICATION(S): 213 SOLUTION TOPICAL at 15:12

## 2023-01-01 RX ADMIN — CELECOXIB 200 MILLIGRAM(S): 200 CAPSULE ORAL at 11:46

## 2023-01-01 RX ADMIN — Medication 300 UNIT(S): at 14:41

## 2023-01-01 RX ADMIN — Medication 1 CAPSULE(S): at 08:45

## 2023-01-01 RX ADMIN — GABAPENTIN 100 MILLIGRAM(S): 400 CAPSULE ORAL at 15:10

## 2023-01-01 RX ADMIN — MORPHINE SULFATE 4 MILLIGRAM(S): 50 CAPSULE, EXTENDED RELEASE ORAL at 08:27

## 2023-01-01 RX ADMIN — METHADONE HYDROCHLORIDE 4 MILLIGRAM(S): 40 TABLET ORAL at 05:21

## 2023-01-01 RX ADMIN — MORPHINE SULFATE 2 MILLIGRAM(S): 50 CAPSULE, EXTENDED RELEASE ORAL at 17:32

## 2023-01-01 RX ADMIN — ENOXAPARIN SODIUM 40 MILLIGRAM(S): 100 INJECTION SUBCUTANEOUS at 21:36

## 2023-01-01 RX ADMIN — Medication 1 CAPSULE(S): at 12:12

## 2023-01-01 RX ADMIN — DIPHENHYDRAMINE HYDROCHLORIDE AND LIDOCAINE HYDROCHLORIDE AND ALUMINUM HYDROXIDE AND MAGNESIUM HYDRO 10 MILLILITER(S): KIT at 05:11

## 2023-01-01 RX ADMIN — DIPHENHYDRAMINE HYDROCHLORIDE AND LIDOCAINE HYDROCHLORIDE AND ALUMINUM HYDROXIDE AND MAGNESIUM HYDRO 10 MILLILITER(S): KIT at 08:14

## 2023-01-01 RX ADMIN — Medication 3 MILLIGRAM(S): at 02:08

## 2023-01-01 RX ADMIN — MORPHINE SULFATE 4 MILLIGRAM(S): 50 CAPSULE, EXTENDED RELEASE ORAL at 06:37

## 2023-01-01 RX ADMIN — PANTOPRAZOLE SODIUM 40 MILLIGRAM(S): 20 TABLET, DELAYED RELEASE ORAL at 05:48

## 2023-01-01 RX ADMIN — DIPHENHYDRAMINE HYDROCHLORIDE AND LIDOCAINE HYDROCHLORIDE AND ALUMINUM HYDROXIDE AND MAGNESIUM HYDRO 10 MILLILITER(S): KIT at 02:24

## 2023-01-01 RX ADMIN — GABAPENTIN 100 MILLIGRAM(S): 400 CAPSULE ORAL at 21:09

## 2023-01-01 RX ADMIN — PANTOPRAZOLE SODIUM 40 MILLIGRAM(S): 20 TABLET, DELAYED RELEASE ORAL at 06:27

## 2023-01-01 RX ADMIN — MORPHINE SULFATE 6 MILLIGRAM(S): 50 CAPSULE, EXTENDED RELEASE ORAL at 13:25

## 2023-01-01 RX ADMIN — MORPHINE SULFATE 4 MILLIGRAM(S): 50 CAPSULE, EXTENDED RELEASE ORAL at 20:57

## 2023-01-01 RX ADMIN — LOSARTAN POTASSIUM 100 MILLIGRAM(S): 100 TABLET, FILM COATED ORAL at 05:07

## 2023-01-01 RX ADMIN — MORPHINE SULFATE 2 MILLIGRAM(S): 50 CAPSULE, EXTENDED RELEASE ORAL at 02:17

## 2023-01-01 RX ADMIN — MORPHINE SULFATE 6 MILLIGRAM(S): 50 CAPSULE, EXTENDED RELEASE ORAL at 22:33

## 2023-01-01 RX ADMIN — MORPHINE SULFATE 4 MILLIGRAM(S): 50 CAPSULE, EXTENDED RELEASE ORAL at 21:41

## 2023-01-01 RX ADMIN — METHADONE HYDROCHLORIDE 2 MILLIGRAM(S): 40 TABLET ORAL at 05:10

## 2023-01-01 RX ADMIN — DIPHENHYDRAMINE HYDROCHLORIDE AND LIDOCAINE HYDROCHLORIDE AND ALUMINUM HYDROXIDE AND MAGNESIUM HYDRO 10 MILLILITER(S): KIT at 23:24

## 2023-01-01 RX ADMIN — Medication 25 MILLIGRAM(S): at 05:26

## 2023-01-01 RX ADMIN — ATORVASTATIN CALCIUM 40 MILLIGRAM(S): 80 TABLET, FILM COATED ORAL at 20:49

## 2023-01-01 RX ADMIN — SODIUM CHLORIDE 1000 MILLILITER(S): 9 INJECTION INTRAMUSCULAR; INTRAVENOUS; SUBCUTANEOUS at 01:41

## 2023-01-01 RX ADMIN — AMLODIPINE BESYLATE 5 MILLIGRAM(S): 2.5 TABLET ORAL at 17:14

## 2023-01-01 RX ADMIN — CELECOXIB 200 MILLIGRAM(S): 200 CAPSULE ORAL at 17:30

## 2023-01-01 RX ADMIN — Medication 1 CAPSULE(S): at 08:12

## 2023-01-01 RX ADMIN — MORPHINE SULFATE 4 MILLIGRAM(S): 50 CAPSULE, EXTENDED RELEASE ORAL at 03:00

## 2023-01-01 RX ADMIN — Medication 1 CAPSULE(S): at 09:52

## 2023-01-01 RX ADMIN — GABAPENTIN 100 MILLIGRAM(S): 400 CAPSULE ORAL at 21:56

## 2023-01-01 RX ADMIN — Medication 650 MILLIGRAM(S): at 08:54

## 2023-01-01 RX ADMIN — GABAPENTIN 100 MILLIGRAM(S): 400 CAPSULE ORAL at 06:26

## 2023-01-01 RX ADMIN — APIXABAN 5 MILLIGRAM(S): 2.5 TABLET, FILM COATED ORAL at 17:27

## 2023-01-01 RX ADMIN — MIRTAZAPINE 15 MILLIGRAM(S): 45 TABLET, ORALLY DISINTEGRATING ORAL at 21:48

## 2023-01-01 RX ADMIN — MORPHINE SULFATE 3 MILLIGRAM(S): 50 CAPSULE, EXTENDED RELEASE ORAL at 12:44

## 2023-01-01 RX ADMIN — ZOLPIDEM TARTRATE 5 MILLIGRAM(S): 10 TABLET ORAL at 21:18

## 2023-01-01 RX ADMIN — ZOLPIDEM TARTRATE 5 MILLIGRAM(S): 10 TABLET ORAL at 22:02

## 2023-01-01 RX ADMIN — Medication 1 CAPSULE(S): at 13:04

## 2023-01-01 RX ADMIN — LIDOCAINE 10 MILLILITER(S): 4 CREAM TOPICAL at 05:45

## 2023-01-01 RX ADMIN — LIDOCAINE 10 MILLILITER(S): 4 CREAM TOPICAL at 00:11

## 2023-01-01 RX ADMIN — CELECOXIB 200 MILLIGRAM(S): 200 CAPSULE ORAL at 14:51

## 2023-01-01 RX ADMIN — MORPHINE SULFATE 6 MILLIGRAM(S): 50 CAPSULE, EXTENDED RELEASE ORAL at 16:14

## 2023-01-01 RX ADMIN — LOSARTAN POTASSIUM 100 MILLIGRAM(S): 100 TABLET, FILM COATED ORAL at 05:48

## 2023-01-01 RX ADMIN — Medication 145 MILLIGRAM(S): at 14:53

## 2023-01-01 RX ADMIN — GABAPENTIN 100 MILLIGRAM(S): 400 CAPSULE ORAL at 21:03

## 2023-01-01 RX ADMIN — LIDOCAINE 10 MILLILITER(S): 4 CREAM TOPICAL at 13:57

## 2023-01-01 RX ADMIN — GABAPENTIN 100 MILLIGRAM(S): 400 CAPSULE ORAL at 05:18

## 2023-01-01 RX ADMIN — DIPHENHYDRAMINE HYDROCHLORIDE AND LIDOCAINE HYDROCHLORIDE AND ALUMINUM HYDROXIDE AND MAGNESIUM HYDRO 10 MILLILITER(S): KIT at 18:08

## 2023-01-01 RX ADMIN — CELECOXIB 200 MILLIGRAM(S): 200 CAPSULE ORAL at 13:56

## 2023-01-01 RX ADMIN — DIPHENHYDRAMINE HYDROCHLORIDE AND LIDOCAINE HYDROCHLORIDE AND ALUMINUM HYDROXIDE AND MAGNESIUM HYDRO 10 MILLILITER(S): KIT at 06:25

## 2023-01-01 RX ADMIN — MORPHINE SULFATE 3 MILLIGRAM(S): 50 CAPSULE, EXTENDED RELEASE ORAL at 15:08

## 2023-01-01 RX ADMIN — Medication 145 MILLIGRAM(S): at 21:41

## 2023-01-01 RX ADMIN — ZOLPIDEM TARTRATE 5 MILLIGRAM(S): 10 TABLET ORAL at 23:25

## 2023-01-01 RX ADMIN — HEPARIN SODIUM 900 UNIT(S)/HR: 5000 INJECTION INTRAVENOUS; SUBCUTANEOUS at 19:58

## 2023-01-01 RX ADMIN — POLYETHYLENE GLYCOL 3350 17 GRAM(S): 17 POWDER, FOR SOLUTION ORAL at 08:57

## 2023-01-01 RX ADMIN — Medication 1 CAPSULE(S): at 17:12

## 2023-01-01 RX ADMIN — OXYCODONE HYDROCHLORIDE 10 MILLIGRAM(S): 5 TABLET ORAL at 17:32

## 2023-01-01 RX ADMIN — Medication 2: at 12:44

## 2023-01-01 RX ADMIN — MORPHINE SULFATE 3 MILLIGRAM(S): 50 CAPSULE, EXTENDED RELEASE ORAL at 20:26

## 2023-01-01 RX ADMIN — MIRTAZAPINE 15 MILLIGRAM(S): 45 TABLET, ORALLY DISINTEGRATING ORAL at 21:09

## 2023-01-01 RX ADMIN — MORPHINE SULFATE 2 MILLIGRAM(S): 50 CAPSULE, EXTENDED RELEASE ORAL at 02:02

## 2023-01-01 RX ADMIN — METHADONE HYDROCHLORIDE 2 MILLIGRAM(S): 40 TABLET ORAL at 17:08

## 2023-01-01 RX ADMIN — HEPARIN SODIUM 900 UNIT(S)/HR: 5000 INJECTION INTRAVENOUS; SUBCUTANEOUS at 19:30

## 2023-01-01 RX ADMIN — METHADONE HYDROCHLORIDE 4 MILLIGRAM(S): 40 TABLET ORAL at 05:04

## 2023-01-01 RX ADMIN — APIXABAN 10 MILLIGRAM(S): 2.5 TABLET, FILM COATED ORAL at 18:17

## 2023-01-01 RX ADMIN — GABAPENTIN 100 MILLIGRAM(S): 400 CAPSULE ORAL at 05:26

## 2023-01-01 RX ADMIN — LIDOCAINE 10 MILLILITER(S): 4 CREAM TOPICAL at 23:04

## 2023-01-01 RX ADMIN — Medication 1: at 18:12

## 2023-01-01 RX ADMIN — MORPHINE SULFATE 4 MILLIGRAM(S): 50 CAPSULE, EXTENDED RELEASE ORAL at 02:07

## 2023-01-01 RX ADMIN — CELECOXIB 200 MILLIGRAM(S): 200 CAPSULE ORAL at 12:33

## 2023-01-01 RX ADMIN — ATORVASTATIN CALCIUM 40 MILLIGRAM(S): 80 TABLET, FILM COATED ORAL at 22:09

## 2023-01-01 RX ADMIN — APIXABAN 10 MILLIGRAM(S): 2.5 TABLET, FILM COATED ORAL at 05:25

## 2023-01-01 RX ADMIN — LOSARTAN POTASSIUM 100 MILLIGRAM(S): 100 TABLET, FILM COATED ORAL at 05:11

## 2023-01-01 RX ADMIN — DIPHENHYDRAMINE HYDROCHLORIDE AND LIDOCAINE HYDROCHLORIDE AND ALUMINUM HYDROXIDE AND MAGNESIUM HYDRO 10 MILLILITER(S): KIT at 17:42

## 2023-01-01 RX ADMIN — Medication 650 MILLIGRAM(S): at 05:15

## 2023-01-01 RX ADMIN — Medication 1 SPRAY(S): at 10:01

## 2023-01-01 RX ADMIN — Medication 650 MILLIGRAM(S): at 13:03

## 2023-01-01 RX ADMIN — GABAPENTIN 100 MILLIGRAM(S): 400 CAPSULE ORAL at 13:31

## 2023-01-01 RX ADMIN — CELECOXIB 100 MILLIGRAM(S): 200 CAPSULE ORAL at 15:31

## 2023-01-01 RX ADMIN — Medication 650 MILLIGRAM(S): at 21:07

## 2023-01-01 RX ADMIN — SODIUM CHLORIDE 2 GRAM(S): 9 INJECTION INTRAMUSCULAR; INTRAVENOUS; SUBCUTANEOUS at 15:16

## 2023-01-01 RX ADMIN — MORPHINE SULFATE 3 MILLIGRAM(S): 50 CAPSULE, EXTENDED RELEASE ORAL at 07:04

## 2023-01-01 RX ADMIN — METHADONE HYDROCHLORIDE 4 MILLIGRAM(S): 40 TABLET ORAL at 05:13

## 2023-01-01 RX ADMIN — Medication 1 PACKET(S): at 08:27

## 2023-01-01 RX ADMIN — MORPHINE SULFATE 6 MILLIGRAM(S): 50 CAPSULE, EXTENDED RELEASE ORAL at 10:30

## 2023-01-01 RX ADMIN — Medication 3 MILLIGRAM(S): at 15:22

## 2023-01-01 RX ADMIN — MORPHINE SULFATE 2 MILLIGRAM(S): 50 CAPSULE, EXTENDED RELEASE ORAL at 09:57

## 2023-01-01 RX ADMIN — Medication 1 CAPSULE(S): at 12:20

## 2023-01-01 RX ADMIN — METHADONE HYDROCHLORIDE 4 MILLIGRAM(S): 40 TABLET ORAL at 05:12

## 2023-01-01 RX ADMIN — MORPHINE SULFATE 2 MILLIGRAM(S): 50 CAPSULE, EXTENDED RELEASE ORAL at 20:50

## 2023-01-01 RX ADMIN — MIRTAZAPINE 15 MILLIGRAM(S): 45 TABLET, ORALLY DISINTEGRATING ORAL at 21:56

## 2023-01-01 RX ADMIN — MORPHINE SULFATE 6 MILLIGRAM(S): 50 CAPSULE, EXTENDED RELEASE ORAL at 11:07

## 2023-01-01 RX ADMIN — CHLORHEXIDINE GLUCONATE 1 APPLICATION(S): 213 SOLUTION TOPICAL at 14:54

## 2023-01-01 RX ADMIN — CELECOXIB 200 MILLIGRAM(S): 200 CAPSULE ORAL at 13:30

## 2023-01-01 RX ADMIN — CELECOXIB 200 MILLIGRAM(S): 200 CAPSULE ORAL at 12:20

## 2023-01-01 RX ADMIN — Medication 81 MILLIGRAM(S): at 13:02

## 2023-01-01 RX ADMIN — GABAPENTIN 100 MILLIGRAM(S): 400 CAPSULE ORAL at 16:14

## 2023-01-01 RX ADMIN — MORPHINE SULFATE 2 MILLIGRAM(S): 50 CAPSULE, EXTENDED RELEASE ORAL at 07:38

## 2023-01-01 RX ADMIN — HEPARIN SODIUM 900 UNIT(S)/HR: 5000 INJECTION INTRAVENOUS; SUBCUTANEOUS at 07:26

## 2023-01-01 RX ADMIN — MORPHINE SULFATE 2 MILLIGRAM(S): 50 CAPSULE, EXTENDED RELEASE ORAL at 03:00

## 2023-01-01 RX ADMIN — MORPHINE SULFATE 2 MILLIGRAM(S): 50 CAPSULE, EXTENDED RELEASE ORAL at 14:46

## 2023-01-01 RX ADMIN — MORPHINE SULFATE 4 MILLIGRAM(S): 50 CAPSULE, EXTENDED RELEASE ORAL at 12:40

## 2023-01-01 RX ADMIN — GABAPENTIN 100 MILLIGRAM(S): 400 CAPSULE ORAL at 21:29

## 2023-01-01 RX ADMIN — APIXABAN 10 MILLIGRAM(S): 2.5 TABLET, FILM COATED ORAL at 18:13

## 2023-01-01 RX ADMIN — METHADONE HYDROCHLORIDE 2 MILLIGRAM(S): 40 TABLET ORAL at 18:12

## 2023-01-01 RX ADMIN — DIPHENHYDRAMINE HYDROCHLORIDE AND LIDOCAINE HYDROCHLORIDE AND ALUMINUM HYDROXIDE AND MAGNESIUM HYDRO 10 MILLILITER(S): KIT at 23:08

## 2023-01-01 RX ADMIN — GABAPENTIN 100 MILLIGRAM(S): 400 CAPSULE ORAL at 05:06

## 2023-01-01 RX ADMIN — PANTOPRAZOLE SODIUM 40 MILLIGRAM(S): 20 TABLET, DELAYED RELEASE ORAL at 06:10

## 2023-01-01 RX ADMIN — GABAPENTIN 100 MILLIGRAM(S): 400 CAPSULE ORAL at 05:13

## 2023-01-01 RX ADMIN — CEFEPIME 100 MILLIGRAM(S): 1 INJECTION, POWDER, FOR SOLUTION INTRAMUSCULAR; INTRAVENOUS at 05:24

## 2023-01-01 RX ADMIN — MORPHINE SULFATE 6 MILLIGRAM(S): 50 CAPSULE, EXTENDED RELEASE ORAL at 03:29

## 2023-01-01 RX ADMIN — DIPHENHYDRAMINE HYDROCHLORIDE AND LIDOCAINE HYDROCHLORIDE AND ALUMINUM HYDROXIDE AND MAGNESIUM HYDRO 10 MILLILITER(S): KIT at 18:19

## 2023-01-01 RX ADMIN — MORPHINE SULFATE 3 MILLIGRAM(S): 50 CAPSULE, EXTENDED RELEASE ORAL at 21:06

## 2023-01-01 RX ADMIN — LIDOCAINE 10 MILLILITER(S): 4 CREAM TOPICAL at 05:04

## 2023-01-01 RX ADMIN — GABAPENTIN 100 MILLIGRAM(S): 400 CAPSULE ORAL at 15:15

## 2023-01-01 RX ADMIN — MORPHINE SULFATE 4 MILLIGRAM(S): 50 CAPSULE, EXTENDED RELEASE ORAL at 21:24

## 2023-01-01 RX ADMIN — Medication 1 CAPSULE(S): at 08:28

## 2023-01-01 RX ADMIN — Medication 25 MILLIGRAM(S): at 05:10

## 2023-01-01 RX ADMIN — Medication 25 MILLIGRAM(S): at 05:14

## 2023-01-01 RX ADMIN — Medication 145 MILLIGRAM(S): at 12:35

## 2023-01-01 RX ADMIN — MORPHINE SULFATE 2 MILLIGRAM(S): 50 CAPSULE, EXTENDED RELEASE ORAL at 08:10

## 2023-01-01 RX ADMIN — CELECOXIB 200 MILLIGRAM(S): 200 CAPSULE ORAL at 16:30

## 2023-01-01 RX ADMIN — MIRTAZAPINE 15 MILLIGRAM(S): 45 TABLET, ORALLY DISINTEGRATING ORAL at 21:32

## 2023-01-01 RX ADMIN — MORPHINE SULFATE 6 MILLIGRAM(S): 50 CAPSULE, EXTENDED RELEASE ORAL at 22:50

## 2023-01-01 RX ADMIN — Medication 1: at 12:38

## 2023-01-01 RX ADMIN — MORPHINE SULFATE 2 MILLIGRAM(S): 50 CAPSULE, EXTENDED RELEASE ORAL at 12:33

## 2023-01-01 RX ADMIN — PANTOPRAZOLE SODIUM 40 MILLIGRAM(S): 20 TABLET, DELAYED RELEASE ORAL at 05:09

## 2023-01-01 RX ADMIN — Medication 81 MILLIGRAM(S): at 13:47

## 2023-01-01 RX ADMIN — GABAPENTIN 100 MILLIGRAM(S): 400 CAPSULE ORAL at 12:27

## 2023-01-01 RX ADMIN — CEFEPIME 100 MILLIGRAM(S): 1 INJECTION, POWDER, FOR SOLUTION INTRAMUSCULAR; INTRAVENOUS at 05:17

## 2023-01-02 NOTE — ASSESSMENT
[FreeTextEntry1] : The visit was provided via telehealth using real-time 2-way audio visual technology. The patient, Toyin Herrera, was located at the medical office in [place], [place] at the time of the visit. The Genetic Counselor, Melchor Chacon, was with the patient in person at [place], [place]. The Physician, Dr. Graham Francois, was located in [place], [place] at the time of the visit. The patient, Toyin Herrera, and both providers participated in the telehealth encounter. Her  and daughter also participated. Verbal consent for telehealth services was given on 2022 by the patient, Toyin Herrera. \par \par REASON FOR CONSULT\par Toyin Herrera is a 77-year-old female who was referred by Dr. Juan David for cancer genetic counseling and a discussion regarding her BRCA2 variant of uncertain significance (VUS) genetic testing results. She was accompanied by her , Lauro, and daughter, Namrata. This consultation was carried out with the aid of a [place]  #[id number].\par \par RELEVANT MEDICAL HISTORY\par Ms. Herrera was diagnosed with metastatic pancreatic cancer (stage 3) in 2022  at age 76. She was treated with a Whipple 2022 and is currently undergoing chemotherapy.  Ms. Herrera pursued genetic testing using [employer]’s Common Hereditary Cancers Panel (47 genes) and a variant of uncertain significance (VUS) was detected in the BRCA2 gene (c.1964C>A; p.[id number]). This test was ordered by Dr. David and Viridiana Cantrell and reported on 2022. \par \par Of note, [facility] [place] somatic tumor testing reported MSI-H was not detected, and noted genomic alterations and variants of unknown significance (VUS) in several genes, including: DNMT3A (N879fs*17 & V877G), BRCA2 (P655Q) and MSH2 (I169V) (2022).\par \par OTHER MEDICAL AND SURGICAL HISTORY:\par HTN. HLD. CAD. Hepatitis C. Arthritis. T2 diabetes (). Stent (). . HYS d/t to cyst/abnormal finding (patient unsure if ovaries were also removed, ). \par \par PAST OB/GYN HISTORY:\par Obstetrical History: \par Age at Menarche: 15\par Menopausal with LMP at age 49\par Age at First Live Birth: 20\par Contraceptive Use: Yes, oral for 2 years and IUD for 7 years.\par Hormone Replacement Therapy: No\par \par CANCER SCREENING HISTORY:  \par Breast: Last mammogram 3 years ago, normal. \par GYN: Last visit over 5 years ago, normal. \par Colon: Last colonoscopy 2 years ago, few noncancerous polyps reported. Frequency: every 5 years \par Skin: Last exam  d/t psoriasis, no biopsies reported. \par \par SOCIAL HISTORY:\par •	\par •	Tobacco-product use: No\par •	Second-hand smoke exposure:  smoked\par \par FAMILY HISTORY:\par Maternal and paternal ancestry was reported as Mohawk. No Ashkenazi Sikhism heritage reported. A detailed family history of cancer was ascertained, see below and scanned chart for pedigree. \par \par To Ms. Herrera’s knowledge, no one else in the family has had germline testing for cancer susceptibility.  \par 	\par RESULTS INTERPRETATION AND ASSESSMENT:\par No known disease-causing mutations were identified in any of the 47 genes tested. A variant of uncertain significance (VUS) was detected in the BRCA2 gene. At this time the available evidence is insufficient to determine the role of this variant in disease and the clinical significance of this result is uncertain. Individuals with a pathogenic mutation in BRCA2 may have an increased risk for cancers associated with Hereditary Breast and Ovarian Cancer (HBOC) syndrome. It is unknown if the patient has an increased risk for the cancers associated with BRCA2 at this time. However, given the the information currently available regarding this BRCA2 variant and the family history, it is unlikely that Ms. Herrera’s diagnosis is due to a hereditary cause.\par \par Given Ms. Herrera’s personal and current reported family history of cancer, and her negative genetic test results, long-term management and surveillance should be based on her on- or post-treatment protocol as recommended by her oncologist. \par \par The detection of this VUS should NOT currently change the patient’s medical management. It is NOT recommended at this time that family members use this result for predictive genetic testing or medical management decisions. With more research, a VUS may be reclassified as either disease-causing or benign. The patient was encouraged to contact us every 2-3 years to enquire about any new information for this variant, or sooner if there are any changes in her personal or family history of cancer.  \par \par We also discussed that, while no clear cause of the patient’s personal and family history of cancer was identified, this result, while reassuring, does entirely not rule out a hereditary cancer risk in the patient. It is possible, although unlikely, the patient has a mutation in one of the genes tested that is not detectable by this analysis, or has a mutation in a different gene, either known or unknown. \par \par PLAN:\par 1.	See above for management recommendations.\par 2.	Testing of family members for this VUS is not recommended.\par 3.	The patient and her family members were encouraged to contact us every 2-3 years to check on any changes in interpretation of the VUS, or sooner if there are changes in her personal or family history of cancer.\par 4.	Patient informed consult note(s) will be available through their [facility] patient portal. \par \par For any additional questions please call Cancer Genetics at [phone]. \par \par \par Melchor Chacon MS, [facility] [place]par Genetic Counselor, Cancer Genetics\par \par \par Attending Attestation:\par \par I have reviewed and edited the genetic counselor's note and I agree with the assessment and plan as documented. I spent approximately 30-35 minutes in total time of which approximately 15-20 minutes was face-to-face (via 2-way audiovisual telemedicine connection) with Ms. Herrera reviewing her relevant personal and family history, the genetic testing results, our risk-assessment, and options for future cancer risk-reduction for the patient and her relevant family members. Over half this time was spent in counseling and coordination of care.\par \par Graham Francois MD\par Chief, Cancer Genetics\par [facility] [place]\par \par \par CC: \par Patient\par Dr. Juan David

## 2023-01-12 NOTE — ASSESSMENT
[FreeTextEntry1] : 76 y/o woman with resected pancreatic cancer pT3N2 PNI+/LVI+ disease, margin negative. \par \par Post-op imaging demonstrated lung lesions c/w metastasis. Considered biopsy of one, but it is very small, sub-centimeter, and discussion with radiology noted likely under size threshold for biopsy. In addition, would not change plan to proceed with FOLFIRINOX as even if patient had histologic confirmation would not qualify for GIANT trial due to language barrier. Will proceed with FOLFIRINOX. Also discussed with patient that [facility], now a [facility], can provide treatment a bit closer to home, but she declined this at this time. Thus we proceeded with FOLFIRINOX. There is concern now that she has relapsed given her rising tumor markers and positive Signatera\par \par Plan:\par \par #Pancreatic Cancer\par - S/P 5 cycles of  FOLFIRINOX every other week- \par Switched  chemo to FOLFOX every 2 weeks dose reduced Oxaliplatin sec to neuropathy \par She is having Grade 2 neuropathy, tingling persistent in both her hands finger tips and on her toes .\par She is doing better with IVF in between chemo infusions.\par Since her neuropathy is worsening - Discussed with Dr david and  stopped  Oxaliplatin on 12/05/22.\par Concern for rising tumor markers - Had planned to  proceed with CT guided lung BX - \par Per IR - Given size and location of the nodules there is a high likelihood of a non-diagnostic biopsy.\par The largest is about 1cm and in a difficult and dangerous location with high likelihood of complication\par Discussed with Dr david and IR - plan to hold off on the biopsy for now- \par - now switching to gemcitabine/nab-paclitaxel\par \par GnP regimen, every other week\par - gemcitabine 800 mg/m2\par - nab-paclitaxel 100 mg/m2\par \par Loss of appetite : \par added Remeron to boost appetite \par Refer to Pall team for medical marijuana \par \par -[facility] reveals BRCA2 Heterogeneous uncertain. Referred  to Dr Dumont genetics counselling \par - patient is not a candidate for the Elicio trial on account of having 4 lesions (more than 3) in the lung\par \par Eliel David MD PhD\par Medical Oncology Attending\par I personally have spent a total of 30 minutes of time on the date of this encounter reviewing test results, documenting findings, coordinating care, and directly consulting with the patient and/or designated family member.\par

## 2023-01-12 NOTE — PHYSICAL EXAM
[Restricted in physically strenuous activity but ambulatory and able to carry out work of a light or sedentary nature] : Status 1- Restricted in physically strenuous activity but ambulatory and able to carry out work of a light or sedentary nature, e.g., light house work, office work [Thin] : thin [Normal] : normal appearance, no rash, nodules, vesicles, ulcers, erythema [Ulcers] : no ulcers [Mucositis] : no mucositis [Thrush] : no thrush [Vesicles] : no vesicles [de-identified] : Post op incision scar helaing well  [de-identified] : occasional lower back pain  [de-identified] : pins and needles on both hands, dropping things lately

## 2023-01-12 NOTE — REASON FOR VISIT
[Follow-Up Visit] : a follow-up [Family Member] : family member [[employer] ] : provided by [employer]   [FreeTextEntry2] : Pancreatic adenocarcinoma  [Interpreters_IDNumber] : [id number] [Interpreters_FullName] : Janet

## 2023-01-12 NOTE — HISTORY OF PRESENT ILLNESS
[de-identified] : ID: 76 y/o Azeri-speaking woman with resected pancreatic cancer pT3N2 PNI+/LVI+ disease, margin negative, presenting for adjuvant treatment discussion\par \par Other Current Medical Problems:  IDDM diagnosed >20 years, HTN, HLD, CAD, Cardiac stent 2019  , chronic hep C (S/P treatment) arthritis , \par \par  Oncological History : \par May 2022: Presented with RUQ abdominal pain intermittently x 1 week and was found to be jaundiced presenting with a T bili of 15 and Ca 19.9 was 6704.\par  05/20/22: CT A/P - 2.1 cms pancreatic head mass with resultant abrupt cut off the CBD. Ct chest with several indeterminate pulmonary nodules. \par  05/20/22- S/P EUS  with FNB pathology + for moderately differntiated adenocarcinoma  05/23/22- S/P ERCP- Plastic stent into CBD  \par 06/13/22- Upfront Whipple surgery: Surgical pathology - Moderately differentiated adenocarcinoma , negative margins 5/17 LN +ve.\par  07/19/22 CT CAP no e/o local disease; however scattered pulmonary nodules, some of which are new.  \par 08/01/22- C # 1 FOLFORINOX - 5FU 2000 mg/m2/  mg/ m2/ Irinotecan 120 mg/ m2/ Oxaliplatin 70 mg/ m2 \par  08/15/22- C # 2 FOLFORINOX - 5FU 2000 mg/m2/  mg/ m2/ Irinotecan 120 mg/ m2/ Oxaliplatin 70 mg/ m2  \par 08/29/22- C # 3 FOLFORINOX - 5FU 2000 mg/m2/  mg/ m2/ Irinotecan 120 mg/ m2/ Oxaliplatin 70 mg/ m2  \par 09/12/22- C # 4  FOLFORINOX - 5FU 2000 mg/m2/  mg/ m2/ Irinotecan 120 mg/ m2/ Oxaliplatin 70 mg/ m2  \par 09/14/22 Restaging scans show no evidence of POD or metastatic disease\par \par 09/26/22- C #5 FOLFORINOX - 5FU 2000 mg/m2/  mg/ m2/ Irinotecan 75 mg/ m2/ Oxaliplatin 70 mg/ m2  \par 10/10/22- C # 6 FOLFOX-  - 5FU 2000 mg/m2/  mg/Oxaliplatin 55 mg/m2 -( stop Irinotecan sec to diarrhea) \par 10/24/22- C # 7 FOLFOX 5FU 2000 mg/m2/  mg/Oxaliplatin 55 mg/m2\par 11/07/22- C # 8- FOLFOX \par \par 11/16/22: Restaging CT scans--Mild increase in size of several previously seen noncalcified pulmonary nodules as described above\par No imaging evidence of locally recurrent or metastatic disease involving the abdomen or pelvis.\par \par 11/21/22- C # 9 FOLFOX\par 12/05/22- C # 10 only 5 FU pump- (stopped Oxali sec to worsening neuropathy) \par 12/19/22- C # 11- 5 FU pump only \par 01/03/23- C # 12 - 5 FU pump only \par \par 01/05/23- CT A/P/C Multiple bilateral pulmonary nodules, some of which are increased in size from the prior exam.New mild omental nodularity and peritoneal thickening, suspicious for peritoneal carcinomatosis.\par \par    PMD:  Martínez -  \par \par   FHX: Brother brain aneurysm diagnosed at 50, Sister had Aneuresym ,daughter Leukemia (27 years old) \par  \par Social HX: Lives with , Apartment, no smoker, no alcohol, walks about 30 mts 2-3 times a day.\par \par    PSX:  Total hysterectomy , Whipple procedure (06/13/22)    \par \par Disease:  Pancreatic adenocarcinoma Pathology:  AJCC Stage :  Tumor Markers: CEA/ Ca 19.9   [de-identified] : [facility]- BRCA2 Variant heterozygous- c.1964C>A \par Signatera -08/02/22 was positive - 0.58\par                 09/12/22-    0.14\par                09/20/22- 0.44\par Foundation one CDx-  KAILEY, 2 muts/ Mb, KRAS Q61H,CDKN2A, CSF3R amplification [de-identified] : 01/12/23:\par Pt is seen and examined in office accompanied by her  and daughter. \par She denies N/V/D\par Neuropathy is same- not worse \par Diarrhea happens 1-2 days after chemo and improves \par Review most recent scans with progression of disease \par Reviewd the most recent rising tumor markers and concern for increasing lung nodules.\par \par \par \par \par \par \par

## 2023-01-24 NOTE — HISTORY OF PRESENT ILLNESS
[de-identified] : ID: 78 y/o Urdu-speaking woman with resected pancreatic cancer pT3N2 PNI+/LVI+ disease, margin negative, presenting for adjuvant treatment discussion\par \par Other Current Medical Problems:  IDDM diagnosed >20 years, HTN, HLD, CAD, Cardiac stent 2019  , chronic hep C (S/P treatment) arthritis , \par \par  Oncological History : \par May 2022: Presented with RUQ abdominal pain intermittently x 1 week and was found to be jaundiced presenting with a T bili of 15 and Ca 19.9 was 6704.\par  05/20/22: CT A/P - 2.1 cms pancreatic head mass with resultant abrupt cut off the CBD. Ct chest with several indeterminate pulmonary nodules. \par  05/20/22- S/P EUS  with FNB pathology + for moderately differntiated adenocarcinoma  05/23/22- S/P ERCP- Plastic stent into CBD  \par 06/13/22- Upfront Whipple surgery: Surgical pathology - Moderately differentiated adenocarcinoma , negative margins 5/17 LN +ve.\par  07/19/22 CT CAP no e/o local disease; however scattered pulmonary nodules, some of which are new.  \par 08/01/22- C # 1 FOLFORINOX - 5FU 2000 mg/m2/  mg/ m2/ Irinotecan 120 mg/ m2/ Oxaliplatin 70 mg/ m2 \par  08/15/22- C # 2 FOLFORINOX - 5FU 2000 mg/m2/  mg/ m2/ Irinotecan 120 mg/ m2/ Oxaliplatin 70 mg/ m2  \par 08/29/22- C # 3 FOLFORINOX - 5FU 2000 mg/m2/  mg/ m2/ Irinotecan 120 mg/ m2/ Oxaliplatin 70 mg/ m2  \par 09/12/22- C # 4  FOLFORINOX - 5FU 2000 mg/m2/  mg/ m2/ Irinotecan 120 mg/ m2/ Oxaliplatin 70 mg/ m2  \par 09/14/22 Restaging scans show no evidence of POD or metastatic disease\par \par 09/26/22- C #5 FOLFORINOX - 5FU 2000 mg/m2/  mg/ m2/ Irinotecan 75 mg/ m2/ Oxaliplatin 70 mg/ m2  \par 10/10/22- C # 6 FOLFOX-  - 5FU 2000 mg/m2/  mg/Oxaliplatin 55 mg/m2 -( stop Irinotecan sec to diarrhea) \par 10/24/22- C # 7 FOLFOX 5FU 2000 mg/m2/  mg/Oxaliplatin 55 mg/m2\par 11/07/22- C # 8- FOLFOX \par \par 11/16/22: Restaging CT scans--Mild increase in size of several previously seen noncalcified pulmonary nodules as described above\par No imaging evidence of locally recurrent or metastatic disease involving the abdomen or pelvis.\par \par 11/21/22- C # 9 FOLFOX\par 12/05/22- C # 10 only 5 FU pump- (stopped Oxali sec to worsening neuropathy) \par \par    PMD:  Martínez - [phone] \par \par   FHX: Brother brain aneurysm diagnosed at 50, Sister had Aneuresym ,daughter Leukemia (27 years old) \par  \par Social HX: Lives with , Apartment, no smoker, no alcohol, walks about 30 mts 2-3 times a day.\par \par    PSX:  Total hysterectomy , Whipple procedure (06/13/22)    \par \par Disease:  Pancreatic adenocarcinoma Pathology:  AJCC Stage :  Tumor Markers: CEA/ Ca 19.9   [de-identified] : [facility]- BRCA2 Variant heterozygous- c.1964C>A \par Signatera -08/02/22 was positive - 0.58\par                 09/12/22-    0.14\par                09/20/22- 0.44\par Foundation one CDx-  KAILEY, 2 muts/ Mb, KRAS Q61H,CDKN2A, CSF3R amplification [de-identified] : 12/5//22:\par Pt is seen and examined , accompanied by her spouse .\par Denies N/V\par Has episodes of Diarrhea 2-3 episodes on week of chemo and resolves with Imodium \par Her Neuropathy on her hands /  tingling on both hands is getting worse.\par Discussed with Dr David and plan to hold Oxaliplatin for now. \par Energy/ Activity level is  good \par Reviewed the most recent scans and tumor markers with patient .Mild increase in size of several previously seen noncalcified pulmonary nodules \par Since the pulm nodules are increasing in size and her tumor markers are rising - will plan to get a CT guided lung Biospy- \par Emailed Dr Tom Blum for lung Biopsy .\par \par \par \par \par \par \par \par \par

## 2023-01-24 NOTE — REASON FOR VISIT
[Follow-Up Visit] : a follow-up [Family Member] : family member [[employer] ] : provided by [employer]   [FreeTextEntry2] : Pancreatic adenocarcinoma  [Interpreters_IDNumber] : [id number] [Interpreters_FullName] : Janet

## 2023-01-24 NOTE — ASSESSMENT
[FreeTextEntry1] : 76 y/o woman with resected pancreatic cancer pT3N2 PNI+/LVI+ disease, margin negative. \par \par Post-op imaging demonstrated lung lesions c/w metastasis. Considered biopsy of one, but it is very small, sub-centimeter, and discussion with radiology noted likely under size threshold for biopsy. In addition, would not change plan to proceed with FOLFIRINOX as even if patient had histologic confirmation would not qualify for GIANT trial due to language barrier. Will proceed with FOLFIRINOX. Also discussed with patient that [facility], [facility] a [facility], can provide treatment a bit closer to home, but she declined this at this time. Thus we proceeded with FOLFIRINOX. There is concern now that she has relapsed given her rising tumor markers and positive Signatera\par \par Plan:\par \par #Pancreatic Cancer\par - S/P 5 cycles of  FOLFIRINOX every other week- \par Switched  chemo to FOLFOX every 2 weeks dose reduced Oxaliplatin sec to neuropathy \par She is having Grade 2 neuropathy, tingling persistent in both her hands finger tips and on her toes .\par She is doing better with IVF in between chemo infusions.\par Since her neuropathy is worsening - Discussed with dr david and plan to stop Oxaliplatin. \par Concern for rising tumor markers - will proceed with CT guided lung BX - \par Emailed Dr Tom maloney for Lung biospy \par Pt and spouse aware as well.\par \par \par Loss of appetite : \par added Remeron to boost appetite \par Refer to Pall team for medical marijuana \par \par -Restaging scans 9/14/22 show stable disease, no change in lung nodules\par -Invitae reveals BRCA2 Heterogeneous uncertain. Referred  to Dr Dumont genetics counselling \par - patient is not a candidate for the Elicio trial on account of having 4 lesions (more than 3) in the lung\par \par \par \par Case discussed with Dr. Davdi\par RTC in 2 weeks \par

## 2023-01-24 NOTE — PHYSICAL EXAM
[Restricted in physically strenuous activity but ambulatory and able to carry out work of a light or sedentary nature] : Status 1- Restricted in physically strenuous activity but ambulatory and able to carry out work of a light or sedentary nature, e.g., light house work, office work [Thin] : thin [Normal] : normal appearance, no rash, nodules, vesicles, ulcers, erythema [Ulcers] : no ulcers [Mucositis] : no mucositis [Thrush] : no thrush [Vesicles] : no vesicles [de-identified] : Post op incision scar helaing well  [de-identified] : occasional lower back pain  [de-identified] : pins and needles on both hands, dropping things lately

## 2023-01-31 PROBLEM — K52.1 CHEMOTHERAPY INDUCED DIARRHEA: Status: ACTIVE | Noted: 2022-01-01

## 2023-01-31 NOTE — ASSESSMENT
[FreeTextEntry1] : 77yoF with:\par \par # Pancreatic cancer - C/w FOLFIRINOX. Med Onc follow-up. \par \par # Pain 2/2 treatment - C/w PRN Tylenol. Cautioned patient of maximum dosage of Tylenol and prefer usage remain under 3gms/day. \par -May use PRN oxycodone 2.5-5mg for severe pain. \par -Medical cannabis may be beneficial as an opioid sparing agent. \par \par # Loss of appetite - Appetite has been improved lately. C/w mirtazapine 7.5mg QHS. Medical cannabis certification previously completed, patient's daughter will complete registration. \par \par # Diarrhea, chemotherapy-related - c/w PRN Imodium/Lomotil. \par \par # Encounter for palliative care- Emotional support provided \par Provided video codes for Advance Care Planning videos in Amharic at our initial visit. \par \par Follow up 1 month, call with questions/issues\par

## 2023-01-31 NOTE — DATA REVIEWED
[FreeTextEntry1] : CT C/A/P  (01/05/2023): \par \par COMPARISON: CT chest abdomen pelvis 11/16/2022\par \par FINDINGS:\par CHEST:\par LUNGS AND LARGE AIRWAYS: Patent central airways. Multiple bilateral pulmonary nodules, some of which are essentially unchanged and some of which demonstrate mild interval increase in size. For reference:\par Left lower lobe nodule (2, 43) measuring 9 mm, previously 7 mm.\par Right lower lobe nodule (3, 46) measuring 8 mm, previously 7 mm.\par Right lower lobe nodule (3, 55) 1.8 cm, previously 1.1 cm.\par \par PLEURA: No pleural effusion.\par VESSELS: Atherosclerotic changes of the aorta and coronary arteries.\par HEART: Heart size is normal. No pericardial effusion.\par MEDIASTINUM AND LEVI: No lymphadenopathy.\par CHEST WALL AND LOWER NECK: Right chest wall MediPort with tip in SVC. Numerous bilateral breast nodules and calcifications, appears similar to prior exam, although incompletely assessed by CT.\par \par ABDOMEN AND PELVIS:\par LIVER: Hepatic steatosis. Stable subcentimeter hypodensity too small to characterize.\par BILE DUCTS: Normal caliber.\par GALLBLADDER: Cholecystectomy.\par SPLEEN: Within normal limits.\par PANCREAS: Multiple procedure. Pancreatic duct stent, unchanged.\par ADRENALS: Within normal limits.\par KIDNEYS/URETERS: No hydronephrosis. Bilateral extrarenal pelvis. Bilateral subcentimeter hypodensities too small to characterize.\par \par BLADDER: Within normal limits.\par REPRODUCTIVE ORGANS: Hysterectomy.\par \par BOWEL: No bowel obstruction. Appendix is normal.\par PERITONEUM: No ascites. New mild omental nodularity predominantly in the right hemiabdomen and mild nodular thickening of the peritoneal lining in the left hemipelvis, suspicious for peritoneal carcinomatosis. Reference nodule in the mid abdomen (2, 95) measuring 9 mm.\par VESSELS: Atherosclerotic changes.\par RETROPERITONEUM/LYMPH NODES: Prominent subcentimeter short axis lymph nodes in the mesenteric root and aortocaval region measuring up to 7 mm (3, 85 and 3, 82).\par ABDOMINAL WALL: Postsurgical changes.\par BONES: Degenerative changes.\par \par IMPRESSION:\par Multiple bilateral pulmonary nodules, some of which are increased in size from the prior exam.\par \par New mild omental nodularity and peritoneal thickening, suspicious for peritoneal carcinomatosis.

## 2023-01-31 NOTE — HISTORY OF PRESENT ILLNESS
[FreeTextEntry1] : 77yoF with pancreatic cancer presents for follow up visit.\par PMH significant for CAD, HTN, HLD, IDDM, chronic hepatitis C, arthritis, cardiac stent 2019. \par \par Patient is Nepali-speaking, her  provided translation per patient's request. \par \par May 2022: Presented with RUQ abdominal pain intermittently x 1 week and was found to be jaundiced presenting with a T bili of 15 and Ca 19.9 was 6704. CT A/P showed a 2.1cm pancreatic head mass. 5/20/22- S/P EUS with FNB pathology + for moderately differentiated adenocarcinoma 05/23/22- S/P ERCP- Plastic stent into CBD \par 06/13/22- Upfront Whipple surgery: Surgical pathology - Moderately differentiated adenocarcinoma , negative margins 5/17 LNs positive. Started adjuvant FOLFIRINOX on 8/1/22. \par \par Main reason for which patient is referred is symptom management. \par Irinotecan has been on hold due to diarrhea, which has been notably better since it being held. \par \par Interval Hx (1/23/23):\par Patient seen for follow up accompanied by her .\par She reports generalized aching and pain throughout her body which began immediately after her last chemo. At its worst the pain is 10/10 and decreases to 5/10 with Tylenol. Pain spikes in the evening and overnight. She is using PRN Tylenol 1000mg usually three times a day and Tylenol PM at bedtime. Remains hesitant to use PRN oxycodone 5mg. Appetite has improved, recently gained a few pounds back. Hydrating well. She uses mirtazapine 7.5mg QHS which has been helpful for her appetite and sleep. Sleeps OK. \par \par ROS:\par +diarrhea - Imodium/Lomotil PRN - has not been as bad since Irinotecan has been held\par +abdominal cramping preceding BMs, improved with BM\par +peripheral neuropathy \par +mood is generally positive\par Denies n/v, trouble sleeping, \par \par Patient is , lives with her  in an apartment in [place]. She is originally from [place] and has been in the [place] since 1988. They have 4 children, 3 live in [place]. Patient requires occasional assistance with ADLs, her  and daughter are able to assist. Patient's daughter serves as HHA, 3 times a week, 4-5 hours each time. Patient enjoys listening to music, reading and doing puzzles. She is emotionally supported by her  and children who are able to assist as needed. She enjoys going for walks, playing golf. She utilizes a cane, wheelchair and shower chair. \par \par PMD: Dr Martínez:  \par \par I-STOP Ref#:  [id number]

## 2023-02-13 PROBLEM — Z90.410 H/O WHIPPLE PROCEDURE: Status: ACTIVE | Noted: 2022-07-19

## 2023-02-13 NOTE — REASON FOR VISIT
[Follow-Up Visit] : a follow-up [Family Member] : family member [[employer] ] : provided by [employer]   [FreeTextEntry2] : Pancreatic adenocarcinoma  [Interpreters_IDNumber] : [id number] [Interpreters_FullName] : Janet

## 2023-02-13 NOTE — ASSESSMENT
[FreeTextEntry1] : 76 y/o woman with resected pancreatic cancer pT3N2 PNI+/LVI+ disease, margin negative. \par \par Post-op imaging demonstrated lung lesions c/w metastasis. Considered biopsy of one, but it is very small, sub-centimeter, and discussion with radiology noted likely under size threshold for biopsy. In addition, would not change plan to proceed with FOLFIRINOX as even if patient had histologic confirmation would not qualify for GIANT trial due to language barrier. Will proceed with FOLFIRINOX. Also discussed with patient that [facility], now a [facility] facility, can provide treatment a bit closer to home, but she declined this at this time. Thus we proceeded with FOLFIRINOX. There is concern now that she has relapsed given her rising tumor markers and positive Signatera\par \par Plan:\par \par #Pancreatic Cancer\par - S/P 5 cycles of  FOLFIRINOX every other week- \par Switched  chemo to FOLFOX every 2 weeks dose reduced Oxaliplatin sec to neuropathy \par She is having Grade 2 neuropathy, tingling persistent in both her hands finger tips and on her toes .\par She is doing better with IVF in between chemo infusions.\par Since her neuropathy is worsening - Discussed with dr david and plan to stop Oxaliplatin. \par Concern for rising tumor markers - Had planned to  proceed with CT guided lung BX - \par Per IR - Given size and location of the nodules there is a high likelihood of a non-diagnostic biopsy.\par The largest is about 1cm and in a difficult and dangerous location with high likelihood of complication\par Discussed with Dr advid and IR - plan to hold off on the biopsy for now- \par Will finish 12 cycles of chemo and get restaging scans and represent in PMDC \par \par \par Loss of appetite : \par added Remeron to boost appetite \par Refer to Pall team for medical marijuana \par \par -[facility] reveals BRCA2 Heterogeneous uncertain. Referred  to Dr Dumont genetics counselling \par - patient is not a candidate for the Elicio trial on account of having 4 lesions (more than 3) in the lung\par \par Case discussed with Dr. David\par RTC in 2 weeks \par

## 2023-02-13 NOTE — HISTORY OF PRESENT ILLNESS
[de-identified] : ID: 78 y/o Wolof-speaking woman with resected pancreatic cancer pT3N2 PNI+/LVI+ disease, margin negative, presenting for adjuvant treatment discussion\par \par Other Current Medical Problems:  IDDM diagnosed >20 years, HTN, HLD, CAD, Cardiac stent 2019  , chronic hep C (S/P treatment) arthritis , \par \par  Oncological History : \par May 2022: Presented with RUQ abdominal pain intermittently x 1 week and was found to be jaundiced presenting with a T bili of 15 and Ca 19.9 was 6704.\par  05/20/22: CT A/P - 2.1 cms pancreatic head mass with resultant abrupt cut off the CBD. Ct chest with several indeterminate pulmonary nodules. \par  05/20/22- S/P EUS  with FNB pathology + for moderately differntiated adenocarcinoma  05/23/22- S/P ERCP- Plastic stent into CBD  \par 06/13/22- Upfront Whipple surgery: Surgical pathology - Moderately differentiated adenocarcinoma , negative margins 5/17 LN +ve.\par  07/19/22 CT CAP no e/o local disease; however scattered pulmonary nodules, some of which are new.  \par 08/01/22- C # 1 FOLFORINOX - 5FU 2000 mg/m2/  mg/ m2/ Irinotecan 120 mg/ m2/ Oxaliplatin 70 mg/ m2 \par  08/15/22- C # 2 FOLFORINOX - 5FU 2000 mg/m2/  mg/ m2/ Irinotecan 120 mg/ m2/ Oxaliplatin 70 mg/ m2  \par 08/29/22- C # 3 FOLFORINOX - 5FU 2000 mg/m2/  mg/ m2/ Irinotecan 120 mg/ m2/ Oxaliplatin 70 mg/ m2  \par 09/12/22- C # 4  FOLFORINOX - 5FU 2000 mg/m2/  mg/ m2/ Irinotecan 120 mg/ m2/ Oxaliplatin 70 mg/ m2  \par 09/14/22 Restaging scans show no evidence of POD or metastatic disease\par \par 09/26/22- C #5 FOLFORINOX - 5FU 2000 mg/m2/  mg/ m2/ Irinotecan 75 mg/ m2/ Oxaliplatin 70 mg/ m2  \par 10/10/22- C # 6 FOLFOX-  - 5FU 2000 mg/m2/  mg/Oxaliplatin 55 mg/m2 -( stop Irinotecan sec to diarrhea) \par 10/24/22- C # 7 FOLFOX 5FU 2000 mg/m2/  mg/Oxaliplatin 55 mg/m2\par 11/07/22- C # 8- FOLFOX \par \par 11/16/22: Restaging CT scans--Mild increase in size of several previously seen noncalcified pulmonary nodules as described above\par No imaging evidence of locally recurrent or metastatic disease involving the abdomen or pelvis.\par \par 11/21/22- C # 9 FOLFOX\par 12/05/22- C # 10 only 5 FU pump- (stopped Oxali sec to worsening neuropathy) \par 12/19/22- C # 11- 5 FU pump only \par \par    PMD: Dr Martínez - [phone] \par \par   FHX: Brother brain aneurysm diagnosed at 50, Sister had Aneuresym ,daughter Leukemia (27 years old) \par  \par Social HX: Lives with , Apartment, no smoker, no alcohol, walks about 30 mts 2-3 times a day.\par \par    PSX:  Total hysterectomy , Whipple procedure (06/13/22)    \par \par Disease:  Pancreatic adenocarcinoma Pathology:  AJCC Stage :  Tumor Markers: CEA/ Ca 19.9   [de-identified] : [facility]- BRCA2 Variant heterozygous- c.1964C>A \par Signatera -08/02/22 was positive - 0.58\par                 09/12/22-    0.14\par                09/20/22- 0.44\par Foundation one CDx-  KAILEY, 2 muts/ Mb, KRAS Q61H,CDKN2A, CSF3R amplification [de-identified] : 12/19/22: \par Pt is seen and examined in treatment room. \par She denies N/V/D\par Neuropathy is same- not worse \par Energy/ Activity level is  good \par Discussed with pt and  (over the phone) that there is no plan for lung biopsy at this point given that IR doesn’t think it is feasible for lung biopsy .\par Will get restaging scans after next  round of chemo- Will monitor tumor markers as well. \par \par \par \par \par \par \par

## 2023-02-14 NOTE — REASON FOR VISIT
[Follow-Up Visit] : a follow-up [Family Member] : family member [[employer] ] : provided by [employer]   [FreeTextEntry2] : Pancreatic adenocarcinoma  [Interpreters_IDNumber] : [id number] [Interpreters_FullName] : Janet

## 2023-02-14 NOTE — ASSESSMENT
[FreeTextEntry1] : 76 y/o woman with resected pancreatic cancer pT3N2 PNI+/LVI+ disease, margin negative. \par \par Post-op imaging demonstrated lung lesions c/w metastasis. Considered biopsy of one, but it is very small, sub-centimeter, and discussion with radiology noted likely under size threshold for biopsy. In addition, would not change plan to proceed with FOLFIRINOX as even if patient had histologic confirmation would not qualify for GIANT trial due to language barrier. Will proceed with FOLFIRINOX. Also discussed with patient that [facility], now a [facility], can provide treatment a bit closer to home, but she declined this at this time. Thus we proceeded with FOLFIRINOX. There is concern now that she has relapsed given her rising tumor markers and positive Signatera\par \par Plan:\par \par #Pancreatic Cancer\par - S/P 5 cycles of  FOLFIRINOX \par Switched  chemo to FOLFOX every 2 weeks dose reduced Oxaliplatin sec to neuropathy \par Since her neuropathy is worsening - Discussed with Dr dang and  stopped  Oxaliplatin on 12/05/22.\par Concern for rising tumor markers - Had planned to  proceed with CT guided lung BX - \par Per IR - Given size and location of the nodules there is a high likelihood of a non-diagnostic biopsy.\par The largest is about 1cm and in a difficult and dangerous location with high likelihood of complication\par Discussed with Dr dang and IR - plan to hold off on the biopsy for now- \par Switched chemo  to gemcitabine/nab-paclitaxel\par \par GnP regimen, every other week- Dose reduction for poor tolerance \par - gemcitabine 600 mg/m2\par - nab-paclitaxel 75  mg/m2\par Will get restaging scans after C # 4  of chemo \par \par Loss of appetite : \par added Remeron to boost appetite \par Refer to Pall team for medical marijuana \par \par -[facility] reveals BRCA2 Heterogeneous uncertain. Referred  to Dr Dumont genetics counselling \par - patient is not a candidate for the Elicio trial on account of having 4 lesions (more than 3) in the lung\par \par Arthralgia \par Currently using Tylenol prn for pain \par Chemo dose reduction done \par Advised to use Claritin after her chemo \par \par Case d/w Dr dang \par RTC in 2 weeks \par \par

## 2023-02-14 NOTE — ASSESSMENT
[FreeTextEntry1] : 76 y/o woman with resected pancreatic cancer pT3N2 PNI+/LVI+ disease, margin negative. \par \par Post-op imaging demonstrated lung lesions c/w metastasis. Considered biopsy of one, but it is very small, sub-centimeter, and discussion with radiology noted likely under size threshold for biopsy. In addition, would not change plan to proceed with FOLFIRINOX as even if patient had histologic confirmation would not qualify for GIANT trial due to language barrier. Will proceed with FOLFIRINOX. Also discussed with patient that [facility], now a [facility], can provide treatment a bit closer to home, but she declined this at this time. Thus we proceeded with FOLFIRINOX. There is concern now that she has relapsed given her rising tumor markers and positive Signatera\par \par Plan:\par \par #Pancreatic Cancer\par - S/P 5 cycles of  FOLFIRINOX \par Switched  chemo to FOLFOX every 2 weeks dose reduced Oxaliplatin sec to neuropathy \par Since her neuropathy is worsening - Discussed with Dr dang and  stopped  Oxaliplatin on 12/05/22.\par Concern for rising tumor markers - Had planned to  proceed with CT guided lung BX - \par Per IR - Given size and location of the nodules there is a high likelihood of a non-diagnostic biopsy.\par The largest is about 1cm and in a difficult and dangerous location with high likelihood of complication\par Discussed with Dr dang and IR - plan to hold off on the biopsy for now- \par - now switching to gemcitabine/nab-paclitaxel\par \par GnP regimen, every other week\par - gemcitabine 800 mg/m2\par - nab-paclitaxel 100 mg/m2\par \par Loss of appetite : \par added Remeron to boost appetite \par Refer to Pall team for medical marijuana \par \par -Invitae reveals BRCA2 Heterogeneous uncertain. Referred  to Dr Dumont genetics counselling \par - patient is not a candidate for the Elicio trial on account of having 4 lesions (more than 3) in the lung\par \par Arthralgia \par Currently using Tylenol prn for pain \par Will consider chemo dose reduction if poor tolerance. \par Discussed with pat and  and they want to continue with same dose of chemo today. \par

## 2023-02-14 NOTE — HISTORY OF PRESENT ILLNESS
[de-identified] : ID: 76 y/o Yi-speaking woman with resected pancreatic cancer pT3N2 PNI+/LVI+ disease, margin negative, presenting for adjuvant treatment discussion\par \par Other Current Medical Problems:  IDDM diagnosed >20 years, HTN, HLD, CAD, Cardiac stent 2019  , chronic hep C (S/P treatment) arthritis , \par \par  Oncological History : \par May 2022: Presented with RUQ abdominal pain intermittently x 1 week and was found to be jaundiced presenting with a T bili of 15 and Ca 19.9 was 6704.\par  05/20/22: CT A/P - 2.1 cms pancreatic head mass with resultant abrupt cut off the CBD. Ct chest with several indeterminate pulmonary nodules. \par  05/20/22- S/P EUS  with FNB pathology + for moderately differntiated adenocarcinoma  05/23/22- S/P ERCP- Plastic stent into CBD  \par 06/13/22- Upfront Whipple surgery: Surgical pathology - Moderately differentiated adenocarcinoma , negative margins 5/17 LN +ve.\par  07/19/22 CT CAP no e/o local disease; however scattered pulmonary nodules, some of which are new.  \par 08/01/22- C # 1 FOLFORINOX - 5FU 2000 mg/m2/  mg/ m2/ Irinotecan 120 mg/ m2/ Oxaliplatin 70 mg/ m2 \par  08/15/22- C # 2 FOLFORINOX - 5FU 2000 mg/m2/  mg/ m2/ Irinotecan 120 mg/ m2/ Oxaliplatin 70 mg/ m2  \par 08/29/22- C # 3 FOLFORINOX - 5FU 2000 mg/m2/  mg/ m2/ Irinotecan 120 mg/ m2/ Oxaliplatin 70 mg/ m2  \par 09/12/22- C # 4  FOLFORINOX - 5FU 2000 mg/m2/  mg/ m2/ Irinotecan 120 mg/ m2/ Oxaliplatin 70 mg/ m2  \par 09/14/22 Restaging scans show no evidence of POD or metastatic disease\par \par 09/26/22- C #5 FOLFORINOX - 5FU 2000 mg/m2/  mg/ m2/ Irinotecan 75 mg/ m2/ Oxaliplatin 70 mg/ m2  \par 10/10/22- C # 6 FOLFOX-  - 5FU 2000 mg/m2/  mg/Oxaliplatin 55 mg/m2 -( stop Irinotecan sec to diarrhea) \par 10/24/22- C # 7 FOLFOX 5FU 2000 mg/m2/  mg/Oxaliplatin 55 mg/m2\par 11/07/22- C # 8- FOLFOX \par \par 11/16/22: Restaging CT scans--Mild increase in size of several previously seen noncalcified pulmonary nodules as described above\par No imaging evidence of locally recurrent or metastatic disease involving the abdomen or pelvis.\par \par 11/21/22- C # 9 FOLFOX\par 12/05/22- C # 10 only 5 FU pump- (stopped Oxali sec to worsening neuropathy) \par 12/19/22- C # 11- 5 FU pump only \par 01/03/23- C # 12 - 5 FU pump only \par \par 01/05/23- CT A/P/C Multiple bilateral pulmonary nodules, some of which are increased in size from the prior exam.New mild omental nodularity and peritoneal thickening, suspicious for peritoneal carcinomatosis.\par \par  \par 01/17/23: C #1 Gemzar 800 mg/m2  / Abraxane 100 mg/m2 \par 01/30/23: C# 2 Gemzar 800 mg/m2   / Abraxane 100 mg/m2 \par 02/13/23- Plan for C # 3  [place] 600mg/m2/ Abraxane 75 mg/m2  (Dose reduction for poor tolerance) \par \par \par \par   PMD:  Martínez - [phone] \par \par   FHX: Brother brain aneurysm diagnosed at 50, Sister had Aneuresym ,daughter Leukemia (27 years old) \par  \par Social HX: Lives with , Apartment, no smoker, no alcohol, walks about 30 mts 2-3 times a day.\par \par    PSX:  Total hysterectomy , Whipple procedure (06/13/22)    \par \par Disease:  Pancreatic adenocarcinoma Pathology:  AJCC Stage :  Tumor Markers: CEA/ Ca 19.9   [de-identified] : [facility]- BRCA2 Variant heterozygous- c.1964C>A \par Signatera -08/02/22 was positive - 0.58\par                 09/12/22-    0.14\par                09/20/22- 0.44\par Foundation one CDx-  KAILEY, 2 muts/ Mb, KRAS Q61H,CDKN2A, CSF3R amplification [de-identified] : 02/13/23\par Pt is seen in office accompanied by her  & daughter \par Pt states she has pain in her joints after she receives chemo- Stats pain is on knees and shoulder similar to arthritis \par Advised to use Claritin for 3-4 days after chemo \par Also taking Tylenol Prn for pain \par Denies constipation / diarrhes/ N/V\par She notices Tingling in hands and feet - not significantly changed or worse \par She also had Low grade fever  T max was 100 \par She also endorses Bowel incontinence and having to use Diaper \par Diarrhea happens 1-2 days after chemo and improves \par Explained that we have dose reduced the chemo today - [place] 600 / Abraxane 75\par

## 2023-02-14 NOTE — REASON FOR VISIT
[FreeTextEntry2] : Pancreatic adenocarcinoma  [Interpreters_IDNumber] : [id number] [Interpreters_FullName] : Janet

## 2023-02-14 NOTE — PHYSICAL EXAM
[Restricted in physically strenuous activity but ambulatory and able to carry out work of a light or sedentary nature] : Status 1- Restricted in physically strenuous activity but ambulatory and able to carry out work of a light or sedentary nature, e.g., light house work, office work [Thin] : thin [Normal] : normal appearance, no rash, nodules, vesicles, ulcers, erythema [Ulcers] : no ulcers [Mucositis] : no mucositis [Thrush] : no thrush [Vesicles] : no vesicles [de-identified] : weak and fatigued  [de-identified] : Post op incision scar helaing well  [de-identified] : occasional lower back pain  [de-identified] : pins and needles on both hands, dropping things lately

## 2023-02-14 NOTE — HISTORY OF PRESENT ILLNESS
[de-identified] : ID: 76 y/o Upper sorbian-speaking woman with resected pancreatic cancer pT3N2 PNI+/LVI+ disease, margin negative, presenting for adjuvant treatment discussion\par \par Other Current Medical Problems:  IDDM diagnosed >20 years, HTN, HLD, CAD, Cardiac stent 2019  , chronic hep C (S/P treatment) arthritis , \par \par  Oncological History : \par May 2022: Presented with RUQ abdominal pain intermittently x 1 week and was found to be jaundiced presenting with a T bili of 15 and Ca 19.9 was 6704.\par  05/20/22: CT A/P - 2.1 cms pancreatic head mass with resultant abrupt cut off the CBD. Ct chest with several indeterminate pulmonary nodules. \par  05/20/22- S/P EUS  with FNB pathology + for moderately differntiated adenocarcinoma  05/23/22- S/P ERCP- Plastic stent into CBD  \par 06/13/22- Upfront Whipple surgery: Surgical pathology - Moderately differentiated adenocarcinoma , negative margins 5/17 LN +ve.\par  07/19/22 CT CAP no e/o local disease; however scattered pulmonary nodules, some of which are new.  \par 08/01/22- C # 1 FOLFORINOX - 5FU 2000 mg/m2/  mg/ m2/ Irinotecan 120 mg/ m2/ Oxaliplatin 70 mg/ m2 \par  08/15/22- C # 2 FOLFORINOX - 5FU 2000 mg/m2/  mg/ m2/ Irinotecan 120 mg/ m2/ Oxaliplatin 70 mg/ m2  \par 08/29/22- C # 3 FOLFORINOX - 5FU 2000 mg/m2/  mg/ m2/ Irinotecan 120 mg/ m2/ Oxaliplatin 70 mg/ m2  \par 09/12/22- C # 4  FOLFORINOX - 5FU 2000 mg/m2/  mg/ m2/ Irinotecan 120 mg/ m2/ Oxaliplatin 70 mg/ m2  \par 09/14/22 Restaging scans show no evidence of POD or metastatic disease\par \par 09/26/22- C #5 FOLFORINOX - 5FU 2000 mg/m2/  mg/ m2/ Irinotecan 75 mg/ m2/ Oxaliplatin 70 mg/ m2  \par 10/10/22- C # 6 FOLFOX-  - 5FU 2000 mg/m2/  mg/Oxaliplatin 55 mg/m2 -( stop Irinotecan sec to diarrhea) \par 10/24/22- C # 7 FOLFOX 5FU 2000 mg/m2/  mg/Oxaliplatin 55 mg/m2\par 11/07/22- C # 8- FOLFOX \par \par 11/16/22: Restaging CT scans--Mild increase in size of several previously seen noncalcified pulmonary nodules as described above\par No imaging evidence of locally recurrent or metastatic disease involving the abdomen or pelvis.\par \par 11/21/22- C # 9 FOLFOX\par 12/05/22- C # 10 only 5 FU pump- (stopped Oxali sec to worsening neuropathy) \par 12/19/22- C # 11- 5 FU pump only \par 01/03/23- C # 12 - 5 FU pump only \par \par 01/05/23- CT A/P/C Multiple bilateral pulmonary nodules, some of which are increased in size from the prior exam.New mild omental nodularity and peritoneal thickening, suspicious for peritoneal carcinomatosis.\par \par  \par 01/17/23: C #1 Gemzar 800 mg/m2  / Abraxane 100 mg/m2 \par 01/30/23: C# 2 Gemzar 800 mg/m2   / Abraxane 100 mg/m2 \par \par \par   PMD: Dr Martínez -  \par \par   FHX: Brother brain aneurysm diagnosed at 50, Sister had Aneuresym ,daughter Leukemia (27 years old) \par  \par Social HX: Lives with , Apartment, no smoker, no alcohol, walks about 30 mts 2-3 times a day.\par \par    PSX:  Total hysterectomy , Whipple procedure (06/13/22)    \par \par Disease:  Pancreatic adenocarcinoma Pathology:  AJCC Stage :  Tumor Markers: CEA/ Ca 19.9   [de-identified] : [facility]- BRCA2 Variant heterozygous- c.1964C>A \par Signatera -08/02/22 was positive - 0.58\par                 09/12/22-    0.14\par                09/20/22- 0.44\par Foundation one CDx-  KAILEY, 2 muts/ Mb, KRAS Q61H,CDKN2A, CSF3R amplification [de-identified] : 01/12/23:\par Pt is seen in office accompanied by her  .\par She did c/o generalized pain  in her big joints after her last chemo session- \par She felt it was like Arthritis like pain- \par She did have episodes of Diarrhea x 3 days after his last chemo session- Resolved on its own- \par She has been using Tylenol Prn for pain -Using upto 4000 mg/ day \par Reluctant to use Oxycodone for pain prn \par Low grade fever 100 T max\par Joint pain is more prominent \par She also endorses Bowel incontinence and having to use Diaper \par Neuropathy is same- not worse \par Diarrhea happens 1-2 days after chemo and improves \par

## 2023-02-14 NOTE — PHYSICAL EXAM
[Ulcers] : no ulcers [Mucositis] : no mucositis [Thrush] : no thrush [Vesicles] : no vesicles [de-identified] : weak and fatigued  [de-identified] : Post op incision scar helaing well  [de-identified] : occasional lower back pain  [de-identified] : pins and needles on both hands, dropping things lately

## 2023-02-27 PROBLEM — G62.9 PERIPHERAL NEUROPATHY: Status: ACTIVE | Noted: 2023-01-01

## 2023-02-27 PROBLEM — G62.0 CHEMOTHERAPY-INDUCED NEUROPATHY: Status: ACTIVE | Noted: 2022-01-01

## 2023-02-27 PROBLEM — R53.83 FATIGUE DUE TO TREATMENT: Status: ACTIVE | Noted: 2022-01-01

## 2023-03-08 NOTE — HISTORY OF PRESENT ILLNESS
[FreeTextEntry1] : 77yoF with pancreatic cancer presents for follow up visit.\par PMH significant for CAD, HTN, HLD, IDDM, chronic hepatitis C, arthritis, cardiac stent 2019. \par \par Patient is Japanese-speaking, her  provided translation per patient's request. \par \par May 2022: Presented with RUQ abdominal pain intermittently x 1 week and was found to be jaundiced presenting with a T bili of 15 and Ca 19.9 was 6704. CT A/P showed a 2.1cm pancreatic head mass. 5/20/22- S/P EUS with FNB pathology + for moderately differentiated adenocarcinoma 05/23/22- S/P ERCP- Plastic stent into CBD \par 06/13/22- Upfront Whipple surgery: Surgical pathology - Moderately differentiated adenocarcinoma , negative margins 5/17 LNs positive. Started adjuvant FOLFIRINOX on 8/1/22. \par \par Main reason for which patient is referred is symptom management. \par Irinotecan has been on hold due to diarrhea, which has been notably better since it being held. \par \par Interval Hx (2/27/23):\par Patient seen for follow up in treatment room, accompanied by her . Her chemo dose was reduced due to side effects of increased weakness and fatigue. \par CIPN is affecting her more lately, especially to her fingers. Wearing gloves helps. \par Generalized aching has improved with the dose decrease and use of Claritin. Continues to use PRN Tylenol 1000mg a few times a week. No recent need for PRN oxycodone 5mg. \par Appetite has improved, recently gained a few pounds back. Hydrating well. Blood sugars have been high, monitors daily and follows with endocrinologist, Dr. Dunlap. Reports small bowel movements a few times a day. \par She uses mirtazapine 7.5mg QHS which has been helpful for her appetite and sleep. Registered for medical cannabis but has not purchased products yet. Stays active by going on daily walks although requires more frequent rests lately. \par \par ROS:\par +diarrhea - Imodium/Lomotil PRN - has not been as bad since Irinotecan has been held\par +abdominal cramping preceding BMs, improved with BM\par +peripheral neuropathy \par +mood is generally positive\par Denies n/v, trouble sleeping, \par \par Patient is , lives with her  in an apartment in [place]. She is originally from [place] and has been in [place] since 1988. They have 4 children, 3 live in [place]. Patient requires occasional assistance with ADLs, her  and daughter are able to assist. Patient's daughter serves as HHA, 3 times a week, 4-5 hours each time. Patient enjoys listening to music, reading and doing puzzles. She is emotionally supported by her  and children who are able to assist as needed. She enjoys going for walks, playing golf. She utilizes a cane, wheelchair and shower chair. \par \par PMD: Dr Martínez:  \par \par I-STOP Ref#:  [id number]

## 2023-03-08 NOTE — ASSESSMENT
[FreeTextEntry1] : 77yoF with:\par \par # Pancreatic cancer - On GnP regimen; s/p FOLFIRINOX. Med Onc follow-up. \par \par # Peripheral neuropathy - Will start gabapentin 100mg QHS with plans to titrate as needed. \par -Discussed how medical cannabis may be beneficial. Recommend starting with 1:1 THC:CBD formulation at low dose of THC (~2mg/dose) in a tincture or topical formulation to apply directly to her hands. \par \par # Pain 2/2 treatment - C/w PRN Tylenol. Cautioned patient of maximum dosage of Tylenol and prefer usage remain under 3gms/day. \par -May use PRN oxycodone 2.5-5mg for severe pain. \par -Medical cannabis may be beneficial as an opioid sparing agent. \par \par # Loss of appetite - Appetite has been improved lately. C/w mirtazapine 7.5mg QHS. \par \par # Constipation - Recommend adding PRN Miralax every other day. Previously with treatment-related diarrhea; Monitor for loose stools.\par \par # Encounter for palliative care- Emotional support provided \par Provided video codes for Advance Care Planning videos in Czech at our initial visit. \par \par Follow up 1 month, call with questions/issues\par

## 2023-03-09 NOTE — END OF VISIT
[] : Fellow [FreeTextEntry3] : Eliel David MD PhD\par Medical Oncology Attending\par I personally have spent a total of 45 minutes of time on the date of this encounter reviewing test results, documenting findings, coordinating care, and directly consulting with the patient and/or designated family member.\par I was present with the trainee during the key portions of the history and exam. I agree with the findings and plan as documented above.\par

## 2023-03-09 NOTE — ASSESSMENT
[FreeTextEntry1] : 78 y/o woman with resected pancreatic cancer pT3N2 PNI+/LVI+ disease, margin negative. \par \par Post-op imaging demonstrated lung lesions c/w metastasis. Considered biopsy of one, but it is very small, sub-centimeter, and discussion with radiology noted likely under size threshold for biopsy. In addition, would not change plan to proceed with FOLFIRINOX as even if patient had histologic confirmation would not qualify for GIANT trial due to language barrier. Will proceed with FOLFIRINOX. Also discussed with patient that [facility], now a [facility], can provide treatment a bit closer to home, but she declined this at this time. Thus we proceeded with FOLFIRINOX. There is concern now that she has relapsed given her rising tumor markers and positive Signatera. BRCA2 but VUS.\par \par Given her progression of diease on standard therapies, I will refer her to the [facility] for trial options.\par \par Plan:\par #Pancreatic Cancer\par - provided phone numbers for multiple academic centers in the [facility]\par - family will keep us in the loop about the trial options offered to them\par - RTC PRN\par \par Valerie Diop MD\par \par \par

## 2023-03-09 NOTE — REASON FOR VISIT
[Follow-Up Visit] : a follow-up [Family Member] : family member [[employer] ] : provided by [employer]   [FreeTextEntry2] : Pancreatic adenocarcinoma  [Interpreters_IDNumber] : [id number] [Interpreters_FullName] : Janet

## 2023-03-09 NOTE — PHYSICAL EXAM
[Restricted in physically strenuous activity but ambulatory and able to carry out work of a light or sedentary nature] : Status 1- Restricted in physically strenuous activity but ambulatory and able to carry out work of a light or sedentary nature, e.g., light house work, office work [Thin] : thin [Normal] : normal appearance, no rash, nodules, vesicles, ulcers, erythema [Ulcers] : no ulcers [Mucositis] : no mucositis [Thrush] : no thrush [Vesicles] : no vesicles [de-identified] : weak and fatigued  [de-identified] : Post op incision scar helaing well  [de-identified] : occasional lower back pain  [de-identified] : pins and needles on both hands, dropping things lately

## 2023-03-09 NOTE — HISTORY OF PRESENT ILLNESS
[de-identified] : ID: 76 y/o Mohawk-speaking woman with resected pancreatic cancer pT3N2 PNI+/LVI+ disease, margin negative, presenting for adjuvant treatment discussion\par \par Other Current Medical Problems:  IDDM diagnosed >20 years, HTN, HLD, CAD, Cardiac stent 2019  , chronic hep C (S/P treatment) arthritis , \par \par  Oncological History : \par May 2022: Presented with RUQ abdominal pain intermittently x 1 week and was found to be jaundiced presenting with a T bili of 15 and Ca 19.9 was 6704.\par  05/20/22: CT A/P - 2.1 cms pancreatic head mass with resultant abrupt cut off the CBD. Ct chest with several indeterminate pulmonary nodules. \par  05/20/22- S/P EUS  with FNB pathology + for moderately differntiated adenocarcinoma  05/23/22- S/P ERCP- Plastic stent into CBD  \par 06/13/22- Upfront Whipple surgery: Surgical pathology - Moderately differentiated adenocarcinoma , negative margins 5/17 LN +ve.\par  07/19/22 CT CAP no e/o local disease; however scattered pulmonary nodules, some of which are new.  \par 08/01/22- C # 1 FOLFORINOX - 5FU 2000 mg/m2/  mg/ m2/ Irinotecan 120 mg/ m2/ Oxaliplatin 70 mg/ m2 \par  08/15/22- C # 2 FOLFORINOX - 5FU 2000 mg/m2/  mg/ m2/ Irinotecan 120 mg/ m2/ Oxaliplatin 70 mg/ m2  \par 08/29/22- C # 3 FOLFORINOX - 5FU 2000 mg/m2/  mg/ m2/ Irinotecan 120 mg/ m2/ Oxaliplatin 70 mg/ m2  \par 09/12/22- C # 4  FOLFORINOX - 5FU 2000 mg/m2/  mg/ m2/ Irinotecan 120 mg/ m2/ Oxaliplatin 70 mg/ m2  \par 09/14/22 Restaging scans show no evidence of POD or metastatic disease\par \par 09/26/22- C #5 FOLFORINOX - 5FU 2000 mg/m2/  mg/ m2/ Irinotecan 75 mg/ m2/ Oxaliplatin 70 mg/ m2  \par 10/10/22- C # 6 FOLFOX-  - 5FU 2000 mg/m2/  mg/Oxaliplatin 55 mg/m2 -( stop Irinotecan sec to diarrhea) \par 10/24/22- C # 7 FOLFOX 5FU 2000 mg/m2/  mg/Oxaliplatin 55 mg/m2\par 11/07/22- C # 8- FOLFOX \par \par 11/16/22: Restaging CT scans--Mild increase in size of several previously seen noncalcified pulmonary nodules as described above\par No imaging evidence of locally recurrent or metastatic disease involving the abdomen or pelvis.\par \par 11/21/22- C # 9 FOLFOX\par 12/05/22- C # 10 only 5 FU pump- (stopped Oxali sec to worsening neuropathy) \par 12/19/22- C # 11- 5 FU pump only \par 01/03/23- C # 12 - 5 FU pump only \par \par 01/05/23- CT A/P/C Multiple bilateral pulmonary nodules, some of which are increased in size from the prior exam.New mild omental nodularity and peritoneal thickening, suspicious for peritoneal carcinomatosis.\par \par  \par 01/17/23: C #1 Gemzar 800 mg/m2  / Abraxane 100 mg/m2 \par 01/30/23: C# 2 Gemzar 800 mg/m2   / Abraxane 100 mg/m2 \par 02/13/23-  C # 3  [place] 600mg/m2/ Abraxane 75 mg/m2  (Dose reduction for poor tolerance) \par 02/27/23-  C # 4 [place] 600mg/m2/ Abraxane 75 mg/m2  (Dose reduction for poor tolerance) \par \par 02/28/23- CT CAP Stable pulmonary metastases in the short interim. Mildly increased omental and pelvic peritoneal metastases\par Increased omental carcinomatosis for example 2.5 x 1.4 cm omental nodule (3, 102) previously measured 1 x 0.9 cm. A central mesenteric metastasis now measures 1.1 cm (3, 101) previously 0.9 cm. No ascites. \par \par PMD: Dr Martínez - [phone] \par \par   FHX: Brother brain aneurysm diagnosed at 50, Sister had Aneuresym ,daughter Leukemia (27 years old) \par  \par Social HX: Lives with , Apartment, no smoker, no alcohol, walks about 30 mts 2-3 times a day.\par \par   PSX:  Total hysterectomy , Whipple procedure (06/13/22)    \par \par Disease:  Pancreatic adenocarcinoma Pathology:  AJCC Stage :  Tumor Markers: CEA/ Ca 19.9   [de-identified] : [facility]- BRCA2 Variant heterozygous- c.1964C>A \par Signatera -08/02/22 was positive - 0.58\par                 09/12/22-    0.14\par                09/20/22- 0.44\par Foundation one CDx-  KAILEY, 2 muts/ Mb, KRAS Q61H,CDKN2A, CSF3R amplification [de-identified] : 03/09/23:\par Pt is seen for follow up in office accompanied by her  and daughter. \par She states that she has been feeling weaker and more tired lately.\par Her fingers and toes have painful numbness and feels cold all the time which has some relief with gabapentin.\par  Although she states after the dose reduction she feels a lot better.\par Patient will see [place] cancer and blood tomorrow

## 2023-04-24 NOTE — HISTORY OF PRESENT ILLNESS
[de-identified] : ID: 78 y/o Belarusian-speaking woman with resected pancreatic cancer pT3N2 PNI+/LVI+ disease, margin negative, presenting for adjuvant treatment discussion\par \par Other Current Medical Problems:  IDDM diagnosed >20 years, HTN, HLD, CAD, Cardiac stent 2019  , chronic hep C (S/P treatment) arthritis , \par \par  Oncological History : \par May 2022: Presented with RUQ abdominal pain intermittently x 1 week and was found to be jaundiced presenting with a T bili of 15 and Ca 19.9 was 6704.\par  05/20/22: CT A/P - 2.1 cms pancreatic head mass with resultant abrupt cut off the CBD. Ct chest with several indeterminate pulmonary nodules. \par  05/20/22- S/P EUS  with FNB pathology + for moderately differntiated adenocarcinoma  05/23/22- S/P ERCP- Plastic stent into CBD  \par 06/13/22- Upfront Whipple surgery: Surgical pathology - Moderately differentiated adenocarcinoma , negative margins 5/17 LN +ve.\par  07/19/22 CT CAP no e/o local disease; however scattered pulmonary nodules, some of which are new.  \par 08/01/22- C # 1 FOLFORINOX - 5FU 2000 mg/m2/  mg/ m2/ Irinotecan 120 mg/ m2/ Oxaliplatin 70 mg/ m2 \par  08/15/22- C # 2 FOLFORINOX - 5FU 2000 mg/m2/  mg/ m2/ Irinotecan 120 mg/ m2/ Oxaliplatin 70 mg/ m2  \par 08/29/22- C # 3 FOLFORINOX - 5FU 2000 mg/m2/  mg/ m2/ Irinotecan 120 mg/ m2/ Oxaliplatin 70 mg/ m2  \par 09/12/22- C # 4  FOLFORINOX - 5FU 2000 mg/m2/  mg/ m2/ Irinotecan 120 mg/ m2/ Oxaliplatin 70 mg/ m2  \par 09/14/22 Restaging scans show no evidence of POD or metastatic disease\par \par 09/26/22- C #5 FOLFORINOX - 5FU 2000 mg/m2/  mg/ m2/ Irinotecan 75 mg/ m2/ Oxaliplatin 70 mg/ m2  \par 10/10/22- C # 6 FOLFOX-  - 5FU 2000 mg/m2/  mg/Oxaliplatin 55 mg/m2 -( stop Irinotecan sec to diarrhea) \par 10/24/22- C # 7 FOLFOX 5FU 2000 mg/m2/  mg/Oxaliplatin 55 mg/m2\par 11/07/22- C # 8- FOLFOX \par \par 11/16/22: Restaging CT scans--Mild increase in size of several previously seen noncalcified pulmonary nodules as described above\par No imaging evidence of locally recurrent or metastatic disease involving the abdomen or pelvis.\par \par 11/21/22- C # 9 FOLFOX\par 12/05/22- C # 10 only 5 FU pump- (stopped Oxali sec to worsening neuropathy) \par 12/19/22- C # 11- 5 FU pump only \par 01/03/23- C # 12 - 5 FU pump only \par \par 01/05/23- CT A/P/C Multiple bilateral pulmonary nodules, some of which are increased in size from the prior exam.New mild omental nodularity and peritoneal thickening, suspicious for peritoneal carcinomatosis.\par \par  \par 01/17/23: C #1 Gemzar 800 mg/m2  / Abraxane 100 mg/m2 \par 01/30/23: C# 2 Gemzar 800 mg/m2   / Abraxane 100 mg/m2 \par 02/13/23-  C # 3  [place] 600mg/m2/ Abraxane 75 mg/m2  (Dose reduction for poor tolerance) \par 02/27/23-  C # 4 [place] 600mg/m2/ Abraxane 75 mg/m2  (Dose reduction for poor tolerance) \par \par \par   PMD:  Martínez -  \par \par   FHX: Brother brain aneurysm diagnosed at 50, Sister had Aneuresym ,daughter Leukemia (27 years old) \par  \par Social HX: Lives with , Apartment, no smoker, no alcohol, walks about 30 mts 2-3 times a day.\par \par    PSX:  Total hysterectomy , Whipple procedure (06/13/22)    \par \par Disease:  Pancreatic adenocarcinoma Pathology:  AJCC Stage :  Tumor Markers: CEA/ Ca 19.9   [de-identified] : [facility]- BRCA2 Variant heterozygous- c.1964C>A \par Signatera -08/02/22 was positive - 0.58\par                 09/12/22-    0.14\par                09/20/22- 0.44\par Foundation one CDx-  KAILEY, 2 muts/ Mb, KRAS Q61H,CDKN2A, CSF3R amplification [de-identified] : 02/27/23:\par Pt is seen for follow up in office accompanied by her . \par She states that she has been feeling weaker and more tired lately.\par Her fingers are bothering had some numbness and feels cold all the time. Although she states after the dose reduction she feels a lot better.\par Denies constipation / diarrhes/ N/V\par She notices Tingling in hands and feet - not significantly changed or worse \par Discussed on getting restaging scans after this round of chemo and follow up with Dr dang after scans \par Also encouraged that she should seek a second opinion to see if she is eligible for any clinical trials. \par

## 2023-04-24 NOTE — ASSESSMENT
[FreeTextEntry1] : 78 y/o woman with resected pancreatic cancer pT3N2 PNI+/LVI+ disease, margin negative. \par \par Post-op imaging demonstrated lung lesions c/w metastasis. Considered biopsy of one, but it is very small, sub-centimeter, and discussion with radiology noted likely under size threshold for biopsy. In addition, would not change plan to proceed with FOLFIRINOX as even if patient had histologic confirmation would not qualify for GIANT trial due to language barrier. Will proceed with FOLFIRINOX. Also discussed with patient that [facility], now a [facility] facility, can provide treatment a bit closer to home, but she declined this at this time. Thus we proceeded with FOLFIRINOX. There is concern now that she has relapsed given her rising tumor markers and positive Signatera\par \par Plan:\par \par #Pancreatic Cancer\par - S/P 5 cycles of  FOLFIRINOX \par Switched  chemo to FOLFOX every 2 weeks dose reduced Oxaliplatin sec to neuropathy \par Since her neuropathy is worsening - Discussed with Dr dang and  stopped  Oxaliplatin on 12/05/22.\par Concern for rising tumor markers - Had planned to  proceed with CT guided lung BX - \par Per IR - Given size and location of the nodules there is a high likelihood of a non-diagnostic biopsy.\par The largest is about 1cm and in a difficult and dangerous location with high likelihood of complication\par Discussed with Dr dang and IR - plan to hold off on the biopsy for now- \par Switched chemo  to gemcitabine/nab-paclitaxel\par \par GnP regimen, every other week- Dose reduction for poor tolerance \par - gemcitabine 600 mg/m2\par - nab-paclitaxel 75  mg/m2\par Will get restaging scans after this round of chemo.\par \par \par Loss of appetite : \par added Remeron to boost appetite \par Refer to Pall team for medical marijuana \par \par -[facility] reveals BRCA2 Heterogeneous uncertain. Referred  to Dr Dumont genetics counselling \par - patient is not a candidate for the Elicio trial on account of having 4 lesions (more than 3) in the lung\par \par Arthralgia \par Currently using Tylenol prn for pain \par Chemo dose reduction done \par Advised to use Claritin after her chemo \par \par Case d/w Dr dang \par RTC in 2 weeks \par \par

## 2023-04-24 NOTE — REASON FOR VISIT
[Follow-Up Visit] : a follow-up [Family Member] : family member [[employer] ] : provided by [employer]   [FreeTextEntry2] : Pancreatic adenocarcinoma  [Interpreters_IDNumber] : [id number] [Interpreters_FullName] : Janet

## 2023-04-24 NOTE — PHYSICAL EXAM
[Restricted in physically strenuous activity but ambulatory and able to carry out work of a light or sedentary nature] : Status 1- Restricted in physically strenuous activity but ambulatory and able to carry out work of a light or sedentary nature, e.g., light house work, office work [Thin] : thin [Normal] : normal appearance, no rash, nodules, vesicles, ulcers, erythema [Ulcers] : no ulcers [Mucositis] : no mucositis [Thrush] : no thrush [Vesicles] : no vesicles [de-identified] : weak and fatigued  [de-identified] : Post op incision scar helaing well  [de-identified] : occasional lower back pain  [de-identified] : pins and needles on both hands, dropping things lately

## 2023-05-01 NOTE — REASON FOR VISIT
[Follow-Up Visit] : a follow-up [Family Member] : family member [[employer] ] : provided by [employer]   [Time Spent: ____ minutes] : Total time spent using  services: [unfilled] minutes. The patient's primary language is not English thus required  services. [Interpreters_IDNumber] : [id number]

## 2023-05-01 NOTE — HISTORY OF PRESENT ILLNESS
[FreeTextEntry1] : patient here wth  \par c/o lower pelvic pain for past month refeered to rectum , pain with bowel habits \par no change in luts , no dysuria , no hematurai \par no INC \par drinks a lot of water \par urine is clear and feel empty after void \par recent ct scan: advancing disease in pelvis- - kidneys and bladder OK \par here for further eval :

## 2023-05-01 NOTE — END OF VISIT
[] : Resident [FreeTextEntry3] : Eliel David MD PhD\par Medical Oncology Attending\par I personally have spent a total of 30 minutes of time on the date of this encounter reviewing test results, documenting findings, coordinating care, and directly consulting with the patient and/or designated family member.\par I was present with the trainee during the key portions of the history and exam. I agree with the findings and plan as documented above.\par

## 2023-05-01 NOTE — REVIEW OF SYSTEMS
[Fatigue] : fatigue [Abdominal Pain] : abdominal pain [Negative] : Respiratory [FreeTextEntry9] : shoulder, neck and back pain

## 2023-05-01 NOTE — HISTORY OF PRESENT ILLNESS
[de-identified] : 76 y/o Syriac-speaking woman with resected pancreatic cancer pT3N2 PNI+/LVI+ disease, margin negative, presenting for adjuvant treatment discussion\par \par Other Current Medical Problems: IDDM diagnosed >20 years, HTN, HLD, CAD, Cardiac stent 2019 , chronic hep C (S/P treatment) arthritis , \par \par Oncological History :\par May 2022: Presented with RUQ abdominal pain intermittently x 1 week and was found to be jaundiced presenting with a T bili of 15 and Ca 19.9 was 6704.\par 05/20/22: CT A/P - 2.1 cms pancreatic head mass with resultant abrupt cut off the CBD.Ct chest with several indeterminate pulmonary nodules. \par 05/20/22- S/P EUS with FNB pathology + for moderately differntiated adenocarcinoma 05/23/22- S/P ERCP- Plastic stent into CBD \par 06/13/22- Upfront Whipple surgery: Surgical pathology - Moderately differentiated adenocarcinoma , negative margins 5/17 LN +ve.\par 07/19/22 CT CAP no e/o local disease; however scattered pulmonary nodules, some of which are new.\par 08/01/22- C # 1 FOLFORINOX - 5FU 2000 mg/m2/  mg/ m2/ Irinotecan 120 mg/ m2/ Oxaliplatin 70 mg/ m2 \par 08/15/22- C # 2 FOLFORINOX - 5FU 2000 mg/m2/  mg/ m2/ Irinotecan 120 mg/ m2/ Oxaliplatin 70 mg/ m2 \par 08/29/22- C # 3 FOLFORINOX - 5FU 2000 mg/m2/  mg/ m2/ Irinotecan 120 mg/ m2/ Oxaliplatin 70 mg/ m2 \par 09/12/22- C # 4 FOLFORINOX - 5FU 2000 mg/m2/  mg/ m2/ Irinotecan 120 mg/ m2/ Oxaliplatin 70 mg/ m2 \par 09/14/22 Restaging scans show no evidence of POD or metastatic disease\par \par 09/26/22- C #5 FOLFORINOX - 5FU 2000 mg/m2/  mg/ m2/ Irinotecan 75 mg/ m2/ Oxaliplatin 70 mg/ m2 \par 10/10/22- C # 6 FOLFOX- - 5FU 2000 mg/m2/  mg/Oxaliplatin 55 mg/m2 -( stop Irinotecan sec to diarrhea) \par 10/24/22- C # 7 FOLFOX 5FU 2000 mg/m2/  mg/Oxaliplatin 55 mg/m2\par 11/07/22- C # 8- FOLFOX \par \par 11/16/22: Restaging CT scans--Mild increase in size of several previously seen noncalcified pulmonary nodules as described above\par No imaging evidence of locally recurrent or metastatic disease involving the abdomen or pelvis.\par \par 11/21/22- C # 9 FOLFOX\par 12/05/22- C # 10 only 5 FU pump- (stopped Oxali sec to worsening neuropathy) \par 12/19/22- C # 11- 5 FU pump only \par 01/03/23- C # 12 - 5 FU pump only \par \par 01/05/23- CT A/P/C Multiple bilateral pulmonary nodules, some of which are increased in size from the prior exam.New mild omental nodularity and peritoneal thickening, suspicious for peritoneal carcinomatosis.\par \par \par 01/17/23: C #1 Gemzar 800 mg/m2 / Abraxane 100 mg/m2 \par 01/30/23: C# 2 Gemzar 800 mg/m2 / Abraxane 100 mg/m2 \par 02/13/23- C # 3 [place] 600mg/m2/ Abraxane 75 mg/m2 (Dose reduction for poor tolerance) \par 02/27/23- C # 4 [place] 600mg/m2/ Abraxane 75 mg/m2 (Dose reduction for poor tolerance) \par \par 02/28/23- CT CAP Stable pulmonary metastases in the short interim. Mildly increased omental and pelvic peritoneal metastases\par Increased omental carcinomatosis for example 2.5 x 1.4 cm omental nodule (3, 102) previously measured 1 x 0.9 cm. A central mesenteric metastasis now measures 1.1 cm (3, 101) previously 0.9 cm. No ascites.\par \par PMD: Dr Martínez - [phone] \par \par FHX: Brother brain aneurysm diagnosed at 50, Sister had Aneuresym ,daughter Leukemia (27 years old) \par \par Social HX: Lives with , Apartment, no smoker, no alcohol, walks about 30 mts 2-3 times a day.\par \par PSX: Total hysterectomy , Whipple procedure (06/13/22) \par \par Disease: Pancreatic adenocarcinomaPathology:AJCC Stage :Tumor Markers: CEA/ Ca 19.9. \par \par \par [facility]- BRCA2 Variant heterozygous- c.1964C>A \par Signatera -08/02/22 was positive - 0.58\par  09/12/22- 0.14\par  09/20/22- 0.44\par Foundation one CDx- KAILEY, 2 muts/ Mb, KRAS Q61H,CDKN2A, CSF3R amplification \par \par \par \par  \par \par  [de-identified] : After the 4 cycles of Gemzar/ Abraxane, CT CAP in Feb  showed stable pulmonary metastases in the short interim, mildly increased omental and pelvic peritoneal metastases, increased omental carcinomatosis. Chemo was discontinued. She didn't experience significant symptoms when on Gemzar/ abraxane, tolerating the regimen well. For the past two months, she has diffuse abdominal pain, feel full easily, bloated, pain with BMs and passing gas. She felt the urge to have BM (3-4x daily) but produced little had stools each time. She is on fiber regimen to help with BMs. For the past month, she also had shoulder, neck and back pain, after an accident on March 28. She visited an orthopedic, got an MRI and was told that she has disc narrowing causing pain. She used Tylenol PRN for the pain and will have steroids injection in 2 days. \par Since last visit, she met with [facility] GI oncologist and was told there was not a clinical trial for her case. Per daughter, they have been having difficulties getting insurance coverage when reaching out to other facilities. \par Besides the abdominal pain and shoulder/ neck/ lower back pain, she feels tired easily. No significant weight loss. Patient and family preferred continuing treatment and not hospice at this point.

## 2023-05-01 NOTE — ASSESSMENT
[FreeTextEntry1] : 78 y/o Croatian-speaking woman with resected pancreatic cancer pT3N2 PNI+/LVI+ disease, margin negative, s/p 5 cycles of FOLFORINOX, then 4 cycles of FOLFOX, then 3 cycles of 5 -FU, then 4 cycles of Gemzar/ Abraxane presented for a follow up.\par \par #Pancreatic cancer:\par Post-op imaging demonstrated lung lesions c/w metastasis. Considered biopsy of one, but it is very small, sub-centimeter, and discussion with radiology noted likely under size threshold for biopsy. In addition, would not change plan to proceed with FOLFIRINOX as even if patient had histologic confirmation would not qualify for GIANT trial due to language barrier. Will proceed with FOLFIRINOX. Also discussed with patient that [facility], now a [facility], can provide treatment a bit closer to home, but she declined this at this time. Thus we proceeded with FOLFIRINOX. There is concern now that she has relapsed given her rising tumor markers and positive Signatera. BRCA2 but VUS.\par \par  S/p 5 cycles of FOLFORINOX, then 4 cycles of FOLFOX, then 3 cycles of 5 -FU, then 4 cycles of Gemzar/ Abraxane, with POD. Currently resistant to the standard regimens (FOLFORINOX and Gemzar/ Abraxane). CT CAP in April showed marked POD with increased size of lung nodules, likely new metastasis to liver and increased size in omental and peritoneal masses.  and CEA markedly increased in April.\par Abdominal pain and BM symptoms are likely secondary to omental and peritoneal masses. \par \par  She is currently not candidate for standard chemo regimens given poor response earlier. I counseled patient to explore different clinical trials in the [facility]. She met with [facility] GI oncologist where there was no eligible trial. Per daughter, it posed some challenges with her insurance coverage. I suggested to also connect with [facility] - Phase I group and other facilities ([facility], [facility]) in the [facility] for possible clinical trials. \par \par Plan:\par -patient preferred treatment and not hospice approach at this point\par -recommended to explore clinical trials in the [facility]\par -should no trial options exist, 5-fu/nal-iri could be considered; low nal-iri dose due to diarrhea with irinotecan\par \par Tamica Gillis MD PGY5\par

## 2023-05-01 NOTE — ASSESSMENT
[FreeTextEntry1] : patient here wth  \par c/o lower pelvic pain for past month refeered to rectum , pain with bowel habits \par no change in luts , no dysuria , no hematurai \par no INC \par drinks a lot of water \par urine is clear and feel empty after void \par recent ct scan: advancing disease in pelvis- \par here for further eval :\par 1- check urine \par 2- discussed that pain may be related to advancing pelvic disease seen on CT \par

## 2023-05-04 PROBLEM — R19.8 TENESMUS: Status: ACTIVE | Noted: 2023-01-01

## 2023-05-04 NOTE — ASSESSMENT
[______] : HCP: [unfilled] [FreeTextEntry1] : 77yoF with:\par \par # Stage IV Pancreatic cancer - Off of DMT, patient is seeking out clinical trials. \par \par # Peripheral neuropathy - c/w Gabapentin 100mg TID\par \par # Pain 2/2 treatment - C/w PRN Tylenol and celebrex. \par -May use PRN oxycodone 2.5-5mg for severe pain. Reassured patient about using opioid to treat her pain. \par \par # +Urine cx - Reached out to Dr. Mcdaniel regarding patient's recent urine cx, defer antibiotic treatment to urology.  Pt denies urinary symptoms at this time. \par \par # Loss of appetite - Appetite has been improved lately. C/w mirtazapine 15mg QHS. \par \par # [facility]mus - recommend use of miralax in lieu of fiber supplement. \par \par # Encounter for palliative care- Emotional support provided \par Provided video code again at today's visit for Advance Care Planning videos in Estonian.\par Health Care Proxy (HCP) form completed in office today after explanation of the role of a HCP.\par  \par \par Follow up 2 weeks, call with questions/issues\par

## 2023-05-04 NOTE — HISTORY OF PRESENT ILLNESS
[FreeTextEntry1] : 77yoF with pancreatic cancer presents for follow up visit.\par PMH significant for CAD, HTN, HLD, IDDM, chronic hepatitis C, arthritis, cardiac stent 2019. \par \par Patient is Urdu-speaking, her  provided translation per patient's request. \par \par May 2022: Presented with RUQ abdominal pain intermittently x 1 week and was found to be jaundiced presenting with a T bili of 15 and Ca 19.9 was 6704. CT A/P showed a 2.1cm pancreatic head mass. 5/20/22- S/P EUS with [facility] pathology + for moderately differentiated adenocarcinoma 05/23/22- S/P ERCP- Plastic stent into CBD \par 06/13/22- Upfront Whipple surgery: Surgical pathology - Moderately differentiated adenocarcinoma , negative margins 5/17 LNs positive. Started adjuvant FOLFIRINOX on 8/1/22. \par \par Main reason for which patient is referred is symptom management. \par Irinotecan has been on hold due to diarrhea, which has been notably better since it being held. \par \par Interval Hx (5/4/23):\par Patient presents in office with her . \par She has been off of chemotherapy since 2/2023. She was evaluated at outside cancer centers for clinical trial eligibility and is continuing the search for a trial.\par She has been having progressively worsening pain in her abdomen, neck/shoulders and back. Uses PRN Tylenol 500mg, lately has been using four times daily, with the bedtime dose being a Tylenol PM. Recently started using Celebrex once daily and has found this to be helpful. Presently she reports no pain during the time of this visit. \par She experiences a rise in pain when she defecates. Has been straining and having small amounts of soft stool multiple times per day. Feels bloated.  Has been using fiber powder agent to help with constipation.\par \par ROS:\par +appetite has been good\par + on insulin and following closely with Endocrinology for her diabetes control. \par +abdominal cramping preceding BMs, improved with BM\par +peripheral neuropathy - has been taking gabapentin 100mg TID\par +mood is generally positive\par Denies n/v, trouble sleeping (uses mirtazapine 15mg at bedtime), \par \par Patient is , lives with her  in an apartment in [place]. She is originally from [place] and has been in the [place] since 1988. They have 4 children, 3 live in [place]. Patient requires occasional assistance with ADLs, her  and daughter are able to assist. Patient's daughter serves as HHA, 3 times a week, 4-5 hours each time. Patient enjoys listening to music, reading and doing puzzles. She is emotionally supported by her  and children who are able to assist as needed. She enjoys going for walks, playing golf. She utilizes a cane, wheelchair and shower chair. \par \par Does puzzles to pass time. \par \par PMD: Dr Martínez:  \par \par I-STOP Ref#: [id number]

## 2023-05-04 NOTE — PHYSICAL EXAM
[General Appearance - Alert] : alert [General Appearance - In No Acute Distress] : in no acute distress [Sclera] : the sclera and conjunctiva were normal [Neck Appearance] : the appearance of the neck was normal [] : no respiratory distress [Heart Rate And Rhythm] : heart rate was normal and rhythm regular [Heart Sounds] : normal S1 and S2 [Edema] : there was no peripheral edema [Bowel Sounds] : normal bowel sounds [Abdomen Soft] : soft [Abdomen Tenderness] : non-tender [Abnormal Walk] : normal gait [Skin Color & Pigmentation] : normal skin color and pigmentation [No Focal Deficits] : no focal deficits [Oriented To Time, Place, And Person] : oriented to person, place, and time [Affect] : the affect was normal [FreeTextEntry1] : + bloated

## 2023-05-04 NOTE — DATA REVIEWED
[FreeTextEntry1] : CT C/A/P  (04/26/2023): \par \par COMPARISON: CT chest abdomen and pelvis 2/28/2023.\par \par FINDINGS:\par CHEST:\par LUNGS AND LARGE AIRWAYS: Patent central airways. Progression of numerous bilateral pulmonary nodules since the previous exam. For example a left lower lobe nodule measuring 1.5 cm (3:44), previously 1.0 cm.\par PLEURA: No pleural effusion.\par VESSELS: Negative. Patent aorto caval junction.\par HEART: Heart size is normal. No pericardial effusion. R. Prasad artery calcifications.\par MEDIASTINUM AND LEVI: No lymphadenopathy.\par CHEST WALL AND LOWER NECK: Dense breasts with numerous calcifications, unchanged. Tiny right lobe thyroid nodules and subcentimeter coarse calcification in the left lobe unchanged.\par \par ABDOMEN AND PELVIS:\par LIVER: In the posterior right liver segment 6 and 7 there are new cystic foci and subcapsular dense fluid likely representing metastatic disease.\par BILE DUCTS: Normal caliber.\par GALLBLADDER: Within normal limits.\par SPLEEN: Within normal limits.\par PANCREAS: The procedure. Pancreatic duct stent in place. The pancreatic tail is atrophic.\par ADRENALS: Within normal limits.\par KIDNEYS/URETERS: Bilateral cortical hypodensities too small to characterize, unchanged. Unchanged bilateral prominent extrarenal pelvises right greater than left.\par \par BLADDER: Within normal limits.\par REPRODUCTIVE ORGANS: Hysterectomy. Previously described nodular thickening along the lower pelvic peritoneal reflexion on the left has worsened and now appears as a 3.3 x 2.1 cm necrotic mass.\par \par BOWEL: No bowel obstruction. Appendix is normal.\par PERITONEUM: Worsening omental and peritoneal masses. For example a 7.7 x 3.2 cm omental mass (3:101) previously measured 2.5 x 1.4 cm. Trace ascites.\par VESSELS: Atherosclerotic changes.\par RETROPERITONEUM/LYMPH NODES: No lymphadenopathy.\par ABDOMINAL WALL: Within normal limits.\par BONES: Degenerative changes.\par \par IMPRESSION:\par \par Marked progression of disease in the chest abdomen and pelvis since the previous exam of 2/28/2023.

## 2023-05-05 PROBLEM — N39.0 ACUTE UTI: Status: RESOLVED | Noted: 2023-01-01 | Resolved: 2023-01-01

## 2023-05-05 NOTE — H&P PST ADULT - PROBLEM/PLAN-1
well developed, well nourished , in no acute distress , ambulating without difficulty , normal communication ability DISPLAY PLAN FREE TEXT

## 2023-05-09 NOTE — PHYSICAL EXAM
[Restricted in physically strenuous activity but ambulatory and able to carry out work of a light or sedentary nature] : Status 1- Restricted in physically strenuous activity but ambulatory and able to carry out work of a light or sedentary nature, e.g., light house work, office work [Thin] : thin [Normal] : normal appearance, no rash, nodules, vesicles, ulcers, erythema [Ulcers] : no ulcers [Mucositis] : no mucositis [Thrush] : no thrush [Vesicles] : no vesicles [de-identified] : Post op incision scar helaing well  [de-identified] : occasional lower back pain  [de-identified] : pins and needles on both hands, dropping things lately

## 2023-05-09 NOTE — REASON FOR VISIT
[Follow-Up Visit] : a follow-up [Family Member] : family member [[employer] ] : provided by [employer]   [FreeTextEntry2] : Pancreatic adenocarcinoma  [Interpreters_IDNumber] : [id number] [Interpreters_FullName] : Janet

## 2023-05-09 NOTE — HISTORY OF PRESENT ILLNESS
[de-identified] : ID: 76 y/o Maori-speaking woman with resected pancreatic cancer pT3N2 PNI+/LVI+ disease, margin negative, presenting for adjuvant treatment discussion\par \par Other Current Medical Problems:  IDDM diagnosed >20 years, HTN, HLD, CAD, Cardiac stent 2019  , chronic hep C (S/P treatment) arthritis , \par \par  Oncological History : \par May 2022: Presented with RUQ abdominal pain intermittently x 1 week and was found to be jaundiced presenting with a T bili of 15 and Ca 19.9 was 6704.\par  05/20/22: CT A/P - 2.1 cms pancreatic head mass with resultant abrupt cut off the CBD. Ct chest with several indeterminate pulmonary nodules. \par  05/20/22- S/P EUS  with [facility] pathology + for moderately differntiated adenocarcinoma  05/23/22- S/P ERCP- Plastic stent into CBD  \par 06/13/22- Upfront Whipple surgery: Surgical pathology - Moderately differentiated adenocarcinoma , negative margins 5/17 LN +ve.\par  07/19/22 CT CAP no e/o local disease; however scattered pulmonary nodules, some of which are new.  \par 08/01/22- C # 1 FOLFORINOX - 5FU 2000 mg/m2/  mg/ m2/ Irinotecan 120 mg/ m2/ Oxaliplatin 70 mg/ m2 \par  08/15/22- C # 2 FOLFORINOX - 5FU 2000 mg/m2/  mg/ m2/ Irinotecan 120 mg/ m2/ Oxaliplatin 70 mg/ m2  \par 08/29/22- C # 3 FOLFORINOX - 5FU 2000 mg/m2/  mg/ m2/ Irinotecan 120 mg/ m2/ Oxaliplatin 70 mg/ m2  \par 09/12/22- C # 4  FOLFORINOX - 5FU 2000 mg/m2/  mg/ m2/ Irinotecan 120 mg/ m2/ Oxaliplatin 70 mg/ m2  \par 09/14/22 Restaging scans show no evidence of POD or metastatic disease\par \par 09/26/22- C #5 FOLFORINOX - 5FU 2000 mg/m2/  mg/ m2/ Irinotecan 75 mg/ m2/ Oxaliplatin 70 mg/ m2  \par 10/10/22- C # 6 FOLFOX-  - 5FU 2000 mg/m2/  mg/Oxaliplatin 55 mg/m2 -( stop Irinotecan sec to diarrhea) \par 10/24/22- C # 7 FOLFOX 5FU 2000 mg/m2/  mg/Oxaliplatin 55 mg/m2\par 11/07/22- C # 8- FOLFOX \par \par 11/16/22: Restaging CT scans--Mild increase in size of several previously seen noncalcified pulmonary nodules as described above\par No imaging evidence of locally recurrent or metastatic disease involving the abdomen or pelvis.\par \par 11/21/22- C # 9 FOLFOX\par 12/05/22- C # 10 only 5 FU pump- (stopped Oxali sec to worsening neuropathy) \par 12/19/22- C # 11- 5 FU pump only \par 01/03/23- C # 12 - 5 FU pump only \par \par    PMD: Dr Martínez - [phone] \par \par   FHX: Brother brain aneurysm diagnosed at 50, Sister had Aneuresym ,daughter Leukemia (27 years old) \par  \par Social HX: Lives with , Apartment, no smoker, no alcohol, walks about 30 mts 2-3 times a day.\par \par    PSX:  Total hysterectomy , Whipple procedure (06/13/22)    \par \par Disease:  Pancreatic adenocarcinoma Pathology:  AJCC Stage :  Tumor Markers: CEA/ Ca 19.9   [de-identified] : [facility]- BRCA2 Variant heterozygous- c.1964C>A \par Signatera -08/02/22 was positive - 0.58\par                 09/12/22-    0.14\par                09/20/22- 0.44\par Foundation one CDx-  KAILEY, 2 muts/ Mb, KRAS Q61H,CDKN2A, CSF3R amplification [de-identified] : 01/03/23:\par Pt is seen and examined in office accompanied by her  and daughter. \par She denies N/V/D\par Neuropathy is same- not worse \par Energy/ Activity level is  good \par Reviewd the most recent rising tumor markers and concern for increasing lung nodules.\par Discussed with pt and  and daughter  that there is no plan for lung biopsy at this point given that IR doesn’t think it is feasible for lung biopsy .\par Will get restaging scans after next  round of chemo- Will monitor tumor markers as well\par Daughter is concerned that tumor markers are rising - Will follow up with Dr Daivd after scans completed. \par \par \par \par \par \par \par

## 2023-05-17 NOTE — ASSESSMENT
[FreeTextEntry1] : 76 y/o Upper sorbian-speaking woman with resected pancreatic cancer pT3N2 PNI+/LVI+ disease, margin negative, s/p 5 cycles of FOLFORINOX, then 4 cycles of FOLFOX, then 3 cycles of 5 -FU, then 4 cycles of Gemzar/ Abraxane presented for a follow up.\par \par Post-op imaging demonstrated lung lesions c/w metastasis. Considered biopsy of one, but it is very small, sub-centimeter, and discussion with radiology noted likely under size threshold for biopsy. In addition, would not change plan to proceed with FOLFIRINOX as even if patient had histologic confirmation would not qualify for GIANT trial due to language barrier. Will proceed with FOLFIRINOX. Also discussed with patient that [facility], can provide treatment a bit closer to home, but she declined this at this time. Thus we proceeded with FOLFIRINOX. There is concern now that she has relapsed given her rising tumor markers and positive Signatera. BRCA2 but VUS.\par \par  S/p 5 cycles of FOLFORINOX, then 4 cycles of FOLFOX, then 3 cycles of 5 -FU, then 4 cycles of Gemzar/ Abraxane, with POD. Currently resistant to the standard regimens (FOLFORINOX and Gemzar/ Abraxane). CT CAP in April showed marked POD with increased size of lung nodules, likely new metastasis to liver and increased size in omental and peritoneal masses.  and CEA markedly increased in April.\par Abdominal pain and BM symptoms are likely secondary to omental and peritoneal masses. \par \par  She is currently not candidate for standard chemo regimens given poor response earlier. I counseled patient to explore different clinical trials in the city. She met with [facility] GI oncologist where there was no eligible trial. Per daughter, it posed some challenges with her insurance coverage. She is now on contact with [facility] team.\par \par She might also be able to start with newly opening BMS trial in our center.\par \par Plan:\par -prepping for BMS trial for now possibly starting next 1~2 week. Basic labs today.\par -should no trial options exist, 5-fu/nal-iri could be considered; low nal-iri dose due to diarrhea with irinotecan\par -PAUF-I may be an option for her as well when that opens\par \edilia Diop MD\edilia Hemoc fellow PGY-6\par Case d/w Dr. David

## 2023-05-17 NOTE — HISTORY OF PRESENT ILLNESS
[de-identified] : 76 y/o Kiswahili-speaking woman with resected pancreatic cancer pT3N2 PNI+/LVI+ disease, margin negative, presenting for adjuvant treatment discussion\par \par Other Current Medical Problems: IDDM diagnosed >20 years, HTN, HLD, CAD, Cardiac stent 2019 , chronic hep C (S/P treatment) arthritis , \par \par Oncological History :\par May 2022: Presented with RUQ abdominal pain intermittently x 1 week and was found to be jaundiced presenting with a T bili of 15 and Ca 19.9 was 6704.\par 05/20/22: CT A/P - 2.1 cms pancreatic head mass with resultant abrupt cut off the CBD.Ct chest with several indeterminate pulmonary nodules. \par 05/20/22- S/P EUS with [facility] pathology + for moderately differntiated adenocarcinoma 05/23/22- S/P ERCP- Plastic stent into CBD \par 06/13/22- Upfront Whipple surgery: Surgical pathology - Moderately differentiated adenocarcinoma , negative margins 5/17 LN +ve.\par 07/19/22 CT CAP no e/o local disease; however scattered pulmonary nodules, some of which are new.\par 08/01/22- C # 1 FOLFORINOX - 5FU 2000 mg/m2/  mg/ m2/ Irinotecan 120 mg/ m2/ Oxaliplatin 70 mg/ m2 \par 08/15/22- C # 2 FOLFORINOX - 5FU 2000 mg/m2/  mg/ m2/ Irinotecan 120 mg/ m2/ Oxaliplatin 70 mg/ m2 \par 08/29/22- C # 3 FOLFORINOX - 5FU 2000 mg/m2/  mg/ m2/ Irinotecan 120 mg/ m2/ Oxaliplatin 70 mg/ m2 \par 09/12/22- C # 4 FOLFORINOX - 5FU 2000 mg/m2/  mg/ m2/ Irinotecan 120 mg/ m2/ Oxaliplatin 70 mg/ m2 \par 09/14/22 Restaging scans show no evidence of POD or metastatic disease\par \par 09/26/22- C #5 FOLFORINOX - 5FU 2000 mg/m2/  mg/ m2/ Irinotecan 75 mg/ m2/ Oxaliplatin 70 mg/ m2 \par 10/10/22- C # 6 FOLFOX- - 5FU 2000 mg/m2/  mg/Oxaliplatin 55 mg/m2 -( stop Irinotecan sec to diarrhea) \par 10/24/22- C # 7 FOLFOX 5FU 2000 mg/m2/  mg/Oxaliplatin 55 mg/m2\par 11/07/22- C # 8- FOLFOX \par \par 11/16/22: Restaging CT scans--Mild increase in size of several previously seen noncalcified pulmonary nodules as described above\par No imaging evidence of locally recurrent or metastatic disease involving the abdomen or pelvis.\par \par 11/21/22- C # 9 FOLFOX\par 12/05/22- C # 10 only 5 FU pump- (stopped Oxali sec to worsening neuropathy) \par 12/19/22- C # 11- 5 FU pump only \par 01/03/23- C # 12 - 5 FU pump only \par \par 01/05/23- CT A/P/C Multiple bilateral pulmonary nodules, some of which are increased in size from the prior exam.New mild omental nodularity and peritoneal thickening, suspicious for peritoneal carcinomatosis.\par \par \par 01/17/23: C #1 Gemzar 800 mg/m2 / Abraxane 100 mg/m2 \par 01/30/23: C# 2 Gemzar 800 mg/m2 / Abraxane 100 mg/m2 \par 02/13/23- C # 3 [place] 600mg/m2/ Abraxane 75 mg/m2 (Dose reduction for poor tolerance) \par 02/27/23- C # 4 [place] 600mg/m2/ Abraxane 75 mg/m2 (Dose reduction for poor tolerance) \par \par 02/28/23- CT CAP Stable pulmonary metastases in the short interim. Mildly increased omental and pelvic peritoneal metastases\par Increased omental carcinomatosis for example 2.5 x 1.4 cm omental nodule (3, 102) previously measured 1 x 0.9 cm. A central mesenteric metastasis now measures 1.1 cm (3, 101) previously 0.9 cm. No ascites.\par 04/26/23 CT CAP with progression of disease - progression in lungs, liver, peritoneal disease\par \par \par \par PMD:  Martínez - [phone] \par \par FHX: Brother brain aneurysm diagnosed at 50, Sister had Aneuresym ,daughter Leukemia (27 years old) \par \par Social HX: Lives with , Apartment, no smoker, no alcohol, walks about 30 mts 2-3 times a day.\par \par PSX: Total hysterectomy , Whipple procedure (06/13/22) \par \par Disease: Pancreatic adenocarcinomaPathology:AJCC Stage :Tumor Markers: CEA/ Ca 19.9. \par \par \par [facility]- BRCA2 Variant heterozygous- c.1964C>A \par Signatera -08/02/22 was positive - 0.58\par  09/12/22- 0.14\par  09/20/22- 0.44\par Foundation one CDx- KAILEY, 2 muts/ Mb, KRAS Q61H,CDKN2A, CSF3R amplification \par \par \par \par  \par \par  [de-identified] : 05/17/23\par She has been doing ok. Her appetite and BM is good. However, she feels a lump in her belly that is likely growing. \par She has been having difficulty finding a clinical trial. She is not on contact with [facility] and need out-of-pocket approval from us. \par

## 2023-05-17 NOTE — REASON FOR VISIT
[Follow-Up Visit] : a follow-up [Family Member] : family member [[employer] ] : provided by [employer]   [Interpreters_IDNumber] : [id number]

## 2023-05-25 NOTE — HISTORY OF PRESENT ILLNESS
[de-identified] : 76 y/o Yakut-speaking woman with resected pancreatic cancer pT3N2 PNI+/LVI+ disease, margin negative, presenting for adjuvant treatment discussion\par \par Other Current Medical Problems: IDDM diagnosed >20 years, HTN, HLD, CAD, Cardiac stent 2019 , chronic hep C (S/P treatment) arthritis , \par \par Oncological History :\par May 2022: Presented with RUQ abdominal pain intermittently x 1 week and was found to be jaundiced presenting with a T bili of 15 and Ca 19.9 was 6704.\par 05/20/22: CT A/P - 2.1 cms pancreatic head mass with resultant abrupt cut off the CBD.Ct chest with several indeterminate pulmonary nodules. \par 05/20/22- S/P EUS with FNB pathology + for moderately differntiated adenocarcinoma 05/23/22- S/P ERCP- Plastic stent into CBD \par 06/13/22- Upfront Whipple surgery: Surgical pathology - Moderately differentiated adenocarcinoma , negative margins 5/17 LN +ve.\par 07/19/22 CT CAP no e/o local disease; however scattered pulmonary nodules, some of which are new.\par 08/01/22- C # 1 FOLFORINOX - 5FU 2000 mg/m2/  mg/ m2/ Irinotecan 120 mg/ m2/ Oxaliplatin 70 mg/ m2 \par 08/15/22- C # 2 FOLFORINOX - 5FU 2000 mg/m2/  mg/ m2/ Irinotecan 120 mg/ m2/ Oxaliplatin 70 mg/ m2 \par 08/29/22- C # 3 FOLFORINOX - 5FU 2000 mg/m2/  mg/ m2/ Irinotecan 120 mg/ m2/ Oxaliplatin 70 mg/ m2 \par 09/12/22- C # 4 FOLFORINOX - 5FU 2000 mg/m2/  mg/ m2/ Irinotecan 120 mg/ m2/ Oxaliplatin 70 mg/ m2 \par 09/14/22 Restaging scans show no evidence of POD or metastatic disease\par 09/26/22- C #5 FOLFORINOX - 5FU 2000 mg/m2/  mg/ m2/ Irinotecan 75 mg/ m2/ Oxaliplatin 70 mg/ m2 \par 10/10/22- C # 6 FOLFOX- - 5FU 2000 mg/m2/  mg/Oxaliplatin 55 mg/m2 -( stop Irinotecan sec to diarrhea) \par 10/24/22- C # 7 FOLFOX 5FU 2000 mg/m2/  mg/Oxaliplatin 55 mg/m2\par 11/07/22- C # 8- FOLFOX \par \par 11/16/22: Restaging CT scans--Mild increase in size of several previously seen noncalcified pulmonary nodules as described above\par No imaging evidence of locally recurrent or metastatic disease involving the abdomen or pelvis.\par 11/21/22- C # 9 FOLFOX\par 12/05/22- C # 10 only 5 FU pump- (stopped Oxali sec to worsening neuropathy) \par 12/19/22- C # 11- 5 FU pump only \par 01/03/23- C # 12 - 5 FU pump only \par 01/05/23- CT A/P/C Multiple bilateral pulmonary nodules, some of which are increased in size from the prior exam.New mild omental nodularity and peritoneal thickening, suspicious for peritoneal carcinomatosis.\par 01/17/23: C #1 Gemzar 800 mg/m2 / Abraxane 100 mg/m2 \par 01/30/23: C# 2 Gemzar 800 mg/m2 / Abraxane 100 mg/m2 \par 02/13/23- C # 3 [place] 600mg/m2/ Abraxane 75 mg/m2 (Dose reduction for poor tolerance) \par 02/27/23- C # 4 [place] 600mg/m2/ Abraxane 75 mg/m2 (Dose reduction for poor tolerance) \par 02/28/23- CT CAP Stable pulmonary metastases in the short interim. Mildly increased omental and pelvic peritoneal metastases\par Increased omental carcinomatosis for example 2.5 x 1.4 cm omental nodule (3, 102) previously measured 1 x 0.9 cm. A central mesenteric metastasis now measures 1.1 cm (3, 101) previously 0.9 cm. No ascites.\par 04/26/23 CT CAP with progression of disease - progression in lungs, liver, peritoneal disease\par \par \par PMD: Dr Martínez - [phone] \par \par FHX: Brother brain aneurysm diagnosed at 50, Sister had Aneuresym ,daughter Leukemia (27 years old) \par Social HX: Lives with , Apartment, no smoker, no alcohol, walks about 30 mts 2-3 times a day.\par PSX: Total hysterectomy , Whipple procedure (06/13/22) \par Disease: Pancreatic adenocarcinomaPathology:AJCC Stage :Tumor Markers: CEA/ Ca 19.9. \par \par Invitae- BRCA2 Variant heterozygous- c.1964C>A \par Signatera -08/02/22 was positive - 0.58\par 09/12/22- 0.14\par 09/20/22- 0.44\par Foundation one CDx- KAILEY, 2 muts/ Mb, KRAS Q61H,CDKN2A, CSF3R amplification \par \par \par \par  \par \par  [de-identified] : 5/25\par - still active\par - abdominal pain, consistent, slightly worse, with more distention\par - phase 1 @ [facility], two clinical trials she may qualify

## 2023-05-25 NOTE — ASSESSMENT
[FreeTextEntry1] : 78 y/o Ukrainian-speaking woman with resected pancreatic cancer pT3N2 PNI+/LVI+ disease, margin negative, s/p 5 cycles of FOLFORINOX, then 4 cycles of FOLFOX, then 3 cycles of 5 -FU, then 4 cycles of Gemzar/ Abraxane presented for a follow up.\par \par Post-op imaging demonstrated lung lesions c/w metastasis. Considered biopsy of one, but it is very small, sub-centimeter, and discussion with radiology noted likely under size threshold for biopsy. In addition, would not change plan to proceed with FOLFIRINOX as even if patient had histologic confirmation would not qualify for GIANT trial due to language barrier. Will proceed with FOLFIRINOX. Also discussed with patient that [facility], now a [facility], can provide treatment a bit closer to home, but she declined this at this time. Thus we proceeded with FOLFIRINOX. There is concern now that she has relapsed given her rising tumor markers and positive Signatera. BRCA2 but VUS.\par \par  S/p 5 cycles of FOLFORINOX, then 4 cycles of FOLFOX, then 3 cycles of 5 -FU, then 4 cycles of Gemzar/ Abraxane, with POD. Currently resistant to the standard regimens (FOLFORINOX and Gemzar/ Abraxane). CT CAP in April showed marked POD with increased size of lung nodules, likely new metastasis to liver and increased size in omental and peritoneal masses.  and CEA markedly increased in April.\par Abdominal pain and BM symptoms are likely secondary to omental and peritoneal masses. \par \par  She is currently not candidate for standard chemo regimens given poor response earlier. I counseled patient to explore different clinical trials in the city. She met with [facility] GI oncologist where there was no eligible trial. Per daughter, it posed some challenges with her insurance coverage. She is now on contact with [facility] team.\par \par She might also be able to start with newly opening BMS trial in our center.\par \par Plan:\par -plan for BMS trial next week\par -should no trial options exist, 5-fu/nal-iri could be considered; low nal-iri dose due to diarrhea with irinotecan\par -PAUF-I may be an option for her as well when that opens\par \par Eliel David MD PhD\par Medical Oncology Attending\par I personally have spent a total of 30 minutes of time on the date of this encounter reviewing test results, documenting findings, coordinating care, and directly consulting with the patient and/or designated family member.\par \par

## 2023-05-25 NOTE — REASON FOR VISIT
[Follow-Up Visit] : a follow-up [Family Member] : family member [[employer] ] : provided by [employer]   [Interpreters_IDNumber] : [id number]

## 2023-05-31 NOTE — ED PROVIDER NOTE - NSFOLLOWUPINSTRUCTIONS_ED_ALL_ED_FT
Follow up with your oncologist within 2-3 days, show copies of your results, you have an appointment scheduled for tomorrow  Follow up with your primary care doctor within 1 week  For SEVERE pain take Oxycodone 5mg (1 tablet) every 6 hours, caution drowsiness do not drive or drink alcohol while taking  Return to the ER with any worsening or concerning symptoms, increased pain, fever/chills, vomiting, weakness or any other concerns.

## 2023-05-31 NOTE — ED PROVIDER NOTE - PROGRESS NOTE DETAILS
GIANCARLO Chanye:  states pt is scheduled for CT abd/pelvis, chest in 2 days, will order chest while in ED. GIANCARLO Chaney: CT resulted showing "Pulmonary metastatic disease, increased. Small to moderate volume ascites, increased. Peritoneal carcinomatosis and subcapsular hepatic metastases, increased. Mild right hydroureteronephrosis." Discussed with ED attg, as pts pain is controlled will d/c home with rx for oxycodone. Pt will return to the ER with any worsening or concerning symptoms, discussed all results with  at bedside who provided translation. UA nitrite positive, no symptoms of UTI, no WBCs in urine, will follow culture.

## 2023-05-31 NOTE — ED PROVIDER NOTE - OBJECTIVE STATEMENT
78yF w/pmhx pancreatic ca s/p whipple, stopped chemo 3 months ago awaiting clinical trial, htn, hld, dm presenting to the ER with right-sided back pain.  Patient's  providing Urdu translation at bedside per patient request.  Reports patient has been having abdominal discomfort and right-sided back pain which worsened over the past few days.  Patient has been taking Motrin and Celexa without relief. Patient saw an orthopedist last month low back pain had an MRI performed which showed L5-S1 disc herniation as well as right-sided hydroureteronephrosis with concern for UPJ obstruction.  Patient denies fever/chills, dysuria, urinary frequency, nausea, vomiting, diarrhea, CP, SOB, headache, dizziness, trauma or injury, saddle anesthesia, bowel or bladder incontinence, recent travel or illness or any other concerns.

## 2023-05-31 NOTE — ED PROVIDER NOTE - CLINICAL SUMMARY MEDICAL DECISION MAKING FREE TEXT BOX
Kasie: H/o Whipple (pancreatic cancer). Off chemo. P/w R flank/back pain and abd pain (chronic). MRI in 4/23 = R hydronephrosis (? obstruction). Check CT abd to look for source of hydronephrosis or progression of pancreatic CA.

## 2023-05-31 NOTE — ED PROVIDER NOTE - CARE PLAN
Principal Discharge DX:	Lower back pain  Secondary Diagnosis:	Abdominal pain   1 Principal Discharge DX:	Right flank pain  Secondary Diagnosis:	Abdominal pain

## 2023-05-31 NOTE — ED PROVIDER NOTE - CARDIAC, MLM
Telephoned to discuss if he would like a loaner bipap machine to determine if remains on optimal settings since no download available on present machine.   He reports he is obtaining good results with current settings and machine since changing out supplies.   He plans to continue to obtain his supplies online since less expensive than obtaining through [facility] and will follow up with [facility] pulmonary/sleep on if needed in future.    Normal rate, regular rhythm.  Heart sounds S1, S2.

## 2023-05-31 NOTE — ED PROVIDER NOTE - ATTENDING APP SHARED VISIT CONTRIBUTION OF CARE
I performed a face-to-face evaluation of the patient and performed a history and physical examination. I agree with the history and physical examination. If this was a PA visit, I personally saw the patient with the PA and performed a substantive portion of the visit including all aspects of the medical decision making.    H/o Whipple (pancreatic cancer). Off chemo. P/w R flank/back pain and abd pain (chronic). MRI in 4/23 = R hydronephrosis (? obstruction). Check CT abd to look for source of hydronephrosis or progression of pancreatic CA.

## 2023-05-31 NOTE — ED PROVIDER NOTE - PATIENT PORTAL LINK FT
You can access the FollowMyHealth Patient Portal offered by [facility] by registering at the following website: [email or web address]. By joining [employer]’s FollowMyHealth portal, you will also be able to view your health information using other applications (apps) compatible with our system.

## 2023-05-31 NOTE — ED PROVIDER NOTE - NS ED ATTENDING STATEMENT MOD
This was a shared visit with the ENDER. I reviewed and verified the documentation and independently performed the documented:

## 2023-05-31 NOTE — ED PROVIDER NOTE - PHYSICAL EXAMINATION
right CVA tenderness, no midline spinal tenderness  strength 5/5 in b/l LE, sensation intact and equal b/l

## 2023-05-31 NOTE — ED ADULT NURSE NOTE - OBJECTIVE STATEMENT
79 y/o female present with  in ED c/o right flank pain and right lower back pain that has worsened from last night. Pain 8/10 with rest and activity.  denies any trauma.

## 2023-06-05 NOTE — ED POST DISCHARGE NOTE - RESULT SUMMARY
UCX: Klebsiella pneumoniae > 100,000 No antibiotic listed in ED provider note or prescription writer at time of discharge. Patient contact # [phone] message left with Call Back  P.A. number and hours for return call back. Alt # [phone] message left with Call Back  P.A. number and hours for return call back.

## 2023-06-20 NOTE — HISTORY OF PRESENT ILLNESS
[de-identified] : 76 y/o Divehi-speaking woman with resected pancreatic cancer pT3N2 PNI+/LVI+ disease, margin negative, presenting for adjuvant treatment discussion\par \par Other Current Medical Problems: IDDM diagnosed >20 years, HTN, HLD, CAD, Cardiac stent 2019 , chronic hep C (S/P treatment) arthritis , \par \par Oncological History :\par May 2022: Presented with RUQ abdominal pain intermittently x 1 week and was found to be jaundiced presenting with a T bili of 15 and Ca 19.9 was 6704.\par 05/20/22: CT A/P - 2.1 cms pancreatic head mass with resultant abrupt cut off the CBD.Ct chest with several indeterminate pulmonary nodules. \par 05/20/22- S/P EUS with FNB pathology + for moderately differntiated adenocarcinoma 05/23/22- S/P ERCP- Plastic stent into CBD \par 06/13/22- Upfront Whipple surgery: Surgical pathology - Moderately differentiated adenocarcinoma , negative margins 5/17 LN +ve.\par 07/19/22 CT CAP no e/o local disease; however scattered pulmonary nodules, some of which are new.\par 08/01/22- C # 1 FOLFORINOX - 5FU 2000 mg/m2/  mg/ m2/ Irinotecan 120 mg/ m2/ Oxaliplatin 70 mg/ m2 \par 08/15/22- C # 2 FOLFORINOX - 5FU 2000 mg/m2/  mg/ m2/ Irinotecan 120 mg/ m2/ Oxaliplatin 70 mg/ m2 \par 08/29/22- C # 3 FOLFORINOX - 5FU 2000 mg/m2/  mg/ m2/ Irinotecan 120 mg/ m2/ Oxaliplatin 70 mg/ m2 \par 09/12/22- C # 4 FOLFORINOX - 5FU 2000 mg/m2/  mg/ m2/ Irinotecan 120 mg/ m2/ Oxaliplatin 70 mg/ m2 \par 09/14/22 Restaging scans show no evidence of POD or metastatic disease\par 09/26/22- C #5 FOLFORINOX - 5FU 2000 mg/m2/  mg/ m2/ Irinotecan 75 mg/ m2/ Oxaliplatin 70 mg/ m2 \par 10/10/22- C # 6 FOLFOX- - 5FU 2000 mg/m2/  mg/Oxaliplatin 55 mg/m2 -( stop Irinotecan sec to diarrhea) \par 10/24/22- C # 7 FOLFOX 5FU 2000 mg/m2/  mg/Oxaliplatin 55 mg/m2\par 11/07/22- C # 8- FOLFOX \par \par 11/16/22: Restaging CT scans--Mild increase in size of several previously seen noncalcified pulmonary nodules as described above\par No imaging evidence of locally recurrent or metastatic disease involving the abdomen or pelvis.\par 11/21/22- C # 9 FOLFOX\par 12/05/22- C # 10 only 5 FU pump- (stopped Oxali sec to worsening neuropathy) \par 12/19/22- C # 11- 5 FU pump only \par 01/03/23- C # 12 - 5 FU pump only \par 01/05/23- CT A/P/C Multiple bilateral pulmonary nodules, some of which are increased in size from the prior exam.New mild omental nodularity and peritoneal thickening, suspicious for peritoneal carcinomatosis.\par 01/17/23: C #1 Gemzar 800 mg/m2 / Abraxane 100 mg/m2 \par 01/30/23: C# 2 Gemzar 800 mg/m2 / Abraxane 100 mg/m2 \par 02/13/23- C # 3 [place] 600mg/m2/ Abraxane 75 mg/m2 (Dose reduction for poor tolerance) \par 02/27/23- C # 4 [place] 600mg/m2/ Abraxane 75 mg/m2 (Dose reduction for poor tolerance) \par 02/28/23- CT CAP Stable pulmonary metastases in the short interim. Mildly increased omental and pelvic peritoneal metastases\par Increased omental carcinomatosis for example 2.5 x 1.4 cm omental nodule (3, 102) previously measured 1 x 0.9 cm. A central mesenteric metastasis now measures 1.1 cm (3, 101) previously 0.9 cm. No ascites.\par 04/26/23 CT CAP with progression of disease - progression in lungs, liver, peritoneal disease\par 05/31/23 CT CAP progressive disease with more peritoneal and omental disease\par \par PMD:  Martínez - [phone] \par \par FHX: Brother brain aneurysm diagnosed at 50, Sister had Aneuresym ,daughter Leukemia (27 years old) \par Social HX: Lives with , Apartment, no smoker, no alcohol, walks about 30 mts 2-3 times a day.\par PSX: Total hysterectomy , Whipple procedure (06/13/22) \par Disease: Pancreatic adenocarcinomaPathology:AJCC Stage :Tumor Markers: CEA/ Ca 19.9. \par \par Invitae- BRCA2 Variant heterozygous- c.1964C>A \par Signatera -08/02/22 was positive - 0.58\par 09/12/22- 0.14\par 09/20/22- 0.44\par Foundation one CDx- KAILEY, 2 muts/ Mb, KRAS Q61H,CDKN2A, CSF3R amplification \par \par \par \par  \par \par  [de-identified] : 6/20\par - she is having more fatigue, but still trys to be active; she went line dancing last week\par - she is having a bit more abdominal pain

## 2023-06-20 NOTE — ASSESSMENT
[FreeTextEntry1] : 78 y/o Danish-speaking woman with resected pancreatic cancer pT3N2 PNI+/LVI+ disease, margin negative, s/p 5 cycles of FOLFORINOX, then 4 cycles of FOLFOX, then 3 cycles of 5 -FU, then 4 cycles of Gemzar/ Abraxane presented for a follow up.\par \par Post-op imaging demonstrated lung lesions c/w metastasis. Considered biopsy of one, but it is very small, sub-centimeter, and discussion with radiology noted likely under size threshold for biopsy. In addition, would not change plan to proceed with FOLFIRINOX as even if patient had histologic confirmation would not qualify for GIANT trial due to language barrier. Will proceed with FOLFIRINOX. Also discussed with patient that [facility], now a [facility], can provide treatment a bit closer to home, but she declined this at this time. Thus we proceeded with FOLFIRINOX. There is concern now that she has relapsed given her rising tumor markers and positive Signatera. BRCA2 but VUS.\par \par  S/p 5 cycles of FOLFORINOX, then 4 cycles of FOLFOX, then 3 cycles of 5 -FU, then 4 cycles of Gemzar/ Abraxane, with POD. Currently resistant to the standard regimens (FOLFORINOX and Gemzar/ Abraxane). CT CAP in April showed marked POD with increased size of lung nodules, likely new metastasis to liver and increased size in omental and peritoneal masses.  and CEA markedly increased in April.\par Abdominal pain and BM symptoms are likely secondary to omental and peritoneal masses. \par \par  She is currently not candidate for standard chemo regimens given poor response earlier. I counseled patient to explore different clinical trials in the [facility]. She met with [facility] GI oncologist where there was no eligible trial. Per daughter, it posed some challenges with her insurance coverage. She is now on contact with [facility] team.\par \par Given her recent events of complication, the patient is no longer immediate candidate for BMS trial. \par \par 6/20: she is developing a bit more fatigue. I discussed that she may still be a candidate for irinotecan but she had a very difficult time with irinotecan. She has an appointment set up to hear about trial options in the city. Unfortunately, given her progressive disase I do not think she would qualify at this time for our BMS trial. She will schedule a return visit with us next week to plan tentatively for low dose irintoecan.\par \par Plan:\par - she is continuing to explore trial options\par - in case none is available, 5-FU/nal-IRI may be an option, although we would use low dose irinotecan given her history of diarrhea; hospice is also a very reasonable option\par \par Eliel David MD PhD\par Medical Oncology Attending\par I personally have spent a total of 30 minutes of time on the date of this encounter reviewing test results, documenting findings, coordinating care, and directly consulting with the patient and/or designated family member.\par

## 2023-06-20 NOTE — REASON FOR VISIT
[Follow-Up Visit] : a follow-up [Family Member] : family member [[employer] ] : provided by [employer]   [Interpreters_IDNumber] : [id number]

## 2023-06-28 NOTE — PHYSICAL EXAM
[General Appearance - Well Developed] : well developed [Sclera] : the sclera and conjunctiva were normal [Outer Ear] : the ears and nose were normal in appearance [Hearing Threshold Finger Rub Not [place]] : hearing was normal [Examination Of The Oral Cavity] : the lips and gums were normal [Neck Appearance] : the appearance of the neck was normal [Respiration, Rhythm And Depth] : normal respiratory rhythm and effort [Auscultation Breath Sounds / Voice Sounds] : lungs were clear to auscultation bilaterally [Edema] : there was no peripheral edema [Bowel Sounds] : normal bowel sounds [Abdomen Tenderness] : non-tender [Abnormal Walk] : normal gait [Motor Tone] : muscle strength and tone were normal [] : no rash [No Focal Deficits] : no focal deficits [FreeTextEntry1] : distended abdomen

## 2023-06-28 NOTE — ASSESSMENT
[FreeTextEntry1] : 77yoF with:\par \par # Stage IV Pancreatic cancer - Off of DMT, patient is seeking out clinical trials. Wishing to resume treatment in the as soon as possible. Med onc followup. \par - Discussed the option of hospice in lieu of pursing additional anti-cancer treatment. Hospice benefits discussed in detail, answered all questions to patient's satisfaction. Patient and her  wish to continue with treatment at this time. \par \par # Pain, neoplasm-related - Begin PRN morphine IR solution 3mg (1.5mL) q4hr, discontinue oxycodone. Reassured patient about using opioid to treat her pain. \par -C/w Celebrex 100mg one to two times daily.  \par - C/w PRN Tylenol 500-1000mg. Cautioned patient of maximum dosage of Tylenol and prefer usage remain under 3gms/day. \par \par # Constipation/Tenesmus - Recommend utilizing Miralax daily. Educated on the importance of maintaining bowel regularity in light of opioid use. \par \par # Abdominal ascites - Recommend patient reach out to schedule paracentesis for next week. \par \par # Urinary retention - Will send Urinalysis and Urine culture to rule out infection.  \par \par # Peripheral neuropathy - c/w Gabapentin 100mg TID\par \par # Loss of appetite - Appetite has been improved lately. C/w mirtazapine 15mg QHS. \par \par # Encounter for palliative care- Emotional support provided \par Provided video code again at today's visit for Advance Care Planning videos in Romansh.\par Health Care Proxy (HCP) form on file. Hospice benefits discussed in detail, answered all questions to patient's satisfaction. Patient wishes to continue with treatment until "she can no longer walk". \par  \par Follow up 2 weeks, call with questions/issues\par

## 2023-07-01 NOTE — H&P ADULT - PROBLEM SELECTOR PLAN 2
- decrease PO intake  - check urine lytes - decrease PO intake  - check urine lytes  - CXR read as pulmonary edema curently sating well on RA off oxygen hold IVF

## 2023-07-01 NOTE — H&P ADULT - NSHPLABSRESULTS_GEN_ALL_CORE
10.5   11 )-----------( 455      ( 2023 01:25 )             33.4           129<L>  |  97<L>  |  31<H>  ----------------------------<  112<H>  5.1   |  22  |  1.18    Ca    8.6      2023 01:25    TPro  6.5  /  Alb  3.1<L>  /  TBili  0.4  /  DBili  x   /  AST  27  /  ALT  9   /  AlkPhos  170<H>        CAPILLARY BLOOD GLUCOSE          Urinalysis Basic - ( 2023 09:35 )    Color: Light Yellow / Appearance: Clear / S.031 / pH: x  Gluc: x / Ketone: Negative  / Bili: Negative / Urobili: <2 mg/dL   Blood: x / Protein: Trace / Nitrite: Negative   Leuk Esterase: Negative / RBC: x / WBC x   Sq Epi: x / Non Sq Epi: x / Bacteria: x        PT/INR - ( 2023 01:25 )   PT: 14.5 sec;   INR: 1.25 ratio         PTT - ( 2023 01:25 )  PTT:28.6 sec    Radiology    < from: CT Abdomen and Pelvis w/ IV Cont (23 @ 05:33) >    IMPRESSION:  1.   Progressive peritoneal carcinomatosis and metastatic malignancy as   noted. Specifically, relatively stable peritoneal disease slightly   worsened hepatic metastatic disease since 2023.  2.   Additional findings as noted.    --- End of Report ---    < end of copied text >    < from: CT Head No Cont (23 @ 05:10) >    IMPRESSION:    No acute intracranial hemorrhage, mass effect, or CT evidence of a large   vascular territory infarct. Small right ganglia capsular lacunar infarct.    If there are new or persistent symptoms, consider further evaluation with   MRI provided no contraindications.    --- End of Report ---      < end of copied text >    xray< from: Xray Chest 1 View- PORTABLE-Urgent (Xray Chest 1 View- PORTABLE-Urgent .) (23 @ 02:44) >      Impression:  Mild pulmonary edema. Small bilateral pleural effusions.    --- End of Report ---    < end of copied text >

## 2023-07-01 NOTE — H&P ADULT - NSHPREVIEWOFSYSTEMS_GEN_ALL_CORE
Review of Systems:   CONSTITUTIONAL: No fever, weight loss, or fatigue  EYES: No eye pain, visual disturbances, or discharge  ENMT:  No difficulty hearing, tinnitus, vertigo; No sinus or throat pain  NECK: No pain or stiffness  BREASTS: No pain, masses, or nipple discharge  RESPIRATORY: No cough, wheezing, chills or hemoptysis; No shortness of breath  CARDIOVASCULAR: No chest pain, palpitations, dizziness, or leg swelling  GASTROINTESTINAL: No abdominal or epigastric pain. No nausea, vomiting, or hematemesis; No diarrhea or constipation. No melena or hematochezia.  GENITOURINARY: No dysuria, frequency, hematuria, or incontinence  NEUROLOGICAL: No headaches, memory loss, loss of strength, numbness, or tremors  SKIN: No itching, burning, rashes, or lesions   LYMPH NODES: No enlarged glands  ENDOCRINE: No heat or cold intolerance; No hair loss  MUSCULOSKELETAL: No joint pain or swelling; No muscle, back, or extremity pain  PSYCHIATRIC: No depression, anxiety, mood swings, or difficulty sleeping  HEME/LYMPH: No easy bruising, or bleeding gums  ALLERGY AND IMMUNOLOGIC: No hives or eczema Review of Systems:   CONSTITUTIONAL: No weight loss, or fatigue +fever   EYES: No eye pain, visual disturbances, or discharge  ENMT:  No difficulty hearing, tinnitus, vertigo; No sinus or throat pain  NECK: No pain or stiffness  BREASTS: No pain, masses, or nipple discharge  RESPIRATORY: No cough, wheezing, chills or hemoptysis; No shortness of breath  CARDIOVASCULAR: No chest pain, palpitations, dizziness, or leg swelling  GASTROINTESTINAL: No abdominal or epigastric pain. No nausea, vomiting, or hematemesis; No diarrhea or constipation. No melena or hematochezia.  GENITOURINARY: No dysuria, frequency, hematuria, or incontinence  NEUROLOGICAL: No headaches, memory loss, loss of strength, numbness, or tremors  SKIN: No itching, burning, rashes, or lesions   LYMPH NODES: No enlarged glands  ENDOCRINE: No heat or cold intolerance; No hair loss  MUSCULOSKELETAL: No joint pain or swelling; No muscle, back, or extremity pain  PSYCHIATRIC: No depression, anxiety, mood swings, or difficulty sleeping  HEME/LYMPH: No easy bruising, or bleeding gums  ALLERGY AND IMMUNOLOGIC: No hives or eczema

## 2023-07-01 NOTE — ED PROVIDER NOTE - OBJECTIVE STATEMENT
79 yo Arabic speaking lady with PMH of resected pancreatic CA on chemotherapy presenting with AMS, fever and confusion onset today. Per , the patient underwent paracentesis yesterday where she had 4200 ccs removed, which is an unsually large amoutn for the patient. She has known history of malignant ascites She had T102.4F. She is still eating. She is too weak to ambulate. She is now confused and mumbling. She has had no vomiting, diarrhea or cough. She is reportedly breathing comfortably. Pt oriented only to self at time of exam.

## 2023-07-01 NOTE — ED ADULT NURSE NOTE - CHIEF COMPLAINT QUOTE
Pt arrives as notification with EMS for weakness, fever, BP "216/palp". Charge aware. pt taken directly to [facility] Oncologist Dr Ward pager [phone]

## 2023-07-01 NOTE — ED PROVIDER NOTE - CLINICAL SUMMARY MEDICAL DECISION MAKING FREE TEXT BOX
Martha STOCKTON PGY-3: 77 yo Slovenian speaking lady with PMH of resected pancreatic CA on chemotherapy presenting with AMS, fever and confusion onset today. 102.9 here. Patient AOx1 to self, at baseline AOx3. AMS with fever and confusion with recent paracentesis yesterday with large volume fluid removal. Sepsis workup. Will obtain CT imaging of head given AMS to evaluate for possible intracranial etiology. Will also obtain CT abdomen and pelvis given distended abdomen (althoguh may be basleine given patient's hx pancreatic CA). Sepsis workup, CT Martha STOCKTON PGY-3: 79 yo Tajik speaking lady with PMH of resected pancreatic CA on chemotherapy presenting with AMS, fever and confusion onset today. 102.9 here. Patient AOx1 to self, at baseline AOx3. AMS with fever and confusion with recent paracentesis yesterday with large volume fluid removal. Sepsis workup. Will obtain CT imaging of head given AMS to evaluate for possible intracranial etiology. Will also obtain CT abdomen and pelvis given distended abdomen (although may be baseline given patient's hx pancreatic CA). Sepsis workup, CT

## 2023-07-01 NOTE — H&P ADULT - PROBLEM SELECTOR PLAN 3
- monitor FS QAC/HS  - ISS  - check A1c - Home regimen tresiba 20 mg PO qhs   - monitor FS QAC/HS  - ISS for now if passes dypahgia screen would start lantus 10 U   - check A1c

## 2023-07-01 NOTE — ED PROVIDER NOTE - PHYSICAL EXAMINATION
Gen: AOx1  Head: NCAT  Eyes: EOMI, PERRLA, no conjunctival pallor, no scleral icterus  ENT: mucous membranes moist, no discharge  Neck: neck supple  Resp: CTAB, no W/R/R  CV: RRR, +S1/S2, no M/R/G  GI: Abdomen soft mildly distended, non-tender  MSK: No open wounds, no bruising, no lower extremity edema  Neuro: A&Ox1, does not lift legs off the bed but moves upper ext.   Ext: no edema, no deformity, warm and well-perfused  Skin: no rash or bruising Gen: AOx1  Head: NCAT  Eyes: EOMI, PERRLA, no conjunctival pallor, no scleral icterus  ENT: mucous membranes moist, no discharge  Neck: neck supple  Resp: CTAB, no W/R/R  CV: RRR, +S1/S2, no M/R/G  GI: Abdomen soft mildly distended, ?diffuse ttp but unreliable exam given AMS  MSK: No open wounds, no bruising, no lower extremity edema  Neuro: A&Ox1, does not lift legs off the bed but moves upper ext. 5/5 strength in UEs. Follows basic commands  Ext: no edema, no deformity, warm and well-perfused  Skin: no rash or bruising

## 2023-07-01 NOTE — ED PROVIDER NOTE - PROGRESS NOTE DETAILS
Low Espino MD (PGY-2): Patient signed out to me by the night team.  She is a 78-year-old female with history of pancreatic cancer complicated by malignant ascites status post 4200 cc drainage done yesterday.  Presenting with altered mental status and fever and abdominal pain.  Status post cefepime and vancomycin and 2 L of normal saline.  Lactate is unremarkable, MAP greater than 65.  Awaiting CT abdomen pelvis read.  admission for fever and encephalopathy. Low Espino MD (PGY-2): Clinically stable.  Mapping well.  CT abdomen pelvis without foci of infection, no visualized ascites or drainable pocket.  Admission to medicine for fever and encephalopathy in a active chemotherapy patient.

## 2023-07-01 NOTE — H&P ADULT - PROBLEM SELECTOR PLAN 6
-  5/22 RUQ abdominal pain and jaundiced. CT noted pancreatic head mass and abrupt cutoff of CBD. s/p EUS FNA +adenocarcinoma and plastic stent placed. 6/22 s/p whipple.  - s/p  FOLFORINOX  and FOLFOX;  5FU; gemzar and abraxne.  2/23 repeat CT C/A/P with increased mets to omentum and mesenteric metastasis. repeat 4/23 with progression to liver and progression in lung  - Creon with meals   - Prelim Heme Onc note pt wants further treatment   - f/u heme/Onc

## 2023-07-01 NOTE — CONSULT NOTE ADULT - SUBJECTIVE AND OBJECTIVE BOX
Oncology Consult Note    HPI:  78 y/o Romanian-speaking woman with resected pancreatic cancer pT3N2 PNI+/LVI+ disease, margin negative, presenting for adjuvant treatment discussion    Other Current Medical Problems: IDDM diagnosed >20 years, HTN, HLD, CAD, Cardiac stent  , chronic hep C (S/P treatment) arthritis ,     Oncological History :  May 2022: Presented with RUQ abdominal pain intermittently x 1 week and was found to be jaundiced presenting with a T bili of 15 and Ca 19.9 was 6704.  22: CT A/P - 2.1 cms pancreatic head mass with resultant abrupt cut off the CBD.Ct chest with several indeterminate pulmonary nodules.   22- S/P EUS with FNB pathology + for moderately differntiated adenocarcinoma 22- S/P ERCP- Plastic stent into CBD   22- Upfront Whipple surgery: Surgical pathology - Moderately differentiated adenocarcinoma , negative margins  LN +ve.  22 CT CAP no e/o local disease; however scattered pulmonary nodules, some of which are new.  22- C # 1 FOLFORINOX - 5FU 2000 mg/m2/  mg/ m2/ Irinotecan 120 mg/ m2/ Oxaliplatin 70 mg/ m2   08/15/22- C # 2 FOLFORINOX - 5FU 2000 mg/m2/  mg/ m2/ Irinotecan 120 mg/ m2/ Oxaliplatin 70 mg/ m2   22- C # 3 FOLFORINOX - 5FU 2000 mg/m2/  mg/ m2/ Irinotecan 120 mg/ m2/ Oxaliplatin 70 mg/ m2   22- C # 4 FOLFORINOX - 5FU 2000 mg/m2/  mg/ m2/ Irinotecan 120 mg/ m2/ Oxaliplatin 70 mg/ m2   22 Restaging scans show no evidence of POD or metastatic disease  22- C #5 FOLFORINOX - 5FU 2000 mg/m2/  mg/ m2/ Irinotecan 75 mg/ m2/ Oxaliplatin 70 mg/ m2   10/10/22- C # 6 FOLFOX- - 5FU 2000 mg/m2/  mg/Oxaliplatin 55 mg/m2 -( stop Irinotecan sec to diarrhea)   10/24/22- C # 7 FOLFOX 5FU 2000 mg/m2/  mg/Oxaliplatin 55 mg/m2  22- C # 8- FOLFOX     22: Restaging CT scans--Mild increase in size of several previously seen noncalcified pulmonary nodules as described above  No imaging evidence of locally recurrent or metastatic disease involving the abdomen or pelvis.  22- C # 9 FOLFOX  22- C # 10 only 5 FU pump- (stopped Oxali sec to worsening neuropathy)   22- C # 11- 5 FU pump only   23- C # 12 - 5 FU pump only   23- CT A/P/C Multiple bilateral pulmonary nodules, some of which are increased in size from the prior exam.New mild omental nodularity and peritoneal thickening, suspicious for peritoneal carcinomatosis.  23: C #1 Gemzar 800 mg/m2 / Abraxane 100 mg/m2   23: C# 2 Gemzar 800 mg/m2 / Abraxane 100 mg/m2   23- C # 3 [place] 600mg/m2/ Abraxane 75 mg/m2 (Dose reduction for poor tolerance)   23- C # 4 [place] 600mg/m2/ Abraxane 75 mg/m2 (Dose reduction for poor tolerance)   23- CT CAP Stable pulmonary metastases in the short interim. Mildly increased omental and pelvic peritoneal metastases  Increased omental carcinomatosis for example 2.5 x 1.4 cm omental nodule (3, 102) previously measured 1 x 0.9 cm. A central mesenteric metastasis now measures 1.1 cm (3, 101) previously 0.9 cm. No ascites.  23 CT CAP with progression of disease - progression in lungs, liver, peritoneal disease  23 CT CAP progressive disease with more peritoneal and omental disease    PMD: Dr Martínez - [phone]     FHX: Brother brain aneurysm diagnosed at 50, Sister had Aneuresym ,daughter Leukemia (27 years old)   Social HX: Lives with , Apartment, no smoker, no alcohol, walks about 30 mts 2-3 times a day.  PSX: Total hysterectomy , Whipple procedure (22)   Disease: Pancreatic adenocarcinomaPathology:AJCC Stage :Tumor Markers: CEA/ Ca 19.9.     Invitae- BRCA2 Variant heterozygous- [id number].[id number]   Signatera -22 was positive - 0.58  22- 0.14  22- 0.44  Foundation one CDx- KAILEY, 2 muts/ Mb, KRAS Q61H,CDKN2A, CSF3R amplification         REVIEW OF SYSTEMS:  CONSTITUTIONAL: No weakness, fevers or chills  EYES/ENT: No visual changes;  No vertigo or throat pain   NECK: No pain or stiffness  RESPIRATORY: No cough, wheezing, hemoptysis; No shortness of breath  CARDIOVASCULAR: No chest pain or palpitations  GASTROINTESTINAL: No abdominal or epigastric pain. No nausea, vomiting, or hematemesis; No diarrhea or constipation. No melena or hematochezia.  GENITOURINARY: No dysuria, frequency or hematuria  NEUROLOGICAL: No numbness or weakness  SKIN: No itching, burning, rashes, or lesions   All other review of systems is negative unless indicated above.    PAST MEDICAL & SURGICAL HISTORY:  Hypertension      IDDM (insulin dependent diabetes mellitus)      Malignant neoplasm of pancreas, unspecified      Dyslipidemia      CAD (coronary artery disease)      H/O chronic hepatitis  treated      History of  section      History of hysterectomy      History of coronary angioplasty with insertion of stent  2019 - 1 stent inserted          FAMILY HISTORY:  FH: HTN (hypertension) (Mother)    Family history of leukemia (Child)        SOCIAL HISTORY:     Allergies    No Known Allergies    Intolerances        MEDICATIONS  (STANDING):    MEDICATIONS  (PRN):      OBJECTIVE   Height (cm): 152.5 ( @ 01:12)    T(F): 102.9 (23 @ 01:41), Max: 102.9 (23 @ 01:41)  HR: 86 (23 @ 05:37)  BP: 98/64 (23 @ 05:37)  RR: 16 (23 @ 05:37)  SpO2: 100% (23 @ 05:37)  Wt(kg): --    PHYSICAL EXAM   GENERAL: NAD, well-developed  HEAD:  Atraumatic, Normocephalic  EYES: EOMI, PERRLA, conjunctiva and sclera clear  NECK: Supple, No JVD  CHEST/LUNG: Clear to auscultation bilaterally; No wheeze  HEART: Regular rate and rhythm; No murmurs, rubs, or gallops  ABDOMEN: Soft, Nontender, Nondistended; Bowel sounds present  EXTREMITIES:  2+ Peripheral Pulses, No clubbing, cyanosis, or edema  NEUROLOGY: non-focal  SKIN: No rashes or lesions                          10.5   11.01 )-----------( 455      ( 2023 01:25 )             33.4       07    129<L>  |  97<L>  |  31<H>  ----------------------------<  112<H>  5.1   |  22  |  1.18    Ca    8.6      2023 01:25    TPro  6.5  /  Alb  3.1<L>  /  TBili  0.4  /  DBili  x   /  AST  27  /  ALT  9   /  AlkPhos  170<H>             Oncology Consult Note    HPI:  78 y/o Urdu-speaking woman with resected pancreatic cancer pT3N2 PNI+/LVI+ disease, margin negative, presenting for adjuvant treatment discussion    Other Current Medical Problems: IDDM diagnosed >20 years, HTN, HLD, CAD, Cardiac stent  , chronic hep C (S/P treatment) arthritis ,     Oncological History :  May 2022: Presented with RUQ abdominal pain intermittently x 1 week and was found to be jaundiced presenting with a T bili of 15 and Ca 19.9 was 6704.  22: CT A/P - 2.1 cms pancreatic head mass with resultant abrupt cut off the CBD.Ct chest with several indeterminate pulmonary nodules.   22- S/P EUS with FNB pathology + for moderately differntiated adenocarcinoma 22- S/P ERCP- Plastic stent into CBD   22- Upfront Whipple surgery: Surgical pathology - Moderately differentiated adenocarcinoma , negative margins  LN +ve.  22 CT CAP no e/o local disease; however scattered pulmonary nodules, some of which are new.  22- C # 1 FOLFORINOX - 5FU 2000 mg/m2/  mg/ m2/ Irinotecan 120 mg/ m2/ Oxaliplatin 70 mg/ m2   08/15/22- C # 2 FOLFORINOX - 5FU 2000 mg/m2/  mg/ m2/ Irinotecan 120 mg/ m2/ Oxaliplatin 70 mg/ m2   22- C # 3 FOLFORINOX - 5FU 2000 mg/m2/  mg/ m2/ Irinotecan 120 mg/ m2/ Oxaliplatin 70 mg/ m2   22- C # 4 FOLFORINOX - 5FU 2000 mg/m2/  mg/ m2/ Irinotecan 120 mg/ m2/ Oxaliplatin 70 mg/ m2   22 Restaging scans show no evidence of POD or metastatic disease  22- C #5 FOLFORINOX - 5FU 2000 mg/m2/  mg/ m2/ Irinotecan 75 mg/ m2/ Oxaliplatin 70 mg/ m2   10/10/22- C # 6 FOLFOX- - 5FU 2000 mg/m2/  mg/Oxaliplatin 55 mg/m2 -( stop Irinotecan sec to diarrhea)   10/24/22- C # 7 FOLFOX 5FU 2000 mg/m2/  mg/Oxaliplatin 55 mg/m2  22- C # 8- FOLFOX     22: Restaging CT scans--Mild increase in size of several previously seen noncalcified pulmonary nodules as described above  No imaging evidence of locally recurrent or metastatic disease involving the abdomen or pelvis.  22- C # 9 FOLFOX  22- C # 10 only 5 FU pump- (stopped Oxali sec to worsening neuropathy)   22- C # 11- 5 FU pump only   23- C # 12 - 5 FU pump only   23- CT A/P/C Multiple bilateral pulmonary nodules, some of which are increased in size from the prior exam.New mild omental nodularity and peritoneal thickening, suspicious for peritoneal carcinomatosis.  23: C #1 Gemzar 800 mg/m2 / Abraxane 100 mg/m2   23: C# 2 Gemzar 800 mg/m2 / Abraxane 100 mg/m2   23- C # 3 [place] 600mg/m2/ Abraxane 75 mg/m2 (Dose reduction for poor tolerance)   23- C # 4 [place] 600mg/m2/ Abraxane 75 mg/m2 (Dose reduction for poor tolerance)   23- CT CAP Stable pulmonary metastases in the short interim. Mildly increased omental and pelvic peritoneal metastases  Increased omental carcinomatosis for example 2.5 x 1.4 cm omental nodule (3, 102) previously measured 1 x 0.9 cm. A central mesenteric metastasis now measures 1.1 cm (3, 101) previously 0.9 cm. No ascites.  23 CT CAP with progression of disease - progression in lungs, liver, peritoneal disease  23 CT CAP progressive disease with more peritoneal and omental disease    PMD: Dr Martínez - [phone]     FHX: Brother brain aneurysm diagnosed at 50, Sister had Aneuresym ,daughter Leukemia (27 years old)   Social HX: Lives with , Apartment, no smoker, no alcohol, walks about 30 mts 2-3 times a day.  PSX: Total hysterectomy , Whipple procedure (22)   Disease: Pancreatic adenocarcinomaPathology:AJCC Stage :Tumor Markers: CEA/ Ca 19.9.     Inv[employer]ae- BRCA2 Variant heterozygous- c.[id number]   Signatera -22 was positive - 0.58  22- 0.14  22- 0.44  Foundation one CDx- KAILEY, 2 muts/ Mb, KRAS Q61H,CDKN2A, CSF3R amplification       Patient was having fevers at home, Tmax reportedly 102F. She was       REVIEW OF SYSTEMS:  CONSTITUTIONAL: No weakness, fevers or chills  EYES/ENT: No visual changes;  No vertigo or throat pain   NECK: No pain or stiffness  RESPIRATORY: No cough, wheezing, hemoptysis; No shortness of breath  CARDIOVASCULAR: No chest pain or palpitations  GASTROINTESTINAL: No abdominal or epigastric pain. No nausea, vomiting, or hematemesis; No diarrhea or constipation. No melena or hematochezia.  GENITOURINARY: No dysuria, frequency or hematuria  NEUROLOGICAL: No numbness or weakness  SKIN: No itching, burning, rashes, or lesions   All other review of systems is negative unless indicated above.    PAST MEDICAL & SURGICAL HISTORY:  Hypertension      IDDM (insulin dependent diabetes mellitus)      Malignant neoplasm of pancreas, unspecified      Dyslipidemia      CAD (coronary artery disease)      H/O chronic hepatitis  treated      History of  section      History of hysterectomy      History of coronary angioplasty with insertion of stent  2019 - 1 stent inserted          FAMILY HISTORY:  FH: HTN (hypertension) (Mother)    Family history of leukemia (Child)        SOCIAL HISTORY:     Allergies    No Known Allergies    Intolerances        MEDICATIONS  (STANDING):    MEDICATIONS  (PRN):      OBJECTIVE   Height (cm): 152.5 ( @ 01:12)    T(F): 102.9 (23 @ 01:41), Max: 102.9 (23 @ 01:41)  HR: 86 (23 @ 05:37)  BP: 98/64 (23 @ 05:37)  RR: 16 (23 @ 05:37)  SpO2: 100% (23 @ 05:37)  Wt(kg): --    PHYSICAL EXAM   GENERAL: NAD, fatigued  HEAD:  Atraumatic, Normocephalic  EYES: EOMI, PERRLA, conjunctiva and sclera clear  NECK: Supple, No JVD  CHEST/LUNG: Clear to auscultation bilaterally; No wheeze  HEART: Regular rate and rhythm; No murmurs, rubs, or gallops  ABDOMEN: Soft, Nontender, Nondistended; Bowel sounds present  EXTREMITIES:  2+ Peripheral Pulses, No clubbing, cyanosis, or edema  NEUROLOGY: non-focal  SKIN: No rashes or lesions                          10.5   11.01 )-----------( 455      ( 2023 01:25 )             33.4       07    129<L>  |  97<L>  |  31<H>  ----------------------------<  112<H>  5.1   |  22  |  1.18    Ca    8.6      2023 01:25    TPro  6.5  /  Alb  3.1<L>  /  TBili  0.4  /  DBili  x   /  AST  27  /  ALT  9   /  AlkPhos  170<H>       Oncology Consult Note    HPI:  78 y/o Kazakh-speaking woman with resected pancreatic cancer pT3N2 PNI+/LVI+ disease, margin negative, presenting for adjuvant treatment discussion    Other Current Medical Problems: IDDM diagnosed >20 years, HTN, HLD, CAD, Cardiac stent  , chronic hep C (S/P treatment) arthritis ,     Oncological History :  May 2022: Presented with RUQ abdominal pain intermittently x 1 week and was found to be jaundiced presenting with a T bili of 15 and Ca 19.9 was 6704.  22: CT A/P - 2.1 cms pancreatic head mass with resultant abrupt cut off the CBD.Ct chest with several indeterminate pulmonary nodules.   22- S/P EUS with FNB pathology + for moderately differntiated adenocarcinoma 22- S/P ERCP- Plastic stent into CBD   22- Upfront Whipple surgery: Surgical pathology - Moderately differentiated adenocarcinoma , negative margins  LN +ve.  22 CT CAP no e/o local disease; however scattered pulmonary nodules, some of which are new.  22- C # 1 FOLFORINOX - 5FU 2000 mg/m2/  mg/ m2/ Irinotecan 120 mg/ m2/ Oxaliplatin 70 mg/ m2   08/15/22- C # 2 FOLFORINOX - 5FU 2000 mg/m2/  mg/ m2/ Irinotecan 120 mg/ m2/ Oxaliplatin 70 mg/ m2   22- C # 3 FOLFORINOX - 5FU 2000 mg/m2/  mg/ m2/ Irinotecan 120 mg/ m2/ Oxaliplatin 70 mg/ m2   22- C # 4 FOLFORINOX - 5FU 2000 mg/m2/  mg/ m2/ Irinotecan 120 mg/ m2/ Oxaliplatin 70 mg/ m2   22 Restaging scans show no evidence of POD or metastatic disease  22- C #5 FOLFORINOX - 5FU 2000 mg/m2/  mg/ m2/ Irinotecan 75 mg/ m2/ Oxaliplatin 70 mg/ m2   10/10/22- C # 6 FOLFOX- - 5FU 2000 mg/m2/  mg/Oxaliplatin 55 mg/m2 -( stop Irinotecan sec to diarrhea)   10/24/22- C # 7 FOLFOX 5FU 2000 mg/m2/  mg/Oxaliplatin 55 mg/m2  22- C # 8- FOLFOX     22: Restaging CT scans--Mild increase in size of several previously seen noncalcified pulmonary nodules as described above  No imaging evidence of locally recurrent or metastatic disease involving the abdomen or pelvis.  22- C # 9 FOLFOX  22- C # 10 only 5 FU pump- (stopped Oxali sec to worsening neuropathy)   22- C # 11- 5 FU pump only   23- C # 12 - 5 FU pump only   23- CT A/P/C Multiple bilateral pulmonary nodules, some of which are increased in size from the prior exam.New mild omental nodularity and peritoneal thickening, suspicious for peritoneal carcinomatosis.  23: C #1 Gemzar 800 mg/m2 / Abraxane 100 mg/m2   23: C# 2 Gemzar 800 mg/m2 / Abraxane 100 mg/m2   23- C # 3 [place] 600mg/m2/ Abraxane 75 mg/m2 (Dose reduction for poor tolerance)   23- C # 4 [place] 600mg/m2/ Abraxane 75 mg/m2 (Dose reduction for poor tolerance)   23- CT CAP Stable pulmonary metastases in the short interim. Mildly increased omental and pelvic peritoneal metastases  Increased omental carcinomatosis for example 2.5 x 1.4 cm omental nodule (3, 102) previously measured 1 x 0.9 cm. A central mesenteric metastasis now measures 1.1 cm (3, 101) previously 0.9 cm. No ascites.  23 CT CAP with progression of disease - progression in lungs, liver, peritoneal disease  23 CT CAP progressive disease with more peritoneal and omental disease    PMD: Dr Martínez - [phone]     FHX: Brother brain aneurysm diagnosed at 50, Sister had Aneuresym ,daughter Leukemia (27 years old)   Social HX: Lives with , Apartment, no smoker, no alcohol, walks about 30 mts 2-3 times a day.  PSX: Total hysterectomy , Whipple procedure (22)   Disease: Pancreatic adenocarcinomaPathology:AJCC Stage :Tumor Markers: CEA/ Ca 19.9.     Invitae- BRCA2 Variant heterozygous- c.1964C>A   Signatera -22 was positive - 0.58  22- 0.14  22- 0.44  Foundation one CDx- KAILEY, 2 muts/ Mb, KRAS Q61H,CDKN2A, CSF3R amplification       Patient was having fevers at home, Tmax reportedly 102F. She was not eating/drinking well due to abdominal distention from ascites. Just had a paracentesis performed yesterday 23 with 4L removed. Appetite has somewhat improved after having paracentesis. Patient has been having issues getting onto a clinical trial because her enrollment into the trial here at [facility] was cancelled (family unsure why) and they had given up a spot at another institution for this trial. Patient wants to continue treatment if offered.      REVIEW OF SYSTEMS:  CONSTITUTIONAL: +Fatigue. +Fevers but no chills  EYES/ENT: No visual changes;  No vertigo or throat pain   NECK: No pain or stiffness  RESPIRATORY: No cough, wheezing, hemoptysis; No shortness of breath  CARDIOVASCULAR: No chest pain or palpitations  GASTROINTESTINAL: +Constipation. No abdominal or epigastric pain. No nausea, vomiting, or hematemesis; No diarrhea. No melena or hematochezia.  GENITOURINARY: No dysuria, frequency or hematuria  NEUROLOGICAL: No numbness or weakness  SKIN: No itching, burning, rashes, or lesions   All other review of systems is negative unless indicated above.    PAST MEDICAL & SURGICAL HISTORY:  Hypertension      IDDM (insulin dependent diabetes mellitus)      Malignant neoplasm of pancreas, unspecified      Dyslipidemia      CAD (coronary artery disease)      H/O chronic hepatitis  treated      History of  section      History of hysterectomy      History of coronary angioplasty with insertion of stent  2019 - 1 stent inserted          FAMILY HISTORY:  FH: HTN (hypertension) (Mother)    Family history of leukemia (Child)        SOCIAL HISTORY:     Allergies    No Known Allergies    Intolerances        MEDICATIONS  (STANDING):    MEDICATIONS  (PRN):      OBJECTIVE   Height (cm): 152.5 ( @ 01:12)    T(F): 102.9 (23 @ 01:41), Max: 102.9 (23 @ 01:41)  HR: 86 (23 @ 05:37)  BP: 98/64 (23 @ 05:37)  RR: 16 (23 @ 05:37)  SpO2: 100% (23 @ 05:37)  Wt(kg): --    PHYSICAL EXAM   GENERAL: NAD, fatigued  HEAD:  Atraumatic, Normocephalic  EYES: EOMI, PERRLA, conjunctiva and sclera clear  NECK: Supple, No JVD  CHEST/LUNG: Clear to auscultation bilaterally; No wheeze  HEART: Regular rate and rhythm; No murmurs, rubs, or gallops  ABDOMEN: Soft, Nontender, Nondistended; Bowel sounds present  EXTREMITIES:  2+ Peripheral Pulses, No clubbing, cyanosis, or edema  NEUROLOGY: non-focal  SKIN: No rashes or lesions                          10.5   11.01 )-----------( 455      ( 2023 01:25 )             33.4           129<L>  |  97<L>  |  31<H>  ----------------------------<  112<H>  5.1   |  22  |  1.18    Ca    8.6      2023 01:25    TPro  6.5  /  Alb  3.1<L>  /  TBili  0.4  /  DBili  x   /  AST  27  /  ALT  9   /  AlkPhos  170<H>      RADIOLOGY & ADDITIONAL TESTING:   < from: CT Abdomen and Pelvis w/ IV Cont (23 @ 05:33) >  FINDINGS:  Tubes, catheters and devices: Central line tip extends into the cavoatrial  junction    Lungs: Multiple bilateral pulmonary nodules 1 of the largest in the   subpleural  right lower lobe increased from 1.4 cm to 1.8 cm series 301, image 8 and  containing central hypodensity consistent with central necrosis    Liver: Right and left lobe subcapsular enlarged masses 1 enlarged from   1.6 cm  to 1.9 cm posterior right hepatic lobe region series 301, image 48.  Gallbladder and bile ducts: The gallbladder is not identified. No   progressive  biliary dilation. Previously seen pneumobilia is not identified.  Pancreas: Post Whipple procedure with pancreaticduct stent, unchanged.  Spleen: Unremarkable  Adrenal glands: Unremarkable  Kidneys and ureters: Right renal cortical sub cm hypodense too small to  characterize lesion, unchanged. Severely dilated right extrarenal pelvis   and  moderately dilated left extrarenal pelvis, unchanged. Symmetrical  nephrograms.Pelvocalyceal dilation may be secondary to congenital   variation,  increased urine flow, vesicoureteral reflux or non specific collecting   system  obstruction.    Stomach and bowel: Intraluminal hyperdensity in the stomach may be due to  ingested opaque substance in the absence of any clinical concern of GI  hemorrhage. Follow-up as indicated. No intestinal obstruction.  Appendix: The appendix is not identified.    Intraperitoneal space: Unchanged bilateral upper quadrant moderate amount  ascites relatively more marked in the left upper quadrant region.   Decreased  residual small amount ascites superior to the urinary bladder. No free   air.  Omental caking greater omental region progressive in the upper quadrant   regions  more marked on the right. Epigastric abdominal wall incision without   incisional  hernia or fluid collection, unchanged  Vasculature: Atherosclerotic abdominal aorta without aneurysm.  Lymph nodes: No significant adenopathy    Urinary bladder: Unremarkable .  Reproductive: The uterus has been removed.    Bones/joints: Degenerative changes axial spine. Secondary multilevel   central  canal stenosis relatively most marked at the L3-L4 level, unchanged.   Congenital  or acquired bilateral L5 spondylolysis without spondylolisthesis,   unchanged.  Unchanged right sacral sclerotic likely bone island. Follow-up bone scan   to  exclude metastasis as indicated. No interval lytic or blastic bone   lesions.  Soft tissues: Unremarkable.    IMPRESSION:  1.   Progressive peritoneal carcinomatosis and metastatic malignancy as   noted. Specifically, relatively stable peritoneal disease slightly   worsened hepatic metastatic disease since 2023.  2.   Additional findings as noted.    --- End of Report ---    TINO CONNER MD; Attending Radiologist  This document has been electronically signed. 2023  8:44AM    < end of copied text >

## 2023-07-01 NOTE — ED ADULT TRIAGE NOTE - CHIEF COMPLAINT QUOTE
Pt arrives as notification with EMS for weakness, fever, BP "216/palp". Charge aware. pt taken directly to [facility] Pt arrives as notification with EMS for weakness, fever, BP "216/palp". Charge aware. pt taken directly to [facility] Oncologist Dr Ward pager [phone]

## 2023-07-01 NOTE — ED ADULT NURSE NOTE - OBJECTIVE STATEMENT
Pt recuevd to [facility] as notification for sepsis. Pt  at bedside for collateral states she is a pancreatic cancer patient[place] wall port noted not accessed. Metastasis to the liver developed ascites and had a paracentesis around 4pm removing approximately 4L fluid which was not her usual amount.  states at 11PM patient got up from bed and was confused and decreased strength noted in bilateral legs. Pt typically AxOx4 now presenting AxOx2 to self and place. NSR on cardiac monitor, respirations even and unlabored 100% on 4LNC. L20G placed, labs drawn and sent. Pt appears weak/lethargic, responds to verbal and physical stimuli. No facial droop noted, Mandarin speaking. Primafit placed. Medicated as per MAR. Pending XR and CT

## 2023-07-01 NOTE — H&P ADULT - PROBLEM SELECTOR PLAN 4
-  5/22 RUQ abdominal pain and jaundiced. CT noted pancreatic head mass and abrupt cutoff of CBD. s/p EUS FNA +adenocarcinoma and plastic stent placed. 6/22 s/p whipple.  - s/p  FOLFORINOX  and FOLFOX;  5FU; gemzar and abraxne.  2/23 repeat CT C/A/P with increased mets to omentum and mesenteric metastasis. repeat 4/23 with progression to liver and progression in lung  - Prelim Heme Onc note pt wants further  - f/u heme/Onc -  5/22 RUQ abdominal pain and jaundiced. CT noted pancreatic head mass and abrupt cutoff of CBD. s/p EUS FNA +adenocarcinoma and plastic stent placed. 6/22 s/p whipple.  - s/p  FOLFORINOX  and FOLFOX;  5FU; gemzar and abraxne.  2/23 repeat CT C/A/P with increased mets to omentum and mesenteric metastasis. repeat 4/23 with progression to liver and progression in lung  - Prelim Heme Onc note pt wants further treatment   - f/u heme/Onc - c/w ASA/ statin/fenofibrate/Metoprolol/ ARB

## 2023-07-01 NOTE — H&P ADULT - HISTORY OF PRESENT ILLNESS
Patient was having fevers at home, Tmax reportedly 102F. She was not eating/drinking well due to abdominal distention from ascites. Just had a paracentesis performed yesterday 6/30/23 with 4L removed. Appetite has somewhat improved after having paracentesis. Patient has been having issues getting onto a clinical trial because her enrollment into the trial here at [facility] was cancelled (family unsure why) and they had given up a spot at another institution for this trial. Patient wants to continue treatment if offered. 77 yo M w/ Hx obtained in conjunction with  at bedside pancreatic Ca s/p whipple, DM, CAD s/p stent p/w fever/AMS. As per , pt was sitting up in bed last night and was noted to be mumbling. He proceeded to help her get to the bathroom and he noted that she was unsteady on her feet and she fell on the floor. He noted she felt warm and proceeded to take her temp and was noted to be 102. He brought her to ED for further eval. He curently notes she is at baseline     + poor PO intake due to ascites PO intake slightly improved after paracentesis  with over 4 L removed on 6/30.  + chronic abdominal pain due to malignancy on morphine

## 2023-07-01 NOTE — ED ADULT NURSE NOTE - NSFALLHARMRISKINTERV_ED_ALL_ED

## 2023-07-01 NOTE — H&P ADULT - NSICDXPASTMEDICALHX_GEN_ALL_CORE_FT
PAST MEDICAL HISTORY:  CAD (coronary artery disease)     Dyslipidemia     H/O chronic hepatitis treated    Hypertension     IDDM (insulin dependent diabetes mellitus)     Malignant neoplasm of pancreas, unspecified     Pancreatic cancer

## 2023-07-01 NOTE — ED PROVIDER NOTE - ATTENDING CONTRIBUTION TO CARE
I performed a face-to-face evaluation of the patient and performed a history and physical examination along with the resident or ACP, and/or medical student above.  I agree with the history and physical examination as documented by the resident or ACP, and/or medical student above.  Olivo:  Gen: AOx1  Head: NCAT  Eyes: EOMI, PERRLA, no conjunctival pallor, no scleral icterus  ENT: mucous membranes moist, no discharge  Neck: neck supple  Resp: CTAB, no W/R/R  CV: RRR, +S1/S2, no M/R/G  GI: Abdomen soft mildly distended, ?diffuse ttp but unreliable exam given AMS  MSK: No open wounds, no bruising, no lower extremity edema  Neuro: A&Ox1, does not lift legs off the bed but moves upper ext. 5/5 strength in UEs. Follows basic commands  Ext: no edema, no deformity, warm and well-perfused  Skin: no rash or bruising

## 2023-07-01 NOTE — H&P ADULT - NSHPPHYSICALEXAM_GEN_ALL_CORE
Vital Signs Last 24 Hrs  T(C): 37.4 (01 Jul 2023 15:35), Max: 39.4 (01 Jul 2023 01:41)  T(F): 99.3 (01 Jul 2023 15:35), Max: 102.9 (01 Jul 2023 01:41)  HR: 103 (01 Jul 2023 15:35) (70 - 103)  BP: 113/58 (01 Jul 2023 15:35) (98/64 - 113/58)  BP(mean): 69 (01 Jul 2023 02:00) (69 - 69)  RR: 18 (01 Jul 2023 16:10) (15 - 19)  SpO2: 100% (01 Jul 2023 16:10) (100% - 100%)    Parameters below as of 01 Jul 2023 16:10  Patient On (Oxygen Delivery Method): room air        PHYSICAL EXAM:  GENERAL: NAD, well-developed  HEAD:  Atraumatic, Normocephalic  EYES: EOMI, PERRLA, conjunctiva and sclera clear  NECK: Supple, No JVD  CHEST/LUNG: Clear to auscultation bilaterally; No wheeze  HEART: Regular rate and rhythm; No murmurs, rubs, or gallops  ABDOMEN: Soft, Nontender, Nondistended; Bowel sounds present  EXTREMITIES:  2+ Peripheral Pulses, No clubbing, cyanosis, or edema  PSYCH: AAOx3  NEUROLOGY: non-focal  SKIN: No rashes or lesions Vital Signs Last 24 Hrs  T(C): 37.4 (01 Jul 2023 15:35), Max: 39.4 (01 Jul 2023 01:41)  T(F): 99.3 (01 Jul 2023 15:35), Max: 102.9 (01 Jul 2023 01:41)  HR: 103 (01 Jul 2023 15:35) (70 - 103)  BP: 113/58 (01 Jul 2023 15:35) (98/64 - 113/58)  BP(mean): 69 (01 Jul 2023 02:00) (69 - 69)  RR: 18 (01 Jul 2023 16:10) (15 - 19)  SpO2: 100% (01 Jul 2023 16:10) (100% - 100%)    Parameters below as of 01 Jul 2023 16:10  Patient On (Oxygen Delivery Method): room air        PHYSICAL EXAM:  GENERAL: NAD, well-developed  HEAD:  Atraumatic, Normocephalic  EYES: EOMI, PERRLA, conjunctiva and sclera clear  NECK: Supple, No JVD  CHEST/LUNG: Clear to auscultation bilaterally; No wheeze  HEART: Regular rate and rhythm; No murmurs, rubs, or gallops  ABDOMEN: Soft, Nondistended; Bowel sounds present + mildly tender b/l upper quadrants   EXTREMITIES:  2+ Peripheral Pulses, No clubbing, cyanosis, or edema  PSYCH: AAOx3  NEUROLOGY: non-focal  SKIN: No rashes or lesions

## 2023-07-01 NOTE — H&P ADULT - NSICDXPASTSURGICALHX_GEN_ALL_CORE_FT
PAST SURGICAL HISTORY:  History of  section     History of coronary angioplasty with insertion of stent 2019 - 1 stent inserted    History of hysterectomy     History of Whipple procedure

## 2023-07-01 NOTE — CHART NOTE - NSCHARTNOTEFT_GEN_A_CORE
Oncology Chart Note    Name: Toyin Herrera  : 1945    78yo Luxembourgish speaking lady with PMH of resected pancreatic CA pT3N2, PN+/LV+ disease, margin negative s/p 5 cycles of FOLFIRINOX, then 4 cycles of FOLFOX, then 3 cycles of 5-FU then 4 cycles of gemcitabine+ nab-paclitaxel with progression of disease who has new generalized weakness, fever and confusion. Patient's  called [facility] call service to request an ambulance to [facility] for his wife.   Per , the patient underwent paracentesis yesterday. She has known history of malignant ascites She had T102.4F. She is still eating. She is too weak to ambulate. She is now confused and mumbling. She has had no vomiting, diarrhea or cough. She is reportedly breathing comfortably.   I called [facility] to request ambulance to be dispatched on her behalf to [address] [place] [address].     Patient had recent imaging in 2023 which identified progression of disease, with new metastases to liver and incrased size in omental and peritoneal masses. Last measured CA 19-9 was 86975  CEA was 62.1, both rising. Patient was deemed by her outpatient oncologist, Dr. David, to not be a candidate for standard chemotherapy regimens given her poor response earlier. Dr. David advised that patient consider clinical trials in the city. 5-FU-low dose nal-IRI was considered an option for therapy. Hospice also deemed reasonable.     Patient met with palliative care team on 23, at which point hospice services were dsicussed. Patient and  wished to continue treatment.  She was started on morphine IR solution 3mg q4h PRN, celebrex 100mg 1-2 x daily tylenol 500mg-1000mg PRN (under 3g total per day), mirtazapine 15mg qhs, gabapentin 100mg TID, bowel regimen of miralax.     UA was positive for leukocyte esterase and nitrite on 23  UCx was positive for Klebsiella pneumoniae with susceptibility to cefazolin, cipro, ceftriaxone.  Patient was prescribed bactrim DS 1tab BID     -Would recommend CTH, infectious workup with RVP, blood cultures, UCX, fluid resuscitation as needed, broad spectrum empiric antibiotics, possible diagnostic paracentesis if patient has significant ascites  - Can consider further imaging based on exam  Oncology team to see in AM for full consult    Note not finalized until signed by attending.   Please do not hesitate to page with questions.     Linette Dobbins MD PGY5   [phone]  Hematology-Oncology Fellow  WEEKENDS- Please call  to page on-call fellow Oncology Chart Note    Name: Toyin Herrera  : 1945    78yo Setswana speaking lady with PMH of resected pancreatic CA pT3N2, PN+/LV+ disease, margin negative s/p 5 cycles of FOLFIRINOX, then 4 cycles of FOLFOX, then 3 cycles of 5-FU then 4 cycles of gemcitabine+ nab-paclitaxel with progression of disease who has new generalized weakness, fever and confusion. Patient's  called [facility] call service to request an ambulance to [facility] for his wife.   Per , the patient underwent paracentesis yesterday. She has known history of malignant ascites She had T102.4F. She is still eating. She is too weak to ambulate. She is now confused and mumbling. She has had no vomiting, diarrhea or cough. She is reportedly breathing comfortably.   I called [facility] to request ambulance to be dispatched on her behalf to [address] [place] [address].     Patient had recent imaging in 2023 which identified progression of disease, with new metastases to liver and incrased size in omental and peritoneal masses. Last measured CA 19-9 was 90604  CEA was 62.1, both rising. Patient was deemed by her outpatient oncologist, Dr. David, to not be a candidate for standard chemotherapy regimens given her poor response earlier. Dr. David advised that patient consider clinical trials in the city. 5-FU-low dose nal-IRI was considered an option for therapy. Hospice also deemed reasonable.     Patient met with palliative care team on 23, at which point hospice services were dsicussed. Patient and  wished to continue treatment.  She was started on morphine IR solution 3mg q4h PRN, celebrex 100mg 1-2 x daily tylenol 500mg-1000mg PRN (under 3g total per day), mirtazapine 15mg qhs, gabapentin 100mg TID, bowel regimen of miralax.     UA was positive for leukocyte esterase and nitrite on 23  UCx was positive for Klebsiella pneumoniae with susceptibility to cefazolin, cipro, ceftriaxone.  Patient was prescribed bactrim DS 1tab BID     - Obtain CBC with diff, CMP, INR/ PT/PTT, VBG  -Would recommend CTH, infectious workup with RVP, blood cultures, UCX, fluid resuscitation as needed, broad spectrum empiric antibiotics, possible diagnostic paracentesis if patient has significant ascites  - Can consider further imaging based on exam and other clinical findings  Oncology team to see in AM for full consult    Note not finalized until signed by attending.   Please do not hesitate to page with questions.     Linette Dobbins MD PGY5   [phone]  Hematology-Oncology Fellow  WEEKENDS- Please call  to page on-call fellow

## 2023-07-01 NOTE — H&P ADULT - ASSESSMENT
76 yo F w/ hx pancreatic Ca s/p whipple's p/w AMS and fever. s/p paracentesis with removal of fluid 6/30  76 yo F w/ hx pancreatic Ca s/p whipple's p/w AMS unsteady gait and garbled speech and fever. s/p paracentesis with removal of fluid 6/30. CT head with + RT ganglia capsular infarct.

## 2023-07-01 NOTE — H&P ADULT - PROBLEM SELECTOR PLAN 1
-  AMS with fever unclear source   -  6/30/23 with 4L removed.  - CT head with small RT ganglia capsular infarct   - CT A/P without overt moderate ascites b/l upper quadrant more marked LT upper quadrant; progression peritoneal carcinomatosis   - no overt pocket noted as per ED notes   - f/u blood cx  - empiric antibiotics   - Check MRI head r/o acute CVA in setting of hypercoagulable state of malignancy -  AMS with fever unclear source   -  6/30/23 with 4L removed.  - CT head with small RT ganglia capsular infarct   - CT A/P without overt moderate ascites b/l upper quadrant more marked LT upper quadrant; progression peritoneal carcinomatosis   - no overt pocket noted as per ED notes   - f/u blood cx  - empiric antibiotics ceftriaxone  2 IVqd and vanco by level   - Check MRI head r/o acute CVA in setting of hypercoagulable state of malignancy  - procedure team c/s on Monday re-eval if poss available pocket - occult infection vs CVA   -  AMS (mumbling with unsteady gait) with fever unclear source   -  6/30/23 with 4L removed.  - UA neg no dsyuria  - CT head with small RT ganglia capsular infarct   - CT A/P without overt moderate ascites b/l upper quadrant more marked LT upper quadrant; progression peritoneal carcinomatosis   - no overt pocket noted as per ED notes   - f/u blood cx  - empiric antibiotics ceftriaxone  2 IVqd and vanco by level   - Check MRI head r/o acute CVA in setting of hypercoagulable state of malignancy  - neuro check q 4; non focal on exam   - tele monitor   - dysphagia screen if passes can start on dysphagia diet   - procedure team c/s on Monday re-eval if poss available pocket  - consider ID c/s

## 2023-07-01 NOTE — CHART NOTE - NSCHARTNOTEFT_GEN_A_CORE
[facility] NIGHT MEDICINE COVERAGE.    This report was requested by: Shonda Coulter | Reference #: [id number]    Practitioner Count: 5  Pharmacy Count: 2  Current Opioid Prescriptions: 1  Current Benzodiazepine Prescriptions: 0  Current Stimulant Prescriptions: 0      Patient Demographic Information (PDI)       PDI	First Name	Last Name	Birth Date	Gender	Street Address	City	State	Zip Code  A	Sherwin Herrera	1945	Female	[address]	[place]	[address]  B	Toyin Herrera	1945	Female	[address]	[place]	[address]    Prescription Information      PDI Filter:    PDI	My Rx	Current Rx	Drug Type	Rx Written	Rx Dispensed	Drug	Quantity	Days Supply	Prescriber Name	Prescriber RERE #  A	N	Y		06/03/2023	06/05/2023	zolpidem tartrate 5 mg tablet	30	30	Martínez, Godfrey Chavira MD	[id number]  Payment Method Medicare Dispenser [facility]  A	N	N	O	05/31/2023	06/01/2023	oxycodone hcl (ir) 5 mg tablet	9	3	Abbi Chaney	[id number]  Payment Method Medicare Dispenser [facility]  A	N	N	O	05/03/2023	05/05/2023	tramadol hcl 50 mg tablet	28	7	Andre Jurado MD	[id number]  Payment Method Medicare Dispenser [facility]  A	N	N	O	03/29/2023	04/03/2023	oxycodone hcl (ir) 5 mg tablet	28	7	Helga Ramirez	[id number]  Payment Method Medicare Dispenser [facility]  B	N	Y	O	06/27/2023	06/27/2023	morphine sulf 10 mg/5 ml soln	270ml	30	Citlaly Chavis	[id number]  Payment Method Medicare  Dispenser [facility]

## 2023-07-01 NOTE — CONSULT NOTE ADULT - ASSESSMENT
76yo Occitan speaking lady with PMH of resected pancreatic CA pT3N2, PN+/LV+ disease, margin negative s/p 5 cycles of FOLFIRINOX, then 4 cycles of FOLFOX, then 3 cycles of 5-FU then 4 cycles of gemcitabine+ nab-paclitaxel with progression of disease who has new generalized weakness, fever and confusion. Oncology following for further management of pancreatic cancer.    #Metastatic Pancreatic Adenocarcinoma  Follows with Dr. David at the [facility]. Initially presented May 2022 with RUQ abdominal pain intermittently x 1 week and was found to be jaundiced presenting with a T bili of 15 and Ca 19.9 was 6704. CT A/P 05/20/22 - 2.1 cms pancreatic head mass with resultant abrupt cut off the CBD. CT chest with several indeterminate pulmonary nodules. S/p EUS with [facility] pathology 05/20/22, positive for moderately differntiated adenocarcinoma 05/23/22- S/P ERCP- Plastic stent into CBD. S/p upfront Whipple surgery 06/13/22 with surgical pathology showing moderately differentiated adenocarcinoma , negative margins 5/17 LN +ve. [facility]- BRCA2 Variant heterozygous- c.[id number]>A. Signatera -08/02/22 was positive - 0.58, 09/12/22- 0.14, 09/20/22- 0.44. Foundation one CDx- KAILEY, 2 muts/ Mb, KRAS Q61H,CDKN2A, CSF3R amplification  Received 5 cycles FOLFIRINOX 8/1/22 - 9/26/22, irinotecan stopped due to diarrhea, then FOLFOX for cycles 6-12 from 10/10/22 to 1/3/23 (oxaliplatin dropped after cycle 9 due to neuropathy). Restaging CT scans 11/16/22 showed mild increase in size of several previously seen noncalcified pulmonary nodules. No imaging evidence of locally recurrent or metastatic disease involving the abdomen or pelvis. CT C/A/P 01/05/23 Multiple bilateral pulmonary nodules, some of which are increased in size from the prior exam. New mild omental nodularity and peritoneal thickening, suspicious for peritoneal carcinomatosis. Started on Gemzar/Abraxane 1/17/23 - 2/27/23. Noted to have POD on CT CAP 4/26/23 with progression of lesions in lung, liver, and peritoneum.  - Patient was exploring clinical trial options  - If unable to find/qualify for a clinical trial, 5-FU/nal-IRI was presented as potential option.  - Currently no plans for any chemotherapy while inpatient  - Infectious workup as per primary team    ***Note is incomplete. Will discuss plan with attending.  Note is not finalized until signed by attending.     Tj Nova MD  Hematology/Oncology Fellow PGY-4  Pager: [facility] [phone] / [place] [phone]  After 5pm and on weekends please page on-call fellow    76yo Wolof speaking lady with PMH of resected pancreatic CA pT3N2, PN+/LV+ disease, margin negative s/p 5 cycles of FOLFIRINOX, then 4 cycles of FOLFOX, then 3 cycles of 5-FU then 4 cycles of gemcitabine+ nab-paclitaxel with progression of disease who has new generalized weakness, fever and confusion. Oncology following for further management of pancreatic cancer.    #Metastatic Pancreatic Adenocarcinoma  Follows with Dr. David at the [facility]. Initially presented May 2022 with RUQ abdominal pain intermittently x 1 week and was found to be jaundiced presenting with a T bili of 15 and Ca 19.9 was 6704. CT A/P 05/20/22 - 2.1 cms pancreatic head mass with resultant abrupt cut off the CBD. CT chest with several indeterminate pulmonary nodules. S/p EUS with [facility] pathology 05/20/22, positive for moderately differntiated adenocarcinoma 05/23/22- S/P ERCP- Plastic stent into CBD. S/p upfront Whipple surgery 06/13/22 with surgical pathology showing moderately differentiated adenocarcinoma , negative margins 5/17 LN +ve. [facility]- BRCA2 Variant heterozygous- c.1964C>A. Signatera -08/02/22 was positive - 0.58, 09/12/22- 0.14, 09/20/22- 0.44. Foundation one CDx- KAILEY, 2 muts/ Mb, KRAS Q61H,CDKN2A, CSF3R amplification  Received 5 cycles FOLFIRINOX 8/1/22 - 9/26/22, irinotecan stopped due to diarrhea, then FOLFOX for cycles 6-12 from 10/10/22 to 1/3/23 (oxaliplatin dropped after cycle 9 due to neuropathy). Restaging CT scans 11/16/22 showed mild increase in size of several previously seen noncalcified pulmonary nodules. No imaging evidence of locally recurrent or metastatic disease involving the abdomen or pelvis. CT C/A/P 01/05/23 Multiple bilateral pulmonary nodules, some of which are increased in size from the prior exam. New mild omental nodularity and peritoneal thickening, suspicious for peritoneal carcinomatosis. Started on Gemzar/Abraxane 1/17/23 - 2/27/23. Noted to have POD on CT CAP 4/26/23 with progression of lesions in lung, liver, and peritoneum.  - Patient was exploring clinical trial options  - If unable to find/qualify for a clinical trial, 5-FU/nal-IRI was presented as potential option to which patient is amenable.  - Currently no plans for any chemotherapy while inpatient  - Infectious workup as per primary team    ***Note is incomplete. Will discuss plan with attending.  Note is not finalized until signed by attending.     Tj Nova MD  Hematology/Oncology Fellow PGY-4  Pager: [facility] [phone] / [place] [phone]  After 5pm and on weekends please page on-call fellow    76yo Khmer speaking lady with PMH of resected pancreatic CA pT3N2, PN+/LV+ disease, margin negative s/p 5 cycles of FOLFIRINOX, then 4 cycles of FOLFOX, then 3 cycles of 5-FU then 4 cycles of gemcitabine+ nab-paclitaxel with progression of disease who has new generalized weakness, fever and confusion. Oncology following for further management of pancreatic cancer.    #Metastatic Pancreatic Adenocarcinoma  Follows with Dr. David at the [facility]. Initially presented May 2022 with RUQ abdominal pain intermittently x 1 week and was found to be jaundiced presenting with a T bili of 15 and Ca 19.9 was 6704. CT A/P 05/20/22 - 2.1 cms pancreatic head mass with resultant abrupt cut off the CBD. CT chest with several indeterminate pulmonary nodules. S/p EUS with [facility] pathology 05/20/22, positive for moderately differntiated adenocarcinoma 05/23/22- S/P ERCP- Plastic stent into CBD. S/p upfront Whipple surgery 06/13/22 with surgical pathology showing moderately differentiated adenocarcinoma , negative margins 5/17 LN +ve. [facility]- BRCA2 Variant heterozygous- c.1964C>A. Signatera -08/02/22 was positive - 0.58, 09/12/22- 0.14, 09/20/22- 0.44. Foundation one CDx- KAILEY, 2 muts/ Mb, KRAS Q61H,CDKN2A, CSF3R amplification  Received 5 cycles FOLFIRINOX 8/1/22 - 9/26/22, irinotecan stopped due to diarrhea, then FOLFOX for cycles 6-12 from 10/10/22 to 1/3/23 (oxaliplatin dropped after cycle 9 due to neuropathy). Restaging CT scans 11/16/22 showed mild increase in size of several previously seen noncalcified pulmonary nodules. No imaging evidence of locally recurrent or metastatic disease involving the abdomen or pelvis. CT C/A/P 01/05/23 Multiple bilateral pulmonary nodules, some of which are increased in size from the prior exam. New mild omental nodularity and peritoneal thickening, suspicious for peritoneal carcinomatosis. Started on Gemzar/Abraxane 1/17/23 - 2/27/23. Noted to have POD on CT CAP 4/26/23 with progression of lesions in lung, liver, and peritoneum.  - Patient was exploring clinical trial options but lost her spot for unclear reasons  - If unable to find/qualify for a clinical trial, 5-FU/nal-IRI was presented as potential option to which patient is amenable.  - Currently no plans for any chemotherapy while inpatient  - Infectious workup as per primary team  - To follow up with Dr. David at [facility] after discharge    Note is not finalized until signed by attending.     Tj Nova MD  Hematology/Oncology Fellow PGY-4  Pager: [facility] [phone] / [place] [phone]  After 5pm and on weekends please page on-call fellow

## 2023-07-02 NOTE — CONSULT NOTE ADULT - ASSESSMENT
WORK UP:      ANTIBIOTIC:      DIAGNOSIS and IMPRESSION:        RECOMMENDATIONS:        Above recommendations are Preliminary until Attending's Addendum which includes Final Recommendations      Drake Blanchard DO, PGY-4   ID fellow  [employer] Preferred  After 5pm/weekends call [phone]   77F with Pancreatic Ca s/p whipple's presents (6/1) with AMS unsteady gait, garbled speech and fever, after s/p paracentesis with removal of fluid 6/30, found to be febrile, CT head with + RT ganglia capsular infarct, ID consulted for assistance.    Patient reports that he had weakness prior to arrival, and may have had a fever at home  Currently AAOx3, and denies any acute complaints  Has some abdominal pain but has been baseline for her  Denies cough, dysuria, diarrhea, dyspnea, fever or chills  Had been on chemo, but no longer, last chemo 2/28/2023  Recently had paracentesis 6/30 due to malignant ascites    WORK UP:  WBC 11  Cr 0.72  UA negative  CXR pulm edema  CTH with small R ganglia capsular lacunar infarct  CT AP with increased hepatic mets, peritoneal carcinomatosis  RVP negative  BCx negative    ANTIBIOTIC:  s/p Vanco x1    cefTRIAXone   IVPB 2000 every 24 hours (7/2 --> )    DIAGNOSIS and IMPRESSION:  #Fever  #AMS, now resolved  #Abnormal CTH, Small R Lacunar Infarct  #Metastatic Disease    - unclear etiology of fever ?GI ?CVA ?extensive mets    RECOMMENDATIONS:  - continue empiric CTX  - monitor temperature curve  - trend WBC  - pending MRI Brain  - consider re-tap and send for analysis and culture  - follow up BCx x2      Above recommendations are Preliminary until Attending's Addendum which includes Final Recommendations      Drake Blanchard DO, PGY-4   ID fellow  [employer] Preferred  After 5pm/weekends call [phone]   77F with Pancreatic Ca s/p whipple's presents (6/1) with AMS unsteady gait, garbled speech and fever, after s/p paracentesis with removal of fluid 6/30, found to be febrile, CT head with + RT ganglia capsular infarct, ID consulted for assistance.    Patient reports that he had weakness prior to arrival, and may have had a fever at home  Currently AAOx3, and has some abdominal pain but has been baseline for her  Denies cough, dysuria, diarrhea, dyspnea, fever or chills  Had been on chemo, but no longer, last chemo 2/28/2023  Recently had paracentesis 6/30 due to malignant ascites  No recent sick contact  No recent travel    WORK UP:  WBC 11  Cr 0.72  UA negative  CXR pulm edema  CTH with small R ganglia capsular lacunar infarct  CT AP with increased hepatic mets, peritoneal carcinomatosis  RVP negative  BCx negative    ANTIBIOTIC:  s/p Vanco x1    cefTRIAXone   IVPB 2000 every 24 hours (7/2 --> )    DIAGNOSIS and IMPRESSION:  #Fever  #AMS, now resolved  #Abnormal CTH, Small R Lacunar Infarct  #Metastatic Disease    - unclear etiology of fever ?GI ?CVA ?extensive mets  - has R chest wall chemoport, but appears okay, no erythema, nonTTP    RECOMMENDATIONS:  - continue empiric CTX  - monitor temperature curve  - trend WBC  - pending MRI Brain  - consider re-tap and send for analysis and culture  - follow up BCx x2      Above recommendations are Preliminary until Attending's Addendum which includes Final Recommendations      Drake Blanchard DO, PGY-4   ID fellow  [employer] Preferred  After 5pm/weekends call [phone]   77F with Pancreatic Ca s/p whipple's presents (6/1) with AMS unsteady gait, garbled speech and fever, after s/p paracentesis with removal of fluid 6/30, found to be febrile, CT head with + RT ganglia capsular infarct, ID consulted for assistance.    Patient reports that he had weakness prior to arrival, and may have had a fever at home  Currently AAOx3, and has some abdominal pain but has been baseline for her  Denies cough, dysuria, diarrhea, dyspnea, fever or chills  Had been on chemo, but no longer, last chemo 2/28/2023  Recently had paracentesis 6/30 due to malignant ascites  No recent sick contact  No recent travel    WORK UP:  WBC 11  Cr 0.72  UA negative  CXR pulm edema  CTH with small R ganglia capsular lacunar infarct  CT AP with increased hepatic mets, peritoneal carcinomatosis  RVP negative  BCx negative    ANTIBIOTIC:  s/p Vanco x1    cefTRIAXone   IVPB 2000 every 24 hours (7/2 --> )    DIAGNOSIS and IMPRESSION:  #Fever  #AMS, now resolved  #Abnormal CTH, Small R Lacunar Infarct  #Metastatic Disease    - unclear etiology of fever, concerning for Intraabdominal infection ?iatrogenic peritonitis s/p recent paracentesis  - other non infection cause ?CVA ?metastatic disease  - has R chest wall chemoport, but appears okay, no erythema, nonTTP    RECOMMENDATIONS:  - continue empiric CTX  - monitor temperature curve  - trend WBC  - pending MRI Brain  - consider re-tap and send for analysis and culture  - follow up BCx x2    Case d/w attending and primary team      Drake Blanchard DO, PGY-4   ID fellow  [employer] Preferred  After 5pm/weekends call [phone]   77F with Pancreatic Ca s/p whipple's presents (6/1) with AMS unsteady gait, garbled speech and fever, after s/p paracentesis with removal of fluid 6/30, found to be febrile, CT head with + RT ganglia capsular infarct, ID consulted for assistance.    Patient reports that he had weakness prior to arrival, and may have had a fever at home  Currently AAOx3, and has some abdominal pain but has been baseline for her  Denies cough, dysuria, diarrhea, dyspnea, fever or chills  Had been on chemo, but no longer, last chemo 2/28/2023  Recently had paracentesis 6/30 due to malignant ascites  No recent sick contact  No recent travel    WORK UP:  WBC 11  Cr 0.72  UA negative  CXR pulm edema  CTH with small R ganglia capsular lacunar infarct  CT AP with increased hepatic mets, peritoneal carcinomatosis  RVP negative  BCx negative    ANTIBIOTIC:  s/p Vanco x1    cefTRIAXone   IVPB 2000 every 24 hours (7/2 --> )    DIAGNOSIS and IMPRESSION:  #Fever  #AMS, now resolved  #Abnormal CTH, Small R Lacunar Infarct  #Metastatic Disease    - unclear etiology of fever, concerning for Intraabdominal infection ?iatrogenic peritonitis s/p recent paracentesis  - other non infection cause ?CVA ?metastatic disease  - has R chest wall chemoport, but appears okay, no erythema, nonTTP    RECOMMENDATIONS:  - continue empiric CTX  - monitor temperature curve  - trend WBC  - pending MRI Brain  - obtain GI PCR  - would re-tap and send for ascites fluid analysis and culture  - follow up BCx x2    Case d/w attending and primary team      Drake Blanchard DO, PGY-4   ID fellow  [employer] Preferred  After 5pm/weekends call [phone]

## 2023-07-02 NOTE — CONSULT NOTE ADULT - ATTENDING COMMENTS
76 y/o [place] speaking female with metastatic pancreatic cancer s/p multiple lines of therapy with progression of disease brought in by  for fever and mental status changes. Abdominal pain improved s/p paracentesis with 4L drained, currently states she is very tired and to discuss medical issues with her  tomorrow. Once acute issues have resolved will have discussion with primary oncologist Dr. David in regards to next line therapy vs clinical trial.
76 yo woman with pancreatic cancer now with CT showing progressive peritoneal carcinomatosis and metastatic disease   presenting with fever, abdominal pain.  s/p therapeutic paracentesis 6/30  concern for peritonitis  c/o diarrhea as well   suggestions above   c/w ceftriaxone for now   check stool GI PCR   will follow

## 2023-07-02 NOTE — CONSULT NOTE ADULT - SUBJECTIVE AND OBJECTIVE BOX
Patient is a 78y old  Female who presents with a chief complaint of Fever/AMS (2023 16:31)    HPI:  77F with Pancreatic Ca s/p whipple's presents () with AMS unsteady gait, garbled speech and fever, after s/p paracentesis with removal of fluid , found to be febrile, CT head with + RT ganglia capsular infarct, ID consulted for assistance.    Patient ---         PAST MEDICAL & SURGICAL HISTORY:  Hypertension      IDDM (insulin dependent diabetes mellitus)      Malignant neoplasm of pancreas, unspecified      Dyslipidemia      CAD (coronary artery disease)      H/O chronic hepatitis  treated      Pancreatic cancer      History of  section      History of hysterectomy      History of coronary angioplasty with insertion of stent  2019 -  stent inserted      History of Whipple procedure          Allergies  No Known Allergies    ANTIMICROBIALS (past 90 days)  MEDICATIONS  (STANDING):  cefepime   IVPB   100 mL/Hr IV Intermittent (23 @ 02:30)    cefTRIAXone   IVPB   100 mL/Hr IV Intermittent (23 @ 00:20)    vancomycin  IVPB.   250 mL/Hr IV Intermittent (23 @ 03:06)    s/p Vanco x1    cefTRIAXone   IVPB 2000 every 24 hours ( --> )    MEDICATIONS  (STANDING):  acetaminophen     Tablet .. 650 every 6 hours PRN  aluminum hydroxide/magnesium hydroxide/simethicone Suspension 30 every 4 hours PRN  amLODIPine   Tablet 10 daily  aspirin  chewable 81 daily  atorvastatin 40 at bedtime  enoxaparin Injectable 40 every 24 hours  fenofibrate Tablet 48 daily  losartan 100 daily  melatonin 3 at bedtime PRN  metoprolol succinate ER 25 daily  ondansetron Injectable 4 every 8 hours PRN  oxycodone    5 mG/acetaminophen 325 mG 1 every 6 hours PRN  pancrelipase  (CREON 36,000 Lipase Units) 1 three times a day with meals  pantoprazole    Tablet 40 before breakfast  polyethylene glycol 3350 17 daily PRN  senna 2 at bedtime  zolpidem 5 at bedtime PRN    SOCIAL HISTORY:       FAMILY HISTORY:  FH: HTN (hypertension) (Mother)    Family history of leukemia (Child)      REVIEW OF SYSTEMS  [  ] ROS unobtainable because:    [  ] All other systems negative except as noted below:	    Constitutional:  [ ] fever [ ] chills  [ ] weight loss  [ ] weakness  Skin:  [ ] rash [ ] phlebitis	  Eyes: [ ] icterus [ ] pain  [ ] discharge	  ENMT: [ ] sore throat  [ ] thrush [ ] ulcers [ ] exudates  Respiratory: [ ] dyspnea [ ] hemoptysis [ ] cough [ ] sputum	  Cardiovascular:  [ ] chest pain [ ] palpitations [ ] edema	  Gastrointestinal:  [ ] nausea [ ] vomiting [ ] diarrhea [ ] constipation [ ] pain	  Genitourinary:  [ ] dysuria [ ] frequency [ ] hematuria [ ] discharge [ ] flank pain  [ ] incontinence  Musculoskeletal:  [ ] myalgias [ ] arthralgias [ ] arthritis  [ ] back pain  Neurological:  [ ] headache [ ] seizures  [ ] confusion/altered mental status  Psychiatric:  [ ] anxiety [ ] depression	  Hematology/Lymphatics:  [ ] lymphadenopathy  Endocrine:  [ ] adrenal [ ] thyroid  Allergic/Immunologic:	 [ ] transplant [ ] seasonal    Vital Signs Last 24 Hrs  T(F): 98.4 (23 @ 10:07), Max: 102.9 (23 @ 01:41)  Vital Signs Last 24 Hrs  HR: 90 (23 @ 10:07) (90 - 105)  BP: 102/51 (23 @ 10:07) (102/51 - 124/68)  RR: 18 (23 @ 10:07)  SpO2: 96% (23 @ 10:07) (96% - 100%)  Wt(kg): --    Physical Exam:  Constitutional:  well preserved, comfortable  Head/Eyes: no icterus  ENT:  supple, no cervical lymphadenopathy   LUNGS:  CTA  CVS:  regular rhythm, no murmur  Abd:  soft, non-tender; non-distended  Ext:  no edema  Vascular:  IV site no erythema tenderness or discharge  MSK:  joints without swelling  Neuro: AAO X 3, non- focal                              9.8    11.53 )-----------( 504      ( 2023 05:30 )             31.4   07-    132<L>  |  99  |  14  ----------------------------<  76  4.0   |  19<L>  |  0.72    Ca    8.3<L>      2023 05:30    TPro  5.9<L>  /  Alb  2.6<L>  /  TBili  0.3  /  DBili  x   /  AST  28  /  ALT  10  /  AlkPhos  156<H>      Urinalysis Basic - ( 2023 05:30 )    Color: x / Appearance: x / SG: x / pH: x  Gluc: 76 mg/dL / Ketone: x  / Bili: x / Urobili: x   Blood: x / Protein: x / Nitrite: x   Leuk Esterase: x / RBC: x / WBC x   Sq Epi: x / Non Sq Epi: x / Bacteria: x    MICROBIOLOGY:Vancomycin Level, Trough: <4.0 ( @ 05:30)    Culture - Blood (collected 2023 01:32)  Source: .Blood Blood-Peripheral  Preliminary Report (2023 07:02):    No growth at 24 hours    Culture - Blood (collected 2023 01:25)  Source: .Blood Blood-Peripheral  Preliminary Report (2023 07:02):    No growth at 24 hours      Rapid RVP Result: NotDetec ( @ 03:17)      RADIOLOGY:  imaging below personally reviewed and agree with findings  < from: CT Abdomen and Pelvis w/ IV Cont (23 @ 05:33) >  IMPRESSION:  1.   Progressive peritoneal carcinomatosis and metastatic malignancy as   noted. Specifically, relatively stable peritoneal disease slightly   worsened hepatic metastatic disease since 2023.  2.   Additional findings as noted.    < end of copied text >  < from: CT Head No Cont (23 @ 05:10) >  IMPRESSION:    No acute intracranial hemorrhage, mass effect, or CT evidence of a large   vascular territory infarct. Small right ganglia capsular lacunar infarct.    If there are new or persistent symptoms, consider further evaluation with   MRI provided no contraindications.    < end of copied text >  < from: Xray Chest 1 View- PORTABLE-Urgent (Xray Chest 1 View- PORTABLE-Urgent .) (23 @ 02:44) >    Impression:  Mild pulmonary edema. Small bilateral pleural effusions.    < end of copied text >   Patient is a 78y old  Female who presents with a chief complaint of Fever/AMS (2023 16:31)    HPI:  77F with Pancreatic Ca s/p whipple's presents () with AMS unsteady gait, garbled speech and fever, after s/p paracentesis with removal of fluid , found to be febrile, CT head with + RT ganglia capsular infarct, ID consulted for assistance.    Patient reports that he had weakness prior to arrival, and may have had a fever at home  Currently AAOx3, and denies any acute complaints  Has some abdominal pain but has been baseline for her  Denies cough, dysuria, diarrhea, dyspnea, fever or chills  Had been on chemo, but no longer, last chemo 2023  Recently had paracentesis  due to malignant ascites    WBC 11  Cr 0.72  UA negative  CXR pulm edema  CTH with small R ganglia capsular lacunar infarct  CT AP with increased hepatic mets, peritoneal carcinomatosis  RVP negative  BCx negative        PAST MEDICAL & SURGICAL HISTORY:  Hypertension      IDDM (insulin dependent diabetes mellitus)      Malignant neoplasm of pancreas, unspecified      Dyslipidemia      CAD (coronary artery disease)      H/O chronic hepatitis  treated      Pancreatic cancer      History of  section      History of hysterectomy      History of coronary angioplasty with insertion of stent  2019 -  stent inserted      History of Whipple procedure          Allergies  No Known Allergies    ANTIMICROBIALS (past 90 days)  MEDICATIONS  (STANDING):  cefepime   IVPB   100 mL/Hr IV Intermittent (23 @ 02:30)    cefTRIAXone   IVPB   100 mL/Hr IV Intermittent (23 @ 00:20)    vancomycin  IVPB.   250 mL/Hr IV Intermittent (23 @ 03:06)    s/p Vanco x1    cefTRIAXone   IVPB 2000 every 24 hours ( --> )    MEDICATIONS  (STANDING):  acetaminophen     Tablet .. 650 every 6 hours PRN  aluminum hydroxide/magnesium hydroxide/simethicone Suspension 30 every 4 hours PRN  amLODIPine   Tablet 10 daily  aspirin  chewable 81 daily  atorvastatin 40 at bedtime  enoxaparin Injectable 40 every 24 hours  fenofibrate Tablet 48 daily  losartan 100 daily  melatonin 3 at bedtime PRN  metoprolol succinate ER 25 daily  ondansetron Injectable 4 every 8 hours PRN  oxycodone    5 mG/acetaminophen 325 mG 1 every 6 hours PRN  pancrelipase  (CREON 36,000 Lipase Units) 1 three times a day with meals  pantoprazole    Tablet 40 before breakfast  polyethylene glycol 3350 17 daily PRN  senna 2 at bedtime  zolpidem 5 at bedtime PRN    SOCIAL HISTORY:   Denies alcohol, tobacco, recreational drug use      FAMILY HISTORY:  FH: HTN (hypertension) (Mother)    Family history of leukemia (Child)      REVIEW OF SYSTEMS  [  ] ROS unobtainable because:    [ x ] All other systems negative except as noted below:	    Constitutional:  [ ] fever [ ] chills  [ ] weight loss  [ ] weakness  Skin:  [ ] rash [ ] phlebitis	  Eyes: [ ] icterus [ ] pain  [ ] discharge	  ENMT: [ ] sore throat  [ ] thrush [ ] ulcers [ ] exudates  Respiratory: [ ] dyspnea [ ] hemoptysis [ ] cough [ ] sputum	  Cardiovascular:  [ ] chest pain [ ] palpitations [ ] edema	  Gastrointestinal:  [ ] nausea [ ] vomiting [ ] diarrhea [ ] constipation [ ] pain	  Genitourinary:  [ ] dysuria [ ] frequency [ ] hematuria [ ] discharge [ ] flank pain  [ ] incontinence  Musculoskeletal:  [ ] myalgias [ ] arthralgias [ ] arthritis  [ ] back pain  Neurological:  [ ] headache [ ] seizures  [ ] confusion/altered mental status  Psychiatric:  [ ] anxiety [ ] depression	  Hematology/Lymphatics:  [ ] lymphadenopathy  Endocrine:  [ ] adrenal [ ] thyroid  Allergic/Immunologic:	 [ ] transplant [ ] seasonal    Vital Signs Last 24 Hrs  T(F): 98.4 (23 @ 10:07), Max: 102.9 (23 @ 01:41)  Vital Signs Last 24 Hrs  HR: 90 (23 @ 10:07) (90 - 105)  BP: 102/51 (23 @ 10:07) (102/51 - 124/68)  RR: 18 (23 @ 10:07)  SpO2: 96% (23 @ 10:07) (96% - 100%)  Wt(kg): --    Physical Exam:  Constitutional:  well preserved, comfortable  Head/Eyes: no icterus  ENT:  supple, no cervical lymphadenopathy   LUNGS:  CTA  CVS:  regular rhythm, no murmur  Abd:  soft, non-tender; +distended  Ext:  no edema  Vascular: +R chest wall port  MSK:  joints without swelling  Neuro: AAO X 3, non- focal                              9.8    11.53 )-----------( 504      ( 2023 05:30 )             31.4       132<L>  |  99  |  14  ----------------------------<  76  4.0   |  19<L>  |  0.72    Ca    8.3<L>      2023 05:30    TPro  5.9<L>  /  Alb  2.6<L>  /  TBili  0.3  /  DBili  x   /  AST  28  /  ALT  10  /  AlkPhos  156<H>      Urinalysis Basic - ( 2023 05:30 )    Color: x / Appearance: x / SG: x / pH: x  Gluc: 76 mg/dL / Ketone: x  / Bili: x / Urobili: x   Blood: x / Protein: x / Nitrite: x   Leuk Esterase: x / RBC: x / WBC x   Sq Epi: x / Non Sq Epi: x / Bacteria: x    MICROBIOLOGY:Vancomycin Level, Trough: <4.0 ( @ 05:30)    Culture - Blood (collected 2023 01:32)  Source: .Blood Blood-Peripheral  Preliminary Report (2023 07:02):    No growth at 24 hours    Culture - Blood (collected 2023 01:25)  Source: .Blood Blood-Peripheral  Preliminary Report (2023 07:02):    No growth at 24 hours      Rapid RVP Result: NotDetec ( @ 03:17)      RADIOLOGY:  imaging below personally reviewed and agree with findings  < from: CT Abdomen and Pelvis w/ IV Cont (23 @ 05:33) >  IMPRESSION:  1.   Progressive peritoneal carcinomatosis and metastatic malignancy as   noted. Specifically, relatively stable peritoneal disease slightly   worsened hepatic metastatic disease since 2023.  2.   Additional findings as noted.    < end of copied text >  < from: CT Head No Cont (23 @ 05:10) >  IMPRESSION:    No acute intracranial hemorrhage, mass effect, or CT evidence of a large   vascular territory infarct. Small right ganglia capsular lacunar infarct.    If there are new or persistent symptoms, consider further evaluation with   MRI provided no contraindications.    < end of copied text >  < from: Xray Chest 1 View- PORTABLE-Urgent (Xray Chest 1 View- PORTABLE-Urgent .) (23 @ 02:44) >    Impression:  Mild pulmonary edema. Small bilateral pleural effusions.    < end of copied text >   Patient is a 78y old  Female who presents with a chief complaint of Fever/AMS (2023 16:31)    HPI:  77F with Pancreatic Ca s/p whipple's presents () with AMS unsteady gait, garbled speech and fever, after s/p paracentesis with removal of fluid , found to be febrile, CT head with + RT ganglia capsular infarct, ID consulted for assistance.    Patient reports that he had weakness prior to arrival, and may have had a fever at home  Currently AAOx3, and has some abdominal pain but has been baseline for her  Denies cough, dysuria, diarrhea, dyspnea, fever or chills  Had been on chemo, but no longer, last chemo 2023  Recently had paracentesis  due to malignant ascites  No recent sick contact  No recent travel    WBC 11  Cr 0.72  UA negative  CXR pulm edema  CTH with small R ganglia capsular lacunar infarct  CT AP with increased hepatic mets, peritoneal carcinomatosis  RVP negative  BCx negative        PAST MEDICAL & SURGICAL HISTORY:  Hypertension      IDDM (insulin dependent diabetes mellitus)      Malignant neoplasm of pancreas, unspecified      Dyslipidemia      CAD (coronary artery disease)      H/O chronic hepatitis  treated      Pancreatic cancer      History of  section      History of hysterectomy      History of coronary angioplasty with insertion of stent  2019 -  stent inserted      History of Whipple procedure          Allergies  No Known Allergies    ANTIMICROBIALS (past 90 days)  MEDICATIONS  (STANDING):  cefepime   IVPB   100 mL/Hr IV Intermittent (23 @ 02:30)    cefTRIAXone   IVPB   100 mL/Hr IV Intermittent (23 @ 00:20)    vancomycin  IVPB.   250 mL/Hr IV Intermittent (23 @ 03:06)    s/p Vanco x1    cefTRIAXone   IVPB 2000 every 24 hours ( --> )    MEDICATIONS  (STANDING):  acetaminophen     Tablet .. 650 every 6 hours PRN  aluminum hydroxide/magnesium hydroxide/simethicone Suspension 30 every 4 hours PRN  amLODIPine   Tablet 10 daily  aspirin  chewable 81 daily  atorvastatin 40 at bedtime  enoxaparin Injectable 40 every 24 hours  fenofibrate Tablet 48 daily  losartan 100 daily  melatonin 3 at bedtime PRN  metoprolol succinate ER 25 daily  ondansetron Injectable 4 every 8 hours PRN  oxycodone    5 mG/acetaminophen 325 mG 1 every 6 hours PRN  pancrelipase  (CREON 36,000 Lipase Units) 1 three times a day with meals  pantoprazole    Tablet 40 before breakfast  polyethylene glycol 3350 17 daily PRN  senna 2 at bedtime  zolpidem 5 at bedtime PRN    SOCIAL HISTORY:   Denies alcohol, tobacco, recreational drug use      FAMILY HISTORY:  FH: HTN (hypertension) (Mother)    Family history of leukemia (Child)      REVIEW OF SYSTEMS  [  ] ROS unobtainable because:    [ x ] All other systems negative except as noted below:	    Constitutional:  [ ] fever [ ] chills  [ ] weight loss  [ ] weakness  Skin:  [ ] rash [ ] phlebitis	  Eyes: [ ] icterus [ ] pain  [ ] discharge	  ENMT: [ ] sore throat  [ ] thrush [ ] ulcers [ ] exudates  Respiratory: [ ] dyspnea [ ] hemoptysis [ ] cough [ ] sputum	  Cardiovascular:  [ ] chest pain [ ] palpitations [ ] edema	  Gastrointestinal:  [ ] nausea [ ] vomiting [ ] diarrhea [ ] constipation [ ] pain	  Genitourinary:  [ ] dysuria [ ] frequency [ ] hematuria [ ] discharge [ ] flank pain  [ ] incontinence  Musculoskeletal:  [ ] myalgias [ ] arthralgias [ ] arthritis  [ ] back pain  Neurological:  [ ] headache [ ] seizures  [ ] confusion/altered mental status  Psychiatric:  [ ] anxiety [ ] depression	  Hematology/Lymphatics:  [ ] lymphadenopathy  Endocrine:  [ ] adrenal [ ] thyroid  Allergic/Immunologic:	 [ ] transplant [ ] seasonal    Vital Signs Last 24 Hrs  T(F): 98.4 (23 @ 10:07), Max: 102.9 (23 @ 01:41)  Vital Signs Last 24 Hrs  HR: 90 (23 @ 10:07) (90 - 105)  BP: 102/51 (23 @ 10:07) (102/51 - 124/68)  RR: 18 (23 @ 10:07)  SpO2: 96% (23 @ 10:07) (96% - 100%)  Wt(kg): --    Physical Exam:  Constitutional:  well preserved, comfortable  Head/Eyes: no icterus  ENT:  supple, no cervical lymphadenopathy   LUNGS:  CTA  CVS:  regular rhythm, no murmur  Abd:  soft, non-tender; +distended  Ext:  no edema  Vascular: +R chest wall port  MSK:  joints without swelling  Neuro: AAO X 3, non- focal                              9.8    11.53 )-----------( 504      ( 2023 05:30 )             31.4       132<L>  |  99  |  14  ----------------------------<  76  4.0   |  19<L>  |  0.72    Ca    8.3<L>      2023 05:30    TPro  5.9<L>  /  Alb  2.6<L>  /  TBili  0.3  /  DBili  x   /  AST  28  /  ALT  10  /  AlkPhos  156<H>      Urinalysis Basic - ( 2023 05:30 )    Color: x / Appearance: x / SG: x / pH: x  Gluc: 76 mg/dL / Ketone: x  / Bili: x / Urobili: x   Blood: x / Protein: x / Nitrite: x   Leuk Esterase: x / RBC: x / WBC x   Sq Epi: x / Non Sq Epi: x / Bacteria: x    MICROBIOLOGY:Vancomycin Level, Trough: <4.0 ( @ 05:30)    Culture - Blood (collected 2023 01:32)  Source: .Blood Blood-Peripheral  Preliminary Report (2023 07:02):    No growth at 24 hours    Culture - Blood (collected 2023 01:25)  Source: .Blood Blood-Peripheral  Preliminary Report (2023 07:02):    No growth at 24 hours      Rapid RVP Result: NotDetec ( @ 03:17)      RADIOLOGY:  imaging below personally reviewed and agree with findings  < from: CT Abdomen and Pelvis w/ IV Cont (23 @ 05:33) >  IMPRESSION:  1.   Progressive peritoneal carcinomatosis and metastatic malignancy as   noted. Specifically, relatively stable peritoneal disease slightly   worsened hepatic metastatic disease since 2023.  2.   Additional findings as noted.    < end of copied text >  < from: CT Head No Cont (23 @ 05:10) >  IMPRESSION:    No acute intracranial hemorrhage, mass effect, or CT evidence of a large   vascular territory infarct. Small right ganglia capsular lacunar infarct.    If there are new or persistent symptoms, consider further evaluation with   MRI provided no contraindications.    < end of copied text >  < from: Xray Chest 1 View- PORTABLE-Urgent (Xray Chest 1 View- PORTABLE-Urgent .) (23 @ 02:44) >    Impression:  Mild pulmonary edema. Small bilateral pleural effusions.    < end of copied text >   Patient is a 78y old  Female who presents with a chief complaint of Fever/AMS (2023 16:31)    HPI:  77F with Pancreatic Ca s/p whipple's presents () with AMS unsteady gait, garbled speech and fever, after s/p paracentesis with removal of fluid , found to be febrile, CT head with + RT ganglia capsular infarct, ID consulted for assistance.    Patient reports that she had weakness prior to arrival, and may have had a fever at home  Currently AAOx3, and has some abdominal pain but has been baseline for her  Denies cough, dysuria, diarrhea, dyspnea, fever or chills  Had been on chemo, but no longer, last chemo 2023  Recently had paracentesis  due to malignant ascites  No recent sick contact  No recent travel    WBC 11  Cr 0.72  UA negative  CXR pulm edema  CTH with small R ganglia capsular lacunar infarct  CT AP with increased hepatic mets, peritoneal carcinomatosis  RVP negative  BCx negative        PAST MEDICAL & SURGICAL HISTORY:  Hypertension      IDDM (insulin dependent diabetes mellitus)      Malignant neoplasm of pancreas, unspecified      Dyslipidemia      CAD (coronary artery disease)      H/O chronic hepatitis  treated      Pancreatic cancer      History of  section      History of hysterectomy      History of coronary angioplasty with insertion of stent  2019 -  stent inserted      History of Whipple procedure          Allergies  No Known Allergies    ANTIMICROBIALS (past 90 days)  MEDICATIONS  (STANDING):  cefepime   IVPB   100 mL/Hr IV Intermittent (23 @ 02:30)    cefTRIAXone   IVPB   100 mL/Hr IV Intermittent (23 @ 00:20)    vancomycin  IVPB.   250 mL/Hr IV Intermittent (23 @ 03:06)    s/p Vanco x1    cefTRIAXone   IVPB 2000 every 24 hours ( --> )    MEDICATIONS  (STANDING):  acetaminophen     Tablet .. 650 every 6 hours PRN  aluminum hydroxide/magnesium hydroxide/simethicone Suspension 30 every 4 hours PRN  amLODIPine   Tablet 10 daily  aspirin  chewable 81 daily  atorvastatin 40 at bedtime  enoxaparin Injectable 40 every 24 hours  fenofibrate Tablet 48 daily  losartan 100 daily  melatonin 3 at bedtime PRN  metoprolol succinate ER 25 daily  ondansetron Injectable 4 every 8 hours PRN  oxycodone    5 mG/acetaminophen 325 mG 1 every 6 hours PRN  pancrelipase  (CREON 36,000 Lipase Units) 1 three times a day with meals  pantoprazole    Tablet 40 before breakfast  polyethylene glycol 3350 17 daily PRN  senna 2 at bedtime  zolpidem 5 at bedtime PRN    SOCIAL HISTORY:   Denies alcohol, tobacco, recreational drug use      FAMILY HISTORY:  FH: HTN (hypertension) (Mother)    Family history of leukemia (Child)      REVIEW OF SYSTEMS  [  ] ROS unobtainable because:    [ x ] All other systems negative except as noted below:	    Constitutional:  [ ] fever [ ] chills  [ ] weight loss  [ ] weakness  Skin:  [ ] rash [ ] phlebitis	  Eyes: [ ] icterus [ ] pain  [ ] discharge	  ENMT: [ ] sore throat  [ ] thrush [ ] ulcers [ ] exudates  Respiratory: [ ] dyspnea [ ] hemoptysis [ ] cough [ ] sputum	  Cardiovascular:  [ ] chest pain [ ] palpitations [ ] edema	  Gastrointestinal:  [ ] nausea [ ] vomiting [ ] diarrhea [ ] constipation [ ] pain	  Genitourinary:  [ ] dysuria [ ] frequency [ ] hematuria [ ] discharge [ ] flank pain  [ ] incontinence  Musculoskeletal:  [ ] myalgias [ ] arthralgias [ ] arthritis  [ ] back pain  Neurological:  [ ] headache [ ] seizures  [ ] confusion/altered mental status  Psychiatric:  [ ] anxiety [ ] depression	  Hematology/Lymphatics:  [ ] lymphadenopathy  Endocrine:  [ ] adrenal [ ] thyroid  Allergic/Immunologic:	 [ ] transplant [ ] seasonal    Vital Signs Last 24 Hrs  T(F): 98.4 (23 @ 10:07), Max: 102.9 (23 @ 01:41)  Vital Signs Last 24 Hrs  HR: 90 (23 @ 10:07) (90 - 105)  BP: 102/51 (23 @ 10:07) (102/51 - 124/68)  RR: 18 (23 @ 10:07)  SpO2: 96% (23 @ 10:07) (96% - 100%)  Wt(kg): --    Physical Exam:  Constitutional: uncomfortable due to abdominal pain  Head/Eyes: no icterus  ENT:  supple  LUNGS:  CTA  CVS:  regular rhythm, no murmur  Abd:  soft, non-tender; +distended  Ext:  no edema  Vascular: +R chest wall port  MSK:  joints without swelling  Neuro: AAO X 3, non- focal                              9.8    11.53 )-----------( 504      ( 2023 05:30 )             31.4       132<L>  |  99  |  14  ----------------------------<  76  4.0   |  19<L>  |  0.72    Ca    8.3<L>      2023 05:30    TPro  5.9<L>  /  Alb  2.6<L>  /  TBili  0.3  /  DBili  x   /  AST  28  /  ALT  10  /  AlkPhos  156<H>      Urinalysis Basic - ( 2023 05:30 )    Color: x / Appearance: x / SG: x / pH: x  Gluc: 76 mg/dL / Ketone: x  / Bili: x / Urobili: x   Blood: x / Protein: x / Nitrite: x   Leuk Esterase: x / RBC: x / WBC x   Sq Epi: x / Non Sq Epi: x / Bacteria: x    MICROBIOLOGY:Vancomycin Level, Trough: <4.0 ( @ 05:30)    Culture - Blood (collected 2023 01:32)  Source: .Blood Blood-Peripheral  Preliminary Report (2023 07:02):    No growth at 24 hours    Culture - Blood (collected 2023 01:25)  Source: .Blood Blood-Peripheral  Preliminary Report (2023 07:02):    No growth at 24 hours      Rapid RVP Result: NotDetec ( @ 03:17)      RADIOLOGY:  imaging below personally reviewed and agree with findings  < from: CT Abdomen and Pelvis w/ IV Cont (23 @ 05:33) >  IMPRESSION:  1.   Progressive peritoneal carcinomatosis and metastatic malignancy as   noted. Specifically, relatively stable peritoneal disease slightly   worsened hepatic metastatic disease since 2023.  2.   Additional findings as noted.    < end of copied text >  < from: CT Head No Cont (23 @ 05:10) >  IMPRESSION:    No acute intracranial hemorrhage, mass effect, or CT evidence of a large   vascular territory infarct. Small right ganglia capsular lacunar infarct.    If there are new or persistent symptoms, consider further evaluation with   MRI provided no contraindications.    < end of copied text >  < from: Xray Chest 1 View- PORTABLE-Urgent (Xray Chest 1 View- PORTABLE-Urgent .) (23 @ 02:44) >    Impression:  Mild pulmonary edema. Small bilateral pleural effusions.    < end of copied text >

## 2023-07-02 NOTE — ED ADULT NURSE REASSESSMENT NOTE - NS ED NURSE REASSESS COMMENT FT1
Pt at baseline mental status, remains c/o "pain all over" , mostly in her back. Medicine PA aware. Awaiting further orders. Safety in place
Report received from ALAINA Celaya. Pt A&Ox3, restless in stretcher, states she has pain "all over". Pt in NAD. Pt offered Prn medications but states "it does not work." ACP contacted to be made aware however they are in the middle of "sign out", will re attempt to contact at 7. Safety measures in place
Pt is now A&Ox3 is more alert after 2L of fluid.

## 2023-07-03 NOTE — PROVIDER CONTACT NOTE (CRITICAL VALUE NOTIFICATION) - BACKGROUND
(Admit Diagnosis) Altered mental status  (PMH) Pancreatic cancer  (PMH) H/O chronic hepatitis  (PMH) CAD (coronary artery disease)

## 2023-07-03 NOTE — CONSULT NOTE ADULT - SUBJECTIVE AND OBJECTIVE BOX
HPI:      NIHSS:   MRS:     A 10-system ROS was performed and is negative except for those items noted above and/or in the HPI.    PAST MEDICAL & SURGICAL HISTORY:  Hypertension  IDDM (insulin dependent diabetes mellitus)  Malignant neoplasm of pancreas, unspecified  Dyslipidemia  CAD (coronary artery disease)  H/O chronic hepatitis  treated  Pancreatic cancer  History of  section  History of hysterectomy  History of coronary angioplasty with insertion of stent  2019 - 1 stent inserted  History of Whipple procedure    FAMILY HISTORY:  FH: HTN (hypertension) (Mother)  Family history of leukemia (Child)    SOCIAL HISTORY:   T/E/D:   Occupation:   Lives with:     MEDICATIONS (HOME):  Home Medications:  amLODIPine 10 mg oral tablet: 1 tab(s) orally once a day (2023 13:50)  aspirin 81 mg oral tablet: 1 tab(s) orally once a day - continue (2023 13:50)  Creon 36,000 units oral delayed release capsule: 1 cap(s) orally 3 times a day (with meals) (2023 19:55)  fenofibrate 145 mg oral tablet: 1 tab(s) orally once a day (2023 13:50)  losartan 100 mg oral tablet: 1 tab(s) orally once a day (2023 13:50)  metoprolol succinate 25 mg oral tablet, extended release: 1 tab(s) orally once a day (2023 13:50)  rosuvastatin 10 mg oral tablet: 1 tab(s) orally once a day (2023 13:50)  Tresiba 100 units/mL subcutaneous solution: 20 subcutaneous once a day (at bedtime) (2023 19:54)    MEDICATIONS  (STANDING):  amLODIPine   Tablet 10 milliGRAM(s) Oral daily  aspirin  chewable 81 milliGRAM(s) Oral daily  atorvastatin 40 milliGRAM(s) Oral at bedtime  cefTRIAXone   IVPB 2000 milliGRAM(s) IV Intermittent every 24 hours  enoxaparin Injectable 40 milliGRAM(s) SubCutaneous every 24 hours  fenofibrate Tablet 48 milliGRAM(s) Oral daily  hydrOXYzine hydrochloride 25 milliGRAM(s) Oral once  losartan 100 milliGRAM(s) Oral daily  metoprolol succinate ER 25 milliGRAM(s) Oral daily  pancrelipase  (CREON 36,000 Lipase Units) 1 Capsule(s) Oral three times a day with meals  pantoprazole    Tablet 40 milliGRAM(s) Oral before breakfast  senna 2 Tablet(s) Oral at bedtime    MEDICATIONS  (PRN):  acetaminophen     Tablet .. 650 milliGRAM(s) Oral every 6 hours PRN Temp greater or equal to 38C (100.4F), Mild Pain (1 - 3)  aluminum hydroxide/magnesium hydroxide/simethicone Suspension 30 milliLiter(s) Oral every 4 hours PRN Dyspepsia  melatonin 3 milliGRAM(s) Oral at bedtime PRN Insomnia  ondansetron Injectable 4 milliGRAM(s) IV Push every 8 hours PRN Nausea and/or Vomiting  oxycodone    5 mG/acetaminophen 325 mG 1 Tablet(s) Oral every 6 hours PRN Severe Pain (7 - 10)  polyethylene glycol 3350 17 Gram(s) Oral daily PRN Constipation  zolpidem 5 milliGRAM(s) Oral at bedtime PRN Insomnia    ALLERGIES/INTOLERANCES:  Allergies  No Known Allergies    Intolerances    VITALS & EXAMINATION:  Vital Signs Last 24 Hrs  T(C): 36.8 (2023 10:31), Max: 37.2 (2023 17:06)  T(F): 98.3 (2023 10:31), Max: 98.9 (2023 17:06)  HR: 90 (2023 10:31) (87 - 95)  BP: 100/48 (2023 10:31) (100/48 - 119/63)  BP(mean): 71 (2023 18:30) (71 - 71)  RR: 18 (2023 10:31) (17 - 18)  SpO2: 98% (2023 10:31) (96% - 98%)    Parameters below as of 2023 05:09  Patient On (Oxygen Delivery Method): room air    General:  Constitutional: Female, appears stated age, in no apparent distress including pain  Head: Normocephalic & Atraumatic.  Respiratory: Breathing comfortably.  Extremities: No cyanosis, clubbing, or edema    Neurological:  MS: Awake, alert, oriented to person, place, situation, time. Follows all commands.    Language: Speech is clear, fluent with good repetition & comprehension.    CNs: PERRL (R = 3mm, L = 3mm). VFF. EOMI no nystagmus. V1-3 intact to LT b/l. No facial asymmetry b/l, full eye closure strength b/l. Hearing grossly normal (rubbing fingers) b/l. Tongue midline, normal movements, no atrophy.     Motor: Normal muscle bulk & tone. No noticeable tremor. No pronator drift.              Deltoid	Biceps	Triceps	   R	         5	             5	              5	 5	  		 	  L	         5	             5	              5	 5	  		    	H-Flex	K-Flex	K-Ext	D-Flex	P-Flex  R	    5	            5	           5	            5	           5		   L	    5	            5	           5	            5	           5		     Sensation: Intact to LT b/l throughout.     Cortical: Extinction on DSS (neglect): none    Reflexes:              Biceps(C5)       BR(C6)     Triceps(C7)               Patellar(L4)    Achilles(S1)    Plantar Resp  R	              2	          2	             2		                              2		    2		      Down   L	              2	          2	             2		                              2		    2		      Down     Coordination: intact rapid-alt movements. No dysmetria to FTN/HTS    Gait:     LABORATORY:  CBC                       9.6    11.30 )-----------( 489      ( 2023 06:56 )             31.1     Chem 07-03    127<L>  |  96<L>  |  16  ----------------------------<  145<H>  4.7   |  19<L>  |  0.78    Ca    8.3<L>      2023 06:56    TPro  6.0  /  Alb  2.7<L>  /  TBili  0.2  /  DBili  x   /  AST  31  /  ALT  6   /  AlkPhos  178<H>  07-03    LFTs LIVER FUNCTIONS - ( 2023 06:56 )  Alb: 2.7 g/dL / Pro: 6.0 g/dL / ALK PHOS: 178 U/L / ALT: 6 U/L / AST: 31 U/L / GGT: x           Coagulopathy   Lipid Panel   A1c   Cardiac enzymes     U/A Urinalysis Basic - ( 2023 06:56 )    Color: x / Appearance: x / SG: x / pH: x  Gluc: 145 mg/dL / Ketone: x  / Bili: x / Urobili: x   Blood: x / Protein: x / Nitrite: x   Leuk Esterase: x / RBC: x / WBC x   Sq Epi: x / Non Sq Epi: x / Bacteria: x      Radiology (XR, CT, MR, U/S, TTE/TANNER):    CTH (23):  No acute intracranial hemorrhage, mass effect, or CT evidence of a large vascular territory infarct. Small right ganglia capsular lacunar infarct.   HPI:  77 yo M w/ Hx obtained in conjunction with  at bedside pancreatic Ca s/p whipple, DM, CAD s/p stent p/w fever/AMS. As per , pt took some sleeping medication, woke up around 1am was sitting up in bed and tried to walk to the bathroom and was noted to be mumbling. He proceeded to help her get to the bathroom and he noted that she was unsteady on her feet and she fell on the floor. He noted she felt warm and proceeded to take her temp and was noted to be 102. He called the cancer center who directed him to bring her to ED for further eval. He currently notes she is at baseline. Neurology consulted as part of AMS workup. CT scan done and showed small right gangliocapsular lacunar infarct. Patient had MRI done 3 years ago for something in her nose which showed an infarct but does not know where it was localized. Patient currently taking 81 mg of ASA daily.     NIHSS: 0  MRS: 1    A 10-system ROS was performed and is negative except for those items noted above and/or in the HPI.    PAST MEDICAL & SURGICAL HISTORY:  Hypertension  IDDM (insulin dependent diabetes mellitus)  Malignant neoplasm of pancreas, unspecified  Dyslipidemia  CAD (coronary artery disease)  H/O chronic hepatitis  treated  Pancreatic cancer  History of  section  History of hysterectomy  History of coronary angioplasty with insertion of stent  2019 -  stent inserted  History of Whipple procedure    FAMILY HISTORY:  FH: HTN (hypertension) (Mother)  Family history of leukemia (Child)    SOCIAL HISTORY:   T/E/D:   Occupation:   Lives with:     MEDICATIONS (HOME):  Home Medications:  amLODIPine 10 mg oral tablet: 1 tab(s) orally once a day (2023 13:50)  aspirin 81 mg oral tablet: 1 tab(s) orally once a day - continue (2023 13:50)  Creon 36,000 units oral delayed release capsule: 1 cap(s) orally 3 times a day (with meals) (2023 19:55)  fenofibrate 145 mg oral tablet: 1 tab(s) orally once a day (2023 13:50)  losartan 100 mg oral tablet: 1 tab(s) orally once a day (2023 13:50)  metoprolol succinate 25 mg oral tablet, extended release: 1 tab(s) orally once a day (2023 13:50)  rosuvastatin 10 mg oral tablet: 1 tab(s) orally once a day (2023 13:50)  Tresiba 100 units/mL subcutaneous solution: 20 subcutaneous once a day (at bedtime) (2023 19:54)    MEDICATIONS  (STANDING):  amLODIPine   Tablet 10 milliGRAM(s) Oral daily  aspirin  chewable 81 milliGRAM(s) Oral daily  atorvastatin 40 milliGRAM(s) Oral at bedtime  cefTRIAXone   IVPB 2000 milliGRAM(s) IV Intermittent every 24 hours  enoxaparin Injectable 40 milliGRAM(s) SubCutaneous every 24 hours  fenofibrate Tablet 48 milliGRAM(s) Oral daily  hydrOXYzine hydrochloride 25 milliGRAM(s) Oral once  losartan 100 milliGRAM(s) Oral daily  metoprolol succinate ER 25 milliGRAM(s) Oral daily  pancrelipase  (CREON 36,000 Lipase Units) 1 Capsule(s) Oral three times a day with meals  pantoprazole    Tablet 40 milliGRAM(s) Oral before breakfast  senna 2 Tablet(s) Oral at bedtime    MEDICATIONS  (PRN):  acetaminophen     Tablet .. 650 milliGRAM(s) Oral every 6 hours PRN Temp greater or equal to 38C (100.4F), Mild Pain (1 - 3)  aluminum hydroxide/magnesium hydroxide/simethicone Suspension 30 milliLiter(s) Oral every 4 hours PRN Dyspepsia  melatonin 3 milliGRAM(s) Oral at bedtime PRN Insomnia  ondansetron Injectable 4 milliGRAM(s) IV Push every 8 hours PRN Nausea and/or Vomiting  oxycodone    5 mG/acetaminophen 325 mG 1 Tablet(s) Oral every 6 hours PRN Severe Pain (7 - 10)  polyethylene glycol 3350 17 Gram(s) Oral daily PRN Constipation  zolpidem 5 milliGRAM(s) Oral at bedtime PRN Insomnia    ALLERGIES/INTOLERANCES:  Allergies  No Known Allergies    Intolerances    VITALS & EXAMINATION:  Vital Signs Last 24 Hrs  T(C): 36.8 (2023 10:31), Max: 37.2 (2023 17:06)  T(F): 98.3 (2023 10:31), Max: 98.9 (2023 17:06)  HR: 90 (2023 10:31) (87 - 95)  BP: 100/48 (2023 10:31) (100/48 - 119/63)  BP(mean): 71 (2023 18:30) (71 - 71)  RR: 18 (2023 10:31) (17 - 18)  SpO2: 98% (2023 10:31) (96% - 98%)    Parameters below as of 2023 05:09  Patient On (Oxygen Delivery Method): room air    : [id number]  General:  Constitutional: Female, appears stated age, in no apparent distress including pain, fatigued after exam  Head: Normocephalic & Atraumatic.  Respiratory: Breathing comfortably.  Extremities: No cyanosis, clubbing, or edema    Neurological:  MS: Eyes open, awake, alert, oriented to person, place, situation, time. Follows all commands, IDs objects (presidents, sock, phone, watch), 3/3 registration and recall.     Language: Speech is clear, fluent with good repetition & comprehension.    CNs: PERRL (R = 2mm, L = 3mm). VFF. EOMI no nystagmus. V1-3 intact to LT b/l. No facial asymmetry b/l, full eye closure strength b/l. Hearing grossly normal (rubbing fingers) b/l. Tongue midline, normal movements, no atrophy.     Motor: Normal muscle bulk & tone. No noticeable tremor. No pronator drift.              Deltoid	Biceps	Triceps	   R	         5	             5	              5	 5	  		 	  L	         5	             5	              5	 5	  		    	H-Flex		K-Ext  R	    5	            5	           	            	           		   L	    5	            5	           	            	           		     Sensation: Intact to LT b/l throughout.     Cortical: Extinction on DSS (neglect): none    Coordination:  No dysmetria to FTN bilaterally.    Gait: normal gait, no assistive device    LABORATORY:  CBC                       9.6    11.30 )-----------( 489      ( 2023 06:56 )             31.1     Chem 07-03    127<L>  |  96<L>  |  16  ----------------------------<  145<H>  4.7   |  19<L>  |  0.78    Ca    8.3<L>      2023 06:56    TPro  6.0  /  Alb  2.7<L>  /  TBili  0.2  /  DBili  x   /  AST  31  /  ALT  6   /  AlkPhos  178<H>  07-03    LFTs LIVER FUNCTIONS - ( 2023 06:56 )  Alb: 2.7 g/dL / Pro: 6.0 g/dL / ALK PHOS: 178 U/L / ALT: 6 U/L / AST: 31 U/L / GGT: x           Coagulopathy   Lipid Panel   A1c   Cardiac enzymes     U/A Urinalysis Basic - ( 2023 06:56 )    Color: x / Appearance: x / SG: x / pH: x  Gluc: 145 mg/dL / Ketone: x  / Bili: x / Urobili: x   Blood: x / Protein: x / Nitrite: x   Leuk Esterase: x / RBC: x / WBC x   Sq Epi: x / Non Sq Epi: x / Bacteria: x      Radiology (XR, CT, MR, U/S, TTE/TANNER):    CTH (23):  No acute intracranial hemorrhage, mass effect, or CT evidence of a large vascular territory infarct. Small right ganglia capsular lacunar infarct.   HPI:79 yo M w/ Hx obtained in conjunction with  at bedside pancreatic Ca s/p whipple, DM, CAD s/p stent p/w fever/AMS. As per , pt took some sleeping medication, woke up around 1am was sitting up in bed and tried to walk to the bathroom and was noted to be mumbling. He proceeded to help her get to the bathroom and he noted that she was unsteady on her feet and she fell on the floor. He noted she felt warm and proceeded to take her temp and was noted to be 102. He called the cancer center who directed him to bring her to ED for further eval. He currently notes she is at baseline. Neurology consulted as part of AMS workup. CT scan done and showed small right gangliocapsular lacunar infarct. Patient had MRI done 3 years ago for something in her nose which showed an infarct but does not know where it was localized. Patient currently taking 81 mg of ASA daily.     NIHSS: 0  MRS: 1    A 10-system ROS was performed and is negative except for those items noted above and/or in the HPI.    PAST MEDICAL & SURGICAL HISTORY:  Hypertension  IDDM (insulin dependent diabetes mellitus)  Malignant neoplasm of pancreas, unspecified  Dyslipidemia  CAD (coronary artery disease)  H/O chronic hepatitis  treated  Pancreatic cancer  History of  section  History of hysterectomy  History of coronary angioplasty with insertion of stent  2019 -  stent inserted  History of Whipple procedure    FAMILY HISTORY:  FH: HTN (hypertension) (Mother)  Family history of leukemia (Child)    SOCIAL HISTORY:   T/E/D:   Occupation:   Lives with:     MEDICATIONS (HOME):  Home Medications:  amLODIPine 10 mg oral tablet: 1 tab(s) orally once a day (2023 13:50)  aspirin 81 mg oral tablet: 1 tab(s) orally once a day - continue (2023 13:50)  Creon 36,000 units oral delayed release capsule: 1 cap(s) orally 3 times a day (with meals) (2023 19:55)  fenofibrate 145 mg oral tablet: 1 tab(s) orally once a day (2023 13:50)  losartan 100 mg oral tablet: 1 tab(s) orally once a day (2023 13:50)  metoprolol succinate 25 mg oral tablet, extended release: 1 tab(s) orally once a day (2023 13:50)  rosuvastatin 10 mg oral tablet: 1 tab(s) orally once a day (2023 13:50)  Tresiba 100 units/mL subcutaneous solution: 20 subcutaneous once a day (at bedtime) (2023 19:54)    MEDICATIONS  (STANDING):  amLODIPine   Tablet 10 milliGRAM(s) Oral daily  aspirin  chewable 81 milliGRAM(s) Oral daily  atorvastatin 40 milliGRAM(s) Oral at bedtime  cefTRIAXone   IVPB 2000 milliGRAM(s) IV Intermittent every 24 hours  enoxaparin Injectable 40 milliGRAM(s) SubCutaneous every 24 hours  fenofibrate Tablet 48 milliGRAM(s) Oral daily  hydrOXYzine hydrochloride 25 milliGRAM(s) Oral once  losartan 100 milliGRAM(s) Oral daily  metoprolol succinate ER 25 milliGRAM(s) Oral daily  pancrelipase  (CREON 36,000 Lipase Units) 1 Capsule(s) Oral three times a day with meals  pantoprazole    Tablet 40 milliGRAM(s) Oral before breakfast  senna 2 Tablet(s) Oral at bedtime    MEDICATIONS  (PRN):  acetaminophen     Tablet .. 650 milliGRAM(s) Oral every 6 hours PRN Temp greater or equal to 38C (100.4F), Mild Pain (1 - 3)  aluminum hydroxide/magnesium hydroxide/simethicone Suspension 30 milliLiter(s) Oral every 4 hours PRN Dyspepsia  melatonin 3 milliGRAM(s) Oral at bedtime PRN Insomnia  ondansetron Injectable 4 milliGRAM(s) IV Push every 8 hours PRN Nausea and/or Vomiting  oxycodone    5 mG/acetaminophen 325 mG 1 Tablet(s) Oral every 6 hours PRN Severe Pain (7 - 10)  polyethylene glycol 3350 17 Gram(s) Oral daily PRN Constipation  zolpidem 5 milliGRAM(s) Oral at bedtime PRN Insomnia    ALLERGIES/INTOLERANCES:  Allergies  No Known Allergies    Intolerances    VITALS & EXAMINATION:  Vital Signs Last 24 Hrs  T(C): 36.8 (2023 10:31), Max: 37.2 (2023 17:06)  T(F): 98.3 (2023 10:31), Max: 98.9 (2023 17:06)  HR: 90 (2023 10:31) (87 - 95)  BP: 100/48 (2023 10:31) (100/48 - 119/63)  BP(mean): 71 (2023 18:30) (71 - 71)  RR: 18 (2023 10:31) (17 - 18)  SpO2: 98% (2023 10:31) (96% - 98%)    Parameters below as of 2023 05:09  Patient On (Oxygen Delivery Method): room air    : [id number]  General:  Constitutional: Female, appears stated age, in no apparent distress including pain, fatigued after exam  Head: Normocephalic & Atraumatic.  Respiratory: Breathing comfortably.  Extremities: No cyanosis, clubbing, or edema    Neurological:  MS: Eyes open, awake, alert, oriented to person, place, situation, time. Follows all commands, IDs objects (presidents, sock, phone, watch), 3/3 registration and recall.   Language: Speech is clear, fluent with good repetition & comprehension.  CNs: PERRL (R = 2mm, L = 3mm). VFF. EOMI no nystagmus. V1-3 intact to LT b/l. No facial asymmetry b/l, full eye closure strength b/l. Hearing grossly normal (rubbing fingers) b/l. Tongue midline, normal movements, no atrophy.   Motor: Normal muscle bulk & tone. No noticeable tremor. No pronator drift.              Deltoid	            Biceps	             Triceps	    R	         5	             5	              5	 5	  		 	  L	         5	             5	              5	 5	  		    	H-Flex		K-Ext  R	    5	            5	           	            	           		   L	    5	            5	           	            	           		     Sensation: Intact to LT b/l throughout.   Cortical: Extinction on DSS (neglect): none  Coordination:  No dysmetria to FTN bilaterally.  Gait: normal gait, no assistive device    LABORATORY:  CBC                       9.6    11.30 )-----------( 489      ( 2023 06:56 )             31.1     Chem 07-03    127<L>  |  96<L>  |  16  ----------------------------<  145<H>  4.7   |  19<L>  |  0.78    Ca    8.3<L>      2023 06:56    TPro  6.0  /  Alb  2.7<L>  /  TBili  0.2  /  DBili  x   /  AST  31  /  ALT  6   /  AlkPhos  178<H>  07-03    LFTs LIVER FUNCTIONS - ( 2023 06:56 )  Alb: 2.7 g/dL / Pro: 6.0 g/dL / ALK PHOS: 178 U/L / ALT: 6 U/L / AST: 31 U/L / GGT: x           Coagulopathy   Lipid Panel   A1c   Cardiac enzymes     U/A Urinalysis Basic - ( 2023 06:56 )    Color: x / Appearance: x / SG: x / pH: x  Gluc: 145 mg/dL / Ketone: x  / Bili: x / Urobili: x   Blood: x / Protein: x / Nitrite: x   Leuk Esterase: x / RBC: x / WBC x   Sq Epi: x / Non Sq Epi: x / Bacteria: x      Radiology (XR, CT, MR, U/S, TTE/TANNER):    CTH (23):  No acute intracranial hemorrhage, mass effect, or CT evidence of a large vascular territory infarct. Small right ganglia capsular lacunar infarct.

## 2023-07-03 NOTE — PROVIDER CONTACT NOTE (OTHER) - ACTION/TREATMENT ORDERED:
ACP notified as noted above , continue Blood glucose monitoring QAC & QHS , Current A1C 6.5%, possible endocrine consult to be decided

## 2023-07-03 NOTE — CONSULT NOTE ADULT - ASSESSMENT
79 yo M w/ Hx obtained in conjunction with  at bedside pancreatic Ca s/p whipple, DM, CAD s/p stent p/w fever/AMS. As per , pt took some sleeping medication, woke up around 1am was sitting up in bed and tried to walk to the bathroom and was noted to be mumbling. He proceeded to help her get to the bathroom and he noted that she was unsteady on her feet and she fell on the floor. He noted she felt warm and proceeded to take her temp and was noted to be 102. He called the cancer center who directed him to bring her to ED for further eval. He currently notes she is at baseline. Neurology consulted as part of AMS workup. CT scan done and showed small right gangliocapsular lacunar infarct. Patient had MRI done 3 years ago for something in her nose which showed an infarct but does not know where it was localized. Patient currently taking 81 mg of ASA daily.  79 yo M w/ Hx obtained in conjunction with  at bedside pancreatic Ca s/p whipple, DM, CAD s/p stent p/w fever/AMS. As per , pt took some sleeping medication, woke up around 1am was sitting up in bed and tried to walk to the bathroom and was noted to be mumbling. He proceeded to help her get to the bathroom and he noted that she was unsteady on her feet and she fell on the floor. He noted she felt warm and proceeded to take her temp and was noted to be 102. He called the cancer center who directed him to bring her to ED for further eval. He currently notes she is at baseline. Neurology consulted as part of AMS workup. CT scan done and showed small right gangliocapsular lacunar infarct. Patient had MRI done 3 years ago for something in her nose which showed an infarct but does not know where it was localized. Patient currently taking 81 mg of ASA daily.     Impression: AMS and fever, now resolved, incidental infarct found on CTH due to small vessel disease.     Recommendations:  [] C/w aspirin 81mg daily   [] C/w atorvastatin 40 mg daily titrated per LDL < 70  [] Please send HgbA1C, fasting lipid panel  [] MRI brain w/o con, if cannot be done inpatient can be done outpatient   [] Carotid dopplers   [] TTE   [] telemetry to check for arrhythmia  [] Tight glucose control (long-term goal HgbA1c < 6%)  []  Stroke education and counseling  [] Neuro-checks and VS q4h  [] Gradual normotension   [x] Continue with pureed exam   [] aspiration, fall precautions  [] STAT CT head non-contrast for change in neuro exam.   [] PT/ OT evaluations   [] DVT ppx per primary team   [] Patient should follow up with Stroke NP, Arelis Navarro or Eva Martinez, in clinic at [address] [place] [address], [phone]. Please email [email or web address] w/ basic PHI.     Case seen and discussed with stroke attending, Dr. Sierra    Please call with any questions [phone].

## 2023-07-03 NOTE — CONSULT NOTE ADULT - NS ATTEND AMEND GEN_ALL_CORE FT
I saw and examined the patient with STEPHANIE Bustamante.     Patient was admitted initially for fever, and diminished level of consciousness, mumbling, less interactive, observed by  at night on 7/1.    Has been treated with ceftriaxone since admission.   Thus far, no clear source of fever.    Mental status has cleared since admission.      Her CTH on admission showed a stroke in the R basal ganglia/head of caudate, and neurology evaluation was requested.     On exam:   Awake, alert, oriented, follows commands, normal naming, repetition, speaking fluently, memory is 3/3 at 5 minutes.   PUpils 3-2mm, symmetric, full VF's, normal EOM, no nystagmus, no facial weakness, no dysarthria, tongue mildine, palate symmetric.   MOTOR: normal bulk and tone.  No drift.  5/5 throughout to confrontation biceps, deltoids, triceps, , hip flexors, knee extensors.   SENSORY: inatct synmmetrically to light touch   COORDINATION: normal FNF  GAIT: narrow based.  negative Rhomberg.     CTH images reviewed: small hypodense lesion in the R anterior internal capsule.      AP: 78 year old woman with metastatic pancreatic cancer s/p Whipple, s/p chemotherapy DC'd in February 2023, CAD, DM, presenting with altered sensorium with imaging showing a subacute to chronic stroke.  Of note, patient had an MRI brain 3-5 years ago, and was told at the time she had a chronic stroke.  On exam this AM, no clear localizing signs.  Imaging as above.   Unclear chronicity of the lesion on CTH.  An MRI is pending, but may be unobtainable at this juncture given the metalic content of her tattoos cannot be assessed.      -please obtain carotid duplex  -please obtain echo  -continue with cardiac monitoring while here  -continue on single antiplatelet agent  -if unable to obtain MRI, can perform a repeat CTH.  Might consider pursuing MRI as an outpatient.   -Neurology to follow.  Please page or call with any additional questions.

## 2023-07-04 NOTE — PROGRESS NOTE ADULT - PROBLEM SELECTOR PLAN 2
- decrease PO intake  - urine lytes suspecting underline SIADH, likely secondary to malignancy.   - CXR read as pulmonary edema currently sating well on RA off oxygen hold IVF
- decrease PO intake  - urine lytes suspecting underline SIADH, likely secondary to malignancy.   - CXR read as pulmonary edema currently sating well on RA off oxygen hold IVF
- decrease PO intake  - check urine lytes  - CXR read as pulmonary edema curently sating well on RA off oxygen hold IVF

## 2023-07-04 NOTE — PROGRESS NOTE ADULT - PROBLEM SELECTOR PLAN 1
- occult infection vs CVA   -  AMS (mumbling with unsteady gait) with fever unclear source   -  6/30/23 with 4L removed.  - UA neg no dsyuria  - CT head with small RT ganglia capsular infarct   - CT A/P without overt moderate ascites b/l upper quadrant more marked LT upper quadrant; progression peritoneal carcinomatosis   - no overt pocket noted as per ED notes   - f/u blood cx - NTTD  - empiric antibiotics ceftriaxone  2 IVqd x5days.  - discussed with Dr. Spencer, given quick improvement in her abd pain, suspecting this is related to poor pain control. will defer repeat paracentesis at this time give patient's exam w/o over signs of ascites.   - Check MRI head r/o acute CVA in setting of hypercoagulable state of malignancy  - neuro check q 4; non focal on exam   - tele monitor   - dysphagia screen if passes can start on dysphagia diet   - procedure team c/s on Monday re-eval if poss available pocket - per Procedure team discussion with ID - wanted to hold off
- occult infection vs CVA   -  AMS (mumbling with unsteady gait) with fever unclear source   -  6/30/23 with 4L removed.  - UA neg no dsyuria  - CT head with small RT ganglia capsular infarct   - CT A/P without overt moderate ascites b/l upper quadrant more marked LT upper quadrant; progression peritoneal carcinomatosis   - no overt pocket noted as per ED notes   - f/u blood cx - NTTD  - empiric antibiotics ceftriaxone  2 IVqd x5days.  - discussed with Dr. Spencer, given quick improvement in her abd pain, suspecting this is related to poor pain control. will defer repeat paracentesis at this time give patient's exam w/o over signs of ascites.   - Check MRI head r/o acute CVA in setting of hypercoagulable state of malignancy  - neuro check q 4; non focal on exam   - tele monitor   - dysphagia screen if passes can start on dysphagia diet   - procedure team c/s on Monday re-eval if poss available pocket
- occult infection vs CVA   -  AMS (mumbling with unsteady gait) with fever unclear source   -  6/30/23 with 4L removed.  - UA neg no dsyuria  - CT head with small RT ganglia capsular infarct   - CT A/P without overt moderate ascites b/l upper quadrant more marked LT upper quadrant; progression peritoneal carcinomatosis   - no overt pocket noted as per ED notes   - f/u blood cx  - empiric antibiotics ceftriaxone  2 IVqd and vanco by level , F/w ID recs  - Check MRI head r/o acute CVA in setting of hypercoagulable state of malignancy  - neuro check q 4; non focal on exam   - tele monitor   - dysphagia screen if passes can start on dysphagia diet   - procedure team c/s on Monday re-eval if poss available pocket

## 2023-07-04 NOTE — PROGRESS NOTE ADULT - PROBLEM SELECTOR PLAN 4
- c/w ASA/ statin/fenofibrate/Metoprolol/ ARB

## 2023-07-04 NOTE — PROGRESS NOTE ADULT - TIME BILLING
reviewing laboratory data, consultants' recommendations, documentation in [place], performing medically appropriate examinations/evaluations, discussion with patient/family/RN/ACP/Residents and interdisciplinary staff (such as , social workers, etc), counseling patient/family/care giver, ordering medically appropriate medication, tests, or procedures. Interventions were performed as documented above.
reviewing laboratory data, consultants' recommendations, documentation in [place], performing medically appropriate examinations/evaluations, discussion with patient/family/RN/ACP/Residents and interdisciplinary staff (such as , social workers, etc), counseling patient/family/care giver, ordering medically appropriate medication, tests, or procedures. Interventions were performed as documented above.

## 2023-07-04 NOTE — PROGRESS NOTE ADULT - PROBLEM SELECTOR PLAN 3
- Home regimen tresiba 20 mg PO qhs   - monitor FS QAC/HS  - ISS for now if passes dypahgia screen would start lantus 10 U   - A1c 6.5
- Home regimen tresiba 20 mg PO qhs   - monitor FS QAC/HS  - ISS for now if passes dypahgia screen would start lantus 10 U   - check A1c
- Home regimen tresiba 20 mg PO qhs   - monitor FS QAC/HS  - ISS for now if passes dypahgia screen would start lantus 10 U   - A1c 6.5

## 2023-07-05 NOTE — DISCHARGE NOTE PROVIDER - NSDCCPTREATMENT_GEN_ALL_CORE_FT
PRINCIPAL PROCEDURE  Procedure: CT of head  Findings and Treatment:   < end of copied text >  Beam hardening artifact slightly obscures evaluation of the posterior   fossa and brainstem.  There is no acute intra-axial or extra-axial hemorrhage. No mass effect   or shift of the midline. The basal cisterns are not effaced. There is   cerebral volume loss with prominence of the ventricles and sulci. There   are patchy areas of low attenuation within the periventricular and   subcortical white matter which are nonspecific but likely the sequela of   chronic microvascular change. There is no CT evidence of a large vascular   territory infarct. Small right ganglia capsular lacunar infarct.  The visualized paranasal sinuses and mastoid air cells are well aerated.  No acute displaced calvarium fracture. Nonspecific soft tissue   calcifications identified at the level of the maxillary sinuses.  IMPRESSION:  No acute intracranial hemorrhage, mass effect, or CT evidence of a large   vascular territory infarct. Small right ganglia capsular lacunar infarct.  If there are new or persistent symptoms, consider further evaluation with   MRI provided no contraindications.< from: CT Head No Cont (07.01.23 @ 05:10) >        SECONDARY PROCEDURE  Procedure: CT abdomen  Findings and Treatment:   < end of copied text >  IMPRESSION:  1.   Progressive peritoneal carcinomatosis and metastatic malignancy as   noted. Specifically, relatively stable peritoneal disease slightly   worsened hepatic metastatic disease since 5/31/2023.  2.   Additional findings as noted.< from: CT Abdomen and Pelvis w/ IV Cont (07.01.23 @ 05:33) >

## 2023-07-05 NOTE — CONSULT NOTE ADULT - TIME BILLING
Time spent for extensive review of the physical chart, electronic health record, and documentation to obtain collateral information including but not limited to:    - Current inpatient records (ED, H&P, primary team, and consultants if applicable)   - Inpatient values/results (biomarkers, immunoassays, imaging, and microbiology results)   - Current or proposed treatment plans   - Pharmacotherapy review   Time spent for counseling and education with patient/family  Time spent discussing and coordinating care with primary team and interdisciplinary staff and floor staff

## 2023-07-05 NOTE — DISCHARGE NOTE PROVIDER - NSDCMRMEDTOKEN_GEN_ALL_CORE_FT
amLODIPine 10 mg oral tablet: 1 tab(s) orally once a day  aspirin 81 mg oral tablet: 1 tab(s) orally once a day - continue  Creon 36,000 units oral delayed release capsule: 1 cap(s) orally 3 times a day (with meals)  fenofibrate 145 mg oral tablet: 1 tab(s) orally once a day  losartan 100 mg oral tablet: 1 tab(s) orally once a day  metoprolol succinate 25 mg oral tablet, extended release: 1 tab(s) orally once a day  pantoprazole 40 mg oral delayed release tablet: 1 tab(s) orally once a day (before a meal)  rosuvastatin 10 mg oral tablet: 1 tab(s) orally once a day  Tresiba 100 units/mL subcutaneous solution: 20 subcutaneous once a day (at bedtime)   amLODIPine 10 mg oral tablet: 1 tab(s) orally once a day  aspirin 81 mg oral tablet: 1 tab(s) orally once a day - continue  atorvastatin 40 mg oral tablet: 1 tab(s) orally once a day (at bedtime)  CeleBREX 200 mg oral capsule: 1 cap(s) orally once a day  Creon 36,000 units oral delayed release capsule: 1 cap(s) orally 3 times a day (with meals)  fenofibrate 145 mg oral tablet: 1 tab(s) orally once a day  losartan 100 mg oral tablet: 1 tab(s) orally once a day  metoprolol succinate 25 mg oral tablet, extended release: 1 tab(s) orally once a day  morphine 10 mg/5 mL oral solution: 1.5 milliliter(s) orally every 4 hours as needed for  severe pain MDD: 9mL  pantoprazole 40 mg oral delayed release tablet: 1 tab(s) orally once a day (before a meal)  polyethylene glycol 3350 oral powder for reconstitution: 17 gram(s) orally once a day As needed Constipation  senna leaf extract oral tablet: 2 tab(s) orally once a day (at bedtime)  Tresiba 100 units/mL subcutaneous solution: 20 subcutaneous once a day (at bedtime)  zolpidem 5 mg oral tablet: 1 tab(s) orally once a day (at bedtime) as needed for Insomnia MDD: 5mg   amLODIPine 10 mg oral tablet: 1 tab(s) orally once a day  aspirin 81 mg oral tablet: 1 tab(s) orally once a day - continue  atorvastatin 40 mg oral tablet: 1 tab(s) orally once a day (at bedtime)  cefpodoxime 200 mg oral tablet: 1 tab(s) orally 2 times a day  CeleBREX 200 mg oral capsule: 1 cap(s) orally once a day  Creon 36,000 units oral delayed release capsule: 1 cap(s) orally 3 times a day (with meals)  fenofibrate 145 mg oral tablet: 1 tab(s) orally once a day  losartan 100 mg oral tablet: 1 tab(s) orally once a day  metoprolol succinate 25 mg oral tablet, extended release: 1 tab(s) orally once a day  metroNIDAZOLE 500 mg oral tablet: 1 tab(s) orally 2 times a day  morphine 10 mg/5 mL oral solution: 1.5 milliliter(s) orally every 4 hours as needed for  severe pain MDD: 9mL  pantoprazole 40 mg oral delayed release tablet: 1 tab(s) orally once a day (before a meal)  polyethylene glycol 3350 oral powder for reconstitution: 17 gram(s) orally once a day As needed Constipation  senna leaf extract oral tablet: 2 tab(s) orally once a day (at bedtime)  Tresiba 100 units/mL subcutaneous solution: 20 subcutaneous once a day (at bedtime)  zolpidem 5 mg oral tablet: 1 tab(s) orally once a day (at bedtime) as needed for Insomnia MDD: 5mg

## 2023-07-05 NOTE — CONSULT NOTE ADULT - ASSESSMENT
78 yo F w/ hx pancreatic Ca s/p whipple's p/w AMS unsteady gait and garbled speech and fever. s/p paracentesis with removal of fluid 6/30.  Palliative Care consulted for evaluation and management of pain

## 2023-07-05 NOTE — CONSULT NOTE ADULT - PROBLEM SELECTOR RECOMMENDATION 3
- In past 24 hours, received 5 prn doses of PO Morphine Solution 3mg  - Per patient, current regimen adequately helps with pain. Counseled patient/family that if upon discharge, pain is not controlled with current dose, can speak to outpatient palliative team to consider increasing dose to PO Morphine Solution 5mg; also discussed to monitor prn usage to discuss with outpatient team to determine if long acting regimen indicated  - PO Morphine Solution 3mg q4 PRN moderate-severe pain.    Pt to follow up with Dr. Sarah Schroeder/ Dr. Ellis/ [facility]  Please include their number [phone] in discharge paper work

## 2023-07-05 NOTE — CONSULT NOTE ADULT - PROBLEM SELECTOR RECOMMENDATION 4
Outpatient palliative care notes reviewed   Case reviewed with primary team and outpatient palliative care team.    Thank you for allowing us to participate in your patient's care. Please page [phone] for any questions/concerns.

## 2023-07-05 NOTE — CONSULT NOTE ADULT - PROBLEM SELECTOR RECOMMENDATION 2
- Patient with metastatic pancreatic cancer s/p multiple lines of therapy with progression of disease  - CT 7/1/2023-  Progressive peritoneal carcinomatosis and metastatic malignancy. Specifically, relatively stable peritoneal disease slightly worsened hepatic metastatic disease since 5/31/2023.  - Oncology recommendations appreciated  - Per patient and family, plan to follow up with Dr. David tomorrow outpatient

## 2023-07-05 NOTE — PROGRESS NOTE ADULT - SUBJECTIVE AND OBJECTIVE BOX
Follow Up:  abdominal pain     Interval History/ROS:   much more comfortable today  no abdominal pain  wants to go home    pain medications adjusted    afebrile     BM no longer mucus     Allergies  No Known Allergies        ANTIMICROBIALS:  cefTRIAXone   IVPB 2000 every 24 hours      OTHER MEDS:  acetaminophen     Tablet .. 650 milliGRAM(s) Oral every 6 hours PRN  aluminum hydroxide/magnesium hydroxide/simethicone Suspension 30 milliLiter(s) Oral every 4 hours PRN  amLODIPine   Tablet 10 milliGRAM(s) Oral daily  aspirin  chewable 81 milliGRAM(s) Oral daily  atorvastatin 40 milliGRAM(s) Oral at bedtime  enoxaparin Injectable 40 milliGRAM(s) SubCutaneous every 24 hours  fenofibrate Tablet 48 milliGRAM(s) Oral daily  hydrOXYzine hydrochloride 25 milliGRAM(s) Oral once  losartan 100 milliGRAM(s) Oral daily  melatonin 3 milliGRAM(s) Oral at bedtime PRN  metoprolol succinate ER 25 milliGRAM(s) Oral daily  morphine   Solution 3 milliGRAM(s) Oral every 4 hours PRN  ondansetron Injectable 4 milliGRAM(s) IV Push every 8 hours PRN  pancrelipase  (CREON 36,000 Lipase Units) 1 Capsule(s) Oral three times a day with meals  pantoprazole    Tablet 40 milliGRAM(s) Oral before breakfast  polyethylene glycol 3350 17 Gram(s) Oral daily PRN  senna 2 Tablet(s) Oral at bedtime  zolpidem 5 milliGRAM(s) Oral at bedtime PRN      Vital Signs Last 24 Hrs  T(C): 36.5 (03 Jul 2023 18:10), Max: 36.8 (02 Jul 2023 21:44)  T(F): 97.7 (03 Jul 2023 18:10), Max: 98.3 (02 Jul 2023 21:44)  HR: 91 (03 Jul 2023 18:10) (87 - 95)  BP: 120/57 (03 Jul 2023 18:10) (100/48 - 120/57)  BP(mean): --  RR: 17 (03 Jul 2023 18:10) (17 - 18)  SpO2: 96% (03 Jul 2023 18:10) (96% - 98%)    Parameters below as of 03 Jul 2023 18:10  Patient On (Oxygen Delivery Method): room air        PHYSICAL EXAM:  Constitutional: Not in acute distress  Eyes: No icterus.  Oral cavity: Clear, no lesions  Neck: Supple  RS: Chest clear   CVS: S1, S2   Abdomen: Soft. No guarding/rigidity/tenderness.  : no nam  Neuro: Alert, oriented to time/place/person  Cranial nerves 2-12 grossly normal. No focal abnormalities                          9.6    11.30 )-----------( 489      ( 03 Jul 2023 06:56 )             31.1       07-03    127<L>  |  96<L>  |  16  ----------------------------<  145<H>  4.7   |  19<L>  |  0.78    Ca    8.3<L>      03 Jul 2023 06:56    TPro  6.0  /  Alb  2.7<L>  /  TBili  0.2  /  DBili  x   /  AST  31  /  ALT  6   /  AlkPhos  178<H>  07-03      Urinalysis Basic - ( 03 Jul 2023 06:56 )    Color: x / Appearance: x / SG: x / pH: x  Gluc: 145 mg/dL / Ketone: x  / Bili: x / Urobili: x   Blood: x / Protein: x / Nitrite: x   Leuk Esterase: x / RBC: x / WBC x   Sq Epi: x / Non Sq Epi: x / Bacteria: x        MICROBIOLOGY:  v  Clean Catch Clean Catch (Midstream)  07-01-23   No growth  --  --      .Blood Blood-Peripheral  07-01-23   No growth at 48 Hours  --  --      .Blood Blood-Peripheral  07-01-23   No growth at 48 Hours  --  --          Rapid RVP Result: NotDetec (07-01 @ 03:17)    GI PCR Panel Stool (07.02.23 @ 14:55)   GI PCR Panel: NotDetec    RADIOLOGY:    rad< from: CT Abdomen and Pelvis w/ IV Cont (07.01.23 @ 05:33) >    ACC: [id number] EXAM:  CT ABDOMEN AND PELVIS IC   ORDERED BY: ARETHA WHITE     PROCEDURE DATE:  07/01/2023          INTERPRETATION:  PROCEDURE INFORMATION:  Exam: CT Abdomen And Pelvis With Contrast  Exam date and time: 7/1/2023 5:13 AM  Age: 78 years old  Clinical indication: Generalized abdominal pain with pancreatic cancer    TECHNIQUE:  Imaging protocol: Computed tomography of the abdomen and pelvis with   contrast.  Contrast material: IV: OMNIPAQUE 350; Contrast volume: 95 ml; Contrast   route:  IV;    COMPARISON:  CT ABDOMEN AND PELVIS WITH IV CONTRAST 5/31/2023 12:28 PM    FINDINGS:  Tubes, catheters and devices: Central line tip extends into the cavoatrial  junction    Lungs: Multiple bilateral pulmonary nodules 1 of the largest in the   subpleural  right lower lobe increased from 1.4 cm to 1.8 cm series 301, image 8 and  containing central hypodensity consistent with central necrosis    Liver: Right and left lobe subcapsular enlarged masses 1 enlarged from   1.6 cm  to 1.9 cm posterior right hepatic lobe region series 301, image 48.  Gallbladder and bile ducts: The gallbladder is not identified. No   progressive  biliary dilation. Previously seen pneumobilia is not identified.  Pancreas: Post Whipple procedure with pancreaticduct stent, unchanged.  Spleen: Unremarkable  Adrenal glands: Unremarkable  Kidneys and ureters: Right renal cortical sub cm hypodense too small to  characterize lesion, unchanged. Severely dilated right extrarenal pelvis   and  moderately dilated left extrarenal pelvis, unchanged. Symmetrical  nephrograms.Pelvocalyceal dilation may be secondary to congenital   variation,  increased urine flow, vesicoureteral reflux or non specific collecting   system  obstruction.    Stomach and bowel: Intraluminal hyperdensity in the stomach may be due to  ingested opaque substance in the absence of any clinical concern of GI  hemorrhage. Follow-up as indicated. No intestinal obstruction.  Appendix: The appendix is not identified.    Intraperitoneal space: Unchanged bilateral upper quadrant moderate amount  ascites relatively more marked in the left upper quadrant region.   Decreased  residual small amount ascites superior to the urinary bladder. No free   air.  Omental caking greater omental region progressive in the upper quadrant   regions  more marked on the right. Epigastric abdominal wall incision without   incisional  hernia or fluid collection, unchanged  Vasculature: Atherosclerotic abdominal aorta without aneurysm.  Lymph nodes: No significant adenopathy    Urinary bladder: Unremarkable .  Reproductive: The uterus has been removed.    Bones/joints: Degenerative changes axial spine. Secondary multilevel   central  canal stenosis relatively most marked at the L3-L4 level, unchanged.   Congenital  or acquired bilateral L5 spondylolysis without spondylolisthesis,   unchanged.  Unchanged right sacral sclerotic likely bone island. Follow-up bone scan   to  exclude metastasis as indicated. No interval lytic or blastic bone   lesions.  Soft tissues: Unremarkable.    IMPRESSION:  1.   Progressive peritoneal carcinomatosis and metastatic malignancy as   noted. Specifically, relatively stable peritoneal disease slightly   worsened hepatic metastatic disease since 5/31/2023.  2.   Additional findings as noted.    --- End of Report ---            TINO CONNER MD; Attending Radiologist  This document has been electronically signed. Jul 1 2023  8:44AM    < end of copied text >  
Medicine Progress Note    Patient is a 78y old  Female who presents with a chief complaint of Fever/AMS (02 Jul 2023 11:02)      SUBJECTIVE / OVERNIGHT EVENTS:  no events   Patient has no complaints     MEDICATIONS  (STANDING):  amLODIPine   Tablet 10 milliGRAM(s) Oral daily  aspirin  chewable 81 milliGRAM(s) Oral daily  atorvastatin 40 milliGRAM(s) Oral at bedtime  cefTRIAXone   IVPB 2000 milliGRAM(s) IV Intermittent every 24 hours  enoxaparin Injectable 40 milliGRAM(s) SubCutaneous every 24 hours  fenofibrate Tablet 48 milliGRAM(s) Oral daily  losartan 100 milliGRAM(s) Oral daily  metoprolol succinate ER 25 milliGRAM(s) Oral daily  pancrelipase  (CREON 36,000 Lipase Units) 1 Capsule(s) Oral three times a day with meals  pantoprazole    Tablet 40 milliGRAM(s) Oral before breakfast  senna 2 Tablet(s) Oral at bedtime    MEDICATIONS  (PRN):  acetaminophen     Tablet .. 650 milliGRAM(s) Oral every 6 hours PRN Temp greater or equal to 38C (100.4F), Mild Pain (1 - 3)  aluminum hydroxide/magnesium hydroxide/simethicone Suspension 30 milliLiter(s) Oral every 4 hours PRN Dyspepsia  melatonin 3 milliGRAM(s) Oral at bedtime PRN Insomnia  ondansetron Injectable 4 milliGRAM(s) IV Push every 8 hours PRN Nausea and/or Vomiting  oxycodone    5 mG/acetaminophen 325 mG 1 Tablet(s) Oral every 6 hours PRN Severe Pain (7 - 10)  polyethylene glycol 3350 17 Gram(s) Oral daily PRN Constipation  zolpidem 5 milliGRAM(s) Oral at bedtime PRN Insomnia    CAPILLARY BLOOD GLUCOSE      POCT Blood Glucose.: 132 mg/dL (02 Jul 2023 15:24)  POCT Blood Glucose.: 88 mg/dL (02 Jul 2023 07:43)  POCT Blood Glucose.: 166 mg/dL (01 Jul 2023 20:35)    I&O's Summary      PHYSICAL EXAM:  Vital Signs Last 24 Hrs  T(C): 36.8 (02 Jul 2023 18:30), Max: 37.7 (01 Jul 2023 22:12)  T(F): 98.2 (02 Jul 2023 18:30), Max: 99.8 (01 Jul 2023 22:12)  HR: 92 (02 Jul 2023 18:30) (90 - 105)  BP: 110/59 (02 Jul 2023 18:30) (102/51 - 124/68)  BP(mean): 71 (02 Jul 2023 18:30) (71 - 71)  RR: 18 (02 Jul 2023 18:30) (17 - 18)  SpO2: 97% (02 Jul 2023 18:30) (96% - 100%)    Parameters below as of 02 Jul 2023 18:30  Patient On (Oxygen Delivery Method): room air      CONSTITUTIONAL: NAD,   ENMT: Moist oral mucosa, no pharyngeal injection or exudates  RESPIRATORY: Normal respiratory effort; lungs are dim at bases  to auscultation bilaterally  CARDIOVASCULAR: Regular rate and rhythm, normal S1 and S2, trace lower extremity edema;   ABDOMEN: Nontender to palpation, distended   PSYCH: A+O to person, ; affect appropriate  NEUROLOGY: grossly non focal    SKIN: No rashes; no palpable lesions    LABS:                        9.8    11.53 )-----------( 504      ( 02 Jul 2023 05:30 )             31.4     07-02    132<L>  |  99  |  14  ----------------------------<  76  4.0   |  19<L>  |  0.72    Ca    8.3<L>      02 Jul 2023 05:30    TPro  5.9<L>  /  Alb  2.6<L>  /  TBili  0.3  /  DBili  x   /  AST  28  /  ALT  10  /  AlkPhos  156<H>  07-02    PT/INR - ( 01 Jul 2023 01:25 )   PT: 14.5 sec;   INR: 1.25 ratio         PTT - ( 01 Jul 2023 01:25 )  PTT:28.6 sec      Urinalysis Basic - ( 02 Jul 2023 05:30 )    Color: x / Appearance: x / SG: x / pH: x  Gluc: 76 mg/dL / Ketone: x  / Bili: x / Urobili: x   Blood: x / Protein: x / Nitrite: x   Leuk Esterase: x / RBC: x / WBC x   Sq Epi: x / Non Sq Epi: x / Bacteria: x        Culture - Urine (collected 01 Jul 2023 09:30)  Source: Clean Catch Clean Catch (Midstream)  Final Report (02 Jul 2023 15:31):    No growth    Culture - Blood (collected 01 Jul 2023 01:32)  Source: .Blood Blood-Peripheral  Preliminary Report (02 Jul 2023 07:02):    No growth at 24 hours    Culture - Blood (collected 01 Jul 2023 01:25)  Source: .Blood Blood-Peripheral  Preliminary Report (02 Jul 2023 07:02):    No growth at 24 hours      SARS-CoV-2: NotDetec (01 Jul 2023 03:17)      RADIOLOGY & ADDITIONAL TESTS:  Imaging from Last 24 Hours:    Electrocardiogram/QTc Interval:    COORDINATION OF CARE:  Care Discussed with Consultants/Other Providers: ACP   
  Follow Up:  abdominal pain     Interval History/ROS:   states "I'm going home today"       afebrile   ambulating     BM no longer mucus     Allergies  No Known Allergies        ANTIMICROBIALS:  cefTRIAXone   IVPB 2000 every 24 hours      OTHER MEDS:  acetaminophen     Tablet .. 650 milliGRAM(s) Oral every 6 hours PRN  aluminum hydroxide/magnesium hydroxide/simethicone Suspension 30 milliLiter(s) Oral every 4 hours PRN  amLODIPine   Tablet 10 milliGRAM(s) Oral daily  aspirin  chewable 81 milliGRAM(s) Oral daily  atorvastatin 40 milliGRAM(s) Oral at bedtime  enoxaparin Injectable 40 milliGRAM(s) SubCutaneous every 24 hours  fenofibrate Tablet 48 milliGRAM(s) Oral daily  hydrOXYzine hydrochloride 25 milliGRAM(s) Oral once  losartan 100 milliGRAM(s) Oral daily  melatonin 3 milliGRAM(s) Oral at bedtime PRN  metoprolol succinate ER 25 milliGRAM(s) Oral daily  morphine   Solution 3 milliGRAM(s) Oral every 4 hours PRN  ondansetron Injectable 4 milliGRAM(s) IV Push every 8 hours PRN  pancrelipase  (CREON 36,000 Lipase Units) 1 Capsule(s) Oral three times a day with meals  pantoprazole    Tablet 40 milliGRAM(s) Oral before breakfast  polyethylene glycol 3350 17 Gram(s) Oral daily PRN  senna 2 Tablet(s) Oral at bedtime  zolpidem 5 milliGRAM(s) Oral at bedtime PRN      Vital Signs Last 24 Hrs  T(F): 98 (07-05-23 @ 10:05), Max: 98 (07-04-23 @ 20:20)  HR: 86 (07-05-23 @ 11:36)  BP: 118/58 (07-05-23 @ 11:36)  RR: 17 (07-05-23 @ 11:36)  SpO2: 98% (07-05-23 @ 11:36) (98% - 100%)        PHYSICAL EXAM:  Constitutional: Not in acute distress  Eyes: No icterus.  Oral cavity: Clear, no lesions  Neck: Supple  RS: Chest clear   CVS: S1, S2   Abdomen: Soft, slight tender to palpation  No guarding/rigidity  : no nam  Neuro: Alert, oriented to time/place/person  Cranial nerves 2-12 grossly normal. No focal abnormalities  port right                                      9.9    10.41 )-----------( 524      ( 05 Jul 2023 05:26 )             31.5 07-05    130  |  97  |  16  ----------------------------<  113  4.8   |  21  |  0.66  Ca    8.4      05 Jul 2023 05:26Phos  2.8     07-05Mg     2.30     07-05  TPro  6.2  /  Alb  2.7  /  TBili  0.2  /  DBili  x   /  AST  36  /  ALT  11  /  AlkPhos  223  07-05          MICROBIOLOGY:    Clean Catch Clean Catch (Midstream)  07-01-23   No growth  --  --      .Blood Blood-Peripheral  07-01-23   No growth --  --      .Blood Blood-Peripheral  07-01-23   No growth  --  --          Rapid RVP Result: NotDetec (07-01 @ 03:17)    GI PCR Panel Stool (07.02.23 @ 14:55)   GI PCR Panel: Judithte    RADIOLOGY:    rad< from: CT Abdomen and Pelvis w/ IV Cont (07.01.23 @ 05:33) >    ACC: [id number] EXAM:  CT ABDOMEN AND PELVIS IC   ORDERED BY: ARETHA WHITE     PROCEDURE DATE:  07/01/2023          INTERPRETATION:  PROCEDURE INFORMATION:  Exam: CT Abdomen And Pelvis With Contrast  Exam date and time: 7/1/2023 5:13 AM  Age: 78 years old  Clinical indication: Generalized abdominal pain with pancreatic cancer    TECHNIQUE:  Imaging protocol: Computed tomography of the abdomen and pelvis with   contrast.  Contrast material: IV: OMNIPAQUE 350; Contrast volume: 95 ml; Contrast   route:  IV;    COMPARISON:  CT ABDOMEN AND PELVIS WITH IV CONTRAST 5/31/2023 12:28 PM    FINDINGS:  Tubes, catheters and devices: Central line tip extends into the cavoatrial  junction    Lungs: Multiple bilateral pulmonary nodules 1 of the largest in the   subpleural  right lower lobe increased from 1.4 cm to 1.8 cm series 301, image 8 and  containing central hypodensity consistent with central necrosis    Liver: Right and left lobe subcapsular enlarged masses 1 enlarged from   1.6 cm  to 1.9 cm posterior right hepatic lobe region series 301, image 48.  Gallbladder and bile ducts: The gallbladder is not identified. No   progressive  biliary dilation. Previously seen pneumobilia is not identified.  Pancreas: Post Whipple procedure with pancreaticduct stent, unchanged.  Spleen: Unremarkable  Adrenal glands: Unremarkable  Kidneys and ureters: Right renal cortical sub cm hypodense too small to  characterize lesion, unchanged. Severely dilated right extrarenal pelvis   and  moderately dilated left extrarenal pelvis, unchanged. Symmetrical  nephrograms.Pelvocalyceal dilation may be secondary to congenital   variation,  increased urine flow, vesicoureteral reflux or non specific collecting   system  obstruction.    Stomach and bowel: Intraluminal hyperdensity in the stomach may be due to  ingested opaque substance in the absence of any clinical concern of GI  hemorrhage. Follow-up as indicated. No intestinal obstruction.  Appendix: The appendix is not identified.    Intraperitoneal space: Unchanged bilateral upper quadrant moderate amount  ascites relatively more marked in the left upper quadrant region.   Decreased  residual small amount ascites superior to the urinary bladder. No free   air.  Omental caking greater omental region progressive in the upper quadrant   regions  more marked on the right. Epigastric abdominal wall incision without   incisional  hernia or fluid collection, unchanged  Vasculature: Atherosclerotic abdominal aorta without aneurysm.  Lymph nodes: No significant adenopathy    Urinary bladder: Unremarkable .  Reproductive: The uterus has been removed.    Bones/joints: Degenerative changes axial spine. Secondary multilevel   central  canal stenosis relatively most marked at the L3-L4 level, unchanged.   Congenital  or acquired bilateral L5 spondylolysis without spondylolisthesis,   unchanged.  Unchanged right sacral sclerotic likely bone island. Follow-up bone scan   to  exclude metastasis as indicated. No interval lytic or blastic bone   lesions.  Soft tissues: Unremarkable.    IMPRESSION:  1.   Progressive peritoneal carcinomatosis and metastatic malignancy as   noted. Specifically, relatively stable peritoneal disease slightly   worsened hepatic metastatic disease since 5/31/2023.  2.   Additional findings as noted.    --- End of Report ---            TINO CONNER MD; Attending Radiologist  This document has been electronically signed. Jul 1 2023  8:44AM    < end of copied text >  
[facility]  Division of Hospital Medicine  Sushila Venegas MD  Pager: [phone]      Patient is a 78y old  Female who presents with a chief complaint of Fever/AMS (03 Jul 2023 19:07)      SUBJECTIVE / OVERNIGHT EVENTS: Reports pain and difficulty sleeping. Frustarted that MRI is taking a long time. Wanting to leave AMA but will talk to daughter.   ADDITIONAL REVIEW OF SYSTEMS:    MEDICATIONS  (STANDING):  amLODIPine   Tablet 10 milliGRAM(s) Oral daily  aspirin  chewable 81 milliGRAM(s) Oral daily  atorvastatin 40 milliGRAM(s) Oral at bedtime  cefTRIAXone   IVPB 2000 milliGRAM(s) IV Intermittent every 24 hours  enoxaparin Injectable 40 milliGRAM(s) SubCutaneous every 24 hours  fenofibrate Tablet 48 milliGRAM(s) Oral daily  hydrOXYzine hydrochloride 25 milliGRAM(s) Oral once  losartan 100 milliGRAM(s) Oral daily  metoprolol succinate ER 25 milliGRAM(s) Oral daily  pancrelipase  (CREON 36,000 Lipase Units) 1 Capsule(s) Oral three times a day with meals  pantoprazole    Tablet 40 milliGRAM(s) Oral before breakfast  senna 2 Tablet(s) Oral at bedtime    MEDICATIONS  (PRN):  acetaminophen     Tablet .. 650 milliGRAM(s) Oral every 6 hours PRN Temp greater or equal to 38C (100.4F), Mild Pain (1 - 3)  aluminum hydroxide/magnesium hydroxide/simethicone Suspension 30 milliLiter(s) Oral every 4 hours PRN Dyspepsia  melatonin 3 milliGRAM(s) Oral at bedtime PRN Insomnia  morphine   Solution 3 milliGRAM(s) Oral every 4 hours PRN Severe Pain (7 - 10)  ondansetron Injectable 4 milliGRAM(s) IV Push every 8 hours PRN Nausea and/or Vomiting  polyethylene glycol 3350 17 Gram(s) Oral daily PRN Constipation  zolpidem 5 milliGRAM(s) Oral at bedtime PRN Insomnia      CAPILLARY BLOOD GLUCOSE      POCT Blood Glucose.: 154 mg/dL (04 Jul 2023 12:05)  POCT Blood Glucose.: 158 mg/dL (04 Jul 2023 08:23)  POCT Blood Glucose.: 235 mg/dL (03 Jul 2023 21:19)  POCT Blood Glucose.: 218 mg/dL (03 Jul 2023 17:32)    I&O's Summary      PHYSICAL EXAM:  Vital Signs Last 24 Hrs  T(C): 36.5 (04 Jul 2023 10:39), Max: 36.5 (03 Jul 2023 18:10)  T(F): 97.7 (04 Jul 2023 10:39), Max: 97.7 (03 Jul 2023 18:10)  HR: 97 (04 Jul 2023 10:39) (91 - 99)  BP: 109/61 (04 Jul 2023 10:39) (109/61 - 137/60)  BP(mean): --  RR: 17 (04 Jul 2023 10:39) (17 - 18)  SpO2: 99% (04 Jul 2023 10:39) (96% - 99%)    Parameters below as of 04 Jul 2023 05:43  Patient On (Oxygen Delivery Method): room air    Gen: NAD; sitting in bed comfortably   Pulm: no accessory muscle use; lungs clear on auscultation bilaterally; no wheezing or crackles.   Cards: RRR, nl S1/S2; no LE edema; no JVD  Abd: non-distended; soft, slightly tender in the LLQ, baseline per patient.  Ext: ETHAN; no joint effusion or tenderness in upper and lower extremities; no cyanosis  Neuro: Awake and Alert; non-focal; moving all extremities.   Skin: no new rashes; warm to touch;     LABS:                        9.8    10.48 )-----------( 533      ( 04 Jul 2023 05:13 )             31.1     07-04    130<L>  |  96<L>  |  13  ----------------------------<  149<H>  4.5   |  22  |  0.62    Ca    8.8      04 Jul 2023 05:13  Phos  2.4     07-04  Mg     2.00     07-04    TPro  6.7  /  Alb  3.0<L>  /  TBili  0.2  /  DBili  x   /  AST  29  /  ALT  12  /  AlkPhos  197<H>  07-04          Urinalysis Basic - ( 04 Jul 2023 05:13 )    Color: x / Appearance: x / SG: x / pH: x  Gluc: 149 mg/dL / Ketone: x  / Bili: x / Urobili: x   Blood: x / Protein: x / Nitrite: x   Leuk Esterase: x / RBC: x / WBC x   Sq Epi: x / Non Sq Epi: x / Bacteria: x          RADIOLOGY & ADDITIONAL TESTS:  Results Reviewed:   Imaging Personally Reviewed:  Electrocardiogram Personally Reviewed:    COORDINATION OF CARE:  Care Discussed with Consultants/Other Providers [Y/N]:  Prior or Outpatient Records Reviewed [Y/N]:  
[facility] Division of Hospital Medicine  Rei Moore MD  In House Pager [phone]    Patient is a 78y old  Female who presents with a chief complaint of Fever/AMS (02 Jul 2023 18:56)    SUBJECTIVE / OVERNIGHT EVENTS: no acute events. patient reports feeling well. walking the unit with . She reports her pain is now controlled and back at baseline. patient eager to go home.     ROS: Denied Fever, Chill, CP, SOB, Abd pain, N/V/D, LE swelling or pain.     MEDICATIONS  (STANDING):  amLODIPine   Tablet 10 milliGRAM(s) Oral daily  aspirin  chewable 81 milliGRAM(s) Oral daily  atorvastatin 40 milliGRAM(s) Oral at bedtime  cefTRIAXone   IVPB 2000 milliGRAM(s) IV Intermittent every 24 hours  enoxaparin Injectable 40 milliGRAM(s) SubCutaneous every 24 hours  fenofibrate Tablet 48 milliGRAM(s) Oral daily  hydrOXYzine hydrochloride 25 milliGRAM(s) Oral once  losartan 100 milliGRAM(s) Oral daily  metoprolol succinate ER 25 milliGRAM(s) Oral daily  pancrelipase  (CREON 36,000 Lipase Units) 1 Capsule(s) Oral three times a day with meals  pantoprazole    Tablet 40 milliGRAM(s) Oral before breakfast  senna 2 Tablet(s) Oral at bedtime    MEDICATIONS  (PRN):  acetaminophen     Tablet .. 650 milliGRAM(s) Oral every 6 hours PRN Temp greater or equal to 38C (100.4F), Mild Pain (1 - 3)  aluminum hydroxide/magnesium hydroxide/simethicone Suspension 30 milliLiter(s) Oral every 4 hours PRN Dyspepsia  melatonin 3 milliGRAM(s) Oral at bedtime PRN Insomnia  ondansetron Injectable 4 milliGRAM(s) IV Push every 8 hours PRN Nausea and/or Vomiting  oxycodone    5 mG/acetaminophen 325 mG 1 Tablet(s) Oral every 6 hours PRN Severe Pain (7 - 10)  polyethylene glycol 3350 17 Gram(s) Oral daily PRN Constipation  zolpidem 5 milliGRAM(s) Oral at bedtime PRN Insomnia    CAPILLARY BLOOD GLUCOSE      POCT Blood Glucose.: 234 mg/dL (03 Jul 2023 12:18)  POCT Blood Glucose.: 139 mg/dL (03 Jul 2023 08:33)  POCT Blood Glucose.: 183 mg/dL (02 Jul 2023 22:42)  POCT Blood Glucose.: 132 mg/dL (02 Jul 2023 15:24)    I&O's Summary    PHYSICAL EXAM:  Vital Signs Last 24 Hrs  T(C): 36.8 (03 Jul 2023 10:31), Max: 37.2 (02 Jul 2023 17:06)  T(F): 98.3 (03 Jul 2023 10:31), Max: 98.9 (02 Jul 2023 17:06)  HR: 90 (03 Jul 2023 10:31) (87 - 95)  BP: 100/48 (03 Jul 2023 10:31) (100/48 - 119/63)  BP(mean): 71 (02 Jul 2023 18:30) (71 - 71)  RR: 18 (03 Jul 2023 10:31) (17 - 18)  SpO2: 98% (03 Jul 2023 10:31) (96% - 98%)    Parameters below as of 03 Jul 2023 05:09  Patient On (Oxygen Delivery Method): room air      Gen: NAD; sitting in bed comfortably   Pulm: no accessory muscle use; lungs clear on auscultation bilaterally; no wheezing or crackles.   Cards: RRR, nl S1/S2; no LE edema; no JVD  Abd: non-distended; soft, slightly tender in the LLQ, baseline per patient.  Ext: ETHAN; no joint effusion or tenderness in upper and lower extremities; no cyanosis  Neuro: Awake and Alert; non-focal; moving all extremities.   Skin: no new rashes; warm to touch;     LABS:                        9.6    11.30 )-----------( 489      ( 03 Jul 2023 06:56 )             31.1     07-03    127<L>  |  96<L>  |  16  ----------------------------<  145<H>  4.7   |  19<L>  |  0.78    Ca    8.3<L>      03 Jul 2023 06:56    TPro  6.0  /  Alb  2.7<L>  /  TBili  0.2  /  DBili  x   /  AST  31  /  ALT  6   /  AlkPhos  178<H>  07-03          Urinalysis Basic - ( 03 Jul 2023 06:56 )    Color: x / Appearance: x / SG: x / pH: x  Gluc: 145 mg/dL / Ketone: x  / Bili: x / Urobili: x   Blood: x / Protein: x / Nitrite: x   Leuk Esterase: x / RBC: x / WBC x   Sq Epi: x / Non Sq Epi: x / Bacteria: x        Culture - Urine (collected 01 Jul 2023 09:30)  Source: Clean Catch Clean Catch (Midstream)  Final Report (02 Jul 2023 15:31):    No growth    Culture - Blood (collected 01 Jul 2023 01:32)  Source: .Blood Blood-Peripheral  Preliminary Report (03 Jul 2023 07:02):    No growth at 48 Hours    Culture - Blood (collected 01 Jul 2023 01:25)  Source: .Blood Blood-Peripheral  Preliminary Report (03 Jul 2023 07:01):    No growth at 48 Hours      RADIOLOGY & ADDITIONAL TESTS:  Results Reviewed: Y  Imaging Personally Reviewed: Y  Electrocardiogram Personally Reviewed: Y    COORDINATION OF CARE:  Care Discussed with Consultants/Other Providers [Y/N]: Y  Prior or Outpatient Records Reviewed [Y/N]: SHELBY

## 2023-07-05 NOTE — DISCHARGE NOTE PROVIDER - ATTENDING DISCHARGE PHYSICAL EXAMINATION:
GENERAL: no apparent distress  HEAD:  Atraumatic, Normocephalic  EYES: EOMI, PERRLA, conjunctiva and sclera clear b/l  CHEST/LUNG: on RA; Clear to auscultation bilaterally; No wheezing; No crackles  HEART: Regular rate and rhythm; S1/S2 wnl; no obvious murmurs  ABDOMEN: Soft, tender on palpation diffusely, Nondistended; Bowel sounds present  EXTREMITIES:  2+ Peripheral Pulses, No edema  PSYCH: normal affect, calm demeanor  NEUROLOGY: AAOX3, CN 2-12 grossly intact, no obvious FND

## 2023-07-05 NOTE — CONSULT NOTE ADULT - SUBJECTIVE AND OBJECTIVE BOX
Date of Service 23 @ 18:04    HPI:  77 yo M w/ Hx obtained in conjunction with  at bedside pancreatic Ca s/p whipple, DM, CAD s/p stent p/w fever/AMS. As per , pt was sitting up in bed last night and was noted to be mumbling. He proceeded to help her get to the bathroom and he noted that she was unsteady on her feet and she fell on the floor. He noted she felt warm and proceeded to take her temp and was noted to be 102. He brought her to ED for further eval. He curently notes she is at baseline     + poor PO intake due to ascites PO intake slightly improved after paracentesis  with over 4 L removed on .  + chronic abdominal pain due to malignancy on morphine (2023 16:31)      Interval History:   Kyrgyz : [id number]. Patient states she has constant abdominal pain since her cancer diagnosis and surgery. Pain radiates to back, pain is sharp and crampy in nature. Pain is 7/10 .  She reports relief when taking prn morphine.   Per daughter Namrata at bedside, patient has been taking morphine atc which is effective for her pain management     PERTINENT PM/SXH:   No pertinent past medical history  Hypertension  HTN (hypertension), benign  HLD (hyperlipidemia)  IDDM (insulin dependent diabetes mellitus)  Type 2 diabetes mellitus  Malignant neoplasm of pancreas, unspecified  Dyslipidemia  CAD (coronary artery disease)  H/O chronic hepatitis  Pancreatic cancer  No significant past surgical history  History of  section  History of hysterectomy  History of coronary angioplasty with insertion of stent  History of Whipple procedure    FAMILY HISTORY:  FH: HTN (hypertension) (Mother)  Family history of leukemia (Child)    ITEMS NOT CHECKED ARE NOT PRESENT    SOCIAL HISTORY:   Significant other/partner[ x]  Children[ x]  Confucianism/Spirituality:   Substance hx:  [ ]   Tobacco hx:  [ ]   Alcohol hx: [ ]   Home Opioid hx:  [x ] I-Stop Reference No: [id number]  PDI	Current Rx	Drug Type	Rx Written	Rx Dispensed	Drug	Quantity	Days Supply	Prescriber Name	Prescriber RERE #	Payment Method	Dispenser  A	Y		2023	zolpidem tartrate 5 mg tablet	30	30	Martínez, Godfrey Chavira MD	[id number]	Medicare	[facility]  A	N	O	2023	oxycodone hcl (ir) 5 mg tablet	9	3	Abbi Chaney	[id number]	Medicare	[facility]  A	N	O	2023	tramadol hcl 50 mg tablet	28	7	Andre Jurado MD	[id number]	Medicare	[facility]  A	N	O	2023	oxycodone hcl (ir) 5 mg tablet	28	7	Helga Ramirez	[id number]	Medicare	[facility]  B	Y	O	2023	morphine sulf 10 mg/5 ml soln	270ml	30	Citlaly Chavis	[id number]	Medicare	[facility]  Living Situation: [ x]Home  [ ]Long term care  [ ]Rehab [ ]Other      ADVANCE DIRECTIVES:    MOLST  [ ]  Living Will  [ ]   DECISION MAKER(s):  [ x] Health Care Proxy(s)  [ ] Surrogate(s)  [ ] Guardian           Name(s): Phone Number(s):  Daughter Namrata Harvinder: [phone]    BASELINE (I)ADL(s) (prior to admission):  [place]: [x ]Total  [ ] Moderate [ ]Dependent    Allergies    No Known Allergies    Intolerances    MEDICATIONS  (STANDING):  amLODIPine   Tablet 10 milliGRAM(s) Oral daily  aspirin  chewable 81 milliGRAM(s) Oral daily  atorvastatin 40 milliGRAM(s) Oral at bedtime  cefTRIAXone   IVPB 2000 milliGRAM(s) IV Intermittent every 24 hours  celecoxib 100 milliGRAM(s) Oral two times a day  dextrose 5%. 1000 milliLiter(s) (100 mL/Hr) IV Continuous <Continuous>  dextrose 5%. 1000 milliLiter(s) (50 mL/Hr) IV Continuous <Continuous>  dextrose 50% Injectable 12.5 Gram(s) IV Push once  dextrose 50% Injectable 25 Gram(s) IV Push once  dextrose 50% Injectable 25 Gram(s) IV Push once  enoxaparin Injectable 40 milliGRAM(s) SubCutaneous every 24 hours  fenofibrate Tablet 48 milliGRAM(s) Oral daily  glucagon  Injectable 1 milliGRAM(s) IntraMuscular once  hydrOXYzine hydrochloride 25 milliGRAM(s) Oral once  insulin lispro (ADMELOG) corrective regimen sliding scale   SubCutaneous three times a day before meals  losartan 100 milliGRAM(s) Oral daily  metoprolol succinate ER 25 milliGRAM(s) Oral daily  pancrelipase  (CREON 36,000 Lipase Units) 1 Capsule(s) Oral three times a day with meals  pantoprazole    Tablet 40 milliGRAM(s) Oral before breakfast  senna 2 Tablet(s) Oral at bedtime    MEDICATIONS  (PRN):  acetaminophen     Tablet .. 650 milliGRAM(s) Oral every 6 hours PRN Temp greater or equal to 38C (100.4F), Mild Pain (1 - 3)  aluminum hydroxide/magnesium hydroxide/simethicone Suspension 30 milliLiter(s) Oral every 4 hours PRN Dyspepsia  dextrose Oral Gel 15 Gram(s) Oral once PRN Blood Glucose LESS THAN 70 milliGRAM(s)/deciliter  melatonin 3 milliGRAM(s) Oral at bedtime PRN Insomnia  morphine   Solution 3 milliGRAM(s) Oral every 4 hours PRN Severe Pain (7 - 10)  ondansetron Injectable 4 milliGRAM(s) IV Push every 8 hours PRN Nausea and/or Vomiting  polyethylene glycol 3350 17 Gram(s) Oral daily PRN Constipation  zolpidem 5 milliGRAM(s) Oral at bedtime PRN Insomnia        ITEMS UNCHECKED ARE NOT PRESENT     PRESENT SYMPTOMS: [ ]Unable to self-report due to altered mental status  [ ] CPOT [ ] PAINADs [ ] RDOS  Source if other than patient:  [ ]Family   [ ]Team     Pain: [x ]yes [ ]no  QOL impact - mild-moderate   Location -       abdomen             Aggravating factors - none  Quality - sharp, crampy  Radiation - back  Timing- constant with intermittent spikes   Severity (0-10 scale): 7  Minimal acceptable level / Pain goal (0-10 scale): 3    CPOT:    https://www.sccm.org/getattachment/nls95n87-8r1o-6m0n-9c2s-7257w8858f9h/Critical-Care-Pain-Observation-Tool-(CPOT)    Dyspnea:                           [ ]Mild [ ]Moderate [ ]Severe  Anxiety:                             [ ]Mild [ ]Moderate [ ]Severe  Agitation:                          [ ]Mild [ ]Moderate [ ]Severe  Fatigue:                             [ ]Mild [ ]Moderate [ ]Severe  Nausea:                             [ ]Mild [ ]Moderate [ ]Severe  Loss of appetite:              [ ]Mild [ ]Moderate [ ]Severe  Constipation:                   [ ]Mild [ ]Moderate [ ]Severe  Diarrhea:                          [ ]Mild [ ]Moderate [ ]Severe      PCSSQ[Palliative Care Spiritual Screening Question]   Severity (0-10):  Score of 4 or > indicate consideration of Chaplaincy referral.  Chaplaincy Referral: [ ] yes [ ] refused [ ] following [ x] deferred    [place]? : [ ] yes [ ] no [ ] Declined   [x ] Deferred            Social work referral [ ] Patient & Family Centered Care Referral [ ]     Anticipatory Grief present?:  [ ] yes [ ] no  [x ] Deferred                  Social work referral [ ] Chaplaincy Referral[ ]    Other Symptoms:  [ x]All other review of systems negative     PHYSICAL EXAM:  Vital Signs Last 24 Hrs  T(C): 36.7 (2023 10:05), Max: 36.7 (2023 20:20)  T(F): 98 (2023 10:05), Max: 98 (2023 20:20)  HR: 86 (2023 11:36) (86 - 111)  BP: 118/58 (2023 11:36) (111/50 - 131/61)  BP(mean): --  RR: 17 (2023 11:36) (16 - 17)  SpO2: 98% (2023 11:36) (98% - 100%)    Parameters below as of 2023 10:05  Patient On (Oxygen Delivery Method): room air         I&O's Summary      GENERAL:  [x ]Alert  [ x]Oriented x 3  [ ]Lethargic  [ ]Cachexia  [ ]Unarousable  [ x]Verbal  [ ]Non-Verbal  [ x] No Distress  Behavioral:   [ ] Anxiety  [ ] Delirium [ ] Agitation [x ] Calm  [ ] Other  HEENT:  [x ]Normal  [ ] Temporal Wasting  [ ]Dry mouth   [ ]ET Tube/Trach  [ ]Oral lesions  [ ] Mucositis  PULMONARY:   [ x]Clear [ ]Tachypnea  [ ]Audible excessive secretions   [ ]Rhonchi        [ ]Right [ ]Left [ ]Bilateral  [ ]Crackles        [ ]Right [ ]Left [ ]Bilateral  [ ]Wheezing     [ ]Right [ ]Left [ ]Bilateral  [ ]Diminished breath sounds [ ]right [ ]left [ ]bilateral  CARDIOVASCULAR:    [x ]Regular [ ]Irregular [ ]Tachy  [ ]Tomy [ ]Murmur [ ]Other  GASTROINTESTINAL:  [ x]Soft  [ ]Distended   [ ]+BS  [x ]Non tender [ ]Tender  [ ]PEG [ ]OGT/ NGT  Last BM:   GENITOURINARY:  [x ]Normal [ ] Incontinent   [ ]Oliguria/Anuria   [ ]Rolle  MUSCULOSKELETAL:   [ x]Normal   [ ]Weakness  [ ]Bed/Wheelchair bound [ ]Edema  [  ] amputation  [  ] contraction  NEUROLOGIC:   [ x]No focal deficits  [ ]Cognitive impairment  [ ]Dysphagia [ ]Dysarthria [ ]Paresis [ ]Other   SKIN: See Nursing Skin Assessment for further details  [x ]Normal    [ ]Rash  [ ]Pressure ulcer(s)       Present on admission [ ]y [ ]n   [  ]  Wound    [  ] hyperpigmentation    CRITICAL CARE:  [ ] Shock Present  [ ]Septic [ ]Cardiogenic [ ]Neurologic [ ]Hypovolemic  [ ]  Vasopressors [ ]  Inotropes   [ ]Respiratory failure present [ ]Mechanical ventilation [ ]Non-invasive ventilatory support [ ]High flow    [ ]Acute  [ ]Chronic [ ]Hypoxic  [ ]Hypercarbic [ ]Other  [ ]Other organ failure     LABS:  reviewed                         9.9    10.41 )-----------( 524      ( 2023 05:26 )             31.5   07-05    130<L>  |  97<L>  |  16  ----------------------------<  113<H>  4.8   |  21<L>  |  0.66    Ca    8.4      2023 05:26  Phos  2.8     07-05  Mg     2.30     07-05    TPro  6.2  /  Alb  2.7<L>  /  TBili  0.2  /  DBili  x   /  AST  36<H>  /  ALT  11  /  AlkPhos  223<H>  07-05    Urinalysis Basic - ( 2023 05:26 )    Color: x / Appearance: x / SG: x / pH: x  Gluc: 113 mg/dL / Ketone: x  / Bili: x / Urobili: x   Blood: x / Protein: x / Nitrite: x   Leuk Esterase: x / RBC: x / WBC x   Sq Epi: x / Non Sq Epi: x / Bacteria: x      CAPILLARY BLOOD GLUCOSE      POCT Blood Glucose.: 209 mg/dL (2023 12:20)  POCT Blood Glucose.: 155 mg/dL (2023 09:51)  POCT Blood Glucose.: 149 mg/dL (2023 21:05)      RADIOLOGY & ADDITIONAL STUDIES: reviewed     PROTEIN CALORIE MALNUTRITION PRESENT: [ ]mild [ ]moderate [ ]severe [ ]underweight [ ]morbid obesity  https://www.andeal.org/vault/2440/web/files/ONC/Table_Clinical%20Characteristics%20to%20Document%20Malnutrition-White%20JV%20et%20al%2020.pdf    Height (cm): 152.5 (23 @ 01:12), 152.5 (23 @ 10:19), 152.5 (23 @ 13:00)  Weight (kg): 58.7 (23 @ 18:10), 56.4904 (23 @ 13:00), 49.9 (22 @ 08:26)  BMI (kg/m2): 25.2 (23 @ 18:10), 24.3 (23 @ 01:12), 24.3 (23 @ 10:19)    [ ]PPSV2 < or = to 30% [ ]significant weight loss  [ ]poor nutritional intake  [ ]anasarca [ ]Artificial Nutrition      REFERRALS:   [ ]Chaplaincy  [ ]Hospice  [ ]Child Life  [ ]Social Work  [x ]Case management [ ]Holistic Therapy

## 2023-07-05 NOTE — DISCHARGE NOTE PROVIDER - HOSPITAL COURSE
76 yo F w/ hx pancreatic Ca s/p whipple's p/w AMS unsteady gait and garbled speech and fever. s/p paracentesis with removal of fluid 6/30. There was initially concern for peritonitis given patient's degree of pain and fever. She was started on empiric abx. No paracentesis performed as patient did not have adequate collection for testing and patient pain improved with oxycodone, later switched back to home regimen of morphine IR. ID recommended empirically treating with total 7 days of abx course. Will discharge patient with 3 more days of Flagyl and Vantin. Patient completed  CT head with + RT ganglia capsular infarct. Patient presented old MRI result from 2020 which showed a chronic Rt lacunar infarct. Patient has no new deficits on clinical exam. patient also gets carotid doppler performed with her cardiologist every 6 months, last done in April. Will defer repeat inpatient doppler at this time. Patient instructed to follow up with her Cardiologist. Patient still with chronic abdominal pain secondary to pancreatic ca. She will follow up with Dr. Hargrove in office for further pain management. 78 yo F w/ hx pancreatic Ca s/p whipple's p/w AMS unsteady gait and garbled speech and fever. s/p paracentesis with removal of fluid 6/30. There was initially concern for peritonitis given patient's degree of pain and fever. She was started on empiric abx. No paracentesis performed as patient did not have adequate collection for testing and patient pain improved with oxycodone, later switched back to home regimen of morphine IR. ID recommended empirically treating with total 7 days of abx course. Will discharge patient with 3 more days of Flagyl and Vantin. Patient completed  CT head with + RT ganglia capsular infarct. Patient presented old MRI result from 2020 which showed a chronic Rt lacunar infarct. Patient has no new deficits on clinical exam. patient also gets carotid doppler performed with her cardiologist every 6 months, last done in April. Will defer repeat inpatient doppler at this time. Patient instructed to follow up with her Cardiologist. Patient still with chronic abdominal pain secondary to pancreatic ca. CT a/p showing progression of disease, likely resulting in her increase pain. She will follow up with Dr. Hargrove in office for further pain management.

## 2023-07-05 NOTE — DISCHARGE NOTE NURSING/CASE MANAGEMENT/SOCIAL WORK - NSDCPEFALRISK_GEN_ALL_CORE
For information on Fall & Injury Prevention, visit: [email or web address] OR  [email or web address] OR  https://www.cdc.gov/steadi/patient.html

## 2023-07-05 NOTE — DISCHARGE NOTE PROVIDER - NSDCFUADDAPPT_GEN_ALL_CORE_FT
Please follow up with your primary care physician regarding your hospitalization and for further monitoring/management.    Please follow up with your scheduled appointments within this coming week.

## 2023-07-05 NOTE — DISCHARGE NOTE NURSING/CASE MANAGEMENT/SOCIAL WORK - PATIENT PORTAL LINK FT
You can access the FollowMyHealth Patient Portal offered by [facility] by registering at the following website: [email or web address]. By joining [employer]’s FollowMyHealth portal, you will also be able to view your health information using other applications (apps) compatible with our system.

## 2023-07-05 NOTE — DISCHARGE NOTE PROVIDER - NSDCCPCAREPLAN_GEN_ALL_CORE_FT
PRINCIPAL DISCHARGE DIAGNOSIS  Diagnosis: Metabolic encephalopathy  Assessment and Plan of Treatment: This is likely due to underline infection and dehydration. You improve with antibiotics and IVF. please complete course of antibiotics as prescribed.      SECONDARY DISCHARGE DIAGNOSES  Diagnosis: Fever  Assessment and Plan of Treatment: this is likely due to infection and inflammation. please complete antibiotics course as prescribed, follow up with your oncologist.    Diagnosis: Pancreatic cancer  Assessment and Plan of Treatment: continue treatment with your oncologist. follow up with Dr. Hargrove for pain management.    Diagnosis: DM (diabetes mellitus)  Assessment and Plan of Treatment: continue taking your home diabetes treatment.    Diagnosis: Hypertension  Assessment and Plan of Treatment: continue taking your home blood pressure medicaitons as prescribed.    Diagnosis: Hyponatremia  Assessment and Plan of Treatment: Your sodium level is low but stable during hospitalization. This is likely due to your underline cancer and chronic pain. Please avoid drinking too much water. monitor level with your outpatient doctors.

## 2023-07-05 NOTE — DISCHARGE NOTE PROVIDER - NSDCFUSCHEDAPPT_GEN_ALL_CORE_FT
Juan David  [facility] Physician [facility]  [facility]  Scheduled Appointment: 07/06/2023    Sarah Goyal  [facility]  GERIATRICS [address]   Scheduled Appointment: 07/10/2023    Chel Dunlap  [address]  Scheduled Appointment: 07/27/2023

## 2023-07-05 NOTE — PROGRESS NOTE ADULT - ASSESSMENT
77F with Pancreatic Ca s/p whipple's presented 7/1 with AMS unsteady gait, garbled speech and fever 102.9   s/p therapeutic  paracentesis with removal of fluid 6/30   (malignant ascites)    Had been on chemo, but no longer, last chemo 2/28/2023     CT head with + RT ganglia capsular infarct    CT abd progressive peritoneal carcinomatosis and metastatic malignancy     Seen in ER yesterday - was very uncomfortable due to abdominal pain  Also c/o mucus stool   Denied cough, dysuria, diarrhea, dyspnea, fever or chills      WORK UP:  WBC 11  Cr 0.72  UA negative  CXR pulm edema  CTH with small R ganglia capsular lacunar infarct  CT AP with increased hepatic mets, peritoneal carcinomatosis  RVP negative  BCx negative    ANTIBIOTIC:  s/p Vanco x1  s/p cefepime 7/1 x 1  cefTRIAXone   IVPB 2000 every 24 hours (7/2 --> )    DIAGNOSIS and IMPRESSION:  #Fever and abdominal pain resolved   #AMS, now resolved  #Abnormal CTH, Small R Lacunar Infarct  # Progressive peritoneal carcinomatosis and metastatic diease     - unclear etiology of fever, concerning for Intraabdominal infection ?iatrogenic peritonitis s/p recent paracentesis    today no abdomina pain   afebrile    - has R chest wall chemoport, but appears okay, no erythema, nonTTP    blood cultures 7/1 no growth to date   GI stool PCR neg     RECOMMENDATIONS:    would continue ceftriaxone   follow blood cultures   would treat empirically for possible peritonitis   if d/c home can transition to flagyl and vantin po to complete 7 days         
77F with Pancreatic Ca s/p whipple's presented 7/1 with AMS unsteady gait, garbled speech and fever 102.9   s/p therapeutic  paracentesis with removal of fluid 6/30   (malignant ascites)    Had been on chemo, but no longer, last chemo 2/28/2023     CT head with + RT ganglia capsular infarct    CT abd progressive peritoneal carcinomatosis and metastatic malignancy     Seen in ER 7/2 - was very uncomfortable due to abdominal pain  Also c/o mucus stool   Denied cough, dysuria, diarrhea, dyspnea, fever or chills      WORK UP:  WBC 11  Cr 0.72  UA negative  CXR pulm edema  CTH with small R ganglia capsular lacunar infarct  CT AP with increased hepatic mets, peritoneal carcinomatosis  RVP negative  BCx negative    ANTIBIOTIC:  s/p Vanco x1  s/p cefepime 7/1 x 1  cefTRIAXone   IVPB 2000 every 24 hours (7/2 --> )    DIAGNOSIS and IMPRESSION:  #Fever and abdominal pain resolved   #AMS,  resolved  #Abnormal CTH, Small R Lacunar Infarct  # Progressive peritoneal carcinomatosis and metastatic diease     - unclear etiology of fever, concerning for Intraabdominal infection ?iatrogenic peritonitis s/p recent paracentesis    abdomina pain resolved quickly   afebrile    - has R chest wall chemoport, but appears okay, no erythema, nonTTP    blood cultures 7/1 no growth   GI stool PCR neg     RECOMMENDATIONS:    would continue ceftriaxone   would treat empirically for possible peritonitis   if d/c home can transition to flagyl and vantin po to complete 7 days         
76 yo F w/ hx pancreatic Ca s/p whipple's p/w AMS unsteady gait and garbled speech and fever. s/p paracentesis with removal of fluid 6/30. CT head with + RT ganglia capsular infarct. unclear if acute or chronic, patient clinically w/o focal deficits. Her abd pain has improved after restarting her home pain regimen. on empiric CTX for concern of peritonitis. 
78 yo F w/ hx pancreatic Ca s/p whipple's p/w AMS unsteady gait and garbled speech and fever. s/p paracentesis with removal of fluid 6/30. CT head with + RT ganglia capsular infarct. unclear if acute or chronic, patient clinically w/o focal deficits. Her abd pain has improved after restarting her home pain regimen. on empiric CTX for concern of peritonitis. 
78 yo F w/ hx pancreatic Ca s/p whipple's p/w AMS unsteady gait and garbled speech and fever. s/p paracentesis with removal of fluid 6/30. CT head with + RT ganglia capsular infarct.

## 2023-07-05 NOTE — CONSULT NOTE ADULT - PROBLEM SELECTOR RECOMMENDATION 9
- Patient with altered mental status, unsteady gait, and garbled speech   - CT Head 7/1/2023- No acute intracranial hemorrhage, mass effect, or CT evidence of a large vascular territory infarct. Small right ganglia capsular lacunar infarct.  - Neurology recommendations appreciated   - Infectious disease recommendations appreciated   - Encephalopathy now resolved.   - management as per primary team

## 2023-07-06 NOTE — HISTORY OF PRESENT ILLNESS
[de-identified] : 79 y/o Macedonian-speaking woman with resected pancreatic cancer pT3N2 PNI+/LVI+ disease, margin negative, presenting for adjuvant treatment discussion\par \par Other Current Medical Problems: IDDM diagnosed >20 years, HTN, HLD, CAD, Cardiac stent 2019 , chronic hep C (S/P treatment) arthritis , \par \par Oncological History :\par May 2022: Presented with RUQ abdominal pain intermittently x 1 week and was found to be jaundiced presenting with a T bili of 15 and Ca 19.9 was 6704.\par 05/20/22: CT A/P - 2.1 cms pancreatic head mass with resultant abrupt cut off the CBD.Ct chest with several indeterminate pulmonary nodules. \par 05/20/22- S/P EUS with [facility]NB pathology + for moderately differntiated adenocarcinoma 05/23/22- S/P ERCP- Plastic stent into CBD \par 06/13/22- Upfront Whipple surgery: Surgical pathology - Moderately differentiated adenocarcinoma , negative margins 5/17 LN +ve.\par 07/19/22 CT CAP no e/o local disease; however scattered pulmonary nodules, some of which are new.\par 08/01/22- C # 1 FOLFORINOX - 5FU 2000 mg/m2/  mg/ m2/ Irinotecan 120 mg/ m2/ Oxaliplatin 70 mg/ m2 \par 08/15/22- C # 2 FOLFORINOX - 5FU 2000 mg/m2/  mg/ m2/ Irinotecan 120 mg/ m2/ Oxaliplatin 70 mg/ m2 \par 08/29/22- C # 3 FOLFORINOX - 5FU 2000 mg/m2/  mg/ m2/ Irinotecan 120 mg/ m2/ Oxaliplatin 70 mg/ m2 \par 09/12/22- C # 4 FOLFORINOX - 5FU 2000 mg/m2/  mg/ m2/ Irinotecan 120 mg/ m2/ Oxaliplatin 70 mg/ m2 \par 09/14/22 Restaging scans show no evidence of POD or metastatic disease\par 09/26/22- C #5 FOLFORINOX - 5FU 2000 mg/m2/  mg/ m2/ Irinotecan 75 mg/ m2/ Oxaliplatin 70 mg/ m2 \par 10/10/22- C # 6 FOLFOX- - 5FU 2000 mg/m2/  mg/Oxaliplatin 55 mg/m2 -( stop Irinotecan sec to diarrhea) \par 10/24/22- C # 7 FOLFOX 5FU 2000 mg/m2/  mg/Oxaliplatin 55 mg/m2\par 11/07/22- C # 8- FOLFOX \par \par 11/16/22: Restaging CT scans--Mild increase in size of several previously seen noncalcified pulmonary nodules as described above\par No imaging evidence of locally recurrent or metastatic disease involving the abdomen or pelvis.\par 11/21/22- C # 9 FOLFOX\par 12/05/22- C # 10 only 5 FU pump- (stopped Oxali sec to worsening neuropathy) \par 12/19/22- C # 11- 5 FU pump only \par 01/03/23- C # 12 - 5 FU pump only \par 01/05/23- CT A/P/C Multiple bilateral pulmonary nodules, some of which are increased in size from the prior exam.New mild omental nodularity and peritoneal thickening, suspicious for peritoneal carcinomatosis.\par 01/17/23: C #1 Gemzar 800 mg/m2 / Abraxane 100 mg/m2 \par 01/30/23: C# 2 Gemzar 800 mg/m2 / Abraxane 100 mg/m2 \par 02/13/23- C # 3 [place] 600mg/m2/ Abraxane 75 mg/m2 (Dose reduction for poor tolerance) \par 02/27/23- C # 4 [place] 600mg/m2/ Abraxane 75 mg/m2 (Dose reduction for poor tolerance) \par 02/28/23- CT CAP Stable pulmonary metastases in the short interim. Mildly increased omental and pelvic peritoneal metastases\par Increased omental carcinomatosis for example 2.5 x 1.4 cm omental nodule (3, 102) previously measured 1 x 0.9 cm. A central mesenteric metastasis now measures 1.1 cm (3, 101) previously 0.9 cm. No ascites.\par 04/26/23 CT CAP with progression of disease - progression in lungs, liver, peritoneal disease\par 05/31/23 CT CAP progressive disease with more peritoneal and omental disease\par 6/30/23 4.2L paracentesis, +malignancy\par 7/1-7/523 Hospitalized at [facility] for fevers and encephalopathy, CT head showed R lacunar infarct (unclear if acute or chronic). CT A/P showed worsening peritoneal and hepatic metastasis since 5/31\par \par PMD: Dr Martínez - [phone] \par \par FHX: Brother brain aneurysm diagnosed at 50, Sister had Aneuresym ,daughter Leukemia (27 years old) \par Social HX: Lives with , Apartment, no smoker, no alcohol, walks about 30 mts 2-3 times a day.\par PSX: Total hysterectomy , Whipple procedure (06/13/22) \par Disease: Pancreatic adenocarcinomaPathology:AJCC Stage :Tumor Markers: CEA/ Ca 19.9. \par \par [employer]ae- BRCA2 Variant heterozygous- c.1964C>A \par Signatera -08/02/22 was positive - 0.58\par 09/12/22- 0.14\par 09/20/22- 0.44\par Foundation one CDx- KAILEY, 2 muts/ Mb, KRAS Q61H,CDKN2A, CSF3R amplification \par \par \par \par  \par \par  [de-identified] : 7/6/23:\par Hospitalized from 7/1-7/5 for fevers and encephalopathy. No ammonia checked, but CT head showed a R lacunar infarct (but reportedly had a similar infarct in 2020, so unclear if it was acute or chronic). MRI was planned, but encephalopathy improved. Concern for abdominal infection (had + UA/Ucx a few days prior, but had a negative UA/ucx on admission); Patient had received a 4.2L paracentesis on 6/30, the day before she was admitted. Although cx were negative from the fluid, she was given a 7 day course of abx, which she is finishing.\par \par However, she still has abdominal pain, constipation. She has been on morphine and is on a bowel regimen with Miralax.\par She endorses some orthopnea, although no SOB on exertion. However, she has N/V and LE edema.

## 2023-07-06 NOTE — REASON FOR VISIT
[Follow-Up Visit] : a follow-up [Family Member] : family member [[employer] ] : provided by [employer]   [Interpreters_IDNumber] : [id number]

## 2023-07-06 NOTE — REVIEW OF SYSTEMS
[Fatigue] : fatigue [Abdominal Pain] : abdominal pain [Lower Ext Edema] : lower extremity edema [Shortness Of Breath] : shortness of breath [Negative] : Heme/Lymph [Chest Pain] : no chest pain [Cough] : no cough [SOB on Exertion] : no shortness of breath during exertion [FreeTextEntry7] : ascites [FreeTextEntry9] : shoulder, neck and back pain

## 2023-07-06 NOTE — ASSESSMENT
[FreeTextEntry1] : 79 y/o Upper sorbian-speaking woman with resected pancreatic cancer pT3N2 PNI+/LVI+ disease, margin negative, s/p 5 cycles of FOLFORINOX, then 4 cycles of FOLFOX, then 3 cycles of 5 -FU, then 4 cycles of Gemzar/ Abraxane presented for a follow up.\par \par Post-op imaging demonstrated lung lesions c/w metastasis. Considered biopsy of one, but it is very small, sub-centimeter, and discussion with radiology noted likely under size threshold for biopsy. In addition, would not change plan to proceed with FOLFIRINOX as even if patient had histologic confirmation would not qualify for GIANT trial due to language barrier. Will proceed with FOLFIRINOX. Also discussed with patient that [facility], now a [facility], can provide treatment a bit closer to home, but she declined this at this time. Thus we proceeded with FOLFIRINOX. There is concern now that she has relapsed given her rising tumor markers and positive Signatera. BRCA2 but VUS.\par \par S/p 5 cycles of FOLFORINOX, then 4 cycles of FOLFOX, then 3 cycles of 5 -FU, then 4 cycles of Gemzar/ Abraxane, with POD. Currently resistant to the standard regimens (FOLFORINOX and Gemzar/ Abraxane). CT CAP in April showed marked POD with increased size of lung nodules, likely new metastasis to liver and increased size in omental and peritoneal masses.  and CEA markedly increased in April.\par Abdominal pain and BM symptoms are likely secondary to omental and peritoneal masses. \par \par She is currently not candidate for standard chemo regimens given poor response earlier. I counseled patient to explore different clinical trials in the [facility]. She met with [facility] GI oncologist where there was no eligible trial. Per daughter, it posed some challenges with her insurance coverage. She was supposed to follow up with [facility] but was lost to follow up given hospitalizations. Given her recent events of complication, the patient is no longer immediate candidate for BMS trial. She is also requiring paracenteses every 1-2 weeks now.\par \par There was a discussion about low dose irinotecan but she had not tolerated it well before She had another appt to hear about trial options in the city but was most recently hospitalized.\par \par Plan:\par - Discussed GOC with patient and what patient wishes to have in life. She would like to continue treatment so far until she cannot tolerate side effects.\par - Will start 5-FU/nal-IRI (5FU with nanoliposomal irinotecan). Discussed that we will have to use low dose irinotecan given her history of diarrhea; discussed that giving low such medication at a low dose may not provide as much benefit as full dose irinotecan, with the risk of toxicities including diarrhea\par - Consented for low dose 5FU and nanoliposomal irinotecan (DK 7/6/23)\par - Will make referrral for an appt with Palliative Care\par - Discussed placing a peritoneal catheter for continuous drainage given frequent paracenteses. If IR not willing to place catheter, would need to schedule for weekly paracenteses (discussed with pt/family that they need to call IR to schedule since she is an established pt there)\par \par Doses:\par - 5FU 2000 mg/m2\par - nal-IRI 50 mg/m2\par \par Patient was seen with and plan discussed with Dr. David.\par \par Aryles Hedjar, MD, PGY-6\par Hematology/Oncology Fellow\par [facility]

## 2023-07-06 NOTE — PHYSICAL EXAM
[Ambulatory and capable of all self care but unable to carry out any work activities] : Status 2- Ambulatory and capable of all self care but unable to carry out any work activities. Up and about more than 50% of waking hours [Normal] : affect appropriate [de-identified] : distended abdomen due to ascites

## 2023-07-07 PROBLEM — C25.9 MALIGNANT NEOPLASM OF PANCREAS, UNSPECIFIED: Chronic | Status: ACTIVE | Noted: 2023-01-01

## 2023-07-10 PROBLEM — R33.9 RETENTION OF URINE: Status: ACTIVE | Noted: 2023-01-01

## 2023-07-10 NOTE — PHYSICAL EXAM
[General Appearance - Well Developed] : well developed [General Appearance - Well Nourished] : well nourished [Normal Appearance] : normal appearance [] : no respiratory distress [Respiration, Rhythm And Depth] : normal respiratory rhythm and effort [Exaggerated Use Of Accessory Muscles For Inspiration] : no accessory muscle use [FreeTextEntry1] : Distended [Urethral Meatus] : the meatus of the urethra showed no abnormalities [Affect] : the affect was normal [Mood] : the mood was normal [Not Anxious] : not anxious

## 2023-07-10 NOTE — HISTORY OF PRESENT ILLNESS
[FreeTextEntry1] : She is a 78-year-old woman who is seen today with family members for the first time.  She has history of metastatic pancreatic cancer.  In the last couple of days that she has not urinated only a few small drops.  She has significant cystitis as well.  She does not feel distended from urinary standpoint otherwise.  Her urine was clear.  Review of her records showed creatinine 0.66 urine culture was negative and CT scan report is below.  On office ultrasound it was difficult to distinguish between a situs or distended bladder.\par \par CT report from July 2023: Kidneys and ureters: Right renal cortical sub cm hypodense too small to characterize lesion, unchanged. Severely dilated right extrarenal pelvis and moderately dilated left extrarenal pelvis, unchanged. Symmetrical nephrograms.Pelvocalyceal dilation may be secondary to congenital variation, increased urine flow, vesicoureteral reflux or non specific collecting system obstruction.

## 2023-07-14 NOTE — DATA REVIEWED
Please schedule virtual visit and route back to RN.   [FreeTextEntry1] : \par CT ABDOMEN AND PELVIS WITH IV CONTRAST 5/31/2023\par \par FINDINGS:\par Tubes, catheters and devices: Central line tip extends into the cavoatrial junction\par \par Lungs: Multiple bilateral pulmonary nodules 1 of the largest in the subpleural right lower lobe increased from 1.4 cm to 1.8 cm series 301, image 8 and containing central hypodensity consistent with central necrosis\par \par Liver: Right and left lobe subcapsular enlarged masses 1 enlarged from \par 1.6 cm\par to 1.9 cm posterior right hepatic lobe region series 301, image 48.\par Gallbladder and bile ducts: The gallbladder is not identified. No \par progressive\par biliary dilation. Previously seen pneumobilia is not identified.\par Pancreas: Post Whipple procedure with pancreatic duct stent, unchanged.\par Spleen: Unremarkable\par Adrenal glands: Unremarkable\par Kidneys and ureters: Right renal cortical sub cm hypodense too small to\par characterize lesion, unchanged. Severely dilated right extrarenal pelvis \par and\par moderately dilated left extrarenal pelvis, unchanged. Symmetrical\par nephrograms.Pelvocalyceal dilation may be secondary to congenital \par variation,\par increased urine flow, vesicoureteral reflux or non specific collecting \par system\par obstruction.\par \par Stomach and bowel: Intraluminal hyperdensity in the stomach may be due to\par ingested opaque substance in the absence of any clinical concern of GI\par hemorrhage. Follow-up as indicated. No intestinal obstruction.\par Appendix: The appendix is not identified.\par \par Intraperitoneal space: Unchanged bilateral upper quadrant moderate amount\par ascites relatively more marked in the left upper quadrant region. \par Decreased\par residual small amount ascites superior to the urinary bladder. No free \par air.\par Omental caking greater omental region progressive in the upper quadrant \par regions\par more marked on the right. Epigastric abdominal wall incision without \par incisional\par hernia or fluid collection, unchanged\par Vasculature: Atherosclerotic abdominal aorta without aneurysm.\par Lymph nodes: No significant adenopathy\par \par Urinary bladder: Unremarkable .\par Reproductive: The uterus has been removed.\par \par Bones/joints: Degenerative changes axial spine. Secondary multilevel central\par canal stenosis relatively most marked at the L3-L4 level, unchanged. \par Congenital or acquired bilateral L5 spondylolysis without spondylolisthesis, unchanged \par Unchanged right sacral sclerotic likely bone island. Follow-up bone scan to\par exclude metastasis as indicated. No interval lytic or blastic bone lesions.\par Soft tissues: Unremarkable.\par \par IMPRESSION:\par 1.   Progressive peritoneal carcinomatosis and metastatic malignancy as noted. Specifically, relatively stable peritoneal disease slightly worsened hepatic metastatic disease since 5/31/2023.\par 2.   Additional findings as noted.

## 2023-07-14 NOTE — ASSESSMENT
[______] : HCP: [unfilled] [FreeTextEntry1] : 78yoF with:\par \par # Stage IV Pancreatic cancer - It is patient's wish to resume treatment as soon as possible. Is scheduled for 5-FU and Onivyde next week. Med onc followup. \par \par # Pain, neoplasm-related - C/w PRN morphine IR solution 3mg (1.5mL) q4hr. C/w Celebrex 100mg one to two times daily.  \par - C/w PRN Tylenol 500-1000mg. Cautioned patient of maximum dosage of Tylenol and prefer usage remain under 3gms/day. \par \par # Constipation/Tenesmus - Add lactulose Q6h for acute constipation.  c/w Miralax daily. \par \par # Urinary retention - patient reports little to no urination in recent days.  Unable to offer straight catheterization in office, patient does not wish to go to ED.  Urologist office contacted where patient has been seen before and they are able to see her today. Patient sent to Urology asap.\par \par # Abdominal ascites - Pt is scheduled for therapeutic paracentesis tomorrow; discussed how she would benefit from indwelling drainage catheter. Oncology team made aware of this.\par \par # Peripheral neuropathy - c/w Gabapentin 100mg TID\par \par # Loss of appetite - Appetite has been improved lately. C/w mirtazapine 15mg QHS. \par \par # Encounter for palliative care- Emotional support provided \par Health Care Proxy (HCP) form on file. Hospice benefits have previously been discussed in detail. Patient is clinically declining, she has been off of chemotherapy for 5 months.  Pt wishes to pursue further treatments. \par  \par Follow up in 1 week, call with questions/issues\par

## 2023-07-14 NOTE — HISTORY OF PRESENT ILLNESS
[FreeTextEntry1] : 78yoF with pancreatic cancer presents for follow up visit.\par PMH significant for CAD, HTN, HLD, IDDM, chronic hepatitis C, arthritis, cardiac stent 2019. \par \par Patient is Swedish-speaking, her  provided translation per patient's request. \par \par May 2022: Presented with RUQ abdominal pain intermittently x 1 week and was found to be jaundiced presenting with a T bili of 15 and Ca 19.9 was 6704. CT A/P showed a 2.1cm pancreatic head mass. 5/20/22- S/P EUS with [facility]NB pathology + for moderately differentiated adenocarcinoma 05/23/22- S/P ERCP- Plastic stent into CBD \par 06/13/22- Upfront Whipple surgery: Surgical pathology - Moderately differentiated adenocarcinoma , negative margins 5/17 LNs positive. Started adjuvant FOLFIRINOX on 8/1/22. \par \par Interval History (7/10/23):\par Patient seen in office for follow up palliative medicine visit. \par She has been off of chemotherapy since 2/2023. She was evaluated at outside cancer centers for clinical trial, unfortunately she has not been found to be eligible for any.  She is scheduled to resume chemotherapy \par \par Patient has reaccumulated ascites, is going to undergo paracentesis tomorrow. (Last para was 6/30. She had a paracentesis on 6/7 which drained 1.5L and then again on 6/22 which drained 3L. Following her para on 6/22 abdominal pain has increased). \par She has severe pain and discomfort in her abdomen and back, is using MS IR 3mg Q4h.  She uses Celebrex 100mg BID which helps for her back pain.  She uses PRN Tylenol 500mg, has not been taking lately. \par Has not been urinating well, has been having difficulty initiating a stream, describes only having a few drips of urine come out when she attempts to urinate.  She had completed a course of antibiotic for UTI last week.  \par \par ROS:\par +n/v - uses prochlorperazine PRN, patient is unsure if it is helping\par + constipation - using miralax \par + urinary retention \par + on insulin and following closely with Endocrinology for her diabetes control. \par + peripheral neuropathy - has been taking gabapentin 100mg TID\par + trouble sleeping - on zolpidem chronically\par Denies n/v, \par Remainder of ROS non-contributory. \par \par Patient is , lives with her  in an apartment in [place]. She is originally from [place] and has been in the [place] since [place]. They have 4 children, 3 live in [place]. Patient requires occasional assistance with ADLs, her  and daughter are able to assist. Patient's daughter serves as HHA, 3 times a week, 4-5 hours each time. Patient enjoys listening to music, reading and doing puzzles. She is emotionally supported by her  and children who are able to assist as needed. She enjoys going for walks, playing golf. She utilizes a cane, wheelchair and shower chair. \par \par Does puzzles to pass time. \par \par PMD: Dr Martínez:  \par \par I-STOP Ref#: [id number]

## 2023-07-14 NOTE — PHYSICAL EXAM
[General Appearance - Alert] : alert [Sclera] : the sclera and conjunctiva were normal [Neck Appearance] : the appearance of the neck was normal [Auscultation Breath Sounds / Voice Sounds] : lungs were clear to auscultation bilaterally [Edema] : there was no peripheral edema [Bowel Sounds] : normal bowel sounds [Abdomen Tenderness] : non-tender [Motor Tone] : muscle strength and tone were normal [] : no rash [No Focal Deficits] : no focal deficits [Oriented To Time, Place, And Person] : oriented to person, place, and time [Affect] : the affect was normal [Normal Oral Mucosa] : normal oral mucosa [Heart Rate And Rhythm] : heart rate was normal and rhythm regular [Heart Sounds] : normal S1 and S2 [FreeTextEntry1] : +pallor

## 2023-07-17 PROBLEM — E11.9 DM (DIABETES MELLITUS), TYPE 2: Status: ACTIVE | Noted: 2022-07-06

## 2023-07-17 PROBLEM — R97.8 ELEVATED CA 19-9 LEVEL: Status: ACTIVE | Noted: 2022-01-01

## 2023-07-17 NOTE — END OF VISIT
[FreeTextEntry4] : Patient being seen as per physician's primary plan of care. [Time Spent: ___ minutes] : I have spent [unfilled] minutes of time on the encounter. [>50% of the face to face encounter time was spent on counseling and/or coordination of care for ___] : Greater than 50% of the face to face encounter time was spent on counseling and/or coordination of care for [unfilled]

## 2023-07-17 NOTE — ASSESSMENT
[FreeTextEntry1] : 77 y/o Bulgarian-speaking woman with resected pancreatic cancer pT3N2 PNI+/LVI+ disease, margin negative, s/p 5 cycles of FOLFORINOX, then 4 cycles of FOLFOX, then 3 cycles of 5 -FU, then 4 cycles of Gemzar/ Abraxane presented for a follow up.\par \par Post-op imaging demonstrated lung lesions c/w metastasis. Considered biopsy of one, but it is very small, sub-centimeter, and discussion with radiology noted likely under size threshold for biopsy. In addition, would not change plan to proceed with FOLFIRINOX as even if patient had histologic confirmation would not qualify for GIANT trial due to language barrier. Will proceed with FOLFIRINOX. Also discussed with patient that [facility], now a [facility], can provide treatment a bit closer to home, but she declined this at this time. Thus we proceeded with FOLFIRINOX. There is concern now that she has relapsed given her rising tumor markers and positive Signatera. BRCA2 but VUS.\par \par S/p 5 cycles of FOLFORINOX, then 4 cycles of FOLFOX, then 3 cycles of 5 -FU, then 4 cycles of Gemzar/ Abraxane, with POD. Currently resistant to the standard regimens (FOLFORINOX and Gemzar/ Abraxane). CT CAP in April showed marked POD with increased size of lung nodules, likely new metastasis to liver and increased size in omental and peritoneal masses.  and CEA markedly increased in April.\par Abdominal pain and BM symptoms are likely secondary to omental and peritoneal masses. \par \par She is currently not candidate for standard chemo regimens given poor response earlier. I counseled patient to explore different clinical trials in the [facility]. She met with [facility] GI oncologist where there was no eligible trial. Per daughter, it posed some challenges with her insurance coverage. She was supposed to follow up with [facility] but was lost to follow up given hospitalizations. Given her recent events of complication, the patient is no longer immediate candidate for BMS trial. She is also requiring paracenteses every 1-2 weeks now.\par \par There was a discussion about low dose irinotecan but she had not tolerated it well before She had another appt to hear about trial options in the city but was most recently hospitalized.\par \par Plan:\par - Discussed GOC with patient and what patient wishes to have in life. She would like to continue treatment so far until she cannot tolerate side effects.\par Pt is weak and frail but wishes to continue with chemotherapy \par - Will start 5-FU/nal-IRI (5FU with nanoliposomal irinotecan). \par Discussed that we will have to use low dose irinotecan given her history of diarrhea; discussed that giving low such medication at a low dose may not provide as much benefit as full dose irinotecan, with the risk of toxicities including diarrhea\par Pt and family would like to hold off on placing the Pleurx catheter concern for infection with catheter .\par \par Doses:\par - 5FU 2000 mg/m2\par - nal-IRI 50 mg/m2\par \par Discussed with pt to follow up with PCP to consider dose reduction on her BP meds - \par Will need weekly paracentesis appointments \par \par \par RTC in 2 weeks\par

## 2023-07-17 NOTE — REVIEW OF SYSTEMS
[Fatigue] : fatigue [Lower Ext Edema] : lower extremity edema [Shortness Of Breath] : shortness of breath [Abdominal Pain] : abdominal pain [Negative] : Heme/Lymph [Chest Pain] : no chest pain [Cough] : no cough [SOB on Exertion] : no shortness of breath during exertion [FreeTextEntry9] : shoulder, neck and back pain [FreeTextEntry7] : ascites

## 2023-07-17 NOTE — HISTORY OF PRESENT ILLNESS
[de-identified] : 77 y/o Khmer-speaking woman with resected pancreatic cancer pT3N2 PNI+/LVI+ disease, margin negative, presenting for adjuvant treatment discussion\par \par Other Current Medical Problems: IDDM diagnosed >20 years, HTN, HLD, CAD, Cardiac stent 2019 , chronic hep C (S/P treatment) arthritis , \par \par Oncological History :\par May 2022: Presented with RUQ abdominal pain intermittently x 1 week and was found to be jaundiced presenting with a T bili of 15 and Ca 19.9 was 6704.\par 05/20/22: CT A/P - 2.1 cms pancreatic head mass with resultant abrupt cut off the CBD.Ct chest with several indeterminate pulmonary nodules. \par 05/20/22- S/P EUS with FNB pathology + for moderately differntiated adenocarcinoma 05/23/22- S/P ERCP- Plastic stent into CBD \par 06/13/22- Upfront Whipple surgery: Surgical pathology - Moderately differentiated adenocarcinoma , negative margins 5/17 LN +ve.\par 07/19/22 CT CAP no e/o local disease; however scattered pulmonary nodules, some of which are new.\par 08/01/22- C # 1 FOLFORINOX - 5FU 2000 mg/m2/  mg/ m2/ Irinotecan 120 mg/ m2/ Oxaliplatin 70 mg/ m2 \par 08/15/22- C # 2 FOLFORINOX - 5FU 2000 mg/m2/  mg/ m2/ Irinotecan 120 mg/ m2/ Oxaliplatin 70 mg/ m2 \par 08/29/22- C # 3 FOLFORINOX - 5FU 2000 mg/m2/  mg/ m2/ Irinotecan 120 mg/ m2/ Oxaliplatin 70 mg/ m2 \par 09/12/22- C # 4 FOLFORINOX - 5FU 2000 mg/m2/  mg/ m2/ Irinotecan 120 mg/ m2/ Oxaliplatin 70 mg/ m2 \par 09/14/22 Restaging scans show no evidence of POD or metastatic disease\par 09/26/22- C #5 FOLFORINOX - 5FU 2000 mg/m2/  mg/ m2/ Irinotecan 75 mg/ m2/ Oxaliplatin 70 mg/ m2 \par 10/10/22- C # 6 FOLFOX- - 5FU 2000 mg/m2/  mg/Oxaliplatin 55 mg/m2 -( stop Irinotecan sec to diarrhea) \par 10/24/22- C # 7 FOLFOX 5FU 2000 mg/m2/  mg/Oxaliplatin 55 mg/m2\par 11/07/22- C # 8- FOLFOX \par \par 11/16/22: Restaging CT scans--Mild increase in size of several previously seen noncalcified pulmonary nodules as described above\par No imaging evidence of locally recurrent or metastatic disease involving the abdomen or pelvis.\par 11/21/22- C # 9 FOLFOX\par 12/05/22- C # 10 only 5 FU pump- (stopped Oxali sec to worsening neuropathy) \par 12/19/22- C # 11- 5 FU pump only \par 01/03/23- C # 12 - 5 FU pump only \par 01/05/23- CT A/P/C Multiple bilateral pulmonary nodules, some of which are increased in size from the prior exam.New mild omental nodularity and peritoneal thickening, suspicious for peritoneal carcinomatosis.\par 01/17/23: C #1 Gemzar 800 mg/m2 / Abraxane 100 mg/m2 \par 01/30/23: C# 2 Gemzar 800 mg/m2 / Abraxane 100 mg/m2 \par 02/13/23- C # 3 [place] 600mg/m2/ Abraxane 75 mg/m2 (Dose reduction for poor tolerance) \par 02/27/23- C # 4 [place] 600mg/m2/ Abraxane 75 mg/m2 (Dose reduction for poor tolerance) \par 02/28/23- CT CAP Stable pulmonary metastases in the short interim. Mildly increased omental and pelvic peritoneal metastases\par Increased omental carcinomatosis for example 2.5 x 1.4 cm omental nodule (3, 102) previously measured 1 x 0.9 cm. A central mesenteric metastasis now measures 1.1 cm (3, 101) previously 0.9 cm. No ascites.\par 04/26/23 CT CAP with progression of disease - progression in lungs, liver, peritoneal disease\par 05/31/23 CT CAP progressive disease with more peritoneal and omental disease\par 6/30/23 4.2L paracentesis, +malignancy\par 7/1-7/523 Hospitalized at [facility] for fevers and encephalopathy, CT head showed R lacunar infarct (unclear if acute or chronic). CT A/P showed worsening peritoneal and hepatic metastasis since 5/31\par \par 07/17/23- C#1 5 FU 2000 mg/m2/ Onivyde 50 mg/m2/  mg/m2 \par \par \par PMD: Dr Martínez - [phone] \par \par FHX: Brother brain aneurysm diagnosed at 50, Sister had Aneuresym ,daughter Leukemia (27 years old) \par Social HX: Lives with , Apartment, no smoker, no alcohol, walks about 30 mts 2-3 times a day.\par PSX: Total hysterectomy , Whipple procedure (06/13/22) \par Disease: Pancreatic adenocarcinomaPathology:AJCC Stage :Tumor Markers: CEA/ Ca 19.9. \par \par [facility]- BRCA2 Variant heterozygous- c.1964C>A \par [facility] -08/02/22 was positive - 0.58\par 09/12/22- 0.14\par 09/20/22- 0.44\par Foundation one CDx- KAILEY, 2 muts/ Mb, KRAS Q61H,CDKN2A, CSF3R amplification \par \par \par \par  \par \par  [de-identified] : 7/17/23:\par PT seen in office accompanied by her . \par She has been feeling weak and tred. \par Today her BP In office is 92/50 \par Pt is still taking Metoprolol and Norvasc for HTN - Advised to follow up with her PCP - to consider dose reduction . \par Pt is reaccumulating fluids in abdomen - Recurrent  Large volume tap - will order Albumin with the tap \par Ordered Paracentesis with Albumin \par Discussed on chemo side effects and what to expect from chemo.

## 2023-07-17 NOTE — PHYSICAL EXAM
[Ambulatory and capable of all self care but unable to carry out any work activities] : Status 2- Ambulatory and capable of all self care but unable to carry out any work activities. Up and about more than 50% of waking hours [Normal] : affect appropriate [Mucositis] : mucositis [de-identified] : distended abdomen due to ascites, fluid reaccumulating

## 2023-07-17 NOTE — REASON FOR VISIT
[Follow-Up Visit] : a follow-up [Family Member] : family member [[employer] ] : provided by [employer]   [FreeTextEntry2] : Pancreatic adenocarcinoma [Interpreters_IDNumber] : [id number]

## 2023-07-18 PROBLEM — R63.0 LOSS OF APPETITE: Status: ACTIVE | Noted: 2022-01-01

## 2023-07-18 PROBLEM — G89.3 PAIN, CANCER: Status: ACTIVE | Noted: 2023-01-01

## 2023-07-18 PROBLEM — R18.8 ABDOMINAL ASCITES: Status: ACTIVE | Noted: 2023-01-01

## 2023-07-18 PROBLEM — C25.9 ADENOCARCINOMA OF PANCREAS: Status: ACTIVE | Noted: 2022-05-27

## 2023-07-18 PROBLEM — K59.00 CONSTIPATION: Status: ACTIVE | Noted: 2023-01-01

## 2023-07-18 PROBLEM — Z51.5 ENCOUNTER FOR PALLIATIVE CARE: Status: ACTIVE | Noted: 2022-01-01

## 2023-07-18 NOTE — ASSESSMENT
[______] : HCP: [unfilled] [FreeTextEntry1] : 78yoF with:\par \par # Stage IV Pancreatic cancer - On 5FU with nanoliposomal irinotecan. Med onc followup. \par - Patient was evaluated at outside cancer centers for clinical trial, unfortunately she has not been found to be eligible for any. \par \par # Pain, neoplasm-related - Will microdose methadone 1mg QHS as patient is sensitive to medications, with intention to uptitrate (EKG 5/31 NMf=[id number]). C/w PRN morphine IR solution 3mg (1.5mL) q4hr. C/w Celebrex 100mg one to two times daily.  \par - C/w PRN Tylenol 500-1000mg. Cautioned patient of maximum dosage of Tylenol and prefer usage remain under 3gms/day. \par \par # Abdominal ascites - Pt is scheduled for therapeutic paracentesis tomorrow; discussed how she would benefit from indwelling drainage catheter; patient will consider. \par \par # Loss of appetite - C/w mirtazapine 15mg QHS. Patient tends to eat more immediately following paracentesis. Recommend small, frequent meals focused on high-calorie, high-protein, nutrient dense healthy foods. \par \par # Constipation/Tenesmus - C/w PRN milk of magnesia. Patient now with loose stools, monitor for constipation. \par \par # Urinary retention - Improved following straight cath with urology, Dr. Padgett. Monitor for further retention. \par \par # Peripheral neuropathy - c/w Gabapentin 100mg TID\par \par # Encounter for palliative care- Emotional support provided.\par Health Care Proxy (HCP) form on file. Hospice benefits have previously been discussed in detail. Patient is clinically declining, she has been off of chemotherapy for 5 months.  Pt wishes to pursue further treatments. \par  \par Follow up in 2 weeks, call with questions/issues\par

## 2023-07-18 NOTE — DATA REVIEWED
[FreeTextEntry1] : CT ABDOMEN AND PELVIS WITH IV CONTRAST 5/31/2023\par \par FINDINGS:\par Tubes, catheters and devices: Central line tip extends into the cavoatrial junction\par \par Lungs: Multiple bilateral pulmonary nodules 1 of the largest in the subpleural right lower lobe increased from 1.4 cm to 1.8 cm series 301, image 8 and containing central hypodensity consistent with central necrosis\par \par Liver: Right and left lobe subcapsular enlarged masses 1 enlarged from \par 1.6 cm\par to 1.9 cm posterior right hepatic lobe region series 301, image 48.\par Gallbladder and bile ducts: The gallbladder is not identified. No \par progressive\par biliary dilation. Previously seen pneumobilia is not identified.\par Pancreas: Post Whipple procedure with pancreatic duct stent, unchanged.\par Spleen: Unremarkable\par Adrenal glands: Unremarkable\par Kidneys and ureters: Right renal cortical sub cm hypodense too small to\par characterize lesion, unchanged. Severely dilated right extrarenal pelvis \par and\par moderately dilated left extrarenal pelvis, unchanged. Symmetrical\par nephrograms.Pelvocalyceal dilation may be secondary to congenital \par variation,\par increased urine flow, vesicoureteral reflux or non specific collecting \par system\par obstruction.\par \par Stomach and bowel: Intraluminal hyperdensity in the stomach may be due to\par ingested opaque substance in the absence of any clinical concern of GI\par hemorrhage. Follow-up as indicated. No intestinal obstruction.\par Appendix: The appendix is not identified.\par \par Intraperitoneal space: Unchanged bilateral upper quadrant moderate amount\par ascites relatively more marked in the left upper quadrant region. \par Decreased\par residual small amount ascites superior to the urinary bladder. No free \par air.\par Omental caking greater omental region progressive in the upper quadrant \par regions\par more marked on the right. Epigastric abdominal wall incision without \par incisional\par hernia or fluid collection, unchanged\par Vasculature: Atherosclerotic abdominal aorta without aneurysm.\par Lymph nodes: No significant adenopathy\par \par Urinary bladder: Unremarkable .\par Reproductive: The uterus has been removed.\par \par Bones/joints: Degenerative changes axial spine. Secondary multilevel central\par canal stenosis relatively most marked at the L3-L4 level, unchanged. \par Congenital or acquired bilateral L5 spondylolysis without spondylolisthesis, unchanged \par Unchanged right sacral sclerotic likely bone island. Follow-up bone scan to\par exclude metastasis as indicated. No interval lytic or blastic bone lesions.\par Soft tissues: Unremarkable.\par \par IMPRESSION:\par 1.   Progressive peritoneal carcinomatosis and metastatic malignancy as noted. Specifically, relatively stable peritoneal disease slightly worsened hepatic metastatic disease since 5/31/2023.\par 2.   Additional findings as noted.

## 2023-07-18 NOTE — HISTORY OF PRESENT ILLNESS
[FreeTextEntry1] : 78yoF with pancreatic cancer presents for follow up visit.\par PMH significant for CAD, HTN, HLD, IDDM, chronic hepatitis C, arthritis, cardiac stent 2019. \par \par Patient is Thai-speaking, her  provided translation per patient's request. \par \par May 2022: Presented with RUQ abdominal pain intermittently x 1 week and was found to be jaundiced presenting with a T bili of 15 and Ca 19.9 was 6704. CT A/P showed a 2.1cm pancreatic head mass. 5/20/22- S/P EUS with FNB pathology + for moderately differentiated adenocarcinoma 05/23/22- S/P ERCP- Plastic stent into CBD \par 06/13/22- Upfront Whipple surgery: Surgical pathology - Moderately differentiated adenocarcinoma , negative margins 5/17 LNs positive. Started adjuvant FOLFIRINOX on 8/1/22. \par \par Interval History (7/17/23):\par Patient seen for follow up palliative medicine visit in treatment room, accompanied by her . She is resuming chemotherapy treatment this afternoon with 5FU/irinotecan. She has been off of chemotherapy since 2/2023. She is having weekly paracentesis for her abdominal ascites, last drained 7/11. She is scheduled for tomorrow as she is quickly reaccumulating. It was recommended that she have a catheter placed however the patient and family remain hesitant given the risk of infection. She reports shortness of breath on exertion and as the day goes on. She continues to experience severe pain to her abdomen and lower back. She is using MS IR 3mg every four hours, including overnight as well. She is using five to six doses per day.  She uses Celebrex 100mg BID which helps for her back pain. Appetite is poor, she reports early satiety after few mouthfuls. She has thick secretions, this causes intermittent nausea. Constipation has improved with milk of magnesia twice a day, now experiencing loose stools. She continues to urinate small amounts. She was evaluated and straight catheterized by her urologist on 7/10. There was found to be a small amount of urine, ~100cc. No complaints of urinary symptoms since.  Sleep interrupted due to dyspnea and pain. \par \par ROS:\par + HANSEN and with laying flat\par + n/v - uses prochlorperazine PRN; improved\par + constipation - using milk of magnesia\par + urinary retention; resolved\par + on insulin and following closely with Endocrinology for her diabetes control. \par + peripheral neuropathy - has been taking gabapentin 100mg TID\par + trouble sleeping - on zolpidem chronically\par Denies n/v, \par Remainder of ROS non-contributory. \par \par Patient is , lives with her  in an apartment in [place]. She is originally from [place] and has been in the  since 1988. They have 4 children, 3 live in [place]. Patient requires occasional assistance with ADLs, her  and daughter are able to assist. Patient's daughter serves as HHA, 3 times a week, 4-5 hours each time. Patient enjoys listening to music, reading and doing puzzles. She is emotionally supported by her  and children who are able to assist as needed. She enjoys going for walks, playing golf. She utilizes a cane, wheelchair and shower chair. \par \par Does puzzles to pass time. \par \par PMD: Dr Martínez:  \par \par I-STOP Ref#: [id number]

## 2023-07-18 NOTE — PHYSICAL EXAM
[General Appearance - Alert] : alert [Sclera] : the sclera and conjunctiva were normal [Normal Oral Mucosa] : normal oral mucosa [Neck Appearance] : the appearance of the neck was normal [Auscultation Breath Sounds / Voice Sounds] : lungs were clear to auscultation bilaterally [Heart Rate And Rhythm] : heart rate was normal and rhythm regular [Heart Sounds] : normal S1 and S2 [Edema] : there was no peripheral edema [Bowel Sounds] : normal bowel sounds [Abdomen Tenderness] : non-tender [Motor Tone] : muscle strength and tone were normal [] : no rash [No Focal Deficits] : no focal deficits [Oriented To Time, Place, And Person] : oriented to person, place, and time [Affect] : the affect was normal [General Appearance - In No Acute Distress] : in no acute distress [Respiration, Rhythm And Depth] : normal respiratory rhythm and effort [FreeTextEntry1] : +pallor

## 2023-07-20 PROBLEM — K13.79 MOUTH SORES: Status: ACTIVE | Noted: 2023-01-01

## 2023-07-20 NOTE — ASU PATIENT PROFILE, ADULT - PRO INTERPRETER NEED 2
[place] Topical Sulfur Applications Counseling: Topical Sulfur Counseling: Patient counseled that this medication may cause skin irritation or allergic reactions.  In the event of skin irritation, the patient was advised to reduce the amount of the drug applied or use it less frequently.   The patient verbalized understanding of the proper use and possible adverse effects of topical sulfur application.  All of the patient's questions and concerns were addressed.

## 2023-07-23 NOTE — H&P ADULT - PROBLEM SELECTOR PLAN 2
had 5 episodes of diarrhea yesterday, 1 this morning. non-bloody. has not had any additional episodes this morning, likely 2/2 chemotherapy and metastatic disease. lower suspicion for infectious etiology given vital signs and labs (no leukocytosis or neutropenia), will r/o  -f/u GI PCR  -f/u stool culture  -f/u Cdiff PCR  -continue contact precautions had 5 episodes of diarrhea yesterday, 1 this morning. non-bloody. has not had any additional episodes this morning, likely 2/2 chemotherapy and metastatic disease. lower suspicion for infectious etiology given vital signs and labs (no leukocytosis or neutropenia), will r/o.  -f/u GI PCR  -f/u stool culture, blood culture  -f/u Cdiff PCR  -continue contact precautions had 5 episodes of diarrhea yesterday, 1 this morning. non-bloody. has not had any additional episodes since then, likely 2/2 chemotherapy and metastatic disease. lower suspicion for infectious etiology given vital signs and labs (no leukocytosis or neutropenia), will complete infectious workup to r/o  -f/u GI PCR  -f/u stool culture, blood culture  -f/u Cdiff PCR  -continue contact precautions

## 2023-07-23 NOTE — H&P ADULT - NSHPLABSRESULTS_GEN_ALL_CORE
Personally reviewed old records.  Personally reviewed labs.  Personally reviewed imaging.  Personally reviewed EKG.                          10.2   8.19  )-----------( 359      ( 2023 09:30 )             31.3           125<L>  |  92<L>  |  11  ----------------------------<  147<H>  4.3   |  19<L>  |  0.53    Ca    8.1<L>      2023 09:30    TPro  5.8<L>  /  Alb  2.5<L>  /  TBili  0.4  /  DBili  x   /  AST  48<H>  /  ALT  22  /  AlkPhos  358<H>              LIVER FUNCTIONS - ( 2023 09:30 )  Alb: 2.5 g/dL / Pro: 5.8 g/dL / ALK PHOS: 358 U/L / ALT: 22 U/L / AST: 48 U/L / GGT: x                 Urinalysis Basic - ( 2023 09:40 )    Color: Yellow / Appearance: Cloudy / S.017 / pH: x  Gluc: x / Ketone: Trace mg/dL  / Bili: Negative / Urobili: 1.0 mg/dL   Blood: x / Protein: Trace mg/dL / Nitrite: Negative   Leuk Esterase: Small / RBC: 18 /HPF / WBC 2 /HPF   Sq Epi: x / Non Sq Epi: 2 /HPF / Bacteria: Moderate /HPF    CT abd/pelvis    FINDINGS:  LOWER CHEST: Grossly unchanged masslike densities in both breasts.   Grossly unchanged numerous bilateral pulmonary metastases.    LIVER: Grossly unchanged multiple hepatic metastases.  BILE DUCTS: Normal in caliber.  GALLBLADDER: Status post cholecystectomy.  SPLEEN: Normal in size and configuration.  PANCREAS: Status post Whipple procedure with pancreatic duct stent,   unchanged.  ADRENALS: No mass.  KIDNEYS/URETERS: Decrease in dilatation of right extrarenal pelvis.   Subcentimeter right renal hypodensity too small to characterize.    BLADDER: Prominently fluid distended.  REPRODUCTIVE ORGANS: Status post hysterectomy.    BOWEL: No bowel obstruction. Little change inserosal   thickening/infiltration of transverse colon.  OMENTUM/PERITONEUM: Little change in extensive omental and metastatic   caking. Considerable increase in now very large amount of abdominal and   pelvic ascites.  VESSELS: No aneurysm. Atherosclerotic calcification.  RETROPERITONEUM/LYMPH NODES: No significant lymphadenopathy.  ABDOMINAL WALL: Increase in areas of fluid accumulation in bilateral   anterior abdominal wall.  BONES: Unchanged in appearance.    IMPRESSION:    Interval increase in now very large amount of abdominal and pelvic   ascites since prior CT of 2023.    Little change in extensive omental metastatic caking.    Little change in serosal thickening/infiltration of transverse colon.    Grossly unchanged multiple hepatic and pulmonary metastases.      CXR    FINDINGS:  Tunneled chest port terminating in the right atrium  The heart is normal in size.  Mild bilateral lower zone patchy airspace disease and/or mild effusions.    There is no pneumothorax or pleural effusion.  No acute bony abnormality.    IMPRESSION:  Chest port terminating in the right ventricle  Mild bilateral lower zone patchy airspace disease and/or mild effusions.  .

## 2023-07-23 NOTE — H&P ADULT - PROBLEM SELECTOR PLAN 8
diet: soft and bite sized  dvt ppx: lovenox  code:  dispo: pending clinical improvement  bowel regimen miralax and senna will hold home zolpidem  -continue Melatonin PRN pt has chronic diffuse body pain, including chest, abdomen, and extremities, in setting of metastatic disease and chemo. Pain managed by oxycodone and morphine at home.  -c/w Tylenol 650mg q6 prn for mild pain, morphine 2mg q6 prn for moderate pain, and morphine 2mg q6 prn severe pain  -will consider oncology consult in AM (oncologist is Dr. Juan David)

## 2023-07-23 NOTE — ED PROVIDER NOTE - CLINICAL SUMMARY MEDICAL DECISION MAKING FREE TEXT BOX
77 yo F w/ hx pancreatic Ca s/p Whipple's on chemo therapy, recently admitted for AMS and fever, found to have chronic lacunar stroke who was brought in by EMS with complains of n/v/d, and worsening severe abdominal pain. 79 yo F w/ hx pancreatic Ca s/p Whipple's on chemo therapy, recently admitted for AMS and fever, found to have chronic lacunar stroke who was brought in by EMS with complains of n/v/d, and worsening severe abdominal pain, concerning for cdiff, gi infection given recent chemo, or mass effect by POD of Pancreatic Ca, pending labs, imaging, and infectious w/u. symptoms management with IVF, Dilaudid

## 2023-07-23 NOTE — H&P ADULT - NSHPREVIEWOFSYSTEMS_GEN_ALL_CORE
REVIEW OF SYSTEMS:  CONSTITUTIONAL: +Generalized weakness. No fevers. No chills.   EYES: No blurry vision. No eye pain.  ENT/NECK:No dysphagia. No sore throat. No Sinusitis/rhinorrhea.  CARDIAC:  No palpitations. No lightheadedness.   RESPIRATORY: No cough. +SOB.  GASTROINTESTINAL: +abd pain. +NVD No constipation  GENITOURINARY: No dysuria. No frequency.   NEUROLOGICAL: No numbness/tingling. No focal weakness. No headache.  BACK: +back pain. No flank pain.  EXTREMITIES: +lower extremity edema. Full ROM. No joint pain.  SKIN: No rashes. No other lesions.  All other systems reviewed and are negative unless indicated above. REVIEW OF SYSTEMS:  CONSTITUTIONAL: +Generalized weakness. No fevers. No chills.   EYES: No blurry vision. No eye pain.  ENT/NECK:+Mouth pain and sores. No dysphagia. No sore throat. No Sinusitis/rhinorrhea.  CARDIAC:  No palpitations. No lightheadedness.   RESPIRATORY: No cough. +SOB.  GASTROINTESTINAL: +abd pain. +NVD No constipation  GENITOURINARY: No dysuria. No frequency.   NEUROLOGICAL: No numbness/tingling. No focal weakness. No headache.  BACK: +back pain. No flank pain.  EXTREMITIES: +lower extremity edema. Full ROM. No joint pain.  SKIN: No rashes. No other lesions.  All other systems reviewed and are negative unless indicated above.

## 2023-07-23 NOTE — ED PROVIDER NOTE - ATTENDING CONTRIBUTION TO CARE
I have personally performed a face to face medical and diagnostic evaluation of the patient. I have discussed with and reviewed the Resident's note and agree with the History, ROS, Physical Exam and MDM unless otherwise indicated. A brief summary of my personal evaluation and impression can be found below.    Trista STOCKTON: 78-year-old female history of pancreatic CA status post Whipple, recently initiated chemotherapy earlier this week, was recently admitted for altered mental status and fever, also with history of diabetes hypertension, presents with a chief complaint of diffuse abdominal discomfort associated with repeated episodes of nausea and vomiting as well as nonbloody diarrhea.  Nonbloody.  Is noting poor p.o. intake, all of the symptoms were exacerbated in the setting her recently started chemo.  No chest pain no trouble breathing notes some discomfort with urination.    All other ROS negative, except as above and as per HPI and ROS section.    VITALS: Initial triage and subsequent vitals have been reviewed by me.  GEN APPEARANCE: Alert, non-toxic, well-appearing, NAD.  HEAD: Atraumatic.  EYES: PERRLa, EOMI, vision grossly intact.   NECK: Supple  CV: RRR, S1S2, no c/r/m/g. Cap refill <2 seconds. No bruits.   LUNGS: CTAB. No abnormal breath sounds.  ABDOMEN: Mild diffuse upper abdominal tenderness with fullness to the upper abdomen.  MSK/EXT: No spinal or extremity point tenderness. No CVA ttp. Pelvis stable. No peripheral edema.  NEURO: Alert, follows commands. Weight bearing normal. Speech normal. Sensation and motor normal x4 extremities.   SKIN: Warm, dry and intact. No rash.  PSYCH: Appropriate    Plan/MDM:  Exam patient arrives tachycardic, with physical exam as above, DDx concern for possible obstructive pathology, would consider infectious etiology, C. difficile is a possibility though less likely given stool burden as well as based on stool sample provided here, also considering worsening metastatic or cancer burden.  Consider urinary tract infection as well, plan to check basic labs EKG cardiac enzymes urine will get CT abdomen and pelvis, will give meds as needed and reassess, anticipate admission.

## 2023-07-23 NOTE — ED PROVIDER NOTE - PHYSICAL EXAMINATION
PHYSICAL EXAM    Vital Signs Last 24 Hrs  T(C): 36.8 (23 Jul 2023 07:19), Max: 36.8 (23 Jul 2023 07:19)  T(F): 98.2 (23 Jul 2023 07:19), Max: 98.2 (23 Jul 2023 07:19)  HR: 118 (23 Jul 2023 07:19) (118 - 118)  BP: 134/86 (23 Jul 2023 07:19) (134/86 - 134/86)  BP(mean): --  RR: 20 (23 Jul 2023 07:19) (20 - 20)  SpO2: 97% (23 Jul 2023 07:19) (97% - 97%)    Parameters below as of 23 Jul 2023 07:19  Patient On (Oxygen Delivery Method): room air      GENERAL: in acute distress, sitting comfortably in the bed  HEAD:  Atraumatic, Normocephalic  EYES: EOMI b/l, PERRLA b/l, conjunctiva and sclera clear  NECK: Supple, No JVD, No LAD   CHEST/LUNG: Clear to auscultation bilaterally; No wheeze or rhonchi  HEART: Regular rate and rhythm; S1 and S2 present, No murmurs, rubs, or gallops  ABDOMEN: tender to palpation, distended, and organomegaly can be appreciated  EXTREMITIES:  2+ Peripheral Pulses, No clubbing, cyanosis, or edema  NEURO: AAOx3, non-focal   SKIN: mucosal membrane lesion noted in the right cheek

## 2023-07-23 NOTE — H&P ADULT - PROBLEM SELECTOR PLAN 3
- f/u abdominal ultrasound to evaluate for ascites  -will reach out to procedure team for inpatient tap  -will give Lasix 20mg IV x1 and reassess  -monitor volume status  -strict I&O Patient has worsening recurrent abdominal distension and BLE edema, likely in setting of metastatic disease. Low suspicion for infection at this time. Scheduled for outpatient para 7/26.  - f/u abdominal ultrasound to evaluate for ascites  -will reach out to procedure team for inpatient tap  -will give Lasix 20mg IV x1 and reassess  -monitor volume status  -strict I&O Patient has worsening recurrent abdominal distension and BLE edema, likely in setting of metastatic disease. Low suspicion for infection at this time. Large volume ascites confirmed on CT A/P.  Scheduled for outpatient paracentesis 7/26.  - f/u abdominal ultrasound to evaluate for ascites  -will reach out to procedure team for inpatient tap  -will give Lasix 20mg IV x1 and reassess  -monitor volume status  -strict I&O

## 2023-07-23 NOTE — ED ADULT NURSE NOTE - OBJECTIVE STATEMENT
Patient is a 77 yo female, h/x stage IV pancreatic CA, HTN, HLD, CAD w/ stent, DM2, presenting with diarrhea starting yesterday. AAOx4, appears in extreme pain, reports nausea/vomiting since last chemo on Monday. Abdomen soft, tender, distended. Denies fever, CP, SOB, urinary symptoms. Placed on cardiac monitor, sinus tach, other VSS. R chest wall port accessed with 20G 3/4 in needle, labs sent per orders. Medicated for pain per orders. Pending CT. Fall precautions maintained.

## 2023-07-23 NOTE — H&P ADULT - PROBLEM SELECTOR PLAN 4
Hyponatremic to 125 in ED, no changes in mental status. Etiology is uncertain - could be SIADH in setting of chemotherapy, could also be from hypovolemia, diarrhea, and third spacing.  -f/u urine lytes, Ucx  -monitor BMP Hyponatremic to 125 in ED, no changes in mental status or neurological deficits. Etiology is uncertain - could be SIADH in setting of chemotherapy, could also be from hypovolemia, diarrhea, and third spacing. Will reassess and treat after urine lytes result  -f/u urine lytes, Ucx  -monitor BMP

## 2023-07-23 NOTE — H&P ADULT - PROBLEM SELECTOR PLAN 1
Patient complains of painful sores throughout mouth and tongue that have been present for several weeks but have worsened within the past week. On exam has lesions to mucosa of inner cheeks, base of tongue, and lips. Low suspicion for thrush. Her sores are likely 2/2 chemotherapy.   -c/w magic mouthwash   -c/w Benzocaine spray  -c/w Tylenol 650mg q6 prn for mild pain, morphine 2mg q6 prn for moderate pain, and morphine 2mg q6 prn severe pain Patient complains of painful sores throughout mouth and tongue that have been present for several weeks but have worsened within the past week. On exam has lesions to mucosa of inner cheeks, base of tongue, and lips. Low suspicion for thrush or other ongoing infection. Her sores are likely 2/2 chemotherapy.   -c/w magic mouthwash   -c/w Benzocaine spray  -c/w Tylenol 650mg q6 prn for mild pain, morphine 2mg q6 prn for moderate pain, and morphine 2mg q6 prn severe pain  -monitor Patient complains of painful sores throughout mouth and tongue that have been present for several weeks but have worsened within the past week. On exam has tender lesions to mucosa of inner cheeks, base of tongue, and lips. Low suspicion for thrush or other ongoing infection. Her sores are likely 2/2 chemotherapy.   -c/w magic mouthwash   -c/w Benzocaine spray  -c/w Tylenol 650mg q6 prn for mild pain, morphine 2mg q6 prn for moderate pain, and morphine 2mg q6 prn severe pain  -monitor

## 2023-07-23 NOTE — H&P ADULT - ASSESSMENT
78F with hx of metastatic pancreatic CA s/p whipple on Chemo (last session 7/17) presents with NVD and abdominal pain likely in setting of worsening metastatic disease.

## 2023-07-23 NOTE — H&P ADULT - PROBLEM SELECTOR PLAN 9
diet: soft and bite sized  dvt ppx: lovenox  code:  dispo: pending clinical improvement  bowel regimen miralax and senna diet: soft and bite sized  dvt ppx: lovenox  dispo: pending clinical improvement  bowel regimen miralax and senna will hold home zolpidem  -continue Melatonin PRN

## 2023-07-23 NOTE — H&P ADULT - PROBLEM SELECTOR PLAN 10
diet: soft and bite sized  dvt ppx: lovenox  dispo: pending clinical improvement  bowel regimen miralax and senna

## 2023-07-23 NOTE — CHART NOTE - NSCHARTNOTEFT_GEN_A_CORE
PDI	First Name	Last Name	Birth Date	Gender	Street Address	City	State	Zip Code  A	Sherwin Herrera	1945	Female	[phone]	[place]	[address]  B	Toyin Herrera	1945	Female	[address]	[place]	[address]    Prescription Information      PDI Filter:    PDI	Current Rx	Drug Type	Rx Written	Rx Dispensed	Drug	Quantity	Days Supply	Prescriber Name	Prescriber RERE #	Payment Method	Dispenser  A	N		07/10/2023	07/11/2023	zolpidem tartrate 5 mg tablet	10	10	Sarah Goyal (MD)	[id number]	Medicare	[facility]  A	N		06/03/2023	06/05/2023	zolpidem tartrate 5 mg tablet	30	30	Godfrey Martínez MD	[id number]	Medicare	[facility]  A	N	O	05/31/2023	06/01/2023	oxycodone hcl (ir) 5 mg tablet	9	3	Abbi Chaney	[id number]	Medicare	[facility]  A	N	O	05/03/2023	05/05/2023	tramadol hcl 50 mg tablet	28	7	Andre Jurado MD	[id number]	Medicare	[facility]  A	N	O	03/29/2023	04/03/2023	oxycodone hcl (ir) 5 mg tablet	28	7	Helga Ramirez	[id number]	Medicare	[facility]  B	Y	O	07/17/2023	07/17/2023	methadone 10 mg/5 ml solution	15ml	30	Citlaly Chavis	[id number]	Medicare	[facility] [place]  B	N		07/05/2023	07/05/2023	zolpidem tartrate 5 mg tablet	5	5	Bari Richards	[id number]	[facility]  B	Y	O	06/27/2023	06/27/2023	morphine sulf 10 mg/5 ml soln	270ml	30	Citlaly Chavis	[id number]	Medicare	[facility] [place]

## 2023-07-23 NOTE — H&P ADULT - NSHPPHYSICALEXAM_GEN_ALL_CORE
T(C): 36.9 (07-23-23 @ 15:32), Max: 37.3 (07-23-23 @ 12:12)  HR: 120 (07-23-23 @ 15:32) (110 - 120)  BP: 137/77 (07-23-23 @ 15:32) (127/66 - 137/77)  RR: 16 (07-23-23 @ 15:32) (15 - 20)  SpO2: 97% (07-23-23 @ 15:32) (97% - 99%)    CONSTITUTIONAL: Well groomed, no apparent distress  EYES: PERRLA and symmetric, EOMI, No conjunctival or scleral injection, non-icteric  ENMT: Oral mucosa with moist membranes. Normal dentition; no pharyngeal injection or exudates             NECK: Supple, symmetric and without tracheal deviation   RESP: No respiratory distress, no use of accessory muscles; CTA b/l, no WRR  CV: RRR, +S1S2, no MRG; no JVD; no peripheral edema  GI: Soft, NT, ND, no rebound, no guarding; no palpable masses; no hepatosplenomegaly; no hernia palpated  LYMPH: No cervical LAD or tenderness; no axillary LAD or tenderness; no inguinal LAD or tenderness  MSK: Normal gait; No digital clubbing or cyanosis; examination of the (head/neck/spine/ribs/pelvis, RUE, LUE, RLE, LLE) without misalignment,            Normal ROM without pain, no spinal tenderness, normal muscle strength/tone  SKIN: No rashes or ulcers noted; no subcutaneous nodules or induration palpable  NEURO: CN II-XII intact; normal reflexes in upper and lower extremities, sensation intact in upper and lower extremities b/l to light touch   PSYCH: Appropriate insight/judgment; A+O x 3, mood and affect appropriate, recent/remote memory intact T(C): 36.9 (07-23-23 @ 15:32), Max: 37.3 (07-23-23 @ 12:12)  HR: 120 (07-23-23 @ 15:32) (110 - 120)  BP: 137/77 (07-23-23 @ 15:32) (127/66 - 137/77)  RR: 16 (07-23-23 @ 15:32) (15 - 20)  SpO2: 97% (07-23-23 @ 15:32) (97% - 99%)    CONSTITUTIONAL: Well groomed, no apparent distress  EYES: PERRLA and symmetric, EOMI, No conjunctival or scleral injection, non-icteric  ENMT: Oral mucosa with moist membranes. Normal dentition; no pharyngeal injection or exudates             NECK: Supple, symmetric and without tracheal deviation   RESP: No respiratory distress, no use of accessory muscles; CTA b/l, no WRR  CV: RRR, +S1S2, no MRG; no JVD; no peripheral edema  GI: Soft, NT, ND, no rebound, no guarding; no palpable masses; no hepatosplenomegaly; no hernia palpated  MSK: Normal gait; No digital clubbing or cyanosis; examination of the (head/neck/spine/ribs/pelvis, RUE, LUE, RLE, LLE) without misalignment,            Normal ROM without pain, no spinal tenderness, normal muscle strength/tone  SKIN: No rashes or ulcers noted  NEURO: Moving all 4 extremities  PSYCH: Appropriate insight/judgment; A+O x 3, mood and affect appropriate, recent/remote memory intact T(C): 36.9 (07-23-23 @ 15:32), Max: 37.3 (07-23-23 @ 12:12)  HR: 120 (07-23-23 @ 15:32) (110 - 120)  BP: 137/77 (07-23-23 @ 15:32) (127/66 - 137/77)  RR: 16 (07-23-23 @ 15:32) (15 - 20)  SpO2: 97% (07-23-23 @ 15:32) (97% - 99%)    CONSTITUTIONAL: Well groomed, in mild distress due to pain  EYES: PERRLA and symmetric, EOMI, No conjunctival or scleral injection, non-icteric  ENMT: Oral mucosa with lesions of inner cheeks bilaterally, Lesions also present on lips and base of tongue. No active bleeding              NECK: Supple, symmetric and without tracheal deviation   RESP: No respiratory distress, no use of accessory muscles; CTA b/l, no WRR  CV: RRR, +S1S2, no MRG; no JVD; no peripheral edema  GI: Distended, diffuse tenderness most severe in RUQ. RUQ is firm with small palpable mass.   MSK: Moving all 4 extremities, +1 edema bilaterally to shins  SKIN: No rashes or ulcers of skin noted  NEURO: Moving all 4 extremities  PSYCH: Appropriate insight/judgment; A+O x 3,

## 2023-07-23 NOTE — H&P ADULT - PROBLEM SELECTOR PLAN 7
diet: soft  dvt ppx: lovenox  code:  dispo: pending clinical improvement pt has chronic diffuse body pain, of chest, abdomen, and extremities, in setting of metastatic disease and chemo. Pain managed by oxycodone and morphine at home.  -c/w Tylenol 650mg q6 prn for mild pain, morphine 2mg q6 prn for moderate pain, and morphine 2mg q6 prn severe pain  -will consider oncology consult (oncologist is Dr. Juan David) pt has chronic diffuse body pain, of chest, abdomen, and extremities, in setting of metastatic disease and chemo. Pain managed by oxycodone and morphine at home.  -c/w Tylenol 650mg q6 prn for mild pain, morphine 2mg q6 prn for moderate pain, and morphine 2mg q6 prn severe pain  -will consider oncology consult in AM (oncologist is Dr. Juan David) pt has chronic diffuse body pain, including chest, abdomen, and extremities, in setting of metastatic disease and chemo. Pain managed by oxycodone and morphine at home.  -c/w Tylenol 650mg q6 prn for mild pain, morphine 2mg q6 prn for moderate pain, and morphine 2mg q6 prn severe pain  -will consider oncology consult in AM (oncologist is Dr. Juan David) on tresiba 20u at home  -hold home insulin  -ISS while inpatient

## 2023-07-23 NOTE — H&P ADULT - ATTENDING COMMENTS
79 yo female with pmhx of Pancreatic Ca s/p Whipple’s on Chemotherapy  presents with complaints of 2 days of loose stools with nausea and abdominal pain. Reports 5-7 bm per day, mostly loose, non-watery, non-bloody stool. Denies any fever/chills. Reports abdominal pain located mostly in the mid-abdomen, radiates to the back. Denies hematemesis. Reports oral mucositis on the lip and tongue causing severe pain and unable to take po intake. Pt last received chemotherapy on July 17th with Onyvide/leucovorin/CADD. Has Right chest port. Additionally, Last paracentesis on 7/19 with 2.2L Removal.     In the ed, afebrile, VSS.  Ill appearing, mucositis of lips and tongue, no oral thrush, chest chemoport with no erythema or discharge, chest clear, s1/s2, no murmur, abd is distended, tenderness to palpation on the mid-abdomen, no guarding or rebound tenderness, LE bilateral pitting edema, warm to touch. No focal neurological deficits.     #N/V/D and abd pain  States Diarrhea is resolved and has had only one episode today.    CT AP showed large ascites, no other acute pathology.  -symptoms currently improved  -fu GI PCR, OP, C.diff rule out, stool culture  -isolation  -supportive care   -monitor volume status, replete electrolyte as needed  -full liquid diet for now    #Oral mucositis:  possibly 2/2 chemotherapy side effect. no fever, no leukocytosis or neutropenia. low suspicious or infectious etiology.  -cont with supportive care, benzocaine spray qid prn, oral mouth swish  -cont with home morphine 2.5ml q4hr prn for moderate pain, for severe pain iv morphine 4mg q6hr prn  -oncology consult tomorrow    #Abd distention  #Large ascites, LE edema  -fu abd us  -procedure team consult for LVP  -no acute cf peritonitis  -one dose of lasix 20 iv    #Pancreatic Ca, metastatic disease  -plan as above  -oncology eval    #Hyponatremia, chronic. possibly 2/2 multifactorial. exam with hypervolemia  -fu urine Na, osm, Sosm  -trend Na  -encourage po    rest as above    dvt: hold off  diet: full liquid 77 yo female with pmhx of Pancreatic Ca s/p Whipple’s on Chemotherapy  presents with complaints of 2 days of loose stools with nausea and abdominal pain. Reports 5-7 bm per day, mostly loose, non-watery, non-bloody stool. Denies any fever/chills. Reports abdominal pain located mostly in the mid-abdomen, radiates to the back. Denies hematemesis. Reports oral mucositis on the lip and tongue causing severe pain and unable to take po intake. Pt last received chemotherapy on July 17th with Onyvide/leucovorin/CADD. Has Right chest port. Additionally, Last paracentesis on 7/19 with 2.2L Removal.     In the ed, afebrile, VSS.  Ill appearing, mucositis of lips and tongue, no oral thrush, chest chemoport with no erythema or discharge, chest clear, s1/s2, no murmur, abd is distended, tenderness to palpation on the mid-abdomen, no guarding or rebound tenderness, LE bilateral pitting edema, warm to touch. No focal neurological deficits.     #N/V/D and abd pain  States Diarrhea is resolved and has had only one episode today.    CT AP showed large ascites, no other acute pathology.  -symptoms currently improved  -fu GI PCR, OP, C.diff rule out, stool culture  -isolation  -supportive care   -monitor volume status, replete electrolyte as needed  -full liquid diet for now    #Oral mucositis:  possibly 2/2 chemotherapy side effect. no fever, no leukocytosis or neutropenia. low suspicious or infectious etiology.  -cont with supportive care, benzocaine spray qid prn, oral mouth swish  -cont with iv morphine 2mg q6hr prn for moderate pain, for severe pain iv morphine 4mg q6hr prn  -oncology consult tomorrow    #Abd distention  #Large ascites, LE edema  -fu abd us  -procedure team consult for LVP  -no acute cf peritonitis  -one dose of lasix 20 iv    #Pancreatic Ca, metastatic disease  -plan as above  -oncology eval    #Hyponatremia, chronic. possibly 2/2 multifactorial. exam with hypervolemia  -fu urine Na, osm, Sosm  -trend Na  -encourage po    rest as above    dvt: 1 dose lovenox, hold for procedure tomorrow  diet: full liquid
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
## 2023-07-23 NOTE — PATIENT PROFILE ADULT - NSPROGENBLOODRESTRICT_GEN_A_NUR
Quality 110: Preventive Care And Screening: Influenza Immunization: Influenza Immunization Administered during Influenza season Quality 226: Preventive Care And Screening: Tobacco Use: Screening And Cessation Intervention: Patient screened for tobacco use and is an ex/non-smoker Quality 111:Pneumonia Vaccination Status For Older Adults: Pneumococcal Vaccination Previously Received Quality 402: Tobacco Use And Help With Quitting Among Adolescents: Patient screened for tobacco and never smoked Detail Level: Detailed Quality 431: Preventive Care And Screening: Unhealthy Alcohol Use - Screening: Patient screened for unhealthy alcohol use using a single question and scores less than 2 times per year none

## 2023-07-23 NOTE — H&P ADULT - HISTORY OF PRESENT ILLNESS
78F with hx of metastatic pancreatic CA s/p whipple on Chemo (last session 7/17) presents with NVD and abdominal pain likely in setting of worsening metastatic disease.          Also complains of mouth pain with sores, no improvement with moush wash used at home.      Recent admit this month for AMS/fever, found to have UTI. 78F with hx of metastatic pancreatic CA s/p whipple on Chemo (last session 7/17, seen by Dr. David) presents with NVD and abdominal pain likely in setting of worsening metastatic disease. She had 5 episodes of nonbloody diarrhea yesterday and 1 episode this morning. She also has had multiple episodes of nonbloody vomiting. She complains of diffuse chronic pain that has worsened, especially in the abdomen. She reports worsening abdominal distension which is recurrent, scheduled for paracentesis 7/26. She states the abdominal distension can cause difficulty breathing. Also complains of mouth pain with sores, no improvement with mouth wash used at home. Increased difficulty with PO intake because of mouth pain. Recent admit this month for AMS/fever, found to have UTI.    In the ED, tachycardia to 118, /86, 98.2F, 98% on RA. Labs showed WBC 8.2, Hgb 10.3, Na 125, K 4.3, Creatinine .5, elevated alk phos 358, albumin 2.5, calcium 8.1, AST 48, ALT 22. Lactate 1.4. UA showed RBCs but no evidence of infection. CXR with bilateral lower airspace disease vs effusion, CTAP with interval increase of now large amount of abdominal ascites. She was given 1L LR and pain control with dilaudid and morphine.

## 2023-07-23 NOTE — ED ADULT NURSE REASSESSMENT NOTE - NS ED NURSE REASSESS COMMENT FT1
Break RN: Pt is A&Ox4, resting in stretcher with no complaints at this time. Respirations even and unlabored, chest rise equal b/l. Sinus tachycardia noted on cardiac monitor. Abdomen soft, nontender, nondistended. VS as noted in flow sheets. Pt denies chest pain, SOB, fever, cough, chills, abdominal pain, N/V/D, h/a, dizziness, numbness/tingling or any urinary symptoms at this time. No acute distress noted. . Report given to floor ALAINA Ortiz. Safety maintained throughout. Will continue to monitor. Received report from ALAINA Garcia. Pt is A&Ox4, resting in stretcher with no complaints at this time. Respirations even and unlabored, chest rise equal b/l. Sinus tachycardia noted on cardiac monitor. Abdomen soft, nontender, nondistended. VS as noted in flow sheets. Pt denies chest pain, SOB, fever, cough, chills, abdominal pain, N/V/D, h/a, dizziness, numbness/tingling or any urinary symptoms at this time. No acute distress noted. . Report given to floor ALAINA Ortiz. Safety maintained throughout. Will continue to monitor.

## 2023-07-23 NOTE — ED PROVIDER NOTE - OBJECTIVE STATEMENT
77 yo F w/ hx pancreatic Ca s/p Whipple's on chemo therapy, recently admitted for AMS and fever, found to have chronic lacunar stroke who was brought in by EMS with complains of n/v/d, and worsening severe abdominal pain. had one episode of greenish vomiting yesterday, 5 episodes of watery non bloody diarrhea. she tried immodium this morning to minimal relief. she also has open mouth sore in the right cheek, poor po intake, difficulty in breathing. Her last chemo was on Monday 07/17, port was removed 07/19, symptoms started after that  for about 2 to 3 days. she denied any cp, or fever, bit cold, dysuria, other skin lesions, or headache.

## 2023-07-23 NOTE — ED ADULT NURSE NOTE - NSFALLUNIVINTERV_ED_ALL_ED
Bed/Stretcher in lowest position, wheels locked, appropriate side rails in place/Call bell, personal items and telephone in reach/Instruct patient to call for assistance before getting out of bed/chair/stretcher/Non-slip footwear applied when patient is off stretcher/[place] to call system/Physically safe environment - no spills, clutter or unnecessary equipment/Purposeful proactive rounding/Room/bathroom lighting operational, light cord in reach

## 2023-07-24 NOTE — CONSULT NOTE ADULT - PROBLEM SELECTOR RECOMMENDATION 6
Thank you for allowing us to participate in your patient's care. We will continue to follow with you. Please page [phone] for any q's or c's. The Geriatric and Palliative Medicine service has coverage 24 hours a day/ 7 days a week to provide medical recommendations regarding symptom management needs via telephone. > See GOC note - I spent 46 minutes discussing ACP with pt and family   > Home Hospice referral made  > DNR/DNI by primary team

## 2023-07-24 NOTE — CONSULT NOTE ADULT - PROBLEM SELECTOR RECOMMENDATION 4
As per daughter last para 7/18   > CT abd/pelvis shows interval increase in now very large amount of abdominal and pelvic ascites since prior CT of 7/1/2023.  > Plan for para 7/26  > Possible abd pleurx for recurrent ascites ? No longer pursing DMT with Dr. David As per daughter last para 7/18   > CT abd/pelvis shows interval increase in now very large amount of abdominal and pelvic ascites since prior CT of 7/1/2023.  > was planned for outpt for para 7/26  > Possible abd pleurx for recurrent ascites ? No longer pursing DMT with Dr. David

## 2023-07-24 NOTE — GOALS OF CARE CONVERSATION - ADVANCED CARE PLANNING - CONVERSATION DETAILS
Referral to palliative care for complex decision making and symptom management in setting of advanced malignancy. Met with patient, spouse and dtr at bedside. [place]  utilized. Introduced role of palliative care team. Pt known to out-pt supportive oncology team as well as Dr. David oncologist at [facility].   Pt is a 78F with hx of metastatic pancreatic CA s/p whipple on multiple lines of chemo with progression of disease. Pt shared her desire to forgo cancer directed treatment and focus on her symptoms. Pt's prime goal is symptom management. Pt's family at bedside understand pt's goals and support her decision to transition to symptom directed care.   Educated pt and family on the philosophy of hospice care and the services provided. Pt and family amenable to a referral for home hospice.   Support provided. Offered caregiver specialist for spouse who is struggling  with accepting his wife's poor prognosis. Spouse receptive to referral.  MOLST on medical record. Pt is DNR/DNI Referral to palliative care for complex decision making and symptom management in setting of advanced malignancy. Met with patient, spouse and daughter Namrata at bedside. German  utilized #[id number]. Introduced role of palliative care team. Pt known to out-pt supportive oncology team as well as Dr. David oncologist at [facility].   Pt is a 78F with hx of metastatic pancreatic CA s/p whipple on multiple lines of chemo with progression of disease. Pt shared her desire to forgo cancer directed treatment and focus on her symptoms. Pt's prime goal is symptom management. Pt's family at bedside understand pt's goals and support her decision to transition to symptom directed care.   Educated pt and family on the philosophy of hospice care and the services provided. Pt and family amenable to a referral for home hospice.   Support provided. Offered caregiver specialist for spouse who is struggling  with accepting his wife's poor prognosis. Spouse receptive to referral.  MOLST on medical record. Pt is DNR/DNI

## 2023-07-24 NOTE — PROGRESS NOTE ADULT - PROBLEM SELECTOR PLAN 9
diet: soft and bite sized  dvt ppx: lovenox  dispo: pending clinical improvement  bowel regimen miralax and senna diet: soft and bite sized  dvt ppx: lovenox  dispo: pending clinical improvement  bowel regimen miralax and senna  Daughter is healthcare proxy diet: soft and bite sized  dvt ppx: lovenox  code: DNR DNI  dispo: pending clinical improvement  bowel regimen miralax and senna  HCP Namrata Harvinder, daughter on tresiba 20u at home  -hold home meds  -ISS while inpatient

## 2023-07-24 NOTE — CONSULT NOTE ADULT - PROBLEM SELECTOR RECOMMENDATION 7
Thank you for allowing us to participate in your patient's care. We will continue to follow with you. Please page [phone] for any q's or c's. The Geriatric and Palliative Medicine service has coverage 24 hours a day/ 7 days a week to provide medical recommendations regarding symptom management needs via telephone.    > Of note pt lives in two family home, primary lives with  but daughter Namrata lives upstairs and participates in pt care.  > HCP Namrata Harvinder (can be found in alscripts 5/4/23)  > Care giver support referral sent for

## 2023-07-24 NOTE — PROGRESS NOTE ADULT - ATTENDING COMMENTS
78W PMH of metastatic pancreatic Ca with POD on multiple regiments who presents with mouth sores, loose stools with nausea and abdominal pain and worsening ascites.     # Metastatic pancreatic cancer   # Malignant ascites   # Peritoneal carcinomatosis   # Pain secondary to malignancy   - Family no longer interested in further chemo, plan no to transition care to focus on symptoms and keeping comfortable  - Has large volume ascites on imaging, would plan for placement of Pleurx given need for recurrent drainage and GOC. IR consulted.  - Per CM patient has been accepted by hospice and is pending DME delivery     # Oral ulcers - secondary to chemo, c/w magic mouthwash  # Diarrhea - now improving, suspect likely secondary to chemotherapy. Stool studies pending

## 2023-07-24 NOTE — CONSULT NOTE ADULT - PROBLEM SELECTOR RECOMMENDATION 3
Patient complains of painful sores throughout mouth and tongue that have been present for several weeks but have worsened within the past week. On exam has tender lesions to mucosa of inner cheeks, base of tongue, and lips. Low suspicion for thrush or other ongoing infection. Her sores are likely 2/2 chemotherapy.   > c/w magic mouthwash   > c/w Benzocaine spray  > managed by primary team

## 2023-07-24 NOTE — PROGRESS NOTE ADULT - PROBLEM SELECTOR PLAN 1
Patient complains of painful sores throughout mouth and tongue that have been present for several weeks but have worsened within the past week. On exam has tender lesions to mucosa of inner cheeks, base of tongue, and lips. Low suspicion for thrush or other ongoing infection. Her sores are likely 2/2 chemotherapy.   -c/w magic mouthwash   -c/w Benzocaine spray  -c/w Tylenol 650mg q6 prn for mild pain, morphine 2mg q6 prn for moderate pain, and morphine 2mg q6 prn severe pain  -monitor Patient complains of painful sores throughout mouth and tongue that have been present for several weeks but have worsened within the past week. On exam has tender lesions to mucosa of inner cheeks, base of tongue, and lips. Low suspicion for thrush or other ongoing infection. Her sores are likely 2/2 chemotherapy.   -c/w magic mouthwash   -c/w Benzocaine spray  -c/w Tylenol 650mg q6 prn for mild pain, morphine 2mg q6 prn for moderate pain, and morphine 2mg q6 prn severe pain  -monitor pain Patient complains of painful sores throughout mouth and tongue that have been present for several weeks but have worsened within the past week. On exam has tender lesions to mucosa of inner cheeks, base of tongue, and lips. Low suspicion for thrush or other ongoing infection. Her sores are likely 2/2 chemotherapy.   -c/w magic mouthwash   -c/w Benzocaine spray  -c/w pain regimen per palliative: Methadone 1mg , Gabapentin 100mg TID, Morphine 4mg PO q4 for moderate/severe pain, and IV morphine 2mg q4 for breakthrough pain  -monitor pain Patient complains of painful sores throughout mouth and tongue that have been present for several weeks but have worsened within the past week. On exam has tender lesions to mucosa of inner cheeks, base of tongue, and lips. Low suspicion for thrush or other ongoing infection. Her sores are likely 2/2 chemotherapy.   -c/w magic mouthwash   -c/w Benzocaine spray  -c/w pain regimen per palliative: Methadone 1mg , Gabapentin 100mg TID, Morphine 4mg PO q4 for moderate/severe pain, and IV morphine 2mg q4 for breakthrough pain  -while NPO will resume Morphine 2/4 mg IV q4 PRN for moderate/severe pain  -monitor pain Patient complains of painful sores throughout mouth and tongue that have been present for several weeks but have worsened within the past week. On exam has tender lesions to mucosa of inner cheeks, base of tongue, and lips. Low suspicion for thrush or other ongoing infection. Her sores are likely 2/2 chemotherapy.   -c/w magic mouthwash   -c/w Benzocaine spray  -c/w pain regimen per palliative: Methadone 1mg , Gabapentin 100mg TID, Morphine 4mg PO q4 for moderate/severe pain, and IV morphine 2mg q4 for breakthrough pain  -until procedure and while NPO will resume Morphine 2/4 mg IV q4 PRN for moderate/severe pain  -monitor pain

## 2023-07-24 NOTE — CHART NOTE - NSCHARTNOTEFT_GEN_A_CORE
PRE-INTERVENTIONAL RADIOLOGY PROCEDURE NOTE      Patient Age: 77 YO    Patient Gender: F    Procedure: Pleurex catheter for ascites    Diagnosis/Indication: Pancreatic cancer with recurrent ascites    Interventional Radiology Attending Physician: Dr. Juan Wade    Ordering Attending Physician: Dr. Jesse Lira    Pertinent Medical History: pancreatic Ca s/p Whipple's, on chemotherapy, chronic lacunar stroke    Pertinent labs:                      9.0    10.27 )-----------( 351      ( 24 Jul 2023 06:20 )             27.4       07-24    124<L>  |  93<L>  |  11  ----------------------------<  142<H>  4.4   |  22  |  0.57    Ca    7.9<L>      24 Jul 2023 06:20  Phos  2.7     07-24  Mg     1.80     07-24    TPro  5.4<L>  /  Alb  2.3<L>  /  TBili  0.4  /  DBili  x   /  AST  39<H>  /  ALT  18  /  AlkPhos  341<H>  07-24      PT/INR - ( 24 Jul 2023 06:20 )   PT: 14.3 sec;   INR: 1.23 ratio         PTT - ( 24 Jul 2023 06:20 )  PTT:30.9 sec        Patient and Family Aware ? Yes

## 2023-07-24 NOTE — PROGRESS NOTE ADULT - CONVERSATION DETAILS
Long discussion at bedside today regarding plan goals of care with patient, daughter and . They report that they no longer want to peruse further chemotherapy and would like to change focus of care to a more symptom based approach. Would like to return home as soon as possible with hospice.     Patient agrees to DNR/ DNI. Hospice referral.   MOLST completed and placed in chart.

## 2023-07-24 NOTE — CONSULT NOTE ADULT - PROBLEM SELECTOR RECOMMENDATION 5
See GOC note   Home Hospice referral made Pt c/o decreased PO intake   > Resumed home dose Remeron 15mg @ bedtime

## 2023-07-24 NOTE — PROVIDER CONTACT NOTE (OTHER) - BACKGROUND
(Admit Diagnosis) Abdominal pain  (PMH) Pancreatic cancer  (PMH) H/O chronic hepatitis  (PMH) CAD (coronary artery disease)

## 2023-07-24 NOTE — CHART NOTE - NSCHARTNOTEFT_GEN_A_CORE
Invasive Procedure Team (IPT) consulted for diagnostic/therapeutic paracentesis.    Indications for Diagnostic Paracentesis:  - Consistent with specialty society guidelines, ruling out Bacterial Peritonitis (in particular, SBP) in any admitted patient with ascites (even if asymptomatic), particularly in those with c/f sepsis  - Identifying etiology for new-onset ascites    Indications for Therapeutic Paracentesis:  - Tense ascites associated with abdominal discomfort, urinary retention, respiratory distress, or other evidence of end-organ compromise that is likely 2/2 the ascites itself (and not another etiology)  - Large ascites refractory to medical management (i.e. diuretics)    Absolute Contraindications:  - DIC  - Acute Abdomen  - Cellulitis    Relative Contraindications:  - Coagulopathy (INR > 2.0 per [facility] IPT policy, but specific to performing proceduralist; refer to final recommendations below)  - Severe Thrombocytopenia (Platelet < 20-50, discuss with team/refer to final recommendations below)  - Pregnancy (discuss with team/refer to final recommendations below)  - Cellulitis/Surgical Scarring/Vasculature or Hematoma overlying only safe bedside access site  - DANYELLE (specifically for therapeutic paracteneses; related to already poor intravascular perfusion. Risk of DANYELLE increases by 1.5x per 1L of ascites fluid removed).    ASSESSMENT/RECOMMENDATIONS  *** INCOMPLETE ***  78F with hx of metastatic pancreatic CA s/p whipple on Chemo (last session 7/17, seen by Dr. David) who presents with nausea/vomiting, diarrhea w/ borderline tachycardia and fever (orally). Procedure team consulted for diagnostic/therapeutic paracentesis with comprehensive US concerning not only for ascites but also with debris visualized in the field. Will POCUS pt 7/25am with plan to proceed if pt still deemed indicated for procedure at that time.   - Please obtain CBC/PTT/PT/INR/BMP for Cr on morning of procedure  - Please order any requested diagnostic studies on morning of procedure & notify procedure team if cytology requested.  - Primary team will have to physically deliver samples to lab; cannot be sent through pneumatic tube  - Can generally continue DVT/VTE PPx (*** Therapeutic AC?***)  - Pt does NOT need to be NPO  - Plan for procedure *** [date] ***  - If repeat therapeutic paracentesis requested for severe / refractory ascites in < 7 day intervals, should consider IR consultation for indwelling catheter placement. Invasive Procedure Team (IPT) consulted for diagnostic/therapeutic paracentesis.    Indications for Diagnostic Paracentesis:  - Consistent with specialty society guidelines, ruling out Bacterial Peritonitis (in particular, SBP) in any admitted patient with ascites (even if asymptomatic), particularly in those with c/f sepsis  - Identifying etiology for new-onset ascites    Indications for Therapeutic Paracentesis:  - Tense ascites associated with abdominal discomfort, urinary retention, respiratory distress, or other evidence of end-organ compromise that is likely 2/2 the ascites itself (and not another etiology)  - Large ascites refractory to medical management (i.e. diuretics)    Absolute Contraindications:  - DIC  - Acute Abdomen  - Cellulitis    Relative Contraindications:  - Coagulopathy (INR > 2.0 per [facility] IPT policy, but specific to performing proceduralist; refer to final recommendations below)  - Severe Thrombocytopenia (Platelet < 20-50, discuss with team/refer to final recommendations below)  - Pregnancy (discuss with team/refer to final recommendations below)  - Cellulitis/Surgical Scarring/Vasculature or Hematoma overlying only safe bedside access site  - DANYELLE (specifically for therapeutic paracteneses; related to already poor intravascular perfusion. Risk of DANYELLE increases by 1.5x per 1L of ascites fluid removed).    ASSESSMENT/RECOMMENDATIONS  78F with hx of metastatic pancreatic CA s/p whipple on Chemo (last session 7/17, seen by Dr. David) who presents with nausea/vomiting, diarrhea w/ borderline tachycardia and fever (orally). Procedure team consulted for diagnostic/therapeutic paracentesis with comprehensive US concerning not only for ascites but also with debris visualized in the field. Will POCUS pt 7/25am with plan to proceed if pt still deemed indicated for procedure at that time.   - Notified by primary team that plan to pursue indwelling catheter for drainage, defer bedside procedure  - Please reconsult as needed    Rei Mayen MD PGY-3

## 2023-07-24 NOTE — PROGRESS NOTE ADULT - SUBJECTIVE AND OBJECTIVE BOX
Allen Coughlin MD  PGY 1 Department of Internal Medicine        Patient is a 78y old  Female who presents with a chief complaint of diarrhea (24 Jul 2023 11:31)      SUBJECTIVE / OVERNIGHT EVENTS: Pt seen and examined. No acute overnight events. She continues to have mouth and abdominal pain, improved from yesterday. Denies fevers, chills, CP, SOB, N/V, Constipation, Diarrhea        MEDICATIONS  (STANDING):  amLODIPine   Tablet 10 milliGRAM(s) Oral daily  atorvastatin 40 milliGRAM(s) Oral at bedtime  dextrose 5%. 1000 milliLiter(s) (50 mL/Hr) IV Continuous <Continuous>  dextrose 5%. 1000 milliLiter(s) (100 mL/Hr) IV Continuous <Continuous>  dextrose 50% Injectable 25 Gram(s) IV Push once  dextrose 50% Injectable 12.5 Gram(s) IV Push once  dextrose 50% Injectable 25 Gram(s) IV Push once  fenofibrate Tablet 145 milliGRAM(s) Oral daily  FIRST- Mouthwash  BLM 10 milliLiter(s) Swish and Spit four times a day  glucagon  Injectable 1 milliGRAM(s) IntraMuscular once  insulin lispro (ADMELOG) corrective regimen sliding scale   SubCutaneous three times a day before meals  losartan 100 milliGRAM(s) Oral daily  metoprolol succinate ER 25 milliGRAM(s) Oral daily  pancrelipase  (CREON 36,000 Lipase Units) 1 Capsule(s) Oral three times a day with meals  pantoprazole    Tablet 40 milliGRAM(s) Oral before breakfast    MEDICATIONS  (PRN):  acetaminophen     Tablet .. 650 milliGRAM(s) Oral every 6 hours PRN Temp greater or equal to 38C (100.4F), Mild Pain (1 - 3)  benzocaine 20% Spray 1 Spray(s) Topical four times a day PRN oral pain  dextrose Oral Gel 15 Gram(s) Oral once PRN Blood Glucose LESS THAN 70 milliGRAM(s)/deciliter  melatonin 3 milliGRAM(s) Oral at bedtime PRN Insomnia  morphine  - Injectable 2 milliGRAM(s) IV Push every 6 hours PRN Moderate Pain (4 - 6)  morphine  - Injectable 4 milliGRAM(s) IV Push every 6 hours PRN Severe Pain (7 - 10)  polyethylene glycol 3350 17 Gram(s) Oral daily PRN Constipation  zolpidem 5 milliGRAM(s) Oral at bedtime PRN Insomnia      I&O's Summary    23 Jul 2023 07:01  -  24 Jul 2023 07:00  --------------------------------------------------------  IN: 300 mL / OUT: 200 mL / NET: 100 mL    24 Jul 2023 07:01  -  24 Jul 2023 13:59  --------------------------------------------------------  IN: 440 mL / OUT: 500 mL / NET: -60 mL        Vital Signs Last 24 Hrs  T(C): 36.9 (24 Jul 2023 12:12), Max: 37.2 (24 Jul 2023 05:20)  T(F): 98.4 (24 Jul 2023 12:12), Max: 99 (24 Jul 2023 05:20)  HR: 104 (24 Jul 2023 12:12) (79 - 122)  BP: 113/62 (24 Jul 2023 12:12) (106/67 - 138/81)  BP(mean): --  RR: 17 (24 Jul 2023 12:12) (16 - 18)  SpO2: 97% (24 Jul 2023 12:12) (96% - 99%)    Parameters below as of 24 Jul 2023 12:12  Patient On (Oxygen Delivery Method): room air        CAPILLARY BLOOD GLUCOSE      POCT Blood Glucose.: 115 mg/dL (24 Jul 2023 12:34)  POCT Blood Glucose.: 178 mg/dL (24 Jul 2023 08:54)  POCT Blood Glucose.: 198 mg/dL (23 Jul 2023 21:56)  POCT Blood Glucose.: 205 mg/dL (23 Jul 2023 15:28)      PHYSICAL EXAM:  GENERAL: NAD,   HEAD:  Atraumatic, Normocephalic  Oral mucosa with lesions of inner cheeks bilaterally, Lesions also present on lips and base of tongue. No active bleeding   EYES: EOMI, PERRL, conjunctiva and sclera clear  NECK: No JVD  CHEST/LUNG: Clear to auscultation bilaterally; No wheeze  HEART: Tachycardic and regular rhythm; No murmurs, rubs, or gallops  ABDOMEN: Distended, diffuse tenderness most severe in RUQ. RUQ is firm with small palpable mass.   EXTREMITIES:  2+ Peripheral Pulses, No clubbing, cyanosis. +1 edema to bilateral shins  PSYCH: AAOx3  NEUROLOGY: non-focal  SKIN: No rashes or lesions       LABS:                        9.0    10.27 )-----------( 351      ( 24 Jul 2023 06:20 )             27.4     Auto Eosinophil # 0.09  / Auto Eosinophil % 0.9   / Auto Neutrophil # 7.99  / Auto Neutrophil % 77.8  / BANDS % x                            10.2   8.19  )-----------( 359      ( 23 Jul 2023 09:30 )             31.3     Auto Eosinophil # 0.06  / Auto Eosinophil % 0.7   / Auto Neutrophil # 6.66  / Auto Neutrophil % 81.4  / BANDS % x        07-24    124<L>  |  93<L>  |  11  ----------------------------<  142<H>  4.4   |  22  |  0.57  07-23    125<L>  |  92<L>  |  11  ----------------------------<  147<H>  4.3   |  19<L>  |  0.53    Ca    7.9<L>      24 Jul 2023 06:20  Mg     1.80     07-24  Phos  2.7     07-24  TPro  5.4<L>  /  Alb  2.3<L>  /  TBili  0.4  /  DBili  x   /  AST  39<H>  /  ALT  18  /  AlkPhos  341<H>  07-24  TPro  5.8<L>  /  Alb  2.5<L>  /  TBili  0.4  /  DBili  x   /  AST  48<H>  /  ALT  22  /  AlkPhos  358<H>  07-23    PT/INR - ( 24 Jul 2023 06:20 )   PT: 14.3 sec;   INR: 1.23 ratio         PTT - ( 24 Jul 2023 06:20 )  PTT:30.9 sec      Urinalysis Basic - ( 24 Jul 2023 06:20 )    Color: x / Appearance: x / SG: x / pH: x  Gluc: 142 mg/dL / Ketone: x  / Bili: x / Urobili: x   Blood: x / Protein: x / Nitrite: x   Leuk Esterase: x / RBC: x / WBC x   Sq Epi: x / Non Sq Epi: x / Bacteria: x            RADIOLOGY & ADDITIONAL TESTS:    Imaging Personally Reviewed:    Consultant(s) Notes Reviewed:      Care Discussed with Consultants/Other Providers:

## 2023-07-24 NOTE — PROGRESS NOTE ADULT - PROBLEM SELECTOR PLAN 7
pt has chronic diffuse body pain, including chest, abdomen, and extremities, in setting of metastatic disease and chemo. Pain managed by oxycodone and morphine at home.  -c/w Tylenol 650mg q6 prn for mild pain, morphine 2mg q6 prn for moderate pain, and morphine 2mg q6 prn severe pain  -will consider oncology consult in AM (oncologist is Dr. Juan David) -c/w Atorvastatin 40mg, Metoprolol 25mg, Losartan 100mg daily  -c/w fenofibrate 145mg daily

## 2023-07-24 NOTE — PROGRESS NOTE ADULT - PROBLEM SELECTOR PLAN 5
-c/w Amlodipine 10mg daily hx metastatic pancreatic cancer with mets to liver and lung. s/p whipple, on chemo with last treatment 7/17. She has chronic diffuse body pain, including chest, abdomen, and extremities, in setting of metastatic disease and chemo. Pain managed by oxycodone and morphine at home. Patient and family interested in starting hospice care, will reach out to palliative.  -palliative care consulted, appreciate recs  -c/w pain regimen per palliative:   -c/w Tylenol 650mg q6 prn for mild pain, morphine 2mg q6 prn for moderate pain, and morphine 2mg q6 prn severe pain  -will consider oncology consult in AM (oncologist is Dr. Juan David) hx metastatic pancreatic cancer with mets to liver and lung. s/p whipple, on chemo with last treatment 7/17. She has chronic diffuse body pain, including chest, abdomen, and extremities, in setting of metastatic disease and chemo. Pain managed by oxycodone and morphine at home. Patient and family no longer interested in DMT and would like to pursue hospice, will consult palliative and place hospice referral.  -palliative care consulted, appreciate recs  -c/w pain regimen per palliative: Methadone 1mg , Gabapentin 100mg TID, Morphine 4mg PO q4 for moderate/severe pain, and IV morphine 2mg q4 for breakthrough pain  -Resumed home Remeron 15mg daily for decreased PO intake hx metastatic pancreatic cancer with mets to liver and lung. s/p whipple, on chemo with last treatment 7/17. She has chronic diffuse body pain, including chest, abdomen, and extremities, in setting of metastatic disease and chemo. Pain managed by oxycodone and morphine at home. Patient and family no longer interested in DMT and would like to pursue hospice, will consult palliative and place hospice referral.  -palliative care consulted, appreciate recs  -c/w pain regimen per palliative: Methadone 1mg , Gabapentin 100mg TID, Morphine 4mg PO q4 for moderate/severe pain, and IV morphine 2mg q4 for breakthrough pain  -while NPO will resume Morphine 2/4 mg IV q4 PRN for moderate/severe pain  -Resumed home Remeron 15mg daily for decreased PO intake hx metastatic pancreatic cancer with mets to liver and lung. s/p whipple, on chemo with last treatment 7/17. She has chronic diffuse body pain, including chest, abdomen, and extremities, in setting of metastatic disease and chemo. Pain managed by oxycodone and morphine at home. Patient and family no longer interested in DMT and would like to pursue hospice, will consult palliative and place hospice referral.  -palliative care consulted, appreciate recs  -c/w pain regimen per palliative: Methadone 1mg , Gabapentin 100mg TID, Morphine 4mg PO q4 for moderate/severe pain, and IV morphine 2mg q4 for breakthrough pain  -until procedure and while NPO will resume Morphine 2/4 mg IV q4 PRN for moderate/severe pain  -Resumed home Remeron 15mg daily for decreased PO intake

## 2023-07-24 NOTE — PROGRESS NOTE ADULT - PROBLEM SELECTOR PLAN 10
diet: soft and bite sized  dvt ppx: lovenox  code: DNR DNI  dispo: pending clinical improvement  bowel regimen miralax and senna  HCP Namrata Harvinder, daughter

## 2023-07-24 NOTE — PROGRESS NOTE ADULT - PROBLEM SELECTOR PLAN 3
Patient has worsening recurrent abdominal distension and BLE edema, likely in setting of metastatic disease. Low suspicion for infection at this time. Large volume ascites confirmed on CT A/P.  Scheduled for outpatient paracentesis 7/26.  - f/u abdominal ultrasound to evaluate for ascites  -will reach out to procedure team for inpatient tap  -will give Lasix 20mg IV x1 and reassess  -monitor volume status  -strict I&O Patient has worsening recurrent abdominal distension and BLE edema, likely in setting of metastatic disease. Low suspicion for infection at this time. Large volume ascites confirmed on CT A/P.  Scheduled for outpatient paracentesis 7/26, last tap 7/19. Abd US 7/24 confirming moderate to severe ascites. Given recurrent ascites, patient interested in PleurX abdominal drain.  -consult IR for PleurX drain placement  -monitor volume status  -strict I&O  -s/p Lasix 20mg IV x1 Patient has worsening recurrent abdominal distension and BLE edema, likely in setting of metastatic disease. Low suspicion for infection at this time. Large volume ascites confirmed on CT A/P.  Scheduled for outpatient paracentesis 7/26, last tap 7/19. Abd US 7/24 confirming moderate to severe ascites. Given recurrent ascites, patient interested in PleurX abdominal drain.  -IR to place Pleurx 7/25. Pt NPO at midnight  -monitor volume status  -strict I&O  -s/p Lasix 20mg IV x1

## 2023-07-24 NOTE — CONSULT NOTE ADULT - PROBLEM SELECTOR RECOMMENDATION 2
Home regimen: Methadone 1mg solution QHS, Morphine 3mg solution Q4 hrs PRN pain, Gabapentin 100 TID  > Resumed Methadone 1mg solution (ekg 7/23 qtc 477)  > Resumed Gabapentin 100mg TID   > Liberated Morphine solution to 4mg PO Q4 hrs for moderate/severe pain   > IV Morphine 2mg for breakthrough pain - can be given 1 hour after PO morphine is pt continues to c/o pain.

## 2023-07-24 NOTE — PROGRESS NOTE ADULT - PROBLEM SELECTOR PLAN 8
will hold home zolpidem  -continue Melatonin PRN -continue home dose zolpidem  -continue Melatonin PRN

## 2023-07-24 NOTE — PROGRESS NOTE ADULT - PROBLEM SELECTOR PLAN 2
had 5 episodes of diarrhea yesterday, 1 this morning. non-bloody. has not had any additional episodes since then, likely 2/2 chemotherapy and metastatic disease. lower suspicion for infectious etiology given vital signs and labs (no leukocytosis or neutropenia), will complete infectious workup to r/o  -f/u GI PCR  -f/u stool culture, blood culture  -f/u Cdiff PCR  -continue contact precautions had 5 episodes of diarrhea 7/22, 1 the morning of admission, all non-bloody. has not had any additional episodes since then, likely 2/2 chemotherapy and metastatic disease. lower suspicion for infectious etiology given vital signs and labs (no leukocytosis or neutropenia). Very low suspicion for c.diff, will dc contact precautions. GI PCR negative.   -f/u stool culture, blood culture  -continue contact precautions Pt had 6 episodes of non-bloody diarrhea prior to admission. No additional episodes of diarrhea since coming to the hospital. Her diarrhea is likely 2/2 chemotherapy and metastatic disease. Low suspicion for infectious etiology at this time given stable vital signs and labs (no leukocytosis or neutropenia). Very low suspicion for c.diff, will dc contact precautions. GI PCR negative. Cultures showing NGTD.  -f/u stool culture, blood culture

## 2023-07-24 NOTE — PROGRESS NOTE ADULT - PROBLEM SELECTOR PLAN 6
-c/w Atorvastatin 40mg, Metoprolol 25mg, Losartan 100mg daily  -c/w fenofibrate 145mg daily -c/w Amlodipine 10mg daily

## 2023-07-24 NOTE — CONSULT NOTE ADULT - ASSESSMENT
78F with hx of metastatic pancreatic CA s/p whipple on Chemo (last session 7/17, seen by Dr. David) presents with NVD and abdominal pain likely in setting of worsening metastatic disease. She had 5 episodes of nonbloody diarrhea yesterday and 1 episode this morning. She also has had multiple episodes of nonbloody vomiting. She complains of diffuse chronic pain that has worsened, especially in the abdomen. She reports worsening abdominal distension which is recurrent, scheduled for paracentesis 7/26. She states the abdominal distension can cause difficulty breathing. Also complains of mouth pain with sores, no improvement with mouth wash used at home. Increased difficulty with PO intake because of mouth pain. Recent admit this month for AMS/fever, found to have UTI.    In the ED, tachycardia to 118, /86, 98.2F, 98% on RA. Labs showed WBC 8.2, Hgb 10.3, Na 125, K 4.3, Creatinine .5, elevated alk phos 358, albumin 2.5, calcium 8.1, AST 48, ALT 22. Lactate 1.4. UA showed RBCs but no evidence of infection. CXR with bilateral lower airspace disease vs effusion, CTAP with interval increase of now large amount of abdominal ascites. She was given 1L LR and pain control with dilaudid and morphine. (23 Jul 2023 17:22)    Incomplete note - pt off floor     Spoke to daughter HCP Namr[place] - expressed interest in home hospice. CM to make referral. Will f/u with patient in regards to pain. 78F with hx of metastatic pancreatic CA s/p whipple on Chemo (last session 7/17, seen by Dr. David) presents with NVD and abdominal pain likely in setting of worsening metastatic disease. She had 5 episodes of nonbloody diarrhea yesterday and 1 episode this morning. She also has had multiple episodes of nonbloody vomiting. She complains of diffuse chronic pain that has worsened, especially in the abdomen. She reports worsening abdominal distension which is recurrent, scheduled for paracentesis 7/26. She states the abdominal distension can cause difficulty breathing. Also complains of mouth pain with sores, no improvement with mouth wash used at home. Increased difficulty with PO intake because of mouth pain. Recent admit this month for AMS/fever, found to have UTI. Palliative care consulted for pain management GOC.

## 2023-07-24 NOTE — CONSULT NOTE ADULT - SUBJECTIVE AND OBJECTIVE BOX
Vascular & Interventional Radiology    HPI:   78F with hx of metastatic pancreatic CA s/p whipple on Chemo (last session 7/17) presents with NVD and abdominal pain likely in setting of worsening metastatic disease. Pt w/ multiple therapeutic paracentesis and now ongoing discussion for hospice care. IR consulted for pleurx catheter for ascites.    Allergies: No Known Allergies    Medications (Abx/Cardiac/Anticoagulation/Blood Products)    Data:    T(C): 36.9  HR: 104  BP: 113/62  RR: 17  SpO2: 97%    -WBC 10.27 / HgB 9.0 / Hct 27.4 / Plt 351  -Na 124 / Cl 93 / BUN 11 / Glucose 142  -K 4.4 / CO2 22 / Cr 0.57  -ALT 18 / Alk Phos 341 / T.Bili 0.4  -INR1.23    Imaging:   Abdominal US 7/24: Moderate to severe ascites throughout the abdomen.    Assessment:   78y Female w/ metastatic pancreatic Ca requiring multiple paracenteses for ascites. IR consulted for pleurx cath prior to hospice care.    RECOMMENDATIONS:    - Please place order for IR Procedure "Pleurx catheter", approving attending Dr. Juan Wade  - NPO past midnight EXCEPTING MEDICATIONS evening prior to procedure (@1159PM 7/24/23)  - continue to hold therapeutic and prophylactic anticoagulants; contact IR in AM if pt required new AC overnight  - maintain active type and screen x 2  - Please draw AM labs @4AM  - CBC/INR/aPTT/BMP  - Plan for procedure 7/25/23  - d/w Allen Coughlin covering pt @ 3:27PM at time of approval  - Ensure the pt can and will provide consent; if alternative/family consent is required; please ensure the number is in the chart/handoff   - Please complete IR pre-procedure note (If pt at [facility] or [facility])    José Berry  PGY-3, Interventional Radiology    -Available on [employer] TEAMS for all non-urgent questions  -Emergent issues: [facility]-p.[phone]; [facility]-p.[phone] ([phone])  -Non-emergent consults: Please place a [facility] order "Consult-Interventional Radiology" with an appropriate callback number  -Scheduling questions: [facility]: [phone]; [facility]: [phone]  -Clinic/Outpatient booking: [facility]: [phone]; [facility]: [phone]

## 2023-07-24 NOTE — PROGRESS NOTE ADULT - PROBLEM SELECTOR PLAN 4
Hyponatremic to 125 in ED, no changes in mental status or neurological deficits. Etiology is uncertain - could be SIADH in setting of chemotherapy, could also be from hypovolemia, diarrhea, and third spacing. Will reassess and treat after urine lytes result  -f/u urine lytes, Ucx  -monitor BMP Hyponatremic to 125 in ED, no changes in mental status or neurological deficits. Etiology is uncertain - could be SIADH in setting of chemotherapy, could also be from hypovolemia, diarrhea, and third spacing. Will reassess and treat after urine lytes result  -f/u urine lytes, Ucx  -monitor BMP    second bolus Hyponatremic to 125 in ED, no changes in mental status or neurological deficits. 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 Of note, had hyponatremia from previous admission this month - thought to be in setting of SIADH. As of 7/24 patient had hyponatremia to 124, will give additional 1L IVF  -f/u serum osmolality, FeNa  -monitor BMP

## 2023-07-24 NOTE — CONSULT NOTE ADULT - SUBJECTIVE AND OBJECTIVE BOX
[facility] Geriatrics and Palliative Care  Emerita Alejo, Palliative Care Nurse Practitioner  Contact Info: Page [phone] (Including Nights/Weekends), Message on Microsoft Teams (Emerita Alejo), or leave VM at Palliative Office [phone] (non-urgent)    Date of [id number] @ 11:31    HPI:  78F with hx of metastatic pancreatic CA s/p whipple on Chemo (last session , seen by Dr. David) presents with NVD and abdominal pain likely in setting of worsening metastatic disease. She had 5 episodes of nonbloody diarrhea yesterday and 1 episode this morning. She also has had multiple episodes of nonbloody vomiting. She complains of diffuse chronic pain that has worsened, especially in the abdomen. She reports worsening abdominal distension which is recurrent, scheduled for paracentesis . She states the abdominal distension can cause difficulty breathing. Also complains of mouth pain with sores, no improvement with mouth wash used at home. Increased difficulty with PO intake because of mouth pain. Recent admit this month for AMS/fever, found to have UTI.    In the ED, tachycardia to 118, /86, 98.2F, 98% on RA. Labs showed WBC 8.2, Hgb 10.3, Na 125, K 4.3, Creatinine .5, elevated alk phos 358, albumin 2.5, calcium 8.1, AST 48, ALT 22. Lactate 1.4. UA showed RBCs but no evidence of infection. CXR with bilateral lower airspace disease vs effusion, CTAP with interval increase of now large amount of abdominal ascites. She was given 1L LR and pain control with dilaudid and morphine. (2023 17:22)    PERTINENT PM/SXH:   No pertinent past medical history    Hypertension    HTN (hypertension), benign    HLD (hyperlipidemia)    IDDM (insulin dependent diabetes mellitus)    Type 2 diabetes mellitus    Malignant neoplasm of pancreas, unspecified    Dyslipidemia    CAD (coronary artery disease)    H/O chronic hepatitis    Pancreatic cancer      No significant past surgical history    History of  section    History of hysterectomy    History of coronary angioplasty with insertion of stent    History of Whipple procedure        -------------------------------------------------------------------------------------------------------    FAMILY HISTORY:  FH: HTN (hypertension) (Mother)    Family history of leukemia (Child)      Family Hx substance abuse [ ]yes [ ]no  ITEMS NOT CHECKED ARE NOT PRESENT    SOCIAL HISTORY:   Significant other/partner[X ]  Children[X]  Latter-day/Spirituality:  Substance hx:  [ ]   Tobacco hx:  [ ]   Alcohol hx: [ ]   Home Opioid hx:  [ X] I-Stop Reference No:  Living Situation: [ X]Home  [ ]Long term care  [ ]Rehab [ ]Other    BASELINE (I)ADL(s) (prior to admission):  [place]: [ ]Total  [X ] Moderate [ ]Dependent    -------------------------------------------------------------------------------------------------------  ADVANCE DIRECTIVES:    DNR/MOLST  [ ]  Living Will  [ ]   DECISION MAKER(s):  [X ] Health Care Proxy(s)  [ ] Surrogate(s)  [ ] Guardian           Name(s): Namrata Blanton Phone Number(s): [phone]    -------------------------------------------------------------------------------------------------------  Allergies    No Known Allergies    Intolerances    MEDICATIONS  (STANDING):  amLODIPine   Tablet 10 milliGRAM(s) Oral daily  atorvastatin 40 milliGRAM(s) Oral at bedtime  dextrose 5%. 1000 milliLiter(s) (50 mL/Hr) IV Continuous <Continuous>  dextrose 5%. 1000 milliLiter(s) (100 mL/Hr) IV Continuous <Continuous>  dextrose 50% Injectable 25 Gram(s) IV Push once  dextrose 50% Injectable 12.5 Gram(s) IV Push once  dextrose 50% Injectable 25 Gram(s) IV Push once  fenofibrate Tablet 145 milliGRAM(s) Oral daily  FIRST- Mouthwash  BLM 10 milliLiter(s) Swish and Spit four times a day  glucagon  Injectable 1 milliGRAM(s) IntraMuscular once  insulin lispro (ADMELOG) corrective regimen sliding scale   SubCutaneous three times a day before meals  losartan 100 milliGRAM(s) Oral daily  metoprolol succinate ER 25 milliGRAM(s) Oral daily  pancrelipase  (CREON 36,000 Lipase Units) 1 Capsule(s) Oral three times a day with meals  pantoprazole    Tablet 40 milliGRAM(s) Oral before breakfast    MEDICATIONS  (PRN):  acetaminophen     Tablet .. 650 milliGRAM(s) Oral every 6 hours PRN Temp greater or equal to 38C (100.4F), Mild Pain (1 - 3)  benzocaine 20% Spray 1 Spray(s) Topical four times a day PRN oral pain  dextrose Oral Gel 15 Gram(s) Oral once PRN Blood Glucose LESS THAN 70 milliGRAM(s)/deciliter  melatonin 3 milliGRAM(s) Oral at bedtime PRN Insomnia  morphine  - Injectable 2 milliGRAM(s) IV Push every 6 hours PRN Moderate Pain (4 - 6)  morphine  - Injectable 4 milliGRAM(s) IV Push every 6 hours PRN Severe Pain (7 - 10)  polyethylene glycol 3350 17 Gram(s) Oral daily PRN Constipation  zolpidem 5 milliGRAM(s) Oral at bedtime PRN Insomnia    PRESENT SYMPTOMS: [ ]Unable to self-report  [ ] CPOT [ ] PAINADs [ ] RDOS  Source if other than patient:  [ ]Family   [ ]Team     Pain: [ X]yes [ ]no  QOL impact -   Location -                    Aggravating factors -  Quality -  Radiation -  Timing-  Severity (0-10 scale):  Minimal acceptable level (0-10 scale)/Pain goal:    CPOT:    https://www.[email or web address]/getattachment/[email or web address]/Critical-Care-Pain-Observation-Tool-(CPOT)    PAINAD Score: See PAINAD tool and score below       RDOS: See RDOS tool and score below   0 to 2  minimal or no respiratory distress   3  mild distress  4 to 6 moderate distress  >7 severe distress    Dyspnea:                           [ ]Mild [ ]Moderate [ ]Severe  Anxiety:                             [ ]Mild [ ]Moderate [ ]Severe  Fatigue:                             [ ]Mild [ ]Moderate [ ]Severe  Nausea:                             [ ]Mild [ ]Moderate [ ]Severe  Loss of appetite:              [ ]Mild [ ]Moderate [ ]Severe  Constipation:                    [ ]Mild [ ]Moderate [ ]Severe  Other Symptoms:  [X ]All other review of systems negative     -------------------------------------------------------------------------------------------------------  PCSSQ[Palliative Care Spiritual Screening Question]   Severity (0-10):  Score of 4 or > indicate consideration of Chaplaincy referral.  Chaplaincy Referral: [ ] yes [ ] refused [ ] following [ X] Deferred     Caregiver [place]? : [ ] yes [ ] no [ ] Deferred [X ] Declined             Social work referral [ ] Patient & Family Centered Care Referral [ ]     Anticipatory Grief present?:  [ ] yes [ ] no  [ X] Deferred                  Social work referral [ ] Chaplaincy Referral [ ]      -------------------------------------------------------------------------------------------------------  PHYSICAL EXAM:  Vital Signs Last 24 Hrs  T(C): 37.2 (2023 05:20), Max: 37.3 (2023 12:12)  T(F): 99 (2023 05:20), Max: 99.1 (2023 12:12)  HR: 100 (2023 05:20) (79 - 122)  BP: 107/58 (2023 05:20) (106/67 - 138/81)  BP(mean): --  RR: 16 (2023 05:20) (15 - 18)  SpO2: 96% (2023 05:20) (96% - 99%)    Parameters below as of 2023 05:20  Patient On (Oxygen Delivery Method): room air     I&O's Summary    2023 07:01  -  2023 07:00  --------------------------------------------------------  IN: 300 mL / OUT: 200 mL / NET: 100 mL    2023 07:01  -  2023 11:31  --------------------------------------------------------  IN: 200 mL / OUT: 200 mL / NET: 0 mL        GENERAL: [place]ian  #   [ ]Cachexia  [ X]Alert  [X ]Oriented x   [ ]Lethargic  [ ]Unarousable  [ ]Verbal  [ ]Non-Verbal    Behavioral:   [ ] Anxiety  [ ] Delirium [ ] Agitation [ ] Other    HEENT:  [ ]Normal   [ ]Dry mouth   [ ]ET Tube/Trach  [X ]Oral lesions    PULMONARY:   [ ]Clear [ ]Tachypnea  [ ]Audible excessive secretions   [ ]Rhonchi        [ ]Right [ ]Left [ ]Bilateral  [ ]Crackles        [ ]Right [ ]Left [ ]Bilateral  [ ]Wheezing     [ ]Right [ ]Left [ ]Bilateral  [ ]Diminished breath sounds [ ]right [ ]left [ ]bilateral    CARDIOVASCULAR:    [ ]Regular [ ]Irregular [ ]Tachy  [ ]Tomy [ ]Murmur [ ]Other    GASTROINTESTINAL:  [ ]Soft  [ X]Distended   [ ]+BS  [ ]Non tender [ ]Tender  [ ]Other [ ]PEG [ ]OGT/ NGT  Last BM:    GENITOURINARY:  [ ]Normal [ ] Incontinent   [ ]Oliguria/Anuria   [ ]Rolle    MUSCULOSKELETAL:   [ ]Normal   [X ]Weakness  [ ]Bed/Wheelchair bound [ ]Edema    NEUROLOGIC:   [X ]No focal deficits  [ ]Cognitive impairment  [ ]Dysphagia [ ]Dysarthria [ ]Paresis [ ]Other     SKIN:   [ ]Normal  [ ]Rash  [ ]Other  [ ]Pressure ulcer(s)       Present on admission [ ]y [ ]n    -------------------------------------------------------------------------------------------------------  CRITICAL CARE:  [ ] Shock Present  [ ]Septic [ ]Cardiogenic [ ]Neurologic [ ]Hypovolemic  [ ]  Vasopressors [ ]  Inotropes   [ ]Respiratory failure present [ ]Mechanical ventilation [ ]Non-invasive ventilatory support [ ]High flow    [ ]Acute  [ ]Chronic [ ]Hypoxic  [ ]Hypercarbic [ ]Other  [ ]Other organ failure     -------------------------------------------------------------------------------------------------------  LABS:                        9.0    10.27 )-----------( 351       06:20 )             27.4   07-24    124<L>  |  93<L>  |  11  ----------------------------<  142<H>  4.4   |  22  |  0.57    Ca    7.9<L>      2023 06:20  Phos  2.7     -  Mg     1.80     -    TPro  5.4<L>  /  Alb  2.3<L>  /  TBili  0.4  /  DBili  x   /  AST  39<H>  /  ALT  18  /  AlkPhos  341<H>  07-24  PT/INR - ( 2023 06:20 )   PT: 14.3 sec;   INR: 1.23 ratio         PTT - ( 2023 06:20 )  PTT:30.9 sec    Urinalysis Basic - ( 2023 06:20 )    Color: x / Appearance: x / SG: x / pH: x  Gluc: 142 mg/dL / Ketone: x  / Bili: x / Urobili: x   Blood: x / Protein: x / Nitrite: x   Leuk Esterase: x / RBC: x / WBC x   Sq Epi: x / Non Sq Epi: x / Bacteria: x      -------------------------------------------------------------------------------------------------------  RADIOLOGY & ADDITIONAL STUDIES: < from: CT Abdomen and Pelvis w/ IV Cont (23 @ 13:36) >  IMPRESSION:    Interval increase in now very large amount of abdominal and pelvic   ascites since prior CT of 2023.    Little change in extensive omental metastatic caking.    Little change in serosal thickening/infiltration of transverse colon.    Grossly unchanged multiple hepatic and pulmonary metastases.    --- End of Report ---    < end of copied text >    -------------------------------------------------------------------------------------------------------  PROTEIN CALORIE MALNUTRITION PRESENT: [ ]mild [ ]moderate [ ]severe [ ]underweight [ ]morbid obesity  https://www.[employer].org/vault/2440/web/files/ONC/Table_Clinical%20Characteristics%20to%20Document%20Malnutrition-White%20JV%20et%20al%2020.pdf    Height (cm): 152.5 (23 @ 07:19), 152.5 (23 @ 01:12), 152.5 (23 @ 10:19)  Weight (kg): 50 (23 @ 18:02), 58.0009 (23 @ 13:00), 58.7 (23 @ 18:10)  BMI (kg/m2): 21.5 (23 @ 18:02), 24.9 (23 @ 07:19), 24.9 (23 @ 13:00)    Palliative Performance Status Version 2:   See PPSv2 tool and score below          [ ]PPSV2 < or = to 30% [ ]significant weight loss  [ ]poor nutritional intake  [ ]anasarca[ ]Artificial Nutrition      -------------------------------------------------------------------------------------------------------  Other REFERRALS:  [X ]Hospice  [ ]Child Life  [ ]Social Work  [ ]Case management [ ]Holistic Therapy     -------------------------------------------------------------------------------------------------------  Goals of Care Document:  [facility] Geriatrics and Palliative Care  Emerita Alejo, Palliative Care Nurse Practitioner  Contact Info: Page [phone] (Including Nights/Weekends), Message on Microsoft Teams (Emerita Alejo), or leave VM at Palliative Office [phone] (non-urgent)    Date of [id number] @ 11:31    HPI:  78F with hx of metastatic pancreatic CA s/p whipple on Chemo (last session , seen by Dr. David) presents with NVD and abdominal pain likely in setting of worsening metastatic disease. She had 5 episodes of nonbloody diarrhea yesterday and 1 episode this morning. She also has had multiple episodes of nonbloody vomiting. She complains of diffuse chronic pain that has worsened, especially in the abdomen. She reports worsening abdominal distension which is recurrent, scheduled for paracentesis . She states the abdominal distension can cause difficulty breathing. Also complains of mouth pain with sores, no improvement with mouth wash used at home. Increased difficulty with PO intake because of mouth pain. Recent admit this month for AMS/fever, found to have UTI.    In the ED, tachycardia to 118, /86, 98.2F, 98% on RA. Labs showed WBC 8.2, Hgb 10.3, Na 125, K 4.3, Creatinine .5, elevated alk phos 358, albumin 2.5, calcium 8.1, AST 48, ALT 22. Lactate 1.4. UA showed RBCs but no evidence of infection. CXR with bilateral lower airspace disease vs effusion, CTAP with interval increase of now large amount of abdominal ascites. She was given 1L LR and pain control with dilaudid and morphine. (2023 17:22)    PERTINENT PM/SXH:   No pertinent past medical history    Hypertension    HTN (hypertension), benign    HLD (hyperlipidemia)    IDDM (insulin dependent diabetes mellitus)    Type 2 diabetes mellitus    Malignant neoplasm of pancreas, unspecified    Dyslipidemia    CAD (coronary artery disease)    H/O chronic hepatitis    Pancreatic cancer      No significant past surgical history    History of  section    History of hysterectomy    History of coronary angioplasty with insertion of stent    History of Whipple procedure    -------------------------------------------------------------------------------------------------------    FAMILY HISTORY:  FH: HTN (hypertension) (Mother)    Family history of leukemia (Child)      Family Hx substance abuse [ ]yes [ ]no  ITEMS NOT CHECKED ARE NOT PRESENT    SOCIAL HISTORY:   Significant other/partner[X ]  Children[X]  Christian/Spirituality:  Substance hx:  [ ]   Tobacco hx:  [ ]   Alcohol hx: [ ]   Home Opioid hx:  [ X] I-Stop Reference No:  Living Situation: [ X]Home  [ ]Long term care  [ ]Rehab [ ]Other    BASELINE (I)ADL(s) (prior to admission):  [place]: [ ]Total  [X ] Moderate [ ]Dependent    -------------------------------------------------------------------------------------------------------  ADVANCE DIRECTIVES:    DNR/MOLST  [ ]  Living Will  [ ]   DECISION MAKER(s):  [X ] Health Care Proxy(s)  [ ] Surrogate(s)  [ ] Guardian           Name(s): Namrata Blanton Phone Number(s): [phone]    -------------------------------------------------------------------------------------------------------  Allergies    No Known Allergies    Intolerances    MEDICATIONS  (STANDING):  amLODIPine   Tablet 10 milliGRAM(s) Oral daily  atorvastatin 40 milliGRAM(s) Oral at bedtime  dextrose 5%. 1000 milliLiter(s) (50 mL/Hr) IV Continuous <Continuous>  dextrose 5%. 1000 milliLiter(s) (100 mL/Hr) IV Continuous <Continuous>  dextrose 50% Injectable 25 Gram(s) IV Push once  dextrose 50% Injectable 12.5 Gram(s) IV Push once  dextrose 50% Injectable 25 Gram(s) IV Push once  fenofibrate Tablet 145 milliGRAM(s) Oral daily  FIRST- Mouthwash  BLM 10 milliLiter(s) Swish and Spit four times a day  glucagon  Injectable 1 milliGRAM(s) IntraMuscular once  insulin lispro (ADMELOG) corrective regimen sliding scale   SubCutaneous three times a day before meals  losartan 100 milliGRAM(s) Oral daily  metoprolol succinate ER 25 milliGRAM(s) Oral daily  pancrelipase  (CREON 36,000 Lipase Units) 1 Capsule(s) Oral three times a day with meals  pantoprazole    Tablet 40 milliGRAM(s) Oral before breakfast    MEDICATIONS  (PRN):  acetaminophen     Tablet .. 650 milliGRAM(s) Oral every 6 hours PRN Temp greater or equal to 38C (100.4F), Mild Pain (1 - 3)  benzocaine 20% Spray 1 Spray(s) Topical four times a day PRN oral pain  dextrose Oral Gel 15 Gram(s) Oral once PRN Blood Glucose LESS THAN 70 milliGRAM(s)/deciliter  melatonin 3 milliGRAM(s) Oral at bedtime PRN Insomnia  morphine  - Injectable 2 milliGRAM(s) IV Push every 6 hours PRN Moderate Pain (4 - 6)  morphine  - Injectable 4 milliGRAM(s) IV Push every 6 hours PRN Severe Pain (7 - 10)  polyethylene glycol 3350 17 Gram(s) Oral daily PRN Constipation  zolpidem 5 milliGRAM(s) Oral at bedtime PRN Insomnia    PRESENT SYMPTOMS: [ ]Unable to self-report  [ ] CPOT [ ] PAINADs [ ] RDOS  Source if other than patient:  [ ]Family   [ ]Team     Pain: [ X]yes [ ]no  QOL impact - recurrent hospitalizations'   Location - abd                    Aggravating factors - movement   Quality - pressure/sharp  Radiation - back  Timing- constant   Severity (0-10 scale): 9-10/10  Minimal acceptable level (0-10 scale)/Pain goal: 4/10    CPOT:    https://www.[email or web address]/getattachment/[email or web address]/Critical-Care-Pain-Observation-Tool-(CPOT)    PAINAD Score: See PAINAD tool and score below       RDOS: See RDOS tool and score below   0 to 2  minimal or no respiratory distress   3  mild distress  4 to 6 moderate distress  >7 severe distress    Dyspnea:                           [ ]Mild [ ]Moderate [ ]Severe  Anxiety:                             [ ]Mild [ ]Moderate [ ]Severe  Fatigue:                             [ ]Mild [ ]Moderate [ ]Severe  Nausea:                             [ ]Mild [ ]Moderate [ ]Severe  Loss of appetite:              [X ]Mild [ ]Moderate [ ]Severe  Constipation:                    [ ]Mild [ ]Moderate [ ]Severe  Other Symptoms:  [X ]All other review of systems negative     -------------------------------------------------------------------------------------------------------  PCSSQ[Palliative Care Spiritual Screening Question]   Severity (0-10):  Score of 4 or > indicate consideration of Chaplaincy referral.  Chaplaincy Referral: [ ] yes [ ] refused [ ] following [ X] Deferred     Caregiver [place]? : [X ] yes [ ] no [ ] Deferred [ ] Declined             Social work referral [X ] Patient & Family Centered Care Referral [ ]     Anticipatory Grief present?:  [X ] yes [ ] no  [ ] Deferred                  Social work referral [X] Chaplaincy Referral [ ]      -------------------------------------------------------------------------------------------------------  PHYSICAL EXAM:  Vital Signs Last 24 Hrs  T(C): 37.2 (2023 05:20), Max: 37.3 (2023 12:12)  T(F): 99 (2023 05:20), Max: 99.1 (2023 12:12)  HR: 100 (2023 05:20) (79 - 122)  BP: 107/58 (2023 05:20) (106/67 - 138/81)  BP(mean): --  RR: 16 (2023 05:20) (15 - 18)  SpO2: 96% (2023 05:20) (96% - 99%)    Parameters below as of 2023 05:20  Patient On (Oxygen Delivery Method): room air     I&O's Summary    2023 07:01  -  2023 07:00  --------------------------------------------------------  IN: 300 mL / OUT: 200 mL / NET: 100 mL    2023 07:01  -  2023 11:31  --------------------------------------------------------  IN: 200 mL / OUT: 200 mL / NET: 0 mL        GENERAL: [place]ali  # [id number]  [ ]Cachexia  [ X]Alert  [X ]Oriented x 4  [ ]Lethargic  [ ]Unarousable  [ ]Verbal  [ ]Non-Verbal    Behavioral:   [ ] Anxiety  [ ] Delirium [ ] Agitation [x ] Other    HEENT:  [ ]Normal   [ ]Dry mouth   [ ]ET Tube/Trach  [X ]Oral lesions    PULMONARY:   [ ]Clear [ ]Tachypnea  [ ]Audible excessive secretions   [ ]Rhonchi        [ ]Right [ ]Left [ ]Bilateral  [ ]Crackles        [ ]Right [ ]Left [ ]Bilateral  [ ]Wheezing     [ ]Right [ ]Left [ ]Bilateral  [X ]Diminished breath sounds [ ]right [ ]left [ ]bilateral    CARDIOVASCULAR:    [ X]Regular [ ]Irregular [ ]Tachy  [ ]Tomy [ ]Murmur [ ]Other    GASTROINTESTINAL:  [ ]Soft  [ X]Distended   [ ]+BS  [ ]Non tender [ X]Tender  [ ]Other [ ]PEG [ ]OGT/ NGT  Last BM:     GENITOURINARY:  [X ]Normal [ ] Incontinent   [ ]Oliguria/Anuria   [ ]Rolle    MUSCULOSKELETAL:   [x ]Normal   [  ]Weakness  [ ]Bed/Wheelchair bound [ ]Edema    NEUROLOGIC:   [X ]No focal deficits  [ ]Cognitive impairment  [ ]Dysphagia [ ]Dysarthria [ ]Paresis [ ]Other     SKIN:   [X ]Normal  [ ]Rash  [ ]Other  [ ]Pressure ulcer(s)       Present on admission [ ]y [ ]n    -------------------------------------------------------------------------------------------------------  CRITICAL CARE:  [ ] Shock Present  [ ]Septic [ ]Cardiogenic [ ]Neurologic [ ]Hypovolemic  [ ]  Vasopressors [ ]  Inotropes   [ ]Respiratory failure present [ ]Mechanical ventilation [ ]Non-invasive ventilatory support [ ]High flow    [ ]Acute  [ ]Chronic [ ]Hypoxic  [ ]Hypercarbic [ ]Other  [ ]Other organ failure     -------------------------------------------------------------------------------------------------------  LABS:                        9.0    10.27 )-----------( 351       06:20 )             27.4   07-    124<L>  |  93<L>  |  11  ----------------------------<  142<H>  4.4   |  22  |  0.57    Ca    7.9<L>      2023 06:20  Phos  2.7       Mg     1.80     -24    TPro  5.4<L>  /  Alb  2.3<L>  /  TBili  0.4  /  DBili  x   /  AST  39<H>  /  ALT  18  /  AlkPhos  341<H>  07-24  PT/INR - ( 2023 06:20 )   PT: 14.3 sec;   INR: 1.23 ratio         PTT - ( 2023 06:20 )  PTT:30.9 sec    Urinalysis Basic - ( 2023 06:20 )    Color: x / Appearance: x / SG: x / pH: x  Gluc: 142 mg/dL / Ketone: x  / Bili: x / Urobili: x   Blood: x / Protein: x / Nitrite: x   Leuk Esterase: x / RBC: x / WBC x   Sq Epi: x / Non Sq Epi: x / Bacteria: x      -------------------------------------------------------------------------------------------------------  RADIOLOGY & ADDITIONAL STUDIES: < from: CT Abdomen and Pelvis w/ IV Cont (23 @ 13:36) >  IMPRESSION:    Interval increase in now very large amount of abdominal and pelvic   ascites since prior CT of 2023.    Little change in extensive omental metastatic caking.    Little change in serosal thickening/infiltration of transverse colon.    Grossly unchanged multiple hepatic and pulmonary metastases.    --- End of Report ---    < end of copied text >    -------------------------------------------------------------------------------------------------------  PROTEIN CALORIE MALNUTRITION PRESENT: [ ]mild [ ]moderate [ ]severe [ ]underweight [ ]morbid obesity  https://[email or web address]/[email or web address]/web/files/ONC/Table_Clinical%20Characteristics%20to%20Document%20Malnutrition-White%20JV%20et%20al%2020.pdf    Height (cm): 152.5 (23 @ 07:19), 152.5 (23 @ 01:12), 152.5 (23 @ 10:19)  Weight (kg): 50 (23 @ 18:02), 58.0009 (23 @ 13:00), 58.7 (23 @ 18:10)  BMI (kg/m2): 21.5 (23 @ 18:02), 24.9 (23 @ 07:19), 24.9 (23 @ 13:00)    Palliative Performance Status Version 2:   See PPSv2 tool and score below          [ ]PPSV2 < or = to 30% [ ]significant weight loss  [ ]poor nutritional intake  [ ]anasarca[ ]Artificial Nutrition      -------------------------------------------------------------------------------------------------------  Other REFERRALS:  [X ]Hospice  [ ]Child Life  [ ]Social Work  [ ]Case management [ ]Holistic Therapy     -------------------------------------------------------------------------------------------------------  Goals of Care Document:

## 2023-07-25 NOTE — PROGRESS NOTE ADULT - PROBLEM SELECTOR PLAN 4
Hyponatremic to 125 in ED, no changes in mental status or neurological deficits. 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 Of note, had hyponatremia from previous admission this month - thought to be in setting of SIADH. As of 7/24 patient had hyponatremia to 124, will give additional 1L IVF  -f/u serum osmolality, FeNa  -monitor BMP Hyponatremic to 125 in ED, no changes in mental status or neurological deficits. 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 Of note, had hyponatremia from previous admission this month - thought to be in setting of SIADH. As of 7/24 patient had hyponatremia to 124, will give additional 1L IVF  -f/u serum osmolality, FeNa  -monitor BMP    suggest SIADH . salt tabs, fluid restrict 1.5L Hyponatremic to 125 in ED, no changes in mental status or neurological deficits. 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 Na 123 as of 7/25, unable to complete IR procedure due to current hyponatremia. Of note, had hyponatremia from previous admission this month, also thought to be in setting of SIADH.   -Na goal of 130 for IR procedure  -c/w Fluid restrict 1.5L daily, Salt tabs 1g TID w/ meals  -s/p 2L IVF  -monitor BMP Hyponatremic to 125 in ED, no changes in mental status or neurological deficits. 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 Na 123 as of 7/25, unable to complete IR procedure due to current hyponatremia. Of note, had hyponatremia from previous admission this month, also thought to be in setting of SIADH.   -Na goal of 130 for IR procedure  -c/w Fluid restrict 1.5L daily,   -patient not tolerating salt tabs 2/2 oral lesions, will consult nephro for recs regarding management, discuss hypertonic saline  -s/p 2L IVF  -monitor BMP Hyponatremic to 125 in ED, no changes in mental status or neurological deficits. 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 Na 123 as of 7/25, unable to complete IR procedure due to current hyponatremia. Of note, had hyponatremia from previous admission this month, also thought to be in setting of SIADH.   -Nephro consulted appreciate recs  -Na goal of 130 for IR procedure  -c/w Fluid restrict 1.0L daily  -Will start 3% NS 30cc/hr for 3 hours as pt not tolerating salt tabs 2/2 oral lesions, recheck BMP this evening  -monitor BMP in AM  -s/p 2L IVF

## 2023-07-25 NOTE — PROGRESS NOTE ADULT - PROBLEM SELECTOR PLAN 2
Home regimen: Methadone 1mg solution QHS, Morphine 3mg solution Q4 hrs PRN pain, Gabapentin 100 TID  > Resumed Methadone 1mg solution (ekg 7/23 qtc 477)  > Resumed Gabapentin 100mg TID   > Liberated Morphine solution to 4mg PO Q4 hrs for moderate/severe pain   > IV Morphine 2mg for breakthrough pain - can be given 1 hour after PO morphine is pt continues to c/o pain. Home regimen: Methadone 1mg solution QHS, Morphine 3mg solution Q4 hrs PRN pain, Gabapentin 100 TID  > c/w Methadone 1mg solution (ekg 7/23 qtc 477)  > c/w Gabapentin 100mg TID   > PO morphine solution 4mg Q4hrs PRN moderate/severe pain with 2mg IV Morphine for breakthrough pain, can be given 1 hour after PO morphine if pt continues to c/o pain

## 2023-07-25 NOTE — CHART NOTE - NSCHARTNOTEFT_GEN_A_CORE
78y Female w/ metastatic pancreatic Ca requiring multiple paracenteses for ascites. IR consulted for pleurx cath prior to hospice care. Pts AM labs shows Na of 123, given low Na, unable to do procedure today    RECOMMENDATIONS:  - d/w Dr. Wade  - procedure on hold today secondary to severe hyponatremia  - recommend correcting Na to at least 130  - pts procedure set for 7/26/23  - can restart diet for today. Please keep NPO starting at 11:59PM on 7/25/23  - communicated w/ provider Allen Berry  PGY-3, Interventional Radiology

## 2023-07-25 NOTE — PROGRESS NOTE ADULT - PROBLEM SELECTOR PLAN 5
Pt c/o decreased PO intake   > Resumed home dose Remeron 15mg @ bedtime. > Pt c/o intermittent nausea with no vomiting   > PRN Zofran 4mg PO Q 8hrs (last EKG 7/23 shows qtc 477 )

## 2023-07-25 NOTE — CONSULT NOTE ADULT - SUBJECTIVE AND OBJECTIVE BOX
[facility] DIVISION OF KIDNEY DISEASES AND HYPERTENSION -- [phone]  -- INITIAL CONSULT NOTE  --------------------------------------------------------------------------------  HPI: 78F w/ PMH of pancreatic cancer s/p chemo (now on hospice), HTN, HLD, DM2, CAD s/p PCI who presented to the ED complaining of diarrhea for 2-3 days prior to admission. Nephrology was consulted for hyponatremia.     On admission, SNa was low at 125 (23). Pt. received 1L NS and 1L LR and SNa decreased to 123 today (23). Prior to this admission, last SNa was low at 129 on 23.      Pt. was seen and evaluated at the bedside this afternoon. She complains of abd pain and leg swelling. Denies fevers/chill, headaches, chest pain, SOB. Has not had any diarrhea since admission. Patient does not recall any recent medication changes. Decision was made to make patient hospice.    PAST HISTORY  --------------------------------------------------------------------------------  PAST MEDICAL & SURGICAL HISTORY:  Hypertension  IDDM (insulin dependent diabetes mellitus)  Malignant neoplasm of pancreas, unspecified  Dyslipidemia  CAD (coronary artery disease)  H/O chronic hepatitis  treated  Pancreatic cancer  History of  section  History of hysterectomy  History of coronary angioplasty with insertion of stent  2019 - 1 stent inserted  History of Whipple procedure    FAMILY HISTORY:  FH: HTN (hypertension) (Mother)  Family history of leukemia (Child)    PAST SOCIAL HISTORY:  lives at home with spouse (2023 17:22)    ALLERGIES & MEDICATIONS  --------------------------------------------------------------------------------  Allergies  No Known Allergies    Intolerances    Standing Inpatient MedicationsamLODIPine   Tablet 10 milliGRAM(s) Oral daily  atorvastatin 40 milliGRAM(s) Oral at bedtime  chlorhexidine 2% Cloths 1 Application(s) Topical daily  dextrose 5%. 1000 milliLiter(s) IV Continuous <Continuous>  dextrose 5%. 1000 milliLiter(s) IV Continuous <Continuous>  dextrose 50% Injectable 25 Gram(s) IV Push once  dextrose 50% Injectable 12.5 Gram(s) IV Push once  dextrose 50% Injectable 25 Gram(s) IV Push once  fenofibrate Tablet 145 milliGRAM(s) Oral daily  FIRST- Mouthwash  BLM 10 milliLiter(s) Swish and Spit four times a day  gabapentin 100 milliGRAM(s) Oral three times a day  glucagon  Injectable 1 milliGRAM(s) IntraMuscular once  insulin lispro (ADMELOG) corrective regimen sliding scale   SubCutaneous three times a day before meals  losartan 100 milliGRAM(s) Oral daily  methadone   Solution 1 milliGRAM(s) Oral daily  metoprolol succinate ER 25 milliGRAM(s) Oral daily  mirtazapine 15 milliGRAM(s) Oral at bedtime  pancrelipase  (CREON 36,000 Lipase Units) 1 Capsule(s) Oral three times a day with meals  pantoprazole    Tablet 40 milliGRAM(s) Oral before breakfast  sodium chloride 1 Gram(s) Oral three times a day    PRN Inpatient Medicationsacetaminophen     Tablet .. 650 milliGRAM(s) Oral every 6 hours PRN  benzocaine 20% Spray 1 Spray(s) Topical four times a day PRN  dextrose Oral Gel 15 Gram(s) Oral once PRN  melatonin 3 milliGRAM(s) Oral at bedtime PRN  morphine   Solution 4 milliGRAM(s) Oral every 4 hours PRN  morphine  - Injectable 2 milliGRAM(s) IV Push every 4 hours PRN  ondansetron    Tablet 4 milliGRAM(s) Oral every 8 hours PRN  polyethylene glycol 3350 17 Gram(s) Oral daily PRN  zolpidem 5 milliGRAM(s) Oral at bedtime PRN    REVIEW OF SYSTEMS  --------------------------------------------------------------------------------  Gen: No fevers/chills  Head/Eyes/Ears: No HA  Respiratory: No dyspnea, cough  CV: No chest pain  GI: +abdominal pain, diarrhea  : No dysuria, hematuria  MSK: leg swelling    All other systems were reviewed and are negative, except as noted.    VITALS/PHYSICAL EXAM  --------------------------------------------------------------------------------  T(C): 37.1 (23 @ 10:24), Max: 37.2 (23 @ 05:00)  HR: 107 (23 @ 10:24) (100 - 109)  BP: 109/64 (23 @ 10:24) (109/64 - 126/68)  RR: 16 (23 @ 10:24) (15 - 17)  SpO2: 95% (23 @ 10:24) (95% - 97%)  Wt(kg): --  Weight (kg): 50 (23 @ 18:02)    23 @ 07:01  -  23 @ 07:00  --------------------------------------------------------  IN: 540 mL / OUT: 950 mL / NET: -410 mL    Physical Exam:  Gen: NAD  HEENT: MMM  Pulm: CTA B/L  CV: S1S2, tachycardic  Abd: Soft, +BS, tenderness to palpation  Ext: B/L LE edema  Neuro: Awake  Skin: Warm and dry    LABS/STUDIES  --------------------------------------------------------------------------------              9.2    11.98 >-----------<  394      [23 06:35]              28.5     123  |  93  |  13  ----------------------------<  101      [23 06:35]  4.3   |  17  |  0.54        Ca     8.0     [23 06:35]      Mg     1.90     [23 06:35]      Phos  2.5     [23 06:35]    TPro  5.6  /  Alb  2.4  /  TBili  0.5  /  DBili  x   /  AST  44  /  ALT  19  /  AlkPhos  390  [23 06:35]    PT/INR: PT 13.7 , INR 1.18       [23 06:35]  PTT: 26.7       [23 06:35]    Serum Osmolality 259      [23 06:35]    Creatinine Trend:  SCr 0.54 [:35]  SCr 0.57 [ 06:20]  SCr 0.53 [ 09:30]  SCr 0.65 [ 13:56]  SCr 0.66 [ 05:26]    Urine Creatinine 53      [23 19:17]  Urine Sodium 60      [23 19:17]  Urine Potassium 39.2      [23 19:17]  Urine Chloride 58      [23 19:17]  Urine Osmolality 469      [23 19:17]    HbA1c 7.1      [19 @ 20:12]    HCV 5.83, Reactive      [23 05:30]

## 2023-07-25 NOTE — PROGRESS NOTE ADULT - PROBLEM SELECTOR PLAN 6
> See GOC note - I spent 46 minutes discussing ACP with pt and family   > Home Hospice referral made  > DNR/DNI by primary team Pt c/o decreased PO intake 2/2 oral sores   > Resumed home dose Remeron 15mg @ bedtime.

## 2023-07-25 NOTE — PROGRESS NOTE ADULT - PROBLEM SELECTOR PLAN 1
Pt known to Dr. Juan David @ [facility]  > Last chemotherapy 7/17   > Pt no longer interested in DMT - hospice appropriate   > CM to make hospice referral

## 2023-07-25 NOTE — PROGRESS NOTE ADULT - PROBLEM SELECTOR PLAN 5
hx metastatic pancreatic cancer with mets to liver and lung. s/p whipple, on chemo with last treatment 7/17. She has chronic diffuse body pain, including chest, abdomen, and extremities, in setting of metastatic disease and chemo. Pain managed by oxycodone and morphine at home. Patient and family no longer interested in DMT and would like to pursue hospice, will consult palliative and place hospice referral.  -palliative care consulted, appreciate recs  -c/w pain regimen per palliative: Methadone 1mg , Gabapentin 100mg TID, Morphine 4mg PO q4 for moderate/severe pain, and IV morphine 2mg q4 for breakthrough pain  -until procedure and while NPO will resume Morphine 2/4 mg IV q4 PRN for moderate/severe pain  -Resumed home Remeron 15mg daily for decreased PO intake hx metastatic pancreatic cancer with mets to liver and lung. s/p whipple, on chemo with last treatment 7/17. She has chronic diffuse body pain, including chest, abdomen, and extremities, in setting of metastatic disease and chemo. Pain managed by oxycodone and morphine at home. Patient and family no longer interested in DMT and would like to pursue hospice, will consult palliative and place hospice referral.  -palliative care consulted, appreciate recs  -c/w pain regimen per palliative: Methadone 1mg , Gabapentin 100mg TID, Morphine 4mg PO q4 for moderate/severe pain, and IV morphine 2mg q4 for breakthrough pain  -Resumed home Remeron 15mg daily for decreased PO intake

## 2023-07-25 NOTE — PROGRESS NOTE ADULT - ATTENDING COMMENTS
78W PMH of metastatic pancreatic Ca with POD on multiple regiments who presents with mouth sores, loose stools with nausea and abdominal pain and worsening ascites.     # Metastatic pancreatic cancer   # Malignant ascites   # Peritoneal carcinomatosis   # Pain secondary to malignancy   - Family no longer interested in further chemo, plan no to transition care to focus on symptoms and keeping comfortable  - Has large volume ascites on imaging, would plan for placement of Pleurx given need for recurrent drainage and GOC. IR plan for tomorrow pending Na.   - Per CM patient has been accepted by hospice and is pending DME delivery     # Hyponatremia - urine studies consistent with SIADH, started on salt tabs today however not tolerating due to mouth sores, will consult nephro to help with hypertonic saline for procedure tomorrow.   # Oral ulcers - secondary to chemo, c/w magic mouthwash, benzocaine spray

## 2023-07-25 NOTE — PROGRESS NOTE ADULT - PROBLEM SELECTOR PLAN 7
Thank you for allowing us to participate in your patient's care. We will continue to follow with you. Please page [phone] for any q's or c's. The Geriatric and Palliative Medicine service has coverage 24 hours a day/ 7 days a week to provide medical recommendations regarding symptom management needs via telephone.

## 2023-07-25 NOTE — PROGRESS NOTE ADULT - SUBJECTIVE AND OBJECTIVE BOX
Allen Coughlin MD  PGY 1 Department of Internal Medicine        Patient is a 78y old  Female who presents with a chief complaint of diarrhea (25 Jul 2023 10:30)      SUBJECTIVE / OVERNIGHT EVENTS: Pt seen and examined. No acute overnight events. Denies fevers, chills, CP, SOB, Abdominal pain, N/V, Constipation, Diarrhea        MEDICATIONS  (STANDING):  amLODIPine   Tablet 10 milliGRAM(s) Oral daily  atorvastatin 40 milliGRAM(s) Oral at bedtime  chlorhexidine 2% Cloths 1 Application(s) Topical daily  dextrose 5%. 1000 milliLiter(s) (100 mL/Hr) IV Continuous <Continuous>  dextrose 5%. 1000 milliLiter(s) (50 mL/Hr) IV Continuous <Continuous>  dextrose 50% Injectable 25 Gram(s) IV Push once  dextrose 50% Injectable 12.5 Gram(s) IV Push once  dextrose 50% Injectable 25 Gram(s) IV Push once  fenofibrate Tablet 145 milliGRAM(s) Oral daily  FIRST- Mouthwash  BLM 10 milliLiter(s) Swish and Spit four times a day  gabapentin 100 milliGRAM(s) Oral three times a day  glucagon  Injectable 1 milliGRAM(s) IntraMuscular once  insulin lispro (ADMELOG) corrective regimen sliding scale   SubCutaneous three times a day before meals  losartan 100 milliGRAM(s) Oral daily  methadone   Solution 1 milliGRAM(s) Oral daily  metoprolol succinate ER 25 milliGRAM(s) Oral daily  mirtazapine 15 milliGRAM(s) Oral at bedtime  pancrelipase  (CREON 36,000 Lipase Units) 1 Capsule(s) Oral three times a day with meals  pantoprazole    Tablet 40 milliGRAM(s) Oral before breakfast  sodium chloride 1 Gram(s) Oral three times a day    MEDICATIONS  (PRN):  acetaminophen     Tablet .. 650 milliGRAM(s) Oral every 6 hours PRN Temp greater or equal to 38C (100.4F), Mild Pain (1 - 3)  benzocaine 20% Spray 1 Spray(s) Topical four times a day PRN oral pain  dextrose Oral Gel 15 Gram(s) Oral once PRN Blood Glucose LESS THAN 70 milliGRAM(s)/deciliter  melatonin 3 milliGRAM(s) Oral at bedtime PRN Insomnia  morphine   Solution 4 milliGRAM(s) Oral every 4 hours PRN Severe Pain (7 - 10)  morphine  - Injectable 2 milliGRAM(s) IV Push every 4 hours PRN breakthrough pain  polyethylene glycol 3350 17 Gram(s) Oral daily PRN Constipation  zolpidem 5 milliGRAM(s) Oral at bedtime PRN Insomnia      I&O's Summary    24 Jul 2023 07:01  -  25 Jul 2023 07:00  --------------------------------------------------------  IN: 540 mL / OUT: 950 mL / NET: -410 mL        Vital Signs Last 24 Hrs  T(C): 37.1 (25 Jul 2023 10:24), Max: 37.2 (25 Jul 2023 05:00)  T(F): 98.7 (25 Jul 2023 10:24), Max: 98.9 (25 Jul 2023 05:00)  HR: 107 (25 Jul 2023 10:24) (100 - 109)  BP: 109/64 (25 Jul 2023 10:24) (109/64 - 126/68)  BP(mean): --  RR: 16 (25 Jul 2023 10:24) (15 - 17)  SpO2: 95% (25 Jul 2023 10:24) (95% - 97%)    Parameters below as of 25 Jul 2023 05:00  Patient On (Oxygen Delivery Method): room air        CAPILLARY BLOOD GLUCOSE      POCT Blood Glucose.: 104 mg/dL (25 Jul 2023 08:00)  POCT Blood Glucose.: 117 mg/dL (25 Jul 2023 02:43)  POCT Blood Glucose.: 129 mg/dL (24 Jul 2023 20:59)  POCT Blood Glucose.: 127 mg/dL (24 Jul 2023 17:25)  POCT Blood Glucose.: 115 mg/dL (24 Jul 2023 12:34)      PHYSICAL EXAM:  GENERAL: NAD,   HEAD:  Atraumatic, Normocephalic  EYES: EOMI, PERRL, conjunctiva and sclera clear  NECK: No JVD  CHEST/LUNG: Clear to auscultation bilaterally; No wheeze  HEART: Regular rate and rhythm; No murmurs, rubs, or gallops  ABDOMEN: Soft, Nontender, Nondistended; Bowel sounds present  EXTREMITIES:  2+ Peripheral Pulses, No clubbing, cyanosis, or edema  PSYCH: AAOx3  NEUROLOGY: non-focal  SKIN: No rashes or lesions       LABS:                        9.2    11.98 )-----------( 394      ( 25 Jul 2023 06:35 )             28.5     Auto Eosinophil # x     / Auto Eosinophil % x     / Auto Neutrophil # x     / Auto Neutrophil % x     / BANDS % x                            9.0    10.27 )-----------( 351      ( 24 Jul 2023 06:20 )             27.4     Auto Eosinophil # 0.09  / Auto Eosinophil % 0.9   / Auto Neutrophil # 7.99  / Auto Neutrophil % 77.8  / BANDS % x        07-25    123<L>  |  93<L>  |  13  ----------------------------<  101<H>  4.3   |  17<L>  |  0.54  07-24    124<L>  |  93<L>  |  11  ----------------------------<  142<H>  4.4   |  22  |  0.57    Ca    8.0<L>      25 Jul 2023 06:35  Mg     1.90     07-25  Phos  2.5     07-25  TPro  5.6<L>  /  Alb  2.4<L>  /  TBili  0.5  /  DBili  x   /  AST  44<H>  /  ALT  19  /  AlkPhos  390<H>  07-25  TPro  5.4<L>  /  Alb  2.3<L>  /  TBili  0.4  /  DBili  x   /  AST  39<H>  /  ALT  18  /  AlkPhos  341<H>  07-24    PT/INR - ( 25 Jul 2023 06:35 )   PT: 13.7 sec;   INR: 1.18 ratio         PTT - ( 25 Jul 2023 06:35 )  PTT:26.7 sec      Urinalysis Basic - ( 25 Jul 2023 06:35 )    Color: x / Appearance: x / SG: x / pH: x  Gluc: 101 mg/dL / Ketone: x  / Bili: x / Urobili: x   Blood: x / Protein: x / Nitrite: x   Leuk Esterase: x / RBC: x / WBC x   Sq Epi: x / Non Sq Epi: x / Bacteria: x            RADIOLOGY & ADDITIONAL TESTS:    Imaging Personally Reviewed:    Consultant(s) Notes Reviewed:      Care Discussed with Consultants/Other Providers:   Allen Coughlin MD  PGY 1 Department of Internal Medicine        Patient is a 78y old  Female who presents with a chief complaint of diarrhea (25 Jul 2023 10:30)      SUBJECTIVE / OVERNIGHT EVENTS: Pt seen and examined. No acute overnight events.          Denies fevers, chills, CP, SOB, Abdominal pain, N/V, Constipation, Diarrhea        MEDICATIONS  (STANDING):  amLODIPine   Tablet 10 milliGRAM(s) Oral daily  atorvastatin 40 milliGRAM(s) Oral at bedtime  chlorhexidine 2% Cloths 1 Application(s) Topical daily  dextrose 5%. 1000 milliLiter(s) (100 mL/Hr) IV Continuous <Continuous>  dextrose 5%. 1000 milliLiter(s) (50 mL/Hr) IV Continuous <Continuous>  dextrose 50% Injectable 25 Gram(s) IV Push once  dextrose 50% Injectable 12.5 Gram(s) IV Push once  dextrose 50% Injectable 25 Gram(s) IV Push once  fenofibrate Tablet 145 milliGRAM(s) Oral daily  FIRST- Mouthwash  BLM 10 milliLiter(s) Swish and Spit four times a day  gabapentin 100 milliGRAM(s) Oral three times a day  glucagon  Injectable 1 milliGRAM(s) IntraMuscular once  insulin lispro (ADMELOG) corrective regimen sliding scale   SubCutaneous three times a day before meals  losartan 100 milliGRAM(s) Oral daily  methadone   Solution 1 milliGRAM(s) Oral daily  metoprolol succinate ER 25 milliGRAM(s) Oral daily  mirtazapine 15 milliGRAM(s) Oral at bedtime  pancrelipase  (CREON 36,000 Lipase Units) 1 Capsule(s) Oral three times a day with meals  pantoprazole    Tablet 40 milliGRAM(s) Oral before breakfast  sodium chloride 1 Gram(s) Oral three times a day    MEDICATIONS  (PRN):  acetaminophen     Tablet .. 650 milliGRAM(s) Oral every 6 hours PRN Temp greater or equal to 38C (100.4F), Mild Pain (1 - 3)  benzocaine 20% Spray 1 Spray(s) Topical four times a day PRN oral pain  dextrose Oral Gel 15 Gram(s) Oral once PRN Blood Glucose LESS THAN 70 milliGRAM(s)/deciliter  melatonin 3 milliGRAM(s) Oral at bedtime PRN Insomnia  morphine   Solution 4 milliGRAM(s) Oral every 4 hours PRN Severe Pain (7 - 10)  morphine  - Injectable 2 milliGRAM(s) IV Push every 4 hours PRN breakthrough pain  polyethylene glycol 3350 17 Gram(s) Oral daily PRN Constipation  zolpidem 5 milliGRAM(s) Oral at bedtime PRN Insomnia      I&O's Summary    24 Jul 2023 07:01  -  25 Jul 2023 07:00  --------------------------------------------------------  IN: 540 mL / OUT: 950 mL / NET: -410 mL        Vital Signs Last 24 Hrs  T(C): 37.1 (25 Jul 2023 10:24), Max: 37.2 (25 Jul 2023 05:00)  T(F): 98.7 (25 Jul 2023 10:24), Max: 98.9 (25 Jul 2023 05:00)  HR: 107 (25 Jul 2023 10:24) (100 - 109)  BP: 109/64 (25 Jul 2023 10:24) (109/64 - 126/68)  BP(mean): --  RR: 16 (25 Jul 2023 10:24) (15 - 17)  SpO2: 95% (25 Jul 2023 10:24) (95% - 97%)    Parameters below as of 25 Jul 2023 05:00  Patient On (Oxygen Delivery Method): room air        CAPILLARY BLOOD GLUCOSE      POCT Blood Glucose.: 104 mg/dL (25 Jul 2023 08:00)  POCT Blood Glucose.: 117 mg/dL (25 Jul 2023 02:43)  POCT Blood Glucose.: 129 mg/dL (24 Jul 2023 20:59)  POCT Blood Glucose.: 127 mg/dL (24 Jul 2023 17:25)  POCT Blood Glucose.: 115 mg/dL (24 Jul 2023 12:34)      PHYSICAL EXAM:  GENERAL: NAD,   HEAD:  Atraumatic, Normocephalic  EYES: EOMI, PERRL, conjunctiva and sclera clear  NECK: No JVD  CHEST/LUNG: Clear to auscultation bilaterally; No wheeze  HEART: Regular rate and rhythm; No murmurs, rubs, or gallops  ABDOMEN: Soft, Nontender, Nondistended; Bowel sounds present  EXTREMITIES:  2+ Peripheral Pulses, No clubbing, cyanosis, or edema  PSYCH: AAOx3  NEUROLOGY: non-focal  SKIN: No rashes or lesions       LABS:                        9.2    11.98 )-----------( 394      ( 25 Jul 2023 06:35 )             28.5     Auto Eosinophil # x     / Auto Eosinophil % x     / Auto Neutrophil # x     / Auto Neutrophil % x     / BANDS % x                            9.0    10.27 )-----------( 351      ( 24 Jul 2023 06:20 )             27.4     Auto Eosinophil # 0.09  / Auto Eosinophil % 0.9   / Auto Neutrophil # 7.99  / Auto Neutrophil % 77.8  / BANDS % x        07-25    123<L>  |  93<L>  |  13  ----------------------------<  101<H>  4.3   |  17<L>  |  0.54  07-24    124<L>  |  93<L>  |  11  ----------------------------<  142<H>  4.4   |  22  |  0.57    Ca    8.0<L>      25 Jul 2023 06:35  Mg     1.90     07-25  Phos  2.5     07-25  TPro  5.6<L>  /  Alb  2.4<L>  /  TBili  0.5  /  DBili  x   /  AST  44<H>  /  ALT  19  /  AlkPhos  390<H>  07-25  TPro  5.4<L>  /  Alb  2.3<L>  /  TBili  0.4  /  DBili  x   /  AST  39<H>  /  ALT  18  /  AlkPhos  341<H>  07-24    PT/INR - ( 25 Jul 2023 06:35 )   PT: 13.7 sec;   INR: 1.18 ratio         PTT - ( 25 Jul 2023 06:35 )  PTT:26.7 sec      Urinalysis Basic - ( 25 Jul 2023 06:35 )    Color: x / Appearance: x / SG: x / pH: x  Gluc: 101 mg/dL / Ketone: x  / Bili: x / Urobili: x   Blood: x / Protein: x / Nitrite: x   Leuk Esterase: x / RBC: x / WBC x   Sq Epi: x / Non Sq Epi: x / Bacteria: x            RADIOLOGY & ADDITIONAL TESTS:    Imaging Personally Reviewed:    Consultant(s) Notes Reviewed:      Care Discussed with Consultants/Other Providers:   Allen Coughlin MD  PGY 1 Department of Internal Medicine        Patient is a 78y old  Female who presents with a chief complaint of diarrhea (25 Jul 2023 10:30)      SUBJECTIVE / OVERNIGHT EVENTS: Pt seen and examined. No acute overnight events. This morning had sudden onset of right knee pain while  was helping her get out of bed. Unable to bear weight on her right leg or ambulate. She did not fall. Also complains of increased mouth pain and abdominal pain from yesterday. Denies numbness, tingling, or weakness of the right leg. Denies fevers, chills, CP, SOB, Abdominal pain, N/V, Constipation, Diarrhea        MEDICATIONS  (STANDING):  amLODIPine   Tablet 10 milliGRAM(s) Oral daily  atorvastatin 40 milliGRAM(s) Oral at bedtime  chlorhexidine 2% Cloths 1 Application(s) Topical daily  dextrose 5%. 1000 milliLiter(s) (100 mL/Hr) IV Continuous <Continuous>  dextrose 5%. 1000 milliLiter(s) (50 mL/Hr) IV Continuous <Continuous>  dextrose 50% Injectable 25 Gram(s) IV Push once  dextrose 50% Injectable 12.5 Gram(s) IV Push once  dextrose 50% Injectable 25 Gram(s) IV Push once  fenofibrate Tablet 145 milliGRAM(s) Oral daily  FIRST- Mouthwash  BLM 10 milliLiter(s) Swish and Spit four times a day  gabapentin 100 milliGRAM(s) Oral three times a day  glucagon  Injectable 1 milliGRAM(s) IntraMuscular once  insulin lispro (ADMELOG) corrective regimen sliding scale   SubCutaneous three times a day before meals  losartan 100 milliGRAM(s) Oral daily  methadone   Solution 1 milliGRAM(s) Oral daily  metoprolol succinate ER 25 milliGRAM(s) Oral daily  mirtazapine 15 milliGRAM(s) Oral at bedtime  pancrelipase  (CREON 36,000 Lipase Units) 1 Capsule(s) Oral three times a day with meals  pantoprazole    Tablet 40 milliGRAM(s) Oral before breakfast  sodium chloride 1 Gram(s) Oral three times a day    MEDICATIONS  (PRN):  acetaminophen     Tablet .. 650 milliGRAM(s) Oral every 6 hours PRN Temp greater or equal to 38C (100.4F), Mild Pain (1 - 3)  benzocaine 20% Spray 1 Spray(s) Topical four times a day PRN oral pain  dextrose Oral Gel 15 Gram(s) Oral once PRN Blood Glucose LESS THAN 70 milliGRAM(s)/deciliter  melatonin 3 milliGRAM(s) Oral at bedtime PRN Insomnia  morphine   Solution 4 milliGRAM(s) Oral every 4 hours PRN Severe Pain (7 - 10)  morphine  - Injectable 2 milliGRAM(s) IV Push every 4 hours PRN breakthrough pain  polyethylene glycol 3350 17 Gram(s) Oral daily PRN Constipation  zolpidem 5 milliGRAM(s) Oral at bedtime PRN Insomnia      I&O's Summary    24 Jul 2023 07:01  -  25 Jul 2023 07:00  --------------------------------------------------------  IN: 540 mL / OUT: 950 mL / NET: -410 mL        Vital Signs Last 24 Hrs  T(C): 37.1 (25 Jul 2023 10:24), Max: 37.2 (25 Jul 2023 05:00)  T(F): 98.7 (25 Jul 2023 10:24), Max: 98.9 (25 Jul 2023 05:00)  HR: 107 (25 Jul 2023 10:24) (100 - 109)  BP: 109/64 (25 Jul 2023 10:24) (109/64 - 126/68)  BP(mean): --  RR: 16 (25 Jul 2023 10:24) (15 - 17)  SpO2: 95% (25 Jul 2023 10:24) (95% - 97%)    Parameters below as of 25 Jul 2023 05:00  Patient On (Oxygen Delivery Method): room air        CAPILLARY BLOOD GLUCOSE      POCT Blood Glucose.: 104 mg/dL (25 Jul 2023 08:00)  POCT Blood Glucose.: 117 mg/dL (25 Jul 2023 02:43)  POCT Blood Glucose.: 129 mg/dL (24 Jul 2023 20:59)  POCT Blood Glucose.: 127 mg/dL (24 Jul 2023 17:25)  POCT Blood Glucose.: 115 mg/dL (24 Jul 2023 12:34)      PHYSICAL EXAM:  GENERAL: NAD,   HEAD:  Atraumatic, Normocephalic  Oral mucosa with lesions of inner cheeks bilaterally, Lesions also present on lips and base of tongue. No active bleeding   EYES: EOMI, PERRL, conjunctiva and sclera clear  NECK: No JVD  CHEST/LUNG: Clear to auscultation bilaterally; No wheeze  HEART: Tachycardic and regular rhythm; No murmurs, rubs, or gallops  ABDOMEN: Distended, diffuse tenderness most severe in RUQ. RUQ is firm with small palpable mass.   EXTREMITIES:  2+ Peripheral Pulses, No clubbing, cyanosis. +1 edema to bilateral shins. Tenderness over right anterior knee and quadriceps tendon, no masses appreciated. Right knee is warm to touch compared to left (though resting on heat pad), no erythema, effusion, or crepitus appreciated. She has significantly decreased ROM w/ right knee flexion and right straight leg raise 2/2 pain. No erythema, warmth, or tenderness of right calf.  PSYCH: AAOx3  NEUROLOGY: non-focal  SKIN: No rashes or lesions       LABS:                        9.2    11.98 )-----------( 394      ( 25 Jul 2023 06:35 )             28.5     Auto Eosinophil # x     / Auto Eosinophil % x     / Auto Neutrophil # x     / Auto Neutrophil % x     / BANDS % x                            9.0    10.27 )-----------( 351      ( 24 Jul 2023 06:20 )             27.4     Auto Eosinophil # 0.09  / Auto Eosinophil % 0.9   / Auto Neutrophil # 7.99  / Auto Neutrophil % 77.8  / BANDS % x        07-25    123<L>  |  93<L>  |  13  ----------------------------<  101<H>  4.3   |  17<L>  |  0.54  07-24    124<L>  |  93<L>  |  11  ----------------------------<  142<H>  4.4   |  22  |  0.57    Ca    8.0<L>      25 Jul 2023 06:35  Mg     1.90     07-25  Phos  2.5     07-25  TPro  5.6<L>  /  Alb  2.4<L>  /  TBili  0.5  /  DBili  x   /  AST  44<H>  /  ALT  19  /  AlkPhos  390<H>  07-25  TPro  5.4<L>  /  Alb  2.3<L>  /  TBili  0.4  /  DBili  x   /  AST  39<H>  /  ALT  18  /  AlkPhos  341<H>  07-24    PT/INR - ( 25 Jul 2023 06:35 )   PT: 13.7 sec;   INR: 1.18 ratio         PTT - ( 25 Jul 2023 06:35 )  PTT:26.7 sec      Urinalysis Basic - ( 25 Jul 2023 06:35 )    Color: x / Appearance: x / SG: x / pH: x  Gluc: 101 mg/dL / Ketone: x  / Bili: x / Urobili: x   Blood: x / Protein: x / Nitrite: x   Leuk Esterase: x / RBC: x / WBC x   Sq Epi: x / Non Sq Epi: x / Bacteria: x            RADIOLOGY & ADDITIONAL TESTS:    Imaging Personally Reviewed:    Consultant(s) Notes Reviewed:      Care Discussed with Consultants/Other Providers:

## 2023-07-25 NOTE — PROGRESS NOTE ADULT - PROBLEM SELECTOR PLAN 6
-c/w Amlodipine 10mg daily -f/u right knee XR  -f/u US Duplex bilaterally sudden onset of R knee pain after getting out of bed this AM, now unable to bear weight. On exam has tenderness over anterior knee and quadriceps tendon, decreased ROM w/ flexion and SLR. There is warmth to anterior knee but no erythema, effusion, or crepitus. Has hx of osteoarthritis, usually on Celecoxib (held due to bleed risk prior to procedure) at home. Also gets Orthovisc injections every 6 months, next scheduled for next week. Knee pain likely 2/2 OA exacerbation, however quadriceps tendon strain, septic arthritis, and DVT are also on differential.   -f/u right knee XR  -f/u US Duplex bilaterally  -f/u CBC, fever curve daily  -c/w current pain regimen per palliative

## 2023-07-25 NOTE — PROGRESS NOTE ADULT - PROBLEM SELECTOR PLAN 10
diet: soft and bite sized  dvt ppx: lovenox  code: DNR DNI  dispo: pending clinical improvement  bowel regimen miralax and senna  HCP Namrata Harvinder, daughter -continue home dose zolpidem  -continue Melatonin PRN

## 2023-07-25 NOTE — PROGRESS NOTE ADULT - PROBLEM SELECTOR PLAN 1
Patient complains of painful sores throughout mouth and tongue that have been present for several weeks but have worsened within the past week. On exam has tender lesions to mucosa of inner cheeks, base of tongue, and lips. Low suspicion for thrush or other ongoing infection. Her sores are likely 2/2 chemotherapy.   -c/w magic mouthwash   -c/w Benzocaine spray  -c/w pain regimen per palliative: Methadone 1mg , Gabapentin 100mg TID, Morphine 4mg PO q4 for moderate/severe pain, and IV morphine 2mg q4 for breakthrough pain  -until procedure and while NPO will resume Morphine 2/4 mg IV q4 PRN for moderate/severe pain  -monitor pain Patient complains of painful sores throughout mouth and tongue that have been present for several weeks but have worsened within the past week. On exam has tender lesions to mucosa of inner cheeks, base of tongue, and lips. Low suspicion for thrush or other ongoing infection. Her sores are likely 2/2 chemotherapy.   -c/w magic mouthwash   -c/w Benzocaine spray  -c/w pain regimen per palliative: Methadone 1mg , Gabapentin 100mg TID, Morphine 4mg PO q4 for moderate/severe pain, and IV morphine 2mg q4 for breakthrough pain  -monitor pain

## 2023-07-25 NOTE — PROGRESS NOTE ADULT - SUBJECTIVE AND OBJECTIVE BOX
[facility] Geriatrics and Palliative Care  Emerita Alejo, Palliative Care Nurse Practitioner  Contact Info: Page [phone] (Including Nights/Weekends), Message on Microsoft Teams (Emerita Alejo), or leave VM at Palliative Office [phone] (non-urgent)    Date of Service 07-25-23 @ 10:31    SUBJECTIVE AND OBJECTIVE:  Indication for Geriatrics and Palliative Care Services/INTERVAL HPI:    OVERNIGHT EVENTS: Pt required PRN IV Morphine 2mg IVP x 4 & PRN IV Morphine 4mg x 3 within 24 hrs (8a-8a).    DNR on chart: DNR  DNI      Allergies    No Known Allergies    Intolerances    MEDICATIONS  (STANDING):  amLODIPine   Tablet 10 milliGRAM(s) Oral daily  atorvastatin 40 milliGRAM(s) Oral at bedtime  chlorhexidine 2% Cloths 1 Application(s) Topical daily  dextrose 5%. 1000 milliLiter(s) (100 mL/Hr) IV Continuous <Continuous>  dextrose 5%. 1000 milliLiter(s) (50 mL/Hr) IV Continuous <Continuous>  dextrose 50% Injectable 25 Gram(s) IV Push once  dextrose 50% Injectable 12.5 Gram(s) IV Push once  dextrose 50% Injectable 25 Gram(s) IV Push once  fenofibrate Tablet 145 milliGRAM(s) Oral daily  FIRST- Mouthwash  BLM 10 milliLiter(s) Swish and Spit four times a day  gabapentin 100 milliGRAM(s) Oral three times a day  glucagon  Injectable 1 milliGRAM(s) IntraMuscular once  insulin lispro (ADMELOG) corrective regimen sliding scale   SubCutaneous three times a day before meals  losartan 100 milliGRAM(s) Oral daily  methadone   Solution 1 milliGRAM(s) Oral daily  metoprolol succinate ER 25 milliGRAM(s) Oral daily  mirtazapine 15 milliGRAM(s) Oral at bedtime  pancrelipase  (CREON 36,000 Lipase Units) 1 Capsule(s) Oral three times a day with meals  pantoprazole    Tablet 40 milliGRAM(s) Oral before breakfast  sodium chloride 1 Gram(s) Oral three times a day    MEDICATIONS  (PRN):  acetaminophen     Tablet .. 650 milliGRAM(s) Oral every 6 hours PRN Temp greater or equal to 38C (100.4F), Mild Pain (1 - 3)  benzocaine 20% Spray 1 Spray(s) Topical four times a day PRN oral pain  dextrose Oral Gel 15 Gram(s) Oral once PRN Blood Glucose LESS THAN 70 milliGRAM(s)/deciliter  melatonin 3 milliGRAM(s) Oral at bedtime PRN Insomnia  morphine   Solution 4 milliGRAM(s) Oral every 4 hours PRN Severe Pain (7 - 10)  morphine  - Injectable 2 milliGRAM(s) IV Push every 4 hours PRN breakthrough pain  polyethylene glycol 3350 17 Gram(s) Oral daily PRN Constipation  zolpidem 5 milliGRAM(s) Oral at bedtime PRN Insomnia      -------------------------------------------------------------------------------------------------------  ITEMS UNCHECKED ARE NOT PRESENT    PRESENT SYMPTOMS: [ ]Unable to self-report - see [ ] CPOT [ ] PAINADS [ ] RDOS  Source if other than patient:  [ ]Family   [ ]Team     Pain: [ X]yes [ ]no  QOL impact - recurrent hospitalizations'   Location - abd                    Aggravating factors - movement   Quality - pressure/sharp  Radiation - back  Timing- constant   Severity (0-10 scale): 9-10/10  Minimal acceptable level (0-10 scale)/Pain goal: 4/10    CPOT:    https://www.[facility].org/getattachment/[email or web address]/Critical-Care-Pain-Observation-Tool-(CPOT)    PAINAD Score: See PAINAD tool and score below       RDOS: See RDOS tool and score below   0 to 2  minimal or no respiratory distress   3  mild distress  4 to 6 moderate distress  >7 severe distress    Dyspnea:                           [ ]Mild [ ]Moderate [ ]Severe  Anxiety:                             [ ]Mild [ ]Moderate [ ]Severe  Fatigue:                             [ ]Mild [ ]Moderate [ ]Severe  Nausea:                             [ ]Mild [ ]Moderate [ ]Severe  Loss of appetite:              [ X]Mild [ ]Moderate [ ]Severe  Constipation:                    [ ]Mild [ ]Moderate [ ]Severe  Other Symptoms:  [X ]All other review of systems negative     Home Medications for Symptoms if present:    I Stop Reference no:     -------------------------------------------------------------------------------------------------------  PCSSQ[Palliative Care Spiritual Screening Question]   Severity (0-10):  Score of 4 or > indicate consideration of Chaplaincy referral.  Chaplaincy Referral: [ ] yes [ ] refused [ ] following [ X] Deferred     Caregiver [place]? : [ X] yes [ ] no [ ] Deferred [ ] Declined             Social work referral [X ] Patient & Family Centered Care Referral [ ]     Anticipatory Grief present?:  [X ] yes [ ] no  [ ] Deferred                  Social work referral [X ] Chaplaincy Referral [ ]    -------------------------------------------------------------------------------------------------------  PHYSICAL EXAM:  Vital Signs Last 24 Hrs  T(C): 37.1 (25 Jul 2023 10:24), Max: 37.2 (25 Jul 2023 05:00)  T(F): 98.7 (25 Jul 2023 10:24), Max: 98.9 (25 Jul 2023 05:00)  HR: 107 (25 Jul 2023 10:24) (100 - 109)  BP: 109/64 (25 Jul 2023 10:24) (109/64 - 126/68)  BP(mean): --  RR: 16 (25 Jul 2023 10:24) (15 - 17)  SpO2: 95% (25 Jul 2023 10:24) (95% - 97%)    Parameters below as of 25 Jul 2023 05:00  Patient On (Oxygen Delivery Method): room air     I&O's Summary    24 Jul 2023 07:01  -  25 Jul 2023 07:00  --------------------------------------------------------  IN: 540 mL / OUT: 950 mL / NET: -410 mL       GENERAL: Danish  #   [ ]Cachexia  [ X]Alert  [X ]Oriented x 4  [ ]Lethargic  [ ]Unarousable  [ ]Verbal  [ ]Non-Verbal    Behavioral:   [ ] Anxiety  [ ] Delirium [ ] Agitation [x ] Other    HEENT:  [ ]Normal   [ ]Dry mouth   [ ]ET Tube/Trach  [X ]Oral lesions    PULMONARY:   [ ]Clear [ ]Tachypnea  [ ]Audible excessive secretions   [ ]Rhonchi        [ ]Right [ ]Left [ ]Bilateral  [ ]Crackles        [ ]Right [ ]Left [ ]Bilateral  [ ]Wheezing     [ ]Right [ ]Left [ ]Bilateral  [X ]Diminished breath sounds [ ]right [ ]left [ ]bilateral    CARDIOVASCULAR:    [ X]Regular [ ]Irregular [ ]Tachy  [ ]Tomy [ ]Murmur [ ]Other    GASTROINTESTINAL:  [ ]Soft  [ X]Distended   [ ]+BS  [ ]Non tender [ X]Tender  [ ]Other [ ]PEG [ ]OGT/ NGT  Last BM: 7/23    GENITOURINARY:  [X ]Normal [ ] Incontinent   [ ]Oliguria/Anuria   [ ]Rolle    MUSCULOSKELETAL:   [x ]Normal   [  ]Weakness  [ ]Bed/Wheelchair bound [ ]Edema    NEUROLOGIC:   [X ]No focal deficits  [ ]Cognitive impairment  [ ]Dysphagia [ ]Dysarthria [ ]Paresis [ ]Other     SKIN:   [X ]Normal  [ ]Rash  [ ]Other  [ ]Pressure ulcer(s)       Present on admission [ ]y [ ]n    -------------------------------------------------------------------------------------------------------  CRITICAL CARE:  [ ]Shock Present  [ ]Septic [ ]Cardiogenic [ ]Neurologic [ ]Hypovolemic  [ ]Vasopressors [ ]Inotropes  [ ]Respiratory failure present [ ]Mechanical Ventilation [ ]Non-invasive ventilatory support [ ]High-Flow   [ ]Acute  [ ]Chronic [ ]Hypoxic  [ ]Hypercarbic [ ]Other  [ ]Other organ failure     -------------------------------------------------------------------------------------------------------  LABS:                        9.2    11.98 )-----------( 394      ( 25 Jul 2023 06:35 )             28.5   07-25    123<L>  |  93<L>  |  13  ----------------------------<  101<H>  4.3   |  17<L>  |  0.54    Ca    8.0<L>      25 Jul 2023 06:35  Phos  2.5     07-25  Mg     1.90     07-25    TPro  5.6<L>  /  Alb  2.4<L>  /  TBili  0.5  /  DBili  x   /  AST  44<H>  /  ALT  19  /  AlkPhos  390<H>  07-25  PT/INR - ( 25 Jul 2023 06:35 )   PT: 13.7 sec;   INR: 1.18 ratio         PTT - ( 25 Jul 2023 06:35 )  PTT:26.7 sec    Urinalysis Basic - ( 25 Jul 2023 06:35 )    Color: x / Appearance: x / SG: x / pH: x  Gluc: 101 mg/dL / Ketone: x  / Bili: x / Urobili: x   Blood: x / Protein: x / Nitrite: x   Leuk Esterase: x / RBC: x / WBC x   Sq Epi: x / Non Sq Epi: x / Bacteria: x    -------------------------------------------------------------------------------------------------------  RADIOLOGY & ADDITIONAL STUDIES: < from: CT Abdomen and Pelvis w/ IV Cont (07.23.23 @ 13:36) >  IMPRESSION:    Interval increase in now very large amount of abdominal and pelvic   ascites since prior CT of 7/1/2023.    Little change in extensive omental metastatic caking.    Little change in serosal thickening/infiltration of transverse colon.    Grossly unchanged multiple hepatic and pulmonary metastases.    --- End of Report ---    < from: US Abdomen Limited (07.24.23 @ 11:22) >  COMPARISON: CT 7/23/2023. Ultrasound 5/20/2022.    Limited ultrasound to evaluate the 4 quadrants and midline abdomen for   free fluid was performed.    Moderate to severe free fluid is seen in the right upper quadrant,   moderate in the right lower quadrant, moderate to severe in the left   upper quadrant and moderate in the left lower quadrant. Small free fluid   is seen at the midline. Fine echoes are noted within the ascitic fluid.    IMPRESSION: Moderate to severe ascites throughout the abdomen.      --- End of Report ---    -------------------------------------------------------------------------------------------------------  Protein Calorie Malnutrition Present: [ ]mild [ ]moderate [ ]severe [ ]underweight [ ]morbid obesity  https://www.andeal.org/vault/5631/web/files/ONC/Table_Clinical%20Characteristics%20to%20Document%20Malnutrition-White%20JV%20et%20al%202012.pdf    Height (cm): 152.5 (07-23-23 @ 07:19), 152.5 (07-01-23 @ 01:12), 152.5 (05-31-23 @ 10:19)  Weight (kg): 50 (07-23-23 @ 18:02), 58.0009 (07-17-23 @ 13:00), 58.7 (07-03-23 @ 18:10)  BMI (kg/m2): 21.5 (07-23-23 @ 18:02), 24.9 (07-23-23 @ 07:19), 24.9 (07-17-23 @ 13:00)    Palliative Performance Status Version 2:   See PPSv2 tool and score below       [ ]PPSV2 < or = 30%  [ ]significant weight loss [ ]poor nutritional intake [ ]anasarca[ ]Artificial Nutrition    Other REFERRALS:  [ ]Hospice  [ ]Child Life  [ ]Social Work  [ ]Case management [ ]Holistic Therapy     -------------------------------------------------------------------------------------------------------   [facility] Geriatrics and Palliative Care  Emerita Alejo, Palliative Care Nurse Practitioner  Contact Info: Page [phone] (Including Nights/Weekends), Message on Microsoft Teams (Emerita Alejo), or leave VM at Palliative Office [phone] (non-urgent)    Date of Service 07-25-23 @ 10:31    SUBJECTIVE AND OBJECTIVE:  Indication for Geriatrics and Palliative Care Services/INTERVAL HPI: Pain management / GOC  Pt seen this AM with Lithuanian  #[phone] - pt denies pain currently, shared current pain regimen is helping. Pt endorses no BM x 3 days but is hesitant to take laxatives as she does not want to have diarrhea. Pt shared she feels like she may go today and if not is agreeable for enema tomorrow. Pt also shared it is very difficult to take PO sodium tablets 2/2 oral sores- Team 2 made aware.     OVERNIGHT EVENTS: Pt required PRN IV Morphine 2mg IVP x 4 & PRN IV Morphine 4mg x 3 within 24 hrs (8a-8a).    DNR on chart: DNR  DNI      Allergies    No Known Allergies    Intolerances    MEDICATIONS  (STANDING):  amLODIPine   Tablet 10 milliGRAM(s) Oral daily  atorvastatin 40 milliGRAM(s) Oral at bedtime  chlorhexidine 2% Cloths 1 Application(s) Topical daily  dextrose 5%. 1000 milliLiter(s) (100 mL/Hr) IV Continuous <Continuous>  dextrose 5%. 1000 milliLiter(s) (50 mL/Hr) IV Continuous <Continuous>  dextrose 50% Injectable 25 Gram(s) IV Push once  dextrose 50% Injectable 12.5 Gram(s) IV Push once  dextrose 50% Injectable 25 Gram(s) IV Push once  fenofibrate Tablet 145 milliGRAM(s) Oral daily  FIRST- Mouthwash  BLM 10 milliLiter(s) Swish and Spit four times a day  gabapentin 100 milliGRAM(s) Oral three times a day  glucagon  Injectable 1 milliGRAM(s) IntraMuscular once  insulin lispro (ADMELOG) corrective regimen sliding scale   SubCutaneous three times a day before meals  losartan 100 milliGRAM(s) Oral daily  methadone   Solution 1 milliGRAM(s) Oral daily  metoprolol succinate ER 25 milliGRAM(s) Oral daily  mirtazapine 15 milliGRAM(s) Oral at bedtime  pancrelipase  (CREON 36,000 Lipase Units) 1 Capsule(s) Oral three times a day with meals  pantoprazole    Tablet 40 milliGRAM(s) Oral before breakfast  sodium chloride 1 Gram(s) Oral three times a day    MEDICATIONS  (PRN):  acetaminophen     Tablet .. 650 milliGRAM(s) Oral every 6 hours PRN Temp greater or equal to 38C (100.4F), Mild Pain (1 - 3)  benzocaine 20% Spray 1 Spray(s) Topical four times a day PRN oral pain  dextrose Oral Gel 15 Gram(s) Oral once PRN Blood Glucose LESS THAN 70 milliGRAM(s)/deciliter  melatonin 3 milliGRAM(s) Oral at bedtime PRN Insomnia  morphine   Solution 4 milliGRAM(s) Oral every 4 hours PRN Severe Pain (7 - 10)  morphine  - Injectable 2 milliGRAM(s) IV Push every 4 hours PRN breakthrough pain  polyethylene glycol 3350 17 Gram(s) Oral daily PRN Constipation  zolpidem 5 milliGRAM(s) Oral at bedtime PRN Insomnia      -------------------------------------------------------------------------------------------------------  ITEMS UNCHECKED ARE NOT PRESENT    PRESENT SYMPTOMS: [ ]Unable to self-report - see [ ] CPOT [ ] PAINADS [ ] RDOS  Source if other than patient:  [ ]Family   [ ]Team     Pain: [ X]yes [ ]no  QOL impact - recurrent hospitalizations'   Location - abd                    Aggravating factors - movement   Quality - pressure/sharp  Radiation - back  Timing- constant   Severity (0-10 scale): 9-10/10  Minimal acceptable level (0-10 scale)/Pain goal: 4/10    CPOT:    https://www.[facility].org/getattachment/[email or web address]/Critical-Care-Pain-Observation-Tool-(CPOT)    PAINAD Score: See PAINAD tool and score below       RDOS: See RDOS tool and score below   0 to 2  minimal or no respiratory distress   3  mild distress  4 to 6 moderate distress  >7 severe distress    Dyspnea:                           [ ]Mild [ ]Moderate [ ]Severe  Anxiety:                             [ ]Mild [ ]Moderate [ ]Severe  Fatigue:                             [ ]Mild [ ]Moderate [ ]Severe  Nausea:                             [ ]Mild [ ]Moderate [ ]Severe  Loss of appetite:              [ X]Mild [ ]Moderate [ ]Severe  Constipation:                    [ X]Mild [ ]Moderate [ ]Severe  Other Symptoms:  [X ]All other review of systems negative     Home Medications for Symptoms if present:    I Stop Reference no:     -------------------------------------------------------------------------------------------------------  PCSSQ[Palliative Care Spiritual Screening Question]   Severity (0-10):  Score of 4 or > indicate consideration of Chaplaincy referral.  Chaplaincy Referral: [ ] yes [ ] refused [ ] following [ X] Deferred     Caregiver [place]? : [ X] yes [ ] no [ ] Deferred [ ] Declined             Social work referral [X ] Patient & Family Centered Care Referral [ ]     Anticipatory Grief present?:  [X ] yes [ ] no  [ ] Deferred                  Social work referral [X ] Chaplaincy Referral [ ]    -------------------------------------------------------------------------------------------------------  PHYSICAL EXAM:  Vital Signs Last 24 Hrs  T(C): 37.1 (25 Jul 2023 10:24), Max: 37.2 (25 Jul 2023 05:00)  T(F): 98.7 (25 Jul 2023 10:24), Max: 98.9 (25 Jul 2023 05:00)  HR: 107 (25 Jul 2023 10:24) (100 - 109)  BP: 109/64 (25 Jul 2023 10:24) (109/64 - 126/68)  BP(mean): --  RR: 16 (25 Jul 2023 10:24) (15 - 17)  SpO2: 95% (25 Jul 2023 10:24) (95% - 97%)    Parameters below as of 25 Jul 2023 05:00  Patient On (Oxygen Delivery Method): room air     I&O's Summary    24 Jul 2023 07:01  -  25 Jul 2023 07:00  --------------------------------------------------------  IN: 540 mL / OUT: 950 mL / NET: -410 mL       GENERAL: Lithuanian  #   [ ]Cachexia  [ X]Alert  [X ]Oriented x 4  [ ]Lethargic  [ ]Unarousable  [ ]Verbal  [ ]Non-Verbal    Behavioral:   [ ] Anxiety  [ ] Delirium [ ] Agitation [x ] Other    HEENT:  [ ]Normal   [ ]Dry mouth   [ ]ET Tube/Trach  [X]Oral lesions    PULMONARY:   [ ]Clear [ ]Tachypnea  [ ]Audible excessive secretions   [ ]Rhonchi        [ ]Right [ ]Left [ ]Bilateral  [ ]Crackles        [ ]Right [ ]Left [ ]Bilateral  [ ]Wheezing     [ ]Right [ ]Left [ ]Bilateral  [X ]Diminished breath sounds [ ]right [ ]left [ ]bilateral    CARDIOVASCULAR:    [ X]Regular [ ]Irregular [ ]Tachy  [ ]Tomy [ ]Murmur [ ]Other    GASTROINTESTINAL:  [ ]Soft  [ X]Distended   [ ]+BS  [ ]Non tender [ X]Tender  [ ]Other [ ]PEG [ ]OGT/ NGT  Last BM: 7/23    GENITOURINARY:  [X ]Normal [ ] Incontinent   [ ]Oliguria/Anuria   [ ]Rolle    MUSCULOSKELETAL:   [ ]Normal   [X]Weakness  [ ]Bed/Wheelchair bound [ ]Edema    NEUROLOGIC:   [X ]No focal deficits  [ ]Cognitive impairment  [ ]Dysphagia [ ]Dysarthria [ ]Paresis [ ]Other     SKIN:   [X ]Normal  [ ]Rash  [ ]Other  [ ]Pressure ulcer(s)       Present on admission [ ]y [ ]n    -------------------------------------------------------------------------------------------------------  CRITICAL CARE:  [ ]Shock Present  [ ]Septic [ ]Cardiogenic [ ]Neurologic [ ]Hypovolemic  [ ]Vasopressors [ ]Inotropes  [ ]Respiratory failure present [ ]Mechanical Ventilation [ ]Non-invasive ventilatory support [ ]High-Flow   [ ]Acute  [ ]Chronic [ ]Hypoxic  [ ]Hypercarbic [ ]Other  [ ]Other organ failure     -------------------------------------------------------------------------------------------------------  LABS:                        9.2    11.98 )-----------( 394      ( 25 Jul 2023 06:35 )             28.5   07-25    123<L>  |  93<L>  |  13  ----------------------------<  101<H>  4.3   |  17<L>  |  0.54    Ca    8.0<L>      25 Jul 2023 06:35  Phos  2.5     07-25  Mg     1.90     07-25    TPro  5.6<L>  /  Alb  2.4<L>  /  TBili  0.5  /  DBili  x   /  AST  44<H>  /  ALT  19  /  AlkPhos  390<H>  07-25  PT/INR - ( 25 Jul 2023 06:35 )   PT: 13.7 sec;   INR: 1.18 ratio         PTT - ( 25 Jul 2023 06:35 )  PTT:26.7 sec    Urinalysis Basic - ( 25 Jul 2023 06:35 )    Color: x / Appearance: x / SG: x / pH: x  Gluc: 101 mg/dL / Ketone: x  / Bili: x / Urobili: x   Blood: x / Protein: x / Nitrite: x   Leuk Esterase: x / RBC: x / WBC x   Sq Epi: x / Non Sq Epi: x / Bacteria: x    -------------------------------------------------------------------------------------------------------  RADIOLOGY & ADDITIONAL STUDIES: < from: CT Abdomen and Pelvis w/ IV Cont (07.23.23 @ 13:36) >  IMPRESSION:    Interval increase in now very large amount of abdominal and pelvic   ascites since prior CT of 7/1/2023.    Little change in extensive omental metastatic caking.    Little change in serosal thickening/infiltration of transverse colon.    Grossly unchanged multiple hepatic and pulmonary metastases.    --- End of Report ---    < from: US Abdomen Limited (07.24.23 @ 11:22) >  COMPARISON: CT 7/23/2023. Ultrasound 5/20/2022.    Limited ultrasound to evaluate the 4 quadrants and midline abdomen for   free fluid was performed.    Moderate to severe free fluid is seen in the right upper quadrant,   moderate in the right lower quadrant, moderate to severe in the left   upper quadrant and moderate in the left lower quadrant. Small free fluid   is seen at the midline. Fine echoes are noted within the ascitic fluid.    IMPRESSION: Moderate to severe ascites throughout the abdomen.      --- End of Report ---    -------------------------------------------------------------------------------------------------------  Protein Calorie Malnutrition Present: [ ]mild [ ]moderate [ ]severe [ ]underweight [ ]morbid obesity  https://www.andeal.org/[employer]0800/web/files/ONC/Table_Clinical%20Characteristics%20to%20Document%20Malnutrition-White%20JV%20et%20al%202012.pdf    Height (cm): 152.5 (07-23-23 @ 07:19), 152.5 (07-01-23 @ 01:12), 152.5 (05-31-23 @ 10:19)  Weight (kg): 50 (07-23-23 @ 18:02), 58.0009 (07-17-23 @ 13:00), 58.7 (07-03-23 @ 18:10)  BMI (kg/m2): 21.5 (07-23-23 @ 18:02), 24.9 (07-23-23 @ 07:19), 24.9 (07-17-23 @ 13:00)    Palliative Performance Status Version 2:   See PPSv2 tool and score below       [ ]PPSV2 < or = 30%  [ ]significant weight loss [ ]poor nutritional intake [ ]anasarca[ ]Artificial Nutrition    Other REFERRALS:  [ ]Hospice  [ ]Child Life  [ ]Social Work  [ ]Case management [ ]Holistic Therapy     -------------------------------------------------------------------------------------------------------

## 2023-07-25 NOTE — PROGRESS NOTE ADULT - PROBLEM SELECTOR PLAN 2
Pt had 6 episodes of non-bloody diarrhea prior to admission. No additional episodes of diarrhea since coming to the hospital. Her diarrhea is likely 2/2 chemotherapy and metastatic disease. Low suspicion for infectious etiology at this time given stable vital signs and labs (no leukocytosis or neutropenia). Very low suspicion for c.diff, will dc contact precautions. GI PCR negative. Cultures showing NGTD.  -f/u stool culture, blood culture

## 2023-07-25 NOTE — CONSULT NOTE ADULT - PROBLEM SELECTOR RECOMMENDATION 9
Pt. with hyponatremia in the setting of diarrhea, decreased PO intake, abd pain, and metastatic pancreatic cancer. Prior to this admission, last SNa was low at 129 on 7/19/23. On admission, SNa was low at 125 (7/23/23). Pt. received 1L NS and 1L LR and SNa decreased to 123 today (7/25/23). Serum Osm low at 259. Uosm 469, Herlinda 60. 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 Recommend fluid restriction to 1L. Please change IV formulations of medications to exclude D5W. Give 3% NS 30cc/hr for 3 hours. Repeat BMP after 2 hours. Monitor SNa.    If you have any questions, please feel free to contact me.  Celio Batres  Nephrology Fellow  [id number] / [phone] / [employer] Teams (Preferred)  (After 4pm or on weekends, please call the on-call Fellow)
Pt known to Dr. Juan David @ [facility]  > Last chemotherapy 7/17   > Pt no longer interested in DMT - hospice appropriate   > CM to make hospice referral

## 2023-07-25 NOTE — PROGRESS NOTE ADULT - PROBLEM SELECTOR PLAN 8
-continue home dose zolpidem  -continue Melatonin PRN -c/w Atorvastatin 40mg, Metoprolol 25mg, Losartan 100mg daily  -c/w fenofibrate 145mg daily

## 2023-07-25 NOTE — PROGRESS NOTE ADULT - PROBLEM SELECTOR PLAN 3
Patient complains of painful sores throughout mouth and tongue that have been present for several weeks but have worsened within the past week. On exam has tender lesions to mucosa of inner cheeks, base of tongue, and lips. Low suspicion for thrush or other ongoing infection. Her sores are likely 2/2 chemotherapy.   > c/w magic mouthwash   > c/w Benzocaine spray  > managed by primary team. Patient complains of painful sores throughout mouth and tongue that have been present for several weeks but have worsened within the past week. On exam has tender lesions to mucosa of inner cheeks, base of tongue, and lips. Low suspicion for thrush or other ongoing infection. Her sores are likely 2/2 chemotherapy.   > c/w magic mouthwash   > c/w Benzocaine spray  > managed by primary team

## 2023-07-25 NOTE — PROGRESS NOTE ADULT - PROBLEM SELECTOR PLAN 3
Patient has worsening recurrent abdominal distension and BLE edema, likely in setting of metastatic disease. Low suspicion for infection at this time. Large volume ascites confirmed on CT A/P.  Scheduled for outpatient paracentesis 7/26, last tap 7/19. Abd US 7/24 confirming moderate to severe ascites. Given recurrent ascites, patient interested in PleurX abdominal drain.  -IR to place Pleurx 7/25. Pt NPO at midnight  -monitor volume status  -strict I&O  -s/p Lasix 20mg IV x1 Patient has worsening recurrent abdominal distension and BLE edema, likely in setting of metastatic disease. Low suspicion for infection at this time. Large volume ascites confirmed on CT A/P.  Scheduled for outpatient paracentesis 7/26, last tap 7/19. Abd US 7/24 confirming moderate to severe ascites. PleurX placement scheduled for today however   -IR to place Pleurx 7/25. Pt NPO at midnight  -monitor volume status  -strict I&O  -s/p Lasix 20mg IV x1 Patient has worsening recurrent abdominal distension and BLE edema, likely in setting of metastatic disease. Low suspicion for infection at this time. Large volume ascites confirmed on CT A/P.  Scheduled for outpatient paracentesis 7/26, last tap 7/19. Abd US 7/24 confirming moderate to severe ascites. PleurX placement scheduled for 7/25 however unable to perform as pt is hyponatremic to 123.   -IR to place PleurX pending hyponatremia improving to >130   -monitor volume status  -strict I&O  -s/p Lasix 20mg IV x1

## 2023-07-25 NOTE — PROGRESS NOTE ADULT - PROBLEM SELECTOR PLAN 4
As per daughter last para 7/18   > CT abd/pelvis shows interval increase in now very large amount of abdominal and pelvic ascites since prior CT of 7/1/2023.  > was planned for outpt for para 7/26  > Planned pleurx for today with IR As per daughter last para 7/18   > CT abd/pelvis shows interval increase in now very large amount of abdominal and pelvic ascites since prior CT of 7/1/2023.  > was planned for outpt for para 7/26  > Planned pleurx for today with IR however low Na, tentative for tomorrow 7/26 if Na improves.

## 2023-07-25 NOTE — PROGRESS NOTE ADULT - ASSESSMENT
78F with hx of metastatic pancreatic CA s/p whipple on Chemo (last session 7/17, seen by Dr. David) presents with NVD and abdominal pain likely in setting of worsening metastatic disease. She had 5 episodes of nonbloody diarrhea yesterday and 1 episode this morning. She also has had multiple episodes of nonbloody vomiting. She complains of diffuse chronic pain that has worsened, especially in the abdomen. She reports worsening abdominal distension which is recurrent, scheduled for paracentesis 7/26. She states the abdominal distension can cause difficulty breathing. Also complains of mouth pain with sores, no improvement with mouth wash used at home. Increased difficulty with PO intake because of mouth pain. Recent admit this month for AMS/fever, found to have UTI. Palliative care consulted for pain management GOC.

## 2023-07-25 NOTE — PROGRESS NOTE ADULT - PROBLEM SELECTOR PLAN 11
diet: soft and bite sized  dvt ppx: lovenox  code: DNR DNI  dispo: pending clinical improvement  bowel regimen miralax and senna  HCP Namrata Harvinder, daughter diet: soft and bite sized  dvt ppx: none, will resume heparin after IR procedure  code: DNR DNI  dispo: pending clinical improvement  bowel regimen miralax and senna  HCP Namrata Blanton, daughter

## 2023-07-26 NOTE — PROGRESS NOTE ADULT - PROBLEM SELECTOR PLAN 10
-continue home dose zolpidem  -continue Melatonin PRN on tresiba 20u at home  -hold home meds  -ISS while inpatient

## 2023-07-26 NOTE — PROGRESS NOTE ADULT - PROBLEM SELECTOR PLAN 9
on tresiba 20u at home  -hold home meds  -ISS while inpatient -c/w Atorvastatin 40mg, Metoprolol 25mg, Losartan 100mg daily  -c/w fenofibrate 145mg daily

## 2023-07-26 NOTE — PROGRESS NOTE ADULT - PROBLEM SELECTOR PLAN 2
Pt had 6 episodes of non-bloody diarrhea prior to admission. No additional episodes of diarrhea since coming to the hospital. Her diarrhea is likely 2/2 chemotherapy and metastatic disease. Low suspicion for infectious etiology at this time given stable vital signs and labs (no leukocytosis or neutropenia). Very low suspicion for c.diff, will dc contact precautions. GI PCR negative. Cultures showing NGTD.  -f/u stool culture, blood culture Pt had 6 episodes of non-bloody diarrhea prior to admission. No additional episodes of diarrhea since coming to the hospital. Her diarrhea is likely 2/2 chemotherapy and metastatic disease. Low suspicion for infectious etiology at this time given stable vital signs and labs (no leukocytosis or neutropenia). Very low suspicion for c.diff, will dc contact precautions. GI PCR negative. Cultures showing NGTD.  -f/u BCx

## 2023-07-26 NOTE — PROGRESS NOTE ADULT - PROBLEM SELECTOR PLAN 1
Patient complains of painful sores throughout mouth and tongue that have been present for several weeks but have worsened within the past week. On exam has tender lesions to mucosa of inner cheeks, base of tongue, and lips. Low suspicion for thrush or other ongoing infection. Her sores are likely 2/2 chemotherapy.   -c/w magic mouthwash   -c/w Benzocaine spray  -c/w pain regimen per palliative: Methadone 1mg , Gabapentin 100mg TID, Morphine 4mg PO q4 for moderate/severe pain, and IV morphine 2mg q4 for breakthrough pain  -monitor pain

## 2023-07-26 NOTE — PRE PROCEDURE NOTE - HISTORY OF PRESENT ILLNESS
Interventional Radiology  Pre-Procedure Note    This is a 78y  Female  presenting for tunneled ascites drainage catheter insertion    HPI:  78F with hx of metastatic pancreatic CA s/p whipple on Chemo (last session , seen by Dr. David) presents with NVD and abdominal pain likely in setting of worsening metastatic disease. She had 5 episodes of nonbloody diarrhea yesterday and 1 episode this morning. She also has had multiple episodes of nonbloody vomiting. She complains of diffuse chronic pain that has worsened, especially in the abdomen. She reports worsening abdominal distension which is recurrent, scheduled for paracentesis . She states the abdominal distension can cause difficulty breathing. Also complains of mouth pain with sores, no improvement with mouth wash used at home. Increased difficulty with PO intake because of mouth pain. Recent admit this month for AMS/fever, found to have UTI.    In the ED, tachycardia to 118, /86, 98.2F, 98% on RA. Labs showed WBC 8.2, Hgb 10.3, Na 125, K 4.3, Creatinine .5, elevated alk phos 358, albumin 2.5, calcium 8.1, AST 48, ALT 22. Lactate 1.4. UA showed RBCs but no evidence of infection. CXR with bilateral lower airspace disease vs effusion, CTAP with interval increase of now large amount of abdominal ascites. She was given 1L LR and pain control with dilaudid and morphine. (2023 17:22)      PAST MEDICAL & SURGICAL HISTORY:  Hypertension      IDDM (insulin dependent diabetes mellitus)      Malignant neoplasm of pancreas, unspecified      Dyslipidemia      CAD (coronary artery disease)      H/O chronic hepatitis  treated      Pancreatic cancer      History of  section      History of hysterectomy      History of coronary angioplasty with insertion of stent  2019 - 1 stent inserted      History of Whipple procedure          Social History:     FAMILY HISTORY:  FH: HTN (hypertension) (Mother)    Family history of leukemia (Child)        Allergies: No Known Allergies      Current Medications: acetaminophen     Tablet .. 650 milliGRAM(s) Oral every 6 hours PRN  amLODIPine   Tablet 10 milliGRAM(s) Oral daily  atorvastatin 40 milliGRAM(s) Oral at bedtime  benzocaine 20% Spray 1 Spray(s) Topical four times a day PRN  chlorhexidine 2% Cloths 1 Application(s) Topical daily  dextrose 5%. 1000 milliLiter(s) IV Continuous <Continuous>  dextrose 5%. 1000 milliLiter(s) IV Continuous <Continuous>  dextrose 50% Injectable 25 Gram(s) IV Push once  dextrose 50% Injectable 12.5 Gram(s) IV Push once  dextrose 50% Injectable 25 Gram(s) IV Push once  dextrose Oral Gel 15 Gram(s) Oral once PRN  fenofibrate Tablet 145 milliGRAM(s) Oral daily  FIRST- Mouthwash  BLM 10 milliLiter(s) Swish and Spit four times a day  gabapentin 100 milliGRAM(s) Oral three times a day  glucagon  Injectable 1 milliGRAM(s) IntraMuscular once  insulin lispro (ADMELOG) corrective regimen sliding scale   SubCutaneous three times a day before meals  losartan 100 milliGRAM(s) Oral daily  melatonin 3 milliGRAM(s) Oral at bedtime PRN  methadone   Solution 1 milliGRAM(s) Oral daily  metoprolol succinate ER 25 milliGRAM(s) Oral daily  mirtazapine 15 milliGRAM(s) Oral at bedtime  morphine   Solution 4 milliGRAM(s) Oral every 4 hours PRN  morphine  - Injectable 2 milliGRAM(s) IV Push every 4 hours PRN  ondansetron    Tablet 4 milliGRAM(s) Oral every 8 hours PRN  pancrelipase  (CREON 36,000 Lipase Units) 1 Capsule(s) Oral three times a day with meals  pantoprazole    Tablet 40 milliGRAM(s) Oral before breakfast  polyethylene glycol 3350 17 Gram(s) Oral daily PRN  zolpidem 5 milliGRAM(s) Oral at bedtime PRN      Labs:                         8.8    10.32 )-----------( 376      ( 2023 06:15 )             27.5           129<L>  |  97<L>  |  19  ----------------------------<  126<H>  4.3   |  18<L>  |  0.72    Ca    8.1<L>      2023 06:15  Phos  2.6       Mg     2.00         TPro  5.6<L>  /  Alb  2.4<L>  /  TBili  0.5  /  DBili  x   /  AST  44<H>  /  ALT  19  /  AlkPhos  390<H>        HCG:       Assessment/Plan:   This is a 78y Female  presents with recurrent ascites s/p multiple paracenteses. Pt with pancreatic ca.  Patient presents to IR for tunneled ascites drainage catheter insertion.  Procedure/ risks/ benefits/ goals/ alternatives were explained. All questions answered. Informed content obtained from patient. Consent placed in chart.

## 2023-07-26 NOTE — PROGRESS NOTE ADULT - PROBLEM SELECTOR PLAN 3
Patient has worsening recurrent abdominal distension and BLE edema, likely in setting of metastatic disease. Low suspicion for infection at this time. Large volume ascites confirmed on CT A/P.  Scheduled for outpatient paracentesis 7/26, last tap 7/19. Abd US 7/24 confirming moderate to severe ascites. PleurX placement scheduled for 7/25 however unable to perform as pt is hyponatremic to 123.   -IR to place PleurX pending hyponatremia improving to >130   -monitor volume status  -strict I&O  -s/p Lasix 20mg IV x1 Patient has worsening recurrent abdominal distension and BLE edema, likely in setting of metastatic disease. Low suspicion for infection at this time. Large volume ascites confirmed on CT A/P.  Scheduled for outpatient paracentesis 7/26, last tap 7/19. Abd US 7/24 confirming moderate to severe ascites.   -IR to place PleurX 7/26  -monitor volume status  -strict I&O  -s/p Lasix 20mg IV x1

## 2023-07-26 NOTE — PROGRESS NOTE ADULT - PROBLEM SELECTOR PLAN 12
diet: soft and bite sized  dvt ppx: none, will resume heparin after IR procedure  code: DNR DNI  dispo: pending clinical improvement  bowel regimen miralax and senna  HCP Namrata Blanton, daughter

## 2023-07-26 NOTE — PROGRESS NOTE ADULT - SUBJECTIVE AND OBJECTIVE BOX
Allen Coughlin MD  PGY 1 Department of Internal Medicine        Patient is a 78y old  Female who presents with a chief complaint of diarrhea (26 Jul 2023 07:27)      SUBJECTIVE / OVERNIGHT EVENTS: Pt seen and examined. No acute overnight events. Continues to have abdominal pain and mouth pain. Her right knee pain has improved. Denies fevers, chills, CP, SOB, N/V, Constipation, Diarrhea        MEDICATIONS  (STANDING):  amLODIPine   Tablet 10 milliGRAM(s) Oral daily  atorvastatin 40 milliGRAM(s) Oral at bedtime  chlorhexidine 2% Cloths 1 Application(s) Topical daily  dextrose 5%. 1000 milliLiter(s) (100 mL/Hr) IV Continuous <Continuous>  dextrose 5%. 1000 milliLiter(s) (50 mL/Hr) IV Continuous <Continuous>  dextrose 50% Injectable 25 Gram(s) IV Push once  dextrose 50% Injectable 12.5 Gram(s) IV Push once  dextrose 50% Injectable 25 Gram(s) IV Push once  fenofibrate Tablet 145 milliGRAM(s) Oral daily  FIRST- Mouthwash  BLM 10 milliLiter(s) Swish and Spit four times a day  gabapentin 100 milliGRAM(s) Oral three times a day  glucagon  Injectable 1 milliGRAM(s) IntraMuscular once  insulin lispro (ADMELOG) corrective regimen sliding scale   SubCutaneous three times a day before meals  losartan 100 milliGRAM(s) Oral daily  methadone   Solution 1 milliGRAM(s) Oral daily  metoprolol succinate ER 25 milliGRAM(s) Oral daily  mirtazapine 15 milliGRAM(s) Oral at bedtime  pancrelipase  (CREON 36,000 Lipase Units) 1 Capsule(s) Oral three times a day with meals  pantoprazole    Tablet 40 milliGRAM(s) Oral before breakfast    MEDICATIONS  (PRN):  acetaminophen     Tablet .. 650 milliGRAM(s) Oral every 6 hours PRN Temp greater or equal to 38C (100.4F), Mild Pain (1 - 3)  benzocaine 20% Spray 1 Spray(s) Topical four times a day PRN oral pain  dextrose Oral Gel 15 Gram(s) Oral once PRN Blood Glucose LESS THAN 70 milliGRAM(s)/deciliter  melatonin 3 milliGRAM(s) Oral at bedtime PRN Insomnia  morphine   Solution 4 milliGRAM(s) Oral every 4 hours PRN Moderate pain 4-6, severe pain 7-10  morphine  - Injectable 2 milliGRAM(s) IV Push every 4 hours PRN breakthrough pain  ondansetron    Tablet 4 milliGRAM(s) Oral every 8 hours PRN Nausea and/or Vomiting  polyethylene glycol 3350 17 Gram(s) Oral daily PRN Constipation  zolpidem 5 milliGRAM(s) Oral at bedtime PRN Insomnia      I&O's Summary      Vital Signs Last 24 Hrs  T(C): 37 (26 Jul 2023 07:14), Max: 37.1 (25 Jul 2023 10:24)  T(F): 98.6 (26 Jul 2023 07:14), Max: 98.7 (25 Jul 2023 10:24)  HR: 119 (26 Jul 2023 07:14) (107 - 119)  BP: 104/64 (26 Jul 2023 07:14) (104/64 - 109/64)  BP(mean): 76 (26 Jul 2023 07:14) (76 - 76)  RR: 16 (26 Jul 2023 07:14) (16 - 16)  SpO2: 95% (26 Jul 2023 07:14) (95% - 95%)    Parameters below as of 26 Jul 2023 07:14  Patient On (Oxygen Delivery Method): room air        CAPILLARY BLOOD GLUCOSE      POCT Blood Glucose.: 148 mg/dL (26 Jul 2023 08:17)  POCT Blood Glucose.: 102 mg/dL (25 Jul 2023 21:38)  POCT Blood Glucose.: 206 mg/dL (25 Jul 2023 17:37)      PHYSICAL EXAM:  HEAD:  Atraumatic, Normocephalic  Oral mucosa with lesions of inner cheeks bilaterally, Lesions also present on lips and base of tongue. No active bleeding   EYES: EOMI, PERRL, conjunctiva and sclera clear  NECK: No JVD  CHEST/LUNG: Clear to auscultation bilaterally; No wheeze  HEART: Tachycardic and regular rhythm; No murmurs, rubs, or gallops  ABDOMEN: Distended, diffuse tenderness most severe in RUQ. RUQ is firm with small palpable mass.   EXTREMITIES:  2+ Peripheral Pulses, No clubbing, cyanosis. +1 edema to bilateral shins. Tenderness over right anterior knee and quadriceps tendon, no masses appreciated. Right knee is warm to touch compared to left (though resting on heat pad), no erythema, effusion, or crepitus appreciated. She has significantly decreased ROM w/ right knee flexion and right straight leg raise 2/2 pain. No erythema, warmth, or tenderness of right calf.  PSYCH: AAOx3  NEUROLOGY: non-focal  SKIN: No rashes or lesions       LABS:                        8.8    10.32 )-----------( 376      ( 26 Jul 2023 06:15 )             27.5     Auto Eosinophil # x     / Auto Eosinophil % x     / Auto Neutrophil # x     / Auto Neutrophil % x     / BANDS % x                            9.2    11.98 )-----------( 394      ( 25 Jul 2023 06:35 )             28.5     Auto Eosinophil # x     / Auto Eosinophil % x     / Auto Neutrophil # x     / Auto Neutrophil % x     / BANDS % x        07-26    129<L>  |  97<L>  |  19  ----------------------------<  126<H>  4.3   |  18<L>  |  0.72  07-25    125<L>  |  92<L>  |  16  ----------------------------<  151<H>  4.4   |  18<L>  |  0.66  07-25    123<L>  |  93<L>  |  13  ----------------------------<  101<H>  4.3   |  17<L>  |  0.54    Ca    8.1<L>      26 Jul 2023 06:15  Mg     2.00     07-26  Phos  2.6     07-26  TPro  5.6<L>  /  Alb  2.4<L>  /  TBili  0.5  /  DBili  x   /  AST  44<H>  /  ALT  19  /  AlkPhos  390<H>  07-25    PT/INR - ( 26 Jul 2023 06:15 )   PT: 13.0 sec;   INR: 1.16 ratio         PTT - ( 26 Jul 2023 06:15 )  PTT:28.0 sec      Urinalysis Basic - ( 26 Jul 2023 06:15 )    Color: x / Appearance: x / SG: x / pH: x  Gluc: 126 mg/dL / Ketone: x  / Bili: x / Urobili: x   Blood: x / Protein: x / Nitrite: x   Leuk Esterase: x / RBC: x / WBC x   Sq Epi: x / Non Sq Epi: x / Bacteria: x            RADIOLOGY & ADDITIONAL TESTS:    Imaging Personally Reviewed:    Consultant(s) Notes Reviewed:      Care Discussed with Consultants/Other Providers:   Allen Coughlin MD  PGY 1 Department of Internal Medicine        Patient is a 78y old  Female who presents with a chief complaint of diarrhea (26 Jul 2023 07:27)      SUBJECTIVE / OVERNIGHT EVENTS: Pt seen and examined. No acute overnight events. Continues to have abdominal pain and mouth pain. Her right knee pain has improved. Denies fevers, chills, CP, SOB, N/V, Constipation, Diarrhea        MEDICATIONS  (STANDING):  amLODIPine   Tablet 10 milliGRAM(s) Oral daily  atorvastatin 40 milliGRAM(s) Oral at bedtime  chlorhexidine 2% Cloths 1 Application(s) Topical daily  dextrose 5%. 1000 milliLiter(s) (100 mL/Hr) IV Continuous <Continuous>  dextrose 5%. 1000 milliLiter(s) (50 mL/Hr) IV Continuous <Continuous>  dextrose 50% Injectable 25 Gram(s) IV Push once  dextrose 50% Injectable 12.5 Gram(s) IV Push once  dextrose 50% Injectable 25 Gram(s) IV Push once  fenofibrate Tablet 145 milliGRAM(s) Oral daily  FIRST- Mouthwash  BLM 10 milliLiter(s) Swish and Spit four times a day  gabapentin 100 milliGRAM(s) Oral three times a day  glucagon  Injectable 1 milliGRAM(s) IntraMuscular once  insulin lispro (ADMELOG) corrective regimen sliding scale   SubCutaneous three times a day before meals  losartan 100 milliGRAM(s) Oral daily  methadone   Solution 1 milliGRAM(s) Oral daily  metoprolol succinate ER 25 milliGRAM(s) Oral daily  mirtazapine 15 milliGRAM(s) Oral at bedtime  pancrelipase  (CREON 36,000 Lipase Units) 1 Capsule(s) Oral three times a day with meals  pantoprazole    Tablet 40 milliGRAM(s) Oral before breakfast    MEDICATIONS  (PRN):  acetaminophen     Tablet .. 650 milliGRAM(s) Oral every 6 hours PRN Temp greater or equal to 38C (100.4F), Mild Pain (1 - 3)  benzocaine 20% Spray 1 Spray(s) Topical four times a day PRN oral pain  dextrose Oral Gel 15 Gram(s) Oral once PRN Blood Glucose LESS THAN 70 milliGRAM(s)/deciliter  melatonin 3 milliGRAM(s) Oral at bedtime PRN Insomnia  morphine   Solution 4 milliGRAM(s) Oral every 4 hours PRN Moderate pain 4-6, severe pain 7-10  morphine  - Injectable 2 milliGRAM(s) IV Push every 4 hours PRN breakthrough pain  ondansetron    Tablet 4 milliGRAM(s) Oral every 8 hours PRN Nausea and/or Vomiting  polyethylene glycol 3350 17 Gram(s) Oral daily PRN Constipation  zolpidem 5 milliGRAM(s) Oral at bedtime PRN Insomnia      I&O's Summary      Vital Signs Last 24 Hrs  T(C): 37 (26 Jul 2023 07:14), Max: 37.1 (25 Jul 2023 10:24)  T(F): 98.6 (26 Jul 2023 07:14), Max: 98.7 (25 Jul 2023 10:24)  HR: 119 (26 Jul 2023 07:14) (107 - 119)  BP: 104/64 (26 Jul 2023 07:14) (104/64 - 109/64)  BP(mean): 76 (26 Jul 2023 07:14) (76 - 76)  RR: 16 (26 Jul 2023 07:14) (16 - 16)  SpO2: 95% (26 Jul 2023 07:14) (95% - 95%)    Parameters below as of 26 Jul 2023 07:14  Patient On (Oxygen Delivery Method): room air        CAPILLARY BLOOD GLUCOSE      POCT Blood Glucose.: 148 mg/dL (26 Jul 2023 08:17)  POCT Blood Glucose.: 102 mg/dL (25 Jul 2023 21:38)  POCT Blood Glucose.: 206 mg/dL (25 Jul 2023 17:37)      PHYSICAL EXAM:  HEAD:  Atraumatic, Normocephalic  Oral mucosa with lesions of inner cheeks bilaterally, Lesions also present on lips and base of tongue. No active bleeding   EYES: EOMI, PERRL, conjunctiva and sclera clear  NECK: No JVD  CHEST/LUNG: Clear to auscultation bilaterally; No wheeze  HEART: Tachycardic and regular rhythm; No murmurs, rubs, or gallops  ABDOMEN: Distended, diffuse tenderness most severe in RUQ. RUQ is firm with small palpable mass.   EXTREMITIES:  2+ Peripheral Pulses, No clubbing, cyanosis. +2 edema to bilateral shins. Tenderness over right anterior knee and quadriceps tendon, no masses appreciated. Right knee is warm to touch compared to left (though resting on heat pad), no erythema, effusion, or crepitus appreciated. She has significantly decreased ROM w/ right knee flexion and right straight leg raise 2/2 pain. No erythema, warmth, or tenderness of right calf.  PSYCH: AAOx3  NEUROLOGY: non-focal  SKIN: No rashes or lesions       LABS:                        8.8    10.32 )-----------( 376      ( 26 Jul 2023 06:15 )             27.5     Auto Eosinophil # x     / Auto Eosinophil % x     / Auto Neutrophil # x     / Auto Neutrophil % x     / BANDS % x                            9.2    11.98 )-----------( 394      ( 25 Jul 2023 06:35 )             28.5     Auto Eosinophil # x     / Auto Eosinophil % x     / Auto Neutrophil # x     / Auto Neutrophil % x     / BANDS % x        07-26    129<L>  |  97<L>  |  19  ----------------------------<  126<H>  4.3   |  18<L>  |  0.72  07-25    125<L>  |  92<L>  |  16  ----------------------------<  151<H>  4.4   |  18<L>  |  0.66  07-25    123<L>  |  93<L>  |  13  ----------------------------<  101<H>  4.3   |  17<L>  |  0.54    Ca    8.1<L>      26 Jul 2023 06:15  Mg     2.00     07-26  Phos  2.6     07-26  TPro  5.6<L>  /  Alb  2.4<L>  /  TBili  0.5  /  DBili  x   /  AST  44<H>  /  ALT  19  /  AlkPhos  390<H>  07-25    PT/INR - ( 26 Jul 2023 06:15 )   PT: 13.0 sec;   INR: 1.16 ratio         PTT - ( 26 Jul 2023 06:15 )  PTT:28.0 sec      Urinalysis Basic - ( 26 Jul 2023 06:15 )    Color: x / Appearance: x / SG: x / pH: x  Gluc: 126 mg/dL / Ketone: x  / Bili: x / Urobili: x   Blood: x / Protein: x / Nitrite: x   Leuk Esterase: x / RBC: x / WBC x   Sq Epi: x / Non Sq Epi: x / Bacteria: x            RADIOLOGY & ADDITIONAL TESTS:    Imaging Personally Reviewed:    Consultant(s) Notes Reviewed:      Care Discussed with Consultants/Other Providers:   lAlen Coughlin MD  PGY 1 Department of Internal Medicine        Patient is a 78y old  Female who presents with a chief complaint of diarrhea (26 Jul 2023 07:27)      SUBJECTIVE / OVERNIGHT EVENTS: Pt seen and examined. No acute overnight events. Continues to have abdominal pain and mouth pain. Her right knee pain has improved. Denies fevers, chills, CP, SOB, N/V, Constipation, Diarrhea        MEDICATIONS  (STANDING):  amLODIPine   Tablet 10 milliGRAM(s) Oral daily  atorvastatin 40 milliGRAM(s) Oral at bedtime  chlorhexidine 2% Cloths 1 Application(s) Topical daily  dextrose 5%. 1000 milliLiter(s) (100 mL/Hr) IV Continuous <Continuous>  dextrose 5%. 1000 milliLiter(s) (50 mL/Hr) IV Continuous <Continuous>  dextrose 50% Injectable 25 Gram(s) IV Push once  dextrose 50% Injectable 12.5 Gram(s) IV Push once  dextrose 50% Injectable 25 Gram(s) IV Push once  fenofibrate Tablet 145 milliGRAM(s) Oral daily  FIRST- Mouthwash  BLM 10 milliLiter(s) Swish and Spit four times a day  gabapentin 100 milliGRAM(s) Oral three times a day  glucagon  Injectable 1 milliGRAM(s) IntraMuscular once  insulin lispro (ADMELOG) corrective regimen sliding scale   SubCutaneous three times a day before meals  losartan 100 milliGRAM(s) Oral daily  methadone   Solution 1 milliGRAM(s) Oral daily  metoprolol succinate ER 25 milliGRAM(s) Oral daily  mirtazapine 15 milliGRAM(s) Oral at bedtime  pancrelipase  (CREON 36,000 Lipase Units) 1 Capsule(s) Oral three times a day with meals  pantoprazole    Tablet 40 milliGRAM(s) Oral before breakfast    MEDICATIONS  (PRN):  acetaminophen     Tablet .. 650 milliGRAM(s) Oral every 6 hours PRN Temp greater or equal to 38C (100.4F), Mild Pain (1 - 3)  benzocaine 20% Spray 1 Spray(s) Topical four times a day PRN oral pain  dextrose Oral Gel 15 Gram(s) Oral once PRN Blood Glucose LESS THAN 70 milliGRAM(s)/deciliter  melatonin 3 milliGRAM(s) Oral at bedtime PRN Insomnia  morphine   Solution 4 milliGRAM(s) Oral every 4 hours PRN Moderate pain 4-6, severe pain 7-10  morphine  - Injectable 2 milliGRAM(s) IV Push every 4 hours PRN breakthrough pain  ondansetron    Tablet 4 milliGRAM(s) Oral every 8 hours PRN Nausea and/or Vomiting  polyethylene glycol 3350 17 Gram(s) Oral daily PRN Constipation  zolpidem 5 milliGRAM(s) Oral at bedtime PRN Insomnia      I&O's Summary      Vital Signs Last 24 Hrs  T(C): 37 (26 Jul 2023 07:14), Max: 37.1 (25 Jul 2023 10:24)  T(F): 98.6 (26 Jul 2023 07:14), Max: 98.7 (25 Jul 2023 10:24)  HR: 119 (26 Jul 2023 07:14) (107 - 119)  BP: 104/64 (26 Jul 2023 07:14) (104/64 - 109/64)  BP(mean): 76 (26 Jul 2023 07:14) (76 - 76)  RR: 16 (26 Jul 2023 07:14) (16 - 16)  SpO2: 95% (26 Jul 2023 07:14) (95% - 95%)    Parameters below as of 26 Jul 2023 07:14  Patient On (Oxygen Delivery Method): room air        CAPILLARY BLOOD GLUCOSE      POCT Blood Glucose.: 148 mg/dL (26 Jul 2023 08:17)  POCT Blood Glucose.: 102 mg/dL (25 Jul 2023 21:38)  POCT Blood Glucose.: 206 mg/dL (25 Jul 2023 17:37)      PHYSICAL EXAM:  HEAD:  Atraumatic, Normocephalic  Oral mucosa with lesions of inner cheeks bilaterally, Lesions also present on lips and base of tongue. No active bleeding   EYES: EOMI, PERRL, conjunctiva and sclera clear  NECK: No JVD  CHEST/LUNG: Clear to auscultation bilaterally; No wheeze  HEART: Tachycardic and regular rhythm; No murmurs, rubs, or gallops  ABDOMEN: Distended, diffuse tenderness most severe in RUQ. RUQ is firm with small palpable mass.   EXTREMITIES:  2+ Peripheral Pulses, No clubbing, cyanosis. +2 edema to bilateral shins. No tenderness of knees bilaterally, no erythema effusion or warmth appreciated. Improved ROM of R knee and leg from yesterday, still slightly decreased 2/2 pain  PSYCH: AAOx3  NEUROLOGY: non-focal  SKIN: No rashes or lesions       LABS:                        8.8    10.32 )-----------( 376      ( 26 Jul 2023 06:15 )             27.5     Auto Eosinophil # x     / Auto Eosinophil % x     / Auto Neutrophil # x     / Auto Neutrophil % x     / BANDS % x                            9.2    11.98 )-----------( 394      ( 25 Jul 2023 06:35 )             28.5     Auto Eosinophil # x     / Auto Eosinophil % x     / Auto Neutrophil # x     / Auto Neutrophil % x     / BANDS % x        07-26    129<L>  |  97<L>  |  19  ----------------------------<  126<H>  4.3   |  18<L>  |  0.72  07-25    125<L>  |  92<L>  |  16  ----------------------------<  151<H>  4.4   |  18<L>  |  0.66  07-25    123<L>  |  93<L>  |  13  ----------------------------<  101<H>  4.3   |  17<L>  |  0.54    Ca    8.1<L>      26 Jul 2023 06:15  Mg     2.00     07-26  Phos  2.6     07-26  TPro  5.6<L>  /  Alb  2.4<L>  /  TBili  0.5  /  DBili  x   /  AST  44<H>  /  ALT  19  /  AlkPhos  390<H>  07-25    PT/INR - ( 26 Jul 2023 06:15 )   PT: 13.0 sec;   INR: 1.16 ratio         PTT - ( 26 Jul 2023 06:15 )  PTT:28.0 sec      Urinalysis Basic - ( 26 Jul 2023 06:15 )    Color: x / Appearance: x / SG: x / pH: x  Gluc: 126 mg/dL / Ketone: x  / Bili: x / Urobili: x   Blood: x / Protein: x / Nitrite: x   Leuk Esterase: x / RBC: x / WBC x   Sq Epi: x / Non Sq Epi: x / Bacteria: x            RADIOLOGY & ADDITIONAL TESTS:    Imaging Personally Reviewed:    Consultant(s) Notes Reviewed:      Care Discussed with Consultants/Other Providers:   Allen Coughlin MD  PGY 1 Department of Internal Medicine        Patient is a 78y old  Female who presents with a chief complaint of diarrhea (26 Jul 2023 07:27)      SUBJECTIVE / OVERNIGHT EVENTS: Pt seen and examined. No acute overnight events. Continues to have abdominal pain and mouth pain. Her right knee pain has improved. Denies fevers, chills, CP, SOB, N/V, Constipation, Diarrhea        MEDICATIONS  (STANDING):  amLODIPine   Tablet 10 milliGRAM(s) Oral daily  atorvastatin 40 milliGRAM(s) Oral at bedtime  chlorhexidine 2% Cloths 1 Application(s) Topical daily  dextrose 5%. 1000 milliLiter(s) (100 mL/Hr) IV Continuous <Continuous>  dextrose 5%. 1000 milliLiter(s) (50 mL/Hr) IV Continuous <Continuous>  dextrose 50% Injectable 25 Gram(s) IV Push once  dextrose 50% Injectable 12.5 Gram(s) IV Push once  dextrose 50% Injectable 25 Gram(s) IV Push once  fenofibrate Tablet 145 milliGRAM(s) Oral daily  FIRST- Mouthwash  BLM 10 milliLiter(s) Swish and Spit four times a day  gabapentin 100 milliGRAM(s) Oral three times a day  glucagon  Injectable 1 milliGRAM(s) IntraMuscular once  insulin lispro (ADMELOG) corrective regimen sliding scale   SubCutaneous three times a day before meals  losartan 100 milliGRAM(s) Oral daily  methadone   Solution 1 milliGRAM(s) Oral daily  metoprolol succinate ER 25 milliGRAM(s) Oral daily  mirtazapine 15 milliGRAM(s) Oral at bedtime  pancrelipase  (CREON 36,000 Lipase Units) 1 Capsule(s) Oral three times a day with meals  pantoprazole    Tablet 40 milliGRAM(s) Oral before breakfast    MEDICATIONS  (PRN):  acetaminophen     Tablet .. 650 milliGRAM(s) Oral every 6 hours PRN Temp greater or equal to 38C (100.4F), Mild Pain (1 - 3)  benzocaine 20% Spray 1 Spray(s) Topical four times a day PRN oral pain  dextrose Oral Gel 15 Gram(s) Oral once PRN Blood Glucose LESS THAN 70 milliGRAM(s)/deciliter  melatonin 3 milliGRAM(s) Oral at bedtime PRN Insomnia  morphine   Solution 4 milliGRAM(s) Oral every 4 hours PRN Moderate pain 4-6, severe pain 7-10  morphine  - Injectable 2 milliGRAM(s) IV Push every 4 hours PRN breakthrough pain  ondansetron    Tablet 4 milliGRAM(s) Oral every 8 hours PRN Nausea and/or Vomiting  polyethylene glycol 3350 17 Gram(s) Oral daily PRN Constipation  zolpidem 5 milliGRAM(s) Oral at bedtime PRN Insomnia      I&O's Summary      Vital Signs Last 24 Hrs  T(C): 37 (26 Jul 2023 07:14), Max: 37.1 (25 Jul 2023 10:24)  T(F): 98.6 (26 Jul 2023 07:14), Max: 98.7 (25 Jul 2023 10:24)  HR: 119 (26 Jul 2023 07:14) (107 - 119)  BP: 104/64 (26 Jul 2023 07:14) (104/64 - 109/64)  BP(mean): 76 (26 Jul 2023 07:14) (76 - 76)  RR: 16 (26 Jul 2023 07:14) (16 - 16)  SpO2: 95% (26 Jul 2023 07:14) (95% - 95%)    Parameters below as of 26 Jul 2023 07:14  Patient On (Oxygen Delivery Method): room air        CAPILLARY BLOOD GLUCOSE      POCT Blood Glucose.: 148 mg/dL (26 Jul 2023 08:17)  POCT Blood Glucose.: 102 mg/dL (25 Jul 2023 21:38)  POCT Blood Glucose.: 206 mg/dL (25 Jul 2023 17:37)      PHYSICAL EXAM:  HEAD:  Atraumatic, Normocephalic  Oral mucosa with lesions of inner cheeks bilaterally, Lesions also present on lips and base of tongue. No active bleeding   EYES: EOMI, PERRL, conjunctiva and sclera clear  NECK: No JVD  CHEST/LUNG: Clear to auscultation bilaterally; No wheeze  HEART: Tachycardic and regular rhythm; No murmurs, rubs, or gallops  ABDOMEN: Distended, diffuse tenderness most severe in RUQ. RUQ is firm with small palpable mass.   EXTREMITIES:  2+ Peripheral Pulses, No clubbing, cyanosis. +2 edema to bilateral shins. No tenderness of knees bilaterally, no erythema effusion or warmth appreciated. ROM of R knee and leg improved from yesterday, still slightly decreased 2/2 pain  PSYCH: AAOx3  NEUROLOGY: non-focal  SKIN: No rashes or lesions       LABS:                        8.8    10.32 )-----------( 376      ( 26 Jul 2023 06:15 )             27.5     Auto Eosinophil # x     / Auto Eosinophil % x     / Auto Neutrophil # x     / Auto Neutrophil % x     / BANDS % x                            9.2    11.98 )-----------( 394      ( 25 Jul 2023 06:35 )             28.5     Auto Eosinophil # x     / Auto Eosinophil % x     / Auto Neutrophil # x     / Auto Neutrophil % x     / BANDS % x        07-26    129<L>  |  97<L>  |  19  ----------------------------<  126<H>  4.3   |  18<L>  |  0.72  07-25    125<L>  |  92<L>  |  16  ----------------------------<  151<H>  4.4   |  18<L>  |  0.66  07-25    123<L>  |  93<L>  |  13  ----------------------------<  101<H>  4.3   |  17<L>  |  0.54    Ca    8.1<L>      26 Jul 2023 06:15  Mg     2.00     07-26  Phos  2.6     07-26  TPro  5.6<L>  /  Alb  2.4<L>  /  TBili  0.5  /  DBili  x   /  AST  44<H>  /  ALT  19  /  AlkPhos  390<H>  07-25    PT/INR - ( 26 Jul 2023 06:15 )   PT: 13.0 sec;   INR: 1.16 ratio         PTT - ( 26 Jul 2023 06:15 )  PTT:28.0 sec      Urinalysis Basic - ( 26 Jul 2023 06:15 )    Color: x / Appearance: x / SG: x / pH: x  Gluc: 126 mg/dL / Ketone: x  / Bili: x / Urobili: x   Blood: x / Protein: x / Nitrite: x   Leuk Esterase: x / RBC: x / WBC x   Sq Epi: x / Non Sq Epi: x / Bacteria: x

## 2023-07-26 NOTE — PROGRESS NOTE ADULT - PROBLEM SELECTOR PLAN 4
Hyponatremic to 125 in ED, no changes in mental status or neurological deficits. 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 Na 123 as of 7/25, unable to complete IR procedure due to current hyponatremia. Of note, had hyponatremia from previous admission this month, also thought to be in setting of SIADH.   -Nephro consulted appreciate recs  -Na goal of 130 for IR procedure  -c/w Fluid restrict 1.0L daily  -Will start 3% NS 30cc/hr for 3 hours as pt not tolerating salt tabs 2/2 oral lesions, recheck BMP this evening  -monitor BMP in AM  -s/p 2L IVF Hyponatremic to 125 in ED, no changes in mental status or neurological deficits. 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 Of note, had hyponatremia from previous admission this month, also thought to be in setting of SIADH. s/p NS 3.0% 30cc/hr x1 and fluid restriction, Na 129 as of 7/26.  -Nephro consulted appreciate recs  -monitor BMP  -c/w Fluid restrict 1.0L daily  -s/p 2L IVF

## 2023-07-26 NOTE — PROGRESS NOTE ADULT - PROBLEM SELECTOR PLAN 7
-c/w Amlodipine 10mg daily sudden onset of R knee pain after getting out of bed this AM, now unable to bear weight. On exam has tenderness over anterior knee and quadriceps tendon, decreased ROM w/ flexion and SLR. There is warmth to anterior knee but no erythema, effusion, or crepitus. Has hx of osteoarthritis, usually on Celecoxib (held due to bleed risk prior to procedure) at home. Also gets Orthovisc injections every 6 months, next scheduled for next week. Knee pain likely 2/2 OA exacerbation, however quadriceps tendon strain, septic arthritis, and DVT are also on differential.   -f/u right knee XR  -f/u US Duplex bilaterally  -f/u CBC, fever curve daily  -c/w current pain regimen per palliative had sudden onset of knee pain after getting out of bed on 7/25, now improved. Has hx of osteoarthritis. Right knee XR unremarkable, no DVT of RLE on US. Knee pain likely 2/2 OA.  -c/w current pain regimen per palliative

## 2023-07-26 NOTE — PROGRESS NOTE ADULT - ATTENDING COMMENTS
78W PMH of metastatic pancreatic Ca with POD on multiple regiments who presents with mouth sores, loose stools with nausea and abdominal pain and worsening ascites, now with course complicated by DVT.     # Metastatic pancreatic cancer   # Malignant ascites   # Peritoneal carcinomatosis   # Pain secondary to malignancy   - Family no longer interested in further chemo, now with plan to focus on symptoms and keeping comfortable  - Has large volume ascites on imaging, planning for IR to place Pleurx today   - Per CM patient has been accepted by hospice and is pending DME delivery     # DVT with hypercoagulable state of malignancy   - found to have LLE DVT, d/w family risks vs benefits of anticoagulation. Would like to start ac today. Will plan to start heparin gtt after Pleurx placement, consider transition to Eliquis vs Lovenox tomorrow    # Hyponatremia - urine studies consistent with SIADH, started on salt tabs today however not tolerating due to mouth sores, will consult nephro to help with hypertonic saline for procedure tomorrow.   # Oral ulcers - secondary to chemo, c/w magic mouthwash, benzocaine spray.

## 2023-07-26 NOTE — PROGRESS NOTE ADULT - PROBLEM SELECTOR PLAN 5
hx metastatic pancreatic cancer with mets to liver and lung. s/p whipple, on chemo with last treatment 7/17. She has chronic diffuse body pain, including chest, abdomen, and extremities, in setting of metastatic disease and chemo. Pain managed by oxycodone and morphine at home. Patient and family no longer interested in DMT and would like to pursue hospice, will consult palliative and place hospice referral.  -palliative care consulted, appreciate recs  -c/w pain regimen per palliative: Methadone 1mg , Gabapentin 100mg TID, Morphine 4mg PO q4 for moderate/severe pain, and IV morphine 2mg q4 for breakthrough pain  -Resumed home Remeron 15mg daily for decreased PO intake Pt had US duplex on 7/26 for evaluate of right leg pain, found incidental LLE DVTs x2 (Acute non-occlusive DVT noted within the distal left femoral and popliteal veins. Acute, occlusive DVT noted within the left posterior tibial and soleal veins). She has no left leg pain and on exam no calf tenderness, warmth, or erythema. s/p PleurX placement 7/26.  -Will start heparin gtt pending IR recs Pt had US duplex on 7/26 for evaluate of right leg pain, found incidental LLE DVTs x2 (Acute non-occlusive DVT noted within the distal left femoral and popliteal veins. Acute, occlusive DVT noted within the left posterior tibial and soleal veins). She has no left leg pain and on exam no calf tenderness, warmth, or erythema. s/p PleurX placement 7/26.  -Per IR, can start heparin gtt w/o bolus 6 hours after procedure (1800 7/26)

## 2023-07-26 NOTE — PROGRESS NOTE ADULT - PROBLEM SELECTOR PLAN 6
sudden onset of R knee pain after getting out of bed this AM, now unable to bear weight. On exam has tenderness over anterior knee and quadriceps tendon, decreased ROM w/ flexion and SLR. There is warmth to anterior knee but no erythema, effusion, or crepitus. Has hx of osteoarthritis, usually on Celecoxib (held due to bleed risk prior to procedure) at home. Also gets Orthovisc injections every 6 months, next scheduled for next week. Knee pain likely 2/2 OA exacerbation, however quadriceps tendon strain, septic arthritis, and DVT are also on differential.   -f/u right knee XR  -f/u US Duplex bilaterally  -f/u CBC, fever curve daily  -c/w current pain regimen per palliative hx metastatic pancreatic cancer with mets to liver and lung. s/p whipple, on chemo with last treatment 7/17. She has chronic diffuse body pain, including chest, abdomen, and extremities, in setting of metastatic disease and chemo. Pain managed by oxycodone and morphine at home. Patient and family no longer interested in DMT and would like to pursue hospice, will consult palliative and place hospice referral.  -palliative care consulted, appreciate recs  -c/w pain regimen per palliative: Methadone 1mg , Gabapentin 100mg TID, Morphine 4mg PO q4 for moderate/severe pain, and IV morphine 2mg q4 for breakthrough pain  -Resumed home Remeron 15mg daily for decreased PO intake hx metastatic pancreatic cancer with mets to liver and lung. s/p whipple, on chemo with last treatment 7/17. She has chronic diffuse body pain, including chest, abdomen, and extremities, in setting of metastatic disease and chemo. Pain managed by oxycodone and morphine at home. Patient and family no longer interested in DMT and would like to pursue hospice, will consult palliative and place hospice referral.  -palliative care consulted, appreciate recs  -plan for hospice on dc, determining home vs inpatient hospice  -c/w pain regimen per palliative: Methadone 1mg , Gabapentin 100mg TID, Morphine 4mg PO q4 for moderate/severe pain, and IV morphine 2mg q4 for breakthrough pain  -c/w Zofran 4mg q8 PRN nausea  -Resumed home Remeron 15mg daily for decreased PO intake

## 2023-07-27 NOTE — DISCHARGE NOTE PROVIDER - DETAILS OF MALNUTRITION DIAGNOSIS/DIAGNOSES
This patient has been assessed with a concern for Malnutrition and was treated during this hospitalization for the following Nutrition diagnosis/diagnoses:     -  07/31/2023: Severe protein-calorie malnutrition

## 2023-07-27 NOTE — DISCHARGE NOTE PROVIDER - HOSPITAL COURSE
Ms. Herrera is a 79YO woman with PMH of pancreatic ca s/p Whipple's on chemo therapy, recent admission with AMS/fever, found to have chronic lacunar stroke who was brought in by EMS with complaint of n/v/d, and worsening severe abdominal pain, found to have significant ascites. Diarrhea resolved on admission. Palliative team was consulted as patient had significant symptoms from mouth sores 2/2 chemotherapy as well as pain from ascites, and adjusted her pain regimen. Patient and family elected for hospice referral.     She was found to be hyponatremic 2/2 SIADH, which improved with fluid restriction and hypertonic saline. she underwent Pleurex placement for recurrent ascites on 7/26.  Patient to get pleurex for recurrent ascites with IR 7/25.     On the day of discharge, she is medically optimized and hemodynamically stable for discharge to home hospice. She will follow up with her PCP and palliative team as needed. Ms. Herrera is a 77YO woman with PMH of pancreatic ca s/p Whipple's on chemo therapy, recent admission with AMS/fever, found to have chronic lacunar stroke who was brought in by EMS with complaint of n/v/d, and worsening severe abdominal pain, found to have significant ascites. Diarrhea resolved on admission. Palliative team was consulted as patient had significant symptoms from mouth sores 2/2 chemotherapy as well as pain from ascites, and adjusted her pain regimen. Patient and family elected for hospice referral.     She was found to be hyponatremic 2/2 SIADH, which improved with fluid restriction and hypertonic saline. She underwent Pleurex placement for recurrent ascites on 7/26.     On the day of discharge, she is medically optimized and hemodynamically stable for discharge to home hospice. She will follow up with her PCP and palliative team as needed. Ms. Herrera is a 79YO woman with PMH of pancreatic ca s/p Whipple's on chemo therapy, recent admission with AMS/fever, found to have chronic lacunar stroke who was brought in by EMS with complaint of n/v/d, and worsening severe abdominal pain, found to have significant ascites. Diarrhea resolved on admission. Palliative team was consulted as patient had significant symptoms from mouth sores 2/2 chemotherapy as well as pain from ascites, and adjusted her pain regimen. Patient and family elected for hospice referral.    She was found to be hyponatremic 2/2 SIADH, which improved with fluid restriction, hypertonic saline, and salt tabs. She underwent Pleurex placement for recurrent ascites on 7/26.  While admitted, she met sepsis criteria, and blood cultures showed E. coli, which empiric antibiotics were started. Urine and pleurx ascites culture were negative. Empiric antibiotics were continued for 7 days.    Goals of care were discussed and she was seen by palliative for pain management, which includes methadone 4mg BID, and reflex oral morphine for pain, and lidocaine mouth wash for mouth sores.      On the day of discharge, she is medically optimized and hemodynamically stable for discharge to nursing home. She will follow up with her PCP and palliative team as needed. Ms. Herrera is a 79YO woman with PMH of pancreatic ca s/p Whipple's on chemo therapy, recent admission with AMS/fever, found to have chronic lacunar stroke who was brought in by EMS with complaint of n/v/d, and worsening severe abdominal pain, found to have significant ascites. Diarrhea resolved on admission. Palliative team was consulted as patient had significant symptoms from mouth sores 2/2 chemotherapy as well as pain from ascites, and adjusted her pain regimen. Patient and family elected for hospice referral.    She was found to be hyponatremic 2/2 SIADH, which improved with fluid restriction, hypertonic saline, and salt tabs. She underwent Pleurex placement for recurrent ascites on 7/26.  While admitted, she met sepsis criteria, and blood cultures showed E. coli, which empiric antibiotics were started. Urine and pleurx ascites culture were negative. Empiric antibiotics were continued for 7 days.    Goals of care were discussed and she was seen by palliative for pain management, which includes methadone 4mg BID, and reflex oral morphine for pain, and lidocaine mouth wash for mouth sores.     On the day of discharge, she is medically optimized and hemodynamically stable for discharge to nursing home. She will follow up with her PCP and palliative team as needed. Ms. Herrera is a 79YO woman with PMH of pancreatic ca s/p Whipple's on chemo therapy, recent admission with AMS/fever, found to have chronic lacunar stroke who was brought in by EMS with complaint of n/v/d, and worsening severe abdominal pain, found to have significant ascites. Diarrhea resolved on admission. Palliative team was consulted as patient had significant symptoms from mouth sores 2/2 chemotherapy as well as pain from ascites, and adjusted her pain regimen. Patient and family elected for hospice referral.    She was found to be hyponatremic 2/2 SIADH, which improved with fluid restriction, hypertonic saline, and salt tabs. She underwent Pleurex placement for recurrent ascites and approx. 1L was removed q5days. She was also found to ahve acute DVTs in her legs, requiring anticoagulation.  While admitted, she met sepsis criteria, and blood cultures showed E. coli, which empiric antibiotics were started. Urine and pleurx ascites culture were negative. Empiric antibiotics were continued for 7 days.    Goals of care were discussed and she was seen by palliative for pain management, which includes methadone 4mg BID, and reflex oral morphine for pain, and pain regimen for mouth sores. On the day of discharge, she is medically optimized and hemodynamically stable for discharge to nursing home. She will follow up with her PCP and palliative team as needed.    Her PleurX drain should be drained every 2-3 days for symptomatic abdominal pain relief  For E. coli bacteremia, she received CTX 2g daily as in hospital. She needs to complete 1 more day of ciprofloxacin 500mg BID (8/6 last day)  Her pain regimen: methadone 4mg BID, gabapentin 100mg TID, morphine 4mg PO PRN for mod pain, morphine 6mg PO for severe pain  Her mouth sore pain regimen: 2% lidocaine 10mL swish and spit 4x/day, benzocaine, magic mouth wash 10mg TID, and benzocaine 20% spray PRN up to 4x/day  Her nutritional supplement: Glucerna shakes BID.  Her bowel regimen: senna 2 tabs daily, miralax 17g MWF  Her DVT regimen: Eliquis 5mg daily    She should have PCP follow up after hospital discharge. Ms. Herrera is a 77YO woman with PMH of pancreatic ca s/p Whipple's on chemo therapy, recent admission with AMS/fever, found to have chronic lacunar stroke who was brought in by EMS with complaint of n/v/d, and worsening severe abdominal pain, found to have significant ascites. Diarrhea resolved on admission. Palliative team was consulted as patient had significant symptoms from mouth sores 2/2 chemotherapy as well as pain from ascites, and adjusted her pain regimen. Patient and family elected for hospice referral.    She was found to be hyponatremic 2/2 SIADH, which improved with fluid restriction, hypertonic saline, and salt tabs. She underwent Pleurex placement for recurrent ascites and approx. 1L was removed q5days. She was also found to ahve acute DVTs in her legs, requiring anticoagulation.  While admitted, she met sepsis criteria, and blood cultures showed E. coli, which empiric antibiotics were started. Urine and pleurx ascites culture were negative. Empiric antibiotics were continued for 7 days.    Goals of care were discussed and she was seen by palliative for pain management, which includes methadone 4mg BID, and reflex oral morphine for pain, and pain regimen for mouth sores. On the day of discharge, she is medically optimized and hemodynamically stable for discharge to nursing home. She will follow up with her PCP and palliative team as needed.    - Her PleurX drain should be drained every 2-3 days for symptomatic abdominal pain relief  - For E. coli bacteremia, she received CTX 2g daily as in hospital. She needs to complete 1 more day of ciprofloxacin 500mg BID (8/6 last day)  - Her pain regimen: methadone 4mg BID, gabapentin 100mg TID, morphine 4mg PO PRN for mod pain, morphine 6mg PO for severe pain  - Her mouth sore pain regimen: 2% lidocaine 10mL swish and spit 4x/day, benzocaine, magic mouth wash 10mg TID, and benzocaine 20% spray PRN up to 4x/day  - Her nutritional supplement: Glucerna shakes BID.  - Her bowel regimen: senna 2 tabs daily, miralax 17g MWF  - Her DVT regimen: Eliquis 5mg daily    She should have PCP follow up after hospital discharge. Ms. Herrera is a 77YO woman with PMH of pancreatic ca s/p Whipple's on chemo therapy, recent admission with AMS/fever, found to have chronic lacunar stroke who was brought in by EMS with complaint of n/v/d, and worsening severe abdominal pain, found to have significant ascites. Diarrhea resolved on admission. Palliative team was consulted as patient had significant symptoms from mouth sores 2/2 chemotherapy as well as pain from ascites, and adjusted her pain regimen. Patient and family elected for hospice referral.    She was found to be hyponatremic 2/2 SIADH, which improved with fluid restriction, hypertonic saline, and salt tabs. She underwent Pleurex placement for recurrent ascites and approx. 1L was removed q5days. She was also found to ahve acute DVTs in her legs, requiring anticoagulation.  While admitted, she met sepsis criteria, and blood cultures showed E. coli, which empiric antibiotics were started. Urine and pleurx ascites culture were negative. Empiric antibiotics were continued for 7 days.    Goals of care were discussed and she was seen by palliative for pain management, which includes methadone 4mg BID, and reflex oral morphine for pain, and pain regimen for mouth sores. On the day of discharge, she is medically optimized and hemodynamically stable for discharge to nursing home. She will follow up with her PCP and palliative team as needed.    - Her PleurX drain should be drained every 2-3 days for symptomatic abdominal pain relief  - For E. coli bacteremia, she received CTX 2g daily as in hospital. She needs to complete 1 more day of ciprofloxacin 500mg BID (8/6 last day)  - Her pain regimen: methadone 4mg BID, gabapentin 100mg TID, morphine 4mg PO PRN for mod pain, morphine 6mg PO for severe pain  - Her mouth sore pain regimen: 2% lidocaine 10mL swish and spit 4x/day, benzocaine, magic mouth wash 10mg TID, and benzocaine 20% spray PRN up to 4x/day  - Her nutritional supplement: Glucerna shakes BID.  - Her bowel regimen: senna 2 tabs daily, miralax 17g MWF  - Her DVT regimen: Eliquis 5mg daily  - Please give lasix PO 20mg daily for volume overload    She should have PCP follow up after hospital discharge.

## 2023-07-27 NOTE — PROGRESS NOTE ADULT - PROBLEM SELECTOR PLAN 6
hx metastatic pancreatic cancer with mets to liver and lung. s/p whipple, on chemo with last treatment 7/17. She has chronic diffuse body pain, including chest, abdomen, and extremities, in setting of metastatic disease and chemo. Pain managed by oxycodone and morphine at home. Patient and family no longer interested in DMT and would like to pursue hospice, will consult palliative and place hospice referral.  -palliative care consulted, appreciate recs  -plan for hospice on dc, determining home vs inpatient hospice  -c/w pain regimen per palliative: Methadone 1mg , Gabapentin 100mg TID, Morphine 4mg PO q4 for moderate/severe pain, and IV morphine 2mg q4 for breakthrough pain  -c/w Zofran 4mg q8 PRN nausea  -Resumed home Remeron 15mg daily for decreased PO intake hx metastatic pancreatic cancer with mets to liver and lung. s/p whipple, on chemo with last treatment 7/17. She has chronic diffuse body pain, including chest, abdomen, and extremities, in setting of metastatic disease and chemo. Pain managed by oxycodone and morphine at home. Patient and family no longer interested in DMT and would like to pursue hospice, will consult palliative and place hospice referral.  -palliative care consulted, appreciate recs  -plan for hospice on dc, determining home vs inpatient hospice  -c/w pain regimen per palliative: Methadone 2mg BID , Gabapentin 100mg TID, Morphine 4mg PO q4 for moderate/severe pain, and IV morphine 2mg q4 for breakthrough pain  -c/w Zofran 4mg q8 PRN nausea  -Resumed home Remeron 15mg daily for decreased PO intake

## 2023-07-27 NOTE — DISCHARGE NOTE PROVIDER - CARE PROVIDER_API CALL
internal, medicine  Phone: (   )    -  Fax: (   )    -  Follow Up Time:    Godfrey Martínez  Internal Medicine  [address] [place] [address]  Phone: [phone]  Fax: [phone]  Established Patient  Follow Up Time: 1 week

## 2023-07-27 NOTE — PROGRESS NOTE ADULT - PROBLEM SELECTOR PLAN 4
Improving 7/27  Patient complains of painful sores throughout mouth and tongue that have been present for several weeks but have worsened within the past week. On exam has tender lesions to mucosa of inner cheeks, base of tongue, and lips. Low suspicion for thrush or other ongoing infection. Her sores are likely 2/2 chemotherapy.   > c/w magic mouthwash   > c/w Benzocaine spray  > managed by primary team

## 2023-07-27 NOTE — PROGRESS NOTE ADULT - PROBLEM SELECTOR PLAN 5
Pt had US duplex on 7/26 for evaluate of right leg pain, found incidental LLE DVTs x2 (Acute non-occlusive DVT noted within the distal left femoral and popliteal veins. Acute, occlusive DVT noted within the left posterior tibial and soleal veins). She has no left leg pain and on exam no calf tenderness, warmth, or erythema. s/p PleurX placement 7/26.  -Per IR, can start heparin gtt w/o bolus 6 hours after procedure (1800 7/26) US duplex on 7/26 found incidental LLE DVTs x2 (Acute non-occlusive DVT noted within the distal left femoral and popliteal veins. Acute, occlusive DVT noted within the left posterior tibial and soleal veins). She has no left leg pain and on exam no calf tenderness, warmth, or erythema. s/p PleurX placement 7/26. On heparin gtt, in therapeutic range  -c/w Heparin gtt  -trend coags  -when dc can transition to eliquis

## 2023-07-27 NOTE — PROGRESS NOTE ADULT - ATTENDING COMMENTS
78W PMH of metastatic pancreatic Ca with POD on multiple regiments who presents with mouth sores, loose stools with nausea and abdominal pain and worsening ascites, now with course complicated by DVT.     # Metastatic pancreatic cancer   # Malignant ascites   # Peritoneal carcinomatosis   # Pain secondary to malignancy   - Family no longer interested in further chemo, now with plan to focus on symptoms and keeping comfortable  - s/p abdominal Pleurx on 7/26;   - per CM patient has been accepted by hospice    # DVT with hypercoagulable state of malignancy - c/w heparin gtt, transition to Eliquis tonight ($0)  # Hyponatremia - improved s/p hypertonic saline; continue to monitor off therapy   # Oral ulcers - secondary to chemo, c/w magic mouthwash, benzocaine spray.     Dispo: Home with home hospice - DME to be delivered tonight can be d/c-ed tomorrow per CM.

## 2023-07-27 NOTE — PROGRESS NOTE ADULT - PROBLEM SELECTOR PLAN 7
had sudden onset of knee pain after getting out of bed on 7/25, now improved. Has hx of osteoarthritis. Right knee XR unremarkable, no DVT of RLE on US. Knee pain likely 2/2 OA.  -c/w current pain regimen per palliative had sudden onset of knee pain after getting out of bed on 7/25, now slightly improved. Has hx of osteoarthritis. Right knee XR unremarkable, no DVT of RLE on US. Knee pain likely 2/2 OA.  -c/w current pain regimen per palliative had sudden onset of knee pain after getting out of bed on 7/25, now slightly improved. Has hx of osteoarthritis. Right knee XR unremarkable, no DVT of RLE on US. Knee pain likely 2/2 OA.  -c/w current pain regimen per palliative  -will give Toradol 15mg x1 had sudden onset of knee pain after getting out of bed on 7/25, now slightly improved. Has hx of osteoarthritis. Right knee XR unremarkable, no DVT of RLE on US. Knee pain likely 2/2 OA.  -c/w current pain regimen per palliative  -will give Toradol 15mg x1  -will resume Celebrex 200mg daily

## 2023-07-27 NOTE — PROGRESS NOTE ADULT - PROBLEM SELECTOR PLAN 12
diet: soft and bite sized  dvt ppx: none, will resume heparin after IR procedure  code: DNR DNI  dispo: pending clinical improvement  bowel regimen miralax and senna  HCP Namrata Blanton, daughter diet: soft and bite sized  dvt ppx: heparin gtt, dc on eliquis  code: DNR DNI  dispo: pending clinical improvement  bowel regimen miralax and senna  HCP Namrata Blanton, daughter diet: soft and bite sized  dvt ppx: heparin gtt, dc on eliquis  code: DNR DNI  dispo: pending clinical improvement  bowel regimen Miralax BID and senna  HCP Namrata Blantno, daughter

## 2023-07-27 NOTE — PROGRESS NOTE ADULT - SUBJECTIVE AND OBJECTIVE BOX
[facility] Geriatrics and Palliative Care  Emerita Alejo, Palliative Care Nurse Practitioner  Contact Info: Page [phone] (Including Nights/Weekends), Message on Microsoft Teams (Emerita Alejo), or leave VM at Palliative Office [phone] (non-urgent)    Date of Service 07-27-23 @ 13:02    SUBJECTIVE AND OBJECTIVE:  Indication for Geriatrics and Palliative Care Services/INTERVAL HPI: Pain management  Pt seen this afternoon with family at the bedside. Macedonian  utilized # [id number]. Pt shared she is feeling much relief since abd pleurx was placed and ascitic fluid was drained. Pt c/o intermittent lower extremity pain, suggested she take PRN morphine prior to ADLS. Also discussed increased methadone based on PRN usage, pt in agreement.     OVERNIGHT EVENTS: Pt required PRN PO Morphine 4mg x3 & PRN IV Morphine 2mg x 2 within 24 hours (total 24mme).     DNR on chart:DNI  DNI      Allergies    No Known Allergies    Intolerances    MEDICATIONS  (STANDING):  amLODIPine   Tablet 10 milliGRAM(s) Oral daily  atorvastatin 40 milliGRAM(s) Oral at bedtime  celecoxib 200 milliGRAM(s) Oral daily  chlorhexidine 2% Cloths 1 Application(s) Topical daily  dextrose 5%. 1000 milliLiter(s) (100 mL/Hr) IV Continuous <Continuous>  dextrose 5%. 1000 milliLiter(s) (50 mL/Hr) IV Continuous <Continuous>  dextrose 50% Injectable 25 Gram(s) IV Push once  dextrose 50% Injectable 12.5 Gram(s) IV Push once  dextrose 50% Injectable 25 Gram(s) IV Push once  fenofibrate Tablet 145 milliGRAM(s) Oral daily  FIRST- Mouthwash  BLM 10 milliLiter(s) Swish and Spit four times a day  gabapentin 100 milliGRAM(s) Oral three times a day  glucagon  Injectable 1 milliGRAM(s) IntraMuscular once  heparin  Infusion.  Unit(s)/Hr (9 mL/Hr) IV Continuous <Continuous>  insulin lispro (ADMELOG) corrective regimen sliding scale   SubCutaneous three times a day before meals  ketorolac   Injectable 15 milliGRAM(s) IV Push once  losartan 100 milliGRAM(s) Oral daily  methadone   Solution 2 milliGRAM(s) Oral two times a day  metoprolol succinate ER 25 milliGRAM(s) Oral daily  mirtazapine 15 milliGRAM(s) Oral at bedtime  pancrelipase  (CREON 36,000 Lipase Units) 1 Capsule(s) Oral three times a day with meals  pantoprazole    Tablet 40 milliGRAM(s) Oral before breakfast  polyethylene glycol 3350 17 Gram(s) Oral two times a day  senna 2 Tablet(s) Oral at bedtime    MEDICATIONS  (PRN):  acetaminophen     Tablet .. 650 milliGRAM(s) Oral every 6 hours PRN Temp greater or equal to 38C (100.4F), Mild Pain (1 - 3)  benzocaine 20% Spray 1 Spray(s) Topical four times a day PRN oral pain  dextrose Oral Gel 15 Gram(s) Oral once PRN Blood Glucose LESS THAN 70 milliGRAM(s)/deciliter  melatonin 3 milliGRAM(s) Oral at bedtime PRN Insomnia  morphine   Solution 4 milliGRAM(s) Oral every 4 hours PRN Moderate pain 4-6, severe pain 7-10  morphine  - Injectable 2 milliGRAM(s) IV Push every 4 hours PRN breakthrough pain  ondansetron    Tablet 4 milliGRAM(s) Oral every 8 hours PRN Nausea and/or Vomiting  zolpidem 5 milliGRAM(s) Oral at bedtime PRN Insomnia    -------------------------------------------------------------------------------------------------------  ITEMS UNCHECKED ARE NOT PRESENT    PRESENT SYMPTOMS: [ ]Unable to self-report - see [ ] CPOT [ ] PAINADS [ ] RDOS  Source if other than patient:  [ ]Family   [ ]Team     Pain: [ X]yes [ ]no  QOL impact - recurrent hospitalizations'   Location - abd/ BLLE               Aggravating factors - movement   Quality - pressure/sharp  Radiation - back  Timing- intermittent   Severity (0-10 scale): 6/10  Minimal acceptable level (0-10 scale)/Pain goal: 4/10    CPOT:    https://[email or web address]/[email or web address]/Critical-Care-Pain-Observation-Tool-(CPOT)    PAINAD Score: See PAINAD tool and score below       RDOS: See RDOS tool and score below   0 to 2  minimal or no respiratory distress   3  mild distress  4 to 6 moderate distress  >7 severe distress    Dyspnea:                           [ ]Mild [ ]Moderate [ ]Severe  Anxiety:                             [ ]Mild [ ]Moderate [ ]Severe  Fatigue:                             [ ]Mild [ ]Moderate [ ]Severe  Nausea:                             [ ]Mild [ ]Moderate [ ]Severe  Loss of appetite:              [ ]Mild [ ]Moderate [ ]Severe  Constipation:                    [ ]Mild [X ]Moderate [ ]Severe  Other Symptoms:  [ X]All other review of systems negative     Home Medications for Symptoms if present:    I Stop Reference no:     -------------------------------------------------------------------------------------------------------  PCSSQ[Palliative Care Spiritual Screening Question]   Severity (0-10):  Score of 4 or > indicate consideration of Chaplaincy referral.  Chaplaincy Referral: [ ] yes [ ] refused [ ] following [X ] Deferred     Caregiver [place]? : [ ] yes [ ] no [ ] Deferred [ ] Declined             Social work referral [X ] Patient & Family Centered Care Referral [ ]     Anticipatory Grief present?:  [ ] yes [ ] no  [ ] Deferred                  Social work referral [X ] Chaplaincy Referral [ ]    -------------------------------------------------------------------------------------------------------  PHYSICAL EXAM:  Vital Signs Last 24 Hrs  T(C): 36.9 (27 Jul 2023 10:36), Max: 36.9 (26 Jul 2023 21:55)  T(F): 98.5 (27 Jul 2023 10:36), Max: 98.5 (27 Jul 2023 06:26)  HR: 103 (27 Jul 2023 12:11) (95 - 107)  BP: 113/62 (27 Jul 2023 12:11) (100/55 - 113/62)  BP(mean): 74 (26 Jul 2023 18:35) (74 - 74)  RR: 18 (27 Jul 2023 10:36) (16 - 18)  SpO2: 95% (27 Jul 2023 10:36) (95% - 99%)    Parameters below as of 27 Jul 2023 06:26  Patient On (Oxygen Delivery Method): room air     I&O's Summary    26 Jul 2023 07:01  -  27 Jul 2023 07:00  --------------------------------------------------------  IN: 258 mL / OUT: 250 mL / NET: 8 mL         GENERAL: Macedonian  # [id number]  [ ]Cachexia  [ X]Alert  [X ]Oriented x 4  [ ]Lethargic  [ ]Unarousable  [ ]Verbal  [ ]Non-Verbal    Behavioral:   [ ] Anxiety  [ ] Delirium [ ] Agitation [x ] Other    HEENT:  [ ]Normal   [ ]Dry mouth   [ ]ET Tube/Trach  [X]Oral lesions    PULMONARY:   [X]Clear [ ]Tachypnea  [ ]Audible excessive secretions   [ ]Rhonchi        [ ]Right [ ]Left [ ]Bilateral  [ ]Crackles        [ ]Right [ ]Left [ ]Bilateral  [ ]Wheezing     [ ]Right [ ]Left [ ]Bilateral  [ ]Diminished breath sounds [ ]right [ ]left [ ]bilateral    CARDIOVASCULAR:    [ X]Regular [ ]Irregular [ ]Tachy  [ ]Tomy [ ]Murmur [ ]Other    GASTROINTESTINAL: abd pleurx in place  [ X]Soft  [ X]Distended   [ ]+BS  [ ]Non tender [ X]Tender  [ ]Other [ ]PEG [ ]OGT/ NGT  Last BM: 7/23    GENITOURINARY:  [X ]Normal [ ] Incontinent   [ ]Oliguria/Anuria   [ ]Rolle    MUSCULOSKELETAL:   [ ]Normal   [X]Weakness  [ ]Bed/Wheelchair bound [ ]Edema    NEUROLOGIC:   [X ]No focal deficits  [ ]Cognitive impairment  [ ]Dysphagia [ ]Dysarthria [ ]Paresis [ ]Other     SKIN:   [X ]Normal  [ ]Rash  [ ]Other  [ ]Pressure ulcer(s)       Present on admission [ ]y [ ]n  -------------------------------------------------------------------------------------------------------  CRITICAL CARE:  [ ]Shock Present  [ ]Septic [ ]Cardiogenic [ ]Neurologic [ ]Hypovolemic  [ ]Vasopressors [ ]Inotropes  [ ]Respiratory failure present [ ]Mechanical Ventilation [ ]Non-invasive ventilatory support [ ]High-Flow   [ ]Acute  [ ]Chronic [ ]Hypoxic  [ ]Hypercarbic [ ]Other  [ ]Other organ failure     -------------------------------------------------------------------------------------------------------  LABS:                        8.3    9.87  )-----------( 370      ( 27 Jul 2023 06:40 )             26.3   07-27    131<L>  |  98  |  19  ----------------------------<  186<H>  4.2   |  18<L>  |  0.56    Ca    8.1<L>      27 Jul 2023 06:40  Phos  2.1     07-27  Mg     2.00     07-27    PT/INR - ( 27 Jul 2023 06:40 )   PT: 14.6 sec;   INR: 1.31 ratio         PTT - ( 27 Jul 2023 06:40 )  PTT:66.9 sec    Urinalysis Basic - ( 27 Jul 2023 06:40 )    Color: x / Appearance: x / SG: x / pH: x  Gluc: 186 mg/dL / Ketone: x  / Bili: x / Urobili: x   Blood: x / Protein: x / Nitrite: x   Leuk Esterase: x / RBC: x / WBC x   Sq Epi: x / Non Sq Epi: x / Bacteria: x    -------------------------------------------------------------------------------------------------------  RADIOLOGY & ADDITIONAL STUDIES: < from: VA Duplex Ext Veins Lower Comp, Bilat. (07.26.23 @ 07:37) >  Summary/Impressions:  1.  Acute, non-occlusive DVT noted within the distal left  femoral and popliteal veins.  Acute, occlusive DVT noted  within the left posterior tibial and soleal veins.  2. No evidence of deep vein thrombosis in the right lower  extremity.  Results communicated with Dr. FABIANA Coughlin on 07/26/23 at  8:30 am with read back confirmation.    < end of copied text >  ------------------------------------------------------------------------------------------------------  Protein Calorie Malnutrition Present: [ ]mild [ ]moderate [ ]severe [ ]underweight [ ]morbid obesity  https://www.andeal.org/vault/6870/web/files/ONC/Table_Clinical%20Characteristics%20to%20Document%20Malnutrition-White%20JV%20et%20al%202012.pdf    Height (cm): 152.5 (07-23-23 @ 07:19), 152.5 (07-01-23 @ 01:12), 152.5 (05-31-23 @ 10:19)  Weight (kg): 50 (07-23-23 @ 18:02), 58.0009 (07-17-23 @ 13:00), 58.7 (07-03-23 @ 18:10)  BMI (kg/m2): 21.5 (07-23-23 @ 18:02), 24.9 (07-23-23 @ 07:19), 24.9 (07-17-23 @ 13:00)    Palliative Performance Status Version 2:   See PPSv2 tool and score below       [ ]PPSV2 < or = 30%  [ ]significant weight loss [ ]poor nutritional intake [ ]anasarca[ ]Artificial Nutrition    Other REFERRALS:  [X ]Hospice  [ ]Child Life  [ ]Social Work  [ ]Case management [ ]Holistic Therapy     -------------------------------------------------------------------------------------------------------

## 2023-07-27 NOTE — DISCHARGE NOTE PROVIDER - NSDCCPTREATMENT_GEN_ALL_CORE_FT
PRINCIPAL PROCEDURE  Procedure: Insertion, catheter, abdominal cavity  Findings and Treatment: · Procedure NamePeritoneal Pleurx Catheter  · TIME OUT Patient's first and last name, , procedure, and correct site confirmed prior to the start of procedure.  · Procedure Date/Time 2023 11:12  · Informed Consent Benefits, risks, and possible complications of procedure explained to patient/caregiver who verbalized understanding and gave written consent.  · Procedure Performed By Myself  · Access Site (if applicable) N/A  · Specimen Obtained None  · Estimated Blood Loss None  · Complications No complications  · Contrast None  · Sedation Provided by Anesthesia Department  · Local Anesthesia 1% Lidocaine  · Procedure Findings and Details Peritoneal Pleurx catheter placed. 2L ascites drained  · Patient Condition/Disposition Recovery, then floor if stable      SECONDARY PROCEDURE  Procedure: CT abdomen pelvis  Findings and Treatment: IMPRESSION:  Interval increase in now very large amount of abdominal and pelvic   ascites since prior CT of 2023.  Little change in extensive omental metastatic caking.  Little change in serosal thickening/infiltration of transverse colon.  Grossly unchanged multiple hepatic and pulmonary metastases.  --- End of Report ---

## 2023-07-27 NOTE — PROGRESS NOTE ADULT - PROBLEM SELECTOR PLAN 8
Thank you for allowing us to participate in your patient's care. We will continue to follow with you. Please page [phone] for any q's or c's. The Geriatric and Palliative Medicine service has coverage 24 hours a day/ 7 days a week to provide medical recommendations regarding symptom management needs via telephone.

## 2023-07-27 NOTE — PROGRESS NOTE ADULT - PROBLEM SELECTOR PLAN 2
Pt had 6 episodes of non-bloody diarrhea prior to admission. No additional episodes of diarrhea since coming to the hospital. Her diarrhea is likely 2/2 chemotherapy and metastatic disease. Low suspicion for infectious etiology at this time given stable vital signs and labs (no leukocytosis or neutropenia). Very low suspicion for c.diff, will dc contact precautions. GI PCR negative. Cultures showing NGTD.  -f/u BCx

## 2023-07-27 NOTE — PROGRESS NOTE ADULT - PROBLEM SELECTOR PLAN 6
> Pt c/o intermittent nausea with no vomiting   > PRN Zofran 4mg PO Q 8hrs (last EKG 7/23 shows qtc 477 )

## 2023-07-27 NOTE — DISCHARGE NOTE PROVIDER - NSDCFUADDINST_GEN_ALL_CORE_FT
Drain peritoneal fluid through pleurex if having abdominal pain and/or tightness or if experiencing shortness of breath

## 2023-07-27 NOTE — PROGRESS NOTE ADULT - SUBJECTIVE AND OBJECTIVE BOX
Allen Coughlin MD  PGY 1 Department of Internal Medicine        Patient is a 78y old  Female who presents with a chief complaint of diarrhea (27 Jul 2023 06:52)      SUBJECTIVE / OVERNIGHT EVENTS: Pt seen and examined. No acute overnight events. This morning complains of mild abdominal pain and mouth pain. Also complains of right knee pain, increased from yesterday. Has not had a BM in several days. Denies fevers, chills, CP, SOB, N/V, Diarrhea        MEDICATIONS  (STANDING):  amLODIPine   Tablet 10 milliGRAM(s) Oral daily  atorvastatin 40 milliGRAM(s) Oral at bedtime  chlorhexidine 2% Cloths 1 Application(s) Topical daily  dextrose 5%. 1000 milliLiter(s) (50 mL/Hr) IV Continuous <Continuous>  dextrose 5%. 1000 milliLiter(s) (100 mL/Hr) IV Continuous <Continuous>  dextrose 50% Injectable 12.5 Gram(s) IV Push once  dextrose 50% Injectable 25 Gram(s) IV Push once  dextrose 50% Injectable 25 Gram(s) IV Push once  fenofibrate Tablet 145 milliGRAM(s) Oral daily  FIRST- Mouthwash  BLM 10 milliLiter(s) Swish and Spit four times a day  gabapentin 100 milliGRAM(s) Oral three times a day  glucagon  Injectable 1 milliGRAM(s) IntraMuscular once  heparin  Infusion.  Unit(s)/Hr (9 mL/Hr) IV Continuous <Continuous>  insulin lispro (ADMELOG) corrective regimen sliding scale   SubCutaneous three times a day before meals  losartan 100 milliGRAM(s) Oral daily  methadone   Solution 1 milliGRAM(s) Oral daily  metoprolol succinate ER 25 milliGRAM(s) Oral daily  mirtazapine 15 milliGRAM(s) Oral at bedtime  pancrelipase  (CREON 36,000 Lipase Units) 1 Capsule(s) Oral three times a day with meals  pantoprazole    Tablet 40 milliGRAM(s) Oral before breakfast    MEDICATIONS  (PRN):  acetaminophen     Tablet .. 650 milliGRAM(s) Oral every 6 hours PRN Temp greater or equal to 38C (100.4F), Mild Pain (1 - 3)  benzocaine 20% Spray 1 Spray(s) Topical four times a day PRN oral pain  dextrose Oral Gel 15 Gram(s) Oral once PRN Blood Glucose LESS THAN 70 milliGRAM(s)/deciliter  melatonin 3 milliGRAM(s) Oral at bedtime PRN Insomnia  morphine   Solution 4 milliGRAM(s) Oral every 4 hours PRN Moderate pain 4-6, severe pain 7-10  morphine  - Injectable 2 milliGRAM(s) IV Push every 4 hours PRN breakthrough pain  ondansetron    Tablet 4 milliGRAM(s) Oral every 8 hours PRN Nausea and/or Vomiting  polyethylene glycol 3350 17 Gram(s) Oral daily PRN Constipation  zolpidem 5 milliGRAM(s) Oral at bedtime PRN Insomnia      I&O's Summary    26 Jul 2023 07:01  -  27 Jul 2023 07:00  --------------------------------------------------------  IN: 258 mL / OUT: 250 mL / NET: 8 mL        Vital Signs Last 24 Hrs  T(C): 36.9 (27 Jul 2023 06:26), Max: 37 (26 Jul 2023 10:06)  T(F): 98.5 (27 Jul 2023 06:26), Max: 98.5 (27 Jul 2023 06:26)  HR: 104 (27 Jul 2023 06:26) (95 - 119)  BP: 103/60 (27 Jul 2023 06:26) (100/55 - 111/60)  BP(mean): 74 (26 Jul 2023 18:35) (74 - 76)  RR: 18 (27 Jul 2023 06:26) (16 - 18)  SpO2: 96% (27 Jul 2023 06:26) (96% - 99%)    Parameters below as of 27 Jul 2023 06:26  Patient On (Oxygen Delivery Method): room air        CAPILLARY BLOOD GLUCOSE      POCT Blood Glucose.: 206 mg/dL (27 Jul 2023 08:00)  POCT Blood Glucose.: 232 mg/dL (26 Jul 2023 22:01)  POCT Blood Glucose.: 123 mg/dL (26 Jul 2023 17:22)  POCT Blood Glucose.: 131 mg/dL (26 Jul 2023 11:48)      PHYSICAL EXAM:  HEAD:  Atraumatic, Normocephalic  Oral mucosa with lesions of inner cheeks bilaterally, Lesions also present on lips and base of tongue. No active bleeding   EYES: EOMI, PERRL, conjunctiva and sclera clear  NECK: No JVD  CHEST/LUNG: Clear to auscultation bilaterally; No wheeze  HEART: Tachycardic and regular rhythm; No murmurs, rubs, or gallops  ABDOMEN: Distended (less than yesterday), mild diffuse tenderness. RUQ is firm with small palpable mass. PleurX in place, dressing is C/D/I  EXTREMITIES:  2+ Peripheral Pulses, No clubbing, cyanosis. +3 edema to bilateral shins. Mild tenderness over right anterior knee and quadriceps tendon. Anterior knee warm to touch but no erythema, effusion or crepitus. Decreased ROM w/ R knee flexion 2/2 pain.  PSYCH: AAOx3  NEUROLOGY: non-focal  SKIN: No rashes or lesions       LABS:                        8.3    9.87  )-----------( 370      ( 27 Jul 2023 06:40 )             26.3     Auto Eosinophil # x     / Auto Eosinophil % x     / Auto Neutrophil # x     / Auto Neutrophil % x     / BANDS % x                            8.0    9.07  )-----------( 331      ( 27 Jul 2023 00:15 )             24.6     Auto Eosinophil # x     / Auto Eosinophil % x     / Auto Neutrophil # x     / Auto Neutrophil % x     / BANDS % x                            8.8    10.32 )-----------( 376      ( 26 Jul 2023 06:15 )             27.5     Auto Eosinophil # x     / Auto Eosinophil % x     / Auto Neutrophil # x     / Auto Neutrophil % x     / BANDS % x        07-27    131<L>  |  98  |  19  ----------------------------<  186<H>  4.2   |  18<L>  |  0.56  07-26    129<L>  |  97<L>  |  19  ----------------------------<  126<H>  4.3   |  18<L>  |  0.72  07-25    125<L>  |  92<L>  |  16  ----------------------------<  151<H>  4.4   |  18<L>  |  0.66    Ca    8.1<L>      27 Jul 2023 06:40  Mg     2.00     07-27  Phos  2.1     07-27    PT/INR - ( 27 Jul 2023 06:40 )   PT: 14.6 sec;   INR: 1.31 ratio         PTT - ( 27 Jul 2023 06:40 )  PTT:66.9 sec      Urinalysis Basic - ( 27 Jul 2023 06:40 )    Color: x / Appearance: x / SG: x / pH: x  Gluc: 186 mg/dL / Ketone: x  / Bili: x / Urobili: x   Blood: x / Protein: x / Nitrite: x   Leuk Esterase: x / RBC: x / WBC x   Sq Epi: x / Non Sq Epi: x / Bacteria: x            RADIOLOGY & ADDITIONAL TESTS:    Imaging Personally Reviewed:    Consultant(s) Notes Reviewed:      Care Discussed with Consultants/Other Providers:

## 2023-07-27 NOTE — PROGRESS NOTE ADULT - PROBLEM SELECTOR PLAN 1
Patient complains of painful sores throughout mouth and tongue that have been present for several weeks but have worsened within the past week. On exam has tender lesions to mucosa of inner cheeks, base of tongue, and lips. Low suspicion for thrush or other ongoing infection. Her sores are likely 2/2 chemotherapy.   -c/w magic mouthwash   -c/w Benzocaine spray  -c/w pain regimen per palliative: Methadone 1mg , Gabapentin 100mg TID, Morphine 4mg PO q4 for moderate/severe pain, and IV morphine 2mg q4 for breakthrough pain  -monitor pain Patient complains of painful sores throughout mouth and tongue that have been present for several weeks but have worsened within the past week. On exam has tender lesions to mucosa of inner cheeks, base of tongue, and lips. Low suspicion for thrush or other ongoing infection. Her sores are likely 2/2 chemotherapy.   -c/w magic mouthwash   -c/w Benzocaine spray  -c/w pain regimen per palliative: Methadone 12mg BID, Gabapentin 100mg TID, Morphine 4mg PO q4 for moderate/severe pain, and IV morphine 2mg q4 for breakthrough pain  -monitor pain

## 2023-07-27 NOTE — PROGRESS NOTE ADULT - PROBLEM SELECTOR PLAN 4
Hyponatremic to 125 in ED, no changes in mental status or neurological deficits. 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 Of note, had hyponatremia from previous admission this month, also thought to be in setting of SIADH. s/p NS 3.0% 30cc/hr x1 and fluid restriction, Na 129 as of 7/26.  -Nephro consulted appreciate recs  -monitor BMP  -c/w Fluid restrict 1.0L daily  -s/p 2L IVF Hyponatremic to 125 in ED, no changes in mental status or neurological deficits. 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 Of note, had hyponatremia from previous admission this month, also thought to be in setting of SIADH. s/p NS 3.0% 30cc/hr x1 and fluid restriction, Na 131 as of 7/27.  -Nephro consulted appreciate recs  -monitor BMP  -c/w Fluid restrict 1.0L daily  -s/p 2L IVF

## 2023-07-27 NOTE — DISCHARGE NOTE PROVIDER - PROVIDER TOKENS
FREE:[LAST:[internal],FIRST:[medicine],PHONE:[(   )    -],FAX:[(   )    -]] PROVIDER:[TOKEN:[[id number]],FOLLOWUP:[1 week],ESTABLISHEDPATIENT:[T]]

## 2023-07-27 NOTE — PROGRESS NOTE ADULT - PROBLEM SELECTOR PLAN 5
As per daughter last para 7/18   > CT abd/pelvis shows interval increase in now very large amount of abdominal and pelvic ascites since prior CT of 7/1/2023.  > was planned for outpt for para 7/26  > Planned pleurx for today with IR however low Na, tentative for tomorrow 7/26 if Na improves.  > Pleurx placed successfully 7/26 with 2L ascitic fluid drained

## 2023-07-27 NOTE — DISCHARGE NOTE PROVIDER - NSDCCAREPROVSEEN_GEN_ALL_CORE_FT
[facility] Medicine House Staff Team 2 - Attending Dr. Jesse Lira [facility] Medicine House Staff Team 2

## 2023-07-27 NOTE — PROGRESS NOTE ADULT - SUBJECTIVE AND OBJECTIVE BOX
[facility] Division of Kidney Diseases & Hypertension  FOLLOW UP NOTE  [phone]--------------------------------------------------------------------------------    HPI: 78F w/ PMH of pancreatic cancer s/p chemo (now on hospice), HTN, HLD, DM2, CAD s/p PCI who presented to the ED complaining of diarrhea for 2-3 days prior to admission. Nephrology was consulted for hyponatremia.     24 hour events/subjective: Pt. was seen and evaluated at the bedside this morning. Pt. complaining of pain all over her body. Had just received pain medication. Denies fevers/chill, headaches, chest pain, SOB. had procedure for PleurX yesterday.    PAST HISTORY  --------------------------------------------------------------------------------  No significant changes to PMH, PSH, FHx, SHx, unless otherwise noted    ALLERGIES & MEDICATIONS  --------------------------------------------------------------------------------  Allergies  No Known Allergies    Intolerances    Standing Inpatient Medications  amLODIPine   Tablet 10 milliGRAM(s) Oral daily  atorvastatin 40 milliGRAM(s) Oral at bedtime  celecoxib 200 milliGRAM(s) Oral daily  chlorhexidine 2% Cloths 1 Application(s) Topical daily  dextrose 5%. 1000 milliLiter(s) IV Continuous <Continuous>  dextrose 5%. 1000 milliLiter(s) IV Continuous <Continuous>  dextrose 50% Injectable 25 Gram(s) IV Push once  dextrose 50% Injectable 12.5 Gram(s) IV Push once  dextrose 50% Injectable 25 Gram(s) IV Push once  fenofibrate Tablet 145 milliGRAM(s) Oral daily  FIRST- Mouthwash  BLM 10 milliLiter(s) Swish and Spit four times a day  gabapentin 100 milliGRAM(s) Oral three times a day  glucagon  Injectable 1 milliGRAM(s) IntraMuscular once  heparin  Infusion.  Unit(s)/Hr IV Continuous <Continuous>  insulin lispro (ADMELOG) corrective regimen sliding scale   SubCutaneous three times a day before meals  losartan 100 milliGRAM(s) Oral daily  methadone   Solution 2 milliGRAM(s) Oral two times a day  metoprolol succinate ER 25 milliGRAM(s) Oral daily  mirtazapine 15 milliGRAM(s) Oral at bedtime  pancrelipase  (CREON 36,000 Lipase Units) 1 Capsule(s) Oral three times a day with meals  pantoprazole    Tablet 40 milliGRAM(s) Oral before breakfast  polyethylene glycol 3350 17 Gram(s) Oral two times a day  senna 2 Tablet(s) Oral at bedtime    PRN Inpatient Medications  acetaminophen     Tablet .. 650 milliGRAM(s) Oral every 6 hours PRN  benzocaine 20% Spray 1 Spray(s) Topical four times a day PRN  dextrose Oral Gel 15 Gram(s) Oral once PRN  melatonin 3 milliGRAM(s) Oral at bedtime PRN  morphine   Solution 4 milliGRAM(s) Oral every 4 hours PRN  morphine  - Injectable 2 milliGRAM(s) IV Push every 4 hours PRN  ondansetron    Tablet 4 milliGRAM(s) Oral every 8 hours PRN  zolpidem 5 milliGRAM(s) Oral at bedtime PRN    REVIEW OF SYSTEMS  --------------------------------------------------------------------------------  Gen: No fevers/chills, generalized body pain  Head/Eyes/Ears: No HA  Respiratory: No dyspnea, cough, pain near site of peritoneal PleurX  CV: No chest pain  GI: +abdominal pain, diarrhea  : No dysuria, hematuria  MSK: leg swelling    All other systems were reviewed and are negative, except as noted.    VITALS/PHYSICAL EXAM  --------------------------------------------------------------------------------  T(C): 36.9 (07-27-23 @ 10:36), Max: 36.9 (07-26-23 @ 21:55)  HR: 103 (07-27-23 @ 12:11) (103 - 107)  BP: 113/62 (07-27-23 @ 12:11) (100/55 - 113/62)  RR: 18 (07-27-23 @ 10:36) (18 - 18)  SpO2: 95% (07-27-23 @ 10:36) (95% - 99%)  Wt(kg): --    07-26-23 @ 07:01  -  07-27-23 @ 07:00  --------------------------------------------------------  IN: 258 mL / OUT: 250 mL / NET: 8 mL    Physical Exam:  Gen: NAD  HEENT: MMM  Pulm: CTA B/L  CV: S1S2, RRR  Abd: Soft, +BS, tenderness to palpation, peritoneal PleurX drain  Ext: B/L LE edema  Neuro: Awake  Skin: Warm and dry    LABS/STUDIES  --------------------------------------------------------------------------------              8.3    9.87  >-----------<  370      [07-27-23 @ 06:40]              26.3     131  |  98  |  19  ----------------------------<  186      [07-27-23 @ 06:40]  4.2   |  18  |  0.56        Ca     8.1     [07-27-23 @ 06:40]      Mg     2.00     [07-27-23 @ 06:40]      Phos  2.1     [07-27-23 @ 06:40]    PT/INR: PT 14.6 , INR 1.31       [07-27-23 @ 06:40]  PTT: 66.9       [07-27-23 @ 06:40]    Creatinine Trend:  SCr 0.56 [07-27 @ 06:40]  SCr 0.72 [07-26 @ 06:15]  SCr 0.66 [07-25 @ 15:46]  SCr 0.54 [07-25 @ 06:35]  SCr 0.57 [07-24 @ 06:20]    Urinalysis - [07-27-23 @ 06:40]      Color  / Appearance  / SG  / pH       Gluc 186 / Ketone   / Bili  / Urobili        Blood  / Protein  / Leuk Est  / Nitrite       RBC  / WBC  / Hyaline  / Gran  / Sq Epi  / Non Sq Epi  / Bacteria     Urine Creatinine 53      [07-23-23 @ 19:17]  Urine Sodium 60      [07-23-23 @ 19:17]  Urine Potassium 39.2      [07-23-23 @ 19:17]  Urine Chloride 58      [07-23-23 @ 19:17]  Urine Osmolality 469      [07-23-23 @ 19:17]

## 2023-07-27 NOTE — PROGRESS NOTE ADULT - PROBLEM SELECTOR PLAN 1
Pt. with hyponatremia in the setting of diarrhea, decreased PO intake, abd pain, and metastatic pancreatic cancer. Prior to this admission, last SNa was low at 129 on 7/19/23. On admission, SNa was low at 125 (7/23/23). Pt. received 1L NS and 1L LR and SNa decreased to 123 (7/25/23). Serum Osm low at 259. Uosm 469, Herlinda 60. 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 Recommend fluid restriction to 1L. Please change IV formulations of medications to exclude D5W. Given 3% NS 30cc/hr for 3 hours. SNa this AM increased to 131. Monitor SNa.    Signing off. Please reconsult as needed.  If you have any questions, please feel free to contact me.  Celio Batres  Nephrology Fellow  [phone] / [phone] / [employer] Teams (Preferred)  (After 4pm or on weekends, please call the on-call Fellow).

## 2023-07-27 NOTE — PROGRESS NOTE ADULT - PROBLEM SELECTOR PLAN 3
> Pt c/o no BM since 7/23  > Requesting enema - reached out to primary team who increased bowel regimen  > Will plan for enema tomorrow if no BM today  > Can also consider lactulose 10g Q6hrs until BM

## 2023-07-27 NOTE — PROGRESS NOTE ADULT - PROBLEM SELECTOR PLAN 2
Home regimen: Methadone 1mg solution QHS, Morphine 3mg solution Q4 hrs PRN pain, Gabapentin 100 TID  > Increased Methadone to 2mg Solution BID for long acting pain control (ekg 7/23 qtc 477)  > c/w Gabapentin 100mg TID   > PO morphine solution 4mg Q4hrs PRN moderate/severe pain with 2mg IV Morphine for breakthrough pain, can be given 1 hour after PO morphine if pt continues to c/o pain

## 2023-07-27 NOTE — DISCHARGE NOTE PROVIDER - NSDCCPCAREPLAN_GEN_ALL_CORE_FT
PRINCIPAL DISCHARGE DIAGNOSIS  Diagnosis: Abdominal pain, vomiting, and diarrhea  Assessment and Plan of Treatment: You came to the hospital with abdominal pain, vomiting and diarrhea that improved when you were admitted. Your abdominal pain likely occurred because of your pancreatic cancer, which is causing a fluid build up in your abdomen. You had a drain, called a "Pleurex," placed in your abdomen so that you can have the fluid drained when it causes you discomfort. We will also discharge you on a pain regimen to help control the abdominal pain, as recommended by our Palliative care team. Please reach out to Palliative care or your hospice team for additional support in managing your symptoms.      SECONDARY DISCHARGE DIAGNOSES  Diagnosis: Acute deep vein thrombosis (DVT)  Assessment and Plan of Treatment: While you were in the hospital, we found that you have a blood clot in your left leg. We started you on a blood thinner to help dissolve this clot. You should continue to take the blood thinner "Eliquis" as prescribed to prevent re-formation of the clot, and migration of the clot to your lungs.     PRINCIPAL DISCHARGE DIAGNOSIS  Diagnosis: Abdominal pain, vomiting, and diarrhea  Assessment and Plan of Treatment: You came to the hospital with abdominal pain, vomiting and diarrhea that improved when you were admitted. Your abdominal pain likely occurred because of your pancreatic cancer, which is causing a fluid build up in your abdomen. You had a drain, called a "Pleurex," placed in your abdomen so that you can have the fluid drained when it causes you discomfort. You should use the drain if you develop difficulty with breathing or abdominal pain. We will also discharge you on a pain regimen to help control the abdominal pain, as recommended by our Palliative care team. Please reach out to Palliative care or your hospice team for additional support in managing your symptoms.      SECONDARY DISCHARGE DIAGNOSES  Diagnosis: Acute deep vein thrombosis (DVT)  Assessment and Plan of Treatment: While you were in the hospital, we found that you have a blood clot in your left leg. We started you on a blood thinner to help dissolve this clot. You should continue to take the blood thinner "Eliquis" as prescribed to prevent re-formation of the clot, and migration of the clot to your lungs.     PRINCIPAL DISCHARGE DIAGNOSIS  Diagnosis: Abdominal pain, vomiting, and diarrhea  Assessment and Plan of Treatment: You came to the hospital with abdominal pain, vomiting and diarrhea that improved when you were admitted. Your abdominal pain likely occurred because of your pancreatic cancer, which is causing a fluid build up in your abdomen. You had a drain, called a "Pleurex," placed in your abdomen so that you can have the fluid drained when it causes you discomfort. You should use the drain if you develop difficulty with breathing or abdominal pain. We will also discharge you on a pain regimen to help control the abdominal pain and mouth sores, as recommended by our Palliative care team. Please reach out to Palliative care or your hospice team for additional support in managing your symptoms.      SECONDARY DISCHARGE DIAGNOSES  Diagnosis: Acute deep vein thrombosis (DVT)  Assessment and Plan of Treatment: While you were in the hospital, we found that you have a blood clot in your left leg. We started you on a blood thinner to help dissolve this clot. You should continue to take the blood thinner "Eliquis" as prescribed to prevent re-formation of the clot, and migration of the clot to your lungs.

## 2023-07-27 NOTE — PROGRESS NOTE ADULT - PROBLEM SELECTOR PLAN 1
Pt known to Dr. Juan David @ [facility]  > Last chemotherapy 7/17   > Pt no longer interested in DMT - hospice appropriate   > CM to make hospice referral

## 2023-07-27 NOTE — DISCHARGE NOTE PROVIDER - NSDCFUSCHEDAPPT_GEN_ALL_CORE_FT
Chel Dunlap  [facility] Physician [facility]  [facility]  Scheduled Appointment: 07/27/2023    [facility]  [facility]  Scheduled Appointment: 07/31/2023    [facility]  [facility]  Scheduled Appointment: 07/31/2023    [facility]  [facility]  Scheduled Appointment: 08/02/2023    [facility]  [facility]  [place]  Scheduled Appointment: 08/03/2023    [facility]  [facility]  [place]  Scheduled Appointment: 08/10/2023    [facility]  [facility]  Scheduled Appointment: 08/14/2023    Viridiana Cantrell  [facility]  [facility]  Scheduled Appointment: 08/14/2023    [facility]  [facility]  Scheduled Appointment: 08/14/2023    [facility]  [facility]  Scheduled Appointment: 08/16/2023     [facility]  [facility]  Scheduled Appointment: 07/31/2023    [facility]  [facility]  Scheduled Appointment: 07/31/2023    [facility]  [facility]  Scheduled Appointment: 08/02/2023    [facility]  [facility]  [place]  Scheduled Appointment: 08/03/2023    [facility]  [facility]  [place]  Scheduled Appointment: 08/10/2023    [facility]  [facility]  Scheduled Appointment: 08/14/2023    Viridiana Cantrell  [facility]  [facility]  Scheduled Appointment: 08/14/2023    [facility]  [facility]  Scheduled Appointment: 08/14/2023    [facility]  [facility]  Scheduled Appointment: 08/16/2023     [facility] Stefani  [facility]  [facility]  Scheduled Appointment: 08/03/2023    [facility]  [facility]  [facility]  Scheduled Appointment: 08/10/2023    [facility]  [facility]  Scheduled Appointment: 08/14/2023    Viridiana Cantrell  [facility]  [facility]  Scheduled Appointment: 08/14/2023     [facility] Physician [facility]  [facility]  [facility]  Scheduled Appointment: 08/10/2023    [facility]  [facility]  Scheduled Appointment: 08/14/2023    Viridiana Cantrell  [facility]  [facility]  Scheduled Appointment: 08/14/2023

## 2023-07-27 NOTE — PROGRESS NOTE ADULT - PROBLEM SELECTOR PLAN 3
Patient has worsening recurrent abdominal distension and BLE edema, likely in setting of metastatic disease. Low suspicion for infection at this time. Large volume ascites confirmed on CT A/P.  Scheduled for outpatient paracentesis 7/26, last tap 7/19. Abd US 7/24 confirming moderate to severe ascites.   -IR to place PleurX 7/26  -monitor volume status  -strict I&O  -s/p Lasix 20mg IV x1 Patient has worsening recurrent abdominal distension and BLE edema, likely in setting of metastatic disease. Low suspicion for infection at this time. Large volume ascites confirmed on CT A/P. Abd US 7/24 confirming moderate to severe ascites. s/p PleurX placement 7/26.  -monitor volume status  -strict I&O  -s/p Lasix 20mg IV x1 Patient has worsening recurrent abdominal distension and BLE edema, likely in setting of metastatic disease. Low suspicion for infection at this time. Large volume ascites confirmed on CT A/P. Abd US 7/24 confirming moderate to severe ascites. ascites improved s/p PleurX placement 7/26  -monitor volume status  -strict I&O  -s/p Lasix 20mg IV x1

## 2023-07-28 NOTE — PROGRESS NOTE ADULT - PROBLEM SELECTOR PLAN 4
Hyponatremic to 125 in ED, no changes in mental status or neurological deficits. 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 Of note, had hyponatremia from previous admission this month, also thought to be in setting of SIADH. s/p NS 3.0% 30cc/hr x1 and fluid restriction, Na 131 as of 7/27.  -Nephro consulted appreciate recs  -monitor BMP  -c/w Fluid restrict 1.0L daily  -s/p 2L IVF Hyponatremic to 125 in ED, no changes in mental status or neurological deficits. 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 Of note, had hyponatremia from previous admission this month, also thought to be in setting of SIADH. s/p NS 3.0% 30cc/hr x1 and fluid restriction, Na 127 as of 7/28, likely in setting of diarrhea and chronic SIADH.  -Nephro consulted appreciate recs  -monitor BMP  -c/w Fluid restrict 1.0L daily  -s/p 2L IVF

## 2023-07-28 NOTE — PROGRESS NOTE ADULT - PROBLEM SELECTOR PLAN 3
Patient has worsening recurrent abdominal distension and BLE edema, likely in setting of metastatic disease. Low suspicion for infection at this time. Large volume ascites confirmed on CT A/P. Abd US 7/24 confirming moderate to severe ascites. ascites improved s/p PleurX placement 7/26  -monitor volume status  -strict I&O  -s/p Lasix 20mg IV x1

## 2023-07-28 NOTE — PROGRESS NOTE ADULT - PROBLEM SELECTOR PLAN 5
US duplex on 7/26 found incidental LLE DVTs x2 (Acute non-occlusive DVT noted within the distal left femoral and popliteal veins. Acute, occlusive DVT noted within the left posterior tibial and soleal veins). She has no left leg pain and on exam no calf tenderness, warmth, or erythema. s/p PleurX placement 7/26. On heparin gtt, in therapeutic range  -c/w Heparin gtt  -trend coags  -when dc can transition to eliquis US duplex on 7/26 found incidental LLE DVTs x2 (Acute non-occlusive DVT noted within the distal left femoral and popliteal veins. Acute, occlusive DVT noted within the left posterior tibial and soleal veins). She has no left leg pain and on exam no calf tenderness, warmth, or erythema. s/p PleurX placement 7/26. On heparin gtt, in therapeutic range  -c/w Heparin gtt  -trend coags  -when dc can transition to eliquis (4 days high dose then can switch to 5mg BID) US duplex on 7/26 found incidental LLE DVTs x2 (Acute non-occlusive DVT noted within the distal left femoral and popliteal veins. Acute, occlusive DVT noted within the left posterior tibial and soleal veins). She has no left leg pain and on exam no calf tenderness, warmth, or erythema. s/p PleurX placement 7/26. On heparin gtt, in therapeutic range  -c/w Heparin gtt  -trend coags  -when dc can transition to eliquis (total 7 days high dose then can switch to 5mg BID)

## 2023-07-28 NOTE — PROGRESS NOTE ADULT - PROBLEM SELECTOR PLAN 2
Pt had 6 episodes of non-bloody diarrhea prior to admission. No additional episodes of diarrhea since coming to the hospital. Her diarrhea is likely 2/2 chemotherapy and metastatic disease. Low suspicion for infectious etiology at this time given stable vital signs and labs (no leukocytosis or neutropenia). Very low suspicion for c.diff, will dc contact precautions. GI PCR negative. Cultures showing NGTD.  -f/u BCx Pt had 6 episodes of non-bloody diarrhea prior to admission. No additional episodes of diarrhea since coming to the hospital. Her diarrhea is likely 2/2 chemotherapy and metastatic disease. Low suspicion for infectious etiology at this time given stable vital signs and labs (no leukocytosis or neutropenia). Very low suspicion for c.diff, will dc contact precautions. GI PCR negative. Cultures showing NGTD. Had episode of diarrhea 7/28, likely from resolving constipation and increased bowel regimen  -f/u BCx Pt had 6 episodes of non-bloody diarrhea prior to admission. No additional episodes of diarrhea since coming to the hospital. Low suspicion for infectious etiology at this time given stable vital signs and labs (no leukocytosis or neutropenia). Very low suspicion for c.diff, will dc contact precautions. GI PCR negative. Cultures showing NGTD. Has had multiple episodes of diarrhea since this morning, occurs on standing. Most likely in setting of increased bowel regimen and constipation. Pt reports dizziness on standing, concerning for dehydration.  -C/w gentle fluid resuscitation for dehydration  -f/u BCx

## 2023-07-28 NOTE — PROGRESS NOTE ADULT - PROBLEM SELECTOR PLAN 1
Patient complains of painful sores throughout mouth and tongue that have been present for several weeks but have worsened within the past week. On exam has tender lesions to mucosa of inner cheeks, base of tongue, and lips. Low suspicion for thrush or other ongoing infection. Her sores are likely 2/2 chemotherapy.   -c/w magic mouthwash   -c/w Benzocaine spray  -c/w pain regimen per palliative: Methadone 12mg BID, Gabapentin 100mg TID, Morphine 4mg PO q4 for moderate/severe pain, and IV morphine 2mg q4 for breakthrough pain  -monitor pain Patient complains of painful sores throughout mouth and tongue that have been present for several weeks but have worsened within the past week. On exam has tender lesions to mucosa of inner cheeks, base of tongue, and lips. Low suspicion for thrush or other ongoing infection. Her sores are likely 2/2 chemotherapy.   -c/w magic mouthwash   -c/w Benzocaine spray  -c/w pain regimen per palliative: Methadone 2mg BID, Gabapentin 100mg TID, Morphine 4mg PO q4 for moderate/severe pain, and IV morphine 2mg q4 for breakthrough pain  -monitor pain

## 2023-07-28 NOTE — PHARMACOTHERAPY INTERVENTION NOTE - COMMENTS
Discharge medications reviewed with the patient's . Current medication schedule was discussed in detail including: medication name, indication, dose, administration times, treatment duration, side effects, and special instructions. Educated on apixaban including the purpose and administration. Discussed adverse effects in detail and when to seek medical attention (blood in stool, vomit, etc.). Patient normally takes Celebrex daily. Pt's  aware of increased risk of bleeding with Celebrex and apixaban, will closely monitor. Patient's  demonstrated understanding. Questions and concerns were answered and addressed. Patient provided with educational medication card.    New medications: apixaban    Pierre PetersonD, [facility]  Clinical Pharmacy Specialist  [phone]

## 2023-07-28 NOTE — PROGRESS NOTE ADULT - PROBLEM SELECTOR PLAN 6
hx metastatic pancreatic cancer with mets to liver and lung. s/p whipple, on chemo with last treatment 7/17. She has chronic diffuse body pain, including chest, abdomen, and extremities, in setting of metastatic disease and chemo. Pain managed by oxycodone and morphine at home. Patient and family no longer interested in DMT and would like to pursue hospice, will consult palliative and place hospice referral.  -palliative care consulted, appreciate recs  -plan for hospice on dc, determining home vs inpatient hospice  -c/w pain regimen per palliative: Methadone 2mg BID , Gabapentin 100mg TID, Morphine 4mg PO q4 for moderate/severe pain, and IV morphine 2mg q4 for breakthrough pain  -c/w Zofran 4mg q8 PRN nausea  -Resumed home Remeron 15mg daily for decreased PO intake

## 2023-07-28 NOTE — PROGRESS NOTE ADULT - PROBLEM SELECTOR PLAN 12
diet: soft and bite sized  dvt ppx: heparin gtt, dc on eliquis  code: DNR DNI  dispo: pending clinical improvement  bowel regimen Miralax BID and senna  HCP Namrata Blanton, daughter

## 2023-07-28 NOTE — PROGRESS NOTE ADULT - PROBLEM SELECTOR PLAN 7
had sudden onset of knee pain after getting out of bed on 7/25, now slightly improved. Has hx of osteoarthritis. Right knee XR unremarkable, no DVT of RLE on US. Knee pain likely 2/2 OA.  -c/w current pain regimen per palliative  -will give Toradol 15mg x1  -will resume Celebrex 200mg daily had sudden onset of knee pain after getting out of bed on 7/25, now improved. Has hx of osteoarthritis. Right knee XR unremarkable, no DVT of RLE on US. Knee pain likely 2/2 OA.  -c/w current pain regimen per palliative  -will give Toradol 15mg x1  -will resume Celebrex 200mg daily

## 2023-07-28 NOTE — PROGRESS NOTE ADULT - ATTENDING COMMENTS
78W PMH of metastatic pancreatic Ca with POD on multiple regiments who presents with mouth sores, loose stools with nausea and abdominal pain and worsening ascites, now with course complicated by DVT.     Today patient and family reporting having 4 BMs overnight of liquid stool after not having BM for 6 days.    # Metastatic pancreatic cancer   # Malignant ascites   # Peritoneal carcinomatosis   # Pain secondary to malignancy   - Family no longer interested in further chemo, now with plan to focus on symptoms and keeping comfortable  - s/p abdominal Pleurx on 7/26;   - per CM patient has been accepted by hospice    # Diarrhea- suspect secondary to liquid diet and aggressive bowel regimen; d/c bowel regimen today and monitor stool count.  # DVT with hypercoagulable state of malignancy - c/w heparin gtt, transition to Eliquis tonight ($0)  # Hyponatremia - likely SIADH in setting of malignancy; s/p hypertonic saline; can continue to monitor   # Oral ulcers - improving, now tolerating solid diet. secondary to chemo, c/w magic mouthwash, benzocaine spray.

## 2023-07-28 NOTE — PROGRESS NOTE ADULT - SUBJECTIVE AND OBJECTIVE BOX
Allen Coughlin MD  PGY 1 Department of Internal Medicine        Patient is a 78y old  Female who presents with a chief complaint of diarrhea (28 Jul 2023 07:46)      SUBJECTIVE / OVERNIGHT EVENTS: Pt seen and examined. No acute overnight events. Reports having episode of large volume diarrhea this morning (last BM ~6 days ago). Her mouth pain and abdominal pain is improved from yesterday. Denies fevers, chills, CP, SOB, N/V        MEDICATIONS  (STANDING):  amLODIPine   Tablet 10 milliGRAM(s) Oral daily  apixaban 10 milliGRAM(s) Oral every 12 hours  atorvastatin 40 milliGRAM(s) Oral at bedtime  celecoxib 200 milliGRAM(s) Oral daily  chlorhexidine 2% Cloths 1 Application(s) Topical daily  dextrose 5%. 1000 milliLiter(s) (100 mL/Hr) IV Continuous <Continuous>  dextrose 5%. 1000 milliLiter(s) (50 mL/Hr) IV Continuous <Continuous>  dextrose 50% Injectable 25 Gram(s) IV Push once  dextrose 50% Injectable 25 Gram(s) IV Push once  dextrose 50% Injectable 12.5 Gram(s) IV Push once  fenofibrate Tablet 145 milliGRAM(s) Oral daily  FIRST- Mouthwash  BLM 10 milliLiter(s) Swish and Spit four times a day  gabapentin 100 milliGRAM(s) Oral three times a day  glucagon  Injectable 1 milliGRAM(s) IntraMuscular once  insulin lispro (ADMELOG) corrective regimen sliding scale   SubCutaneous three times a day before meals  losartan 100 milliGRAM(s) Oral daily  methadone   Solution 2 milliGRAM(s) Oral two times a day  metoprolol succinate ER 25 milliGRAM(s) Oral daily  mirtazapine 15 milliGRAM(s) Oral at bedtime  pancrelipase  (CREON 36,000 Lipase Units) 1 Capsule(s) Oral three times a day with meals  pantoprazole    Tablet 40 milliGRAM(s) Oral before breakfast  polyethylene glycol 3350 17 Gram(s) Oral two times a day  senna 2 Tablet(s) Oral at bedtime    MEDICATIONS  (PRN):  acetaminophen     Tablet .. 650 milliGRAM(s) Oral every 6 hours PRN Temp greater or equal to 38C (100.4F), Mild Pain (1 - 3)  benzocaine 20% Spray 1 Spray(s) Topical four times a day PRN oral pain  dextrose Oral Gel 15 Gram(s) Oral once PRN Blood Glucose LESS THAN 70 milliGRAM(s)/deciliter  melatonin 3 milliGRAM(s) Oral at bedtime PRN Insomnia  morphine   Solution 4 milliGRAM(s) Oral every 4 hours PRN Moderate pain 4-6, severe pain 7-10  morphine  - Injectable 2 milliGRAM(s) IV Push every 4 hours PRN breakthrough pain  ondansetron    Tablet 4 milliGRAM(s) Oral every 8 hours PRN Nausea and/or Vomiting  zolpidem 5 milliGRAM(s) Oral at bedtime PRN Insomnia      I&O's Summary    27 Jul 2023 07:01  -  28 Jul 2023 07:00  --------------------------------------------------------  IN: 299 mL / OUT: 600 mL / NET: -301 mL        Vital Signs Last 24 Hrs  T(C): 36.8 (28 Jul 2023 04:00), Max: 36.9 (27 Jul 2023 10:36)  T(F): 98.2 (28 Jul 2023 04:00), Max: 98.5 (27 Jul 2023 10:36)  HR: 98 (28 Jul 2023 04:00) (95 - 104)  BP: 110/63 (28 Jul 2023 04:00) (106/63 - 113/62)  BP(mean): --  RR: 18 (28 Jul 2023 04:00) (16 - 18)  SpO2: 100% (28 Jul 2023 04:00) (95% - 100%)    Parameters below as of 28 Jul 2023 04:00  Patient On (Oxygen Delivery Method): room air        CAPILLARY BLOOD GLUCOSE      POCT Blood Glucose.: 181 mg/dL (28 Jul 2023 08:08)  POCT Blood Glucose.: 163 mg/dL (27 Jul 2023 17:00)  POCT Blood Glucose.: 135 mg/dL (27 Jul 2023 12:15)      PHYSICAL EXAM:  HEAD:  Atraumatic, Normocephalic  Oral mucosa with lesions of inner cheeks bilaterally, Lesions also present on lips and base of tongue. No active bleeding   EYES: EOMI, PERRL, conjunctiva and sclera clear  NECK: No JVD  CHEST/LUNG: Clear to auscultation bilaterally; No wheeze  HEART: Tachycardic and regular rhythm; No murmurs, rubs, or gallops  ABDOMEN: Mild distension and mild diffuse tenderness. RUQ is firm with small palpable mass. PleurX in place, dressing is C/D/I  EXTREMITIES:  2+ Peripheral Pulses, No clubbing, cyanosis. +3 edema to bilateral shins. Knees non-tender bilaterally, no warmth erythema or effusion of either knee. Minimally decreased ROM w/ R knee flexion 2/2 pain.  PSYCH: AAOx3  NEUROLOGY: non-focal  SKIN: No rashes or lesions          LABS:                        8.3    11.19 )-----------( 402      ( 28 Jul 2023 07:53 )             26.4     Auto Eosinophil # x     / Auto Eosinophil % x     / Auto Neutrophil # x     / Auto Neutrophil % x     / BANDS % x                            8.3    9.87  )-----------( 370      ( 27 Jul 2023 06:40 )             26.3     Auto Eosinophil # x     / Auto Eosinophil % x     / Auto Neutrophil # x     / Auto Neutrophil % x     / BANDS % x                            8.0    9.07  )-----------( 331      ( 27 Jul 2023 00:15 )             24.6     Auto Eosinophil # x     / Auto Eosinophil % x     / Auto Neutrophil # x     / Auto Neutrophil % x     / BANDS % x        07-28    127<L>  |  95<L>  |  26<H>  ----------------------------<  182<H>  4.2   |  19<L>  |  0.91  07-27    131<L>  |  98  |  19  ----------------------------<  186<H>  4.2   |  18<L>  |  0.56    Ca    7.9<L>      28 Jul 2023 07:53  Mg     2.00     07-28  Phos  2.6     07-28    PT/INR - ( 28 Jul 2023 07:53 )   PT: 14.4 sec;   INR: 1.28 ratio         PTT - ( 28 Jul 2023 07:53 )  PTT:87.8 sec      Urinalysis Basic - ( 28 Jul 2023 07:53 )    Color: x / Appearance: x / SG: x / pH: x  Gluc: 182 mg/dL / Ketone: x  / Bili: x / Urobili: x   Blood: x / Protein: x / Nitrite: x   Leuk Esterase: x / RBC: x / WBC x   Sq Epi: x / Non Sq Epi: x / Bacteria: x            RADIOLOGY & ADDITIONAL TESTS:    Imaging Personally Reviewed:    Consultant(s) Notes Reviewed:      Care Discussed with Consultants/Other Providers:

## 2023-07-29 NOTE — PROGRESS NOTE ADULT - PROBLEM SELECTOR PLAN 1
Patient complains of painful sores throughout mouth and tongue that have been present for several weeks but have worsened within the past week. On exam has tender lesions to mucosa of inner cheeks, base of tongue, and lips. Low suspicion for thrush or other ongoing infection. Her sores are likely 2/2 chemotherapy.   -c/w magic mouthwash   -c/w Benzocaine spray  -c/w pain regimen per palliative: Methadone 2mg BID, Gabapentin 100mg TID, Morphine 4mg PO q4 for moderate/severe pain, and IV morphine 2mg q4 for breakthrough pain  -monitor pain Patient complains of painful sores throughout mouth and tongue that have been present for several weeks but have worsened within the past week. On exam has tender lesions to mucosa of inner cheeks, base of tongue, and lips. Low suspicion for thrush or other ongoing infection. Her sores are likely 2/2 chemotherapy.   -d/c magic mouthwash and start 2% viscous lidocaine per palliative recs  -c/w Benzocaine spray  -c/w pain regimen per palliative: Methadone 2mg BID, Gabapentin 100mg TID, Morphine 4mg PO q4 for moderate/severe pain, and IV morphine 2mg q4 for breakthrough pain  -monitor pain

## 2023-07-29 NOTE — PROGRESS NOTE ADULT - ATTENDING COMMENTS
78F with met pancreatic ca presented with mouth sores from chemo. now pending home hospice. patient is reporting increase mouth pain cannot tolerate PO. had diarrhea yeasterday, improved with holding of her bowel regimen. worsening hyponatremia this monring, secondary to SIADH+solute loss and poor PO. need to discuss hypertonic saline with nephrology. ascites, pleurX in place. ctm clinically.

## 2023-07-29 NOTE — PROGRESS NOTE ADULT - PROBLEM SELECTOR PLAN 2
Pt had 6 episodes of non-bloody diarrhea prior to admission. No additional episodes of diarrhea since coming to the hospital. Low suspicion for infectious etiology at this time given stable vital signs and labs (no leukocytosis or neutropenia). Very low suspicion for c.diff, will dc contact precautions. GI PCR negative. Cultures showing NGTD. Has had multiple episodes of diarrhea since this morning, occurs on standing. Most likely in setting of increased bowel regimen and constipation. Pt reports dizziness on standing, concerning for dehydration.  -C/w gentle fluid resuscitation for dehydration  -f/u BCx Pt had 6 episodes of non-bloody diarrhea prior to admission. No additional episodes of diarrhea since coming to the hospital. Low suspicion for infectious etiology at this time given stable vital signs and labs (no leukocytosis or neutropenia). Very low suspicion for c.diff, will dc contact precautions. GI PCR negative. Cultures showing NGTD. Had several episodes of diarrhea 7/28, resolving 7/29, likely secondary to increasing bowel regimen  -Monitor

## 2023-07-29 NOTE — PROGRESS NOTE ADULT - PROBLEM SELECTOR PLAN 7
had sudden onset of knee pain after getting out of bed on 7/25, now improved. Has hx of osteoarthritis. Right knee XR unremarkable, no DVT of RLE on US. Knee pain likely 2/2 OA.  -c/w current pain regimen per palliative  -will give Toradol 15mg x1  -will resume Celebrex 200mg daily Reports having hematuria 7/29, given uptrending leukocytosis will evaluate for UTI. Could also be 2/2 malignancy vs chemotherapy  -f/u UA

## 2023-07-29 NOTE — PROGRESS NOTE ADULT - SUBJECTIVE AND OBJECTIVE BOX
Allen Coughlin MD  PGY 1 Department of Internal Medicine        Patient is a 78y old  Female who presents with a chief complaint of diarrhea (29 Jul 2023 07:00)      SUBJECTIVE / OVERNIGHT EVENTS: Pt seen and examined. No acute overnight events. Her diarrhea has nearly resolved. She has increased mouth pain and abdominal pain. Denies fevers, chills, CP, SOB, N/V, Constipation        MEDICATIONS  (STANDING):  amLODIPine   Tablet 10 milliGRAM(s) Oral daily  apixaban 10 milliGRAM(s) Oral every 12 hours  atorvastatin 40 milliGRAM(s) Oral at bedtime  celecoxib 200 milliGRAM(s) Oral daily  chlorhexidine 2% Cloths 1 Application(s) Topical daily  dextrose 5%. 1000 milliLiter(s) (50 mL/Hr) IV Continuous <Continuous>  dextrose 5%. 1000 milliLiter(s) (100 mL/Hr) IV Continuous <Continuous>  dextrose 50% Injectable 12.5 Gram(s) IV Push once  dextrose 50% Injectable 25 Gram(s) IV Push once  dextrose 50% Injectable 25 Gram(s) IV Push once  fenofibrate Tablet 145 milliGRAM(s) Oral daily  gabapentin 100 milliGRAM(s) Oral three times a day  glucagon  Injectable 1 milliGRAM(s) IntraMuscular once  insulin lispro (ADMELOG) corrective regimen sliding scale   SubCutaneous three times a day before meals  lactated ringers. 1000 milliLiter(s) (100 mL/Hr) IV Continuous <Continuous>  lidocaine 2% Viscous 10 milliLiter(s) Swish and Spit four times a day  losartan 100 milliGRAM(s) Oral daily  methadone   Solution 2 milliGRAM(s) Oral two times a day  metoprolol succinate ER 25 milliGRAM(s) Oral daily  mirtazapine 15 milliGRAM(s) Oral at bedtime  pancrelipase  (CREON 36,000 Lipase Units) 1 Capsule(s) Oral three times a day with meals  pantoprazole    Tablet 40 milliGRAM(s) Oral before breakfast    MEDICATIONS  (PRN):  acetaminophen     Tablet .. 650 milliGRAM(s) Oral every 6 hours PRN Temp greater or equal to 38C (100.4F), Mild Pain (1 - 3)  benzocaine 20% Spray 1 Spray(s) Topical four times a day PRN oral pain  dextrose Oral Gel 15 Gram(s) Oral once PRN Blood Glucose LESS THAN 70 milliGRAM(s)/deciliter  melatonin 3 milliGRAM(s) Oral at bedtime PRN Insomnia  morphine   Solution 4 milliGRAM(s) Oral every 4 hours PRN Moderate pain 4-6, severe pain 7-10  morphine  - Injectable 2 milliGRAM(s) IV Push every 4 hours PRN breakthrough pain  ondansetron    Tablet 4 milliGRAM(s) Oral every 8 hours PRN Nausea and/or Vomiting  zolpidem 5 milliGRAM(s) Oral at bedtime PRN Insomnia      I&O's Summary    28 Jul 2023 07:01  -  29 Jul 2023 07:00  --------------------------------------------------------  IN: 1450 mL / OUT: 1950 mL / NET: -500 mL        Vital Signs Last 24 Hrs  T(C): 36.8 (29 Jul 2023 04:00), Max: 36.8 (28 Jul 2023 18:00)  T(F): 98.3 (29 Jul 2023 04:00), Max: 98.3 (29 Jul 2023 04:00)  HR: 91 (29 Jul 2023 04:00) (91 - 98)  BP: 109/62 (29 Jul 2023 04:00) (102/63 - 109/62)  BP(mean): --  RR: 18 (29 Jul 2023 04:00) (18 - 18)  SpO2: 100% (29 Jul 2023 04:00) (98% - 100%)    Parameters below as of 29 Jul 2023 04:00    O2 Flow (L/min): 2      CAPILLARY BLOOD GLUCOSE      POCT Blood Glucose.: 189 mg/dL (29 Jul 2023 09:21)  POCT Blood Glucose.: 185 mg/dL (28 Jul 2023 21:51)  POCT Blood Glucose.: 162 mg/dL (28 Jul 2023 17:33)  POCT Blood Glucose.: 184 mg/dL (28 Jul 2023 12:04)      PHYSICAL EXAM:  HEAD:  Atraumatic, Normocephalic  Oral mucosa with lesions of inner cheeks bilaterally, Lesions also present on lips and base of tongue. No active bleeding. Overall improved  EYES: EOMI, PERRL, conjunctiva and sclera clear  NECK: No JVD  CHEST/LUNG: Clear to auscultation bilaterally; No wheeze  HEART: Tachycardic and regular rhythm; No murmurs, rubs, or gallops  ABDOMEN: Mild distension and mild diffuse tenderness. RUQ is firm with small palpable mass. PleurX in place, dressing is C/D/I  EXTREMITIES:  2+ Peripheral Pulses, No clubbing, cyanosis. 2+ edema to bilateral shins. Knees non-tender bilaterally, no warmth erythema or effusion of either knee. Minimally decreased ROM w/ R knee flexion 2/2 pain.  PSYCH: AAOx3  NEUROLOGY: non-focal  SKIN: No rashes or lesions       LABS:                        8.9    13.33 )-----------( 394      ( 29 Jul 2023 07:18 )             28.1     Auto Eosinophil # x     / Auto Eosinophil % x     / Auto Neutrophil # x     / Auto Neutrophil % x     / BANDS % x                            8.3    11.19 )-----------( 402      ( 28 Jul 2023 07:53 )             26.4     Auto Eosinophil # x     / Auto Eosinophil % x     / Auto Neutrophil # x     / Auto Neutrophil % x     / BANDS % x        07-29    125<L>  |  95<L>  |  26<H>  ----------------------------<  179<H>  4.5   |  20<L>  |  0.70  07-28    127<L>  |  95<L>  |  26<H>  ----------------------------<  182<H>  4.2   |  19<L>  |  0.91    Ca    8.0<L>      29 Jul 2023 07:18  Mg     2.00     07-29  Phos  2.1     07-29    PT/INR - ( 28 Jul 2023 07:53 )   PT: 14.4 sec;   INR: 1.28 ratio         PTT - ( 28 Jul 2023 07:53 )  PTT:87.8 sec      Urinalysis Basic - ( 29 Jul 2023 07:18 )    Color: x / Appearance: x / SG: x / pH: x  Gluc: 179 mg/dL / Ketone: x  / Bili: x / Urobili: x   Blood: x / Protein: x / Nitrite: x   Leuk Esterase: x / RBC: x / WBC x   Sq Epi: x / Non Sq Epi: x / Bacteria: x            RADIOLOGY & ADDITIONAL TESTS:    Imaging Personally Reviewed:    Consultant(s) Notes Reviewed:      Care Discussed with Consultants/Other Providers:   Allen Coughlin MD  PGY 1 Department of Internal Medicine        Patient is a 78y old  Female who presents with a chief complaint of diarrhea (29 Jul 2023 07:00)      SUBJECTIVE / OVERNIGHT EVENTS: Pt seen and examined. No acute overnight events. Her diarrhea has nearly resolved. She has increased mouth pain and abdominal pain. Reports having some hematuria this morning. Denies fevers, chills, CP, SOB, N/V, Constipation        MEDICATIONS  (STANDING):  amLODIPine   Tablet 10 milliGRAM(s) Oral daily  apixaban 10 milliGRAM(s) Oral every 12 hours  atorvastatin 40 milliGRAM(s) Oral at bedtime  celecoxib 200 milliGRAM(s) Oral daily  chlorhexidine 2% Cloths 1 Application(s) Topical daily  dextrose 5%. 1000 milliLiter(s) (50 mL/Hr) IV Continuous <Continuous>  dextrose 5%. 1000 milliLiter(s) (100 mL/Hr) IV Continuous <Continuous>  dextrose 50% Injectable 12.5 Gram(s) IV Push once  dextrose 50% Injectable 25 Gram(s) IV Push once  dextrose 50% Injectable 25 Gram(s) IV Push once  fenofibrate Tablet 145 milliGRAM(s) Oral daily  gabapentin 100 milliGRAM(s) Oral three times a day  glucagon  Injectable 1 milliGRAM(s) IntraMuscular once  insulin lispro (ADMELOG) corrective regimen sliding scale   SubCutaneous three times a day before meals  lactated ringers. 1000 milliLiter(s) (100 mL/Hr) IV Continuous <Continuous>  lidocaine 2% Viscous 10 milliLiter(s) Swish and Spit four times a day  losartan 100 milliGRAM(s) Oral daily  methadone   Solution 2 milliGRAM(s) Oral two times a day  metoprolol succinate ER 25 milliGRAM(s) Oral daily  mirtazapine 15 milliGRAM(s) Oral at bedtime  pancrelipase  (CREON 36,000 Lipase Units) 1 Capsule(s) Oral three times a day with meals  pantoprazole    Tablet 40 milliGRAM(s) Oral before breakfast    MEDICATIONS  (PRN):  acetaminophen     Tablet .. 650 milliGRAM(s) Oral every 6 hours PRN Temp greater or equal to 38C (100.4F), Mild Pain (1 - 3)  benzocaine 20% Spray 1 Spray(s) Topical four times a day PRN oral pain  dextrose Oral Gel 15 Gram(s) Oral once PRN Blood Glucose LESS THAN 70 milliGRAM(s)/deciliter  melatonin 3 milliGRAM(s) Oral at bedtime PRN Insomnia  morphine   Solution 4 milliGRAM(s) Oral every 4 hours PRN Moderate pain 4-6, severe pain 7-10  morphine  - Injectable 2 milliGRAM(s) IV Push every 4 hours PRN breakthrough pain  ondansetron    Tablet 4 milliGRAM(s) Oral every 8 hours PRN Nausea and/or Vomiting  zolpidem 5 milliGRAM(s) Oral at bedtime PRN Insomnia      I&O's Summary    28 Jul 2023 07:01  -  29 Jul 2023 07:00  --------------------------------------------------------  IN: 1450 mL / OUT: 1950 mL / NET: -500 mL        Vital Signs Last 24 Hrs  T(C): 36.8 (29 Jul 2023 04:00), Max: 36.8 (28 Jul 2023 18:00)  T(F): 98.3 (29 Jul 2023 04:00), Max: 98.3 (29 Jul 2023 04:00)  HR: 91 (29 Jul 2023 04:00) (91 - 98)  BP: 109/62 (29 Jul 2023 04:00) (102/63 - 109/62)  BP(mean): --  RR: 18 (29 Jul 2023 04:00) (18 - 18)  SpO2: 100% (29 Jul 2023 04:00) (98% - 100%)    Parameters below as of 29 Jul 2023 04:00    O2 Flow (L/min): 2      CAPILLARY BLOOD GLUCOSE      POCT Blood Glucose.: 189 mg/dL (29 Jul 2023 09:21)  POCT Blood Glucose.: 185 mg/dL (28 Jul 2023 21:51)  POCT Blood Glucose.: 162 mg/dL (28 Jul 2023 17:33)  POCT Blood Glucose.: 184 mg/dL (28 Jul 2023 12:04)      PHYSICAL EXAM:  HEAD:  Atraumatic, Normocephalic  Oral mucosa with lesions of inner cheeks bilaterally, Lesions also present on lips and base of tongue. No active bleeding. Overall improved  EYES: EOMI, PERRL, conjunctiva and sclera clear  NECK: No JVD  CHEST/LUNG: Clear to auscultation bilaterally; No wheeze  HEART: Tachycardic and regular rhythm; No murmurs, rubs, or gallops  ABDOMEN: Mild distension and mild diffuse tenderness. RUQ is firm with small palpable mass. PleurX in place, dressing is C/D/I  EXTREMITIES:  2+ Peripheral Pulses, No clubbing, cyanosis. 2+ edema to bilateral shins. Knees non-tender bilaterally, no warmth erythema or effusion of either knee. Minimally decreased ROM w/ R knee flexion 2/2 pain.  PSYCH: AAOx3  NEUROLOGY: non-focal  SKIN: No rashes or lesions       LABS:                        8.9    13.33 )-----------( 394      ( 29 Jul 2023 07:18 )             28.1     Auto Eosinophil # x     / Auto Eosinophil % x     / Auto Neutrophil # x     / Auto Neutrophil % x     / BANDS % x                            8.3    11.19 )-----------( 402      ( 28 Jul 2023 07:53 )             26.4     Auto Eosinophil # x     / Auto Eosinophil % x     / Auto Neutrophil # x     / Auto Neutrophil % x     / BANDS % x        07-29    125<L>  |  95<L>  |  26<H>  ----------------------------<  179<H>  4.5   |  20<L>  |  0.70  07-28    127<L>  |  95<L>  |  26<H>  ----------------------------<  182<H>  4.2   |  19<L>  |  0.91    Ca    8.0<L>      29 Jul 2023 07:18  Mg     2.00     07-29  Phos  2.1     07-29    PT/INR - ( 28 Jul 2023 07:53 )   PT: 14.4 sec;   INR: 1.28 ratio         PTT - ( 28 Jul 2023 07:53 )  PTT:87.8 sec      Urinalysis Basic - ( 29 Jul 2023 07:18 )    Color: x / Appearance: x / SG: x / pH: x  Gluc: 179 mg/dL / Ketone: x  / Bili: x / Urobili: x   Blood: x / Protein: x / Nitrite: x   Leuk Esterase: x / RBC: x / WBC x   Sq Epi: x / Non Sq Epi: x / Bacteria: x            RADIOLOGY & ADDITIONAL TESTS:    Imaging Personally Reviewed:    Consultant(s) Notes Reviewed:      Care Discussed with Consultants/Other Providers:

## 2023-07-29 NOTE — PROGRESS NOTE ADULT - PROBLEM SELECTOR PLAN 8
-c/w Amlodipine 10mg daily had sudden onset of knee pain after getting out of bed on 7/25, now improved. Has hx of osteoarthritis. Right knee XR unremarkable, no DVT of RLE on US. Knee pain likely 2/2 OA.  -c/w current pain regimen per palliative  -will give Toradol 15mg x1  -will resume Celebrex 200mg daily

## 2023-07-29 NOTE — PROGRESS NOTE ADULT - PROBLEM SELECTOR PLAN 4
Hyponatremic to 125 in ED, no changes in mental status or neurological deficits. 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 Of note, had hyponatremia from previous admission this month, also thought to be in setting of SIADH. s/p NS 3.0% 30cc/hr x1 and fluid restriction, Na 127 as of 7/28, likely in setting of diarrhea and chronic SIADH.  -Nephro consulted appreciate recs  -monitor BMP  -c/w Fluid restrict 1.0L daily  -s/p 2L IVF Hyponatremic to 125 in ED, no changes in mental status or neurological deficits. 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 Of note, had hyponatremia from previous admission this month, also thought to be in setting of SIADH. s/p NS 3.0% 30cc/hr x1 and fluid restriction, Na 127 > 125 as of 7/289, likely in setting of diarrhea and chronic SIADH.  -Nephro consulted appreciate recs, may need additional hypertonic saline  -monitor BMP  -c/w Fluid restrict 1.0L daily  -s/p 2L IVF Hyponatremic to 125 in ED, no changes in mental status or neurological deficits. 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 Of note, had hyponatremia from previous admission this month, also thought to be in setting of SIADH. s/p NS 3.0% 30cc/hr x1 and fluid restriction, did not tolerate salt tablet 2/2 mouth lesions. 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  -Nephro consulted appreciate recs, may need additional hypertonic saline  -monitor BMP  -c/w Fluid restrict 1.0L daily  -s/p 2L IVF Hyponatremic to 125 in ED, no changes in mental status or neurological deficits. 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 Of note, had hyponatremia from previous admission this month, also thought to be in setting of SIADH. s/p NS 3.0% 30cc/hr x1 and fluid restriction,. 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  -Nephro consulted appreciate recs, may need additional hypertonic saline  -monitor BMP  -c/w Fluid restrict 1.0L daily  -s/p 2L IVF

## 2023-07-29 NOTE — PROGRESS NOTE ADULT - PROBLEM SELECTOR PLAN 1
Pt. with hyponatremia in the setting of diarrhea, decreased PO intake, abd pain, and metastatic pancreatic cancer. Prior to this admission, last SNa was low at 129 on 7/19/23. On admission, SNa was low at 125 (7/23/23). Pt. received 1L NS and 1L LR and SNa decreased to 123 (7/25/23). Serum Osm low at 259. Uosm 469, Herlinda 60. Pt. received 3% hypertonic saline on 7/25. SNa initially improved to 131 on 7/27. However, down-trended to 125 today. 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 Start sodium chloride tabs 1 gm tid, can up-titrate based on response. Recommend fluid restriction to 1L. Please change IV formulations of medications to exclude D5W. Recheck serum osm, urine osm, urine sodium, and serum uric acid levels. Monitor SNa.    If you have any questions, please feel free to contact me  Christopher Hodge  Nephrology Fellow  [phone] / [employer] Teams(Preferred)  (After 5pm or on weekends please page the on-call fellow)

## 2023-07-29 NOTE — PROGRESS NOTE ADULT - PROBLEM SELECTOR PLAN 5
US duplex on 7/26 found incidental LLE DVTs x2 (Acute non-occlusive DVT noted within the distal left femoral and popliteal veins. Acute, occlusive DVT noted within the left posterior tibial and soleal veins). She has no left leg pain and on exam no calf tenderness, warmth, or erythema. s/p PleurX placement 7/26. On heparin gtt, in therapeutic range  -c/w Heparin gtt  -trend coags  -when dc can transition to eliquis (total 7 days high dose then can switch to 5mg BID)

## 2023-07-29 NOTE — PROGRESS NOTE ADULT - ATTENDING COMMENTS
1. Hyponatremia - most recent reading 125.  See above discussion.  Fluid restrict, NaCl tabs (see above), serial labs.  Further recommendations per course.

## 2023-07-29 NOTE — PROGRESS NOTE ADULT - SUBJECTIVE AND OBJECTIVE BOX
[facility] Division of Kidney Diseases & Hypertension  FOLLOW UP NOTE  [phone]--------------------------------------------------------------------------------    HPI: 78F w/ PMH of pancreatic cancer s/p chemo (now on hospice), HTN, HLD, DM2, CAD s/p PCI who presented to the ED complaining of diarrhea for 2-3 days prior to admission. Pt. being seen for hyponatremia.     24 hour events/subjective: Pt. was seen and evaluated with family present at the bedside this morning. Pt. reports feeling well, offers no complaints. Denies fevers/chill, headaches, chest pain, SOB.    PAST HISTORY  --------------------------------------------------------------------------------  No significant changes to PMH, PSH, FHx, SHx, unless otherwise noted    ALLERGIES & MEDICATIONS  --------------------------------------------------------------------------------  Allergies    No Known Allergies    Intolerances      Standing Inpatient Medications  amLODIPine   Tablet 10 milliGRAM(s) Oral daily  apixaban 10 milliGRAM(s) Oral every 12 hours  atorvastatin 40 milliGRAM(s) Oral at bedtime  celecoxib 200 milliGRAM(s) Oral daily  chlorhexidine 2% Cloths 1 Application(s) Topical daily  dextrose 5%. 1000 milliLiter(s) IV Continuous <Continuous>  dextrose 5%. 1000 milliLiter(s) IV Continuous <Continuous>  dextrose 50% Injectable 25 Gram(s) IV Push once  dextrose 50% Injectable 12.5 Gram(s) IV Push once  dextrose 50% Injectable 25 Gram(s) IV Push once  fenofibrate Tablet 145 milliGRAM(s) Oral daily  gabapentin 100 milliGRAM(s) Oral three times a day  glucagon  Injectable 1 milliGRAM(s) IntraMuscular once  insulin lispro (ADMELOG) corrective regimen sliding scale   SubCutaneous three times a day before meals  lactated ringers. 1000 milliLiter(s) IV Continuous <Continuous>  lidocaine 2% Viscous 10 milliLiter(s) Swish and Spit four times a day  losartan 100 milliGRAM(s) Oral daily  methadone   Solution 2 milliGRAM(s) Oral two times a day  metoprolol succinate ER 25 milliGRAM(s) Oral daily  mirtazapine 15 milliGRAM(s) Oral at bedtime  pancrelipase  (CREON 36,000 Lipase Units) 1 Capsule(s) Oral three times a day with meals  pantoprazole    Tablet 40 milliGRAM(s) Oral before breakfast    PRN Inpatient Medications  acetaminophen     Tablet .. 650 milliGRAM(s) Oral every 6 hours PRN  benzocaine 20% Spray 1 Spray(s) Topical four times a day PRN  dextrose Oral Gel 15 Gram(s) Oral once PRN  melatonin 3 milliGRAM(s) Oral at bedtime PRN  morphine   Solution 4 milliGRAM(s) Oral every 4 hours PRN  morphine  - Injectable 2 milliGRAM(s) IV Push every 4 hours PRN  ondansetron    Tablet 4 milliGRAM(s) Oral every 8 hours PRN  zolpidem 5 milliGRAM(s) Oral at bedtime PRN      REVIEW OF SYSTEMS  --------------------------------------------------------------------------------  See HPI    VITALS/PHYSICAL EXAM  --------------------------------------------------------------------------------  T(C): 36.8 (07-29-23 @ 04:00), Max: 36.8 (07-28-23 @ 18:00)  HR: 91 (07-29-23 @ 04:00) (91 - 98)  BP: 109/62 (07-29-23 @ 04:00) (102/63 - 109/62)  RR: 18 (07-29-23 @ 04:00) (18 - 18)  SpO2: 100% (07-29-23 @ 04:00) (98% - 100%)  Wt(kg): --    07-28-23 @ 07:01  -  07-29-23 @ 07:00  --------------------------------------------------------  IN: 1450 mL / OUT: 1950 mL / NET: -500 mL      Physical Exam:  Gen: NAD  HEENT: MMM  Pulm: CTA B/L  CV: S1S2, RRR  Abd: Soft, +BS, tenderness to palpation, peritoneal PleurX drain  Ext: B/L LE edema  Neuro: Awake  Skin: Warm and dry    LABS/STUDIES  --------------------------------------------------------------------------------              8.9    13.33 >-----------<  394      [07-29-23 @ 07:18]              28.1     125  |  95  |  26  ----------------------------<  179      [07-29-23 @ 07:18]  4.5   |  20  |  0.70        Ca     8.0     [07-29-23 @ 07:18]      Mg     2.00     [07-29-23 @ 07:18]      Phos  2.1     [07-29-23 @ 07:18]      PT/INR: PT 14.4 , INR 1.28       [07-28-23 @ 07:53]  PTT: 87.8       [07-28-23 @ 07:53]      Creatinine Trend:  SCr 0.70 [07-29 @ 07:18]  SCr 0.91 [07-28 @ 07:53]  SCr 0.56 [07-27 @ 06:40]  SCr 0.72 [07-26 @ 06:15]  SCr 0.66 [07-25 @ 15:46]    Urine Creatinine 53      [07-23-23 @ 19:17]  Urine Sodium 60      [07-23-23 @ 19:17]  Urine Potassium 39.2      [07-23-23 @ 19:17]  Urine Chloride 58      [07-23-23 @ 19:17]  Urine Osmolality 469      [07-23-23 @ 19:17]

## 2023-07-30 NOTE — PROGRESS NOTE ADULT - PROBLEM SELECTOR PLAN 8
had sudden onset of knee pain after getting out of bed on 7/25, now improved. Has hx of osteoarthritis. Right knee XR unremarkable, no DVT of RLE on US. Knee pain likely 2/2 OA.  -c/w current pain regimen per palliative  -will give Toradol 15mg x1  -will resume Celebrex 200mg daily

## 2023-07-30 NOTE — PROGRESS NOTE ADULT - ASSESSMENT
78F with hx of metastatic pancreatic CA s/p whipple on Chemo (last session 7/17) presents with NVD and abdominal pain likely in setting of worsening metastatic disease.   78F with hx of metastatic pancreatic CA s/p whipple on Chemo (last session 7/17) presents with NVD and abdominal pain likely in setting of worsening metastatic disease.

## 2023-07-30 NOTE — PROGRESS NOTE ADULT - SUBJECTIVE AND OBJECTIVE BOX
Patient is a 78y old  Female who presents with a chief complaint of diarrhea (29 Jul 2023 16:16)      SUBJECTIVE / OVERNIGHT EVENTS:    MEDICATIONS  (STANDING):  amLODIPine   Tablet 10 milliGRAM(s) Oral daily  apixaban 10 milliGRAM(s) Oral every 12 hours  atorvastatin 40 milliGRAM(s) Oral at bedtime  celecoxib 200 milliGRAM(s) Oral daily  chlorhexidine 2% Cloths 1 Application(s) Topical daily  dextrose 5%. 1000 milliLiter(s) (50 mL/Hr) IV Continuous <Continuous>  dextrose 5%. 1000 milliLiter(s) (100 mL/Hr) IV Continuous <Continuous>  dextrose 50% Injectable 25 Gram(s) IV Push once  dextrose 50% Injectable 25 Gram(s) IV Push once  dextrose 50% Injectable 12.5 Gram(s) IV Push once  fenofibrate Tablet 145 milliGRAM(s) Oral daily  gabapentin 100 milliGRAM(s) Oral three times a day  glucagon  Injectable 1 milliGRAM(s) IntraMuscular once  insulin lispro (ADMELOG) corrective regimen sliding scale   SubCutaneous three times a day before meals  lidocaine 2% Viscous 10 milliLiter(s) Swish and Spit four times a day  losartan 100 milliGRAM(s) Oral daily  methadone   Solution 2 milliGRAM(s) Oral two times a day  metoprolol succinate ER 25 milliGRAM(s) Oral daily  mirtazapine 15 milliGRAM(s) Oral at bedtime  pancrelipase  (CREON 36,000 Lipase Units) 1 Capsule(s) Oral three times a day with meals  pantoprazole    Tablet 40 milliGRAM(s) Oral before breakfast  sodium chloride 1 Gram(s) Oral three times a day    MEDICATIONS  (PRN):  acetaminophen     Tablet .. 650 milliGRAM(s) Oral every 6 hours PRN Temp greater or equal to 38C (100.4F), Mild Pain (1 - 3)  benzocaine 20% Spray 1 Spray(s) Topical four times a day PRN oral pain  dextrose Oral Gel 15 Gram(s) Oral once PRN Blood Glucose LESS THAN 70 milliGRAM(s)/deciliter  melatonin 3 milliGRAM(s) Oral at bedtime PRN Insomnia  morphine   Solution 4 milliGRAM(s) Oral every 4 hours PRN Moderate pain 4-6, severe pain 7-10  morphine  - Injectable 2 milliGRAM(s) IV Push every 4 hours PRN breakthrough pain  ondansetron    Tablet 4 milliGRAM(s) Oral every 8 hours PRN Nausea and/or Vomiting  zolpidem 5 milliGRAM(s) Oral at bedtime PRN Insomnia      CAPILLARY BLOOD GLUCOSE      POCT Blood Glucose.: 133 mg/dL (29 Jul 2023 21:31)  POCT Blood Glucose.: 180 mg/dL (29 Jul 2023 17:20)  POCT Blood Glucose.: 167 mg/dL (29 Jul 2023 12:28)  POCT Blood Glucose.: 189 mg/dL (29 Jul 2023 09:21)    I&O's Summary    29 Jul 2023 07:01  -  30 Jul 2023 07:00  --------------------------------------------------------  IN: 800 mL / OUT: 450 mL / NET: 350 mL        Vital Signs Last 24 Hrs  T(C): 36.7 (30 Jul 2023 04:00), Max: 37 (29 Jul 2023 15:00)  T(F): 98.1 (30 Jul 2023 04:00), Max: 98.6 (29 Jul 2023 15:00)  HR: 86 (30 Jul 2023 04:00) (86 - 98)  BP: 103/66 (30 Jul 2023 04:00) (102/58 - 108/60)  BP(mean): --  RR: 18 (30 Jul 2023 04:00) (16 - 18)  SpO2: 100% (30 Jul 2023 04:00) (100% - 100%)    Parameters below as of 30 Jul 2023 04:00  Patient On (Oxygen Delivery Method): nasal cannula  O2 Flow (L/min): 2      PHYSICAL EXAM:  GENERAL: NAD, well-developed, well-nourished  HEAD:  Atraumatic, Normocephalic  Oral mucosa with lesions of inner cheeks bilaterally, Lesions also present on lips and base of tongue. No active bleeding. Overall improved  EYES: EOMI, PERRL, conjunctiva and sclera clear  NECK: No JVD  CHEST/LUNG: Clear to auscultation bilaterally; No wheeze  HEART: Tachycardic and regular rhythm; No murmurs, rubs, or gallops  ABDOMEN: Mild distension and mild diffuse tenderness. RUQ is firm with small palpable mass. PleurX in place, dressing is C/D/I  EXTREMITIES:  2+ Peripheral Pulses, No clubbing, cyanosis. 2+ edema to bilateral shins. Knees non-tender bilaterally, no warmth erythema or effusion of either knee. Minimally decreased ROM w/ R knee flexion 2/2 pain.  PSYCH: AAOx3  NEUROLOGY: non-focal  SKIN: No rashes or lesions    LABS:                        8.9    13.33 )-----------( 394      ( 29 Jul 2023 07:18 )             28.1      07-29    127<L>  |  94<L>  |  20  ----------------------------<  141<H>  4.2   |  20<L>  |  0.64    Ca    8.0<L>      29 Jul 2023 19:36  Phos  2.1     07-29  Mg     2.00     07-29      PT/INR - ( 28 Jul 2023 07:53 )   PT: 14.4 sec;   INR: 1.28 ratio         PTT - ( 28 Jul 2023 07:53 )  PTT:87.8 sec      Urinalysis Basic - ( 29 Jul 2023 19:36 )    Color: x / Appearance: x / SG: x / pH: x  Gluc: 141 mg/dL / Ketone: x  / Bili: x / Urobili: x   Blood: x / Protein: x / Nitrite: x   Leuk Esterase: x / RBC: x / WBC x   Sq Epi: x / Non Sq Epi: x / Bacteria: x        RADIOLOGY & ADDITIONAL TESTS:    Imaging Personally Reviewed:    Consultant(s) Notes Reviewed:      Care Discussed with Consultants/Other Providers:   Patient is a 78y old  Female who presents with a chief complaint of diarrhea (29 Jul 2023 16:16)      SUBJECTIVE / OVERNIGHT EVENTS: No acute events overnight. Patient seen and examined at bedside, notes abdominal pain and increased ascites. Family notes she appears weaker. Diarrhea has resolved and mouth ulcers with better pain control at this time.     MEDICATIONS  (STANDING):  amLODIPine   Tablet 10 milliGRAM(s) Oral daily  apixaban 10 milliGRAM(s) Oral every 12 hours  atorvastatin 40 milliGRAM(s) Oral at bedtime  celecoxib 200 milliGRAM(s) Oral daily  chlorhexidine 2% Cloths 1 Application(s) Topical daily  dextrose 5%. 1000 milliLiter(s) (50 mL/Hr) IV Continuous <Continuous>  dextrose 5%. 1000 milliLiter(s) (100 mL/Hr) IV Continuous <Continuous>  dextrose 50% Injectable 25 Gram(s) IV Push once  dextrose 50% Injectable 25 Gram(s) IV Push once  dextrose 50% Injectable 12.5 Gram(s) IV Push once  fenofibrate Tablet 145 milliGRAM(s) Oral daily  gabapentin 100 milliGRAM(s) Oral three times a day  glucagon  Injectable 1 milliGRAM(s) IntraMuscular once  insulin lispro (ADMELOG) corrective regimen sliding scale   SubCutaneous three times a day before meals  lidocaine 2% Viscous 10 milliLiter(s) Swish and Spit four times a day  losartan 100 milliGRAM(s) Oral daily  methadone   Solution 2 milliGRAM(s) Oral two times a day  metoprolol succinate ER 25 milliGRAM(s) Oral daily  mirtazapine 15 milliGRAM(s) Oral at bedtime  pancrelipase  (CREON 36,000 Lipase Units) 1 Capsule(s) Oral three times a day with meals  pantoprazole    Tablet 40 milliGRAM(s) Oral before breakfast  sodium chloride 1 Gram(s) Oral three times a day    MEDICATIONS  (PRN):  acetaminophen     Tablet .. 650 milliGRAM(s) Oral every 6 hours PRN Temp greater or equal to 38C (100.4F), Mild Pain (1 - 3)  benzocaine 20% Spray 1 Spray(s) Topical four times a day PRN oral pain  dextrose Oral Gel 15 Gram(s) Oral once PRN Blood Glucose LESS THAN 70 milliGRAM(s)/deciliter  melatonin 3 milliGRAM(s) Oral at bedtime PRN Insomnia  morphine   Solution 4 milliGRAM(s) Oral every 4 hours PRN Moderate pain 4-6, severe pain 7-10  morphine  - Injectable 2 milliGRAM(s) IV Push every 4 hours PRN breakthrough pain  ondansetron    Tablet 4 milliGRAM(s) Oral every 8 hours PRN Nausea and/or Vomiting  zolpidem 5 milliGRAM(s) Oral at bedtime PRN Insomnia      CAPILLARY BLOOD GLUCOSE      POCT Blood Glucose.: 133 mg/dL (29 Jul 2023 21:31)  POCT Blood Glucose.: 180 mg/dL (29 Jul 2023 17:20)  POCT Blood Glucose.: 167 mg/dL (29 Jul 2023 12:28)  POCT Blood Glucose.: 189 mg/dL (29 Jul 2023 09:21)    I&O's Summary    29 Jul 2023 07:01  -  30 Jul 2023 07:00  --------------------------------------------------------  IN: 800 mL / OUT: 450 mL / NET: 350 mL        Vital Signs Last 24 Hrs  T(C): 36.7 (30 Jul 2023 04:00), Max: 37 (29 Jul 2023 15:00)  T(F): 98.1 (30 Jul 2023 04:00), Max: 98.6 (29 Jul 2023 15:00)  HR: 86 (30 Jul 2023 04:00) (86 - 98)  BP: 103/66 (30 Jul 2023 04:00) (102/58 - 108/60)  BP(mean): --  RR: 18 (30 Jul 2023 04:00) (16 - 18)  SpO2: 100% (30 Jul 2023 04:00) (100% - 100%)    Parameters below as of 30 Jul 2023 04:00  Patient On (Oxygen Delivery Method): nasal cannula  O2 Flow (L/min): 2      PHYSICAL EXAM:  GENERAL: NAD, well-developed, well-nourished  HEAD:  Atraumatic, Normocephalic  Oral mucosa with lesions of inner cheeks bilaterally, Lesions also present on lips and base of tongue. No active bleeding.  EYES: EOMI, PERRL, conjunctiva and sclera clear  NECK: No JVD  CHEST/LUNG: Clear to auscultation bilaterally; No wheeze  HEART: Tachycardic and regular rhythm; No murmurs, rubs, or gallops  ABDOMEN: Distension and mild diffuse tenderness. RUQ is firm with small palpable mass. PleurX in place, dressing is C/D/I  EXTREMITIES:  2+ Peripheral Pulses, No clubbing, cyanosis. 2+ edema to bilateral shins. Knees non-tender bilaterally, no warmth erythema or effusion of either knee. Minimally decreased ROM w/ R knee flexion 2/2 pain.  PSYCH: AAOx3  NEUROLOGY: non-focal  SKIN: No rashes or lesions    LABS:                        8.9    13.33 )-----------( 394      ( 29 Jul 2023 07:18 )             28.1      07-29    127<L>  |  94<L>  |  20  ----------------------------<  141<H>  4.2   |  20<L>  |  0.64    Ca    8.0<L>      29 Jul 2023 19:36  Phos  2.1     07-29  Mg     2.00     07-29      PT/INR - ( 28 Jul 2023 07:53 )   PT: 14.4 sec;   INR: 1.28 ratio         PTT - ( 28 Jul 2023 07:53 )  PTT:87.8 sec      Urinalysis Basic - ( 29 Jul 2023 19:36 )    Color: x / Appearance: x / SG: x / pH: x  Gluc: 141 mg/dL / Ketone: x  / Bili: x / Urobili: x   Blood: x / Protein: x / Nitrite: x   Leuk Esterase: x / RBC: x / WBC x   Sq Epi: x / Non Sq Epi: x / Bacteria: x        RADIOLOGY & ADDITIONAL TESTS:    Imaging Personally Reviewed:    Consultant(s) Notes Reviewed:      Care Discussed with Consultants/Other Providers:

## 2023-07-30 NOTE — PROGRESS NOTE ADULT - PROBLEM SELECTOR PLAN 3
Patient has worsening recurrent abdominal distension and BLE edema, likely in setting of metastatic disease. Low suspicion for infection at this time. Large volume ascites confirmed on CT A/P. Abd US 7/24 confirming moderate to severe ascites. ascites improved s/p PleurX placement 7/26  -monitor volume status  -strict I&O  -s/p Lasix 20mg IV x1 Patient has worsening recurrent abdominal distension and BLE edema, likely in setting of metastatic disease. Low suspicion for infection at this time. Large volume ascites confirmed on CT A/P. Abd US 7/24 confirming moderate to severe ascites. ascites improved s/p PleurX placement 7/26  -monitor volume status  -strict I&O  -s/p Lasix 20mg IV x1  - 7/30 removed 1L

## 2023-07-30 NOTE — PROGRESS NOTE ADULT - PROBLEM SELECTOR PLAN 5
US duplex on 7/26 found incidental LLE DVTs x2 (Acute non-occlusive DVT noted within the distal left femoral and popliteal veins. Acute, occlusive DVT noted within the left posterior tibial and soleal veins). She has no left leg pain and on exam no calf tenderness, warmth, or erythema. s/p PleurX placement 7/26. On heparin gtt, in therapeutic range  -c/w Heparin gtt  -trend coags  -when dc can transition to eliquis (total 7 days high dose then can switch to 5mg BID) US duplex on 7/26 found incidental LLE DVTs x2 (Acute non-occlusive DVT noted within the distal left femoral and popliteal veins. Acute, occlusive DVT noted within the left posterior tibial and soleal veins). She has no left leg pain and on exam no calf tenderness, warmth, or erythema. s/p PleurX placement 7/26. On heparin gtt, in therapeutic range  -c/w Heparin gtt  -trend coags  -when dc can transition to eliquis (total 7 days high dose then can switch to 5mg BID - 8/4)

## 2023-07-30 NOTE — PROGRESS NOTE ADULT - ATTENDING COMMENTS
78F with metastatic cancer, hyponatremia, chronic pain, mucositis secondary to chemo. Na slightly improved, tolerating salt tab. patient still with poor PO. no further diarrhea, also no BM since yesterday. c/w treatment plan as above. d/c to home hospice. f/u SW/CM.

## 2023-07-30 NOTE — PROVIDER CONTACT NOTE (OTHER) - SITUATION
pt still c/o 8/10 after morphine, pleurex tube may need to be drained, please put in orders if appropriate
529A still tachy in 120s and temp of 101.4 after tylenol was given. cefepime running, then will give vanco, morphine given for pain, and blood cultures sent
awaiting nurse to do pleurex drain with, pt is tachy to 125 and temp of 99.8
Patient placed on 1L fluid restriction for hyponatremia, currently has lactated ringers running at 100cc/hr.
/58,

## 2023-07-30 NOTE — PROGRESS NOTE ADULT - PROBLEM SELECTOR PLAN 1
Patient complains of painful sores throughout mouth and tongue that have been present for several weeks but have worsened within the past week. On exam has tender lesions to mucosa of inner cheeks, base of tongue, and lips. Low suspicion for thrush or other ongoing infection. Her sores are likely 2/2 chemotherapy.   -d/c magic mouthwash and start 2% viscous lidocaine per palliative recs  -c/w Benzocaine spray  -c/w pain regimen per palliative: Methadone 2mg BID, Gabapentin 100mg TID, Morphine 4mg PO q4 for moderate/severe pain, and IV morphine 2mg q4 for breakthrough pain  -monitor pain Patient complains of painful sores throughout mouth and tongue that have been present for several weeks but have worsened within the past week. On exam has tender lesions to mucosa of inner cheeks, base of tongue, and lips. Low suspicion for thrush or other ongoing infection. Her sores are likely 2/2 chemotherapy.   -d/c magic mouthwash and start 2% viscous lidocaine per palliative recs  -c/w pain regimen per palliative: Methadone 2mg BID, Gabapentin 100mg TID, Morphine 4mg PO q4 for moderate/severe pain, and IV morphine 2mg q4 for breakthrough pain  -monitor pain

## 2023-07-30 NOTE — PROGRESS NOTE ADULT - PROBLEM SELECTOR PLAN 7
Reports having hematuria 7/29, given uptrending leukocytosis will evaluate for UTI. Could also be 2/2 malignancy vs chemotherapy  -f/u UA Reports having hematuria 7/29, given uptrending leukocytosis will evaluate for UTI. Could also be 2/2 malignancy vs chemotherapy  - Resolved, UA negative for blood

## 2023-07-30 NOTE — PROGRESS NOTE ADULT - PROBLEM SELECTOR PLAN 4
Hyponatremic to 125 in ED, no changes in mental status or neurological deficits. 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 Of note, had hyponatremia from previous admission this month, also thought to be in setting of SIADH. s/p NS 3.0% 30cc/hr x1 and fluid restriction,. 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  -Nephro consulted appreciate recs, may need additional hypertonic saline  -monitor BMP  -c/w Fluid restrict 1.0L daily  -s/p 2L IVF Hyponatremic to 125 in ED, no changes in mental status or neurological deficits. 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 Of note, had hyponatremia from previous admission this month, also thought to be in setting of SIADH. s/p NS 3.0% 30cc/hr x1 and fluid restriction,. 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   - Nephro consulted appreciate recs, on fluid restriction + salt tabs  - monitor BMP  - c/w Fluid restrict 1.0L daily  - Discussed with nephro 7/30 - patient tachy, with decreased sodium in urine, will give 250 cc NS and repeat BMP

## 2023-07-30 NOTE — PROGRESS NOTE ADULT - PROBLEM SELECTOR PLAN 2
Pt had 6 episodes of non-bloody diarrhea prior to admission. No additional episodes of diarrhea since coming to the hospital. Low suspicion for infectious etiology at this time given stable vital signs and labs (no leukocytosis or neutropenia). Very low suspicion for c.diff, will dc contact precautions. GI PCR negative. Cultures showing NGTD. Had several episodes of diarrhea 7/28, resolving 7/29, likely secondary to increasing bowel regimen  -Monitor Pt had 6 episodes of non-bloody diarrhea prior to admission. No additional episodes of diarrhea since coming to the hospital. Low suspicion for infectious etiology at this time given stable vital signs and labs (no leukocytosis or neutropenia). Very low suspicion for c.diff, will dc contact precautions. GI PCR negative. Cultures showing NGTD. Had several episodes of diarrhea 7/28, resolving 7/29, likely secondary to increasing bowel regimen  -Resolved

## 2023-07-31 NOTE — DIETITIAN INITIAL EVALUATION ADULT - ORAL INTAKE PTA/DIET HISTORY
Patient is Hebrew speaking,  service offered, preferred  by bedside for translation. Patient's  reports patient has poor appetite, since last year. Patient was on chemo, experienced episodes of vomiting, thus affecting po intake PTA. Patient is not sure about amount of weight loss due to edema, and ascites. Patient has no known food allergies.

## 2023-07-31 NOTE — PROGRESS NOTE ADULT - PROBLEM SELECTOR PLAN 4
Improving 7/31  Patient complains of painful sores throughout mouth and tongue that have been present for several weeks but have worsened within the past week. On exam has tender lesions to mucosa of inner cheeks, base of tongue, and lips. Low suspicion for thrush or other ongoing infection. Her sores are likely 2/2 chemotherapy.   > c/w Viscous lidocaine ATC  > c/w Benzocaine spray PRN

## 2023-07-31 NOTE — DIETITIAN INITIAL EVALUATION ADULT - PERTINENT LABORATORY DATA
07-31    122<L>  |  101  |  16  ----------------------------<  169<H>  4.6   |  15<L>  |  0.51    Ca    7.7<L>      31 Jul 2023 06:16  Phos  2.3     07-31  Mg     2.00     07-31    TPro  5.3<L>  /  Alb  2.1<L>  /  TBili  0.8  /  DBili  x   /  AST  47<H>  /  ALT  12  /  AlkPhos  385<H>  07-31  POCT Blood Glucose.: 123 mg/dL (07-31-23 @ 12:23)  A1C with Estimated Average Glucose Result: 6.5 % (07-03-23 @ 06:56)

## 2023-07-31 NOTE — DIETITIAN INITIAL EVALUATION ADULT - PERTINENT MEDS FT
MEDICATIONS  (STANDING):  apixaban 10 milliGRAM(s) Oral every 12 hours  atorvastatin 40 milliGRAM(s) Oral at bedtime  cefepime   IVPB 2000 milliGRAM(s) IV Intermittent every 12 hours  cefepime   IVPB      celecoxib 200 milliGRAM(s) Oral daily  chlorhexidine 2% Cloths 1 Application(s) Topical daily  dextrose 5%. 1000 milliLiter(s) (100 mL/Hr) IV Continuous <Continuous>  dextrose 5%. 1000 milliLiter(s) (50 mL/Hr) IV Continuous <Continuous>  dextrose 50% Injectable 25 Gram(s) IV Push once  dextrose 50% Injectable 12.5 Gram(s) IV Push once  dextrose 50% Injectable 25 Gram(s) IV Push once  fenofibrate Tablet 145 milliGRAM(s) Oral daily  gabapentin 100 milliGRAM(s) Oral three times a day  glucagon  Injectable 1 milliGRAM(s) IntraMuscular once  insulin lispro (ADMELOG) corrective regimen sliding scale   SubCutaneous three times a day before meals  lidocaine 2% Viscous 10 milliLiter(s) Swish and Spit four times a day  methadone   Solution 2 milliGRAM(s) Oral two times a day  metoprolol succinate ER 25 milliGRAM(s) Oral daily  mirtazapine 15 milliGRAM(s) Oral at bedtime  pancrelipase  (CREON 36,000 Lipase Units) 1 Capsule(s) Oral three times a day with meals  pantoprazole    Tablet 40 milliGRAM(s) Oral before breakfast  sodium chloride 2 Gram(s) Oral three times a day    MEDICATIONS  (PRN):  acetaminophen     Tablet .. 650 milliGRAM(s) Oral every 6 hours PRN Temp greater or equal to 38C (100.4F), Mild Pain (1 - 3)  benzocaine 20% Spray 1 Spray(s) Topical four times a day PRN oral pain  dextrose Oral Gel 15 Gram(s) Oral once PRN Blood Glucose LESS THAN 70 milliGRAM(s)/deciliter  melatonin 3 milliGRAM(s) Oral at bedtime PRN Insomnia  morphine   Solution 4 milliGRAM(s) Oral every 4 hours PRN Moderate Pain (4 - 6)  morphine   Solution 6 milliGRAM(s) Oral every 4 hours PRN Severe Pain (7 - 10)  ondansetron    Tablet 4 milliGRAM(s) Oral every 8 hours PRN Nausea and/or Vomiting  zolpidem 5 milliGRAM(s) Oral at bedtime PRN Insomnia

## 2023-07-31 NOTE — PROGRESS NOTE ADULT - PROBLEM SELECTOR PLAN 7
Reports having hematuria 7/29, given uptrending leukocytosis will evaluate for UTI. Could also be 2/2 malignancy vs chemotherapy  - Resolved, UA negative for blood Reports having hematuria 7/29, given uptrending leukocytosis will evaluate for UTI. Could also be 2/2 malignancy vs chemotherapy  - Repeat UA pending Reports having hematuria 7/29, given uptrending leukocytosis will evaluate for UTI. BCx 7/30 positive for E. coli. Could also be 2/2 malignancy vs chemotherapy  - Repeat UA pending

## 2023-07-31 NOTE — PROGRESS NOTE ADULT - ASSESSMENT
78F with hx of metastatic pancreatic CA s/p whipple on Chemo (last session 7/17, seen by Dr. David) presents with NVD and abdominal pain likely in setting of worsening metastatic disease. She had 5 episodes of nonbloody diarrhea yesterday and 1 episode this morning. She also has had multiple episodes of nonbloody vomiting. She complains of diffuse chronic pain that has worsened, especially in the abdomen. She reports worsening abdominal distension which is recurrent, scheduled for paracentesis 7/26. She states the abdominal distension can cause difficulty breathing. Also complains of mouth pain with sores, no improvement with mouth wash used at home. Increased difficulty with PO intake because of mouth pain. Recent admit this month for AMS/fever, found to have UTI. Palliative care consulted for pain management & GOC.

## 2023-07-31 NOTE — DIETITIAN INITIAL EVALUATION ADULT - ADD RECOMMEND
1. Recommend continue liberalized- regular diet, to optimize po intake. Monitor POCT, to determine needs of consistent carb diet. Fluid needs per MD discretion.   2. Recommend adding Glucerna 8oz 2x/day (440kcal, 20gm protein) for nutrient support.   3. Consider adding probiotics, to promote gut health.   4. Monitor and replete electrolytes(phos).   5. Monitor weight, labs, po intake and tolerance, bowel movement, skin integrity.   6. Encourage PO intake and honor food preferences as able.

## 2023-07-31 NOTE — ADVANCED PRACTICE NURSE CONSULT - ASSESSMENT
Right arm cleansed with CHG. Ultra sound guidance, placed Accucath ace intravascular peripheral catheter system 20G /5.71cm into Left Brachial vein. Brisk blood return and flushed with 20Mls of normal saline. Minimal blood loss and patient tolerated procedure well. CHG dressing placed. All sharps accounted for. LOT#: [id number] , REF#: [id number]

## 2023-07-31 NOTE — PROGRESS NOTE ADULT - PROBLEM SELECTOR PLAN 1
Patient complains of painful sores throughout mouth and tongue that have been present for several weeks but have worsened within the past week. On exam has tender lesions to mucosa of inner cheeks, base of tongue, and lips. Low suspicion for thrush or other ongoing infection. Her sores are likely 2/2 chemotherapy.   -d/c magic mouthwash and start 2% viscous lidocaine per palliative recs  -c/w pain regimen per palliative: Methadone 2mg BID, Gabapentin 100mg TID, Morphine 4mg PO q4 for moderate/severe pain, and IV morphine 2mg q4 for breakthrough pain  -monitor pain Patient complains of painful sores in mouth and tongue that have been present for several weeks but have worsened within the past week. On exam has tender lesions to mucosa of inner cheeks, base of tongue, and lips. Low suspicion for thrush or other ongoing infection. Her sores are likely 2/2 chemotherapy.  -c/w 2% viscous lidocaine per palliative recs  -c/w pain regimen per palliative: increase to Methadone 4mg BID, Gabapentin 100mg TID, Morphine 4mg PO q4 for moderate pain, and Morphine 6mg PO q4 for severe pain  -d/c IV morphine for dc planning  -monitor pain Patient complains of painful sores in mouth and tongue that have been present for several weeks but have worsened within the past week. On exam has tender lesions to mucosa of inner cheeks, base of tongue, and lips. Low suspicion for thrush or other ongoing infection. Her sores are likely 2/2 chemotherapy.  -c/w 2% viscous lidocaine per palliative recs  -c/w pain regimen per palliative: recheck EKG for QTc. If not prolonged, increase to Methadone 4mg BID, Gabapentin 100mg TID, Morphine 4mg PO q4 for moderate pain, and Morphine 6mg PO q4 for severe pain  -d/c IV morphine for dc planning  -monitor pain Patient complains of painful sores in mouth and tongue that have been present for several weeks but have worsened within the past week. On exam has tender lesions to mucosa of inner cheeks, base of tongue, and lips. Low suspicion for thrush or other ongoing infection. Her sores are likely 2/2 chemotherapy.  -c/w 2% viscous lidocaine per palliative recs  -c/w pain regimen per palliative: repeat EKG 7/31 with normal QTc. increase to Methadone 4mg BID, Gabapentin 100mg TID, Morphine 4mg PO q4 for moderate pain, and Morphine 6mg PO q4 for severe pain  -d/c IV morphine for dc planning  -monitor pain

## 2023-07-31 NOTE — PROGRESS NOTE ADULT - PROBLEM SELECTOR PLAN 2
Home regimen: Methadone 1mg solution QHS, Morphine 3mg solution Q4 hrs PRN pain, Gabapentin 100 TID  > Can increase Methadone to 4mg Solution BID for long acting pain control as long as QTC < 500 - new EKG ordered 7/31  > c/w Gabapentin 100mg TID   > PO morphine solution 4mg Q4hrs PRN moderate  > PO morphine solution 6mg Q4hrs PRN severe pain  > Discontinued IV Morphine as dc planning  > Bowel regimen while on opioids

## 2023-07-31 NOTE — PROGRESS NOTE ADULT - PROBLEM SELECTOR PLAN 5
US duplex on 7/26 found incidental LLE DVTs x2 (Acute non-occlusive DVT noted within the distal left femoral and popliteal veins. Acute, occlusive DVT noted within the left posterior tibial and soleal veins). She has no left leg pain and on exam no calf tenderness, warmth, or erythema. s/p PleurX placement 7/26. On heparin gtt, in therapeutic range  -c/w Heparin gtt  -trend coags  -when dc can transition to eliquis (total 7 days high dose then can switch to 5mg BID - 8/4)

## 2023-07-31 NOTE — PROGRESS NOTE ADULT - ATTENDING COMMENTS
78F with metastatic cancer, hyponatremia, chronic pain, mucositis secondary to chemo. Na downtrending, tolerating salt tab. Patient still with poor PO. states diarrhea improved. Patient febrile overnight, w/ gram negative bacteremia suspect source SBP vs UTI vs less likely diarrheal illness (diarrhea now improved). Will send UA/Ucx, fluid from pleurx for culture, if with repeat diarrhea will send stools studies. Will need to clarify GOC, if HCP would want workup of infection given goal is nursing home w/ hospice. For now will plan for 7 day course of empiric abx for gram negative sepsis. Patient w/ worsening hyponatremia, appears symptomatic on my interview and exam.  reports increasing lethargy and decreased spontaneous speech. Will f/u with nephrology. May benefit from hypertonic saline.     Palliative recs appreciated:   Can increase Methadone to 4mg Solution BID for long acting pain control as long as QTC < 500 - new EKG ordered 7/31     interested in placement at [facility] [address] [facility] [address]      [id number]    Plan of care discussed w/ Dr. Courtney and Dr. Brito

## 2023-07-31 NOTE — PROGRESS NOTE ADULT - PROBLEM SELECTOR PLAN 6
hx metastatic pancreatic cancer with mets to liver and lung. s/p whipple, on chemo with last treatment 7/17. She has chronic diffuse body pain, including chest, abdomen, and extremities, in setting of metastatic disease and chemo. Pain managed by oxycodone and morphine at home. Patient and family no longer interested in DMT and would like to pursue hospice, will consult palliative and place hospice referral.  -palliative care consulted, appreciate recs  -plan for hospice on dc, determining home vs inpatient hospice  -c/w pain regimen per palliative: Methadone 2mg BID , Gabapentin 100mg TID, Morphine 4mg PO q4 for moderate/severe pain, and IV morphine 2mg q4 for breakthrough pain  -c/w Zofran 4mg q8 PRN nausea  -Resumed home Remeron 15mg daily for decreased PO intake hx metastatic pancreatic cancer with mets to liver and lung. s/p whipple, on chemo with last treatment 7/17. She has chronic diffuse body pain, including chest, abdomen, and extremities, in setting of metastatic disease and chemo. Pain managed by oxycodone and morphine at home. Patient and family no longer interested in DMT and would like to pursue hospice, will consult palliative and place hospice referral.  -plan for hospice on dc  -c/w pain regimen per palliative: Methadone 2mg BID, Gabapentin 100mg TID, Morphine 4mg PO q4 PRN for moderate and 6mg PO q4 PRN severe pain  -c/w Zofran 4mg q8 PRN nausea  -Resumed home Remeron 15mg daily for decreased PO intake hx metastatic pancreatic cancer with mets to liver and lung. s/p whipple, on chemo with last treatment 7/17. She has chronic diffuse body pain, including chest, abdomen, and extremities, in setting of metastatic disease and chemo. Pain managed by oxycodone and morphine at home. Patient and family no longer interested in DMT and would like to pursue hospice, will consult palliative and place hospice referral.  -plan for hospice on dc  -c/w pain regimen per palliative: recheck EKG for QTc. Methadone 2mg BID, Gabapentin 100mg TID, Morphine 4mg PO q4 PRN for moderate and 6mg PO q4 PRN severe pain  -c/w Zofran 4mg q8 PRN nausea  -Resumed home Remeron 15mg daily for decreased PO intake hx metastatic pancreatic cancer with mets to liver and lung. s/p whipple, on chemo with last treatment 7/17. She has chronic diffuse body pain, including chest, abdomen, and extremities, in setting of metastatic disease and chemo. Pain managed by oxycodone and morphine at home. Patient and family no longer interested in DMT and would like to pursue hospice, will consult palliative and place hospice referral.  -plan for hospice on dc  -c/w pain regimen per palliative: repeat EKG 7/31 with normal QTc. increase to methadone 4mg BID, Gabapentin 100mg TID, Morphine 4mg PO q4 PRN for moderate and 6mg PO q4 PRN severe pain  -c/w Zofran 4mg q8 PRN nausea  -Resumed home Remeron 15mg daily for decreased PO intake

## 2023-07-31 NOTE — PROGRESS NOTE ADULT - PROBLEM SELECTOR PLAN 2
Pt had 6 episodes of non-bloody diarrhea prior to admission. No additional episodes of diarrhea since coming to the hospital. Low suspicion for infectious etiology at this time given stable vital signs and labs (no leukocytosis or neutropenia). Very low suspicion for c.diff, will dc contact precautions. GI PCR negative. Cultures showing NGTD. Had several episodes of diarrhea 7/28, resolving 7/29, likely secondary to increasing bowel regimen  -Resolved 6 episodes of non-bloody diarrhea prior to admission. Very low suspicion for c.diff, will dc contact precautions. GI PCR negative. Cultures showing NGTD. Had several episodes of diarrhea 7/28, resolving 7/29, likely secondary to increasing bowel regimen  -Resolved

## 2023-07-31 NOTE — PROGRESS NOTE ADULT - SUBJECTIVE AND OBJECTIVE BOX
[facility] Geriatrics and Palliative Care  Emerita Alejo, Palliative Care Nurse Practitioner  Contact Info: Page [phone] (Including Nights/Weekends), Message on Microsoft Teams (Emerita Alejo), or leave VM at Palliative Office [phone] (non-urgent)    Date of Service 07-31-23 @ 14:09    SUBJECTIVE AND OBJECTIVE:  Indication for Geriatrics and Palliative Care Services/INTERVAL HPI: Pain management / GOC  Pt seen this AM with daughter at the bedside. Pt shared oral pain has improved however still feels abd tenderness. Discussed increasing methadone & morphine for better pain control, pt in agreement. Daughter Namrata at bedside shared family cant care for pt at home and now interested in Nursing home placement, SW aware.     OVERNIGHT EVENTS: Pt required PRN PO Morphine 4mg x2 & PRN IV Morphine 2mg x 3 within 24 hours (total 26mme).     DNR on chart:DNI  DNI      Allergies    No Known Allergies    Intolerances    MEDICATIONS  (STANDING):  apixaban 10 milliGRAM(s) Oral every 12 hours  atorvastatin 40 milliGRAM(s) Oral at bedtime  cefepime   IVPB      cefepime   IVPB 2000 milliGRAM(s) IV Intermittent every 12 hours  celecoxib 200 milliGRAM(s) Oral daily  chlorhexidine 2% Cloths 1 Application(s) Topical daily  dextrose 5%. 1000 milliLiter(s) (100 mL/Hr) IV Continuous <Continuous>  dextrose 5%. 1000 milliLiter(s) (50 mL/Hr) IV Continuous <Continuous>  dextrose 50% Injectable 25 Gram(s) IV Push once  dextrose 50% Injectable 25 Gram(s) IV Push once  dextrose 50% Injectable 12.5 Gram(s) IV Push once  fenofibrate Tablet 145 milliGRAM(s) Oral daily  gabapentin 100 milliGRAM(s) Oral three times a day  glucagon  Injectable 1 milliGRAM(s) IntraMuscular once  heparin  Lock Flush 100 Units/mL Injectable 300 Unit(s) IV Push once  insulin lispro (ADMELOG) corrective regimen sliding scale   SubCutaneous three times a day before meals  lidocaine 2% Viscous 10 milliLiter(s) Swish and Spit four times a day  methadone   Solution 2 milliGRAM(s) Oral two times a day  metoprolol succinate ER 25 milliGRAM(s) Oral daily  mirtazapine 15 milliGRAM(s) Oral at bedtime  pancrelipase  (CREON 36,000 Lipase Units) 1 Capsule(s) Oral three times a day with meals  pantoprazole    Tablet 40 milliGRAM(s) Oral before breakfast  sodium chloride 2 Gram(s) Oral three times a day    MEDICATIONS  (PRN):  acetaminophen     Tablet .. 650 milliGRAM(s) Oral every 6 hours PRN Temp greater or equal to 38C (100.4F), Mild Pain (1 - 3)  benzocaine 20% Spray 1 Spray(s) Topical four times a day PRN oral pain  dextrose Oral Gel 15 Gram(s) Oral once PRN Blood Glucose LESS THAN 70 milliGRAM(s)/deciliter  melatonin 3 milliGRAM(s) Oral at bedtime PRN Insomnia  morphine   Solution 4 milliGRAM(s) Oral every 4 hours PRN Moderate pain 4-6, severe pain 7-10  morphine  - Injectable 2 milliGRAM(s) IV Push every 4 hours PRN breakthrough pain  ondansetron    Tablet 4 milliGRAM(s) Oral every 8 hours PRN Nausea and/or Vomiting  zolpidem 5 milliGRAM(s) Oral at bedtime PRN Insomnia        -------------------------------------------------------------------------------------------------------  ITEMS UNCHECKED ARE NOT PRESENT    PRESENT SYMPTOMS: [ ]Unable to self-report - see [ ] CPOT [ ] PAINADS [ ] RDOS  Source if other than patient:  [ ]Family   [ ]Team     Pain: [ X]yes [ ]no  QOL impact - recurrent hospitalizations'   Location - abd/ [facility]               Aggravating factors - movement   Quality - pressure/sharp  Radiation - back  Timing- intermittent   Severity (0-10 scale): 6/10  Minimal acceptable level (0-10 scale)/Pain goal: 4/10    CPOT:    https://[email or web address]/[email or web address]/Critical-Care-Pain-Observation-Tool-(CPOT)    PAINAD Score: See PAINAD tool and score below       RDOS: See RDOS tool and score below   0 to 2  minimal or no respiratory distress   3  mild distress  4 to 6 moderate distress  >7 severe distress    Dyspnea:                           [ ]Mild [ ]Moderate [ ]Severe  Anxiety:                             [ ]Mild [ ]Moderate [ ]Severe  Fatigue:                             [X ]Mild [ ]Moderate [ ]Severe  Nausea:                             [ ]Mild [ ]Moderate [ ]Severe  Loss of appetite:              [ ]Mild [ ]Moderate [ ]Severe  Constipation:                    [ ]Mild [ ]Moderate [ ]Severe  Other Symptoms:  [X]All other review of systems negative     Home Medications for Symptoms if present:    I Stop Reference no:     -------------------------------------------------------------------------------------------------------  PCSSQ[Palliative Care Spiritual Screening Question]   Severity (0-10):  Score of 4 or > indicate consideration of Chaplaincy referral.  Chaplaincy Referral: [ ] yes [ ] refused [ ] following [ X] Deferred     Caregiver [place]? : [X ] yes [ ] no [ ] Deferred [ ] Declined             Social work referral [X ] Patient & Family Centered Care Referral [ ]     Anticipatory Grief present?:  [ X] yes [ ] no  [ ] Deferred                  Social work referral [ X] Chaplaincy Referral [ ]    -------------------------------------------------------------------------------------------------------  PHYSICAL EXAM:  Vital Signs Last 24 Hrs  T(C): 36.6 (31 Jul 2023 11:54), Max: 38.7 (30 Jul 2023 14:46)  T(F): 97.8 (31 Jul 2023 11:54), Max: 101.6 (30 Jul 2023 14:46)  HR: 99 (31 Jul 2023 11:54) (99 - 128)  BP: 101/49 (31 Jul 2023 11:54) (101/49 - 127/60)  BP(mean): --  RR: 20 (31 Jul 2023 11:54) (15 - 20)  SpO2: 95% (31 Jul 2023 11:54) (94% - 96%)    Parameters below as of 31 Jul 2023 11:54  Patient On (Oxygen Delivery Method): room air     I&O's Summary    30 Jul 2023 07:01  -  31 Jul 2023 07:00  --------------------------------------------------------  IN: 0 mL / OUT: 900 mL / NET: -900 mL           GENERAL: Vietnamese  # [id number]  [ ]Cachexia  [ X]Alert  [X ]Oriented x 4  [ ]Lethargic  [ ]Unarousable  [X ]Verbal  [ ]Non-Verbal    Behavioral:   [ ] Anxiety  [ ] Delirium [ ] Agitation [x ] Other    HEENT:  [ ]Normal   [ ]Dry mouth   [ ]ET Tube/Trach  [X]Oral lesions    PULMONARY:   [X]Clear [ ]Tachypnea  [ ]Audible excessive secretions   [ ]Rhonchi        [ ]Right [ ]Left [ ]Bilateral  [ ]Crackles        [ ]Right [ ]Left [ ]Bilateral  [ ]Wheezing     [ ]Right [ ]Left [ ]Bilateral  [ ]Diminished breath sounds [ ]right [ ]left [ ]bilateral    CARDIOVASCULAR:    [ X]Regular [ ]Irregular [ ]Tachy  [ ]Tomy [ ]Murmur [ ]Other    GASTROINTESTINAL: abd pleurx in place  [ X]Soft  [ X]Distended   [ ]+BS  [ ]Non tender [ X]Tender  [ ]Other [ ]PEG [ ]OGT/ NGT  Last BM: 7/30    GENITOURINARY:  [X ]Normal [ ] Incontinent   [ ]Oliguria/Anuria   [ ]Rolle    MUSCULOSKELETAL:   [ ]Normal   [X]Weakness  [ ]Bed/Wheelchair bound [ ]Edema    NEUROLOGIC:   [X ]No focal deficits  [ ]Cognitive impairment  [ ]Dysphagia [ ]Dysarthria [ ]Paresis [ ]Other     SKIN:   [X ]Normal  [ ]Rash  [ ]Other  [ ]Pressure ulcer(s)       Present on admission [ ]y [ ]n    -------------------------------------------------------------------------------------------------------  CRITICAL CARE:  [ ]Shock Present  [ ]Septic [ ]Cardiogenic [ ]Neurologic [ ]Hypovolemic  [ ]Vasopressors [ ]Inotropes  [ ]Respiratory failure present [ ]Mechanical Ventilation [ ]Non-invasive ventilatory support [ ]High-Flow   [ ]Acute  [ ]Chronic [ ]Hypoxic  [ ]Hypercarbic [ ]Other  [ ]Other organ failure     -------------------------------------------------------------------------------------------------------  LABS:                        9.3    8.24  )-----------( 365      ( 31 Jul 2023 06:16 )             30.5   07-31    122<L>  |  101  |  16  ----------------------------<  169<H>  4.6   |  15<L>  |  0.51    Ca    7.7<L>      31 Jul 2023 06:16  Phos  2.3     07-31  Mg     2.00     07-31    TPro  5.3<L>  /  Alb  2.1<L>  /  TBili  0.8  /  DBili  x   /  AST  47<H>  /  ALT  12  /  AlkPhos  385<H>  07-31  PT/INR - ( 31 Jul 2023 06:16 )   PT: 21.3 sec;   INR: 1.94 ratio         PTT - ( 31 Jul 2023 06:16 )  PTT:31.7 sec    Urinalysis Basic - ( 31 Jul 2023 06:16 )    Color: x / Appearance: x / SG: x / pH: x  Gluc: 169 mg/dL / Ketone: x  / Bili: x / Urobili: x   Blood: x / Protein: x / Nitrite: x   Leuk Esterase: x / RBC: x / WBC x   Sq Epi: x / Non Sq Epi: x / Bacteria: x          -------------------------------------------------------------------------------------------------------  RADIOLOGY & ADDITIONAL STUDIES: < from: CT Abdomen and Pelvis w/ IV Cont (07.23.23 @ 13:36) >    IMPRESSION:    Interval increase in now very large amount of abdominal and pelvic   ascites since prior CT of 7/1/2023.    Little change in extensive omental metastatic caking.    Little change in serosal thickening/infiltration of transverse colon.    Grossly unchanged multiple hepatic and pulmonary metastases.    --- End of Report ---    -------------------------------------------------------------------------------------------------------  Protein Calorie Malnutrition Present: [ ]mild [ ]moderate [ ]severe [ ]underweight [ ]morbid obesity  https://www.andeal.org/vault/4256/web/files/ONC/Table_Clinical%20Characteristics%20to%20Document%20Malnutrition-White%20JV%20et%20al%202012.pdf    Height (cm): 152.5 (07-23-23 @ 07:19), 152.5 (07-01-23 @ 01:12), 152.5 (05-31-23 @ 10:19)  Weight (kg): 50 (07-23-23 @ 18:02), 58.0009 (07-17-23 @ 13:00), 58.7 (07-03-23 @ 18:10)  BMI (kg/m2): 21.5 (07-23-23 @ 18:02), 24.9 (07-23-23 @ 07:19), 24.9 (07-17-23 @ 13:00)    Palliative Performance Status Version 2:   See PPSv2 tool and score below       [ ]PPSV2 < or = 30%  [ ]significant weight loss [ ]poor nutritional intake [ ]anasarca[ ]Artificial Nutrition    Other REFERRALS:  [ x]Hospice  [ ]Child Life  [ ]Social Work  [ ]Case management [ ]Holistic Therapy     -------------------------------------------------------------------------------------------------------

## 2023-07-31 NOTE — PROVIDER CONTACT NOTE (CRITICAL VALUE NOTIFICATION) - SITUATION
provider Quintin BOLAND notified via teams tats pt's blood cultures from yesterday are positive from preliminary report in the aerobic bottle

## 2023-07-31 NOTE — DIETITIAN INITIAL EVALUATION ADULT - SIGNS/SYMPTOMS
consume </= 75% of EER for >/= 1 month, physical signs of severe muscle and fat loss, severe edema.

## 2023-07-31 NOTE — PROGRESS NOTE ADULT - PROBLEM SELECTOR PLAN 1
Pt known to Dr. Juan David @ [facility]  > Last chemotherapy 7/17   > Pt no longer interested in DMT - hospice appropriate   > Plan originally was home wt hospice, however, family feels like pt care needs are too much for them. Interested in NH with hospice - SW aware.

## 2023-07-31 NOTE — PROGRESS NOTE ADULT - PROBLEM SELECTOR PLAN 4
Hyponatremic to 125 in ED, no changes in mental status or neurological deficits. 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 Of note, had hyponatremia from previous admission this month, also thought to be in setting of SIADH. s/p NS 3.0% 30cc/hr x1 and fluid restriction,. 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   - Nephro consulted appreciate recs, on fluid restriction + salt tabs  - monitor BMP  - c/w Fluid restrict 1.0L daily  - Discussed with nephro 7/30 - patient tachy, with decreased sodium in urine, will give 250 cc NS and repeat BMP Hyponatremic to 125 in ED, no changes in mental status or neurological deficits. 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 Of note, had hyponatremia from previous admission this month, also thought to be in setting of SIADH. s/p NS 3.0% 30cc/hr x1 and fluid restriction. 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   - Nephro consulted appreciate recs, on fluid restriction + salt tabs  - monitor BMP  - c/w Fluid restrict 1.0L daily  - Discussed with nephro 7/30 - patient tachy, with decreased sodium in urine, will give 250 cc NS and repeat BMP

## 2023-07-31 NOTE — PROGRESS NOTE ADULT - PROBLEM SELECTOR PLAN 3
Patient has worsening recurrent abdominal distension and BLE edema, likely in setting of metastatic disease. Low suspicion for infection at this time. Large volume ascites confirmed on CT A/P. Abd US 7/24 confirming moderate to severe ascites. ascites improved s/p PleurX placement 7/26  -monitor volume status  -strict I&O  -s/p Lasix 20mg IV x1  - 7/30 removed 1L Patient has worsening recurrent abdominal distension and BLE edema, likely in setting of metastatic disease. Large volume ascites confirmed on CT A/P. Abd US 7/24 confirming moderate to severe ascites. ascites improved s/p PleurX placement 7/26. New blood cultures 7/30 show GNR.  - Paracentesis culture pending  - monitor volume status  - strict I&O  - s/p Lasix 20mg IV x1  - 7/30 removed 1L Patient has worsening recurrent abdominal distension and BLE edema, likely in setting of metastatic disease. Large volume ascites confirmed on CT A/P. Abd US 7/24 confirming moderate to severe ascites. ascites improved s/p PleurX placement 7/26. New blood cultures 7/30 show GNR with E coli.  - Paracentesis culture pending  - monitor volume status  - strict I&O  - s/p Lasix 20mg IV x1  - 7/30 removed 1L

## 2023-07-31 NOTE — PROGRESS NOTE ADULT - PROBLEM SELECTOR PLAN 8
Thank you for allowing us to participate in your patient's care. We will continue to follow with you. Please page [phone] for any q's or c's. The Geriatric and Palliative Medicine service has coverage 24 hours a day/ 7 days a week to provide medical recommendations regarding symptom management needs via telephone.

## 2023-07-31 NOTE — PROGRESS NOTE ADULT - SUBJECTIVE AND OBJECTIVE BOX
Patient is a 78y old  Female who presents with a chief complaint of diarrhea    SUBJECTIVE / OVERNIGHT EVENTS:   Had Na levels down to 133 on labs overnight, increased salt tabs to 2g TID. Patient seen and examined at bedside, notes improved abdominal pain. Diarrhea has resolved and mouth ulcers with better pain control at this time, able to eat solid foods. Family notes she appears weaker.  She had fever up to 101.6F. Blood cultures show gram neg rods, already on IV cefepime. She denies any chest pain, SOB. Still with edema in her lower extremities.    MEDICATIONS  (STANDING):  amLODIPine   Tablet 10 milliGRAM(s) Oral daily  apixaban 10 milliGRAM(s) Oral every 12 hours  atorvastatin 40 milliGRAM(s) Oral at bedtime  celecoxib 200 milliGRAM(s) Oral daily  chlorhexidine 2% Cloths 1 Application(s) Topical daily  dextrose 5%. 1000 milliLiter(s) (50 mL/Hr) IV Continuous <Continuous>  dextrose 5%. 1000 milliLiter(s) (100 mL/Hr) IV Continuous <Continuous>  dextrose 50% Injectable 25 Gram(s) IV Push once  dextrose 50% Injectable 25 Gram(s) IV Push once  dextrose 50% Injectable 12.5 Gram(s) IV Push once  fenofibrate Tablet 145 milliGRAM(s) Oral daily  gabapentin 100 milliGRAM(s) Oral three times a day  glucagon  Injectable 1 milliGRAM(s) IntraMuscular once  insulin lispro (ADMELOG) corrective regimen sliding scale   SubCutaneous three times a day before meals  lidocaine 2% Viscous 10 milliLiter(s) Swish and Spit four times a day  losartan 100 milliGRAM(s) Oral daily  methadone   Solution 2 milliGRAM(s) Oral two times a day  metoprolol succinate ER 25 milliGRAM(s) Oral daily  mirtazapine 15 milliGRAM(s) Oral at bedtime  pancrelipase  (CREON 36,000 Lipase Units) 1 Capsule(s) Oral three times a day with meals  pantoprazole    Tablet 40 milliGRAM(s) Oral before breakfast  sodium chloride 1 Gram(s) Oral three times a day    MEDICATIONS  (PRN):  acetaminophen     Tablet .. 650 milliGRAM(s) Oral every 6 hours PRN Temp greater or equal to 38C (100.4F), Mild Pain (1 - 3)  benzocaine 20% Spray 1 Spray(s) Topical four times a day PRN oral pain  dextrose Oral Gel 15 Gram(s) Oral once PRN Blood Glucose LESS THAN 70 milliGRAM(s)/deciliter  melatonin 3 milliGRAM(s) Oral at bedtime PRN Insomnia  morphine   Solution 4 milliGRAM(s) Oral every 4 hours PRN Moderate pain 4-6, severe pain 7-10  morphine  - Injectable 2 milliGRAM(s) IV Push every 4 hours PRN breakthrough pain  ondansetron    Tablet 4 milliGRAM(s) Oral every 8 hours PRN Nausea and/or Vomiting  zolpidem 5 milliGRAM(s) Oral at bedtime PRN Insomnia    Vital Signs Last 24 Hrs  T(C): 36.6 (31 Jul 2023 11:54), Max: 38.7 (30 Jul 2023 14:46)  T(F): 97.8 (31 Jul 2023 11:54), Max: 101.6 (30 Jul 2023 14:46)  HR: 99 (31 Jul 2023 11:54) (99 - 128)  BP: 101/49 (31 Jul 2023 11:54) (101/49 - 127/60)  RR: 20 (31 Jul 2023 11:54) (15 - 20)  SpO2: 95% (31 Jul 2023 11:54) (94% - 96%)    Parameters below as of 31 Jul 2023 11:54  Patient On (Oxygen Delivery Method): room air    PHYSICAL EXAM:  GENERAL: NAD, well-developed, well-nourished  HEAD:  Atraumatic, Normocephalic  Oral mucosa with lesions of inner cheeks bilaterally, Lesions also present on lips and base of tongue. No active bleeding.  EYES: EOMI, PERRL, conjunctiva and sclera clear  NECK: No JVD  CHEST/LUNG: Clear to auscultation bilaterally; No wheeze  HEART: Tachycardic and regular rhythm; No murmurs, rubs, or gallops  ABDOMEN: Distension and mild diffuse tenderness. PleurX in place, dressing is clean, dry, intact  EXTREMITIES:  2+ Peripheral Pulses, No clubbing, cyanosis. 2+ edema to bilateral shins. Knees non-tender bilaterally, no warmth erythema or effusion of either knee.     PSYCH: AAOx3  NEUROLOGY: non-focal  SKIN: No rashes or lesions    LABS:              Patient is a 78y old  Female who presents with a chief complaint of diarrhea    SUBJECTIVE / OVERNIGHT EVENTS:   Had Na levels down to 133 on labs overnight, increased salt tabs to 2g TID. Patient seen and examined at bedside, notes improved abdominal pain. Diarrhea has resolved and mouth ulcers with better pain control at this time, able to eat solid foods. Family notes she appears weaker.  She had fever up to 101.6F. Blood cultures show gram neg rods, already on IV cefepime. She denies any chest pain, SOB. Still with edema in her lower extremities.    MEDICATIONS  (STANDING):  amLODIPine   Tablet 10 milliGRAM(s) Oral daily  apixaban 10 milliGRAM(s) Oral every 12 hours  atorvastatin 40 milliGRAM(s) Oral at bedtime  celecoxib 200 milliGRAM(s) Oral daily  chlorhexidine 2% Cloths 1 Application(s) Topical daily  dextrose 5%. 1000 milliLiter(s) (50 mL/Hr) IV Continuous <Continuous>  dextrose 5%. 1000 milliLiter(s) (100 mL/Hr) IV Continuous <Continuous>  dextrose 50% Injectable 25 Gram(s) IV Push once  dextrose 50% Injectable 25 Gram(s) IV Push once  dextrose 50% Injectable 12.5 Gram(s) IV Push once  fenofibrate Tablet 145 milliGRAM(s) Oral daily  gabapentin 100 milliGRAM(s) Oral three times a day  glucagon  Injectable 1 milliGRAM(s) IntraMuscular once  insulin lispro (ADMELOG) corrective regimen sliding scale   SubCutaneous three times a day before meals  lidocaine 2% Viscous 10 milliLiter(s) Swish and Spit four times a day  losartan 100 milliGRAM(s) Oral daily  methadone   Solution 2 milliGRAM(s) Oral two times a day  metoprolol succinate ER 25 milliGRAM(s) Oral daily  mirtazapine 15 milliGRAM(s) Oral at bedtime  pancrelipase  (CREON 36,000 Lipase Units) 1 Capsule(s) Oral three times a day with meals  pantoprazole    Tablet 40 milliGRAM(s) Oral before breakfast  sodium chloride 1 Gram(s) Oral three times a day    MEDICATIONS  (PRN):  acetaminophen     Tablet .. 650 milliGRAM(s) Oral every 6 hours PRN Temp greater or equal to 38C (100.4F), Mild Pain (1 - 3)  benzocaine 20% Spray 1 Spray(s) Topical four times a day PRN oral pain  dextrose Oral Gel 15 Gram(s) Oral once PRN Blood Glucose LESS THAN 70 milliGRAM(s)/deciliter  melatonin 3 milliGRAM(s) Oral at bedtime PRN Insomnia  morphine   Solution 4 milliGRAM(s) Oral every 4 hours PRN Moderate pain 4-6, severe pain 7-10  morphine  - Injectable 2 milliGRAM(s) IV Push every 4 hours PRN breakthrough pain  ondansetron    Tablet 4 milliGRAM(s) Oral every 8 hours PRN Nausea and/or Vomiting  zolpidem 5 milliGRAM(s) Oral at bedtime PRN Insomnia    Vital Signs Last 24 Hrs  T(C): 36.6 (31 Jul 2023 11:54), Max: 38.7 (30 Jul 2023 14:46)  T(F): 97.8 (31 Jul 2023 11:54), Max: 101.6 (30 Jul 2023 14:46)  HR: 99 (31 Jul 2023 11:54) (99 - 128)  BP: 101/49 (31 Jul 2023 11:54) (101/49 - 127/60)  RR: 20 (31 Jul 2023 11:54) (15 - 20)  SpO2: 95% (31 Jul 2023 11:54) (94% - 96%)    Parameters below as of 31 Jul 2023 11:54  Patient On (Oxygen Delivery Method): room air    PHYSICAL EXAM:  GENERAL: NAD, well-developed, well-nourished  HEENT: AT, NC, Oral mucosa with lesions of inner cheeks b/l, lips and base of tongue. No active bleeding.  EYES: EOMI, PERRL, conjunctiva and sclera clear  NECK: No JVD  CHEST/LUNG: Clear to auscultation bilaterally; No wheeze  HEART: Tachycardic and regular rhythm; No murmurs, rubs, or gallops  ABDOMEN: Distension and mild diffuse tenderness. PleurX in place, dressing is clean, dry, intact  EXTREMITIES:  2+ Peripheral Pulses, No clubbing, cyanosis. 2+ edema to bilateral shins. Knees non-tender bilaterally, no warmth erythema or effusion of either knee.     PSYCH: AAOx3  NEUROLOGY: non-focal  SKIN: No rashes or lesions    LABS:

## 2023-07-31 NOTE — DIETITIAN INITIAL EVALUATION ADULT - RD TO REMAIN AVAILABLE
Reducing Knee Pain and Swelling    Many treatments can help reduce pain and swelling in your knee. Your healthcare provider or physical therapist may suggest one or more of the following treatments:    Icing your knee helps reduce swelling. You may be asked to ice your knee once a day or more. Apply ice for about 15 to 20 minutes at a time, with at least 40 minutes between sessions. Always keep a towel between the ice and your skin.     Keeping your leg raised above your heart helps excess fluid flow out of your knee joint. This reduces swelling.    Compression means wrapping an elastic bandage or neoprene sleeve snugly around your knees. This keeps fluid from collecting in your knee joint.    Electrical stimulation, done by a physical therapist or , can help reduce excess fluid in your knee joint.    Anti-inflammatory medicines may be prescribed by your healthcare provider. You may take pills or receive injections in your knee.    Isometric (rosaura) exercises strengthen the muscles that support your knee joint. They also help reduce excess fluid in your knee.    Massage helps fluid drain away from your knee.  Date Last Reviewed: 10/13/2015    2563-9921 [employer]. [address] [place] [address]. All rights reserved. This information is not intended as a substitute for professional medical care. Always follow your healthcare professional's instructions.        Understanding Medial Collateral Ligament Sprain    The knee is a complex joint where the thighbone (femur) meets the shinbone (tibia). Strong tissues called ligaments connect these bones together. Ligaments also keep the bones aligned, so the knee only bends how it is supposed to. The medial collateral ligament (MCL) runs across the knee joint on the medial side of the leg. Injury to this ligament may be very painful. The knee may also not work the way it should.  Causes of an MCL sprain  An MCL sprain often  happens when the knee joint is pushed beyond its normal range of motion. It is most common during a blow to the knee from the outside, pushing the knee inward. It may also happen if the knee is forced into a twist. These movements stretch and tear the MCL. Other parts of the knee may be damaged along with the MCL.  Symptoms of an MCL sprain  These include:    Knee pain    Knee swelling    Locking of the joint    Wobbly or unstable feeling in the joint  Treatment for an MCL sprain  Treatment will depend on the severity of the sprain and whether there is damage to other parts of the knee. Options often include:    Rest. This allows the knee to heal. Activities that stress the knee should be avoided. Crutches, a knee brace, or both may also be recommended for a short time.    Cold packs and elevation of the knee. These help reduce swelling and relieve pain.    Compression. The knee may be wrapped with a bandage to help reduce swelling.    Medicines. These help relieve pain and swelling.    Exercises. These help improve the knee s stability, strength, and range of motion.  If the injury is severe or several parts of the joint are involved, surgery may be an option. Surgery repairs the MCL and any other damaged structures.     When to call your healthcare provider  Call your healthcare provider right away if you have any of these:    Pain, swelling, or instability that doesn t get better with treatment or gets worse    New symptoms   Date Last Reviewed: 3/10/2016    7333-9719 [employer]. [address] [place] [address], [place] [address]. All rights reserved. This information is not intended as a substitute for professional medical care. Always follow your healthcare professional's instructions.         yes

## 2023-07-31 NOTE — PROGRESS NOTE ADULT - PROBLEM SELECTOR PLAN 5
> CT abd/pelvis shows interval increase in now very large amount of abdominal and pelvic ascites since prior CT of 7/1/2023.  > Pleurx placed successfully 7/26 with 2L ascitic fluid drained  > Last drained Sunday 7/30 ~1L

## 2023-07-31 NOTE — DIETITIAN INITIAL EVALUATION ADULT - OTHER INFO
78F with hx of metastatic pancreatic CA s/p whipple on Chemo (last session 7/17) presents with NVD and abdominal pain likely in setting of worsening metastatic disease, per chart.     As per  by bedside, patient has poor appetite. As per RN flow sheet, patient is consuming ~51-75% of meals. Patient denies any difficulty chewing or swallowing difficulties, any diarrhea, nausea, vomiting during visit. C/O constipation. Last bowel movement 7/28/2023. As per H&P dated 7/23, patient had 5 episodes of diarrhea PTA. Consider adding probiotics, to promote gut health. Noted patient is on pancrelipase. On Zofran for N/V. Per RN flow sheet, current weight: 57.8kg/127.4lbs (7/28), 50kg/110.2lbs (7/23). As per last RD note dated 6/15/2022, weight history: 50.8kg. Noted patient with fluid related weight gain of +7.8kg/+15%BW x 5days. Weight is relatively stable x 1 month. However, noted patient has 3+ edema to left leg, right leg, and ascites when admitted. Could mask weight loss. Noted patient is on Remeron. Small frequent meals, different oral nutritional supplement discussed with patient and  during visit. Amendable to Glucerna supplement. Labs reviewed, noted low phos-2.3(7/31), recommend monitor and replete electrolytes(phos). Noted last HgA1c- 6.5%(7/3), currently on insulin coverage.

## 2023-07-31 NOTE — PROGRESS NOTE ADULT - PROBLEM SELECTOR PLAN 8
had sudden onset of knee pain after getting out of bed on 7/25, now improved. Has hx of osteoarthritis. Right knee XR unremarkable, no DVT of RLE on US. Knee pain likely 2/2 OA.  -c/w current pain regimen per palliative  -will give Toradol 15mg x1  -will resume Celebrex 200mg daily had sudden onset of knee pain after getting out of bed on 7/25, now improved. Has hx of osteoarthritis. Right knee XR unremarkable, no DVT of RLE on US. Knee pain likely 2/2 OA.  - received Toradol 15mg x1  -c/w pain regimen per palliative  -will resume Celebrex 200mg daily

## 2023-08-01 NOTE — PROGRESS NOTE ADULT - PROBLEM SELECTOR PLAN 5
US duplex on 7/26 found incidental LLE DVTs x2 (Acute non-occlusive DVT noted within the distal left femoral and popliteal veins. Acute, occlusive DVT noted within the left posterior tibial and soleal veins). She has no left leg pain and on exam no calf tenderness, warmth, or erythema. s/p PleurX placement 7/26. On heparin gtt, in therapeutic range  -c/w Heparin gtt  -when dc can transition to eliquis (total 7 days high dose then can switch to 5mg BID - 8/4)

## 2023-08-01 NOTE — PROGRESS NOTE ADULT - PROBLEM SELECTOR PLAN 9
Thank you for allowing us to participate in your patient's care. We will continue to follow with you. Please page [phone] for any q's or c's. The Geriatric and Palliative Medicine service has coverage 24 hours a day/ 7 days a week to provide medical recommendations regarding symptom management needs via telephone.

## 2023-08-01 NOTE — PROGRESS NOTE ADULT - PROBLEM SELECTOR PLAN 6
hx metastatic pancreatic cancer with mets to liver and lung. s/p whipple, on chemo with last treatment 7/17. She has chronic diffuse body pain, including chest, abdomen, and extremities, in setting of metastatic disease and chemo. Pain managed by oxycodone and morphine at home. Patient and family no longer interested in DMT and would like to pursue hospice, will consult palliative and place hospice referral.  -plan for hospice on dc  -c/w pain regimen per palliative: methadone 4mg BID, Gabapentin 100mg TID, Morphine 4mg PO q4 PRN for moderate and 6mg PO q4 PRN severe pain  -c/w Zofran 4mg q8 PRN nausea  -Resumed home Remeron 15mg daily for decreased PO intake

## 2023-08-01 NOTE — PROGRESS NOTE ADULT - PROBLEM SELECTOR PLAN 4
Improving 8/1  Patient complains of painful sores throughout mouth and tongue that have been present for several weeks but have worsened within the past week. On exam has tender lesions to mucosa of inner cheeks, base of tongue, and lips. Low suspicion for thrush or other ongoing infection. Her sores are likely 2/2 chemotherapy.   > c/w Viscous lidocaine ATC  > c/w Benzocaine spray PRN

## 2023-08-01 NOTE — PROGRESS NOTE ADULT - PROBLEM SELECTOR PLAN 1
Patient complains of painful sores in mouth and tongue that have been present for several weeks but have worsened within the past week. On exam has tender lesions to mucosa of inner cheeks, base of tongue, and lips. Low suspicion for thrush or other ongoing infection. Her sores are likely 2/2 chemotherapy.  -c/w 2% viscous lidocaine per palliative recs  -c/w pain regimen per palliative: Methadone 4mg BID, Gabapentin 100mg TID, Morphine 4mg PO q4 for moderate pain, and Morphine 6mg PO q4 for severe pain  -d/c IV morphine for dc planning  -monitor pain

## 2023-08-01 NOTE — PROGRESS NOTE ADULT - PROBLEM SELECTOR PLAN 3
Patient has worsening recurrent abdominal distension and BLE edema, likely in setting of metastatic disease. Large volume ascites confirmed on CT A/P. Abd US 7/24 confirming moderate to severe ascites. ascites improved s/p PleurX placement 7/26. New blood cultures 7/30 show GNR with E coli.  - Paracentesis culture negative  - monitor volume status  - strict I&O  - s/p Lasix 20mg IV x1  - 7/30 removed 1L

## 2023-08-01 NOTE — PROGRESS NOTE ADULT - ATTENDING COMMENTS
78F with metastatic cancer, hyponatremia, chronic pain, mucositis secondary to chemo. Na remains low, tolerating salt tab. Patient still with poor PO. States diarrhea improved. Patient w/ gram negative bacteremia, peritoneal culture negative. Anticipate source diarrheal illness now improved vs a UTI. UCx pending. Ucx pending. If with repeat diarrhea will send stool culture. Will not hold discharge goal is comfort. Will plan for 7 day course of empiric abx for gram negative sepsis.     Palliative recs appreciated:   Increased Methadone to 4mg Solution BID for long acting pain control     Appreciate SW involvement in disposition.    Discontinuing fingersticks, no more labs draws.

## 2023-08-01 NOTE — PROGRESS NOTE ADULT - SUBJECTIVE AND OBJECTIVE BOX
Patient is a 78y old  Female who presents with a chief complaint of Abdominal pain     (31 Jul 2023 16:18)      INTERVAL HPI/OVERNIGHT EVENTS:      REVIEW OF SYSTEMS:  CONSTITUTIONAL: No fever, weight loss, or fatigue  EYES: No eye pain, visual disturbances, or discharge  ENMT:  No difficulty hearing, tinnitus, vertigo; No sinus or throat pain  NECK: No pain or stiffness  BREASTS: No pain, masses, or nipple discharge  RESPIRATORY: No cough, wheezing, chills or hemoptysis; No shortness of breath  CARDIOVASCULAR: No chest pain, palpitations, dizziness, or leg swelling  GASTROINTESTINAL: No abdominal or epigastric pain. No nausea, vomiting, or hematemesis; No diarrhea or constipation. No melena or hematochezia.  GENITOURINARY: No dysuria, frequency, hematuria, or incontinence  NEUROLOGICAL: No headaches, memory loss, loss of strength, numbness, or tremors  SKIN: No itching, burning, rashes, or lesions   LYMPH NODES: No enlarged glands  ENDOCRINE: No heat or cold intolerance; No hair loss  MUSCULOSKELETAL: No joint pain or swelling; No muscle, back, or extremity pain  PSYCHIATRIC: No depression, anxiety, mood swings, or difficulty sleeping  HEME/LYMPH: No easy bruising, or bleeding gums  ALLERY AND IMMUNOLOGIC: No hives or eczema  FAMILY HISTORY:  FH: HTN (hypertension) (Mother)    Family history of leukemia (Child)      T(C): 37.2 (08-01-23 @ 05:00), Max: 37.2 (08-01-23 @ 05:00)  HR: 110 (08-01-23 @ 05:00) (86 - 110)  BP: 105/55 (08-01-23 @ 05:00) (100/49 - 105/55)  RR: 18 (08-01-23 @ 05:00) (18 - 20)  SpO2: 97% (08-01-23 @ 05:00) (95% - 97%)  Wt(kg): --Vital Signs Last 24 Hrs  T(C): 37.2 (01 Aug 2023 05:00), Max: 37.2 (01 Aug 2023 05:00)  T(F): 98.9 (01 Aug 2023 05:00), Max: 98.9 (01 Aug 2023 05:00)  HR: 110 (01 Aug 2023 05:00) (86 - 110)  BP: 105/55 (01 Aug 2023 05:00) (100/49 - 105/55)  BP(mean): --  RR: 18 (01 Aug 2023 05:00) (18 - 20)  SpO2: 97% (01 Aug 2023 05:00) (95% - 97%)    Parameters below as of 01 Aug 2023 05:00  Patient On (Oxygen Delivery Method): room air      No Known Allergies      PHYSICAL EXAM:  GENERAL: NAD, well-groomed, well-developed  HEAD:  Atraumatic, Normocephalic  EYES: EOMI, PERRLA, conjunctiva and sclera clear  ENMT: No tonsillar erythema, exudates, or enlargement; Moist mucous membranes, Good dentition, No lesions  NECK: Supple, No JVD, Normal thyroid  NERVOUS SYSTEM:  Alert & Oriented X3, Good concentration; Motor Strength 5/5 B/L upper and lower extremities; DTRs 2+ intact and symmetric  CHEST/LUNG: Clear to percussion bilaterally; No rales, rhonchi, wheezing, or rubs  HEART: Regular rate and rhythm; No murmurs, rubs, or gallops  ABDOMEN: Soft, Nontender, Nondistended; Bowel sounds present  EXTREMITIES:  2+ Peripheral Pulses, No clubbing, cyanosis, or edema  LYMPH: No lymphadenopathy noted  SKIN: No rashes or lesions    Consultant(s) Notes Reviewed:  [x ] YES  [ ] NO  Care Discussed with Consultants/Other Providers [ x] YES  [ ] NO    LABS:      Culture - Body Fluid with Gram Stain (collected 07-31-23 @ 16:16)  Source: Peritoneal Peritoneal Fluid  Gram Stain (08-01-23 @ 00:27):    No polymorphonuclear leukocytes seen per low power field    No organisms seen per oil power field      RADIOLOGY & ADDITIONAL TESTS:    Imaging Personally Reviewed:  [ ] YES  [ ] NO  acetaminophen     Tablet .. 650 milliGRAM(s) Oral every 6 hours PRN  apixaban 10 milliGRAM(s) Oral every 12 hours  atorvastatin 40 milliGRAM(s) Oral at bedtime  benzocaine 20% Spray 1 Spray(s) Topical four times a day PRN  cefepime   IVPB 2000 milliGRAM(s) IV Intermittent every 12 hours  celecoxib 200 milliGRAM(s) Oral daily  chlorhexidine 2% Cloths 1 Application(s) Topical daily  dextrose 5%. 1000 milliLiter(s) IV Continuous <Continuous>  dextrose 5%. 1000 milliLiter(s) IV Continuous <Continuous>  dextrose 50% Injectable 12.5 Gram(s) IV Push once  dextrose 50% Injectable 25 Gram(s) IV Push once  dextrose 50% Injectable 25 Gram(s) IV Push once  dextrose Oral Gel 15 Gram(s) Oral once PRN  fenofibrate Tablet 145 milliGRAM(s) Oral daily  gabapentin 100 milliGRAM(s) Oral three times a day  glucagon  Injectable 1 milliGRAM(s) IntraMuscular once  insulin lispro (ADMELOG) corrective regimen sliding scale   SubCutaneous three times a day before meals  lidocaine 2% Viscous 10 milliLiter(s) Swish and Spit four times a day  melatonin 3 milliGRAM(s) Oral at bedtime PRN  methadone   Solution 4 milliGRAM(s) Oral two times a day  metoprolol succinate ER 25 milliGRAM(s) Oral daily  mirtazapine 15 milliGRAM(s) Oral at bedtime  morphine   Solution 4 milliGRAM(s) Oral every 4 hours PRN  morphine   Solution 6 milliGRAM(s) Oral every 4 hours PRN  ondansetron    Tablet 4 milliGRAM(s) Oral every 8 hours PRN  pancrelipase  (CREON 36,000 Lipase Units) 1 Capsule(s) Oral three times a day with meals  pantoprazole    Tablet 40 milliGRAM(s) Oral before breakfast  sodium chloride 2 Gram(s) Oral three times a day  zolpidem 5 milliGRAM(s) Oral at bedtime PRN      HEALTH ISSUES - PROBLEM Dx:  Mouth sore    Diarrhea    Ascites    Hyponatremia    Hypertension    CAD (coronary artery disease)    Need for prophylactic measure    Chronic pain of multiple sites    Pancreatic cancer    Neoplasm related pain    ACP (advance care planning)    Palliative care encounter    Adult failure to thrive    DM (diabetes mellitus)    Right knee pain    Nausea and/or vomiting    Acute deep vein thrombosis (DVT)    Constipation    Hematuria           Patient is a 78y old  Female who presents with a chief complaint of Abdominal pain    INTERVAL HPI/OVERNIGHT EVENTS: Patient had no acute events overnight. She is taking food by mouth. She denies any n/v, constipation. Diarrhea resolved 7/30. She denies any new symptoms.    REVIEW OF SYSTEMS:  CONSTITUTIONAL: No fever  EYES: No eye pain  NECK: No pain or stiffness  RESPIRATORY: No cough, wheezing, chills or hemoptysis; No shortness of breath  CARDIOVASCULAR: No chest pain, palpitations, dizziness, or leg swelling  GASTROINTESTINAL: No abdominal or epigastric pain. No nausea, vomiting, or hematemesis; No diarrhea or constipation. No melena or hematochezia.  GENITOURINARY: No dysuria, frequency  NEUROLOGICAL: No headaches, memory loss, loss of strength, numbness, or tremors  SKIN: No itching, burning, rashes, or lesions   MUSCULOSKELETAL: + global pain  HEME/LYMPH: No easy bruising, or bleeding gums    FAMILY HISTORY:  FH: HTN (hypertension) (Mother)  Family history of leukemia (Child)    T(C): 37.2 (08-01-23 @ 05:00), Max: 37.2 (08-01-23 @ 05:00)  HR: 110 (08-01-23 @ 05:00) (86 - 110)  BP: 105/55 (08-01-23 @ 05:00) (100/49 - 105/55)  RR: 18 (08-01-23 @ 05:00) (18 - 20)  SpO2: 97% (08-01-23 @ 05:00) (95% - 97%)  Wt(kg): --Vital Signs Last 24 Hrs  T(C): 37.2 (01 Aug 2023 05:00), Max: 37.2 (01 Aug 2023 05:00)  T(F): 98.9 (01 Aug 2023 05:00), Max: 98.9 (01 Aug 2023 05:00)  HR: 110 (01 Aug 2023 05:00) (86 - 110)  BP: 105/55 (01 Aug 2023 05:00) (100/49 - 105/55)  BP(mean): --  RR: 18 (01 Aug 2023 05:00) (18 - 20)  SpO2: 97% (01 Aug 2023 05:00) (95% - 97%)    Parameters below as of 01 Aug 2023 05:00  Patient On (Oxygen Delivery Method): room air      No Known Allergies    PHYSICAL EXAM:  GENERAL: NAD  HEAD:  Atraumatic, Normocephalic  EYES: EOMI, conjunctiva and sclera clear  ENMT: Moist mucous membranes  NECK: Supple  NERVOUS SYSTEM:  Alert & Oriented X3  CHEST/LUNG: Clear to percussion bilaterally; No rales, rhonchi, wheezing, or rubs  HEART: Regular rate and rhythm; No murmurs, rubs, or gallops  ABDOMEN: Soft, Nontender, Nondistended; Bowel sounds present  EXTREMITIES:  2+ Peripheral Pulses, No clubbing, cyanosis, 1+ b/l pitting edema  SKIN: No rashes or lesions    Consultant(s) Notes Reviewed:  [x ] YES  [ ] NO  Care Discussed with Consultants/Other Providers [ x] YES  [ ] NO    LABS:    Culture - Body Fluid with Gram Stain (collected 07-31-23 @ 16:16)  Source: Peritoneal Peritoneal Fluid  Gram Stain (08-01-23 @ 00:27):    No polymorphonuclear leukocytes seen per low power field    No organisms seen per oil power field  Urinalysis (07.31.23 @ 20:00)   pH Urine: 6.0  Glucose Qualitative, Urine: Negative mg/dL  Blood, Urine: Negative  Color: Dark Yellow  Urine Appearance: Clear  Bilirubin: Small  Ketone - Urine: Trace mg/dL  Specific Gravity: 1.024  Protein, Urine: Trace mg/dL  Urobilinogen: 1.0 mg/dL  Nitrite: Negative  Leukocyte Esterase Concentration: Negative  acetaminophen     Tablet .. 650 milliGRAM(s) Oral every 6 hours PRN  apixaban 10 milliGRAM(s) Oral every 12 hours  atorvastatin 40 milliGRAM(s) Oral at bedtime  benzocaine 20% Spray 1 Spray(s) Topical four times a day PRN  cefepime   IVPB 2000 milliGRAM(s) IV Intermittent every 12 hours  celecoxib 200 milliGRAM(s) Oral daily  chlorhexidine 2% Cloths 1 Application(s) Topical daily  dextrose 5%. 1000 milliLiter(s) IV Continuous <Continuous>  dextrose 5%. 1000 milliLiter(s) IV Continuous <Continuous>  dextrose 50% Injectable 12.5 Gram(s) IV Push once  dextrose 50% Injectable 25 Gram(s) IV Push once  dextrose 50% Injectable 25 Gram(s) IV Push once  dextrose Oral Gel 15 Gram(s) Oral once PRN  fenofibrate Tablet 145 milliGRAM(s) Oral daily  gabapentin 100 milliGRAM(s) Oral three times a day  glucagon  Injectable 1 milliGRAM(s) IntraMuscular once  insulin lispro (ADMELOG) corrective regimen sliding scale   SubCutaneous three times a day before meals  lidocaine 2% Viscous 10 milliLiter(s) Swish and Spit four times a day  melatonin 3 milliGRAM(s) Oral at bedtime PRN  methadone   Solution 4 milliGRAM(s) Oral two times a day  metoprolol succinate ER 25 milliGRAM(s) Oral daily  mirtazapine 15 milliGRAM(s) Oral at bedtime  morphine   Solution 4 milliGRAM(s) Oral every 4 hours PRN  morphine   Solution 6 milliGRAM(s) Oral every 4 hours PRN  ondansetron    Tablet 4 milliGRAM(s) Oral every 8 hours PRN  pancrelipase  (CREON 36,000 Lipase Units) 1 Capsule(s) Oral three times a day with meals  pantoprazole    Tablet 40 milliGRAM(s) Oral before breakfast  sodium chloride 2 Gram(s) Oral three times a day  zolpidem 5 milliGRAM(s) Oral at bedtime PRN

## 2023-08-01 NOTE — PROGRESS NOTE ADULT - SUBJECTIVE AND OBJECTIVE BOX
[facility]  Emerita Alejo, Palliative Care Nurse Practitioner  Contact Info: Page [phone] (Including Nights/Weekends), Message on Microsoft Teams (Emerita Alejo), or leave VM at Palliative Office [phone] (non-urgent)    Date of Service 23 @ 14:53    SUBJECTIVE AND OBJECTIVE:  Indication for Geriatrics and Palliative Care Services/INTERVAL HPI: Pain management / GOC  Pt seen this AM with Indonesian  #[id number], pt shared pain is well controlled on current regimen and oral sores are improving pt eating fruit during encounter.  at the bedside.     OVERNIGHT EVENTS: Pt required PRN PO Morphine 4mg x 1 & PO Morphine 6mg x 3 within 24 hour period (8a-8a).    DNR on chart:DNI  DNI      Allergies    No Known Allergies    Intolerances    MEDICATIONS  (STANDING):  apixaban 10 milliGRAM(s) Oral every 12 hours  atorvastatin 40 milliGRAM(s) Oral at bedtime  cefepime   IVPB 2000 milliGRAM(s) IV Intermittent every 12 hours  celecoxib 200 milliGRAM(s) Oral daily  chlorhexidine 2% Cloths 1 Application(s) Topical daily  dextrose 5%. 1000 milliLiter(s) (100 mL/Hr) IV Continuous <Continuous>  dextrose 5%. 1000 milliLiter(s) (50 mL/Hr) IV Continuous <Continuous>  dextrose 50% Injectable 25 Gram(s) IV Push once  dextrose 50% Injectable 12.5 Gram(s) IV Push once  dextrose 50% Injectable 25 Gram(s) IV Push once  fenofibrate Tablet 145 milliGRAM(s) Oral daily  gabapentin 100 milliGRAM(s) Oral three times a day  glucagon  Injectable 1 milliGRAM(s) IntraMuscular once  insulin lispro (ADMELOG) corrective regimen sliding scale   SubCutaneous three times a day before meals  lidocaine 2% Viscous 10 milliLiter(s) Swish and Spit four times a day  methadone   Solution 4 milliGRAM(s) Oral two times a day  metoprolol succinate ER 25 milliGRAM(s) Oral daily  mirtazapine 15 milliGRAM(s) Oral at bedtime  pancrelipase  (CREON 36,000 Lipase Units) 1 Capsule(s) Oral three times a day with meals  pantoprazole    Tablet 40 milliGRAM(s) Oral before breakfast  sodium chloride 2 Gram(s) Oral three times a day    MEDICATIONS  (PRN):  acetaminophen     Tablet .. 650 milliGRAM(s) Oral every 6 hours PRN Temp greater or equal to 38C (100.4F), Mild Pain (1 - 3)  benzocaine 20% Spray 1 Spray(s) Topical four times a day PRN oral pain  dextrose Oral Gel 15 Gram(s) Oral once PRN Blood Glucose LESS THAN 70 milliGRAM(s)/deciliter  melatonin 3 milliGRAM(s) Oral at bedtime PRN Insomnia  morphine   Solution 6 milliGRAM(s) Oral every 4 hours PRN Severe Pain (7 - 10)  morphine   Solution 4 milliGRAM(s) Oral every 4 hours PRN Moderate Pain (4 - 6)  ondansetron    Tablet 4 milliGRAM(s) Oral every 8 hours PRN Nausea and/or Vomiting  zolpidem 5 milliGRAM(s) Oral at bedtime PRN Insomnia        -------------------------------------------------------------------------------------------------------  ITEMS UNCHECKED ARE NOT PRESENT    PRESENT SYMPTOMS: [ ]Unable to self-report - see [ ] CPOT [ ] PAINADS [ ] RDOS  Source if other than patient:  [ ]Family   [ ]Team       Pain: [ X]yes [ ]no  QOL impact - recurrent hospitalizations'   Location - abd/ [facility]               Aggravating factors - movement   Quality - pressure/sharp  Radiation - back  Timing- intermittent   Severity (0-10 scale): 5/10  Minimal acceptable level (0-10 scale)/Pain goal: 4/10    CPOT:    https://www.[facility].org/getattachment/[email or web address]/Critical-Care-Pain-Observation-Tool-(CPOT)    PAINAD Score: See PAINAD tool and score below       RDOS: See RDOS tool and score below   0 to 2  minimal or no respiratory distress   3  mild distress  4 to 6 moderate distress  >7 severe distress    Dyspnea:                           [ ]Mild [ ]Moderate [ ]Severe  Anxiety:                             [ ]Mild [ ]Moderate [ ]Severe  Fatigue:                             [ ]Mild [ ]Moderate [ ]Severe  Nausea:                             [ ]Mild [ ]Moderate [ ]Severe  Loss of appetite:              [ ]Mild [ ]Moderate [ ]Severe  Constipation:                    [ ]Mild [ ]Moderate [ ]Severe  Other Symptoms:  [X ]All other review of systems negative     Home Medications for Symptoms if present:    I Stop Reference no:     -------------------------------------------------------------------------------------------------------  PCSSQ[Palliative Care Spiritual Screening Question]   Severity (0-10):  Score of 4 or > indicate consideration of Chaplaincy referral.  Chaplaincy Referral: [ ] yes [ ] refused [ ] following [ X] Deferred     Caregiver [place]? : [ ] yes [ ] no [ ] Deferred [ ] Declined             Social work referral [X ] Patient & Family Centered Care Referral [ ]     Anticipatory Grief present?:  [ ] yes [ ] no  [ ] Deferred                  Social work referral [X ] Chaplaincy Referral [ ]    -------------------------------------------------------------------------------------------------------  PHYSICAL EXAM:  Vital Signs Last 24 Hrs  T(C): 37.6 (01 Aug 2023 12:27), Max: 37.6 (01 Aug 2023 12:27)  T(F): 99.6 (01 Aug 2023 12:27), Max: 99.6 (01 Aug 2023 12:27)  HR: 96 (01 Aug 2023 12:27) (86 - 110)  BP: 126/71 (01 Aug 2023 12:27) (100/49 - 126/71)  BP(mean): --  RR: 14 (01 Aug 2023 12:) (14 - 18)  SpO2: 97% (01 Aug 2023 12:) (96% - 97%)    Parameters below as of 01 Aug 2023 12:27  Patient On (Oxygen Delivery Method): room air     I&O's Summary    2023 07:01  -  01 Aug 2023 07:00  --------------------------------------------------------  IN: 0 mL / OUT: 750 mL / NET: -750 mL         GENERAL: Indonesian  # [id number]  [ ]Cachexia  [ X]Alert  [X ]Oriented x 4  [ ]Lethargic  [ ]Unarousable  [X ]Verbal  [ ]Non-Verbal    Behavioral:   [ ] Anxiety  [ ] Delirium [ ] Agitation [x ] Other    HEENT:  [ ]Normal   [ ]Dry mouth   [ ]ET Tube/Trach  [X]Oral lesions    PULMONARY:   [X]Clear [ ]Tachypnea  [ ]Audible excessive secretions   [ ]Rhonchi        [ ]Right [ ]Left [ ]Bilateral  [ ]Crackles        [ ]Right [ ]Left [ ]Bilateral  [ ]Wheezing     [ ]Right [ ]Left [ ]Bilateral  [ ]Diminished breath sounds [ ]right [ ]left [ ]bilateral    CARDIOVASCULAR:    [ X]Regular [ ]Irregular [ ]Tachy  [ ]Tomy [ ]Murmur [ ]Other    GASTROINTESTINAL: abd pleurx in place  [ X]Soft  [ X]Distended   [ ]+BS  [ ]Non tender [ X]Tender  [ ]Other [ ]PEG [ ]OGT/ NGT  Last BM:     GENITOURINARY:  [X ]Normal [ ] Incontinent   [ ]Oliguria/Anuria   [ ]Rolle    MUSCULOSKELETAL:   [ ]Normal   [X]Weakness  [ ]Bed/Wheelchair bound [ ]Edema    NEUROLOGIC:   [X ]No focal deficits  [ ]Cognitive impairment  [ ]Dysphagia [ ]Dysarthria [ ]Paresis [ ]Other     SKIN:   [X ]Normal  [ ]Rash  [ ]Other  [ ]Pressure ulcer(s)       Present on admission [ ]y [ ]n      -------------------------------------------------------------------------------------------------------  CRITICAL CARE:  [ ]Shock Present  [ ]Septic [ ]Cardiogenic [ ]Neurologic [ ]Hypovolemic  [ ]Vasopressors [ ]Inotropes  [ ]Respiratory failure present [ ]Mechanical Ventilation [ ]Non-invasive ventilatory support [ ]High-Flow   [ ]Acute  [ ]Chronic [ ]Hypoxic  [ ]Hypercarbic [ ]Other  [ ]Other organ failure     -------------------------------------------------------------------------------------------------------  LABS:                        9.3    8.24  )-----------( 365      ( 2023 06:16 )             30.5   07-31    122<L>  |  101  |  16  ----------------------------<  169<H>  4.6   |  15<L>  |  0.51    Ca    7.7<L>      2023 06:16  Phos  2.3       Mg     2.00     31    TPro  5.3<L>  /  Alb  2.1<L>  /  TBili  0.8  /  DBili  x   /  AST  47<H>  /  ALT  12  /  AlkPhos  385<H>  31  PT/INR - ( 2023 06:16 )   PT: 21.3 sec;   INR: 1.94 ratio         PTT - ( 2023 06:16 )  PTT:31.7 sec    Urinalysis Basic - ( 2023 20:00 )    Color: Dark Yellow / Appearance: Clear / S.024 / pH: x  Gluc: x / Ketone: Trace mg/dL  / Bili: Small / Urobili: 1.0 mg/dL   Blood: x / Protein: Trace mg/dL / Nitrite: Negative   Leuk Esterase: Negative / RBC: x / WBC x   Sq Epi: x / Non Sq Epi: x / Bacteria: x      -------------------------------------------------------------------------------------------------------  RADIOLOGY & ADDITIONAL STUDIES: No new studies       -------------------------------------------------------------------------------------------------------  Protein Calorie Malnutrition Present: [ ]mild [ ]moderate [ ]severe [ ]underweight [ ]morbid obesity  https://www.andeal.org/vault/2440/web/files/ONC/Table_Clinical%20Characteristics%20to%20Document%20Malnutrition-White%20JV%20et%20al%2020.pdf    Height (cm): 152.5 (23 @ 07:19), 152.5 (23 @ 01:12), 152.5 (23 @ 10:19)  Weight (kg): 50 (23 @ 18:02), 58.0009 (23 @ 13:00), 58.7 (23 @ 18:10)  BMI (kg/m2): 21.5 (23 @ 18:02), 24.9 (07-23-23 @ 07:19), 24.9 (23 @ 13:00)    Palliative Performance Status Version 2:   See PPSv2 tool and score below       [ ]PPSV2 < or = 30%  [ ]significant weight loss [ ]poor nutritional intake [ ]anasarca[ ]Artificial Nutrition    Other REFERRALS:  [ X]Hospice  [ ]Child Life  [ ]Social Work  [ ]Case management [ ]Holistic Therapy     -------------------------------------------------------------------------------------------------------

## 2023-08-01 NOTE — CHART NOTE - NSCHARTNOTEFT_GEN_A_CORE
78F with hx of metastatic pancreatic CA s/p whipple prior on Chemo (last session 7/17, seen by Dr. David). Patient with worsening metastatic disease. GOC discussion had. Family and patient decided no against any further cancer treatment including chemo. There is no plan for chemo at long term care. 78F with hx of metastatic pancreatic CA s/p whipple prior on Chemo (last session 7/17, seen by Dr. David). Patient with worsening metastatic disease. Goals of care discussion had. Family and patient decided no against any further cancer treatment including chemo. There is no plan for chemo at long term care. 78F with hx of metastatic pancreatic CA s/p whipple prior on Chemo (last session 7/17, seen by Dr. David). Patient with worsening metastatic disease. Goals of care discussion had. Family and patient decided against any further cancer treatment including chemo. There is no plan for chemo at long term care.

## 2023-08-01 NOTE — PROGRESS NOTE ADULT - PROBLEM SELECTOR PLAN 1
Pt known to Dr. Juan David @ [facility]  > Last chemotherapy 7/17   > Pt no longer interested in DMT - hospice appropriate   > Plan originally was home wt hospice, however, family feels like pt care needs are too much for them. Interested in NH with hospice - SW aware.

## 2023-08-01 NOTE — PROGRESS NOTE ADULT - PROBLEM SELECTOR PLAN 2
Home regimen: Methadone 1mg solution QHS, Morphine 3mg solution Q4 hrs PRN pain, Gabapentin 100 TID  > /w Methadone 4mg BID EKG 7/30 qtc 431  > c/w Gabapentin 100mg TID   > PO morphine solution 4mg Q4hrs PRN moderate  > PO morphine solution 6mg Q4hrs PRN severe pain  > Discontinued IV Morphine as dc planning  > Bowel regimen while on opioids

## 2023-08-01 NOTE — PROGRESS NOTE ADULT - PROBLEM SELECTOR PLAN 7
Reports having hematuria 7/29, given uptrending leukocytosis will evaluate for UTI. BCx 7/30 positive for E. coli. Could also be 2/2 malignancy vs chemotherapy  - Resolved 7/30  - UA negative

## 2023-08-01 NOTE — PROGRESS NOTE ADULT - PROBLEM SELECTOR PLAN 6
Pt developed fever 7/30   > Blood cx growing gram negative rods - e.coli  > Pending GI PCR & urine cx   > Managed by primary team

## 2023-08-01 NOTE — PROGRESS NOTE ADULT - ATTENDING COMMENTS
seen and evaluated at bedside and discussed with patient's  and with the primary team.  poor prognosis.  not opting for further blood draws.  no role for HTS.  nephrology will sign off.

## 2023-08-01 NOTE — PROGRESS NOTE ADULT - PROBLEM SELECTOR PLAN 1
Pt. with hyponatremia in the setting of diarrhea, decreased PO intake, abd pain, and metastatic pancreatic cancer. Prior to this admission, last SNa was low at 129 on 7/19/23. On admission, SNa was low at 125 (7/23/23). Pt. received 1L NS and 1L LR and SNa decreased to 123 (7/25/23). Serum Osm low at 259. Uosm 469, Herlinda 60. Pt. received 3% hypertonic saline on 7/25. SNa initially improved to 131 on 7/27. However, down-trended to 122 today. Pt. with SIADH. Continue sodium chloride tabs 2 gm tid, can up-titrate based on response. Recommend fluid restriction to 1L. Monitor SNa.     Final recommendations pending attending signature.    If you have any questions, please feel free to contact me  Ayo Rodriguez  Nephrology Fellow  yoone/Page [phone]  (After 5pm or on weekends please page the on-call fellow)

## 2023-08-01 NOTE — CHART NOTE - NSCHARTNOTESELECT_GEN_ALL_CORE
Hospitalist/Event Note
I-STOP/Event Note
IPT Consult/Event Note
Interventional Radiologu/Event Note
Pre-IR Procedure Note/Event Note

## 2023-08-01 NOTE — PROGRESS NOTE ADULT - SUBJECTIVE AND OBJECTIVE BOX
[facility] Division of Kidney Diseases & Hypertension  FOLLOW UP NOTE  [phone]--------------------------------------------------------------------------------  Chief Complaint:Abdominal pain  HPI: 78F w/ PMH of pancreatic cancer s/p chemo (now on hospice), HTN, HLD, DM2, CAD s/p PCI who presented to the ED complaining of diarrhea for 2-3 days prior to admission. Pt. being seen for hyponatremia.     24 hour events/subjective: Pt. was seen and evaluated with pts  present at the bedside this morning. Pts.  reports that pt is the same. They are looking into hospice care.    PAST HISTORY  --------------------------------------------------------------------------------  No significant changes to PMH, PSH, FHx, SHx, unless otherwise noted    ALLERGIES & MEDICATIONS  --------------------------------------------------------------------------------  Allergies  No Known Allergies    Intolerances    Standing Inpatient Medications  apixaban 10 milliGRAM(s) Oral every 12 hours  atorvastatin 40 milliGRAM(s) Oral at bedtime  cefepime   IVPB 2000 milliGRAM(s) IV Intermittent every 12 hours  celecoxib 200 milliGRAM(s) Oral daily  chlorhexidine 2% Cloths 1 Application(s) Topical daily  dextrose 5%. 1000 milliLiter(s) IV Continuous <Continuous>  dextrose 5%. 1000 milliLiter(s) IV Continuous <Continuous>  dextrose 50% Injectable 12.5 Gram(s) IV Push once  dextrose 50% Injectable 25 Gram(s) IV Push once  dextrose 50% Injectable 25 Gram(s) IV Push once  fenofibrate Tablet 145 milliGRAM(s) Oral daily  gabapentin 100 milliGRAM(s) Oral three times a day  glucagon  Injectable 1 milliGRAM(s) IntraMuscular once  insulin lispro (ADMELOG) corrective regimen sliding scale   SubCutaneous three times a day before meals  lidocaine 2% Viscous 10 milliLiter(s) Swish and Spit four times a day  methadone   Solution 4 milliGRAM(s) Oral two times a day  metoprolol succinate ER 25 milliGRAM(s) Oral daily  mirtazapine 15 milliGRAM(s) Oral at bedtime  pancrelipase  (CREON 36,000 Lipase Units) 1 Capsule(s) Oral three times a day with meals  pantoprazole    Tablet 40 milliGRAM(s) Oral before breakfast  sodium chloride 2 Gram(s) Oral three times a day    PRN Inpatient Medications  acetaminophen     Tablet .. 650 milliGRAM(s) Oral every 6 hours PRN  benzocaine 20% Spray 1 Spray(s) Topical four times a day PRN  dextrose Oral Gel 15 Gram(s) Oral once PRN  melatonin 3 milliGRAM(s) Oral at bedtime PRN  morphine   Solution 4 milliGRAM(s) Oral every 4 hours PRN  morphine   Solution 6 milliGRAM(s) Oral every 4 hours PRN  ondansetron    Tablet 4 milliGRAM(s) Oral every 8 hours PRN  zolpidem 5 milliGRAM(s) Oral at bedtime PRN      REVIEW OF SYSTEMS  --------------------------------------------------------------------------------  unable to obtain due to mental status     All other systems were reviewed and are negative, except as noted.    VITALS/PHYSICAL EXAM  --------------------------------------------------------------------------------  T(C): 37.2 (08-01-23 @ 05:00), Max: 37.2 (08-01-23 @ 05:00)  HR: 110 (08-01-23 @ 05:00) (86 - 110)  BP: 105/55 (08-01-23 @ 05:00) (100/49 - 105/55)  RR: 18 (08-01-23 @ 05:00) (18 - 18)  SpO2: 97% (08-01-23 @ 05:00) (96% - 97%)  Wt(kg): --      07-31-23 @ 07:01  -  08-01-23 @ 07:00  --------------------------------------------------------  IN: 0 mL / OUT: 750 mL / NET: -750 mL    Physical Exam:  Gen: NAD, lethargic   HEENT: dry mucous membranes  Pulm: CTA B/L  CV: S1S2  Abd: Soft, +BS, tenderness to palpation, peritoneal PleurX drain  Ext: B/L LE edema  Neuro: Awake    LABS/STUDIES  --------------------------------------------------------------------------------              9.3    8.24  >-----------<  365      [07-31-23 @ 06:16]              30.5     122  |  101  |  16  ----------------------------<  169      [07-31-23 @ 06:16]  4.6   |  15  |  0.51        Ca     7.7     [07-31-23 @ 06:16]      Mg     2.00     [07-31-23 @ 06:16]      Phos  2.3     [07-31-23 @ 06:16]    TPro  5.3  /  Alb  2.1  /  TBili  0.8  /  DBili  x   /  AST  47  /  ALT  12  /  AlkPhos  385  [07-31-23 @ 06:16]    PT/INR: PT 21.3 , INR 1.94       [07-31-23 @ 06:16]  PTT: 31.7       [07-31-23 @ 06:16]    Creatinine Trend:  SCr 0.51 [07-31 @ 06:16]  SCr 0.62 [07-30 @ 18:15]  SCr 0.55 [07-30 @ 06:04]  SCr 0.64 [07-29 @ 19:36]  SCr 0.70 [07-29 @ 07:18]    Urine Sodium <20      [07-30-23 @ 02:20]  Urine Potassium 29.7      [07-30-23 @ 02:20]  Urine Chloride 30      [07-30-23 @ 02:20]  Urine Osmolality 522      [07-30-23 @ 02:20]

## 2023-08-01 NOTE — PROGRESS NOTE ADULT - PROBLEM SELECTOR PLAN 8
had sudden onset of knee pain after getting out of bed on 7/25, now improved. Has hx of osteoarthritis. Right knee XR unremarkable, no DVT of RLE on US. Knee pain likely 2/2 OA.  - received Toradol 15mg x1  - c/w pain regimen per palliative  - will resume Celebrex 200mg daily

## 2023-08-01 NOTE — PROGRESS NOTE ADULT - PROBLEM SELECTOR PLAN 4
Hyponatremic to 125 in ED, no changes in mental status or neurological deficits. 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 Of note, had hyponatremia from previous admission this month, also thought to be in setting of SIADH. s/p NS 3.0% 30cc/hr x1 and fluid restriction. 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   - Nephro consulted, on fluid restriction + salt tabs  - monitor BMP  - c/w Fluid restrict 1.0L daily

## 2023-08-01 NOTE — PROGRESS NOTE ADULT - PROBLEM SELECTOR PLAN 2
6 episodes of non-bloody diarrhea prior to admission. Very low suspicion for c.diff, will dc contact precautions. GI PCR negative. Cultures showing NGTD. Had several episodes of diarrhea 7/28, resolving 7/29, likely secondary to increasing bowel regimen  -Resolved

## 2023-08-02 NOTE — PROGRESS NOTE ADULT - PROBLEM SELECTOR PLAN 3
Patient has worsening recurrent abdominal distension and BLE edema, likely in setting of metastatic disease. Large volume ascites confirmed on CT A/P. Abd US 7/24 confirming moderate to severe ascites. ascites improved s/p PleurX placement 7/26. New blood cultures 7/30 show GNR with E coli.  - Paracentesis culture negative  - monitor volume status  - strict I&O  - s/p Lasix 20mg IV x1  - 7/30 removed 1L Hyponatremic to 125 in ED, no changes in mental status or neurological deficits. 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 Of note, had hyponatremia from previous admission this month, also thought to be in setting of SIADH. s/p NS 3.0% 30cc/hr x1 and fluid restriction. 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   - Nephro consulted, on fluid restriction 1.0L daily + salt tabs 2g TID + IV CTX in NaCl 0.9%

## 2023-08-02 NOTE — PROGRESS NOTE ADULT - PROBLEM SELECTOR PLAN 6
hx metastatic pancreatic cancer with mets to liver and lung. s/p whipple, on chemo with last treatment 7/17. She has chronic diffuse body pain, including chest, abdomen, and extremities, in setting of metastatic disease and chemo. Pain managed by oxycodone and morphine at home. Patient and family no longer interested in DMT and would like to pursue hospice, will consult palliative and place hospice referral.  -plan for hospice on dc  -c/w pain regimen per palliative: methadone 4mg BID, Gabapentin 100mg TID, Morphine 4mg PO q4 PRN for moderate and 6mg PO q4 PRN severe pain  -c/w Zofran 4mg q8 PRN nausea  -c/w home Remeron 15mg daily for decreased PO intake Reports having hematuria 7/29, given uptrending leukocytosis will evaluate for UTI. BCx 7/30 positive for E. coli. Could also be 2/2 malignancy vs chemotherapy  - Resolved 7/30  - UA negative

## 2023-08-02 NOTE — PROGRESS NOTE ADULT - ATTENDING COMMENTS
78F with metastatic cancer, hyponatremia, chronic pain, mucositis secondary to chemo. Tolerating salt tabs for hyponatremia. Patient still with poor PO. States diarrhea improved now complaining of constipation. Patient w/ gram negative bacteremia, peritoneal culture negative. Anticipate source diarrheal illness now improved vs a UTI. UCx pending. If with repeat diarrhea will send stool culture. Will not hold discharge goal is comfort. Will plan for 7 day course of empiric abx for gram negative sepsis. Patient pain improving on increased dose of methadone. Changed cefepime to ceftriaxone given sensitivities. Senna given for constipation. Patient w/ minimal urine output likely in the setting of poor po and advanced cancer. Bladder scan 300cc however unclear if this is 2/2 to ascites. Will straight cath, if retaining will place nam for comfort. Will discuss w/ palliative symptom management for mucositis.     Patient medically optimized for discharge awaiting NH placement.    Plan of care discussed w/ Dr. Courtney and Dr. Brito

## 2023-08-02 NOTE — PROGRESS NOTE ADULT - SUBJECTIVE AND OBJECTIVE BOX
Patient is a 78y old  Female who presents with a chief complaint of diarrhea (02 Aug 2023 07:13)    INTERVAL HPI/OVERNIGHT EVENTS: Patient had no acute events overnight. She was seen and examined at bedside, with . She is improved from yesterday, with less abdominal pain. She has had some constipation, last BM from 7/30 - which was loose diarrhea. She denies any hematuria or dysuria. She also complains of mouth sores.      REVIEW OF SYSTEMS:  CONSTITUTIONAL: + fatigue, no fever  EYES: No eye pain, visual disturbances, or discharge  ENMT:  No sinus or throat pain  NECK: No pain or stiffness  RESPIRATORY: No cough, wheezing, chills or hemoptysis; No shortness of breath  CARDIOVASCULAR: No chest pain, palpitations, dizziness, or leg swelling  GASTROINTESTINAL: No abdominal or epigastric pain. No nausea, vomiting, or hematemesis; + constipation  GENITOURINARY: No dysuria, frequency, hematuria, or incontinence  NEUROLOGICAL: No headaches, memory loss, loss of strength, numbness, or tremors  SKIN: No itching, burning, rashes, or lesions   MUSCULOSKELETAL: + generalized pain  ALLERY AND IMMUNOLOGIC: No hives or eczema    FAMILY HISTORY:  FH: HTN (hypertension) (Mother)  Family history of leukemia (Child)    T(C): 36.8 (08-02-23 @ 11:22), Max: 36.9 (08-01-23 @ 20:26)  HR: 108 (08-02-23 @ 11:22) (105 - 116)  BP: 109/59 (08-02-23 @ 11:22) (103/53 - 120/52)  RR: 18 (08-02-23 @ 11:22) (18 - 18)  SpO2: 100% (08-02-23 @ 11:22) (95% - 100%)  Wt(kg): --Vital Signs Last 24 Hrs  T(C): 36.8 (02 Aug 2023 11:22), Max: 36.9 (01 Aug 2023 20:26)  T(F): 98.2 (02 Aug 2023 11:22), Max: 98.5 (01 Aug 2023 20:26)  HR: 108 (02 Aug 2023 11:22) (105 - 116)  BP: 109/59 (02 Aug 2023 11:22) (103/53 - 120/52)  RR: 18 (02 Aug 2023 11:22) (18 - 18)  SpO2: 100% (02 Aug 2023 11:22) (95% - 100%)    Parameters below as of 02 Aug 2023 11:22  Patient On (Oxygen Delivery Method): room air    No Known Allergies    PHYSICAL EXAM:  GENERAL: NAD, well-groomed, well-developed  HEAD:  Atraumatic, Normocephalic  EYES: EOMI, PERRLA, conjunctiva and sclera clear  ENMT: Dry mucous membranes, dried blood on corners of mouth, mouth sores  NECK: Supple  NERVOUS SYSTEM:  Alert & Oriented X3  CHEST/LUNG: Clear to percussion bilaterally; No rales, rhonchi, wheezing, or rubs  HEART: Regular rate and rhythm; No murmurs, rubs, or gallops  ABDOMEN: Soft, Nontender, Nondistended; Bowel sounds present  EXTREMITIES:  2+ Peripheral Pulses, No clubbing, cyanosis; 1+ pitting edema b/l  SKIN: No rashes or lesions    Consultant(s) Notes Reviewed:  [x ] YES  [ ] NO  Care Discussed with Consultants/Other Providers [ x] YES  [ ] NO    acetaminophen     Tablet .. 650 milliGRAM(s) Oral every 6 hours PRN  apixaban 10 milliGRAM(s) Oral every 12 hours  atorvastatin 40 milliGRAM(s) Oral at bedtime  benzocaine 20% Spray 1 Spray(s) Topical four times a day PRN  cefepime   IVPB 2000 milliGRAM(s) IV Intermittent every 12 hours  celecoxib 200 milliGRAM(s) Oral daily  chlorhexidine 2% Cloths 1 Application(s) Topical daily  fenofibrate Tablet 145 milliGRAM(s) Oral daily  gabapentin 100 milliGRAM(s) Oral three times a day  lidocaine 2% Viscous 10 milliLiter(s) Swish and Spit four times a day  melatonin 3 milliGRAM(s) Oral at bedtime PRN  methadone   Solution 4 milliGRAM(s) Oral two times a day  metoprolol succinate ER 25 milliGRAM(s) Oral daily  mirtazapine 15 milliGRAM(s) Oral at bedtime  morphine   Solution 4 milliGRAM(s) Oral every 4 hours PRN  morphine   Solution 6 milliGRAM(s) Oral every 4 hours PRN  ondansetron    Tablet 4 milliGRAM(s) Oral every 8 hours PRN  pancrelipase  (CREON 36,000 Lipase Units) 1 Capsule(s) Oral three times a day with meals  pantoprazole    Tablet 40 milliGRAM(s) Oral before breakfast  sodium chloride 2 Gram(s) Oral three times a day  zolpidem 5 milliGRAM(s) Oral at bedtime PRN Patient is a 78y old  Female who presents with a chief complaint of diarrhea (02 Aug 2023 07:13)    INTERVAL HPI/OVERNIGHT EVENTS: Patient had no acute events overnight. She was seen and examined at bedside, with . She is improved from yesterday, with less abdominal pain. She has had some constipation, last BM from 7/30 - which was loose diarrhea. She denies any hematuria or dysuria. She also complains of mouth sores.    Had only 100cc of urine output today, with 375cc on bladder scan (unsure of ascites vs. urine) per nurse. Will consider urine catheter with leave in nam if urine retention.      REVIEW OF SYSTEMS:  CONSTITUTIONAL: + fatigue, no fever  EYES: No eye pain, visual disturbances, or discharge  ENMT:  No sinus or throat pain  NECK: No pain or stiffness  RESPIRATORY: No cough, wheezing, chills or hemoptysis; No shortness of breath  CARDIOVASCULAR: No chest pain, palpitations, dizziness, or leg swelling  GASTROINTESTINAL: No abdominal or epigastric pain. No nausea, vomiting, or hematemesis; + constipation  GENITOURINARY: No dysuria, frequency, hematuria, or incontinence  NEUROLOGICAL: No headaches, memory loss, loss of strength, numbness, or tremors  SKIN: No itching, burning, rashes, or lesions   MUSCULOSKELETAL: + generalized pain  ALLERY AND IMMUNOLOGIC: No hives or eczema    FAMILY HISTORY:  FH: HTN (hypertension) (Mother)  Family history of leukemia (Child)    T(C): 36.8 (08-02-23 @ 11:22), Max: 36.9 (08-01-23 @ 20:26)  HR: 108 (08-02-23 @ 11:22) (105 - 116)  BP: 109/59 (08-02-23 @ 11:22) (103/53 - 120/52)  RR: 18 (08-02-23 @ 11:22) (18 - 18)  SpO2: 100% (08-02-23 @ 11:22) (95% - 100%)  Wt(kg): --Vital Signs Last 24 Hrs  T(C): 36.8 (02 Aug 2023 11:22), Max: 36.9 (01 Aug 2023 20:26)  T(F): 98.2 (02 Aug 2023 11:22), Max: 98.5 (01 Aug 2023 20:26)  HR: 108 (02 Aug 2023 11:22) (105 - 116)  BP: 109/59 (02 Aug 2023 11:22) (103/53 - 120/52)  RR: 18 (02 Aug 2023 11:22) (18 - 18)  SpO2: 100% (02 Aug 2023 11:22) (95% - 100%)    Parameters below as of 02 Aug 2023 11:22  Patient On (Oxygen Delivery Method): room air    No Known Allergies    PHYSICAL EXAM:  GENERAL: NAD, well-groomed, well-developed  HEAD:  Atraumatic, Normocephalic  EYES: EOMI, PERRLA, conjunctiva and sclera clear  ENMT: Dry mucous membranes, dried blood on corners of mouth, mouth sores  NECK: Supple  NERVOUS SYSTEM:  Alert & Oriented X3  CHEST/LUNG: Clear to percussion bilaterally; No rales, rhonchi, wheezing, or rubs  HEART: Regular rate and rhythm; No murmurs, rubs, or gallops  ABDOMEN: Soft, Nontender, Nondistended; Bowel sounds present  EXTREMITIES:  2+ Peripheral Pulses, No clubbing, cyanosis; 1+ pitting edema b/l  SKIN: No rashes or lesions    Consultant(s) Notes Reviewed:  [x ] YES  [ ] NO  Care Discussed with Consultants/Other Providers [ x] YES  [ ] NO    acetaminophen     Tablet .. 650 milliGRAM(s) Oral every 6 hours PRN  apixaban 10 milliGRAM(s) Oral every 12 hours  atorvastatin 40 milliGRAM(s) Oral at bedtime  benzocaine 20% Spray 1 Spray(s) Topical four times a day PRN  cefepime   IVPB 2000 milliGRAM(s) IV Intermittent every 12 hours  celecoxib 200 milliGRAM(s) Oral daily  chlorhexidine 2% Cloths 1 Application(s) Topical daily  fenofibrate Tablet 145 milliGRAM(s) Oral daily  gabapentin 100 milliGRAM(s) Oral three times a day  lidocaine 2% Viscous 10 milliLiter(s) Swish and Spit four times a day  melatonin 3 milliGRAM(s) Oral at bedtime PRN  methadone   Solution 4 milliGRAM(s) Oral two times a day  metoprolol succinate ER 25 milliGRAM(s) Oral daily  mirtazapine 15 milliGRAM(s) Oral at bedtime  morphine   Solution 4 milliGRAM(s) Oral every 4 hours PRN  morphine   Solution 6 milliGRAM(s) Oral every 4 hours PRN  ondansetron    Tablet 4 milliGRAM(s) Oral every 8 hours PRN  pancrelipase  (CREON 36,000 Lipase Units) 1 Capsule(s) Oral three times a day with meals  pantoprazole    Tablet 40 milliGRAM(s) Oral before breakfast  sodium chloride 2 Gram(s) Oral three times a day  zolpidem 5 milliGRAM(s) Oral at bedtime PRN

## 2023-08-02 NOTE — PROGRESS NOTE ADULT - PROBLEM SELECTOR PLAN 4
Hyponatremic to 125 in ED, no changes in mental status or neurological deficits. 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 Of note, had hyponatremia from previous admission this month, also thought to be in setting of SIADH. s/p NS 3.0% 30cc/hr x1 and fluid restriction. 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   - Nephro consulted, on fluid restriction + salt tabs  - monitor BMP  - c/w Fluid restrict 1.0L daily US duplex on 7/26 found incidental LLE DVTs x2 (Acute non-occlusive DVT noted within the distal left femoral and popliteal veins. Acute, occlusive DVT noted within the left posterior tibial and soleal veins). She has no left leg pain and on exam no calf tenderness, warmth, or erythema. s/p PleurX placement 7/26. On heparin gtt, in therapeutic range  -c/w Heparin gtt  -when dc can transition to eliquis (total 7 days high dose then can switch to 5mg BID - 8/4)

## 2023-08-02 NOTE — PROGRESS NOTE ADULT - PROBLEM SELECTOR PLAN 2
6 episodes of non-bloody diarrhea prior to admission. Very low suspicion for c.diff, will dc contact precautions. GI PCR negative. Cultures showing NGTD. Had several episodes of diarrhea 7/28, resolving 7/29, likely secondary to increasing bowel regimen  -Resolved Patient has worsening recurrent abdominal distension and BLE edema, likely in setting of metastatic disease. Large volume ascites confirmed on CT A/P. Abd US 7/24 confirming moderate to severe ascites. ascites improved s/p PleurX placement 7/26. New blood cultures 7/30 show GNR with E coli.  - 7/30 removed 1L  - s/p Lasix 20mg IV x1  - Paracentesis culture 7/31 negative. UA 7/31 negative  - monitor volume status  - strict I&O Patient has worsening recurrent abdominal distension and BLE edema, likely in setting of metastatic disease. Large volume ascites confirmed on CT A/P. Abd US 7/24 confirming moderate to severe ascites. ascites improved s/p PleurX placement 7/26. New blood cultures 7/30 show GNR with E coli.  - 7/30 removed 1L. s/p Lasix 20mg IV x1  - started cefipime 2g. susceptibility panel returned - will c/w CTX 2g daily for total of 7 days (7/30 - 8/5)  - paracentesis culture 7/31 negative. UA 7/31 negative  - monitor volume status.  - strict I&O

## 2023-08-02 NOTE — PROGRESS NOTE ADULT - PROBLEM SELECTOR PLAN 11
on tresiba 20u at home  -hold home meds  -ISS while inpatient Had 2 days of loose diarrhea 7/29-7/30, then resolved. Now without BM since 7/30. Most liekly due to poor PO intake from mouth sores.  - supplemental nutrition Glucerna BID  - encouraged PO intake  - trial of senna x1

## 2023-08-02 NOTE — PROGRESS NOTE ADULT - PROBLEM SELECTOR PLAN 9
-c/w Amlodipine 10mg daily -c/w Atorvastatin 40mg, Metoprolol 25mg, Losartan 100mg daily  -c/w fenofibrate 145mg daily

## 2023-08-02 NOTE — PROGRESS NOTE ADULT - PROBLEM SELECTOR PLAN 10
-c/w Atorvastatin 40mg, Metoprolol 25mg, Losartan 100mg daily  -c/w fenofibrate 145mg daily on tresiba 20u at home  -hold home meds  -ISS while inpatient

## 2023-08-02 NOTE — PROGRESS NOTE ADULT - PROBLEM SELECTOR PLAN 7
Reports having hematuria 7/29, given uptrending leukocytosis will evaluate for UTI. BCx 7/30 positive for E. coli. Could also be 2/2 malignancy vs chemotherapy  - Resolved 7/30  - UA negative had sudden onset of knee pain after getting out of bed on 7/25, now improved. Has hx of osteoarthritis. Right knee XR unremarkable, no DVT of RLE on US. Knee pain likely 2/2 OA.  - received Toradol 15mg x1  - c/w pain regimen per palliative  - c/w Celebrex 200mg daily

## 2023-08-02 NOTE — PROGRESS NOTE ADULT - PROBLEM SELECTOR PLAN 1
Patient complains of painful sores in mouth and tongue that have been present for several weeks but have worsened within the past week. On exam has tender lesions to mucosa of inner cheeks, base of tongue, and lips. Low suspicion for thrush or other ongoing infection. Her sores are likely 2/2 chemotherapy.  -c/w 2% viscous lidocaine per palliative recs  -c/w pain regimen per palliative: Methadone 4mg BID, Gabapentin 100mg TID, Morphine 4mg PO q4 for moderate pain, and Morphine 6mg PO q4 for severe pain  -d/c IV morphine for dc planning  -monitor pain Patient complains of painful sores in mouth and tongue that have been present for several weeks but have worsened within the past week. On exam has tender lesions to mucosa of inner cheeks, base of tongue, and lips. Low suspicion for thrush or other ongoing infection. Her sores are likely 2/2 chemotherapy.  -c/w 2% viscous lidocaine and benzocaine spray PRN per palliative recs  -c/w glucerna shake x2 per dietitian recs d/t poor PO intake from mouth sores  -monitor pain  -monitor nutrition intake

## 2023-08-02 NOTE — PROGRESS NOTE ADULT - PROBLEM SELECTOR PLAN 5
US duplex on 7/26 found incidental LLE DVTs x2 (Acute non-occlusive DVT noted within the distal left femoral and popliteal veins. Acute, occlusive DVT noted within the left posterior tibial and soleal veins). She has no left leg pain and on exam no calf tenderness, warmth, or erythema. s/p PleurX placement 7/26. On heparin gtt, in therapeutic range  -c/w Heparin gtt  -when dc can transition to eliquis (total 7 days high dose then can switch to 5mg BID - 8/4) hx metastatic pancreatic cancer with mets to liver and lung. s/p whipple, on chemo with last treatment 7/17. She has chronic diffuse body pain, including chest, abdomen, and extremities, in setting of metastatic disease and chemo. Pain managed by oxycodone and morphine at home. Patient and family no longer interested in DMT and would like to pursue hospice, will consult palliative and place hospice referral.  -plan for hospice on dc  -c/w pain regimen per palliative: methadone 4mg BID, Gabapentin 100mg TID, Morphine 4mg PO q4 PRN for moderate and 6mg PO q4 PRN severe pain. IV morphine dc for discharge preparation  -c/w Zofran 4mg q8 PRN nausea  -c/w home Remeron 15mg daily for decreased PO intake

## 2023-08-02 NOTE — PROGRESS NOTE ADULT - PROBLEM SELECTOR PLAN 8
had sudden onset of knee pain after getting out of bed on 7/25, now improved. Has hx of osteoarthritis. Right knee XR unremarkable, no DVT of RLE on US. Knee pain likely 2/2 OA.  - received Toradol 15mg x1  - c/w pain regimen per palliative  - will resume Celebrex 200mg daily -c/w Amlodipine 10mg daily

## 2023-08-02 NOTE — PROGRESS NOTE ADULT - PROBLEM SELECTOR PLAN 12
DVT: Eliquis as above  Diet: Fluid restriction 1L, regular diet + Glucerna shake supplement BID  Dispo: SNF - arrange with SW  Code: DNR/DNI DVT: Eliquis as above  Diet: Fluid restriction 1L, regular diet + Glucerna shake supplement BID  Dispo: SNF. [facility] is not acccepting long term care patients as is full. Family is currently looking at [facility] with SW.  Code: DNR/DNI

## 2023-08-03 NOTE — PROGRESS NOTE ADULT - PROBLEM SELECTOR PLAN 3
Hyponatremic to 125 in ED, no changes in mental status or neurological deficits. 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 Of note, had hyponatremia from previous admission this month, also thought to be in setting of SIADH. s/p NS 3.0% 30cc/hr x1 and fluid restriction. 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   - Nephro consulted, on fluid restriction 1.0L daily + salt tabs 2g TID + IV CTX in NaCl 0.9%

## 2023-08-03 NOTE — PROGRESS NOTE ADULT - PROBLEM SELECTOR PLAN 1
Patient complains of painful sores in mouth and tongue that have been present for several weeks but have worsened within the past week. On exam has tender lesions to mucosa of inner cheeks, base of tongue, and lips. Low suspicion for thrush or other ongoing infection. Her sores are likely 2/2 chemotherapy.  -c/w 2% viscous lidocaine and benzocaine spray PRN per palliative recs  -c/w glucerna shake x2 per dietitian recs d/t poor PO intake from mouth sores  -monitor pain  -monitor nutrition intake

## 2023-08-03 NOTE — PROGRESS NOTE ADULT - PROBLEM SELECTOR PLAN 11
Had 2 days of loose diarrhea 7/29-7/30, then resolved. Now without BM since 7/30. Most liekly due to poor PO intake from mouth sores.  - supplemental nutrition Glucerna BID  - encouraged PO intake  - trial of senna x1 Had 2 days of loose diarrhea 7/29-7/30, then resolved. Now without BM since 7/30. Also has poor PO intake from mouth sores, and has low input that may be affecting bowel movement.  - supplemental nutrition Glucerna BID  - encouraged PO intake  - trial of senna x1 8/2. Still without BM 8/3. Will give senna x1 and miralax x1 again

## 2023-08-03 NOTE — PROGRESS NOTE ADULT - PROBLEM SELECTOR PLAN 7
had sudden onset of knee pain after getting out of bed on 7/25, now improved. Has hx of osteoarthritis. Right knee XR unremarkable, no DVT of RLE on US. Knee pain likely 2/2 OA.  - received Toradol 15mg x1  - c/w pain regimen per palliative  - c/w Celebrex 200mg daily

## 2023-08-03 NOTE — PROGRESS NOTE ADULT - SUBJECTIVE AND OBJECTIVE BOX
[facility] Geriatrics and Palliative Care  Emerita Alejo, Palliative Care Nurse Practitioner  Contact Info: Page [phone] (Including Nights/Weekends), Message on Microsoft Teams (Emerita Alejo), or leave VM at Palliative Office [phone] (non-urgent)    Date of Service 08-03-23 @ 15:23    SUBJECTIVE AND OBJECTIVE:  Indication for Geriatrics and Palliative Care Services/INTERVAL HPI: Symptoms  Pt seen this afternoon, shared pain is well controlled on current regimen. Pt does endorses abd fullness however pleurx is due to be drained today by primary RN. Pt continues to c/o weakness, had lengthy discussion about this being secondary to her cancer, daughter aNmrata present for conversation and verbalizes understanding.     OVERNIGHT EVENTS: Pt required PRN PO Morphine solution 6mg x 2 within 24 hours (8a-8a).    DNR on chart:DNI  DNI      Allergies    No Known Allergies    Intolerances    MEDICATIONS  (STANDING):  apixaban 10 milliGRAM(s) Oral every 12 hours  atorvastatin 40 milliGRAM(s) Oral at bedtime  cefTRIAXone   IVPB 2000 milliGRAM(s) IV Intermittent every 24 hours  celecoxib 200 milliGRAM(s) Oral daily  chlorhexidine 2% Cloths 1 Application(s) Topical daily  fenofibrate Tablet 145 milliGRAM(s) Oral daily  gabapentin 100 milliGRAM(s) Oral three times a day  lidocaine 2% Viscous 10 milliLiter(s) Swish and Spit four times a day  magnesium hydroxide Suspension 30 milliLiter(s) Oral once  methadone   Solution 4 milliGRAM(s) Oral two times a day  metoprolol succinate ER 25 milliGRAM(s) Oral daily  mirtazapine 15 milliGRAM(s) Oral at bedtime  pancrelipase  (CREON 36,000 Lipase Units) 1 Capsule(s) Oral three times a day with meals  pantoprazole    Tablet 40 milliGRAM(s) Oral before breakfast  sodium chloride 2 Gram(s) Oral three times a day    MEDICATIONS  (PRN):  acetaminophen     Tablet .. 650 milliGRAM(s) Oral every 6 hours PRN Temp greater or equal to 38C (100.4F), Mild Pain (1 - 3)  benzocaine 20% Spray 1 Spray(s) Topical four times a day PRN oral pain  melatonin 3 milliGRAM(s) Oral at bedtime PRN Insomnia  morphine   Solution 4 milliGRAM(s) Oral every 4 hours PRN Moderate Pain (4 - 6)  morphine   Solution 6 milliGRAM(s) Oral every 4 hours PRN Severe Pain (7 - 10)  ondansetron    Tablet 4 milliGRAM(s) Oral every 8 hours PRN Nausea and/or Vomiting  zolpidem 5 milliGRAM(s) Oral at bedtime PRN Insomnia    -------------------------------------------------------------------------------------------------------  ITEMS UNCHECKED ARE NOT PRESENT    PRESENT SYMPTOMS: [ ]Unable to self-report - see [ ] CPOT [ ] PAINADS [ ] RDOS  Source if other than patient:  [ ]Family   [ ]Team     Pain: [ X]yes [ ]no  QOL impact - recurrent hospitalizations'   Location - abd/ oral lesion             Aggravating factors - movement   Quality - pressure/sharp  Radiation - back  Timing- intermittent   Severity (0-10 scale): 5/10  Minimal acceptable level (0-10 scale)/Pain goal: 4/10    CPOT:    https://www.[facility].org/getattachment/[email or web address]-[email or web address]-[email or web address]-[email or web address]/Critical-Care-Pain-Observation-Tool-(CPOT)    PAINAD Score: See PAINAD tool and score below       RDOS: See RDOS tool and score below   0 to 2  minimal or no respiratory distress   3  mild distress  4 to 6 moderate distress  >7 severe distress    Dyspnea:                           [ ]Mild [ ]Moderate [ ]Severe  Anxiety:                             [ ]Mild [ ]Moderate [ ]Severe  Fatigue:                             [ ]Mild [ ]Moderate [ ]Severe  Nausea:                             [ ]Mild [ ]Moderate [ ]Severe  Loss of appetite:              [ ]Mild [ ]Moderate [ ]Severe  Constipation:                    [ ]Mild [ ]Moderate [ ]Severe  Other Symptoms:  [X]All other review of systems negative     Home Medications for Symptoms if present:    I Stop Reference no:     -------------------------------------------------------------------------------------------------------  PCSSQ[Palliative Care Spiritual Screening Question]   Severity (0-10):  Score of 4 or > indicate consideration of Chaplaincy referral.  Chaplaincy Referral: [ ] yes [ ] refused [ ] following [ X] Deferred     Caregiver [place]? : [ ] yes [ ] no [ ] Deferred [ ] Declined             Social work referral [X ] Patient & Family Centered Care Referral [ ]     Anticipatory Grief present?:  [ ] yes [ ] no  [ ] Deferred                  Social work referral [X ] Chaplaincy Referral [ ]    -------------------------------------------------------------------------------------------------------  PHYSICAL EXAM:  Vital Signs Last 24 Hrs  T(C): 36.6 (03 Aug 2023 11:44), Max: 38.2 (02 Aug 2023 20:50)  T(F): 97.8 (03 Aug 2023 11:44), Max: 100.8 (02 Aug 2023 20:50)  HR: 90 (03 Aug 2023 11:44) (90 - 107)  BP: 100/52 (03 Aug 2023 11:44) (100/52 - 117/55)  BP(mean): --  RR: 18 (03 Aug 2023 11:44) (16 - 18)  SpO2: 99% (03 Aug 2023 11:44) (98% - 100%)    Parameters below as of 03 Aug 2023 11:44  Patient On (Oxygen Delivery Method): room air     I&O's Summary    02 Aug 2023 07:01  -  03 Aug 2023 07:00  --------------------------------------------------------  IN: 260 mL / OUT: 400 mL / NET: -140 mL       GENERAL: Khmer  # [id number]  [ ]Cachexia  [ X]Alert  [X ]Oriented x 4  [ ]Lethargic  [ ]Unarousable  [X ]Verbal  [ ]Non-Verbal    Behavioral:   [ ] Anxiety  [ ] Delirium [ ] Agitation [x ] Other    HEENT:  [ ]Normal   [ ]Dry mouth   [ ]ET Tube/Trach  [X]Oral lesions    PULMONARY:   [X]Clear [ ]Tachypnea  [ ]Audible excessive secretions   [ ]Rhonchi        [ ]Right [ ]Left [ ]Bilateral  [ ]Crackles        [ ]Right [ ]Left [ ]Bilateral  [ ]Wheezing     [ ]Right [ ]Left [ ]Bilateral  [ ]Diminished breath sounds [ ]right [ ]left [ ]bilateral    CARDIOVASCULAR:    [ X]Regular [ ]Irregular [ ]Tachy  [ ]Tomy [ ]Murmur [ ]Other    GASTROINTESTINAL: abd pleurx in place  [ X]Soft  [ X]Distended   [ ]+BS  [ ]Non tender [ X]Tender  [ ]Other [ ]PEG [ ]OGT/ NGT  Last BM: 8/3    GENITOURINARY:  [X ]Normal [ ] Incontinent   [ ]Oliguria/Anuria   [ ]Rolle    MUSCULOSKELETAL:   [ ]Normal   [X]Weakness  [ ]Bed/Wheelchair bound [ ]Edema    NEUROLOGIC:   [X ]No focal deficits  [ ]Cognitive impairment  [ ]Dysphagia [ ]Dysarthria [ ]Paresis [ ]Other     SKIN:   [X ]Normal  [ ]Rash  [ ]Other  [ ]Pressure ulcer(s)       Present on admission [ ]y [ ]n    -------------------------------------------------------------------------------------------------------  CRITICAL CARE:  [ ]Shock Present  [ ]Septic [ ]Cardiogenic [ ]Neurologic [ ]Hypovolemic  [ ]Vasopressors [ ]Inotropes  [ ]Respiratory failure present [ ]Mechanical Ventilation [ ]Non-invasive ventilatory support [ ]High-Flow   [ ]Acute  [ ]Chronic [ ]Hypoxic  [ ]Hypercarbic [ ]Other  [ ]Other organ failure     -------------------------------------------------------------------------------------------------------  LABS: No new labs  -------------------------------------------------------------------------------------------------------  RADIOLOGY & ADDITIONAL STUDIES: Reviewed.     -------------------------------------------------------------------------------------------------------  Protein Calorie Malnutrition Present: [ ]mild [ ]moderate [ ]severe [ ]underweight [ ]morbid obesity  https://www.[employer].org/vault/3999/web/files/ONC/Table_Clinical%20Characteristics%20to%20Document%20Malnutrition-White%20JV%20et%20al%202012.pdf    Height (cm): 152.5 (07-23-23 @ 07:19), 152.5 (07-01-23 @ 01:12), 152.5 (05-31-23 @ 10:19)  Weight (kg): 50 (07-23-23 @ 18:02), 58.0009 (07-17-23 @ 13:00), 58.7 (07-03-23 @ 18:10)  BMI (kg/m2): 21.5 (07-23-23 @ 18:02), 24.9 (07-23-23 @ 07:19), 24.9 (07-17-23 @ 13:00)    Palliative Performance Status Version 2:   See PPSv2 tool and score below       [ ]PPSV2 < or = 30%  [ ]significant weight loss [ ]poor nutritional intake [ ]anasarca[ ]Artificial Nutrition    Other REFERRALS:  [ ]Hospice  [ ]Child Life  [ ]Social Work  [ ]Case management [ ]Holistic Therapy     -------------------------------------------------------------------------------------------------------

## 2023-08-03 NOTE — PROGRESS NOTE ADULT - PROBLEM SELECTOR PLAN 6
Reports having hematuria 7/29, given uptrending leukocytosis will evaluate for UTI. BCx 7/30 positive for E. coli. Could also be 2/2 malignancy vs chemotherapy  - Resolved 7/30  - UA negative Reports having hematuria 7/29, given uptrending leukocytosis will evaluate for UTI. BCx 7/30 positive for E. coli. Could also be 2/2 malignancy vs chemotherapy  - Resolved 7/30. UA negative 7/31.   - Again with dysuria symptoms 8/2, repeat UA pending, UCx pending

## 2023-08-03 NOTE — PROGRESS NOTE ADULT - PROBLEM SELECTOR PLAN 1
Pt known to Dr. Juan David @ [facility]  > Last chemotherapy 7/17   > Pt no longer interested in DMT - hospice appropriate   > Plan originally was home wt hospice, however, family feels like pt care needs are too much for them. Interested in NH with hospice - SW aware.

## 2023-08-03 NOTE — PROGRESS NOTE ADULT - PROBLEM SELECTOR PLAN 9
Thank you for allowing us to participate in your patient's care. We will continue to follow with you. Please page [phone] for any q's or c's. The Geriatric and Palliative Medicine service has coverage 24 hours a day/ 7 days a week to provide medical recommendations regarding symptom management needs via telephone.    > Caregiver support SW following.

## 2023-08-03 NOTE — PROGRESS NOTE ADULT - SUBJECTIVE AND OBJECTIVE BOX
Patient is a 78y old  Female who presents with a chief complaint of diarrhea (02 Aug 2023 07:13)    INTERVAL HPI/OVERNIGHT EVENTS:     MEDS:  acetaminophen     Tablet .. 650 milliGRAM(s) Oral every 6 hours PRN  apixaban 10 milliGRAM(s) Oral every 12 hours  atorvastatin 40 milliGRAM(s) Oral at bedtime  benzocaine 20% Spray 1 Spray(s) Topical four times a day PRN  cefTRIAXone   IVPB 2000 milliGRAM(s) IV Intermittent every 24 hours  celecoxib 200 milliGRAM(s) Oral daily  chlorhexidine 2% Cloths 1 Application(s) Topical daily  fenofibrate Tablet 145 milliGRAM(s) Oral daily  gabapentin 100 milliGRAM(s) Oral three times a day  lidocaine 2% Viscous 10 milliLiter(s) Swish and Spit four times a day  melatonin 3 milliGRAM(s) Oral at bedtime PRN  methadone   Solution 4 milliGRAM(s) Oral two times a day  metoprolol succinate ER 25 milliGRAM(s) Oral daily  mirtazapine 15 milliGRAM(s) Oral at bedtime  morphine   Solution 4 milliGRAM(s) Oral every 4 hours PRN  morphine   Solution 6 milliGRAM(s) Oral every 4 hours PRN  ondansetron    Tablet 4 milliGRAM(s) Oral every 8 hours PRN  pancrelipase  (CREON 36,000 Lipase Units) 1 Capsule(s) Oral three times a day with meals  pantoprazole    Tablet 40 milliGRAM(s) Oral before breakfast  sodium chloride 2 Gram(s) Oral three times a day  zolpidem 5 milliGRAM(s) Oral at bedtime PRN    REVIEW OF SYSTEMS:  CONSTITUTIONAL: No fever, weight loss, or fatigue  EYES: No eye pain, visual disturbances, or discharge  ENMT:  No difficulty hearing, tinnitus, vertigo; No sinus or throat pain  NECK: No pain or stiffness  BREASTS: No pain, masses, or nipple discharge  RESPIRATORY: No cough, wheezing, chills or hemoptysis; No shortness of breath  CARDIOVASCULAR: No chest pain, palpitations, dizziness, or leg swelling  GASTROINTESTINAL: No abdominal or epigastric pain. No nausea, vomiting, or hematemesis; No diarrhea or constipation. No melena or hematochezia.  GENITOURINARY: No dysuria, frequency, hematuria, or incontinence  NEUROLOGICAL: No headaches, memory loss, loss of strength, numbness, or tremors  SKIN: No itching, burning, rashes, or lesions   LYMPH NODES: No enlarged glands  ENDOCRINE: No heat or cold intolerance; No hair loss  MUSCULOSKELETAL: No joint pain or swelling; No muscle, back, or extremity pain  PSYCHIATRIC: No depression, anxiety, mood swings, or difficulty sleeping  HEME/LYMPH: No easy bruising, or bleeding gums  ALLERY AND IMMUNOLOGIC: No hives or eczema    FAMILY HISTORY:  FH: HTN (hypertension) (Mother)  Family history of leukemia (Child)    T(C): 36.8 (08-03-23 @ 05:10), Max: 38.2 (08-02-23 @ 20:50)  HR: 96 (08-03-23 @ 05:10) (96 - 108)  BP: 101/50 (08-03-23 @ 05:10) (101/50 - 117/55)  RR: 18 (08-03-23 @ 05:10) (16 - 18)  SpO2: 98% (08-03-23 @ 05:10) (98% - 100%)  Wt(kg): --Vital Signs Last 24 Hrs  T(C): 36.8 (03 Aug 2023 05:10), Max: 38.2 (02 Aug 2023 20:50)  T(F): 98.2 (03 Aug 2023 05:10), Max: 100.8 (02 Aug 2023 20:50)  HR: 96 (03 Aug 2023 05:10) (96 - 108)  BP: 101/50 (03 Aug 2023 05:10) (101/50 - 117/55)  RR: 18 (03 Aug 2023 05:10) (16 - 18)  SpO2: 98% (03 Aug 2023 05:10) (98% - 100%)    Parameters below as of 03 Aug 2023 05:10  Patient On (Oxygen Delivery Method): room air    No Known Allergies    PHYSICAL EXAM:  GENERAL: NAD  HEAD:  Atraumatic, Normocephalic  EYES: EOMI, PERRLA, conjunctiva and sclera clear  ENMT: Moist mucous membranes  NECK: Supple  NERVOUS SYSTEM:  Alert & Oriented X3  CHEST/LUNG: Clear to percussion bilaterally; No rales, rhonchi, wheezing, or rubs  HEART: Regular rate and rhythm; No murmurs, rubs, or gallops  ABDOMEN: Soft, Nontender, Nondistended; Bowel sounds present, +pleurx drain c/d/i  EXTREMITIES:  2+ Peripheral Pulses, No clubbing, cyanosis; 1+ edema b/l  LYMPH: No lymphadenopathy noted  SKIN: No rashes or lesions    Consultant(s) Notes Reviewed:  [x ] YES  [ ] NO  Care Discussed with Consultants/Other Providers [ x] YES  [ ] NO    LABS:  POCT Blood Glucose.: 188 mg/dL (08.02.23 @ 22:11)     IR Procedure (07.26.23 @ 10:54)  IMPRESSION:  Successful chronic Paracentesis Catheter insertion and drainage of   approximately 2 L  of ascitic fluid. Patient is a 78y old  Female who presents with a chief complaint of diarrhea (02 Aug 2023 07:13)    INTERVAL HPI/OVERNIGHT EVENTS: Patient was seen and examined oat bedside, with  present.  Her mouth sores are not bleeding today and she was eating breakfast. She had glucerna x2 yesterday, was only able to finish half the bottle. She denies any cough, chest pain, shortness of breath. She reports some painful urination, feels full, without blood. She had UC sent yesterday - pending. She also has not had bowel movement since 7/30, despite trial of senna yesterday.  and daughter have selected SNF at [place], and DW will get paperwork started for insurance auth.    [facility]    MEDS:  acetaminophen     Tablet .. 650 milliGRAM(s) Oral every 6 hours PRN  apixaban 10 milliGRAM(s) Oral every 12 hours  atorvastatin 40 milliGRAM(s) Oral at bedtime  benzocaine 20% Spray 1 Spray(s) Topical four times a day PRN  cefTRIAXone   IVPB 2000 milliGRAM(s) IV Intermittent every 24 hours  celecoxib 200 milliGRAM(s) Oral daily  chlorhexidine 2% Cloths 1 Application(s) Topical daily  fenofibrate Tablet 145 milliGRAM(s) Oral daily  gabapentin 100 milliGRAM(s) Oral three times a day  lidocaine 2% Viscous 10 milliLiter(s) Swish and Spit four times a day  melatonin 3 milliGRAM(s) Oral at bedtime PRN  methadone   Solution 4 milliGRAM(s) Oral two times a day  metoprolol succinate ER 25 milliGRAM(s) Oral daily  mirtazapine 15 milliGRAM(s) Oral at bedtime  morphine   Solution 4 milliGRAM(s) Oral every 4 hours PRN  morphine   Solution 6 milliGRAM(s) Oral every 4 hours PRN  ondansetron    Tablet 4 milliGRAM(s) Oral every 8 hours PRN  pancrelipase  (CREON 36,000 Lipase Units) 1 Capsule(s) Oral three times a day with meals  pantoprazole    Tablet 40 milliGRAM(s) Oral before breakfast  sodium chloride 2 Gram(s) Oral three times a day  zolpidem 5 milliGRAM(s) Oral at bedtime PRN    REVIEW OF SYSTEMS:  CONSTITUTIONAL: No fever, weight loss, or fatigue  EYES: No eye pain, visual disturbances, or discharge  ENMT:  No difficulty hearing, tinnitus, vertigo; No sinus or throat pain  NECK: No pain or stiffness  BREASTS: No pain, masses, or nipple discharge  RESPIRATORY: No cough, wheezing, chills or hemoptysis; No shortness of breath  CARDIOVASCULAR: No chest pain, palpitations, dizziness, or leg swelling  GASTROINTESTINAL: No abdominal or epigastric pain. No nausea, vomiting, or hematemesis; No diarrhea or constipation. No melena or hematochezia.  GENITOURINARY: No dysuria, frequency, hematuria, or incontinence  NEUROLOGICAL: No headaches, memory loss, loss of strength, numbness, or tremors  SKIN: No itching, burning, rashes, or lesions   LYMPH NODES: No enlarged glands  ENDOCRINE: No heat or cold intolerance; No hair loss  MUSCULOSKELETAL: No joint pain or swelling; No muscle, back, or extremity pain  PSYCHIATRIC: No depression, anxiety, mood swings, or difficulty sleeping  HEME/LYMPH: No easy bruising, or bleeding gums  ALLERY AND IMMUNOLOGIC: No hives or eczema    FAMILY HISTORY:  FH: HTN (hypertension) (Mother)  Family history of leukemia (Child)    T(C): 36.8 (08-03-23 @ 05:10), Max: 38.2 (08-02-23 @ 20:50)  HR: 96 (08-03-23 @ 05:10) (96 - 108)  BP: 101/50 (08-03-23 @ 05:10) (101/50 - 117/55)  RR: 18 (08-03-23 @ 05:10) (16 - 18)  SpO2: 98% (08-03-23 @ 05:10) (98% - 100%)  Wt(kg): --Vital Signs Last 24 Hrs  T(C): 36.8 (03 Aug 2023 05:10), Max: 38.2 (02 Aug 2023 20:50)  T(F): 98.2 (03 Aug 2023 05:10), Max: 100.8 (02 Aug 2023 20:50)  HR: 96 (03 Aug 2023 05:10) (96 - 108)  BP: 101/50 (03 Aug 2023 05:10) (101/50 - 117/55)  RR: 18 (03 Aug 2023 05:10) (16 - 18)  SpO2: 98% (03 Aug 2023 05:10) (98% - 100%)    Parameters below as of 03 Aug 2023 05:10  Patient On (Oxygen Delivery Method): room air    No Known Allergies    PHYSICAL EXAM:  GENERAL: NAD  HEAD:  Atraumatic, Normocephalic  EYES: EOMI, PERRLA, conjunctiva and sclera clear  ENMT: Moist mucous membranes  NECK: Supple  NERVOUS SYSTEM:  Alert & Oriented X3  CHEST/LUNG: Clear to percussion bilaterally; No rales, rhonchi, wheezing, or rubs  HEART: Regular rate and rhythm; No murmurs, rubs, or gallops  ABDOMEN: Soft, Nontender, Nondistended; Bowel sounds present, +pleurx drain c/d/i  EXTREMITIES:  2+ Peripheral Pulses, No clubbing, cyanosis; 1+ edema b/l  LYMPH: No lymphadenopathy noted  SKIN: No rashes or lesions    Consultant(s) Notes Reviewed:  [x ] YES  [ ] NO  Care Discussed with Consultants/Other Providers [ x] YES  [ ] NO    LABS:  POCT Blood Glucose.: 188 mg/dL (08.02.23 @ 22:11)     IR Procedure (07.26.23 @ 10:54)  IMPRESSION:  Successful chronic Paracentesis Catheter insertion and drainage of   approximately 2 L  of ascitic fluid. Patient is a 78y old  Female who presents with a chief complaint of diarrhea (02 Aug 2023 07:13)    INTERVAL HPI/OVERNIGHT EVENTS: Patient was seen and examined oat bedside, with  present.  Her mouth sores are not bleeding today and she was eating breakfast. She had glucerna x2 yesterday, was only able to finish half the bottle. She denies any cough, chest pain, shortness of breath. She reports some painful urination, feels full, without blood. She had UC sent yesterday - pending. She also has not had bowel movement since 7/30, despite trial of senna yesterday.  and daughter have selected SNF at [place], and SW will get paperwork started for insurance auth.    Nursing will drain from [facility] today.    MEDS:  acetaminophen     Tablet .. 650 milliGRAM(s) Oral every 6 hours PRN  apixaban 10 milliGRAM(s) Oral every 12 hours  atorvastatin 40 milliGRAM(s) Oral at bedtime  benzocaine 20% Spray 1 Spray(s) Topical four times a day PRN  cefTRIAXone   IVPB 2000 milliGRAM(s) IV Intermittent every 24 hours  celecoxib 200 milliGRAM(s) Oral daily  chlorhexidine 2% Cloths 1 Application(s) Topical daily  fenofibrate Tablet 145 milliGRAM(s) Oral daily  gabapentin 100 milliGRAM(s) Oral three times a day  lidocaine 2% Viscous 10 milliLiter(s) Swish and Spit four times a day  melatonin 3 milliGRAM(s) Oral at bedtime PRN  methadone   Solution 4 milliGRAM(s) Oral two times a day  metoprolol succinate ER 25 milliGRAM(s) Oral daily  mirtazapine 15 milliGRAM(s) Oral at bedtime  morphine   Solution 4 milliGRAM(s) Oral every 4 hours PRN  morphine   Solution 6 milliGRAM(s) Oral every 4 hours PRN  ondansetron    Tablet 4 milliGRAM(s) Oral every 8 hours PRN  pancrelipase  (CREON 36,000 Lipase Units) 1 Capsule(s) Oral three times a day with meals  pantoprazole    Tablet 40 milliGRAM(s) Oral before breakfast  sodium chloride 2 Gram(s) Oral three times a day  zolpidem 5 milliGRAM(s) Oral at bedtime PRN    REVIEW OF SYSTEMS:  CONSTITUTIONAL: No fever, weight loss, or fatigue  EYES: No eye pain, visual disturbances, or discharge  ENMT: +mouth sores  RESPIRATORY: No cough, wheezing, chills or hemoptysis; No shortness of breath  CARDIOVASCULAR: No chest pain, palpitations, dizziness, + b/l leg swelling  GASTROINTESTINAL:  No nausea, vomiting, or hematemesis; No diarrhea. No melena or hematochezia. + constipation  GENITOURINARY: No frequency, hematuria, or incontinence  NEUROLOGICAL: No headaches, memory loss, loss of strength, numbness, or tremors  SKIN: No itching, burning, rashes, or lesions   MUSCULOSKELETAL: No joint pain or swelling; + muscle pain  ALLERY AND IMMUNOLOGIC: No hives or eczema    FAMILY HISTORY:  FH: HTN (hypertension) (Mother)  Family history of leukemia (Child)    T(C): 36.8 (08-03-23 @ 05:10), Max: 38.2 (08-02-23 @ 20:50)  HR: 96 (08-03-23 @ 05:10) (96 - 108)  BP: 101/50 (08-03-23 @ 05:10) (101/50 - 117/55)  RR: 18 (08-03-23 @ 05:10) (16 - 18)  SpO2: 98% (08-03-23 @ 05:10) (98% - 100%)  Wt(kg): --Vital Signs Last 24 Hrs  T(C): 36.8 (03 Aug 2023 05:10), Max: 38.2 (02 Aug 2023 20:50)  T(F): 98.2 (03 Aug 2023 05:10), Max: 100.8 (02 Aug 2023 20:50)  HR: 96 (03 Aug 2023 05:10) (96 - 108)  BP: 101/50 (03 Aug 2023 05:10) (101/50 - 117/55)  RR: 18 (03 Aug 2023 05:10) (16 - 18)  SpO2: 98% (03 Aug 2023 05:10) (98% - 100%)    Parameters below as of 03 Aug 2023 05:10  Patient On (Oxygen Delivery Method): room air    No Known Allergies    PHYSICAL EXAM:  GENERAL: NAD  HEAD:  Atraumatic, Normocephalic  EYES: EOMI, PERRLA, conjunctiva and sclera clear  ENMT: Moist mucous membranes  NECK: Supple  NERVOUS SYSTEM:  Alert & Oriented X3  CHEST/LUNG: Clear to percussion bilaterally; No rales, rhonchi, wheezing, or rubs  HEART: Regular rate and rhythm; No murmurs, rubs, or gallops  ABDOMEN: Soft, Nontender, Nondistended; Bowel sounds present, +pleurx drain c/d/i  EXTREMITIES:  2+ Peripheral Pulses, No clubbing, cyanosis; 1+ edema b/l  LYMPH: No lymphadenopathy noted  SKIN: No rashes or lesions    Consultant(s) Notes Reviewed:  [x ] YES  [ ] NO  Care Discussed with Consultants/Other Providers [ x] YES  [ ] NO    LABS:  POCT Blood Glucose.: 188 mg/dL (08.02.23 @ 22:11)     IR Procedure (07.26.23 @ 10:54)  IMPRESSION:  Successful chronic Paracentesis Catheter insertion and drainage of   approximately 2 L  of ascitic fluid.

## 2023-08-03 NOTE — PROGRESS NOTE ADULT - PROBLEM SELECTOR PLAN 6
Pt developed fever 7/30   > Blood cx growing gram negative rods - e.coli  > Paracentesis culture 7/31 negative. UA 7/31 negative  > Managed by primary team

## 2023-08-03 NOTE — PROGRESS NOTE ADULT - ATTENDING COMMENTS
78F with metastatic cancer, hyponatremia, chronic pain, mucositis secondary to chemo. Tolerating salt tabs for hyponatremia. Patient still with poor PO. States diarrhea improved now complaining of constipation. Patient w/ gram negative bacteremia, peritoneal culture negative, Ucx negative. Anticipate source diarrheal illness now improved. If with repeat diarrhea will send stool culture. Will not hold discharge goal is comfort. Will plan for 7 day course of empiric abx for gram negative sepsis. Patient pain improving on increased dose of methadone. Changed cefepime to ceftriaxone given sensitivities. Will change to po FLQ for discharge. Senna given for constipation, also dosing milk of magnesia. Patient w/ minimal urine output likely in the setting of poor po and advanced cancer. Mucositis improved from day prior.     SW note appreciated: The grand at  cannot accept patient's insurance. [facility] [place] Awaiting insurance auth.     Patient medically optimized for discharge awaiting NH placement.    Case Discussed w/ Dr. Courtney and Dr. Brito

## 2023-08-03 NOTE — PROGRESS NOTE ADULT - PROBLEM SELECTOR PLAN 2
Patient has worsening recurrent abdominal distension and BLE edema, likely in setting of metastatic disease. Large volume ascites confirmed on CT A/P. Abd US 7/24 confirming moderate to severe ascites. ascites improved s/p PleurX placement 7/26. New blood cultures 7/30 show GNR with E coli.  - 7/30 removed 1L. s/p Lasix 20mg IV x1  - started cefipime 2g. susceptibility panel returned - will c/w CTX 2g daily for total of 7 days (7/30 - 8/5)  - paracentesis culture 7/31 negative. UA 7/31 negative  - monitor volume status.  - strict I&O Patient has worsening recurrent abdominal distension and BLE edema, likely in setting of metastatic disease. Large volume ascites confirmed on CT A/P. Abd US 7/24 confirming moderate to severe ascites. ascites improved s/p PleurX placement 7/26. New blood cultures 7/30 show GNR with E coli.  - 7/30 removed 1L. s/p Lasix 20mg IV x1  - will drain again today 8/3  - started cefipime 2g. susceptibility panel returned - will c/w CTX 2g daily for total of 7 days (7/30 - 8/5)  - paracentesis culture 7/31 negative. UA 7/31 negative  - monitor volume status.  - strict I&O Patient has worsening recurrent abdominal distension and BLE edema, likely in setting of metastatic disease. Large volume ascites confirmed on CT A/P. Abd US 7/24 confirming moderate to severe ascites. ascites improved s/p PleurX placement 7/26. New blood cultures 7/30 show GNR with E coli.  - 7/30 removed 1L. s/p Lasix 20mg IV x1  - will drain again today 8/3  - started cefipime 2g. susceptibility panel returned - will c/w CTX 2g daily for total of 7 days (7/30 - 8/5). On discharge, will switch to cipro 500mg BID as outpt  - paracentesis culture 7/31 negative. UA 7/31 negative  - monitor volume status.  - strict I&O

## 2023-08-03 NOTE — PROGRESS NOTE ADULT - PROBLEM SELECTOR PLAN 12
DVT: Eliquis as above  Diet: Fluid restriction 1L, regular diet + Glucerna shake supplement BID  Dispo: SNF. [facility] is not acccepting long term care patients as is full. Family is currently looking at [facility] with SW.  Code: DNR/DNI DVT: Eliquis as above  Diet: Fluid restriction 1L, regular diet + Glucerna shake supplement BID  Dispo: SNF. [facility] is not acccepting long term care patients as is full. Paperwork started for [place] SNF with SW.  Code: DNR/DNI DVT: Eliquis as above  Diet: Fluid restriction 1L, regular diet + Glucerna shake supplement BID  Dispo: SNF. [facility] is not acccepting long term care patients as is full. Paperwork started for SNF with SW.  Code: DNR/DNI

## 2023-08-03 NOTE — PROGRESS NOTE ADULT - PROBLEM SELECTOR PLAN 5
hx metastatic pancreatic cancer with mets to liver and lung. s/p whipple, on chemo with last treatment 7/17. She has chronic diffuse body pain, including chest, abdomen, and extremities, in setting of metastatic disease and chemo. Pain managed by oxycodone and morphine at home. Patient and family no longer interested in DMT and would like to pursue hospice, will consult palliative and place hospice referral.  -plan for hospice on dc  -c/w pain regimen per palliative: methadone 4mg BID, Gabapentin 100mg TID, Morphine 4mg PO q4 PRN for moderate and 6mg PO q4 PRN severe pain. IV morphine dc for discharge preparation  -c/w Zofran 4mg q8 PRN nausea  -c/w home Remeron 15mg daily for decreased PO intake hx metastatic pancreatic cancer with mets to liver and lung. s/p whipple, on chemo with last treatment 7/17. She has chronic diffuse body pain, including chest, abdomen, and extremities, in setting of metastatic disease and chemo. Pain managed by oxycodone and morphine at home. Patient and family no longer interested in DMT and would like to pursue hospice, will consult palliative and place hospice referral.  -plan for SNF on dc  -c/w pain regimen per palliative: methadone 4mg BID, Gabapentin 100mg TID, Morphine 4mg PO q4 PRN for moderate and 6mg PO q4 PRN severe pain. IV morphine dc for discharge preparation  -c/w Zofran 4mg q8 PRN nausea  -c/w home Remeron 15mg daily for decreased PO intake

## 2023-08-03 NOTE — PROGRESS NOTE ADULT - PROBLEM SELECTOR PLAN 2
Home regimen: Methadone 1mg solution QHS, Morphine 3mg solution Q4 hrs PRN pain, Gabapentin 100 TID  > c/w Methadone 4mg BID EKG 7/30 qtc 431  > c/w Gabapentin 100mg TID   > PO morphine solution 4mg Q4hrs PRN moderate  > PO morphine solution 6mg Q4hrs PRN severe pain  > Bowel regimen while on opioids

## 2023-08-03 NOTE — PROGRESS NOTE ADULT - PROBLEM SELECTOR PLAN 5
> CT abd/pelvis shows interval increase in now very large amount of abdominal and pelvic ascites since prior CT of 7/1/2023.  > Pleurx placed successfully 7/26 with 2L ascitic fluid drained  > Last drained Sunday 7/30 ~1L  > pt c/o fullness - plan to drain pleurx today.

## 2023-08-03 NOTE — PROGRESS NOTE ADULT - PROBLEM SELECTOR PLAN 4
Patient complains of painful sores throughout mouth and tongue that have been present for several weeks but have worsened within the past week. On exam has tender lesions to mucosa of inner cheeks, base of tongue, and lips. Low suspicion for thrush or other ongoing infection. Her sores are likely 2/2 chemotherapy.   > c/w Viscous lidocaine ATC   > c/w Benzocaine spray PRN]  > Added Magic Mouth Wash Q8 hrs

## 2023-08-04 NOTE — PROGRESS NOTE ADULT - PROBLEM SELECTOR PLAN 11
Had 2 days of loose diarrhea 7/29-7/30, then resolved. Now without BM since 7/30. Also has poor PO intake from mouth sores and on opiates  - supplemental nutrition Glucerna BID  - encouraged PO intake  - trial of senna x1 8/2. Still without BM 8/3.  - Continue senna and miralax daily

## 2023-08-04 NOTE — PROGRESS NOTE ADULT - PROBLEM SELECTOR PLAN 12
DVT: Eliquis as above  Diet: Fluid restriction 1L, regular diet + Glucerna shake supplement BID  Dispo: SNF. [facility] is not acccepting long term care patients as is full. Paperwork started for SNF with SW.  Code: DNR/DNI DVT: Eliquis as above  Diet: Fluid restriction 1L, regular diet + Glucerna shake supplement BID  Dispo: SNF. [facility] is not acccepting long term care patients as is full. Paperwork started for SNF with SW.   Code: DNR/DNI

## 2023-08-04 NOTE — PROGRESS NOTE ADULT - PROBLEM SELECTOR PLAN 2
Patient has worsening recurrent abdominal distension and BLE edema, likely in setting of metastatic disease. Large volume ascites confirmed on CT A/P. Abd US 7/24 confirming moderate to severe ascites. ascites improved s/p PleurX placement 7/26. New blood cultures 7/30 show GNR with E coli.  - 7/30 removed 1L. s/p Lasix 20mg IV x1  - 8/3 removed 1.2L  - started cefipime 2g. susceptibility panel returned - will c/w CTX 2g daily for total of 7 days (7/30 - 8/5). On discharge, will switch to cipro 500mg BID as outpt  - paracentesis culture 7/31 negative. UA 7/31 negative  - monitor volume status.  - strict I&O

## 2023-08-04 NOTE — PROGRESS NOTE ADULT - PROBLEM SELECTOR PLAN 1
Patient complains of painful sores in mouth and tongue that have been present for several weeks but have worsened within the past week. On exam has tender lesions to mucosa of inner cheeks, base of tongue, and lips. Low suspicion for thrush or other ongoing infection. Her sores are likely 2/2 chemotherapy.  -c/w 2% viscous lidocaine and benzocaine spray PRN per palliative recs  -restarted magic mouth wash  -c/w glucerna shake x2 per dietitian recs d/t poor PO intake from mouth sores  -monitor pain  -monitor nutrition intake Patient complains of painful sores in mouth and tongue that have been present for several weeks but have worsened within the past week. On exam has tender lesions to mucosa of inner cheeks, base of tongue, and lips. Low suspicion for thrush or other ongoing infection. Her sores are likely 2/2 chemotherapy.  -c/w 2% viscous lidocaine and benzocaine spray PRN per palliative recs  -restarted magic mouth wash  -c/w glucerna shake x2 per dietitian recs d/t poor PO intake from mouth sores  -monitor pain  -monitor nutrition intake  - ready for dc

## 2023-08-04 NOTE — PROGRESS NOTE ADULT - PROBLEM SELECTOR PLAN 3
Hyponatremic to 125 in ED, no changes in mental status or neurological deficits. 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 Of note, had hyponatremia from previous admission this month, also thought to be in setting of SIADH. s/p NS 3.0% 30cc/hr x1 and fluid restriction. 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   - Nephro consulted, on fluid restriction 1.0L daily + salt tabs 2g TID

## 2023-08-04 NOTE — PROGRESS NOTE ADULT - ATTENDING COMMENTS
78F with metastatic cancer, hyponatremia, chronic pain, mucositis secondary to chemo. Tolerating salt tabs for hyponatremia. Patient still with poor PO. States diarrhea improved was complaining of constipation post-diarrhea. Patient now w/ BM. Patient w/ gram negative bacteremia, peritoneal culture negative, Ucx negative. Anticipate source diarrheal illness now improved. If with repeat diarrhea will send stool culture. Will not hold discharge goal is comfort. Will plan for 7 day course of empiric abx for gram negative sepsis, growing E coli. Patient pain improving on increased dose of methadone. Changed cefepime to ceftriaxone given sensitivities. Will change to po FLQ for discharge. Patient w/ minimal urine output likely in the setting of poor po and advanced cancer. Mucositis improving. Patient drained 1.2L of peritoneal fluid yesterday. Will drain every 2-3 days for comfort. Reducing amlodipine to 5mg given tightly controlled BP. Will also change vitals to q shift given goals of care.     CHENG note appreciated: awaiting for insurance authorization.

## 2023-08-04 NOTE — PROGRESS NOTE ADULT - PROBLEM SELECTOR PLAN 5
hx metastatic pancreatic cancer with mets to liver and lung. s/p whipple, on chemo with last treatment 7/17. She has chronic diffuse body pain, including chest, abdomen, and extremities, in setting of metastatic disease and chemo. Pain managed by oxycodone and morphine at home. Patient and family no longer interested in DMT and would like to pursue hospice, will consult palliative and place hospice referral.  -plan for SNF on dc  -c/w pain regimen per palliative: methadone 4mg BID, Gabapentin 100mg TID, Morphine 4mg PO q4 PRN for moderate and 6mg PO q4 PRN severe pain. IV morphine dc for discharge preparation  -c/w Zofran 4mg q8 PRN nausea  -c/w home Remeron 15mg daily for decreased PO intake

## 2023-08-04 NOTE — PROGRESS NOTE ADULT - SUBJECTIVE AND OBJECTIVE BOX
Patient is a 78y old  Female who presents with a chief complaint of diarrhea (03 Aug 2023 15:23)    INTERVAL HPI/OVERNIGHT EVENTS: Patient had 1.2L of ascitic fluid drained yesterday with mild abdominal relief. Her mouth sores appear _______. Now added magic mouth wash 10mL TID. She is on opiates for pain management. She had 2 BM yesterday, with senna and miralax.     She reported some painful urination on 8/2, with UCx showing NGTD.    She denies     MEDS:  acetaminophen     Tablet .. 650 milliGRAM(s) Oral every 6 hours PRN  atorvastatin 40 milliGRAM(s) Oral at bedtime  benzocaine 20% Spray 1 Spray(s) Topical four times a day PRN  cefTRIAXone   IVPB 2000 milliGRAM(s) IV Intermittent every 24 hours  celecoxib 200 milliGRAM(s) Oral daily  chlorhexidine 2% Cloths 1 Application(s) Topical daily  fenofibrate Tablet 145 milliGRAM(s) Oral daily  FIRST- Mouthwash  BLM 10 milliLiter(s) Swish and Spit three times a day  gabapentin 100 milliGRAM(s) Oral three times a day  lidocaine 2% Viscous 10 milliLiter(s) Swish and Spit four times a day  magnesium hydroxide Suspension 30 milliLiter(s) Oral once  melatonin 3 milliGRAM(s) Oral at bedtime PRN  methadone   Solution 4 milliGRAM(s) Oral two times a day  metoprolol succinate ER 25 milliGRAM(s) Oral daily  mirtazapine 15 milliGRAM(s) Oral at bedtime  morphine   Solution 4 milliGRAM(s) Oral every 4 hours PRN  morphine   Solution 6 milliGRAM(s) Oral every 4 hours PRN  ondansetron    Tablet 4 milliGRAM(s) Oral every 8 hours PRN  pancrelipase  (CREON 36,000 Lipase Units) 1 Capsule(s) Oral three times a day with meals  pantoprazole    Tablet 40 milliGRAM(s) Oral before breakfast  sodium chloride 2 Gram(s) Oral three times a day  zolpidem 5 milliGRAM(s) Oral at bedtime PRN    REVIEW OF SYSTEMS:  CONSTITUTIONAL: No fever, weight loss, or fatigue  EYES: No visual disturbances  ENMT:  No difficulty hearing, tinnitus, vertigo; No sinus or throat pain  NECK: No pain or stiffness  BREASTS: No pain, masses, or nipple discharge  RESPIRATORY: No cough, wheezing, chills or hemoptysis; No shortness of breath  CARDIOVASCULAR: No chest pain, palpitations, dizziness, or leg swelling  GASTROINTESTINAL: No abdominal or epigastric pain. No nausea, vomiting, or hematemesis; No diarrhea or constipation. No melena or hematochezia.  GENITOURINARY: No dysuria, frequency, hematuria, or incontinence  NEUROLOGICAL: No headaches, memory loss, loss of strength, numbness, or tremors  SKIN: No itching, burning, rashes, or lesions   MUSCULOSKELETAL: + generalized muscle pain  ALLERY AND IMMUNOLOGIC: No hives or eczema    FAMILY HISTORY:  FH: HTN (hypertension) (Mother)  Family history of leukemia (Child)    T(C): 36.8 (08-04-23 @ 04:00), Max: 36.8 (08-04-23 @ 04:00)  HR: 79 (08-04-23 @ 04:00) (79 - 91)  BP: 101/56 (08-04-23 @ 04:00) (100/52 - 106/51)  RR: 18 (08-04-23 @ 04:00) (16 - 18)  SpO2: 100% (08-04-23 @ 04:00) (99% - 100%)  Wt(kg): --Vital Signs Last 24 Hrs  T(C): 36.8 (04 Aug 2023 04:00), Max: 36.8 (04 Aug 2023 04:00)  T(F): 98.3 (04 Aug 2023 04:00), Max: 98.3 (04 Aug 2023 04:00)  HR: 79 (04 Aug 2023 04:00) (79 - 91)  BP: 101/56 (04 Aug 2023 04:00) (100/52 - 106/51)  RR: 18 (04 Aug 2023 04:00) (16 - 18)  SpO2: 100% (04 Aug 2023 04:00) (99% - 100%)    Parameters below as of 04 Aug 2023 04:00  Patient On (Oxygen Delivery Method): room air    No Known Allergies    PHYSICAL EXAM:  GENERAL: NAD  HEAD:  Atraumatic, Normocephalic  EYES: EOMI, conjunctiva and sclera clear  ENMT: Moist mucous membranes, oral mucosal lesions, healing  NECK: Supple  NERVOUS SYSTEM:  Alert & Oriented X3  CHEST/LUNG: Clear to percussion bilaterally; No rales, rhonchi, wheezing, or rubs  HEART: Regular rate and rhythm; No murmurs, rubs, or gallops  ABDOMEN: Soft, Nontender; Bowel sounds present; slightly distended from ascites  EXTREMITIES:  2+ Peripheral Pulses, No clubbing, cyanosis; b/l LE edema  SKIN: No rashes or lesions    Consultant(s) Notes Reviewed:  [x ] YES  [ ] NO  Care Discussed with Consultants/Other Providers [ x] YES  [ ] NO Patient is a 78y old  Female who presents with a chief complaint of diarrhea (03 Aug 2023 15:23)    INTERVAL HPI/OVERNIGHT EVENTS: Patient had 1.2L of ascitic fluid drained yesterday with mild abdominal relief. Her mouth sores appear improved. Now added magic mouth wash 10mL TID. She is on opiates for pain management. She had 2 BM yesterday, with senna and miralax.   She reported some painful urination on 8/2, with UCx showing NGTD.  She denies any cp, sob, cough No new complaints    MEDS:  acetaminophen     Tablet .. 650 milliGRAM(s) Oral every 6 hours PRN  atorvastatin 40 milliGRAM(s) Oral at bedtime  benzocaine 20% Spray 1 Spray(s) Topical four times a day PRN  cefTRIAXone   IVPB 2000 milliGRAM(s) IV Intermittent every 24 hours  celecoxib 200 milliGRAM(s) Oral daily  chlorhexidine 2% Cloths 1 Application(s) Topical daily  fenofibrate Tablet 145 milliGRAM(s) Oral daily  FIRST- Mouthwash  BLM 10 milliLiter(s) Swish and Spit three times a day  gabapentin 100 milliGRAM(s) Oral three times a day  lidocaine 2% Viscous 10 milliLiter(s) Swish and Spit four times a day  magnesium hydroxide Suspension 30 milliLiter(s) Oral once  melatonin 3 milliGRAM(s) Oral at bedtime PRN  methadone   Solution 4 milliGRAM(s) Oral two times a day  metoprolol succinate ER 25 milliGRAM(s) Oral daily  mirtazapine 15 milliGRAM(s) Oral at bedtime  morphine   Solution 4 milliGRAM(s) Oral every 4 hours PRN  morphine   Solution 6 milliGRAM(s) Oral every 4 hours PRN  ondansetron    Tablet 4 milliGRAM(s) Oral every 8 hours PRN  pancrelipase  (CREON 36,000 Lipase Units) 1 Capsule(s) Oral three times a day with meals  pantoprazole    Tablet 40 milliGRAM(s) Oral before breakfast  sodium chloride 2 Gram(s) Oral three times a day  zolpidem 5 milliGRAM(s) Oral at bedtime PRN    REVIEW OF SYSTEMS:  CONSTITUTIONAL: No fever, weight loss, or fatigue  EYES: No visual disturbances  ENMT:  No difficulty hearing, tinnitus, vertigo; No sinus or throat pain  NECK: No pain or stiffness  BREASTS: No pain, masses, or nipple discharge  RESPIRATORY: No cough, wheezing, chills or hemoptysis; No shortness of breath  CARDIOVASCULAR: No chest pain, palpitations, dizziness, or leg swelling  GASTROINTESTINAL: No abdominal or epigastric pain. No nausea, vomiting, or hematemesis; No diarrhea or constipation. No melena or hematochezia.  GENITOURINARY: No dysuria, frequency, hematuria, or incontinence  NEUROLOGICAL: No headaches, memory loss, loss of strength, numbness, or tremors  SKIN: No itching, burning, rashes, or lesions   MUSCULOSKELETAL: + generalized muscle pain  ALLERY AND IMMUNOLOGIC: No hives or eczema    FAMILY HISTORY:  FH: HTN (hypertension) (Mother)  Family history of leukemia (Child)    T(C): 36.8 (08-04-23 @ 04:00), Max: 36.8 (08-04-23 @ 04:00)  HR: 79 (08-04-23 @ 04:00) (79 - 91)  BP: 101/56 (08-04-23 @ 04:00) (100/52 - 106/51)  RR: 18 (08-04-23 @ 04:00) (16 - 18)  SpO2: 100% (08-04-23 @ 04:00) (99% - 100%)  Wt(kg): --Vital Signs Last 24 Hrs  T(C): 36.8 (04 Aug 2023 04:00), Max: 36.8 (04 Aug 2023 04:00)  T(F): 98.3 (04 Aug 2023 04:00), Max: 98.3 (04 Aug 2023 04:00)  HR: 79 (04 Aug 2023 04:00) (79 - 91)  BP: 101/56 (04 Aug 2023 04:00) (100/52 - 106/51)  RR: 18 (04 Aug 2023 04:00) (16 - 18)  SpO2: 100% (04 Aug 2023 04:00) (99% - 100%)    Parameters below as of 04 Aug 2023 04:00  Patient On (Oxygen Delivery Method): room air    No Known Allergies    PHYSICAL EXAM:  GENERAL: NAD  HEAD:  Atraumatic, Normocephalic  EYES: EOMI, conjunctiva and sclera clear  ENMT: Moist mucous membranes, oral mucosal lesions, healing  NECK: Supple  NERVOUS SYSTEM:  Alert & Oriented X3  CHEST/LUNG: Clear to percussion bilaterally; No rales, rhonchi, wheezing, or rubs  HEART: Regular rate and rhythm; No murmurs, rubs, or gallops  ABDOMEN: Soft, Nontender; Bowel sounds present; slightly distended from ascites  EXTREMITIES:  2+ Peripheral Pulses, No clubbing, cyanosis; b/l LE edema  SKIN: No rashes or lesions    Consultant(s) Notes Reviewed:  [x ] YES  [ ] NO  Care Discussed with Consultants/Other Providers [ x] YES  [ ] NO

## 2023-08-04 NOTE — PROGRESS NOTE ADULT - PROBLEM SELECTOR PLAN 6
Reports having hematuria 7/29, given uptrending leukocytosis will evaluate for UTI. BCx 7/30 positive for E. coli. Could also be 2/2 malignancy vs chemotherapy  - Resolved 7/30. UA negative 7/31.   - Again with dysuria symptoms 8/2, repeat UA pending, UCx pending

## 2023-08-05 NOTE — PROGRESS NOTE ADULT - NUTRITIONAL ASSESSMENT
This patient has been assessed with a concern for Malnutrition and has been determined to have a diagnosis/diagnoses of Severe protein-calorie malnutrition.    This patient is being managed with:   Diet Regular-  1000mL Fluid Restriction ([id number])  Entered: Jul 29 2023  6:45PM  
This patient has been assessed with a concern for Malnutrition and has been determined to have a diagnosis/diagnoses of Severe protein-calorie malnutrition.    This patient is being managed with:   Diet Regular-  1000mL Fluid Restriction ([id number])  Supplement Feeding Modality:  Oral  Glucerna Shake Cans or Servings Per Day:  1       Frequency:  Two Times a day  Entered: Aug  2 2023  7:10AM  
This patient has been assessed with a concern for Malnutrition and has been determined to have a diagnosis/diagnoses of Severe protein-calorie malnutrition.    This patient is being managed with:   Diet Regular-  1000mL Fluid Restriction ([id number])  Supplement Feeding Modality:  Oral  Glucerna Shake Cans or Servings Per Day:  1       Frequency:  Two Times a day  Entered: Aug  2 2023  7:10AM  
This patient has been assessed with a concern for Malnutrition and has been determined to have a diagnosis/diagnoses of Severe protein-calorie malnutrition.    This patient is being managed with:   Diet Regular-  1000mL Fluid Restriction ([id number])  Supplement Feeding Modality:  Oral  Glucerna Shake Cans or Servings Per Day:  1       Frequency:  Two Times a day  Entered: Aug  2 2023  7:10AM  
This patient has been assessed with a concern for Malnutrition and has been determined to have a diagnosis/diagnoses of Severe protein-calorie malnutrition.    This patient is being managed with:   Diet Regular-  1000mL Fluid Restriction ([id number])  Supplement Feeding Modality:  Oral  Glucerna Shake Cans or Servings Per Day:  1       Frequency:  Two Times a day  Entered: Aug  2 2023  7:10AM

## 2023-08-05 NOTE — PROGRESS NOTE ADULT - PROBLEM/PLAN-3
Writer cleansed pt penis with soap and water, followed by iodine cleanse, prior to straight cath with 16 F coude.  UA obtained and sent to lab.  Approximately 25 cc of cloudy, yellow urine obtained with strands of mucous present in urine collection.  After sample obtained, cath removed.  Pt tolerated with minimal \"stinging\" to penis during catheterization.  
DISPLAY PLAN FREE TEXT

## 2023-08-05 NOTE — DISCHARGE NOTE NURSING/CASE MANAGEMENT/SOCIAL WORK - NSDCPEELIQUISDIET_GEN_ALL_CORE
Eat healthy foods you enjoy. Apixaban/Eliquis DOES NOT have a special diet. Limit your alcohol intake.
21-Mar-2020 19:21

## 2023-08-05 NOTE — PROGRESS NOTE ADULT - TIME-BASED
Follow up with Dr. Tete Ornelas in 3 months. Fasting labs prior to your visit to recheck blood sugars and cholesterol. Start meformin 500 mg twice daily for diabetes. Start atorvastatin 10 mg in evening for cholesterol. Continue to monitor your blood sugar at home and keep log (few times per week or if you have symptoms such as feeling dizzy/sweaty, etc). Annual influenza vaccination in fall when due, COVID-19 boosters as recommended. 6.  Recommend dilated annual eye exam (diabetes). 7.   Shingles vaccine today, series of 2 vaccines . First today, and then 2nd in 2-6 months (may be updated with visit with Dr. Tete Ornelas in 3 months). Irvin Galvan [phone] for your stomach.
51
51
35
44
51

## 2023-08-05 NOTE — PROGRESS NOTE ADULT - ATTENDING COMMENTS
78F with metastatic cancer, hyponatremia, chronic pain, mucositis secondary to chemo. Tolerating salt tabs for hyponatremia. Patient still with poor PO. States diarrhea improved was complaining of constipation post-diarrhea. Patient now w/ BM. Patient w/ gram negative bacteremia, peritoneal culture negative, Ucx negative. Anticipate source diarrheal illness now improved. If with repeat diarrhea will send stool culture. Will not hold discharge goal is comfort. Will plan for 7 day course of empiric abx for gram negative sepsis, growing E coli. Patient pain improving on increased dose of methadone. Changed cefepime to ceftriaxone given sensitivities. Will change to po FLQ for discharge. Patient w/ minimal urine output likely in the setting of poor po and advanced cancer. Mucositis improving. Patient drained 1.2L of peritoneal fluid 8/4. Will drain every 2-3 days for comfort. Reduced amlodipine to 5mg given tightly controlled BP. Also changed vitals to q shift given goals of care.     Giving furosemide 20mg IV for leg edema    SW note appreciated: awaiting NH bed will f/u SW 78F with metastatic cancer, hyponatremia, chronic pain, mucositis secondary to chemo. Tolerating salt tabs for hyponatremia. Patient still with poor PO. States diarrhea improved was complaining of constipation post-diarrhea. Patient now w/ BM. Patient w/ gram negative bacteremia, peritoneal culture negative, Ucx negative. Anticipate source diarrheal illness now improved. If with repeat diarrhea will send stool culture. Will not hold discharge goal is comfort. Will plan for 7 day course of empiric abx for gram negative sepsis, growing E coli. Patient pain improving on increased dose of methadone. Changed cefepime to ceftriaxone given sensitivities. Will change to po FLQ for discharge. Patient w/ minimal urine output likely in the setting of poor po and advanced cancer. Mucositis improving. Patient drained 1.2L of peritoneal fluid 8/4. Will drain every 2-3 days for comfort. Reduced amlodipine to 5mg given tightly controlled BP. Also changed vitals to q shift given goals of care.     Giving furosemide 20mg IV for leg edema    SW note appreciated: awaiting NH bed will f/u SW    For discharge home today 32 minutes spent on discharge planning

## 2023-08-05 NOTE — PROGRESS NOTE ADULT - REASON FOR ADMISSION
diarrhea

## 2023-08-05 NOTE — PROGRESS NOTE ADULT - PROBLEM SELECTOR PLAN 12
DVT: Eliquis as above  Diet: Fluid restriction 1L, regular diet + Glucerna shake supplement BID  Dispo: acceptde to SNF, dc 8/5  Code: DNR/DNI

## 2023-08-05 NOTE — PROGRESS NOTE ADULT - ATTENDING SUPERVISION STATEMENT
Fellow
Resident

## 2023-08-05 NOTE — PROGRESS NOTE ADULT - PROBLEM SELECTOR PLAN 6
Reports having hematuria 7/29, given uptrending leukocytosis will evaluate for UTI. BCx 7/30 positive for E. coli. Could also be 2/2 malignancy vs chemotherapy  - Resolved 7/30. UA negative 7/31.   - Again with dysuria symptoms 8/2, repeat UCx NGTD

## 2023-08-05 NOTE — PROGRESS NOTE ADULT - PROBLEM SELECTOR PLAN 2
Patient has worsening recurrent abdominal distension and BLE edema, likely in setting of metastatic disease. Large volume ascites confirmed on CT A/P. Abd US 7/24 confirming moderate to severe ascites. ascites improved s/p PleurX placement 7/26. New blood cultures 7/30 show GNR with E coli.  - 7/30 removed 1L. s/p Lasix 20mg IV x1  - 8/3 removed 1.2L  - started cefipime 2g 7/30. susceptibility panel returned - will c/w CTX 2g daily for total of 7 days (7/30 - 8/6). On discharge, will switch to cipro 500mg BID as outpt until 8/6.  - paracentesis culture 7/31 negative. UA 7/31 negative  - monitor volume status.  - strict I&O

## 2023-08-05 NOTE — PROGRESS NOTE ADULT - PROBLEM SELECTOR PLAN 4
US duplex on 7/26 found incidental LLE DVTs x2 (Acute non-occlusive DVT noted within the distal left femoral and popliteal veins. Acute, occlusive DVT noted within the left posterior tibial and soleal veins). She has no left leg pain and on exam no calf tenderness, warmth, or erythema. s/p PleurX placement 7/26. On heparin gtt, in therapeutic range  -c/w Heparin gtt  -when dc can transition to eliquis (total 7 days high dose then can switch to 5mg BID - 8/4)  -outpt f/u with PCP

## 2023-08-05 NOTE — PROGRESS NOTE ADULT - PROBLEM SELECTOR PLAN 11
Had 2 days of loose diarrhea 7/29-7/30, then resolved. Now without BM since 7/30. Also has poor PO intake from mouth sores and on opiates  - supplemental nutrition Glucerna BID  - encouraged PO intake  - trial of senna x1 8/2. Still without BM 8/3.  - Continue senna and miralax MWF

## 2023-08-05 NOTE — PROGRESS NOTE ADULT - PROBLEM SELECTOR PLAN 1
Patient complains of painful sores in mouth and tongue that have been present for several weeks but have worsened within the past week. On exam has tender lesions to mucosa of inner cheeks, base of tongue, and lips. Low suspicion for thrush or other ongoing infection. Her sores are likely 2/2 chemotherapy.  -c/w 2% viscous lidocaine and benzocaine spray PRN per palliative recs  -restarted magic mouth wash  -c/w glucerna shake x2 per dietitian recs d/t poor PO intake from mouth sores  -monitor pain  -monitor nutrition intake

## 2023-08-05 NOTE — PROGRESS NOTE ADULT - TIME BILLING
reviewing laboratory data, consultants' recommendations, documentation in [place], performing medically appropriate examinations/evaluations, discussion with patient/family/RN/ACP/Residents and interdisciplinary staff (such as , social workers, etc), counseling patient/family/care giver, ordering medically appropriate medication, tests, or procedures. Interventions were performed as documented above.
Time spent on review of vital signs, labs, prior documentation. Patient interviewed and examined during this encounter. Plan of care was discussed with patient, and resident team. Encounter documented.
Time spent on review of vital signs, labs, prior documentation. Patient interviewed and examined during this encounter. Plan of care was discussed with patient, and resident team. Encounter documented.
Time spent on review of vital signs, labs, prior documentation, consultant notes. Patient interviewed and examined during this encounter. Plan of care was discussed with patient, patient  and resident team. Encounter documented.
Time spent on review of vital signs, labs, prior documentation. Patient interviewed and examined during this encounter. Plan of care was discussed with patient, and resident team. Encounter documented.
Time spent on review of vital signs, labs, prior documentation. Patient interviewed and examined during this encounter. Plan of care was discussed with patient, and resident team. Encounter documented.
Time spent on review of vital signs, labs, prior documentation. Patient interviewed and examined during this encounter. Plan of care was discussed with patient, family and resident team. Encounter documented.

## 2023-08-05 NOTE — DISCHARGE NOTE NURSING/CASE MANAGEMENT/SOCIAL WORK - PATIENT PORTAL LINK FT
You can access the FollowMyHealth Patient Portal offered by [facility] by registering at the following website: [email or web address]. By joining [employer]’s FollowMyHealth portal, you will also be able to view your health information using other applications (apps) compatible with our system.

## 2023-08-05 NOTE — PROGRESS NOTE ADULT - SUBJECTIVE AND OBJECTIVE BOX
Patient is a 78y old  Female who presents with a chief complaint of diarrhea (03 Aug 2023 15:23)    INTERVAL HPI/OVERNIGHT EVENTS: Patient Mouth sores appear improved with pain management. She denies any cp, sob, cough No new complaints. She is optimized for dc today.    MEDS:  acetaminophen     Tablet .. 650 milliGRAM(s) Oral every 6 hours PRN  atorvastatin 40 milliGRAM(s) Oral at bedtime  benzocaine 20% Spray 1 Spray(s) Topical four times a day PRN  cefTRIAXone   IVPB 2000 milliGRAM(s) IV Intermittent every 24 hours  celecoxib 200 milliGRAM(s) Oral daily  chlorhexidine 2% Cloths 1 Application(s) Topical daily  fenofibrate Tablet 145 milliGRAM(s) Oral daily  FIRST- Mouthwash  BLM 10 milliLiter(s) Swish and Spit three times a day  gabapentin 100 milliGRAM(s) Oral three times a day  lidocaine 2% Viscous 10 milliLiter(s) Swish and Spit four times a day  magnesium hydroxide Suspension 30 milliLiter(s) Oral once  melatonin 3 milliGRAM(s) Oral at bedtime PRN  methadone   Solution 4 milliGRAM(s) Oral two times a day  metoprolol succinate ER 25 milliGRAM(s) Oral daily  mirtazapine 15 milliGRAM(s) Oral at bedtime  morphine   Solution 4 milliGRAM(s) Oral every 4 hours PRN  morphine   Solution 6 milliGRAM(s) Oral every 4 hours PRN  ondansetron    Tablet 4 milliGRAM(s) Oral every 8 hours PRN  pancrelipase  (CREON 36,000 Lipase Units) 1 Capsule(s) Oral three times a day with meals  pantoprazole    Tablet 40 milliGRAM(s) Oral before breakfast  sodium chloride 2 Gram(s) Oral three times a day  zolpidem 5 milliGRAM(s) Oral at bedtime PRN    REVIEW OF SYSTEMS:  CONSTITUTIONAL: No fever, weight loss, +fatigue  EYES: No visual disturbances  ENMT:  No difficulty hearing, tinnitus, vertigo; No sinus or throat pain  NECK: No pain or stiffness  BREASTS: No pain, masses, or nipple discharge  RESPIRATORY: No cough, wheezing, chills or hemoptysis; No shortness of breath  CARDIOVASCULAR: No chest pain, palpitations, dizziness, or leg swelling  GASTROINTESTINAL: No abdominal or epigastric pain. No nausea, vomiting, or hematemesis; No diarrhea or constipation. No melena or hematochezia.  GENITOURINARY: No dysuria, frequency, hematuria, or incontinence  NEUROLOGICAL: No headaches, memory loss, loss of strength, numbness, or tremors  SKIN: No itching, burning, rashes, or lesions   MUSCULOSKELETAL: + generalized muscle pain  ALLERY AND IMMUNOLOGIC: No hives or eczema    FAMILY HISTORY:  FH: HTN (hypertension) (Mother)  Family history of leukemia (Child)    Vital Signs Last 24 Hrs  T(C): 36.8 (05 Aug 2023 04:00), Max: 37.1 (04 Aug 2023 22:00)  T(F): 98.2 (05 Aug 2023 04:00), Max: 98.8 (04 Aug 2023 22:00)  HR: 101 (05 Aug 2023 04:00) (101 - 117)  BP: 109/66 (05 Aug 2023 04:00) (109/66 - 115/72)  RR: 16 (05 Aug 2023 04:00) (16 - 18)  SpO2: 100% (05 Aug 2023 04:00) (98% - 100%)    Parameters below as of 05 Aug 2023 04:00  Patient On (Oxygen Delivery Method): nasal cannula  O2 Flow (L/min): 2    No Known Allergies    PHYSICAL EXAM:  GENERAL: NAD  HEAD:  Atraumatic, Normocephalic  EYES: EOMI, conjunctiva and sclera clear  ENMT: Moist mucous membranes, oral mucosal lesions, healing  NECK: Supple  NERVOUS SYSTEM:  Alert & Oriented X3  CHEST/LUNG: Clear to percussion bilaterally; No rales, rhonchi, wheezing, or rubs  HEART: Regular rate and rhythm; No murmurs, rubs, or gallops  ABDOMEN: Soft, Nontender; Bowel sounds present; slightly distended from ascites  EXTREMITIES:  2+ Peripheral Pulses, No clubbing, cyanosis; b/l LE edema  SKIN: No rashes or lesions    Consultant(s) Notes Reviewed:  [x ] YES  [ ] NO  Care Discussed with Consultants/Other Providers [ x] YES  [ ] NO Patient is a 78y old  Female who presents with a chief complaint of diarrhea (03 Aug 2023 15:23)    INTERVAL HPI/OVERNIGHT EVENTS: Patient mouth sores appear improved with pain management. She denies any cp, sob, cough No new complaints. Pleurx to be drained again today. Swelling in lower extremities b/l, will receive IV lasix 20mg x1 today. SW to obtain bed at nursing facility. She is optimized for dc today.    MEDS:  acetaminophen     Tablet .. 650 milliGRAM(s) Oral every 6 hours PRN  atorvastatin 40 milliGRAM(s) Oral at bedtime  benzocaine 20% Spray 1 Spray(s) Topical four times a day PRN  cefTRIAXone   IVPB 2000 milliGRAM(s) IV Intermittent every 24 hours  celecoxib 200 milliGRAM(s) Oral daily  chlorhexidine 2% Cloths 1 Application(s) Topical daily  fenofibrate Tablet 145 milliGRAM(s) Oral daily  FIRST- Mouthwash  BLM 10 milliLiter(s) Swish and Spit three times a day  gabapentin 100 milliGRAM(s) Oral three times a day  lidocaine 2% Viscous 10 milliLiter(s) Swish and Spit four times a day  magnesium hydroxide Suspension 30 milliLiter(s) Oral once  melatonin 3 milliGRAM(s) Oral at bedtime PRN  methadone   Solution 4 milliGRAM(s) Oral two times a day  metoprolol succinate ER 25 milliGRAM(s) Oral daily  mirtazapine 15 milliGRAM(s) Oral at bedtime  morphine   Solution 4 milliGRAM(s) Oral every 4 hours PRN  morphine   Solution 6 milliGRAM(s) Oral every 4 hours PRN  ondansetron    Tablet 4 milliGRAM(s) Oral every 8 hours PRN  pancrelipase  (CREON 36,000 Lipase Units) 1 Capsule(s) Oral three times a day with meals  pantoprazole    Tablet 40 milliGRAM(s) Oral before breakfast  sodium chloride 2 Gram(s) Oral three times a day  zolpidem 5 milliGRAM(s) Oral at bedtime PRN    REVIEW OF SYSTEMS:  CONSTITUTIONAL: No fever, weight loss, +fatigue  EYES: No visual disturbances  ENMT:  No difficulty hearing, tinnitus, vertigo; No sinus or throat pain  NECK: No pain or stiffness  BREASTS: No pain, masses, or nipple discharge  RESPIRATORY: No cough, wheezing, chills or hemoptysis; No shortness of breath  CARDIOVASCULAR: No chest pain, palpitations, dizziness, or leg swelling  GASTROINTESTINAL: No abdominal or epigastric pain. No nausea, vomiting, or hematemesis; No diarrhea or constipation. No melena or hematochezia.  GENITOURINARY: No dysuria, frequency, hematuria, or incontinence  NEUROLOGICAL: No headaches, memory loss, loss of strength, numbness, or tremors  SKIN: No itching, burning, rashes, or lesions   MUSCULOSKELETAL: + generalized muscle pain  ALLERY AND IMMUNOLOGIC: No hives or eczema    FAMILY HISTORY:  FH: HTN (hypertension) (Mother)  Family history of leukemia (Child)    Vital Signs Last 24 Hrs  T(C): 36.8 (05 Aug 2023 04:00), Max: 37.1 (04 Aug 2023 22:00)  T(F): 98.2 (05 Aug 2023 04:00), Max: 98.8 (04 Aug 2023 22:00)  HR: 101 (05 Aug 2023 04:00) (101 - 117)  BP: 109/66 (05 Aug 2023 04:00) (109/66 - 115/72)  RR: 16 (05 Aug 2023 04:00) (16 - 18)  SpO2: 100% (05 Aug 2023 04:00) (98% - 100%)    Parameters below as of 05 Aug 2023 04:00  Patient On (Oxygen Delivery Method): nasal cannula  O2 Flow (L/min): 2    No Known Allergies    PHYSICAL EXAM:  GENERAL: NAD  HEAD:  Atraumatic, Normocephalic  EYES: EOMI, conjunctiva and sclera clear  ENMT: Moist mucous membranes, oral mucosal lesions, healing  NECK: Supple  NERVOUS SYSTEM:  Alert & Oriented X3  CHEST/LUNG: Clear to percussion bilaterally; No rales, rhonchi, wheezing, or rubs  HEART: Regular rate and rhythm; No murmurs, rubs, or gallops  ABDOMEN: Soft, Nontender; Bowel sounds present; slightly distended from ascites  EXTREMITIES:  2+ Peripheral Pulses, No clubbing, cyanosis; 2+ b/l LE edema  SKIN: No rashes or lesions    Consultant(s) Notes Reviewed:  [x ] YES  [ ] NO  Care Discussed with Consultants/Other Providers [ x] YES  [ ] NO

## 2023-08-05 NOTE — PROGRESS NOTE ADULT - PROVIDER SPECIALTY LIST ADULT
Internal Medicine
Palliative Care
Internal Medicine
Internal Medicine
Nephrology
Internal Medicine
Internal Medicine
Palliative Care
Internal Medicine
Palliative Care
Palliative Care
Internal Medicine
Palliative Care
Internal Medicine

## 2023-08-05 NOTE — PROGRESS NOTE ADULT - PROBLEM SELECTOR PROBLEM 1
Hyponatremia
Pancreatic cancer
Hyponatremia
Hyponatremia
Mouth sore
Pancreatic cancer
Mouth sore
Pancreatic cancer
Mouth sore
Pancreatic cancer
Pancreatic cancer
Mouth sore
Mouth sore

## 2023-08-10 ENCOUNTER — APPOINTMENT (OUTPATIENT)
Dept: ULTRASOUND IMAGING | Facility: IMAGING CENTER | Age: 78
End: 2023-08-10

## 2023-08-14 ENCOUNTER — APPOINTMENT (OUTPATIENT)
Dept: HEMATOLOGY ONCOLOGY | Facility: CLINIC | Age: 78
End: 2023-08-14

## 2023-08-14 ENCOUNTER — APPOINTMENT (OUTPATIENT)
Dept: INFUSION THERAPY | Facility: HOSPITAL | Age: 78
End: 2023-08-14

## 2023-08-16 ENCOUNTER — APPOINTMENT (OUTPATIENT)
Dept: INFUSION THERAPY | Facility: HOSPITAL | Age: 78
End: 2023-08-16

## 2024-03-04 NOTE — PATIENT PROFILE ADULT - FALL HARM RISK - HARM RISK INTERVENTIONS
Scope due 12/2025.  Sodium was 137 Feb 7    So unless something has changed in last 3 weeks, no need to check.  Will add BMP if she wants to check anyway   Assistance with ambulation/Assistance OOB with selected safe patient handling equipment/Communicate Risk of Fall with Harm to all staff/Reinforce activity limits and safety measures with patient and family/Tailored Fall Risk Interventions/Visual Cue: Yellow wristband and red socks/Bed in lowest position, wheels locked, appropriate side rails in place/Call bell, personal items and telephone in reach/Instruct patient to call for assistance before getting out of bed or chair/Non-slip footwear when patient is out of bed/Summers to call system/Physically safe environment - no spills, clutter or unnecessary equipment/Purposeful Proactive Rounding/Room/bathroom lighting operational, light cord in reach

## 2024-06-06 NOTE — PATIENT PROFILE ADULT - FUNCTIONAL ASSESSMENT - BASIC MOBILITY 1.
Diagnosis:   Encounter Diagnoses   Name Primary?    Malignant neoplasm of upper-inner quadrant of left breast in female, estrogen receptor negative  (CMD) Yes     Regimen: Trodevly  Cycle/Day: C40D1 post NP F/U  Is this a C1D1 appt?  No    Dr. Howell is supervising clinician today.    ECOG:   ECOG [06/06/24 1108]   ECOG Performance Status 1       Review and verified Advanced Directives: No: Patient declined to create/provide document at this time     Verified if patient has state DNR bracelet on: No; Full Code    Nursing Assessment:   A focused nursing assessment addressing the toxicity of chemotherapy was performed and the patient reports the following:    Anxiety/Depression/Insomnia: Anxiety: No, Depression: No, and Insomnia: No  Pain: NO    Toxicity Assessment    Auditory/Ear  Assessment: Yes (Within Defined Limits)    Cardiac General  Assessment: Yes (Within Defined Limits)    Constitutional  Assessment: Yes (w/ Exceptions to WDL)  Fatigue: Grade 1    Dermatology/Skin  Assessment: Yes (w/ Exceptions to WDL)  Alopecia: Grade 2    Endocrine  Assessment: Yes (Within Defined Limits)    Gastrointestinal  Assessment: Yes (Within Defined Limits)    Hemorrhage/Bleeding  Assessment: Yes (Within Defined Limits)    Infection  Assessment: Yes (Within Defined Limits)    Lymphatics  Assessment: Yes (Within Defined Limits)    Musculoskeletal  Assessment: Yes (Within Defined Limits)    Neurology  Assessment: Yes (w/ Exceptions to WDL)  Paresthesia: Grade 1    Ocular  Assessment: Yes (Within Defined Limits)    Pain  Assessment: Yes (w/ Exceptions to WDL)  Pain: Grade 1 (mild joint pain)    Pulmonary/Upper Respiratory  Assessment: Yes (Within Defined Limits)    Genitourinary  Assessment: Yes (Within Defined Limits)        Patient confirms receipt of a signed copy of Anti-Cancer Treatment consent and verbalizes understanding of treatment plan: Yes.     Pre-Treatment: Treatment consent signed  Patient has valid  pre-authorization  VS completed  Height and weight verified  BSA independently double checked & verified by two practitioners  Premed orders, including hydration, are verified prior to administration  Treatment parameters verified in patient protocol  Lab results reviewed: Treatment conditions met, ok to proceed with treatment   Chemotherapy doses independently doubled checked & verified by two practitioners    Treatment: I have reviewed the following with the patient:  Name of chemo drug, duration and route of infusion, and reportable infusion-related symptoms.  Chemotherapy has not ; double checked & verified by two practitioners  Appearance and physical integrity of drugs meets standard of drug monograph; double checked & verified by two practitioners  Rate set on infusion pump is in alignment with ordered rate; double checked & verified by two practitioners  Blood return confirmed before, during and after treatment administered  Infusion pump used for non-vesicant drugs  Drugs were administered in proper sequencing  Refer to [facility] and MAR for line assessment and medication administration    Post Treatment: Treatment tolerated well; no adverse reaction    Transfusion: Not needed    Integrative Medicine: No    Oral Chemotherapy: No    Education: No new instructions needed    Next appointment scheduled:   Future Appointments   Date Time Provider Department Center   2024 10:00 AM [facility] ACL LAB [facility]   2024 10:15 AM [facility] TREATMENT CHAIR [address]   2024  9:30 AM [facility] NM PET CT [id number] [facility]   2024  9:00 AM [facility] ACL LAB [facility]   2024  9:30 AM Suman Tripp MD [address]   2024  9:15 AM Lashanda Enriquez MD [facility]   10/15/2024  9:10 AM [facility]   2025  3:00 PM Nury Galicia, NP [facility]       Patient instructed to call the office with any questions or concerns.    Patient Discharged:  patient discharged to home per self, ambulatory     4 = No assist / stand by assistance

## 2024-08-07 NOTE — PROGRESS NOTE ADULT - PROBLEM SELECTOR PROBLEM 8
Arrived from PACU AXO, Pt's VSS; denies N/V; states pain is at tolerable level. Dressing CDI to left thigh.     @1605 D/c orders received. IV dc'd. Pt changed into clothing with assistance. Discharge reviewed, Pt and family verbalized understanding and questions answered. Patient states ready to d/c home. Pt dc'd in w/c with all belongings.   CAD (coronary artery disease) Hypertension

## 2025-02-24 NOTE — H&P ADULT - PROBLEM SELECTOR PROBLEM 4
Pancreatic cancer Quality 431: Preventive Care And Screening: Unhealthy Alcohol Use - Screening: Patient not identified as an unhealthy alcohol user when screened for unhealthy alcohol use using a systematic screening method Detail Level: Detailed Quality 130: Documentation Of Current Medications In The Medical Record: Current Medications Documented Quality 226: Preventive Care And Screening: Tobacco Use: Screening And Cessation Intervention: Patient screened for tobacco use and is an ex/non-smoker CAD (coronary artery disease)

## 2025-04-07 NOTE — ASU PATIENT PROFILE, ADULT - NS PRO ABUSE SCREEN AFRAID ANYONE YN
----- Message from Stefan RUSS sent at 4/7/2025  1:47 PM EDT -----  Regarding: [facility] Appointment Request  [facility] Appointment Request    Patient needs appointment for [facility] Appointment Type: Annual Visit.    Patient Requested Dates(s):  Patient Requested Time:  Provider Name: Aneudy Benton MD      Reason for Appointment Request: Established Patient - No appointments available during search/ Wellness  --------------------------------------------------------------------------------------------------------------------------    Relationship to Patient: Self     Call Back Information: OK to leave message on voicemail  Preferred Call Back Number: [phone]  
Returned patients call and got him set up for an appointment.   
no
